# Patient Record
Sex: MALE | Race: WHITE | NOT HISPANIC OR LATINO
[De-identification: names, ages, dates, MRNs, and addresses within clinical notes are randomized per-mention and may not be internally consistent; named-entity substitution may affect disease eponyms.]

---

## 2020-08-13 ENCOUNTER — TRANSCRIPTION ENCOUNTER (OUTPATIENT)
Age: 20
End: 2020-08-13

## 2020-08-15 ENCOUNTER — EMERGENCY (EMERGENCY)
Facility: HOSPITAL | Age: 20
LOS: 1 days | Discharge: HOME | End: 2020-08-15
Attending: EMERGENCY MEDICINE | Admitting: EMERGENCY MEDICINE
Payer: COMMERCIAL

## 2020-08-15 VITALS
HEART RATE: 90 BPM | RESPIRATION RATE: 18 BRPM | DIASTOLIC BLOOD PRESSURE: 75 MMHG | SYSTOLIC BLOOD PRESSURE: 128 MMHG | OXYGEN SATURATION: 98 % | TEMPERATURE: 97 F

## 2020-08-15 VITALS
SYSTOLIC BLOOD PRESSURE: 125 MMHG | RESPIRATION RATE: 18 BRPM | TEMPERATURE: 98 F | DIASTOLIC BLOOD PRESSURE: 75 MMHG | OXYGEN SATURATION: 97 % | HEART RATE: 88 BPM

## 2020-08-15 PROCEDURE — 10060 I&D ABSCESS SIMPLE/SINGLE: CPT | Mod: GC

## 2020-08-15 PROCEDURE — 99283 EMERGENCY DEPT VISIT LOW MDM: CPT | Mod: 25,GC

## 2020-08-15 RX ORDER — TETANUS TOXOID, REDUCED DIPHTHERIA TOXOID AND ACELLULAR PERTUSSIS VACCINE, ADSORBED 5; 2.5; 8; 8; 2.5 [IU]/.5ML; [IU]/.5ML; UG/.5ML; UG/.5ML; UG/.5ML
0.5 SUSPENSION INTRAMUSCULAR ONCE
Refills: 0 | Status: COMPLETED | OUTPATIENT
Start: 2020-08-15 | End: 2020-08-15

## 2020-08-15 RX ADMIN — Medication 300 MILLIGRAM(S): at 20:47

## 2020-08-15 RX ADMIN — TETANUS TOXOID, REDUCED DIPHTHERIA TOXOID AND ACELLULAR PERTUSSIS VACCINE, ADSORBED 0.5 MILLILITER(S): 5; 2.5; 8; 8; 2.5 SUSPENSION INTRAMUSCULAR at 20:47

## 2020-08-15 NOTE — ED PROVIDER NOTE - NSFOLLOWUPINSTRUCTIONS_ED_ALL_ED_FT
Abscess    An abscess is an infected area that contains a collection of pus and debris. It can occur in almost any part of the body and occurs when the tissue gets infection. Symptoms include a painful mass that is red, warm, tender that might break open and HAVE drainage. If your health care provider gave you antibiotics make sure to take the full course and do not stop even if feeling better.     SEEK IMMEDIATE MEDICAL CARE IF YOU HAVE ANY OF THE FOLLOWING SYMPTOMS: chills, fever, muscle aches, or red streaking from the area.     - Please discontinue Keflex    - Please take clindamycin 300mg every 6 hours for 10 days for abscess    - Please follow up with your Primary Care Doctor within 24-48 hours and bring your paperwork Abscess    An abscess is an infected area that contains a collection of pus and debris. It can occur in almost any part of the body and occurs when the tissue gets infection. Symptoms include a painful mass that is red, warm, tender that might break open and HAVE drainage. If your health care provider gave you antibiotics make sure to take the full course and do not stop even if feeling better.     SEEK IMMEDIATE MEDICAL CARE IF YOU HAVE ANY OF THE FOLLOWING SYMPTOMS: chills, fever, muscle aches, or red streaking from the area.     - Please discontinue Keflex    - Please take clindamycin 300mg every 6 hours for 10 days for abscess    - Please apply clean gauze every 24 hours to wound    - Please follow up with your Primary Care Doctor within 24-48 hours and bring your paperwork

## 2020-08-15 NOTE — ED PROVIDER NOTE - OBJECTIVE STATEMENT
20M h/o intellectual disability and non-verbal at baseline brought in from group home for "cyst" to right chest wall.  Unclear duration of lesion, currently on Keflex to treat presumed infection but little improvement.  Per group home attendant, no fevers and acting at baseline. 21y/o M w/ h/o intellectual disability and non-verbal at baseline brought in from group home for "cyst" to right chest wall.  Unclear duration of lesion, currently on Keflex to treat presumed infection but little improvement.  Per group home attendant, no fevers and acting at baseline.

## 2020-08-15 NOTE — ED PROVIDER NOTE - SKIN, MLM
+lesion over right upper anterior chest just under clavicle; 3x3cm, erythematous, fluctuant in center

## 2020-08-15 NOTE — ED PROCEDURE NOTE - PROCEDURE ADDITIONAL DETAILS
A [id number] perimeter abscess was drained with pus. loculations broken up and squeezed out with sterile 4x4 gauze. Abscess was irrigated with normal saline 3 x and then cleaned with a sterile gauze and tegaderm.

## 2020-08-15 NOTE — ED ADULT TRIAGE NOTE - CHIEF COMPLAINT QUOTE
Pt accompanied with staff from [facility] group home sent to ED for drainage of R. chest wall cyst. Pt currently on cephalexin. Pt refusing to keep face mask on. PMHx MR, nonverbal

## 2020-08-15 NOTE — ED PROVIDER NOTE - PATIENT PORTAL LINK FT
You can access the FollowMyHealth Patient Portal offered by [facility] by registering at the following website: [email or web address]. By joining [employer]’s FollowMyHealth portal, you will also be able to view your health information using other applications (apps) compatible with our system.

## 2020-08-15 NOTE — ED ADULT NURSE REASSESSMENT NOTE - NS ED NURSE REASSESS COMMENT FT1
received report from day shift Rn Paulina, pt resting comfortably. Group Home caretaker at bedside, pt in NAD. Meds given as ordered.

## 2020-08-15 NOTE — ED PROCEDURE NOTE - ATTENDING CONTRIBUTION TO CARE
Dr. Quarles:  I have personally performed a face to face bedside history and physical examination of this patient. I have discussed the history, examination, review of systems, assessment and plan of management with the resident. I have reviewed the electronic medical record and amended it to reflect my history, review of systems, physical exam, assessment and plan.

## 2021-01-22 ENCOUNTER — INPATIENT (INPATIENT)
Facility: HOSPITAL | Age: 21
LOS: 13 days | Discharge: HOME | End: 2021-02-05
Attending: STUDENT IN AN ORGANIZED HEALTH CARE EDUCATION/TRAINING PROGRAM | Admitting: STUDENT IN AN ORGANIZED HEALTH CARE EDUCATION/TRAINING PROGRAM
Payer: MEDICARE

## 2021-01-22 ENCOUNTER — EMERGENCY (EMERGENCY)
Facility: HOSPITAL | Age: 21
LOS: 0 days | Discharge: ANOTHER HOSPITAL | End: 2021-01-22
Attending: EMERGENCY MEDICINE
Payer: MEDICARE

## 2021-01-22 VITALS
HEART RATE: 86 BPM | OXYGEN SATURATION: 98 % | SYSTOLIC BLOOD PRESSURE: 138 MMHG | RESPIRATION RATE: 19 BRPM | DIASTOLIC BLOOD PRESSURE: 75 MMHG | WEIGHT: 315 LBS | TEMPERATURE: 99 F | HEIGHT: 70 IN

## 2021-01-22 DIAGNOSIS — K62.5 HEMORRHAGE OF ANUS AND RECTUM: ICD-10-CM

## 2021-01-22 DIAGNOSIS — D69.6 THROMBOCYTOPENIA, UNSPECIFIED: ICD-10-CM

## 2021-01-22 DIAGNOSIS — U07.1 COVID-19: ICD-10-CM

## 2021-01-22 PROBLEM — F79 UNSPECIFIED INTELLECTUAL DISABILITIES: Chronic | Status: ACTIVE | Noted: 2020-08-15

## 2021-01-22 LAB
ALBUMIN SERPL ELPH-MCNC: 3.2 G/DL — LOW (ref 3.3–5)
ALP SERPL-CCNC: 62 U/L — SIGNIFICANT CHANGE UP (ref 40–120)
ALT FLD-CCNC: 27 U/L — SIGNIFICANT CHANGE UP (ref 12–78)
ANION GAP SERPL CALC-SCNC: 5 MMOL/L — SIGNIFICANT CHANGE UP (ref 5–17)
ANISOCYTOSIS BLD QL: SLIGHT — SIGNIFICANT CHANGE UP
APTT BLD: 32.1 SEC — SIGNIFICANT CHANGE UP (ref 27.5–35.5)
AST SERPL-CCNC: 17 U/L — SIGNIFICANT CHANGE UP (ref 15–37)
BASOPHILS # BLD AUTO: 0.02 K/UL — SIGNIFICANT CHANGE UP (ref 0–0.2)
BASOPHILS NFR BLD AUTO: 0.3 % — SIGNIFICANT CHANGE UP (ref 0–2)
BILIRUB SERPL-MCNC: 0.4 MG/DL — SIGNIFICANT CHANGE UP (ref 0.2–1.2)
BLD GP AB SCN SERPL QL: SIGNIFICANT CHANGE UP
BUN SERPL-MCNC: 14 MG/DL — SIGNIFICANT CHANGE UP (ref 7–23)
CALCIUM SERPL-MCNC: 8.5 MG/DL — SIGNIFICANT CHANGE UP (ref 8.5–10.1)
CHLORIDE SERPL-SCNC: 104 MMOL/L — SIGNIFICANT CHANGE UP (ref 96–108)
CO2 SERPL-SCNC: 30 MMOL/L — SIGNIFICANT CHANGE UP (ref 22–31)
CREAT SERPL-MCNC: 1.11 MG/DL — SIGNIFICANT CHANGE UP (ref 0.5–1.3)
EOSINOPHIL # BLD AUTO: 0.02 K/UL — SIGNIFICANT CHANGE UP (ref 0–0.5)
EOSINOPHIL NFR BLD AUTO: 0.3 % — SIGNIFICANT CHANGE UP (ref 0–6)
GLUCOSE SERPL-MCNC: 96 MG/DL — SIGNIFICANT CHANGE UP (ref 70–99)
HCT VFR BLD CALC: 44.2 % — SIGNIFICANT CHANGE UP (ref 39–50)
HGB BLD-MCNC: 13.7 G/DL — SIGNIFICANT CHANGE UP (ref 13–17)
IMM GRANULOCYTES NFR BLD AUTO: 0.2 % — SIGNIFICANT CHANGE UP (ref 0–1.5)
INR BLD: 1.14 RATIO — SIGNIFICANT CHANGE UP (ref 0.88–1.16)
LYMPHOCYTES # BLD AUTO: 1.91 K/UL — SIGNIFICANT CHANGE UP (ref 1–3.3)
LYMPHOCYTES # BLD AUTO: 30.4 % — SIGNIFICANT CHANGE UP (ref 13–44)
MANUAL SMEAR VERIFICATION: YES — SIGNIFICANT CHANGE UP
MCHC RBC-ENTMCNC: 25.4 PG — LOW (ref 27–34)
MCHC RBC-ENTMCNC: 31 GM/DL — LOW (ref 32–36)
MCV RBC AUTO: 82 FL — SIGNIFICANT CHANGE UP (ref 80–100)
MONOCYTES # BLD AUTO: 0.78 K/UL — SIGNIFICANT CHANGE UP (ref 0–0.9)
MONOCYTES NFR BLD AUTO: 12.4 % — SIGNIFICANT CHANGE UP (ref 2–14)
NEUTROPHILS # BLD AUTO: 3.55 K/UL — SIGNIFICANT CHANGE UP (ref 1.8–7.4)
NEUTROPHILS NFR BLD AUTO: 56.4 % — SIGNIFICANT CHANGE UP (ref 43–77)
NRBC # BLD: 0 /100 WBCS — SIGNIFICANT CHANGE UP (ref 0–0)
PLAT MORPH BLD: NORMAL — SIGNIFICANT CHANGE UP
PLATELET # BLD AUTO: 1 K/UL — CRITICAL LOW (ref 150–400)
POTASSIUM SERPL-MCNC: 3.7 MMOL/L — SIGNIFICANT CHANGE UP (ref 3.5–5.3)
POTASSIUM SERPL-SCNC: 3.7 MMOL/L — SIGNIFICANT CHANGE UP (ref 3.5–5.3)
PROT SERPL-MCNC: 6.8 GM/DL — SIGNIFICANT CHANGE UP (ref 6–8.3)
PROTHROM AB SERPL-ACNC: 13.1 SEC — SIGNIFICANT CHANGE UP (ref 10.6–13.6)
RBC # BLD: 5.39 M/UL — SIGNIFICANT CHANGE UP (ref 4.2–5.8)
RBC # FLD: 14.6 % — HIGH (ref 10.3–14.5)
RBC BLD AUTO: SIGNIFICANT CHANGE UP
SODIUM SERPL-SCNC: 139 MMOL/L — SIGNIFICANT CHANGE UP (ref 135–145)
WBC # BLD: 6.29 K/UL — SIGNIFICANT CHANGE UP (ref 3.8–10.5)
WBC # FLD AUTO: 6.29 K/UL — SIGNIFICANT CHANGE UP (ref 3.8–10.5)

## 2021-01-22 PROCEDURE — 99284 EMERGENCY DEPT VISIT MOD MDM: CPT

## 2021-01-22 PROCEDURE — 70450 CT HEAD/BRAIN W/O DYE: CPT | Mod: 26,MA

## 2021-01-22 NOTE — ED ADULT NURSE REASSESSMENT NOTE - NS ED NURSE REASSESS COMMENT FT1
patient in no acute distress no changes in patient status, no available beds at this time , mother little upset about it

## 2021-01-22 NOTE — ED PROVIDER NOTE - OBJECTIVE STATEMENT
19yo obese male from group home\ presents with some BRB noted when wiping at group home and noted some on diaper. Pt in USOH, eating drinking normally, no apparent pain. no prior h/o GI bleed. Family did note some bruising. no epistaxis. [facility]: [phone], dr dove ([place]). other numbers ([phone], [phone], [phone], [phone], [phone])    pmh autism, nonverbal and h/o ITP since 18months. Pt was treated with steroids and IVIG in past. No treatment for past 10 years, but pt normally runs low in [id number]. Pt does get routine lab work. Pt normally asymptomatic, but occasional bruising.     No fever/chills, no SOB/cough, No abdominal pain, No V/D,  no changes in neurological status/function.

## 2021-01-22 NOTE — ED ADULT NURSE REASSESSMENT NOTE - NS ED NURSE REASSESS COMMENT FT1
Claire the [facility] aware patient mother is upset because the patient still has no bed she will come to talk with mother

## 2021-01-22 NOTE — ED PROVIDER NOTE - CARE PLAN
Principal Discharge DX:	Rectal bleed   Principal Discharge DX:	Rectal bleed  Secondary Diagnosis:	Thrombocytopenia

## 2021-01-22 NOTE — ED PROVIDER NOTE - PHYSICAL EXAMINATION
Gen: Alert, Well appearing. NAD    Head: NC, AT, PERRL, normal lids/conjunctiva   Neck: supple, no tenderness/meningismus  Pulm: Bilateral clear BS, normal resp effort  CV: RRR, no M/R/G, +dist pulses   Abd: soft, NT/ND, +BS, no guarding/rebound tenderness  Rectal: scant BRB on diaper, no hemorrhoids, masses, + scant BRB on glove  Mskel:  no edema/erythema/cyanosis   Skin: no rash, no bruising  Neuro: AAOx3, no sensory/motor deficits, CN 2-12 intact

## 2021-01-22 NOTE — ED PROVIDER NOTE - PROGRESS NOTE DETAILS
Janeth; pt signed out to me at 7 pm. platelets 1k, CT-H ordered to r/o spontaneous bleed. discussed results with parents, transfer to [facility] for hematology c/s and IVIG (no hematologist on call at [facility])

## 2021-01-23 VITALS
HEIGHT: 70 IN | OXYGEN SATURATION: 95 % | DIASTOLIC BLOOD PRESSURE: 69 MMHG | RESPIRATION RATE: 16 BRPM | TEMPERATURE: 97 F | SYSTOLIC BLOOD PRESSURE: 111 MMHG | HEART RATE: 98 BPM

## 2021-01-23 DIAGNOSIS — D69.3 IMMUNE THROMBOCYTOPENIC PURPURA: ICD-10-CM

## 2021-01-23 DIAGNOSIS — K92.2 GASTROINTESTINAL HEMORRHAGE, UNSPECIFIED: ICD-10-CM

## 2021-01-23 DIAGNOSIS — Z29.9 ENCOUNTER FOR PROPHYLACTIC MEASURES, UNSPECIFIED: ICD-10-CM

## 2021-01-23 DIAGNOSIS — F79 UNSPECIFIED INTELLECTUAL DISABILITIES: ICD-10-CM

## 2021-01-23 LAB
ALBUMIN SERPL ELPH-MCNC: 3.6 G/DL — SIGNIFICANT CHANGE UP (ref 3.3–5)
ALP SERPL-CCNC: 64 U/L — SIGNIFICANT CHANGE UP (ref 40–120)
ALT FLD-CCNC: 18 U/L — SIGNIFICANT CHANGE UP (ref 4–41)
ANION GAP SERPL CALC-SCNC: 11 MMOL/L — SIGNIFICANT CHANGE UP (ref 7–14)
APTT BLD: 31.4 SEC — SIGNIFICANT CHANGE UP (ref 27–36.3)
AST SERPL-CCNC: 19 U/L — SIGNIFICANT CHANGE UP (ref 4–40)
BASOPHILS # BLD AUTO: 0 K/UL — SIGNIFICANT CHANGE UP (ref 0–0.2)
BASOPHILS NFR BLD AUTO: 0 % — SIGNIFICANT CHANGE UP (ref 0–2)
BILIRUB SERPL-MCNC: 0.4 MG/DL — SIGNIFICANT CHANGE UP (ref 0.2–1.2)
BLD GP AB SCN SERPL QL: NEGATIVE — SIGNIFICANT CHANGE UP
BUN SERPL-MCNC: 20 MG/DL — SIGNIFICANT CHANGE UP (ref 7–23)
CALCIUM SERPL-MCNC: 8.8 MG/DL — SIGNIFICANT CHANGE UP (ref 8.4–10.5)
CHLORIDE SERPL-SCNC: 103 MMOL/L — SIGNIFICANT CHANGE UP (ref 98–107)
CO2 SERPL-SCNC: 25 MMOL/L — SIGNIFICANT CHANGE UP (ref 22–31)
CREAT SERPL-MCNC: 0.95 MG/DL — SIGNIFICANT CHANGE UP (ref 0.5–1.3)
EOSINOPHIL # BLD AUTO: 0 K/UL — SIGNIFICANT CHANGE UP (ref 0–0.5)
EOSINOPHIL NFR BLD AUTO: 0 % — SIGNIFICANT CHANGE UP (ref 0–6)
FERRITIN SERPL-MCNC: 53 NG/ML — SIGNIFICANT CHANGE UP (ref 30–400)
FIBRINOGEN PPP-MCNC: 536 MG/DL — HIGH (ref 290–520)
GLUCOSE SERPL-MCNC: 96 MG/DL — SIGNIFICANT CHANGE UP (ref 70–99)
HCT VFR BLD CALC: 44.2 % — SIGNIFICANT CHANGE UP (ref 39–50)
HCT VFR BLD CALC: 48.4 % — SIGNIFICANT CHANGE UP (ref 39–50)
HGB BLD-MCNC: 13.4 G/DL — SIGNIFICANT CHANGE UP (ref 13–17)
HGB BLD-MCNC: 14.7 G/DL — SIGNIFICANT CHANGE UP (ref 13–17)
IANC: 3.83 K/UL — SIGNIFICANT CHANGE UP (ref 1.5–8.5)
INR BLD: 1.16 RATIO — SIGNIFICANT CHANGE UP (ref 0.88–1.16)
LDH SERPL L TO P-CCNC: 183 U/L — SIGNIFICANT CHANGE UP (ref 135–225)
LYMPHOCYTES # BLD AUTO: 0.99 K/UL — LOW (ref 1–3.3)
LYMPHOCYTES # BLD AUTO: 15.8 % — SIGNIFICANT CHANGE UP (ref 13–44)
MCHC RBC-ENTMCNC: 25.1 PG — LOW (ref 27–34)
MCHC RBC-ENTMCNC: 25.6 PG — LOW (ref 27–34)
MCHC RBC-ENTMCNC: 30.3 GM/DL — LOW (ref 32–36)
MCHC RBC-ENTMCNC: 30.4 GM/DL — LOW (ref 32–36)
MCV RBC AUTO: 82.6 FL — SIGNIFICANT CHANGE UP (ref 80–100)
MCV RBC AUTO: 84.4 FL — SIGNIFICANT CHANGE UP (ref 80–100)
MONOCYTES # BLD AUTO: 0.55 K/UL — SIGNIFICANT CHANGE UP (ref 0–0.9)
MONOCYTES NFR BLD AUTO: 8.8 % — SIGNIFICANT CHANGE UP (ref 2–14)
NEUTROPHILS # BLD AUTO: 4.3 K/UL — SIGNIFICANT CHANGE UP (ref 1.8–7.4)
NEUTROPHILS NFR BLD AUTO: 66.7 % — SIGNIFICANT CHANGE UP (ref 43–77)
NRBC # BLD: 0 /100 WBCS — SIGNIFICANT CHANGE UP
NRBC # FLD: 0 K/UL — SIGNIFICANT CHANGE UP
OB PNL STL: POSITIVE
PLATELET # BLD AUTO: 13 K/UL — CRITICAL LOW (ref 150–400)
PLATELET # BLD AUTO: 2 K/UL — CRITICAL LOW (ref 150–400)
POTASSIUM SERPL-MCNC: 4.2 MMOL/L — SIGNIFICANT CHANGE UP (ref 3.5–5.3)
POTASSIUM SERPL-SCNC: 4.2 MMOL/L — SIGNIFICANT CHANGE UP (ref 3.5–5.3)
PROT SERPL-MCNC: 6.5 G/DL — SIGNIFICANT CHANGE UP (ref 6–8.3)
PROTHROM AB SERPL-ACNC: 13.2 SEC — SIGNIFICANT CHANGE UP (ref 10.6–13.6)
RBC # BLD: 5.24 M/UL — SIGNIFICANT CHANGE UP (ref 4.2–5.8)
RBC # BLD: 5.86 M/UL — HIGH (ref 4.2–5.8)
RBC # FLD: 14.5 % — SIGNIFICANT CHANGE UP (ref 10.3–14.5)
RBC # FLD: 14.6 % — HIGH (ref 10.3–14.5)
RH IG SCN BLD-IMP: POSITIVE — SIGNIFICANT CHANGE UP
RH IG SCN BLD-IMP: POSITIVE — SIGNIFICANT CHANGE UP
SODIUM SERPL-SCNC: 139 MMOL/L — SIGNIFICANT CHANGE UP (ref 135–145)
WBC # BLD: 5.09 K/UL — SIGNIFICANT CHANGE UP (ref 3.8–10.5)
WBC # BLD: 6.29 K/UL — SIGNIFICANT CHANGE UP (ref 3.8–10.5)
WBC # FLD AUTO: 5.09 K/UL — SIGNIFICANT CHANGE UP (ref 3.8–10.5)
WBC # FLD AUTO: 6.29 K/UL — SIGNIFICANT CHANGE UP (ref 3.8–10.5)

## 2021-01-23 PROCEDURE — 99223 1ST HOSP IP/OBS HIGH 75: CPT | Mod: GC

## 2021-01-23 PROCEDURE — 99222 1ST HOSP IP/OBS MODERATE 55: CPT | Mod: GC

## 2021-01-23 PROCEDURE — 99223 1ST HOSP IP/OBS HIGH 75: CPT

## 2021-01-23 PROCEDURE — 99285 EMERGENCY DEPT VISIT HI MDM: CPT | Mod: CS

## 2021-01-23 RX ORDER — PANTOPRAZOLE SODIUM 20 MG/1
80 TABLET, DELAYED RELEASE ORAL ONCE
Refills: 0 | Status: DISCONTINUED | OUTPATIENT
Start: 2021-01-23 | End: 2021-01-23

## 2021-01-23 RX ORDER — DEXAMETHASONE 0.5 MG/5ML
40 ELIXIR ORAL ONCE
Refills: 0 | Status: COMPLETED | OUTPATIENT
Start: 2021-01-23 | End: 2021-01-23

## 2021-01-23 RX ORDER — IMMUNE GLOBULIN (HUMAN) 10 G/100ML
105 INJECTION INTRAVENOUS; SUBCUTANEOUS ONCE
Refills: 0 | Status: DISCONTINUED | OUTPATIENT
Start: 2021-01-23 | End: 2021-01-23

## 2021-01-23 RX ORDER — IMMUNE GLOBULIN (HUMAN) 10 G/100ML
105 INJECTION INTRAVENOUS; SUBCUTANEOUS DAILY
Refills: 0 | Status: DISCONTINUED | OUTPATIENT
Start: 2021-01-23 | End: 2021-01-24

## 2021-01-23 RX ORDER — TRAZODONE HCL 50 MG
300 TABLET ORAL AT BEDTIME
Refills: 0 | Status: DISCONTINUED | OUTPATIENT
Start: 2021-01-23 | End: 2021-02-05

## 2021-01-23 RX ORDER — INFLUENZA VIRUS VACCINE 15; 15; 15; 15 UG/.5ML; UG/.5ML; UG/.5ML; UG/.5ML
0.5 SUSPENSION INTRAMUSCULAR ONCE
Refills: 0 | Status: DISCONTINUED | OUTPATIENT
Start: 2021-01-23 | End: 2021-02-05

## 2021-01-23 RX ORDER — BREXPIPRAZOLE 0.25 MG/1
6 TABLET ORAL DAILY
Refills: 0 | Status: DISCONTINUED | OUTPATIENT
Start: 2021-01-23 | End: 2021-02-05

## 2021-01-23 RX ORDER — DEXAMETHASONE 0.5 MG/5ML
40 ELIXIR ORAL DAILY
Refills: 0 | Status: COMPLETED | OUTPATIENT
Start: 2021-01-24 | End: 2021-01-26

## 2021-01-23 RX ADMIN — Medication 40 MILLIGRAM(S): at 06:06

## 2021-01-23 RX ADMIN — Medication 120 MILLIGRAM(S): at 05:33

## 2021-01-23 RX ADMIN — Medication 1 MILLIGRAM(S): at 22:49

## 2021-01-23 RX ADMIN — IMMUNE GLOBULIN (HUMAN) 175 GRAM(S): 10 INJECTION INTRAVENOUS; SUBCUTANEOUS at 15:03

## 2021-01-23 RX ADMIN — BREXPIPRAZOLE 6 MILLIGRAM(S): 0.25 TABLET ORAL at 22:49

## 2021-01-23 NOTE — CONSULT NOTE ADULT - ASSESSMENT
20M w/ autism (non-verbal), chronic ITP, CARLITOS (not on CPAP) and ? acid reflux, transferred from [facility] for Plt 1 and BRBPR. MICU consulted for low platelets in the setting of possible bleeding event.     Assessment:  - Unlikely to have major GIB given reported hx and current presentation: Hgb stable, HD stable, no BM x 24 hours, no signs of discomfort  - Possible hemorrhoidal bleed  - Heme following: Will give IVIG and Decadron  - No concerning signs of infection; no change in medications    Recommendations:  - C/w IVIG and Decadron per heme rec  - No Plt transfusion unless absolutely indicated  - Monitor for further episode of bleeding, and monitor hemodynamics  - Not a MICU candidate at this time. Please call back if (+) worsening clinical conditions.

## 2021-01-23 NOTE — CHART NOTE - NSCHARTNOTEFT_GEN_A_CORE
Heme Note    21 yo M hx of chronic ITP (since 18 months per ED),  follows with hematology at [place], autism presented from [facility] VS with plt 1 and BRBPR.  Rest of CBC within normal limits, CMP normal and coags normal.  CT head negative for bleed.  ED reports BRBPR has stopped and no other bleeding at this time.    Recommend  - platelet transfusion for count less <30,000 and active bleeding (per ED, patient's mother has refused at this time)  - recheck CBC  - check retic count, LDH, haptoglobin, HIV, hepatitis panel  - start dexamethasone 40 mg x 4 days  - start IVIG 1g/kg daily x 2-3 days  - monitor for bleeding closely  - obtain records from hematologist  - full consult to follow in AM    Shavon Nicolas DO  Hematology/Oncology Fellow, PGY6  Pager: [phone]

## 2021-01-23 NOTE — ED PROVIDER NOTE - CARE PLAN
Principal Discharge DX:	Idiopathic thrombocytopenia purpura   Principal Discharge DX:	Idiopathic thrombocytopenia purpura  Secondary Diagnosis:	GI bleed

## 2021-01-23 NOTE — ED ADULT NURSE NOTE - CHIEF COMPLAINT QUOTE
coming from [place] c/o rectal bleeding. Pt from group home and when he went to the bathroom they saw bright red blood in the toilet and his pullup. As per EMS platelet count at [place] was found to be 1. Past medical history ITP. Pt autistic, non-verbal at baseline, mother at bedside.

## 2021-01-23 NOTE — H&P ADULT - PROBLEM SELECTOR PLAN 1
acute on chronic, unclear last platelet count, please call [phone] Dr. Blanco's office on Monday to obtain baseline platelet count and additional collateral  platelet ct 2000 on admission  heme recc: 1u platelet STAT, dexamethasone 40 mg daily x 4 days (1/23-1/26), IVIG 1g/kg daily x 3 days (1/23-1/25)  transfuse platelet < 30,000 although pt's mother refuses transfusion given his baseline is possibly much lower  obtain post transfusion CBC, retic count, LDH, haptoglobin, HIV, hepatitis panel with next lab draw   per mother pt had severe rxn to chronic suppressive therapy such as rituximab and WhinRho leading to MICU admission  pt not a MICU candidate given hemodynamic stability  CT head negative for ICH  hold cimetidine for ?GERD until platelets stabilize given case reports of causing thrombocytopenia acute on chronic, unclear last platelet count, please call [phone] Dr. Blanco's office on Monday to obtain baseline platelet count and additional collateral  platelet ct 2000 on admission, fibrinogen inconsistent with DIC  heme recc: 1u platelet STAT, dexamethasone 40 mg daily x 4 days (1/23-1/26), IVIG 1g/kg daily x 3 days (1/23-1/25)  transfuse platelet < 30,000 although pt's mother refuses transfusion given his baseline is possibly much lower  obtain post transfusion CBC, retic count, LDH, haptoglobin, HIV, hepatitis panel with next lab draw   per mother pt had severe rxn to chronic suppressive therapy such as rituximab and WhinRho leading to MICU admission  pt not a MICU candidate given hemodynamic stability  CT head negative for ICH  hold cimetidine for ?GERD until platelets stabilize given case reports of causing thrombocytopenia acute on chronic, unclear last platelet count, please call [phone] Dr. Blanco's office on Monday to obtain baseline platelet count and additional collateral  platelet ct 2000 on admission, fibrinogen inconsistent with DIC  heme recc: 1u platelet STAT, dexamethasone 40 mg daily x 4 days (1/23-1/26), IVIG 1g/kg daily x 3 days (1/23-1/25)  transfuse platelet < 30,000 although pt's mother refuses transfusion given his baseline is possibly much lower  obtain COVID 19 PCR and post transfusion CBC, retic count, LDH, haptoglobin, HIV, hepatitis panel with next lab draw   pt not a MICU candidate given hemodynamic stability  CT head negative for ICH  hold cimetidine for ?GERD until platelets stabilize given case reports of causing thrombocytopenia  per mother pt had severe rxn to chronic suppressive therapy such as rituximab and WhinRho leading to MICU admission

## 2021-01-23 NOTE — CONSULT NOTE ADULT - ASSESSMENT
21 yo M hx of chronic ITP (since 18 months per ED),  follows with hematology at [place], autism presented from [facility] VS with plt 1 and BRBPR.    # History of chronic ITP  - Recommend platelet transfusion for count less <30,000 or active bleeding (per ED, patient's mother has refused at this time)  - Check CBC daily  - Please check retic count, LDH, haptoglobin, HIV, hepatitis panel  - Continue dexamethasone 40 mg daily x 4 days (1/23-1/26)  - Continue IVIG 1g/kg daily x 3 days (1/23-1/25)  - Monitor closely for s/s of active bleeding  - Please obtain records from outside Hematologist for review including most recent lab work and progress notes  - Peripheral smear will be reviewed    Gabbi Phillips MD  Hematology/Oncology Fellow, PGY-4  Pager: [phone]  After 5pm and on weekends please page on-call fellow   21 yo M hx of chronic ITP (since 18 months per ED),  follows with hematology at [place], autism presented from [facility] VS with plt 1 and BRBPR.    # History of chronic ITP  - Recommend platelet transfusion for count less <30,000 or active bleeding (per ED, patient's mother has refused at this time)  - Check CBC daily  - Please check retic count, LDH, haptoglobin, HIV, hepatitis panel  - Continue dexamethasone 40 mg daily x 4 days (1/23-1/26)  - Continue IVIG 1g/kg daily x 2 days (1/23-1/24)  - Monitor closely for s/s of active bleeding  - Please obtain records from outside Hematologist for review including most recent lab work and progress notes  - Peripheral smear will be reviewed    Gabbi Phillips MD  Hematology/Oncology Fellow, PGY-4  Pager: [phone]  After 5pm and on weekends please page on-call fellow   21 yo M hx of chronic ITP (since 18 months per ED),  follows with hematology at [place], autism presented from [facility] VS with plt 1 and BRBPR.    # History of chronic ITP  - Recommend platelet transfusion for count less <30,000 or active bleeding - 1u Plt currently running and no active bleeding noted per patient's mother at bedside currently  - Please check retic count, LDH, haptoglobin, HIV, hepatitis panel  - Continue dexamethasone 40 mg daily x 4 days (1/23-1/26)  - Continue IVIG 1g/kg daily x 2 days (1/23-1/24)  - Monitor closely for s/s of active bleeding, stat CBC  - Please obtain records from outside Hematologist for review including most recent lab work and progress notes  - Peripheral smear will be reviewed    Gabbi Phillips MD  Hematology/Oncology Fellow, PGY-4  Pager: [phone]  After 5pm and on weekends please page on-call fellow

## 2021-01-23 NOTE — ED PROVIDER NOTE - ATTENDING CONTRIBUTION TO CARE
HPI: 19yo obese M from group home accompanied by mother, autism, nonverbal and h/o chronic ITP c/b chronic bruising since 18months presents as transfer from [facility] with BRBPR in his diaper and on wiping, +platelet of 1. CTH at [facility] neg for bleed. Pt was treated with steroids and IVIG in past. No treatments needed for past 10 years. Pt does not take any medications. Per mom pt has been acting normally and no complaints and has been tolerating PO without issue.   EXAM: Obese, NAD, watching ipad, abd soft nontender, rectal (see resident note).   MDM: Pt with multiple medical problems that is non verbal here with mom, transferred from [facility] for reports of GIB and found to have platelets of 1000. Known to have ITP, mom reports pt has had multiple transfusions and IVIG in past. Will obtain repeat labs, consult Hematology and admit.

## 2021-01-23 NOTE — H&P ADULT - NSHPSOCIALHISTORY_GEN_ALL_CORE
Lives at [facility]. Parents are undergoing divorce. Mother denies toxic habits. Ambulates independently.

## 2021-01-23 NOTE — CONSULT NOTE ADULT - SUBJECTIVE AND OBJECTIVE BOX
HPI:  20M w/ autism (non-verbal), chronic ITP, CARLITOS (not on CPAP) and ? acid reflux, transferred from [facility] for Plt 1 and BRBPR.   Mother Nicole is at bedside and providing history.   Patient lives in a group home, and mother was notified about "GI bleed" today after patient was sent to urgent care then ED. "GI bleed" was described as bright red blood while wiping, and bloody streak on the diaper. Mother saw blood in diaper while in [facility], and reported a thin streak of bright red smear of blood along the middle of the diaper. Otherwise no visualized gross bleeding, and no BM since yesterday. Patient is non-verbal, so no known complaints. Per mother, patient would usually moan and became restlessness with discomfort, which she has not observed today. No known sick contacts in group home, and no recent signs of illness. Denied fever, cough, difficulty breathing, change in appetite, abdominal discomfort, diarrhea, discomfort with urination or BLE pain.   Patient was diagnosed with ITP since 18 months. Plt usually runs in 10K range. Follows with hematology at [place]. Over the years, they have become more conservative in treatments given that patient would have very low level of platelets without major bleeding events. Patient was treated with IVIG and steroids in the remote past. Last treatment with steroids was about 10 years ago. Last episode of major flare was about 5 years ago when he had diffuse petechiae without bleeding, and plt was about 5K at that time.  No recent labs until this episode.       PAST MEDICAL & SURGICAL HISTORY:  Intellectual disability    No significant past surgical history        Allergies    Bactrim (Hives)  Rituxan (Hives)  WinRho SDF (Hives)    Intolerances        MEDICATIONS  (STANDING):  immune   globulin 10% (GAMMAGARD) IVPB 105 Gram(s) IV Intermittent Once  influenza   Vaccine 0.5 milliLiter(s) IntraMuscular once    MEDICATIONS  (PRN):      FAMILY HISTORY:      SOCIAL HISTORY: No EtOH, no tobacco    REVIEW OF SYSTEMS:  Unable to obtain, patient non-verbal    Height (cm): 180.3 (01-23 @ 07:47), 177.8 (01-22 @ 16:27)  Weight (kg): 151.6 (01-23 @ 07:47), 158.8 (01-22 @ 16:27)  BMI (kg/m2): 46.6 (01-23 @ 07:47), 50.2 (01-22 @ 16:27)  BSA (m2): 2.62 (01-23 @ 07:47), 2.65 (01-22 @ 16:27)    T(F): 98 (01-23-21 @ 07:47), Max: 99.3 (01-23-21 @ 02:40)  HR: 88 (01-23-21 @ 07:47)  BP: 117/66 (01-23-21 @ 07:47)  RR: 18 (01-23-21 @ 07:47)  SpO2: 95% (01-23-21 @ 07:47)  Wt(kg): --    GENERAL: NAD, Resting in bed, sleeping  Eyes: Not assessed due to sleeping  HENT:  Head atraumatic  NECK: Supple  CHEST/LUNG: Clear to auscultation bilaterally, though difficult exam due to obesity and patient position; No rales, rhonchi, wheezing, or rubs. Unlabored respirations on room air; (+) snoring  HEART: Regular rate and rhythm; No murmurs, rubs, or gallops appreciated  ABDOMEN: Bowel sounds present; Soft, Nontender, Nondistended  EXTREMITIES:  2+ Peripheral Pulses, brisk capillary refill. No clubbing, cyanosis, or edema  NERVOUS SYSTEM: Sleeping, non-verbal at baseline, not fully assessed 2/2 sleeping; spontaneously moving all extremities and turning in bed  SKIN: (+) scattered petechia and faint ecchymoses throughout the body, but no clusters; chronic appearing dark discoloration at b/l ankles with dry skin                          13.4   6.29  )-----------( 2        ( 23 Jan 2021 03:50 )             44.2       01-23    139  |  103  |  20  ----------------------------<  96  4.2   |  25  |  0.95    Ca    8.8      23 Jan 2021 03:50    TPro  6.5  /  Alb  3.6  /  TBili  0.4  /  DBili  x   /  AST  19  /  ALT  18  /  AlkPhos  64  01-23      Lactate Dehydrogenase, Serum: 183 U/L (01-23 @ 03:50)      PT/INR - ( 23 Jan 2021 03:50 )   PT: 13.2 sec;   INR: 1.16 ratio         PTT - ( 23 Jan 2021 03:50 )  PTT:31.4 sec     HPI:  20M w/ autism (non-verbal), chronic ITP, CARLITOS (not on CPAP) and ? acid reflux, transferred from [facility] for Plt 1 and BRBPR.   Mother Nicole is at bedside and providing history.   Patient lives in a group home, and mother was notified about "GI bleed" today after patient was sent to urgent care then ED. "GI bleed" was described as bright red blood while wiping, and bloody streak on the diaper. Mother saw blood in diaper while in [facility], and reported a thin streak of bright red smear of blood along the middle of the diaper. Otherwise no visualized gross bleeding, and no BM since yesterday. Patient is non-verbal, so no known complaints. Per mother, patient would usually moan and became restlessness with discomfort, which she has not observed today. No known sick contacts in group home, and no recent signs of illness. Denied fever, cough, difficulty breathing, change in appetite, abdominal discomfort, diarrhea, discomfort with urination or BLE pain.   Patient was diagnosed with ITP since 18 months. Plt usually runs in 10K range, but she notes he hasn't had regular CBCs in a long time, years. Follows with hematology at [place]. Over the years, they have become more conservative in treatments given that patient would have very low level of platelets without major bleeding events. Patient was treated with IVIG and steroids in the remote past. Last treatment with steroids was about 10 years ago. Last episode of major flare was about 5 years ago when he had diffuse petechiae without bleeding, and plt was about 5K at that time.  No recent labs until this episode.       PAST MEDICAL & SURGICAL HISTORY:  Intellectual disability    No significant past surgical history        Allergies    Bactrim (Hives)  Rituxan (Hives)  WinRho SDF (Hives)    Intolerances        MEDICATIONS  (STANDING):  immune   globulin 10% (GAMMAGARD) IVPB 105 Gram(s) IV Intermittent Once  influenza   Vaccine 0.5 milliLiter(s) IntraMuscular once    MEDICATIONS  (PRN):      FAMILY HISTORY:      SOCIAL HISTORY: No EtOH, no tobacco    REVIEW OF SYSTEMS:  Unable to obtain, patient non-verbal    Height (cm): 180.3 (01-23 @ 07:47), 177.8 (01-22 @ 16:27)  Weight (kg): 151.6 (01-23 @ 07:47), 158.8 (01-22 @ 16:27)  BMI (kg/m2): 46.6 (01-23 @ 07:47), 50.2 (01-22 @ 16:27)  BSA (m2): 2.62 (01-23 @ 07:47), 2.65 (01-22 @ 16:27)    T(F): 98 (01-23-21 @ 07:47), Max: 99.3 (01-23-21 @ 02:40)  HR: 88 (01-23-21 @ 07:47)  BP: 117/66 (01-23-21 @ 07:47)  RR: 18 (01-23-21 @ 07:47)  SpO2: 95% (01-23-21 @ 07:47)  Wt(kg): --    GENERAL: NAD, Resting in bed, sleeping  Eyes: Not assessed due to sleeping  HENT:  Head atraumatic  NECK: Supple  CHEST/LUNG: Clear to auscultation bilaterally, though difficult exam due to obesity and patient position; No rales, rhonchi, wheezing, or rubs. Unlabored respirations on room air; (+) snoring  HEART: Regular rate and rhythm; No murmurs, rubs, or gallops appreciated  ABDOMEN: Bowel sounds present; Soft, Nontender, Nondistended  EXTREMITIES:  2+ Peripheral Pulses, brisk capillary refill. No clubbing, cyanosis, or edema  NERVOUS SYSTEM: Sleeping, non-verbal at baseline, not fully assessed 2/2 sleeping; spontaneously moving all extremities and turning in bed  SKIN: (+) scattered petechia and faint ecchymoses throughout the body, but no clusters; chronic appearing dark discoloration at b/l ankles with dry skin                          13.4   6.29  )-----------( 2        ( 23 Jan 2021 03:50 )             44.2       01-23    139  |  103  |  20  ----------------------------<  96  4.2   |  25  |  0.95    Ca    8.8      23 Jan 2021 03:50    TPro  6.5  /  Alb  3.6  /  TBili  0.4  /  DBili  x   /  AST  19  /  ALT  18  /  AlkPhos  64  01-23      Lactate Dehydrogenase, Serum: 183 U/L (01-23 @ 03:50)      PT/INR - ( 23 Jan 2021 03:50 )   PT: 13.2 sec;   INR: 1.16 ratio         PTT - ( 23 Jan 2021 03:50 )  PTT:31.4 sec

## 2021-01-23 NOTE — H&P ADULT - ASSESSMENT
20M w/ autism (non-verbal), chronic ITP, CARLITOS (not on CPAP) and ? acid reflux admitted for acute lower GI bleed in setting of acute on chronic ITP, unclear inciting factor. Hgb stable, hemodynamically stable.

## 2021-01-23 NOTE — ED PROVIDER NOTE - OBJECTIVE STATEMENT
19yo obese male from group home, autism, nonverbal and h/o chronic ITP c/n chronic bruising since 18months presents as transfer from  with BRBPR and platelet of 1. Pt was treated with steroids and IVIG in past. No treatment for past 10 years. Pt does not take any medications. 21yo obese male from group home, autism, nonverbal and h/o chronic ITP c/b chronic bruising since 18months presents as transfer from  with BRBPR found in his diaper and platelet of 1. CTH at [facility] neg for bleed. Pt was treated with steroids and IVIG in past. No treatments needed for past 10 years. Pt does not take any medications. 19yo obese male from group home, autism, nonverbal and h/o chronic ITP c/b chronic bruising since 18months presents as transfer from  with BRBPR in his diaper and on wiping, +platelet of 1. CTH at [facility] neg for bleed. Pt was treated with steroids and IVIG in past. No treatments needed for past 10 years. Pt does not take any medications. ***HISTORY PER MOM***  21yo obese male from group home, autism, nonverbal and h/o chronic ITP c/b chronic bruising since 18months presents as transfer from  with BRBPR in his diaper and on wiping, +platelet of 1. CTH at [facility] neg for bleed. Pt was treated with steroids and IVIG in past. No treatments needed for past 10 years. Pt does not take any medications.

## 2021-01-23 NOTE — CONSULT NOTE ADULT - ASSESSMENT
IMPRESSION:   #BRBPR: pt w/ episode of small volume BRBPR when wiping + on diaper. Remains HDS w/ Hb stable in 13s. Most likely ddx 2/2 hemorrhoids v less likely rectal ulcers, malignancy, diverticulosis, angiectasias. Pt without clinically significant GI bleeding at this time, would not recommend colonoscopy considering severe thrombocytopenia.       RECOMMENDATION:   - Trend CBC, transfuse Hb < 1  - PLT transfusion goals per heme (PLT < 30 or active bleeding) -- mom refused in the ED   -  Aloe wipes  - High fiber diet   - Due to severe thrombocytopenia, the risks of endoscopic intervention would far outweigh benefits       Thank you for involving us in the care of this patient, please reach out if any further questions.     Chuck Mccarthy MD  Gastroenterology Fellow, PGY4    Available on Microsoft Teams  [phone] ([facility])  [phone] ([facility])  Please contact on call fellow weekdays after 5pm-7am and weekends: [phone]          IMPRESSION:   #BRBPR: pt w/ episode of small volume BRBPR when wiping + on diaper. Remains HDS w/ Hb stable in 13s. Most likely LGIB exacerbated in the setting of thrombocytopenia, ddx 2/2 very likely hemorrhoids v less likely rectal ulcers, malignancy, diverticulosis, angiectasias. Pt without clinically significant GI bleeding at this time, would not recommend colonoscopy considering severe thrombocytopenia.       RECOMMENDATION:   - Trend CBC, transfuse Hb < 7  - PLT transfusion goals per heme (PLT < 30 or active bleeding) -- mom refused in the ED   -  Aloe wipes  - High fiber diet  - Miralax daily, titrate to 1 soft BM daily   - Due to severe thrombocytopenia, the risks of endoscopic intervention would far outweigh benefits       Thank you for involving us in the care of this patient, please reach out if any further questions.     Chuck Mccarthy MD  Gastroenterology Fellow, PGY4    Available on Microsoft Teams  [phone] ([facility])  [phone] ([facility])  Please contact on call fellow weekdays after 5pm-7am and weekends: [phone]

## 2021-01-23 NOTE — H&P ADULT - NSHPPHYSICALEXAM_GEN_ALL_CORE
Vital Signs Last 24 Hrs  T(C): 37.1 (23 Jan 2021 10:39), Max: 37.4 (23 Jan 2021 02:40)  T(F): 98.7 (23 Jan 2021 10:39), Max: 99.3 (23 Jan 2021 02:40)  HR: 82 (23 Jan 2021 10:39) (82 - 98)  BP: 126/74 (23 Jan 2021 10:39) (111/69 - 138/75)  BP(mean): --  RR: 18 (23 Jan 2021 10:39) (16 - 19)  SpO2: 99% (23 Jan 2021 10:39) (95% - 99%)    General: agitated, asking for food via special needs ipad, sitting upright in chair  Neurology: Unable to assess due to mental status, unable to follow simple commands  Eyes: PERRL, anicteric  ENT/Neck: Neck supple, trachea midline, No JVD  Respiratory: CTA B/L, No wheezing, rales, rhonchi  CV: RRR, S1S2, no murmurs, rubs or gallops  Abdominal: Soft, NT, ND +BS,   Extremities: No edema, + peripheral pulses  Skin: faint abdominal ecchymosis, b/l LE xerosis with chronic hyperpigmentation  MSK: No joint effusion or joint tenderness noted

## 2021-01-23 NOTE — H&P ADULT - PROBLEM SELECTOR PLAN 2
Hgb stable 13.4, FOBT pos, per ED physician, no noticeable external hemorrhoids  last BRBPR yesterday PM  ED consulted GI via email, pending reccs Hgb stable 13.4, FOBT pos, Coags wnl, per ED physician, no noticeable external hemorrhoids  last BRBPR yesterday PM  ED consulted GI via email, pending reccs

## 2021-01-23 NOTE — ED PROVIDER NOTE - PHYSICAL EXAMINATION
Physical Examination: Gen: NAD, non-toxic, conversational  Eyes: PERRLA, EOMI   HENT: Normocephalic, atraumatic. External ears normal, no rhinorrhea, moist mucous membranes.   CV: RRR, no M/R/G  Resp: CTAB, non-labored, speaking without difficulty on room air  Abd: soft, non-tender, non rigid, no guarding or rebound tenderness  Back: No CVAT bilaterally, no midline ttp  Skin: purpura to abd wall  Neuro: awake and alert, non verbal, NICHOLS without laterality  Psych: Mood okay, affect euthymic Physical Examination: Gen: NAD, non-toxic, conversational  Eyes: PERRLA, EOMI   HENT: Normocephalic, atraumatic. External ears normal, no rhinorrhea, moist mucous membranes.   CV: RRR, no M/R/G  Resp: CTAB, non-labored, speaking without difficulty on room air  Abd: soft, non-tender, non rigid, no guarding or rebound tenderness, brown stool on rectal exam (chaperone RN Kale)  Back: No CVAT bilaterally, no midline ttp  Skin: purpura to abd wall  Neuro: awake and alert, non verbal, NICHOLS without laterality  Psych: Mood okay, affect euthymic

## 2021-01-23 NOTE — CONSULT NOTE ADULT - SUBJECTIVE AND OBJECTIVE BOX
Chief Complaint:  Patient is a 20y old  Male who presents with a chief complaint of BRBPR (23 Jan 2021 10:44)      HPI: 20M w/ autism (non-verbal), chronic ITP, CARLITOS (not on CPAP) and ? acid reflux, transferred from [facility] for BRBPR.     Pt non verbal, history provided by mother who reported there was bright red blood when wiping and then a thin smear of blood in diaper. Denies tram hematochezia, melena, abd pain, n/v/d/f/c.     In ED VSS  Hb 13.7 --> 13.4   PLT 1 --> 2    Allergies:  Bactrim (Hives)  Rituxan (Hives)  WinRho SDF (Hives)      Home Medications:    Hospital Medications:  brexpiprazole 6 milliGRAM(s) Oral daily  immune   globulin 10% (GAMMAGARD) IVPB 105 Gram(s) IV Intermittent daily  influenza   Vaccine 0.5 milliLiter(s) IntraMuscular once  LORazepam     Tablet 1 milliGRAM(s) Oral at bedtime  traZODone 300 milliGRAM(s) Oral at bedtime      PMHX/PSHX:  Chronic ITP (idiopathic thrombocytopenia)    Intellectual disability    No significant past surgical history        Family history:  FH: hypertension        Denies family history of colon cancer/polyps, stomach cancer/polyps, pancreatic cancer/masses, liver cancer/disease, ovarian cancer and endometrial cancer.    Social History:     Tob: Denies  EtOH: Denies  Illicit Drugs: Denies    ROS:     General:  No wt loss, fevers, chills, night sweats, fatigue  Eyes:  Good vision, no reported pain  ENT:  No sore throat, pain, runny nose, dysphagia  CV:  No pain, palpitations, hypo/hypertension  Pulm:  No dyspnea, cough, tachypnea, wheezing  GI:  No pain, No nausea, No vomiting, No diarrhea, No constipation, No weight loss, No fever, No pruritis, No rectal bleeding, No tarry stools, No dysphagia,  :  No pain, bleeding, incontinence, nocturia  Muscle:  No pain, weakness  Neuro:  No weakness, tingling, memory problems  Psych:  No fatigue, insomnia, mood problems, depression  Endocrine:  No polyuria, polydipsia, cold/heat intolerance  Heme:  No petechiae, ecchymosis, easy bruisability  Skin:  No rash, tattoos, scars, edema    PHYSICAL EXAM:     GENERAL:  No acute distress  HEENT:  Normocephalic/atraumatic, no scleral icterus  CHEST:  Clear to auscultation bilaterally, no wheezes/rales/ronchi, no accessory muscle use  HEART:  Regular rate and rhythm, no murmurs/rubs/gallops  ABDOMEN:  Soft, non-tender, non-distended, normoactive bowel sounds,  no masses, no hepato-splenomegaly, no signs of chronic liver disease  EXTREMITIES: No cyanosis, clubbing, or edema  SKIN:  No rash/erythema/ecchymoses/petechiae/wounds/abscess/warm/dry  NEURO:  Alert and oriented x 3, no asterixis    Vital Signs:  Vital Signs Last 24 Hrs  T(C): 37.1 (23 Jan 2021 10:45), Max: 37.4 (23 Jan 2021 02:40)  T(F): 98.7 (23 Jan 2021 10:45), Max: 99.3 (23 Jan 2021 02:40)  HR: 85 (23 Jan 2021 10:45) (82 - 98)  BP: 110/75 (23 Jan 2021 10:45) (110/75 - 138/75)  BP(mean): --  RR: 18 (23 Jan 2021 10:39) (16 - 19)  SpO2: 99% (23 Jan 2021 10:39) (95% - 99%)  Daily Height in cm: 180.34 (23 Jan 2021 07:47)    Daily     LABS:                        13.4   6.29  )-----------( 2        ( 23 Jan 2021 03:50 )             44.2     Mean Cell Volume: 84.4 fL (01-23-21 @ 03:50)    01-23    139  |  103  |  20  ----------------------------<  96  4.2   |  25  |  0.95    Ca    8.8      23 Jan 2021 03:50    TPro  6.5  /  Alb  3.6  /  TBili  0.4  /  DBili  x   /  AST  19  /  ALT  18  /  AlkPhos  64  01-23    LIVER FUNCTIONS - ( 23 Jan 2021 03:50 )  Alb: 3.6 g/dL / Pro: 6.5 g/dL / ALK PHOS: 64 U/L / ALT: 18 U/L / AST: 19 U/L / GGT: x           PT/INR - ( 23 Jan 2021 03:50 )   PT: 13.2 sec;   INR: 1.16 ratio         PTT - ( 23 Jan 2021 03:50 )  PTT:31.4 sec                            13.4   6.29  )-----------( 2        ( 23 Jan 2021 03:50 )             44.2                         13.7   6.29  )-----------( 1        ( 22 Jan 2021 18:46 )             44.2       Imaging:           Chief Complaint:  Patient is a 20y old  Male who presents with a chief complaint of BRBPR (23 Jan 2021 10:44)      HPI: 20M w/ autism (non-verbal), chronic ITP, CARLITOS (not on CPAP) and ? acid reflux, transferred from [facility] for BRBPR.     Pt non verbal, history provided by mother who reported there was bright red blood when wiping and then a thin smear of blood in diaper. Denies tram hematochezia, melena, abd pain, n/v/d/f/c. No prior Hx of GI bleeding. Has lost 45lbs in last year intentionally.    In ED VSS  Hb 13.7 --> 13.4   PLT 1 --> 2    Allergies:  Bactrim (Hives)  Rituxan (Hives)  WinRho SDF (Hives)      Home Medications:    Hospital Medications:  brexpiprazole 6 milliGRAM(s) Oral daily  immune   globulin 10% (GAMMAGARD) IVPB 105 Gram(s) IV Intermittent daily  influenza   Vaccine 0.5 milliLiter(s) IntraMuscular once  LORazepam     Tablet 1 milliGRAM(s) Oral at bedtime  traZODone 300 milliGRAM(s) Oral at bedtime      PMHX/PSHX:  Chronic ITP (idiopathic thrombocytopenia)    Intellectual disability    No significant past surgical history        Family history:  FH: hypertension        Denies family history of colon cancer/polyps, stomach cancer/polyps, pancreatic cancer/masses, liver cancer/disease, ovarian cancer and endometrial cancer.    Social History:     Tob: Denies  EtOH: Denies  Illicit Drugs: Denies    ROS:     General:  No wt loss, fevers, chills, night sweats, fatigue  Eyes:  Good vision, no reported pain  ENT:  No sore throat, pain, runny nose, dysphagia  CV:  No pain, palpitations, hypo/hypertension  Pulm:  No dyspnea, cough, tachypnea, wheezing  GI: see above  :  No pain, bleeding, incontinence, nocturia  Muscle:  No pain, weakness  Neuro:  No weakness, tingling, memory problems  Psych:  No fatigue, insomnia, mood problems, depression  Endocrine:  No polyuria, polydipsia, cold/heat intolerance  Heme:  No petechiae, ecchymosis, easy bruisability  Skin:  No rash, tattoos, scars, edema    PHYSICAL EXAM:     GENERAL:  No acute distress, WD,  WN, non verbal   HEENT:  Normocephalic/atraumatic, no scleral icterus  CHEST:  Clear to auscultation bilaterally, no wheezes/rales/ronchi, no accessory muscle use  HEART:  Regular rate and rhythm, no murmurs/rubs/gallops  ABDOMEN:  +obese, Soft, non-tender, non-distended, normoactive bowel sounds  RECTAL: +brown smear, no external hemorrhoids seen  EXTREMITIES: No cyanosis, clubbing, or edema  SKIN:  No rash/erythema/ecchymoses/petechiae/wounds/abscess/warm/dry  NEURO:  Alert and oriented x 3, no asterixis    Vital Signs:  Vital Signs Last 24 Hrs  T(C): 37.1 (23 Jan 2021 10:45), Max: 37.4 (23 Jan 2021 02:40)  T(F): 98.7 (23 Jan 2021 10:45), Max: 99.3 (23 Jan 2021 02:40)  HR: 85 (23 Jan 2021 10:45) (82 - 98)  BP: 110/75 (23 Jan 2021 10:45) (110/75 - 138/75)  BP(mean): --  RR: 18 (23 Jan 2021 10:39) (16 - 19)  SpO2: 99% (23 Jan 2021 10:39) (95% - 99%)  Daily Height in cm: 180.34 (23 Jan 2021 07:47)    Daily     LABS:                        13.4   6.29  )-----------( 2        ( 23 Jan 2021 03:50 )             44.2     Mean Cell Volume: 84.4 fL (01-23-21 @ 03:50)    01-23    139  |  103  |  20  ----------------------------<  96  4.2   |  25  |  0.95    Ca    8.8      23 Jan 2021 03:50    TPro  6.5  /  Alb  3.6  /  TBili  0.4  /  DBili  x   /  AST  19  /  ALT  18  /  AlkPhos  64  01-23    LIVER FUNCTIONS - ( 23 Jan 2021 03:50 )  Alb: 3.6 g/dL / Pro: 6.5 g/dL / ALK PHOS: 64 U/L / ALT: 18 U/L / AST: 19 U/L / GGT: x           PT/INR - ( 23 Jan 2021 03:50 )   PT: 13.2 sec;   INR: 1.16 ratio         PTT - ( 23 Jan 2021 03:50 )  PTT:31.4 sec                            13.4   6.29  )-----------( 2        ( 23 Jan 2021 03:50 )             44.2                         13.7   6.29  )-----------( 1        ( 22 Jan 2021 18:46 )             44.2       Imaging:

## 2021-01-23 NOTE — CONSULT NOTE ADULT - SUBJECTIVE AND OBJECTIVE BOX
HPI:  20M w/ autism (non-verbal), chronic ITP, CARLITOS (not on CPAP) and ? acid reflux, transferred from [facility] for Plt 1 and BRBPR.   Mother Nicole is at bedside and providing history.   Patient lives in a group home, and mother was notified about "GI bleed" today after patient was sent to urgent care then ED. "GI bleed" was described as bright red blood while wiping, and bloody streak on the diaper. Mother saw blood in diaper while in [facility], and reported a thin streak of bright red smear of blood along the middle of the diaper. Otherwise no visualized gross bleeding, and no BM since yesterday. Patient is non-verbal, so no known complaints. Per mother, patient would usually moan and became restlessness with discomfort, which she has not observed today. No known sick contacts in group home, and no recent signs of illness. Denied fever, cough, difficulty breathing, change in appetite, abdominal discomfort, diarrhea, discomfort with urination or BLE pain.   Patient was diagnosed with ITP since 18 months. Plt usually runs in 10K range. Follows with hematology at [place]. Over the years, they have become more conservative in treatments given that patient would have very low level of platelets without major bleeding events. Patient was treated with IVIG and steroids in the remote past. Last treatment with steroids was about 10 years ago. Last episode of major flare was about 5 years ago when he had diffuse petechiae without bleeding, and plt was about 5K at that time.  No recent labs until this episode.       PAST MEDICAL & SURGICAL HISTORY:  Intellectual disability  Chronic ITP  CARLITOS (not on CPAP  ? acid reflux  Undescended testes s/p repair at 13 months      FAMILY HISTORY:  Not contributory    SOCIAL HISTORY:  No toxic behaviors  Lives in group home, non-verbal    Allergies    Bactrim (Hives)  Rituxan & WinRho SDF (one is serum sickness and the other one anaphylaxis, but not sure which is which)    Intolerances        HOME MEDICATIONS:  Ativan 1 mg PO at bedtime  Trazadone 300 mg PO at bedtime  Cimetidine (dose in paperwork)  Rexulti 6 mg PO QD    REVIEW OF SYSTEMS:  [ ] All other systems negative  [X ] Unable to assess ROS because patient is non-verbal; See HPI for ROS reported by mother    OBJECTIVE:  ICU Vital Signs Last 24 Hrs  T(C): 37.4 (23 Jan 2021 02:40), Max: 37.4 (23 Jan 2021 02:40)  T(F): 99.3 (23 Jan 2021 02:40), Max: 99.3 (23 Jan 2021 02:40)  HR: 96 (23 Jan 2021 00:56) (86 - 98)  BP: 123/63 (23 Jan 2021 00:56) (111/69 - 138/75)  BP(mean): --  ABP: --  ABP(mean): --  RR: 16 (23 Jan 2021 00:56) (16 - 19)  SpO2: 98% (23 Jan 2021 00:56) (95% - 98%)      PHYSICAL EXAM:  GENERAL: NAD, Resting in bed, sleeping  Eyes: Not assessed due to sleeping  HENT:  Head atraumatic  NECK: Supple  CHEST/LUNG: Clear to auscultation bilaterally, though difficult exam due to obesity and patient position; No rales, rhonchi, wheezing, or rubs. Unlabored respirations on room air; (+) snoring  HEART: Regular rate and rhythm; No murmurs, rubs, or gallops appreciated  ABDOMEN: Bowel sounds present; Soft, Nontender, Nondistended  EXTREMITIES:  2+ Peripheral Pulses, brisk capillary refill. No clubbing, cyanosis, or edema  NERVOUS SYSTEM: Sleeping, non-verbal at baseline, not fully assessed 2/2 sleeping; spontaneously moving all extremities and turning in bed  SKIN: (+) scattered petechia and faint ecchymoses throughout the body, but no clusters; chronic appearing dark discoloration at b/l ankles with dry skin  Psych: Sleeping    HOSPITAL MEDICATIONS:  MEDICATIONS  (STANDING):  immune   globulin 10% (GAMMAGARD) IVPB 105 Gram(s) IV Intermittent Once    MEDICATIONS  (PRN):      LABS:                        13.4   6.29  )-----------( 2        ( 23 Jan 2021 03:50 )             44.2     01-23    139  |  103  |  20  ----------------------------<  96  4.2   |  25  |  0.95    Ca    8.8      23 Jan 2021 03:50    TPro  6.5  /  Alb  3.6  /  TBili  0.4  /  DBili  x   /  AST  19  /  ALT  18  /  AlkPhos  64  01-23    PT/INR - ( 23 Jan 2021 03:50 )   PT: 13.2 sec;   INR: 1.16 ratio         PTT - ( 23 Jan 2021 03:50 )  PTT:31.4 sec      MICROBIOLOGY:     RADIOLOGY:  ct< from: CT Head No Cont (01.22.21 @ 21:37) >  FINDINGS:  No acute intracranial hemorrhage, mass effect or midline shift.  No CT evidence of acute large vascular territory infarct.  The ventricles and cortical sulci are within normal limits.    The paranasal sinuses and mastoid air cells are well aerated.  No displaced calvarial fracture.    IMPRESSION:  No acute intracranial hemorrhage or mass effect.    < end of copied text >

## 2021-01-23 NOTE — CHART NOTE - NSCHARTNOTEFT_GEN_A_CORE
Pt's s/p 1 unit Plt, repeat Plt 13K, no active bleeding, no further BRBPR. No further Plt transfusion at this time. Pt  receiving IVIG. Will continue to monitor.

## 2021-01-23 NOTE — H&P ADULT - HISTORY OF PRESENT ILLNESS
20M w/ autism (non-verbal), chronic ITP, CARLITOS (not on CPAP) and ? acid reflux, transferred from [facility] for Plt 1 and BRBPR.   Mother Nicole is at bedside and providing history.   Patient lives in a group home, and mother was notified about "GI bleed" yesterday after patient was sent to urgent care then ED. "GI bleed" was described as bright red blood while wiping, and bloody streak on the diaper. Mother reports last blood streaked BM was yesterday at 7 PM at [facility], stated it was a thin streak of bright red smear of blood along the middle of the diaper. Otherwise no visualized gross bleeding, and no BM since yesterday. Patient is non-verbal, so no known complaints. Per mother, patient would usually moan and became restlessness with discomfort, which she has not observed today. No known sick contacts in group home, and no recent signs of illness. Denied fever, cough, difficulty breathing, change in appetite, abdominal discomfort, diarrhea, discomfort with urination or BLE pain.   Patient was diagnosed with ITP since 18 months. Plt usually runs in 10K range. Follows with hematology at [place]. Over the years, they have become more conservative in treatments given that patient would have very low level of platelets without major bleeding events. Patient was treated with IVIG and steroids in the remote past. Last episode of major flare was about 5 years ago when he had diffuse petechiae without bleeding, and plt was about 5K at that time. Mother reports last blood draw was 3-4 years ago, unsure of last platelet count. Denies prior colonoscopy, or known dx of internal/external hemorrhoids.

## 2021-01-23 NOTE — ED PROVIDER NOTE - CLINICAL SUMMARY MEDICAL DECISION MAKING FREE TEXT BOX
21yo obese male from group home, autism, nonverbal and h/o chronic ITP c/n chronic bruising since 18months presents as transfer from  with BRBPR and platelet of 1.  recheck labs, give platelets, ivig and +/- steroids. 19yo obese male from group home, autism, nonverbal and h/o chronic ITP c/n chronic bruising since 18months presents as transfer from  with BRBPR and platelet of 1.  recheck labs, give ivig and +/- steroids.

## 2021-01-23 NOTE — ED ADULT TRIAGE NOTE - CHIEF COMPLAINT QUOTE
coming from [place] c/o rectal bleeding. Pt from group home and when he went to the bathroom they saw bright red blood in the toilet and his pullup. As per EMS platelet count at [place] was found to be 1. Past medical history ITP. Pt autistic, non-verbal at baseline, mother at bedside.

## 2021-01-24 LAB
ANION GAP SERPL CALC-SCNC: 9 MMOL/L — SIGNIFICANT CHANGE UP (ref 7–14)
BASOPHILS # BLD AUTO: 0.01 K/UL — SIGNIFICANT CHANGE UP (ref 0–0.2)
BASOPHILS NFR BLD AUTO: 0.1 % — SIGNIFICANT CHANGE UP (ref 0–2)
BUN SERPL-MCNC: 19 MG/DL — SIGNIFICANT CHANGE UP (ref 7–23)
CALCIUM SERPL-MCNC: 8.8 MG/DL — SIGNIFICANT CHANGE UP (ref 8.4–10.5)
CHLORIDE SERPL-SCNC: 105 MMOL/L — SIGNIFICANT CHANGE UP (ref 98–107)
CO2 SERPL-SCNC: 22 MMOL/L — SIGNIFICANT CHANGE UP (ref 22–31)
CREAT SERPL-MCNC: 0.76 MG/DL — SIGNIFICANT CHANGE UP (ref 0.5–1.3)
EOSINOPHIL # BLD AUTO: 0 K/UL — SIGNIFICANT CHANGE UP (ref 0–0.5)
EOSINOPHIL NFR BLD AUTO: 0 % — SIGNIFICANT CHANGE UP (ref 0–6)
GLUCOSE SERPL-MCNC: 136 MG/DL — HIGH (ref 70–99)
HCT VFR BLD CALC: 46.1 % — SIGNIFICANT CHANGE UP (ref 39–50)
HGB BLD-MCNC: 14 G/DL — SIGNIFICANT CHANGE UP (ref 13–17)
IANC: 11.71 K/UL — HIGH (ref 1.5–8.5)
IMM GRANULOCYTES NFR BLD AUTO: 0.6 % — SIGNIFICANT CHANGE UP (ref 0–1.5)
LYMPHOCYTES # BLD AUTO: 0.89 K/UL — LOW (ref 1–3.3)
LYMPHOCYTES # BLD AUTO: 6.7 % — LOW (ref 13–44)
MAGNESIUM SERPL-MCNC: 1.8 MG/DL — SIGNIFICANT CHANGE UP (ref 1.6–2.6)
MCHC RBC-ENTMCNC: 25.5 PG — LOW (ref 27–34)
MCHC RBC-ENTMCNC: 30.4 GM/DL — LOW (ref 32–36)
MCV RBC AUTO: 83.8 FL — SIGNIFICANT CHANGE UP (ref 80–100)
MONOCYTES # BLD AUTO: 0.68 K/UL — SIGNIFICANT CHANGE UP (ref 0–0.9)
MONOCYTES NFR BLD AUTO: 5.1 % — SIGNIFICANT CHANGE UP (ref 2–14)
NEUTROPHILS # BLD AUTO: 11.71 K/UL — HIGH (ref 1.8–7.4)
NEUTROPHILS NFR BLD AUTO: 87.5 % — HIGH (ref 43–77)
NRBC # BLD: 0 /100 WBCS — SIGNIFICANT CHANGE UP
NRBC # FLD: 0 K/UL — SIGNIFICANT CHANGE UP
PHOSPHATE SERPL-MCNC: 3.6 MG/DL — SIGNIFICANT CHANGE UP (ref 2.5–4.5)
PLATELET # BLD AUTO: 64 K/UL — LOW (ref 150–400)
POTASSIUM SERPL-MCNC: 4.4 MMOL/L — SIGNIFICANT CHANGE UP (ref 3.5–5.3)
POTASSIUM SERPL-SCNC: 4.4 MMOL/L — SIGNIFICANT CHANGE UP (ref 3.5–5.3)
RBC # BLD: 5.5 M/UL — SIGNIFICANT CHANGE UP (ref 4.2–5.8)
RBC # FLD: 14.3 % — SIGNIFICANT CHANGE UP (ref 10.3–14.5)
SODIUM SERPL-SCNC: 136 MMOL/L — SIGNIFICANT CHANGE UP (ref 135–145)
WBC # BLD: 13.37 K/UL — HIGH (ref 3.8–10.5)
WBC # FLD AUTO: 13.37 K/UL — HIGH (ref 3.8–10.5)

## 2021-01-24 PROCEDURE — 99232 SBSQ HOSP IP/OBS MODERATE 35: CPT

## 2021-01-24 PROCEDURE — 99232 SBSQ HOSP IP/OBS MODERATE 35: CPT | Mod: GC

## 2021-01-24 RX ADMIN — IMMUNE GLOBULIN (HUMAN) 175 GRAM(S): 10 INJECTION INTRAVENOUS; SUBCUTANEOUS at 11:58

## 2021-01-24 RX ADMIN — BREXPIPRAZOLE 6 MILLIGRAM(S): 0.25 TABLET ORAL at 11:57

## 2021-01-24 RX ADMIN — Medication 1 MILLIGRAM(S): at 22:32

## 2021-01-24 RX ADMIN — Medication 120 MILLIGRAM(S): at 06:58

## 2021-01-24 RX ADMIN — Medication 300 MILLIGRAM(S): at 02:08

## 2021-01-24 RX ADMIN — Medication 300 MILLIGRAM(S): at 22:32

## 2021-01-24 NOTE — PROGRESS NOTE ADULT - ASSESSMENT
IMPRESSION:   #BRBPR: pt w/ episode of small volume BRBPR when wiping + on diaper. Remains HDS w/ Hb stable in 13-14s. Most likely LGIB exacerbated in the setting of thrombocytopenia, ddx 2/2 very likely hemorrhoids v less likely rectal ulcers, malignancy, diverticulosis, angiectasias. Pt without clinically significant GI bleeding at this time, would not recommend colonoscopy considering severe thrombocytopenia.       RECOMMENDATION:   - Trend CBC, transfuse Hb < 7  - PLT transfusion goals per heme (PLT < 30 or active bleeding)   -  Aloe wipes  - High fiber diet  - Miralax daily, titrate to 1 soft BM daily   - Due to severe thrombocytopenia, the risks of endoscopic intervention would far outweigh benefits   - Please call GI back if needed      Thank you for involving us in the care of this patient, please reach out if any further questions.     Chuck Mccarthy MD  Gastroenterology Fellow, PGY4    Available on [employer] Teams  [phone] ([facility])  [id number] ([facility])  Please contact on call fellow weekdays after 5pm-7am and weekends: [phone]

## 2021-01-24 NOTE — PROGRESS NOTE ADULT - ASSESSMENT
20M w/ autism (non-verbal), hx of ITP since childhood, CARLITOS (not on CPAP)  admitted for acute lower GI bleed in setting of acute on chronic ITP, unclear inciting factor. Likely internal hemorrhoids. H&H stable, no further episodes of bleeding

## 2021-01-25 DIAGNOSIS — Z02.9 ENCOUNTER FOR ADMINISTRATIVE EXAMINATIONS, UNSPECIFIED: ICD-10-CM

## 2021-01-25 LAB
ANION GAP SERPL CALC-SCNC: 11 MMOL/L — SIGNIFICANT CHANGE UP (ref 7–14)
BASOPHILS # BLD AUTO: 0.02 K/UL — SIGNIFICANT CHANGE UP (ref 0–0.2)
BASOPHILS NFR BLD AUTO: 0.1 % — SIGNIFICANT CHANGE UP (ref 0–2)
BUN SERPL-MCNC: 23 MG/DL — SIGNIFICANT CHANGE UP (ref 7–23)
CALCIUM SERPL-MCNC: 8.2 MG/DL — LOW (ref 8.4–10.5)
CHLORIDE SERPL-SCNC: 102 MMOL/L — SIGNIFICANT CHANGE UP (ref 98–107)
CO2 SERPL-SCNC: 23 MMOL/L — SIGNIFICANT CHANGE UP (ref 22–31)
CREAT SERPL-MCNC: 0.76 MG/DL — SIGNIFICANT CHANGE UP (ref 0.5–1.3)
EOSINOPHIL # BLD AUTO: 0 K/UL — SIGNIFICANT CHANGE UP (ref 0–0.5)
EOSINOPHIL NFR BLD AUTO: 0 % — SIGNIFICANT CHANGE UP (ref 0–6)
GLUCOSE SERPL-MCNC: 132 MG/DL — HIGH (ref 70–99)
HAV IGM SER-ACNC: SIGNIFICANT CHANGE UP
HBV CORE IGM SER-ACNC: SIGNIFICANT CHANGE UP
HBV SURFACE AG SER-ACNC: SIGNIFICANT CHANGE UP
HCT VFR BLD CALC: 44.3 % — SIGNIFICANT CHANGE UP (ref 39–50)
HCV AB S/CO SERPL IA: 0.18 S/CO — SIGNIFICANT CHANGE UP (ref 0–0.99)
HCV AB SERPL-IMP: SIGNIFICANT CHANGE UP
HGB BLD-MCNC: 13.2 G/DL — SIGNIFICANT CHANGE UP (ref 13–17)
HIV 1+2 AB+HIV1 P24 AG SERPL QL IA: SIGNIFICANT CHANGE UP
IANC: 15.52 K/UL — HIGH (ref 1.5–8.5)
IMM GRANULOCYTES NFR BLD AUTO: 0.8 % — SIGNIFICANT CHANGE UP (ref 0–1.5)
LYMPHOCYTES # BLD AUTO: 0.95 K/UL — LOW (ref 1–3.3)
LYMPHOCYTES # BLD AUTO: 5.5 % — LOW (ref 13–44)
MCHC RBC-ENTMCNC: 25.6 PG — LOW (ref 27–34)
MCHC RBC-ENTMCNC: 29.8 GM/DL — LOW (ref 32–36)
MCV RBC AUTO: 86 FL — SIGNIFICANT CHANGE UP (ref 80–100)
MONOCYTES # BLD AUTO: 0.62 K/UL — SIGNIFICANT CHANGE UP (ref 0–0.9)
MONOCYTES NFR BLD AUTO: 3.6 % — SIGNIFICANT CHANGE UP (ref 2–14)
NEUTROPHILS # BLD AUTO: 15.52 K/UL — HIGH (ref 1.8–7.4)
NEUTROPHILS NFR BLD AUTO: 90 % — HIGH (ref 43–77)
NRBC # BLD: 0 /100 WBCS — SIGNIFICANT CHANGE UP
NRBC # FLD: 0 K/UL — SIGNIFICANT CHANGE UP
PLATELET # BLD AUTO: 172 K/UL — SIGNIFICANT CHANGE UP (ref 150–400)
POTASSIUM SERPL-MCNC: 4.3 MMOL/L — SIGNIFICANT CHANGE UP (ref 3.5–5.3)
POTASSIUM SERPL-SCNC: 4.3 MMOL/L — SIGNIFICANT CHANGE UP (ref 3.5–5.3)
RBC # BLD: 5.15 M/UL — SIGNIFICANT CHANGE UP (ref 4.2–5.8)
RBC # FLD: 14.6 % — HIGH (ref 10.3–14.5)
RETICS #: 93 K/UL — SIGNIFICANT CHANGE UP (ref 25–125)
RETICS/RBC NFR: 1.8 % — SIGNIFICANT CHANGE UP (ref 0.5–2.5)
SARS-COV-2 IGG SERPL QL IA: NEGATIVE — SIGNIFICANT CHANGE UP
SARS-COV-2 IGM SERPL IA-ACNC: 0.3 INDEX — SIGNIFICANT CHANGE UP
SARS-COV-2 RNA SPEC QL NAA+PROBE: DETECTED
SODIUM SERPL-SCNC: 136 MMOL/L — SIGNIFICANT CHANGE UP (ref 135–145)
WBC # BLD: 17.24 K/UL — HIGH (ref 3.8–10.5)
WBC # FLD AUTO: 17.24 K/UL — HIGH (ref 3.8–10.5)

## 2021-01-25 PROCEDURE — 99233 SBSQ HOSP IP/OBS HIGH 50: CPT | Mod: GC

## 2021-01-25 PROCEDURE — 99232 SBSQ HOSP IP/OBS MODERATE 35: CPT

## 2021-01-25 RX ORDER — PANTOPRAZOLE SODIUM 20 MG/1
40 TABLET, DELAYED RELEASE ORAL ONCE
Refills: 0 | Status: COMPLETED | OUTPATIENT
Start: 2021-01-25 | End: 2021-01-25

## 2021-01-25 RX ORDER — PANTOPRAZOLE SODIUM 20 MG/1
40 TABLET, DELAYED RELEASE ORAL
Refills: 0 | Status: DISCONTINUED | OUTPATIENT
Start: 2021-01-26 | End: 2021-02-05

## 2021-01-25 RX ADMIN — Medication 300 MILLIGRAM(S): at 20:35

## 2021-01-25 RX ADMIN — Medication 120 MILLIGRAM(S): at 05:41

## 2021-01-25 RX ADMIN — PANTOPRAZOLE SODIUM 40 MILLIGRAM(S): 20 TABLET, DELAYED RELEASE ORAL at 18:36

## 2021-01-25 RX ADMIN — Medication 1 MILLIGRAM(S): at 20:34

## 2021-01-25 RX ADMIN — BREXPIPRAZOLE 6 MILLIGRAM(S): 0.25 TABLET ORAL at 11:52

## 2021-01-25 NOTE — PROGRESS NOTE ADULT - ASSESSMENT
20M w/ autism (non-verbal), hx of ITP since childhood, CARLITOS (not on CPAP)  admitted for acute lower GI bleed in setting of acute on chronic ITP

## 2021-01-25 NOTE — PROGRESS NOTE ADULT - ASSESSMENT
21 yo M hx of chronic ITP, previously under care of Dr. Rachana Blanco ([facility]), autism, presented from [facility] with plt 1 and BRBPR:    # History of chronic ITP  - discussed admission with Dr. Blanco. Last seen by Dr. Blanco in 2014, she previously treated with steroids, rituxan, without sustained response and plts have remained 4-10 without any issues.    - this admission, patient treated with IVIG x2, pulse dose dex, received 1 unit of plt and plt responded 1>>>172.   - hgb has been normal on admission. normal LDH, bilirubin, neg HIV.   - recommend finishing last dose of dexamethasone tomorrow. can finish as outpt if discharged   - patient no longer candidate to follow with Dr. Blanco (due to age) and will set up outpt follow up with [facility] at [facility]     d/w Dr. Santa Gleason Heme/onc PGY4    19 yo M hx of chronic ITP, previously under care of Dr. Rachana Blanco ([facility]), autism, presented from [facility] with plt 1 and BRBPR,:    # History of chronic ITP  - discussed admission with Dr. Blanco. Last seen by Dr. Blanco in 2014, she previously treated with steroids, rituxan, without sustained response and plts have remained 4-10 without any issues.    - this admission, patient treated with IVIG x2, pulse dose dex, received 1 unit of plt and plt responded 1>>>172.   - hgb has been normal on admission. normal LDH, bilirubin, neg HIV.   - recommend finishing last dose of dexamethasone tomorrow and d/c on prednisone 1 mg/kg. patient appointment with [facility] on Feb 9th at 2 pm.  can taper steroids as outpt     #BRBPR  -resoved, h/h stables, management per primary team     d/w Dr. Pratt and primary team     Seb Gleason Heme/onc PGY4

## 2021-01-26 DIAGNOSIS — U07.1 COVID-19: ICD-10-CM

## 2021-01-26 PROBLEM — D69.3 IMMUNE THROMBOCYTOPENIC PURPURA: Chronic | Status: ACTIVE | Noted: 2021-01-23

## 2021-01-26 PROBLEM — Z00.00 ENCOUNTER FOR PREVENTIVE HEALTH EXAMINATION: Status: ACTIVE | Noted: 2021-01-26

## 2021-01-26 LAB
ALBUMIN SERPL ELPH-MCNC: 3.5 G/DL — SIGNIFICANT CHANGE UP (ref 3.3–5)
ALP SERPL-CCNC: 49 U/L — SIGNIFICANT CHANGE UP (ref 40–120)
ALT FLD-CCNC: 121 U/L — HIGH (ref 4–41)
ANION GAP SERPL CALC-SCNC: 7 MMOL/L — SIGNIFICANT CHANGE UP (ref 7–14)
APTT BLD: 23.7 SEC — LOW (ref 27–36.3)
AST SERPL-CCNC: 58 U/L — HIGH (ref 4–40)
BASOPHILS # BLD AUTO: 0.01 K/UL — SIGNIFICANT CHANGE UP (ref 0–0.2)
BASOPHILS NFR BLD AUTO: 0.1 % — SIGNIFICANT CHANGE UP (ref 0–2)
BILIRUB SERPL-MCNC: 0.8 MG/DL — SIGNIFICANT CHANGE UP (ref 0.2–1.2)
BUN SERPL-MCNC: 21 MG/DL — SIGNIFICANT CHANGE UP (ref 7–23)
CALCIUM SERPL-MCNC: 8.1 MG/DL — LOW (ref 8.4–10.5)
CHLORIDE SERPL-SCNC: 103 MMOL/L — SIGNIFICANT CHANGE UP (ref 98–107)
CK SERPL-CCNC: 17 U/L — LOW (ref 30–200)
CO2 SERPL-SCNC: 24 MMOL/L — SIGNIFICANT CHANGE UP (ref 22–31)
CREAT SERPL-MCNC: 0.74 MG/DL — SIGNIFICANT CHANGE UP (ref 0.5–1.3)
CRP SERPL-MCNC: <4 MG/L — SIGNIFICANT CHANGE UP
D DIMER BLD IA.RAPID-MCNC: 240 NG/ML DDU — HIGH
EOSINOPHIL # BLD AUTO: 0 K/UL — SIGNIFICANT CHANGE UP (ref 0–0.5)
EOSINOPHIL NFR BLD AUTO: 0 % — SIGNIFICANT CHANGE UP (ref 0–6)
GLUCOSE SERPL-MCNC: 231 MG/DL — HIGH (ref 70–99)
HCT VFR BLD CALC: 42.8 % — SIGNIFICANT CHANGE UP (ref 39–50)
HGB BLD-MCNC: 13.2 G/DL — SIGNIFICANT CHANGE UP (ref 13–17)
IANC: 13.74 K/UL — HIGH (ref 1.5–8.5)
IMM GRANULOCYTES NFR BLD AUTO: 0.9 % — SIGNIFICANT CHANGE UP (ref 0–1.5)
INR BLD: 1.06 RATIO — SIGNIFICANT CHANGE UP (ref 0.88–1.16)
LDH SERPL L TO P-CCNC: 166 U/L — SIGNIFICANT CHANGE UP (ref 135–225)
LYMPHOCYTES # BLD AUTO: 0.57 K/UL — LOW (ref 1–3.3)
LYMPHOCYTES # BLD AUTO: 3.8 % — LOW (ref 13–44)
MCHC RBC-ENTMCNC: 25.2 PG — LOW (ref 27–34)
MCHC RBC-ENTMCNC: 30.8 GM/DL — LOW (ref 32–36)
MCV RBC AUTO: 81.7 FL — SIGNIFICANT CHANGE UP (ref 80–100)
MONOCYTES # BLD AUTO: 0.62 K/UL — SIGNIFICANT CHANGE UP (ref 0–0.9)
MONOCYTES NFR BLD AUTO: 4.1 % — SIGNIFICANT CHANGE UP (ref 2–14)
NEUTROPHILS # BLD AUTO: 13.74 K/UL — HIGH (ref 1.8–7.4)
NEUTROPHILS NFR BLD AUTO: 91.1 % — HIGH (ref 43–77)
NRBC # BLD: 0 /100 WBCS — SIGNIFICANT CHANGE UP
NRBC # FLD: 0 K/UL — SIGNIFICANT CHANGE UP
PLATELET # BLD AUTO: 327 K/UL — SIGNIFICANT CHANGE UP (ref 150–400)
POTASSIUM SERPL-MCNC: 4.2 MMOL/L — SIGNIFICANT CHANGE UP (ref 3.5–5.3)
POTASSIUM SERPL-SCNC: 4.2 MMOL/L — SIGNIFICANT CHANGE UP (ref 3.5–5.3)
PROCALCITONIN SERPL-MCNC: 0.04 NG/ML — SIGNIFICANT CHANGE UP (ref 0.02–0.1)
PROT SERPL-MCNC: 8.3 G/DL — SIGNIFICANT CHANGE UP (ref 6–8.3)
PROTHROM AB SERPL-ACNC: 12.2 SEC — SIGNIFICANT CHANGE UP (ref 10.6–13.6)
RBC # BLD: 5.24 M/UL — SIGNIFICANT CHANGE UP (ref 4.2–5.8)
RBC # FLD: 14.4 % — SIGNIFICANT CHANGE UP (ref 10.3–14.5)
SARS-COV-2 RNA SPEC QL NAA+PROBE: DETECTED
SODIUM SERPL-SCNC: 134 MMOL/L — LOW (ref 135–145)
TROPONIN T, HIGH SENSITIVITY RESULT: <6 NG/L — SIGNIFICANT CHANGE UP
WBC # BLD: 15.08 K/UL — HIGH (ref 3.8–10.5)
WBC # FLD AUTO: 15.08 K/UL — HIGH (ref 3.8–10.5)

## 2021-01-26 PROCEDURE — 71045 X-RAY EXAM CHEST 1 VIEW: CPT | Mod: 26

## 2021-01-26 PROCEDURE — 99232 SBSQ HOSP IP/OBS MODERATE 35: CPT | Mod: CS

## 2021-01-26 RX ORDER — ACETAMINOPHEN 500 MG
650 TABLET ORAL EVERY 6 HOURS
Refills: 0 | Status: DISCONTINUED | OUTPATIENT
Start: 2021-01-26 | End: 2021-02-05

## 2021-01-26 RX ADMIN — Medication 300 MILLIGRAM(S): at 23:34

## 2021-01-26 RX ADMIN — Medication 1 MILLIGRAM(S): at 23:34

## 2021-01-26 RX ADMIN — Medication 120 MILLIGRAM(S): at 07:48

## 2021-01-26 RX ADMIN — BREXPIPRAZOLE 6 MILLIGRAM(S): 0.25 TABLET ORAL at 18:03

## 2021-01-26 RX ADMIN — PANTOPRAZOLE SODIUM 40 MILLIGRAM(S): 20 TABLET, DELAYED RELEASE ORAL at 07:48

## 2021-01-26 RX ADMIN — Medication 650 MILLIGRAM(S): at 18:04

## 2021-01-26 NOTE — CHART NOTE - NSCHARTNOTEFT_GEN_A_CORE
COVID PCR from 1/22 done at [facility] and swab done on 1/25 at [facility] positive for COVID-19. Spoke with patients mother at bedside. Explained the swab that was done at [facility] on 1/22 prior to [facility] admission resulted as positive on 1/25 night. The repeat swab done on 1/25 resulted positive as well. Informed patients mother about transfer to COVID unit. Educated mother that she will have to wear PPE at all times and will have to remain in room if she is to remain at bedside during admission. D/w ANM and ADN. Mother requesting COVID swab, will find out more information from administration. Will continue to monitor patient.

## 2021-01-26 NOTE — CHART NOTE - NSCHARTNOTEFT_GEN_A_CORE
Discussed lab results and XRAY results with patients mother.  discussed continued need for steroids and Heme giving the OK for anticoagulation given platelet levels.  She wants to think about anticoagulation, will revisit in AM

## 2021-01-26 NOTE — PROGRESS NOTE ADULT - ASSESSMENT
20M w/ autism (non-verbal), hx of ITP since childhood, CARLITOS (not on CPAP)  admitted for acute lower GI bleed in setting of acute on chronic ITP, found to be COVID +

## 2021-01-27 ENCOUNTER — TRANSCRIPTION ENCOUNTER (OUTPATIENT)
Age: 21
End: 2021-01-27

## 2021-01-27 LAB
ALBUMIN SERPL ELPH-MCNC: 3 G/DL — LOW (ref 3.3–5)
ALBUMIN SERPL ELPH-MCNC: 3.2 G/DL — LOW (ref 3.3–5)
ALP SERPL-CCNC: 46 U/L — SIGNIFICANT CHANGE UP (ref 40–120)
ALP SERPL-CCNC: 52 U/L — SIGNIFICANT CHANGE UP (ref 40–120)
ALT FLD-CCNC: 170 U/L — HIGH (ref 4–41)
ALT FLD-CCNC: 204 U/L — HIGH (ref 4–41)
AMMONIA BLD-MCNC: 46 UMOL/L — SIGNIFICANT CHANGE UP (ref 11–55)
ANION GAP SERPL CALC-SCNC: 7 MMOL/L — SIGNIFICANT CHANGE UP (ref 7–14)
ANION GAP SERPL CALC-SCNC: 9 MMOL/L — SIGNIFICANT CHANGE UP (ref 7–14)
AST SERPL-CCNC: 78 U/L — HIGH (ref 4–40)
AST SERPL-CCNC: 82 U/L — HIGH (ref 4–40)
BASOPHILS # BLD AUTO: 0.02 K/UL — SIGNIFICANT CHANGE UP (ref 0–0.2)
BASOPHILS NFR BLD AUTO: 0.1 % — SIGNIFICANT CHANGE UP (ref 0–2)
BILIRUB SERPL-MCNC: 0.9 MG/DL — SIGNIFICANT CHANGE UP (ref 0.2–1.2)
BILIRUB SERPL-MCNC: 0.9 MG/DL — SIGNIFICANT CHANGE UP (ref 0.2–1.2)
BUN SERPL-MCNC: 21 MG/DL — SIGNIFICANT CHANGE UP (ref 7–23)
BUN SERPL-MCNC: 22 MG/DL — SIGNIFICANT CHANGE UP (ref 7–23)
CALCIUM SERPL-MCNC: 7.8 MG/DL — LOW (ref 8.4–10.5)
CALCIUM SERPL-MCNC: 8.6 MG/DL — SIGNIFICANT CHANGE UP (ref 8.4–10.5)
CHLORIDE SERPL-SCNC: 101 MMOL/L — SIGNIFICANT CHANGE UP (ref 98–107)
CHLORIDE SERPL-SCNC: 105 MMOL/L — SIGNIFICANT CHANGE UP (ref 98–107)
CO2 SERPL-SCNC: 23 MMOL/L — SIGNIFICANT CHANGE UP (ref 22–31)
CO2 SERPL-SCNC: 25 MMOL/L — SIGNIFICANT CHANGE UP (ref 22–31)
CREAT SERPL-MCNC: 0.72 MG/DL — SIGNIFICANT CHANGE UP (ref 0.5–1.3)
CREAT SERPL-MCNC: 0.83 MG/DL — SIGNIFICANT CHANGE UP (ref 0.5–1.3)
EOSINOPHIL # BLD AUTO: 0 K/UL — SIGNIFICANT CHANGE UP (ref 0–0.5)
EOSINOPHIL NFR BLD AUTO: 0 % — SIGNIFICANT CHANGE UP (ref 0–6)
FERRITIN SERPL-MCNC: 153 NG/ML — SIGNIFICANT CHANGE UP (ref 30–400)
GLUCOSE SERPL-MCNC: 121 MG/DL — HIGH (ref 70–99)
GLUCOSE SERPL-MCNC: 192 MG/DL — HIGH (ref 70–99)
HCT VFR BLD CALC: 41.6 % — SIGNIFICANT CHANGE UP (ref 39–50)
HCT VFR BLD CALC: 42.6 % — SIGNIFICANT CHANGE UP (ref 39–50)
HGB BLD-MCNC: 13.1 G/DL — SIGNIFICANT CHANGE UP (ref 13–17)
HGB BLD-MCNC: 13.4 G/DL — SIGNIFICANT CHANGE UP (ref 13–17)
IANC: 15.21 K/UL — HIGH (ref 1.5–8.5)
IMM GRANULOCYTES NFR BLD AUTO: 1.4 % — SIGNIFICANT CHANGE UP (ref 0–1.5)
LYMPHOCYTES # BLD AUTO: 0.87 K/UL — LOW (ref 1–3.3)
LYMPHOCYTES # BLD AUTO: 5 % — LOW (ref 13–44)
MAGNESIUM SERPL-MCNC: 1.8 MG/DL — SIGNIFICANT CHANGE UP (ref 1.6–2.6)
MAGNESIUM SERPL-MCNC: 2.1 MG/DL — SIGNIFICANT CHANGE UP (ref 1.6–2.6)
MCHC RBC-ENTMCNC: 25.6 PG — LOW (ref 27–34)
MCHC RBC-ENTMCNC: 25.7 PG — LOW (ref 27–34)
MCHC RBC-ENTMCNC: 31.5 GM/DL — LOW (ref 32–36)
MCHC RBC-ENTMCNC: 31.5 GM/DL — LOW (ref 32–36)
MCV RBC AUTO: 81.5 FL — SIGNIFICANT CHANGE UP (ref 80–100)
MCV RBC AUTO: 81.6 FL — SIGNIFICANT CHANGE UP (ref 80–100)
MONOCYTES # BLD AUTO: 1.16 K/UL — HIGH (ref 0–0.9)
MONOCYTES NFR BLD AUTO: 6.6 % — SIGNIFICANT CHANGE UP (ref 2–14)
NEUTROPHILS # BLD AUTO: 15.21 K/UL — HIGH (ref 1.8–7.4)
NEUTROPHILS NFR BLD AUTO: 86.9 % — HIGH (ref 43–77)
NRBC # BLD: 0 /100 WBCS — SIGNIFICANT CHANGE UP
NRBC # BLD: 0 /100 WBCS — SIGNIFICANT CHANGE UP
NRBC # FLD: 0 K/UL — SIGNIFICANT CHANGE UP
NRBC # FLD: 0 K/UL — SIGNIFICANT CHANGE UP
PHOSPHATE SERPL-MCNC: 2.4 MG/DL — LOW (ref 2.5–4.5)
PHOSPHATE SERPL-MCNC: 3.3 MG/DL — SIGNIFICANT CHANGE UP (ref 2.5–4.5)
PLATELET # BLD AUTO: 328 K/UL — SIGNIFICANT CHANGE UP (ref 150–400)
PLATELET # BLD AUTO: 392 K/UL — SIGNIFICANT CHANGE UP (ref 150–400)
POTASSIUM SERPL-MCNC: 4.1 MMOL/L — SIGNIFICANT CHANGE UP (ref 3.5–5.3)
POTASSIUM SERPL-MCNC: 4.5 MMOL/L — SIGNIFICANT CHANGE UP (ref 3.5–5.3)
POTASSIUM SERPL-SCNC: 4.1 MMOL/L — SIGNIFICANT CHANGE UP (ref 3.5–5.3)
POTASSIUM SERPL-SCNC: 4.5 MMOL/L — SIGNIFICANT CHANGE UP (ref 3.5–5.3)
PROT SERPL-MCNC: 7.8 G/DL — SIGNIFICANT CHANGE UP (ref 6–8.3)
PROT SERPL-MCNC: 8.3 G/DL — SIGNIFICANT CHANGE UP (ref 6–8.3)
RBC # BLD: 5.1 M/UL — SIGNIFICANT CHANGE UP (ref 4.2–5.8)
RBC # BLD: 5.23 M/UL — SIGNIFICANT CHANGE UP (ref 4.2–5.8)
RBC # FLD: 14.2 % — SIGNIFICANT CHANGE UP (ref 10.3–14.5)
RBC # FLD: 14.6 % — HIGH (ref 10.3–14.5)
SODIUM SERPL-SCNC: 133 MMOL/L — LOW (ref 135–145)
SODIUM SERPL-SCNC: 137 MMOL/L — SIGNIFICANT CHANGE UP (ref 135–145)
WBC # BLD: 15.17 K/UL — HIGH (ref 3.8–10.5)
WBC # BLD: 17.51 K/UL — HIGH (ref 3.8–10.5)
WBC # FLD AUTO: 15.17 K/UL — HIGH (ref 3.8–10.5)
WBC # FLD AUTO: 17.51 K/UL — HIGH (ref 3.8–10.5)

## 2021-01-27 PROCEDURE — 99233 SBSQ HOSP IP/OBS HIGH 50: CPT | Mod: CS

## 2021-01-27 RX ADMIN — BREXPIPRAZOLE 6 MILLIGRAM(S): 0.25 TABLET ORAL at 12:59

## 2021-01-27 RX ADMIN — PANTOPRAZOLE SODIUM 40 MILLIGRAM(S): 20 TABLET, DELAYED RELEASE ORAL at 06:51

## 2021-01-27 RX ADMIN — Medication 650 MILLIGRAM(S): at 17:00

## 2021-01-27 RX ADMIN — Medication 150 MILLIGRAM(S): at 12:57

## 2021-01-27 NOTE — DISCHARGE NOTE PROVIDER - NSFOLLOWUPCLINICS_GEN_ALL_ED_FT
[facility]  Hematology/Oncology  [address] [id number]  Phone: [phone]  Fax:   Follow Up Time: 1 week

## 2021-01-27 NOTE — PROGRESS NOTE ADULT - PROBLEM SELECTOR PLAN 3
Hgb stable, no further episodes of bleeding.  No need for colonoscopy at this time, GI input appreciates

## 2021-01-27 NOTE — DISCHARGE NOTE PROVIDER - NSDCMRMEDTOKEN_GEN_ALL_CORE_FT
Ativan 1 mg oral tablet: 1 tab(s) orally once a day (at bedtime)  cimetidine 400 mg oral tablet: 1 tab(s) orally 3 times a day  Rexulti 4 mg oral tablet: 6 tab(s) orally once a day in AM  traZODone 300 mg oral tablet: 1 tab(s) orally once a day (at bedtime)   acetaminophen 325 mg oral tablet: 2 tab(s) orally every 6 hours, As needed, Temp greater or equal to 38C (100.4F), Mild Pain (1 - 3)  brexpiprazole 3 mg oral tablet: 2 tab(s) orally once a day MDD:2  calcium carbonate 500 mg (200 mg elemental calcium) oral tablet, chewable: 1 tab(s) orally 4 times a day, As needed, Indigestion  cimetidine 400 mg oral tablet: 1 tab(s) orally 3 times a day  loratadine 10 mg oral tablet: 1 tab(s) orally once  Multiple Vitamins oral tablet: 1 tab(s) orally once a day  pantoprazole 40 mg oral delayed release tablet: 1 tab(s) orally once a day (before a meal)  predniSONE 20 mg oral tablet: 4 tab(s) orally once a day  traZODone 300 mg oral tablet: 1 tab(s) orally once a day (at bedtime)

## 2021-01-27 NOTE — CHART NOTE - NSCHARTNOTEFT_GEN_A_CORE
Called by RN to evaluate patient at bedside. Patient was seen by day provider for concerns of "eye tracking". CBC, CMP, NH3 sent. Discussed results with patients mom at bedside. - Hgb/platelets stable, Ammonia level normal. Patient appears a baseline, A+Ox0 nonverbal full ROM walking around the room. No gait abnormality. As per mom patient had Right gaze preference x 2 hours. Denies hx of stroke/seizure. Concern for neurological event due to COVID. Discussed with Hospitalist on call. Neuro c/s paged awaiting call back. Called by RN to evaluate patient at bedside. Patient was seen by day provider for concerns of "eye tracking". CBC, CMP, NH3 sent. Discussed results with patients mom at bedside. - Hgb/platelets stable, Ammonia level normal. Patient appears a baseline, A+Ox0 nonverbal full ROM walking around the room. No gait abnormality. As per mom patient had Left gaze preference x 2 hours. Denies hx of stroke/seizure. Concern for neurological event due to COVID. Discussed with Hospitalist on call. Neuro c/s paged awaiting call back. Called by RN to evaluate patient at bedside. Patient was seen by day provider for concerns of "eye tracking". CBC, CMP, NH3 sent. Discussed results with patients mom at bedside. - Hgb/platelets stable, Ammonia level normal. Patient appears a baseline, A+Ox0 nonverbal  PERRLA EOMI. full ROM walking around the room. No gait abnormality. No focal deficit. As per mom patient had Left gaze preference x 2 hours. Denies hx of stroke/seizure. Concern for neurological event due to COVID. Requesting Neuro eval. Discussed with Hospitalist on call. Neuro c/s paged awaiting call back. Called by RN to evaluate patient at bedside. Patient was seen by day provider for concerns of "eye tracking". CBC, CMP, NH3 sent. Discussed results with patients mom at bedside. - Hgb/platelets stable, Ammonia level normal. Patient appears a baseline, A+Ox0 nonverbal  PERRLA EOMI. full ROM walking around the room. No gait abnormality. No focal deficit. As per mom patient had Left gaze preference x 2 hours. Denies hx of stroke/seizure. Concern for neurological event due to COVID. Requesting Neuro eval. Discussed with Hospitalist on call. Neuro c/s paged awaiting call back.    Addendum 22:50 Discussed with Neuro. Patient with hx of Autism can be at risk for seizures. Will order 24 hour Video EEG r/o seizure. Seizure precautions ordered. CT head ordered. Will f/u official recs.

## 2021-01-27 NOTE — DISCHARGE NOTE PROVIDER - NSDCCPCAREPLAN_GEN_ALL_CORE_FT
PRINCIPAL DISCHARGE DIAGNOSIS  Diagnosis: Idiopathic thrombocytopenia purpura  Assessment and Plan of Treatment:       SECONDARY DISCHARGE DIAGNOSES  Diagnosis: COVID-19  Assessment and Plan of Treatment: You have been diagnosed with the COVID-19 virus during your hospital stay. You must self quarantine to complete a 14 day time period.  Monitor for fevers, shortness of breath and cough primarily.  Monitor your temperature daily to not any changes and increases.    It has been determined that you no longer need hospitalization and can recover while remaining in self-quarantine at home. You should follow the prevention steps below until a healthcare provider or local or state health department says you can return to your normal activities.  1. You should restrict activities outside your home, except for getting medical care.  2. Do not go to work, school, or public areas.  3. Avoid using public transportation, ride-sharing, or taxis.  4. Separate yourself from other people and animals in your home.  5. Call ahead before visiting your doctor.  6. Wear a facemask.  7. Cover your coughs and sneezes.  8. Clean your hands often.  9. Avoid sharing personal household items.  10. Clean all “high-touch” surfaces everyday.  11. Monitor your symptoms.  If you have a medical emergency and need to call 911, notify the dispatch personnel that you have COVID-19 If possible, put on a facemask before emergency medical services arrive.  12. Stopping home isolation.  Patients with confirmed COVID-19 should remain under home isolation precautions for 14 days since the positive COVID-19 test and until the risk of secondary transmission to others is thought to be low. The decision to discontinue home isolation precautions should be made on a case-by-case basis, in consultation with healthcare providers and state and local health departments. Your [facility] Department of Health can be reached at [phone] for further information about COVID-19.    Diagnosis: GI bleed  Assessment and Plan of Treatment:      PRINCIPAL DISCHARGE DIAGNOSIS  Diagnosis: Idiopathic thrombocytopenia purpura  Assessment and Plan of Treatment: You were seen and evaluated by hematology. You received 1 unit of platelets and Dexamethasone.  Take your steroid taper as prescribed, do not spot abrubtly.  Follow outpatient with at [facility].      SECONDARY DISCHARGE DIAGNOSES  Diagnosis: COVID-19  Assessment and Plan of Treatment: You have been diagnosed with the COVID-19 virus during your hospital stay. You must self quarantine to complete a 14 day time period.  Monitor for fevers, shortness of breath and cough primarily.  Monitor your temperature daily to not any changes and increases.    It has been determined that you no longer need hospitalization and can recover while remaining in self-quarantine at home. You should follow the prevention steps below until a healthcare provider or local or state health department says you can return to your normal activities.  1. You should restrict activities outside your home, except for getting medical care.  2. Do not go to work, school, or public areas.  3. Avoid using public transportation, ride-sharing, or taxis.  4. Separate yourself from other people and animals in your home.  5. Call ahead before visiting your doctor.  6. Wear a facemask.  7. Cover your coughs and sneezes.  8. Clean your hands often.  9. Avoid sharing personal household items.  10. Clean all “high-touch” surfaces everyday.  11. Monitor your symptoms.  If you have a medical emergency and need to call 911, notify the dispatch personnel that you have COVID-19 If possible, put on a facemask before emergency medical services arrive.  12. Stopping home isolation.  Patients with confirmed COVID-19 should remain under home isolation precautions for 14 days since the positive COVID-19 test and until the risk of secondary transmission to others is thought to be low. The decision to discontinue home isolation precautions should be made on a case-by-case basis, in consultation with healthcare providers and state and local health departments. Your [facility] Department of Health can be reached at [phone] for further information about COVID-19.

## 2021-01-27 NOTE — DISCHARGE NOTE PROVIDER - PROVIDER TOKENS
FREE:[LAST:[[place]],PHONE:[(   )    -],FAX:[(   )    -],SCHEDULEDAPPT:[02/09/2021],SCHEDULEDAPPTTIME:[02:00 PM],ESTABLISHEDPATIENT:[T]]

## 2021-01-27 NOTE — CHART NOTE - NSCHARTNOTEFT_GEN_A_CORE
PA medicine note    Informed by RN that pt's mother requested to see provider over concerns that her sons "eyes are tracking"   Pt. is A&O x 0, non verbal at baseline per mother. Mother states pt has been "tracking" his eyes upwards and to the left today and he has never done this before. Mother denies pt ever doing this before. Pt. noted to be yelling on exam, however per mother this is normal for pt.     Examined pt at bedside   General - Pt. in NAD. A&O x 0. No facial asymmetry noted. Was unable to assess true  strength however pt was observed eating and using both arms with full ROM. Asked mother to assist in standing pt up to walk, and pt was observed walking without any imbalance or gait abnormality.  Neuro - EOMI, PEERLA. On occasion would note that pt glances upwards briefly and then returns gaze to ipad in his hands or his mother.   Heart - RRR w/o MRG   Lungs - CTA w/o WRR    Vital Signs Last 24 Hrs  T(C): 36.1 (27 Jan 2021 16:27), Max: 36.4 (27 Jan 2021 09:52)  T(F): 96.9 (27 Jan 2021 16:27), Max: 97.5 (27 Jan 2021 09:52)  HR: 82 (27 Jan 2021 16:27) (63 - 89)  BP: 131/74 (27 Jan 2021 16:27) (122/85 - 150/75)  BP(mean): --  RR: 18 (27 Jan 2021 16:27) (18 - 20)  SpO2: 96% (27 Jan 2021 16:27) (94% - 99%)    A/P  Eye deviation  As per discussion with Dr. Steele, will order CBC and CMP. Also ordered ammonia level. Will order TSH, b12 and folate for AM. Pt. without focal neuro deficits and vital signs are stable. Will hold off CTH or EEG for now as per conversation and follow up lab work. Will continue to monitor pt closely     Luis Bertrand PA-C  Pager [phone]

## 2021-01-27 NOTE — DISCHARGE NOTE PROVIDER - CARE PROVIDER_API CALL
Adrienne,   Phone: (   )    -  Fax: (   )    -  Established Patient  Scheduled Appointment: 02/09/2021 02:00 PM

## 2021-01-28 DIAGNOSIS — Q15.9 CONGENITAL MALFORMATION OF EYE, UNSPECIFIED: ICD-10-CM

## 2021-01-28 LAB
ALBUMIN SERPL ELPH-MCNC: 3.1 G/DL — LOW (ref 3.3–5)
ALP SERPL-CCNC: 54 U/L — SIGNIFICANT CHANGE UP (ref 40–120)
ALT FLD-CCNC: 160 U/L — HIGH (ref 4–41)
ANION GAP SERPL CALC-SCNC: 12 MMOL/L — SIGNIFICANT CHANGE UP (ref 7–14)
AST SERPL-CCNC: 49 U/L — HIGH (ref 4–40)
BASOPHILS # BLD AUTO: 0.04 K/UL — SIGNIFICANT CHANGE UP (ref 0–0.2)
BASOPHILS NFR BLD AUTO: 0.2 % — SIGNIFICANT CHANGE UP (ref 0–2)
BILIRUB SERPL-MCNC: 0.7 MG/DL — SIGNIFICANT CHANGE UP (ref 0.2–1.2)
BUN SERPL-MCNC: 22 MG/DL — SIGNIFICANT CHANGE UP (ref 7–23)
CALCIUM SERPL-MCNC: 8.4 MG/DL — SIGNIFICANT CHANGE UP (ref 8.4–10.5)
CHLORIDE SERPL-SCNC: 100 MMOL/L — SIGNIFICANT CHANGE UP (ref 98–107)
CO2 SERPL-SCNC: 23 MMOL/L — SIGNIFICANT CHANGE UP (ref 22–31)
CREAT SERPL-MCNC: 0.67 MG/DL — SIGNIFICANT CHANGE UP (ref 0.5–1.3)
D DIMER BLD IA.RAPID-MCNC: 203 NG/ML DDU — HIGH
EOSINOPHIL # BLD AUTO: 0 K/UL — SIGNIFICANT CHANGE UP (ref 0–0.5)
EOSINOPHIL NFR BLD AUTO: 0 % — SIGNIFICANT CHANGE UP (ref 0–6)
FOLATE SERPL-MCNC: 5.5 NG/ML — SIGNIFICANT CHANGE UP (ref 3.1–17.5)
GLUCOSE SERPL-MCNC: 115 MG/DL — HIGH (ref 70–99)
HCT VFR BLD CALC: 43.8 % — SIGNIFICANT CHANGE UP (ref 39–50)
HGB BLD-MCNC: 13.4 G/DL — SIGNIFICANT CHANGE UP (ref 13–17)
IANC: 14.69 K/UL — HIGH (ref 1.5–8.5)
IMM GRANULOCYTES NFR BLD AUTO: 1.6 % — HIGH (ref 0–1.5)
LYMPHOCYTES # BLD AUTO: 1.21 K/UL — SIGNIFICANT CHANGE UP (ref 1–3.3)
LYMPHOCYTES # BLD AUTO: 7 % — LOW (ref 13–44)
MAGNESIUM SERPL-MCNC: 2 MG/DL — SIGNIFICANT CHANGE UP (ref 1.6–2.6)
MCHC RBC-ENTMCNC: 25.5 PG — LOW (ref 27–34)
MCHC RBC-ENTMCNC: 30.6 GM/DL — LOW (ref 32–36)
MCV RBC AUTO: 83.3 FL — SIGNIFICANT CHANGE UP (ref 80–100)
MONOCYTES # BLD AUTO: 1.16 K/UL — HIGH (ref 0–0.9)
MONOCYTES NFR BLD AUTO: 6.7 % — SIGNIFICANT CHANGE UP (ref 2–14)
NEUTROPHILS # BLD AUTO: 14.69 K/UL — HIGH (ref 1.8–7.4)
NEUTROPHILS NFR BLD AUTO: 84.5 % — HIGH (ref 43–77)
NRBC # BLD: 0 /100 WBCS — SIGNIFICANT CHANGE UP
NRBC # FLD: 0 K/UL — SIGNIFICANT CHANGE UP
PHOSPHATE SERPL-MCNC: 3.1 MG/DL — SIGNIFICANT CHANGE UP (ref 2.5–4.5)
PLATELET # BLD AUTO: 391 K/UL — SIGNIFICANT CHANGE UP (ref 150–400)
POTASSIUM SERPL-MCNC: 4.4 MMOL/L — SIGNIFICANT CHANGE UP (ref 3.5–5.3)
POTASSIUM SERPL-SCNC: 4.4 MMOL/L — SIGNIFICANT CHANGE UP (ref 3.5–5.3)
PROT SERPL-MCNC: 8.1 G/DL — SIGNIFICANT CHANGE UP (ref 6–8.3)
RBC # BLD: 5.26 M/UL — SIGNIFICANT CHANGE UP (ref 4.2–5.8)
RBC # FLD: 14.4 % — SIGNIFICANT CHANGE UP (ref 10.3–14.5)
SODIUM SERPL-SCNC: 135 MMOL/L — SIGNIFICANT CHANGE UP (ref 135–145)
TSH SERPL-MCNC: 1.28 UIU/ML — SIGNIFICANT CHANGE UP (ref 0.27–4.2)
VIT B12 SERPL-MCNC: 390 PG/ML — SIGNIFICANT CHANGE UP (ref 200–900)
WBC # BLD: 17.37 K/UL — HIGH (ref 3.8–10.5)
WBC # FLD AUTO: 17.37 K/UL — HIGH (ref 3.8–10.5)

## 2021-01-28 PROCEDURE — 99233 SBSQ HOSP IP/OBS HIGH 50: CPT | Mod: CS

## 2021-01-28 PROCEDURE — 70450 CT HEAD/BRAIN W/O DYE: CPT | Mod: 26

## 2021-01-28 PROCEDURE — 95816 EEG AWAKE AND DROWSY: CPT | Mod: 26

## 2021-01-28 RX ADMIN — BREXPIPRAZOLE 6 MILLIGRAM(S): 0.25 TABLET ORAL at 12:00

## 2021-01-28 RX ADMIN — Medication 1 MILLIGRAM(S): at 05:00

## 2021-01-28 RX ADMIN — Medication 1 MILLIGRAM(S): at 21:35

## 2021-01-28 RX ADMIN — Medication 300 MILLIGRAM(S): at 04:04

## 2021-01-28 RX ADMIN — Medication 300 MILLIGRAM(S): at 21:35

## 2021-01-28 RX ADMIN — Medication 1 MILLIGRAM(S): at 04:02

## 2021-01-28 NOTE — PROGRESS NOTE ADULT - PROBLEM SELECTOR PLAN 3
acute on chronic ITP  s/p 1 unit Plts, s/p IVIG, s/p Dexamethasone, was on prednisone 1mg/kg 150 mg.  Holding for ? steroid induced psychosis.  Platelets now normal.   Eventual plan for outpt f/u with Henry

## 2021-01-28 NOTE — CONSULT NOTE ADULT - ATTENDING COMMENTS
Agreed with above history and exam. Reviewed video, does not seem epileptic or vascular, appears behavioral, ? steroid induced Can get Routine EEG while here, does not need to delay discharge no further inpatient w/u
Agree with above. 19 yo with severe thrombocytopenia noted with bright red blood seen on diaper and upon wiping. Hemodynamically stable with stable Hb. Most likely hemorrhoidal. Bowel regimen to avoid constipation. Trend CBC.
The patient was seen and examined today with the fellow, Dr. Phillips, and I agree with the History, Physical Exam and Plan in the record. Labs and imaging reviewed by me.

## 2021-01-28 NOTE — CHART NOTE - NSCHARTNOTEFT_GEN_A_CORE
19 yo M hx of chronic ITP, previously under care of Dr. Rachana Blanco ([facility]), autism, presented from [facility] with plt 1 and BRBPR,:    # History of chronic ITP  - discussed admission with Dr. Blanco. Last seen by Dr. Blanco in 2014, she previously treated with steroids, rituxan, without sustained response and plts have remained 4-10 without any issues.    - hgb has been normal on admission. normal LDH, bilirubin, neg HIV.   - this admission, patient treated with IVIG x2, pulse dose dex x 4 days, received 1 unit of plt and plt responded 1>>>172>>300s  - plan was to continue with prednisone 1 mg/kg and taper steroids as outpt. patient has appointment with Dr. Deleon at [facility] on Feb 9th at 2 pm.    - however, no with eye deviation, MS change? possible steroid induced psychosis? neuro following and recs appreciated. EEG without seizure activity. okay to hold prednisone and continue to monitor counts     d/w Dr. Pratt and primary team     Seb Gleason Heme/onc PGY4

## 2021-01-28 NOTE — PROGRESS NOTE ADULT - ASSESSMENT
20M w/ autism (non-verbal), hx of ITP since childhood, CARLITOS (not on CPAP)  admitted for acute lower GI bleed in setting of acute on chronic ITP, found to be COVID +, now with eye deviation

## 2021-01-28 NOTE — CONSULT NOTE ADULT - ASSESSMENT
20M w/ autism (non-verbal), chronic ITP, CARLITOS (not on CPAP) transferred to [facility] from [facility] on 1/23 due to BRBPR. Admitted to the medical service for management of lower GI bleed in the setting of ITP flare. Hospital course notable for treatment with Prednisone and IVIG as well as COVID-19 positive on 1/26.   On 1/27, patient's mother noted patient to have eye deviation. Per mother and per video recording taken by mother, patient appeared to have intermittent upward and rightward gaze deviation while groaning, reaching for objects, and still interacting with mother. Per mother and primary team, there was no loss of consciousness however, patient had prolonged repetitive episodes of this. Neurology consulted for this reason. Per mother, patient has never exhibited this type of behavior before. Denies any prior history of seizure. Per mother, patient was back to his usual self after the episodes and interacting with her during the episodes. ROS limited.     Neurologic exam limited -- notable for non-verbal patient with impaired comprehension (baseline).     Impression: Intermittent eye deviation (resolved at time of interview/exam) 2/2 unknown etiology -- possibly seizure as there is a known increased prevalence of epilepsy in patient's with autism vs new behavioral manifestation; ischemic event is considered less likely.    Recommendations:   [] CTH non-contrast   [] vEEG for 24 hours -- It may be difficult to have a prolonged study but would ideally want to capture this eye deviation event  [] No AEDs for now  [] May possibly proceed with MRI Brain depending on results from aforementioned work-up

## 2021-01-28 NOTE — CONSULT NOTE ADULT - SUBJECTIVE AND OBJECTIVE BOX
Neurology Consult Note     History obtained from patient's mother, at bedside.   HPI: 20M w/ autism (non-verbal), chronic ITP, CARLITOS (not on CPAP) transferred to [facility] from [facility] on 1/23 due to BRBPR. Admitted to the medical service for management of lower GI bleed in the setting of ITP flare. Hospital course notable for treatment with Prednisone and IVIG as well as COVID-19 positive on 1/26.   On 1/27, patient's mother noted patient to have eye deviation. Per mother and per video recording taken by mother, patient appeared to have intermittent upward and rightward gaze deviation while groaning, reaching for objects, and still interacting with mother. Per mother and primary team, there was no loss of consciousness however, patient had prolonged repetitive episodes of this. Neurology consulted for this reason. Per mother, patient has never exhibited this type of behavior before. Denies any prior history of seizure. Per mother, patient was back to his usual self after the episodes and interacting with her during the episodes. ROS limited.       REVIEW OF SYSTEMS    A 10-system ROS was performed and is negative except for those items noted above and/or in the HPI.    PAST MEDICAL & SURGICAL HISTORY:  Chronic ITP (idiopathic thrombocytopenia)    Intellectual disability    No significant past surgical history      FAMILY HISTORY:  FH: hypertension      SOCIAL HISTORY:   T/E/D:   Occupation:   Lives with:     MEDICATIONS (HOME):  Home Medications:  Ativan 1 mg oral tablet: 1 tab(s) orally once a day (at bedtime) (23 Jan 2021 10:56)  cimetidine 400 mg oral tablet: 1 tab(s) orally 3 times a day (23 Jan 2021 10:56)  Rexulti 4 mg oral tablet: 6 tab(s) orally once a day in AM (23 Jan 2021 10:56)  traZODone 300 mg oral tablet: 1 tab(s) orally once a day (at bedtime) (23 Jan 2021 10:56)    MEDICATIONS  (STANDING):  brexpiprazole 6 milliGRAM(s) Oral daily  influenza   Vaccine 0.5 milliLiter(s) IntraMuscular once  LORazepam     Tablet 1 milliGRAM(s) Oral at bedtime  pantoprazole    Tablet 40 milliGRAM(s) Oral before breakfast  predniSONE   Tablet 150 milliGRAM(s) Oral daily  traZODone 300 milliGRAM(s) Oral at bedtime    MEDICATIONS  (PRN):  acetaminophen   Tablet .. 650 milliGRAM(s) Oral every 6 hours PRN Temp greater or equal to 38C (100.4F), Mild Pain (1 - 3)    ALLERGIES/INTOLERANCES:  Allergies  Bactrim (Hives)  Rituxan (Hives)  WinRho SDF (Hives)    Intolerances    VITALS & EXAMINATION:  Vital Signs Last 24 Hrs  T(C): 36.1 (27 Jan 2021 20:54), Max: 36.4 (27 Jan 2021 09:52)  T(F): 96.9 (27 Jan 2021 20:54), Max: 97.5 (27 Jan 2021 09:52)  HR: 75 (27 Jan 2021 20:54) (67 - 89)  BP: 124/69 (27 Jan 2021 20:54) (122/85 - 136/74)  BP(mean): --  RR: 16 (27 Jan 2021 20:54) (16 - 18)  SpO2: 97% (27 Jan 2021 20:54) (96% - 99%)    General:  Constitutional: Obese Male, appears stated age, in no apparent distress including pain  Head: Normocephalic & atraumatic.  Extremities: No cyanosis, clubbing, or edema.  Skin: No rashes, bruising, or discoloration.    Neurological (>12):  MS: Awake, alert. Unable to assess orientation as patient is non-verbal but attends to name. Intermittently follows simple commands.     Language: Non-verbal    CNs: VFF. EOMI no nystagmus, no diplopia. Well developed masseter muscles b/l. No facial asymmetry b/l.    Fundoscopic: Not visualized.     Motor:   R	At least antigravity; moves spontaneously	  L	At least antigravity; moves spontaneously  	  R	At least antigravity; moves spontaneously	   L	At least antigravity; moves spontaneously	     Sensation: Intact to LT/PP/Temp/Vibration/Position b/l throughout.     Cortical: Extinction on DSS (neglect): none    Reflexes:                                Plantar Resp  R			Down   L			Down     Coordination: Grossly intact (able to 'high five' mother's hand with both L/R hands)    Gait: Unable to assess due to lack of patient cooperation    LABORATORY:  CBC                       13.4   17.51 )-----------( 392      ( 27 Jan 2021 18:26 )             42.6     Chem 01-27    133<L>  |  101  |  22  ----------------------------<  192<H>  4.1   |  23  |  0.83    Ca    7.8<L>      27 Jan 2021 18:26  Phos  2.4     01-27  Mg     1.8     01-27    TPro  7.8  /  Alb  3.2<L>  /  TBili  0.9  /  DBili  x   /  AST  78<H>  /  ALT  204<H>  /  AlkPhos  52  01-27    LFTs LIVER FUNCTIONS - ( 27 Jan 2021 18:26 )  Alb: 3.2 g/dL / Pro: 7.8 g/dL / ALK PHOS: 52 U/L / ALT: 204 U/L / AST: 78 U/L / GGT: x           Coagulopathy PT/INR - ( 26 Jan 2021 15:52 )   PT: 12.2 sec;   INR: 1.06 ratio         PTT - ( 26 Jan 2021 15:52 )  PTT:23.7 sec  Lipid Panel   A1c   Cardiac enzymes CARDIAC MARKERS ( 26 Jan 2021 15:52 )  x     / x     / 17 U/L / x     / x          U/A   CSF  Immunological  Other    STUDIES & IMAGING:  Studies (EKG, EEG, EMG, etc):     Radiology (XR, CT, MR, U/S, TTE/MAKENZIE):

## 2021-01-28 NOTE — EEG REPORT - NS EEG TEXT BOX
[facility]   COMPREHENSIVE EPILEPSY CENTER   REPORT OF ROUTINE VIDEO EEG     [facility]: [address], [place] [address], Ph#: [phone]  [facility]: [address] [place] [address], Ph#: [phone]  H: [address] [place] [address], Ph#: [phone]    Patient Name: VINNY MOON  Age and : 20y ([birth year])  MRN #: [id number]  Location: [place] [id number]   Referring Physician: Devan Soto    Study Date: 21    _____________________________________________________________  TECHNICAL INFORMATION    Placement and Labeling of Electrodes:  The EEG was performed utilizing 20 channels referential EEG connections (coronal over temporal over parasagittal montage) using all standard 10-20 electrode placements with EKG.  Recording was at a sampling rate of 256 samples per second per channel.  Time synchronized digital video recording was done simultaneously with EEG recording.  A low light infrared camera was used for low light recording.  Ananda and seizure detection algorithms were utilized.    _____________________________________________________________  HISTORY    Patient is a 20y old  Male who presents with a chief complaint of BRBPR (2021 12:58)      PERTINENT MEDICATION:  LORazepam     Tablet 1 milliGRAM(s) Oral at bedtime    _____________________________________________________________  STUDY INTERPRETATION    Findings: The background was continuous, spontaneously variable and reactive. During wakefulness, fragmentary 8-9hz PDR was seen    Background Slowing:  Background predominantly consisted of theta, delta and faster activities.    Focal Slowing:   None were present.    Sleep Background:  Drowsiness and stage II sleep transients were not recorded.    Other Non-Epileptiform Findings:  F4 sharp transient noted  Accentuation of sharply contoured activity noted over F8 perhaps representing artifact    Interictal Epileptiform Activity:   None were present.    Events:  Clinical events: None recorded.  Seizures: None recorded.    Activation Procedures:   Hyperventilation was not performed.    Photic stimulation was not performed.     Artifacts:  Intermittent myogenic and movement artifacts were noted.    ECG:  The heart rate on single channel ECG was predominantly between 60-70 BPM.    _____________________________________________________________  EEG SUMMARY/CLASSIFICATION    Abnormal EEG in an altered Patient.  - Mild generalized slowing  - F4 sharp transient noted  - Accentuation of sharply contoured activity noted over F8 perhaps representing artifact  _____________________________________________________________  EEG IMPRESSION/CLINICAL CORRELATE    Abnormal EEG study.  1. Mild nonspecific diffuse or multifocal cerebral dysfunction.   2. No seizure seen.    Amilcar Garcia MD PGY-5  Epilepsy Fellow    This Preliminary report is based on fellow review. Final report pending attending review.    Reading Room: [phone]  On Call Service After Hours: [phone] [facility]   COMPREHENSIVE EPILEPSY CENTER   REPORT OF ROUTINE VIDEO EEG     [facility]: [address], [place] [address], Ph#: [phone]  [facility]: [address] [place] [address], Ph#: [phone]  [facility]: [address] [place] [address], Ph#: [phone]    Patient Name: VINNY MOON  Age and : 20y (99)  MRN #: [id number]  Location: [place] [id number]   Referring Physician: Devan Soto    Study Date: 21    _____________________________________________________________  TECHNICAL INFORMATION    Placement and Labeling of Electrodes:  The EEG was performed utilizing 20 channels referential EEG connections (coronal over temporal over parasagittal montage) using all standard 10-20 electrode placements with EKG.  Recording was at a sampling rate of 256 samples per second per channel.  Time synchronized digital video recording was done simultaneously with EEG recording.  A low light infrared camera was used for low light recording.  Ananda and seizure detection algorithms were utilized.    _____________________________________________________________  HISTORY    Patient is a 20y old  Male who presents with a chief complaint of BRBPR (2021 12:58)      PERTINENT MEDICATION:  LORazepam     Tablet 1 milliGRAM(s) Oral at bedtime    _____________________________________________________________  STUDY INTERPRETATION    Findings: The background was continuous, spontaneously variable and reactive. During wakefulness, fragmentary 8-9hz PDR was seen    Background Slowing:  Background predominantly consisted of theta, delta and faster activities.    Focal Slowing:   None were present.    Sleep Background:  Drowsiness and stage II sleep transients were not recorded.    Other Non-Epileptiform Findings:  F4 sharp transient noted  Accentuation of sharply contoured activity noted over F8 perhaps representing artifact    Interictal Epileptiform Activity:   None were present.    Events:  Clinical events: None recorded.  Seizures: None recorded.    Activation Procedures:   Hyperventilation was not performed.    Photic stimulation was not performed.     Artifacts:  Intermittent myogenic and movement artifacts were noted.    ECG:  The heart rate on single channel ECG was predominantly between 60-70 BPM.    _____________________________________________________________  EEG SUMMARY/CLASSIFICATION    Abnormal EEG in an altered Patient.  - Mild generalized slowing  - F4 sharp transient noted  - Accentuation of sharply contoured activity noted over F8 perhaps representing artifact  _____________________________________________________________  EEG IMPRESSION/CLINICAL CORRELATE    Abnormal EEG study.  1. Mild nonspecific diffuse or multifocal cerebral dysfunction.   2. No seizure seen.    Amilcar Garcia MD PGY-5  Epilepsy Fellow    Ramy Martino MD PhD  Director, Epilepsy Division, [facility] EEG Reading Room Ph#: [phone]  Epilepsy Answering Service after 5PM and before 8:30AM: Ph#: [phone]

## 2021-01-29 LAB
ALBUMIN SERPL ELPH-MCNC: 2.6 G/DL — LOW (ref 3.3–5)
ALP SERPL-CCNC: 49 U/L — SIGNIFICANT CHANGE UP (ref 40–120)
ALT FLD-CCNC: 92 U/L — HIGH (ref 4–41)
ANION GAP SERPL CALC-SCNC: 10 MMOL/L — SIGNIFICANT CHANGE UP (ref 7–14)
AST SERPL-CCNC: 20 U/L — SIGNIFICANT CHANGE UP (ref 4–40)
BILIRUB SERPL-MCNC: 0.7 MG/DL — SIGNIFICANT CHANGE UP (ref 0.2–1.2)
BUN SERPL-MCNC: 28 MG/DL — HIGH (ref 7–23)
CALCIUM SERPL-MCNC: 8.3 MG/DL — LOW (ref 8.4–10.5)
CHLORIDE SERPL-SCNC: 103 MMOL/L — SIGNIFICANT CHANGE UP (ref 98–107)
CO2 SERPL-SCNC: 22 MMOL/L — SIGNIFICANT CHANGE UP (ref 22–31)
CREAT SERPL-MCNC: 0.89 MG/DL — SIGNIFICANT CHANGE UP (ref 0.5–1.3)
GLUCOSE SERPL-MCNC: 59 MG/DL — LOW (ref 70–99)
HCT VFR BLD CALC: 41.7 % — SIGNIFICANT CHANGE UP (ref 39–50)
HGB BLD-MCNC: 12.9 G/DL — LOW (ref 13–17)
MAGNESIUM SERPL-MCNC: 1.9 MG/DL — SIGNIFICANT CHANGE UP (ref 1.6–2.6)
MCHC RBC-ENTMCNC: 25.9 PG — LOW (ref 27–34)
MCHC RBC-ENTMCNC: 30.9 GM/DL — LOW (ref 32–36)
MCV RBC AUTO: 83.6 FL — SIGNIFICANT CHANGE UP (ref 80–100)
NRBC # BLD: 0 /100 WBCS — SIGNIFICANT CHANGE UP
NRBC # FLD: 0.03 K/UL — HIGH
PHOSPHATE SERPL-MCNC: 3.1 MG/DL — SIGNIFICANT CHANGE UP (ref 2.5–4.5)
PLATELET # BLD AUTO: 256 K/UL — SIGNIFICANT CHANGE UP (ref 150–400)
POTASSIUM SERPL-MCNC: 4 MMOL/L — SIGNIFICANT CHANGE UP (ref 3.5–5.3)
POTASSIUM SERPL-SCNC: 4 MMOL/L — SIGNIFICANT CHANGE UP (ref 3.5–5.3)
PROT SERPL-MCNC: 7 G/DL — SIGNIFICANT CHANGE UP (ref 6–8.3)
RBC # BLD: 4.99 M/UL — SIGNIFICANT CHANGE UP (ref 4.2–5.8)
RBC # FLD: 14.6 % — HIGH (ref 10.3–14.5)
SODIUM SERPL-SCNC: 135 MMOL/L — SIGNIFICANT CHANGE UP (ref 135–145)
WBC # BLD: 15.06 K/UL — HIGH (ref 3.8–10.5)
WBC # FLD AUTO: 15.06 K/UL — HIGH (ref 3.8–10.5)

## 2021-01-29 PROCEDURE — 99233 SBSQ HOSP IP/OBS HIGH 50: CPT | Mod: CS

## 2021-01-29 RX ORDER — CALCIUM CARBONATE 500(1250)
1 TABLET ORAL
Refills: 0 | Status: DISCONTINUED | OUTPATIENT
Start: 2021-01-29 | End: 2021-02-05

## 2021-01-29 RX ORDER — ACETAMINOPHEN 500 MG
650 TABLET ORAL ONCE
Refills: 0 | Status: COMPLETED | OUTPATIENT
Start: 2021-01-29 | End: 2021-01-29

## 2021-01-29 RX ADMIN — Medication 650 MILLIGRAM(S): at 08:22

## 2021-01-29 RX ADMIN — PANTOPRAZOLE SODIUM 40 MILLIGRAM(S): 20 TABLET, DELAYED RELEASE ORAL at 05:55

## 2021-01-29 RX ADMIN — Medication 650 MILLIGRAM(S): at 21:54

## 2021-01-29 RX ADMIN — BREXPIPRAZOLE 6 MILLIGRAM(S): 0.25 TABLET ORAL at 15:39

## 2021-01-29 RX ADMIN — Medication 100 MILLIGRAM(S): at 08:22

## 2021-01-29 RX ADMIN — Medication 300 MILLIGRAM(S): at 21:36

## 2021-01-29 RX ADMIN — Medication 1 MILLIGRAM(S): at 21:36

## 2021-01-29 NOTE — PROGRESS NOTE ADULT - PROBLEM SELECTOR PLAN 3
Addended by: BERTHA SALDANA on: 11/18/2020 03:16 PM     Modules accepted: Orders     acute on chronic ITP  s/p 1 unit Plts, s/p IVIG, s/p Dexamethasone, was on prednisone 1mg/kg 150 mg.  Holding for ? steroid induced psychosis.  Platelets now normal. check daily off steroids  Eventual plan for outpt f/u with [facility] acute on chronic ITP  s/p 1 unit Plts, s/p IVIG, s/p Dexamethasone, was on prednisone 1mg/kg 150 mg.  Holding for ? steroid induced psychosis.  Platelets now normal. check daily off steroids  Eventual plan for outpt f/u with [facility]  d/w mother about her concerns about VTE prophylaxis after d/w heme fellow- ideally lovenox preferred but pt's mother would prefer to hold off d/t ITP.  pt is ambulating in the room.

## 2021-01-29 NOTE — PROGRESS NOTE ADULT - ASSESSMENT
20M w/ autism (non-verbal), hx of ITP since childhood, CARLITOS (not on CPAP)  admitted for acute lower GI bleed in setting of acute on chronic ITP, found to be COVID + stable for now  eye deviation episode resolved- not seizure per neuro ?steroid related psychosis

## 2021-01-29 NOTE — CHART NOTE - NSCHARTNOTEFT_GEN_A_CORE
EEG reviewed and is unremarkable. No further inpatient neurological work-up needed at this time. Neurology to sign-off.     Neuro [id number]

## 2021-01-30 LAB
ANION GAP SERPL CALC-SCNC: 10 MMOL/L — SIGNIFICANT CHANGE UP (ref 7–14)
BUN SERPL-MCNC: 22 MG/DL — SIGNIFICANT CHANGE UP (ref 7–23)
CALCIUM SERPL-MCNC: 8.2 MG/DL — LOW (ref 8.4–10.5)
CHLORIDE SERPL-SCNC: 101 MMOL/L — SIGNIFICANT CHANGE UP (ref 98–107)
CO2 SERPL-SCNC: 25 MMOL/L — SIGNIFICANT CHANGE UP (ref 22–31)
CREAT SERPL-MCNC: 0.83 MG/DL — SIGNIFICANT CHANGE UP (ref 0.5–1.3)
GIANT PLATELETS BLD QL SMEAR: PRESENT — SIGNIFICANT CHANGE UP
GLUCOSE SERPL-MCNC: 98 MG/DL — SIGNIFICANT CHANGE UP (ref 70–99)
HCT VFR BLD CALC: 44.1 % — SIGNIFICANT CHANGE UP (ref 39–50)
HGB BLD-MCNC: 13.6 G/DL — SIGNIFICANT CHANGE UP (ref 13–17)
MAGNESIUM SERPL-MCNC: 1.8 MG/DL — SIGNIFICANT CHANGE UP (ref 1.6–2.6)
MANUAL SMEAR VERIFICATION: SIGNIFICANT CHANGE UP
MCHC RBC-ENTMCNC: 25.4 PG — LOW (ref 27–34)
MCHC RBC-ENTMCNC: 30.8 GM/DL — LOW (ref 32–36)
MCV RBC AUTO: 82.4 FL — SIGNIFICANT CHANGE UP (ref 80–100)
NRBC # BLD: 0 /100 WBCS — SIGNIFICANT CHANGE UP
NRBC # FLD: 0 K/UL — SIGNIFICANT CHANGE UP
PHOSPHATE SERPL-MCNC: 3.7 MG/DL — SIGNIFICANT CHANGE UP (ref 2.5–4.5)
PLAT MORPH BLD: ABNORMAL
PLATELET # BLD AUTO: 122 K/UL — LOW (ref 150–400)
PLATELET COUNT - ESTIMATE: ABNORMAL
POTASSIUM SERPL-MCNC: 3.7 MMOL/L — SIGNIFICANT CHANGE UP (ref 3.5–5.3)
POTASSIUM SERPL-SCNC: 3.7 MMOL/L — SIGNIFICANT CHANGE UP (ref 3.5–5.3)
RBC # BLD: 5.35 M/UL — SIGNIFICANT CHANGE UP (ref 4.2–5.8)
RBC # FLD: 14.4 % — SIGNIFICANT CHANGE UP (ref 10.3–14.5)
RBC BLD AUTO: NORMAL — SIGNIFICANT CHANGE UP
SARS-COV-2 RNA SPEC QL NAA+PROBE: DETECTED
SODIUM SERPL-SCNC: 136 MMOL/L — SIGNIFICANT CHANGE UP (ref 135–145)
WBC # BLD: 12.95 K/UL — HIGH (ref 3.8–10.5)
WBC # FLD AUTO: 12.95 K/UL — HIGH (ref 3.8–10.5)

## 2021-01-30 PROCEDURE — 99233 SBSQ HOSP IP/OBS HIGH 50: CPT | Mod: CS

## 2021-01-30 RX ADMIN — BREXPIPRAZOLE 6 MILLIGRAM(S): 0.25 TABLET ORAL at 13:12

## 2021-01-30 RX ADMIN — PANTOPRAZOLE SODIUM 40 MILLIGRAM(S): 20 TABLET, DELAYED RELEASE ORAL at 06:04

## 2021-01-30 RX ADMIN — Medication 650 MILLIGRAM(S): at 21:29

## 2021-01-30 RX ADMIN — Medication 1 MILLIGRAM(S): at 21:29

## 2021-01-30 RX ADMIN — Medication 300 MILLIGRAM(S): at 21:29

## 2021-01-30 NOTE — PROGRESS NOTE ADULT - ASSESSMENT
20M w/ autism (non-verbal), hx of ITP since childhood, CARLITOS (not on CPAP)  admitted for acute lower GI bleed in setting of acute on chronic ITP, found to be COVID + stable for now  eye deviation episode resolved- not seizure per neuro   due to downtrending of platelets- prednisone restarted 1/30 based on ideal body weight

## 2021-01-30 NOTE — CHART NOTE - NSCHARTNOTEFT_GEN_A_CORE
21yo M hx of chronic ITP, previously under care of Dr. Rachana Blanco ([facility]), autism, presented from [facility] with plt 1 and BRBPR,:    # History of chronic ITP  - discussed admission with Dr. Blanco. Last seen by Dr. Blanco in 2014, she previously treated with steroids, rituxan, without sustained response and plts have remained 4-10 without any issues.    - hgb has been normal on admission. normal LDH, bilirubin, neg HIV.   - this admission, patient treated with IVIG x2, pulse dose dex x 4 days, received 1 unit of plt and plt responded 1>>>172>>300s  - plan was to continue with prednisone 1 mg/kg and taper steroids as outpt. patient has [place] with [place] [place] [facility] [place] [facility] at 2 pm.    - however, had eposide of eye deviation, ?MS change, possible steroid induced psychosis? Last prednisone 01/27. EEG without seizure activity and neuro recommending outpatient workup    - downtrending Plt since 01/27: 392 -> 391 -> 256 ->122  - recommend restarting prednisone at 1mg/kg using ideal body weight (recommend 80mg prednisone qd)    Discussed with family, primary team      Adriano Jones MD  Hematology/Oncology Fellow

## 2021-01-31 LAB
ALBUMIN SERPL ELPH-MCNC: 3.1 G/DL — LOW (ref 3.3–5)
ALP SERPL-CCNC: 55 U/L — SIGNIFICANT CHANGE UP (ref 40–120)
ALT FLD-CCNC: 88 U/L — HIGH (ref 4–41)
ANION GAP SERPL CALC-SCNC: 7 MMOL/L — SIGNIFICANT CHANGE UP (ref 7–14)
AST SERPL-CCNC: 42 U/L — HIGH (ref 4–40)
BILIRUB SERPL-MCNC: 0.5 MG/DL — SIGNIFICANT CHANGE UP (ref 0.2–1.2)
BUN SERPL-MCNC: 20 MG/DL — SIGNIFICANT CHANGE UP (ref 7–23)
CALCIUM SERPL-MCNC: 8.5 MG/DL — SIGNIFICANT CHANGE UP (ref 8.4–10.5)
CHLORIDE SERPL-SCNC: 103 MMOL/L — SIGNIFICANT CHANGE UP (ref 98–107)
CO2 SERPL-SCNC: 26 MMOL/L — SIGNIFICANT CHANGE UP (ref 22–31)
CREAT SERPL-MCNC: 0.97 MG/DL — SIGNIFICANT CHANGE UP (ref 0.5–1.3)
GLUCOSE SERPL-MCNC: 102 MG/DL — HIGH (ref 70–99)
HCT VFR BLD CALC: 41 % — SIGNIFICANT CHANGE UP (ref 39–50)
HGB BLD-MCNC: 12.7 G/DL — LOW (ref 13–17)
MAGNESIUM SERPL-MCNC: 1.9 MG/DL — SIGNIFICANT CHANGE UP (ref 1.6–2.6)
MCHC RBC-ENTMCNC: 25.7 PG — LOW (ref 27–34)
MCHC RBC-ENTMCNC: 31 GM/DL — LOW (ref 32–36)
MCV RBC AUTO: 83 FL — SIGNIFICANT CHANGE UP (ref 80–100)
NRBC # BLD: 0 /100 WBCS — SIGNIFICANT CHANGE UP
NRBC # FLD: 0 K/UL — SIGNIFICANT CHANGE UP
PLATELET # BLD AUTO: 43 K/UL — LOW (ref 150–400)
POTASSIUM SERPL-MCNC: 4.1 MMOL/L — SIGNIFICANT CHANGE UP (ref 3.5–5.3)
POTASSIUM SERPL-SCNC: 4.1 MMOL/L — SIGNIFICANT CHANGE UP (ref 3.5–5.3)
PROT SERPL-MCNC: 6.9 G/DL — SIGNIFICANT CHANGE UP (ref 6–8.3)
RBC # BLD: 4.94 M/UL — SIGNIFICANT CHANGE UP (ref 4.2–5.8)
RBC # FLD: 14.8 % — HIGH (ref 10.3–14.5)
SODIUM SERPL-SCNC: 136 MMOL/L — SIGNIFICANT CHANGE UP (ref 135–145)
WBC # BLD: 17.31 K/UL — HIGH (ref 3.8–10.5)
WBC # FLD AUTO: 17.31 K/UL — HIGH (ref 3.8–10.5)

## 2021-01-31 PROCEDURE — 99232 SBSQ HOSP IP/OBS MODERATE 35: CPT | Mod: CS

## 2021-01-31 RX ORDER — IMMUNE GLOBULIN (HUMAN) 10 G/100ML
55 INJECTION INTRAVENOUS; SUBCUTANEOUS DAILY
Refills: 0 | Status: COMPLETED | OUTPATIENT
Start: 2021-01-31 | End: 2021-02-03

## 2021-01-31 RX ADMIN — BREXPIPRAZOLE 6 MILLIGRAM(S): 0.25 TABLET ORAL at 14:13

## 2021-01-31 RX ADMIN — IMMUNE GLOBULIN (HUMAN) 137.5 GRAM(S): 10 INJECTION INTRAVENOUS; SUBCUTANEOUS at 21:52

## 2021-01-31 RX ADMIN — Medication 80 MILLIGRAM(S): at 12:32

## 2021-01-31 RX ADMIN — Medication 1 MILLIGRAM(S): at 22:05

## 2021-01-31 RX ADMIN — Medication 300 MILLIGRAM(S): at 22:05

## 2021-01-31 RX ADMIN — PANTOPRAZOLE SODIUM 40 MILLIGRAM(S): 20 TABLET, DELAYED RELEASE ORAL at 06:24

## 2021-01-31 NOTE — PROGRESS NOTE ADULT - ASSESSMENT
20M w/ autism (non-verbal), hx of ITP since childhood, CARLITOS (not on CPAP)  admitted for acute lower GI bleed in setting of acute on chronic ITP, found to be COVID + stable for now  eye deviation episode resolved- not seizure per neuro   due to downtrending of platelets- prednisone restarted 1/30 based on ideal body weight- not ordered as planned- started 1/31- heme wishes to give IVIG 1/31 as well.

## 2021-01-31 NOTE — PROGRESS NOTE ADULT - PROBLEM SELECTOR PLAN 3
acute on chronic ITP  s/p 1 unit Plts, s/p IVIG, s/p Dexamethasone, was on prednisone 1mg/kg 150 mg.  Held for ? steroid induced psychosis.  Platelets dropping- restarting Prednisone 1/31 and IVIG per heme for 1/31    d/w mother 1/29 about her concerns about VTE prophylaxis after d/w heme fellow- ideally lovenox preferred but pt's mother would prefer to hold off d/t ITP.  pt is ambulating in the room.

## 2021-01-31 NOTE — CHART NOTE - NSCHARTNOTEFT_GEN_A_CORE
20 year old man hx of chronic ITP, previously under care of Dr. Rachana Blanco ([facility]), autism, presented from [facility] with plt 1 and BRBPR,    # History of chronic ITP  - discussed admission with Dr. Blanco. Last seen by Dr. Blanco in 2014.  Patient chronically has platelet counts as low as 5,000 however does not typically experience bleeding.  At beginning of current hospital stay, patient treated with IVIG X 2 doses as well as pulse decadron 40mg X 4 days and then a prednisone taper starting at 1mg/kg (150mg).  Prednisone discontinued temporarily due to patient having mental status changes, possible steroid induced psychosis, but this could also have been from the COVID as well.  3 days following this platelets fell from 250's down to 122 then 43 today.  Patient prednisone restarted at an adjusted body weight dose of 80mg daily.  Recommend that he also be treated with IVIG 0.5mg/KG X 4 days to prevent severe drop in platelets counts.  It has been a week since the last dose of IVIG so this drop in platlets may be due to the previous IVIG dose leaving the system more than the discontinuation of the prednisone 150mg.    Discussed with family, primary team

## 2021-02-01 LAB
HCT VFR BLD CALC: 40.3 % — SIGNIFICANT CHANGE UP (ref 39–50)
HGB BLD-MCNC: 12.8 G/DL — LOW (ref 13–17)
MCHC RBC-ENTMCNC: 25.7 PG — LOW (ref 27–34)
MCHC RBC-ENTMCNC: 31.8 GM/DL — LOW (ref 32–36)
MCV RBC AUTO: 80.8 FL — SIGNIFICANT CHANGE UP (ref 80–100)
NRBC # BLD: 0 /100 WBCS — SIGNIFICANT CHANGE UP
NRBC # FLD: 0 K/UL — SIGNIFICANT CHANGE UP
PLATELET # BLD AUTO: 59 K/UL — LOW (ref 150–400)
RBC # BLD: 4.99 M/UL — SIGNIFICANT CHANGE UP (ref 4.2–5.8)
RBC # FLD: 15 % — HIGH (ref 10.3–14.5)
WBC # BLD: 17.93 K/UL — HIGH (ref 3.8–10.5)
WBC # FLD AUTO: 17.93 K/UL — HIGH (ref 3.8–10.5)

## 2021-02-01 PROCEDURE — 99232 SBSQ HOSP IP/OBS MODERATE 35: CPT | Mod: CS

## 2021-02-01 RX ORDER — DIPHENHYDRAMINE HCL 50 MG
25 CAPSULE ORAL ONCE
Refills: 0 | Status: COMPLETED | OUTPATIENT
Start: 2021-02-01 | End: 2021-02-01

## 2021-02-01 RX ADMIN — PANTOPRAZOLE SODIUM 40 MILLIGRAM(S): 20 TABLET, DELAYED RELEASE ORAL at 05:50

## 2021-02-01 RX ADMIN — Medication 80 MILLIGRAM(S): at 05:51

## 2021-02-01 RX ADMIN — BREXPIPRAZOLE 6 MILLIGRAM(S): 0.25 TABLET ORAL at 13:58

## 2021-02-01 RX ADMIN — Medication 300 MILLIGRAM(S): at 23:04

## 2021-02-01 RX ADMIN — IMMUNE GLOBULIN (HUMAN) 137.5 GRAM(S): 10 INJECTION INTRAVENOUS; SUBCUTANEOUS at 22:59

## 2021-02-01 RX ADMIN — Medication 1 MILLIGRAM(S): at 23:04

## 2021-02-01 RX ADMIN — Medication 25 MILLIGRAM(S): at 23:03

## 2021-02-01 NOTE — DIETITIAN INITIAL EVALUATION ADULT. - PHYSCIAL ASSESSMENT
obese Unable to conduct a face to face interview or nutrition-focused physical exam due to limited contact restrictions related to patient's medical condition and isolation precautions.

## 2021-02-01 NOTE — DIETITIAN INITIAL EVALUATION ADULT. - PROBLEM SELECTOR PLAN 2
Hgb stable 13.4, FOBT pos, Coags wnl, per ED physician, no noticeable external hemorrhoids  last BRBPR yesterday PM  ED consulted GI via email, pending reccs

## 2021-02-01 NOTE — DIETITIAN INITIAL EVALUATION ADULT. - REASON FOR ADMISSION
Patient is a 20M w/ autism (non-verbal), hx of ITP since childhood, CARLITOS (not on CPAP)  admitted for acute lower GI bleed in setting of acute on chronic ITP, found to be COVID + stable for now

## 2021-02-01 NOTE — DIETITIAN INITIAL EVALUATION ADULT. - PERTINENT LABORATORY DATA
01-31 Na136 mmol/L Glu 102 mg/dL<H> K+ 4.1 mmol/L Cr  0.97 mg/dL BUN 20 mg/dL 01-30 Phos 3.7 mg/dL 01-31 Alb 3.1 g/dL<L>

## 2021-02-01 NOTE — DIETITIAN INITIAL EVALUATION ADULT. - OTHER INFO
Unable to conduct a face to face interview or nutrition-focused physical exam due to limited contact restrictions related to patient's medical condition and isolation precautions. Obtained subjective information from extensive chart review. No GI distress (nausea/vomiting/diarrhea/constipation.) No reported difficulties chewing and swallowing, pt on a regular high fiber diet. Weight obtained on this admission 158.8kg vs 151.6kg 1/22.  Per chart, patient was 167.8kg 8/13/20. Please obtain current weight and document % PO intake as feasible to assess adequacy of PO.

## 2021-02-01 NOTE — DIETITIAN INITIAL EVALUATION ADULT. - PROBLEM SELECTOR PLAN 1
acute on chronic, unclear last platelet count, please call [phone] Dr. Blanco's office on Monday to obtain baseline platelet count and additional collateral  platelet ct 2000 on admission, fibrinogen inconsistent with DIC  heme recc: 1u platelet STAT, dexamethasone 40 mg daily x 4 days (1/23-1/26), IVIG 1g/kg daily x 3 days (1/23-1/25)  transfuse platelet < 30,000 although pt's mother refuses transfusion given his baseline is possibly much lower  obtain COVID 19 PCR and post transfusion CBC, retic count, LDH, haptoglobin, HIV, hepatitis panel with next lab draw   pt not a MICU candidate given hemodynamic stability  CT head negative for ICH  hold cimetidine for ?GERD until platelets stabilize given case reports of causing thrombocytopenia  per mother pt had severe rxn to chronic suppressive therapy such as rituximab and [place] leading to MICU admission

## 2021-02-01 NOTE — DIETITIAN INITIAL EVALUATION ADULT. - PERTINENT MEDS FT
MEDICATIONS  (STANDING):  brexpiprazole 6 milliGRAM(s) Oral daily  immune   globulin 10% (GAMMAGARD) IVPB 55 Gram(s) IV Intermittent daily  influenza   Vaccine 0.5 milliLiter(s) IntraMuscular once  LORazepam     Tablet 1 milliGRAM(s) Oral at bedtime  pantoprazole    Tablet 40 milliGRAM(s) Oral before breakfast  predniSONE   Tablet 80 milliGRAM(s) Oral daily  traZODone 300 milliGRAM(s) Oral at bedtime

## 2021-02-01 NOTE — CHART NOTE - NSCHARTNOTEFT_GEN_A_CORE
20 year old man hx of chronic ITP, previously under care of Dr. Rachana Blanco ([facility]), autism, presented from [facility] with plt 1 and BRBPR,    # History of chronic ITP  - discussed admission with Dr. Blanco. Last seen by Dr. Blanco in 2014.  Patient chronically has platelet counts as low as 5,000 however does not typically experience bleeding.  At beginning of current hospital stay, patient treated with IVIG X 2 doses as well as pulse decadron 40mg X 4 days and then a prednisone taper starting at 1mg/kg (150mg).  Prednisone discontinued temporarily due to patient having mental status changes, possible steroid induced psychosis, but this could also have been from the COVID as well.   -3 days following this platelets fell from 250's down to 122 then 43 today.  Rrednisone restarted at an adjusted body weight dose of 80mg daily and IVIG 0.5mg/KG X 4 days (to be completed on 2/3/21) to prevent severe drop in platelets counts.   -continue to monitor platelets. outpt f/u scheduled with Dr. Deleon on 2/9 and will likely need to be rescheduled     Seb Garcia/onc PGY4 20 year old man hx of chronic ITP, previously under care of Dr. Rachana Blanco ([facility]), autism, presented from [facility] with plt 1 and BRBPR,    # History of chronic ITP  - discussed admission with Dr. Blanco. Last seen by Dr. Blanco in 2014.  Patient chronically has platelet counts as low as 5,000 however does not typically experience bleeding.  At beginning of current hospital stay, patient treated with IVIG X 2 doses as well as pulse decadron 40mg X 4 days (with an adequate response, plt inc to 300s) and then a prednisone taper starting at 1mg/kg (150mg).  Prednisone discontinued temporarily due to patient having mental status changes, possible steroid induced psychosis, but this could also have been from the COVID as well.   -3 days following his platelets fell from 250's down to 122 then 43 today.  Prednisone restarted at an adjusted body weight dose of 80mg daily and IVIG 0.5mg/KG X 4 days (to be completed on 2/3/21) to prevent severe drop in platelets counts.   -continue to monitor platelets. outpt f/u scheduled with Dr. Deleon on 2/9 and will likely need to be rescheduled     Seb Garcia/onc PGY4 20 year old man hx of chronic ITP, previously under care of Dr. Rachana Blanco ([facility] sera), autism, presented from [facility] with plt 1 and BRBPR,    # History of chronic ITP  - discussed admission with Dr. Blanco. Last seen by Dr. Blanco in 2014.  Patient chronically has platelet counts as low as 5,000 however does not typically experience bleeding.  At beginning of current hospital stay, patient treated with IVIG X 2 doses as well as pulse decadron 40mg X 4 days (with an adequate response, plt inc to 300s) and then a prednisone taper starting at 1mg/kg (150mg).  Prednisone discontinued temporarily due to patient having mental status changes, possible steroid induced psychosis, but this could also have been from the COVID as well.   -3 days following his platelets fell from 250's down to 122 then 43 today.  Prednisone restarted at an adjusted body weight dose of 80mg daily and IVIG 0.5mg/KG X 4 days (to be completed on 2/3/21) to prevent severe drop in platelets counts.   -continue to monitor platelets. outpt f/u scheduled with Dr. Deleon on 2/9 and will likely need to be rescheduled     Seb Garcia/onc PGY4    Patient discussed during rounds.  Known chronic ITP, continue steroids and IVIG as tolerated. We will continue to monitor plts with you.

## 2021-02-01 NOTE — PROGRESS NOTE ADULT - ASSESSMENT
20M w/ autism (non-verbal), hx of ITP since childhood, CARLITOS (not on CPAP)  admitted for acute lower GI bleed in setting of acute on chronic ITP, found to be COVID +,

## 2021-02-02 LAB
ALBUMIN SERPL ELPH-MCNC: 2.8 G/DL — LOW (ref 3.3–5)
ALP SERPL-CCNC: 53 U/L — SIGNIFICANT CHANGE UP (ref 40–120)
ALT FLD-CCNC: 125 U/L — HIGH (ref 4–41)
ANION GAP SERPL CALC-SCNC: 9 MMOL/L — SIGNIFICANT CHANGE UP (ref 7–14)
AST SERPL-CCNC: 67 U/L — HIGH (ref 4–40)
BILIRUB SERPL-MCNC: 0.5 MG/DL — SIGNIFICANT CHANGE UP (ref 0.2–1.2)
BUN SERPL-MCNC: 20 MG/DL — SIGNIFICANT CHANGE UP (ref 7–23)
CALCIUM SERPL-MCNC: 8.7 MG/DL — SIGNIFICANT CHANGE UP (ref 8.4–10.5)
CHLORIDE SERPL-SCNC: 102 MMOL/L — SIGNIFICANT CHANGE UP (ref 98–107)
CO2 SERPL-SCNC: 20 MMOL/L — LOW (ref 22–31)
CREAT SERPL-MCNC: 0.8 MG/DL — SIGNIFICANT CHANGE UP (ref 0.5–1.3)
GLUCOSE SERPL-MCNC: 129 MG/DL — HIGH (ref 70–99)
HCT VFR BLD CALC: 41.5 % — SIGNIFICANT CHANGE UP (ref 39–50)
HGB BLD-MCNC: 12.9 G/DL — LOW (ref 13–17)
MCHC RBC-ENTMCNC: 25.9 PG — LOW (ref 27–34)
MCHC RBC-ENTMCNC: 31.1 GM/DL — LOW (ref 32–36)
MCV RBC AUTO: 83.2 FL — SIGNIFICANT CHANGE UP (ref 80–100)
NRBC # BLD: 0 /100 WBCS — SIGNIFICANT CHANGE UP
NRBC # FLD: 0 K/UL — SIGNIFICANT CHANGE UP
PLATELET # BLD AUTO: 78 K/UL — LOW (ref 150–400)
POTASSIUM SERPL-MCNC: 4.9 MMOL/L — SIGNIFICANT CHANGE UP (ref 3.5–5.3)
POTASSIUM SERPL-SCNC: 4.9 MMOL/L — SIGNIFICANT CHANGE UP (ref 3.5–5.3)
PROT SERPL-MCNC: 9.1 G/DL — HIGH (ref 6–8.3)
RBC # BLD: 4.99 M/UL — SIGNIFICANT CHANGE UP (ref 4.2–5.8)
RBC # FLD: 15.9 % — HIGH (ref 10.3–14.5)
SODIUM SERPL-SCNC: 131 MMOL/L — LOW (ref 135–145)
WBC # BLD: 16.89 K/UL — HIGH (ref 3.8–10.5)
WBC # FLD AUTO: 16.89 K/UL — HIGH (ref 3.8–10.5)

## 2021-02-02 PROCEDURE — 99232 SBSQ HOSP IP/OBS MODERATE 35: CPT | Mod: CS

## 2021-02-02 RX ORDER — DIPHENHYDRAMINE HCL 50 MG
25 CAPSULE ORAL ONCE
Refills: 0 | Status: COMPLETED | OUTPATIENT
Start: 2021-02-02 | End: 2021-02-02

## 2021-02-02 RX ADMIN — IMMUNE GLOBULIN (HUMAN) 137.5 GRAM(S): 10 INJECTION INTRAVENOUS; SUBCUTANEOUS at 23:35

## 2021-02-02 RX ADMIN — Medication 25 MILLIGRAM(S): at 23:03

## 2021-02-02 RX ADMIN — Medication 80 MILLIGRAM(S): at 05:20

## 2021-02-02 RX ADMIN — Medication 300 MILLIGRAM(S): at 21:55

## 2021-02-02 RX ADMIN — BREXPIPRAZOLE 6 MILLIGRAM(S): 0.25 TABLET ORAL at 12:59

## 2021-02-02 RX ADMIN — PANTOPRAZOLE SODIUM 40 MILLIGRAM(S): 20 TABLET, DELAYED RELEASE ORAL at 05:20

## 2021-02-02 RX ADMIN — Medication 1 MILLIGRAM(S): at 21:55

## 2021-02-03 ENCOUNTER — OUTPATIENT (OUTPATIENT)
Dept: OUTPATIENT SERVICES | Facility: HOSPITAL | Age: 21
LOS: 1 days | Discharge: HOME | End: 2021-02-03

## 2021-02-03 DIAGNOSIS — D69.3 IMMUNE THROMBOCYTOPENIC PURPURA: ICD-10-CM

## 2021-02-03 DIAGNOSIS — R60.9 EDEMA, UNSPECIFIED: ICD-10-CM

## 2021-02-03 LAB
HCT VFR BLD CALC: 37.5 % — LOW (ref 39–50)
HGB BLD-MCNC: 11.5 G/DL — LOW (ref 13–17)
MCHC RBC-ENTMCNC: 26 PG — LOW (ref 27–34)
MCHC RBC-ENTMCNC: 30.7 GM/DL — LOW (ref 32–36)
MCV RBC AUTO: 84.7 FL — SIGNIFICANT CHANGE UP (ref 80–100)
NRBC # BLD: 0 /100 WBCS — SIGNIFICANT CHANGE UP
NRBC # FLD: 0 K/UL — SIGNIFICANT CHANGE UP
PLATELET # BLD AUTO: 89 K/UL — LOW (ref 150–400)
RBC # BLD: 4.43 M/UL — SIGNIFICANT CHANGE UP (ref 4.2–5.8)
RBC # FLD: 15.9 % — HIGH (ref 10.3–14.5)
WBC # BLD: 20.04 K/UL — HIGH (ref 3.8–10.5)
WBC # FLD AUTO: 20.04 K/UL — HIGH (ref 3.8–10.5)

## 2021-02-03 PROCEDURE — 99232 SBSQ HOSP IP/OBS MODERATE 35: CPT | Mod: CS

## 2021-02-03 RX ADMIN — Medication 80 MILLIGRAM(S): at 05:01

## 2021-02-03 RX ADMIN — Medication 300 MILLIGRAM(S): at 21:53

## 2021-02-03 RX ADMIN — BREXPIPRAZOLE 6 MILLIGRAM(S): 0.25 TABLET ORAL at 13:26

## 2021-02-03 RX ADMIN — IMMUNE GLOBULIN (HUMAN) 137.5 GRAM(S): 10 INJECTION INTRAVENOUS; SUBCUTANEOUS at 21:54

## 2021-02-03 RX ADMIN — PANTOPRAZOLE SODIUM 40 MILLIGRAM(S): 20 TABLET, DELAYED RELEASE ORAL at 05:01

## 2021-02-03 RX ADMIN — Medication 1 MILLIGRAM(S): at 21:53

## 2021-02-03 NOTE — PROVIDER CONTACT NOTE (OTHER) - SITUATION
As per patients mom, patient legs are more swollen than yesterday.
Pt unable to fit largest size anti-embolism stockings
platels [id number]
Patients BP is 104/49mmHg prior to administration of immune globulin infusion.

## 2021-02-03 NOTE — PROVIDER CONTACT NOTE (OTHER) - RECOMMENDATIONS
Continue to monitor.
Ask provider if infusion can be initiated.
Continue to encourage ambulation in room

## 2021-02-03 NOTE — PROGRESS NOTE ADULT - PROBLEM SELECTOR PLAN 3
Mom reports LE edema yesterday evening, much improved this AM.  Seems less likely DVT (b/l, resolving with elevation/compression), more likely related to steroids.  Monitor

## 2021-02-03 NOTE — PROVIDER CONTACT NOTE (OTHER) - BACKGROUND
Pt has hx of covid-19 and at risk for blood clots
hx itp
20 year old male admitted for immune thrombocytopenia purpura. PMH of autism and CARLITOS.

## 2021-02-03 NOTE — PROVIDER CONTACT NOTE (OTHER) - ASSESSMENT
NAD noted
As per patients mom, patient legs are more swollen than yesterday. Patient A&Ox0, nonverbal. No signs of distress noted. +2 pitting edema noted to bilateral lower extremities.
Pt has no SOB and no calf pain; frequently ambulating around room
Patients BP is 104/49mmHg prior to administration of immune globulin infusion. Patient A&Ox0, nonverbal. No signs of distress noted.

## 2021-02-03 NOTE — PROVIDER CONTACT NOTE (OTHER) - ACTION/TREATMENT ORDERED:
patient now receiving gammaguard
No further orders; Continue to encourage ambulation in room
Ok to start immune globulin infusion.
Continue to monitor.

## 2021-02-03 NOTE — PROVIDER CONTACT NOTE (OTHER) - REASON
Patients BP is 104/49mmHg prior to administration of immune globulin infusion.
Platels [id number]
As per patients mom, patient legs are more swollen than yesterday.
Pt unable to fit largest size anti-embolism stockings

## 2021-02-04 LAB
HCT VFR BLD CALC: 35.7 % — LOW (ref 39–50)
HGB BLD-MCNC: 10.9 G/DL — LOW (ref 13–17)
MCHC RBC-ENTMCNC: 26.1 PG — LOW (ref 27–34)
MCHC RBC-ENTMCNC: 30.5 GM/DL — LOW (ref 32–36)
MCV RBC AUTO: 85.6 FL — SIGNIFICANT CHANGE UP (ref 80–100)
NRBC # BLD: 0 /100 WBCS — SIGNIFICANT CHANGE UP
NRBC # FLD: 0.03 K/UL — HIGH
PLATELET # BLD AUTO: 75 K/UL — LOW (ref 150–400)
RBC # BLD: 4.17 M/UL — LOW (ref 4.2–5.8)
RBC # FLD: 16.2 % — HIGH (ref 10.3–14.5)
SARS-COV-2 RNA SPEC QL NAA+PROBE: DETECTED
WBC # BLD: 19.22 K/UL — HIGH (ref 3.8–10.5)
WBC # FLD AUTO: 19.22 K/UL — HIGH (ref 3.8–10.5)

## 2021-02-04 PROCEDURE — 99232 SBSQ HOSP IP/OBS MODERATE 35: CPT | Mod: CS

## 2021-02-04 RX ORDER — LORATADINE 10 MG/1
10 TABLET ORAL ONCE
Refills: 0 | Status: DISCONTINUED | OUTPATIENT
Start: 2021-02-04 | End: 2021-02-05

## 2021-02-04 RX ADMIN — Medication 1 MILLIGRAM(S): at 21:08

## 2021-02-04 RX ADMIN — Medication 1 TABLET(S): at 17:18

## 2021-02-04 RX ADMIN — BREXPIPRAZOLE 6 MILLIGRAM(S): 0.25 TABLET ORAL at 12:19

## 2021-02-04 RX ADMIN — PANTOPRAZOLE SODIUM 40 MILLIGRAM(S): 20 TABLET, DELAYED RELEASE ORAL at 06:51

## 2021-02-04 RX ADMIN — Medication 300 MILLIGRAM(S): at 21:08

## 2021-02-04 RX ADMIN — Medication 80 MILLIGRAM(S): at 06:51

## 2021-02-05 ENCOUNTER — TRANSCRIPTION ENCOUNTER (OUTPATIENT)
Age: 21
End: 2021-02-05

## 2021-02-05 VITALS
SYSTOLIC BLOOD PRESSURE: 108 MMHG | DIASTOLIC BLOOD PRESSURE: 60 MMHG | HEART RATE: 91 BPM | RESPIRATION RATE: 17 BRPM | TEMPERATURE: 98 F | OXYGEN SATURATION: 99 %

## 2021-02-05 LAB
ANION GAP SERPL CALC-SCNC: 10 MMOL/L — SIGNIFICANT CHANGE UP (ref 7–14)
BUN SERPL-MCNC: 20 MG/DL — SIGNIFICANT CHANGE UP (ref 7–23)
CALCIUM SERPL-MCNC: 8.9 MG/DL — SIGNIFICANT CHANGE UP (ref 8.4–10.5)
CHLORIDE SERPL-SCNC: 102 MMOL/L — SIGNIFICANT CHANGE UP (ref 98–107)
CO2 SERPL-SCNC: 25 MMOL/L — SIGNIFICANT CHANGE UP (ref 22–31)
CREAT SERPL-MCNC: 0.89 MG/DL — SIGNIFICANT CHANGE UP (ref 0.5–1.3)
GLUCOSE SERPL-MCNC: 112 MG/DL — HIGH (ref 70–99)
HCT VFR BLD CALC: 38 % — LOW (ref 39–50)
HGB BLD-MCNC: 11.6 G/DL — LOW (ref 13–17)
MAGNESIUM SERPL-MCNC: 1.8 MG/DL — SIGNIFICANT CHANGE UP (ref 1.6–2.6)
MCHC RBC-ENTMCNC: 26.4 PG — LOW (ref 27–34)
MCHC RBC-ENTMCNC: 30.5 GM/DL — LOW (ref 32–36)
MCV RBC AUTO: 86.4 FL — SIGNIFICANT CHANGE UP (ref 80–100)
NRBC # BLD: 0 /100 WBCS — SIGNIFICANT CHANGE UP
NRBC # FLD: 0.04 K/UL — HIGH
PHOSPHATE SERPL-MCNC: 4.6 MG/DL — HIGH (ref 2.5–4.5)
PLATELET # BLD AUTO: 79 K/UL — LOW (ref 150–400)
POTASSIUM SERPL-MCNC: 3.8 MMOL/L — SIGNIFICANT CHANGE UP (ref 3.5–5.3)
POTASSIUM SERPL-SCNC: 3.8 MMOL/L — SIGNIFICANT CHANGE UP (ref 3.5–5.3)
RBC # BLD: 4.4 M/UL — SIGNIFICANT CHANGE UP (ref 4.2–5.8)
RBC # FLD: 17.1 % — HIGH (ref 10.3–14.5)
SODIUM SERPL-SCNC: 137 MMOL/L — SIGNIFICANT CHANGE UP (ref 135–145)
WBC # BLD: 22.06 K/UL — HIGH (ref 3.8–10.5)
WBC # FLD AUTO: 22.06 K/UL — HIGH (ref 3.8–10.5)

## 2021-02-05 PROCEDURE — 99239 HOSP IP/OBS DSCHRG MGMT >30: CPT | Mod: CS

## 2021-02-05 RX ORDER — CALCIUM CARBONATE 500(1250)
1 TABLET ORAL
Qty: 120 | Refills: 0
Start: 2021-02-05 | End: 2021-03-06

## 2021-02-05 RX ORDER — LORATADINE 10 MG/1
1 TABLET ORAL
Qty: 30 | Refills: 0
Start: 2021-02-05 | End: 2021-03-06

## 2021-02-05 RX ORDER — BREXPIPRAZOLE 0.25 MG/1
6 TABLET ORAL
Qty: 0 | Refills: 0 | DISCHARGE

## 2021-02-05 RX ORDER — TRAZODONE HCL 50 MG
1 TABLET ORAL
Qty: 0 | Refills: 0 | DISCHARGE

## 2021-02-05 RX ORDER — TRAZODONE HCL 50 MG
1 TABLET ORAL
Qty: 30 | Refills: 0
Start: 2021-02-05 | End: 2021-03-06

## 2021-02-05 RX ORDER — ACETAMINOPHEN 500 MG
2 TABLET ORAL
Qty: 0 | Refills: 0 | DISCHARGE
Start: 2021-02-05

## 2021-02-05 RX ORDER — BREXPIPRAZOLE 0.25 MG/1
2 TABLET ORAL
Qty: 20 | Refills: 0
Start: 2021-02-05 | End: 2021-02-14

## 2021-02-05 RX ORDER — PANTOPRAZOLE SODIUM 20 MG/1
1 TABLET, DELAYED RELEASE ORAL
Qty: 30 | Refills: 0
Start: 2021-02-05 | End: 2021-03-06

## 2021-02-05 RX ADMIN — BREXPIPRAZOLE 6 MILLIGRAM(S): 0.25 TABLET ORAL at 12:33

## 2021-02-05 RX ADMIN — PANTOPRAZOLE SODIUM 40 MILLIGRAM(S): 20 TABLET, DELAYED RELEASE ORAL at 05:08

## 2021-02-05 RX ADMIN — Medication 80 MILLIGRAM(S): at 05:08

## 2021-02-05 RX ADMIN — Medication 1 TABLET(S): at 12:33

## 2021-02-05 NOTE — PROGRESS NOTE ADULT - PROBLEM SELECTOR PLAN 6
DVT ppx: Hold pharmacologic VTE ppx,   Diet: Regular
DVT ppx: Hold pharmacologic VTE ppx, continued discussions with mother regarding AC.  Diet: Regular
DVT ppx: Hold pharmacologic VTE ppx, continued discussions with mother regarding AC.  Diet: Regular
DVT ppx: Hold pharmacologic VTE ppx,   Diet: Regular
eventual return to [facility].
Autism, stable, c/w ativan 1 mg PO qhs, trazodone 300 mg qhs, brexpiprazole (rixulti) 6 mg AM
DVT ppx: Hold pharmacologic VTE ppx, continued discussions with mother regarding AC.  Diet: Regular
DVT ppx: Hold pharmacologic VTE ppx, continued discussions with mother regarding AC.  Diet: Regular
Autism, stable, c/w ativan 1 mg PO qhs, trazodone 300 mg qhs, brexpiprazole (rixulti) 6 mg AM
eventual return to [facility].
Autism, stable, c/w ativan 1 mg PO qhs, trazodone 300 mg qhs, brexpiprazole (rixulti) 6 mg AM

## 2021-02-05 NOTE — PROGRESS NOTE ADULT - PROBLEM SELECTOR PROBLEM 5
Preventive measure
Lower GI bleed
Lower GI bleed
Intellectual disability
Lower GI bleed
Preventive measure
Intellectual disability
Intellectual disability
Discharge planning issues
Intellectual disability

## 2021-02-05 NOTE — PROGRESS NOTE ADULT - PROBLEM SELECTOR PROBLEM 7
Discharge planning issues
Discharge planning issues
Preventive measure
Discharge planning issues
Preventive measure
Discharge planning issues
Preventive measure

## 2021-02-05 NOTE — PROGRESS NOTE ADULT - PROBLEM SELECTOR PLAN 8
eventual return to Group Home when medically stable, likely 2/5
eventual return to Group Home when medically stable
Return to group home 2/5, 4pm

## 2021-02-05 NOTE — CHART NOTE - NSCHARTNOTEFT_GEN_A_CORE
Per heme-onc, patient can be discharged with prednisone 80mg po daily, and will be tapered outpatient.

## 2021-02-05 NOTE — PROGRESS NOTE ADULT - PROBLEM SELECTOR PLAN 1
VSS, no hypoxia.  resp status good,  No fevers.  CRP trending up; blood glucose on chemistry this am low- monitoring for now.    pt's mother not keen on monoclonal antibody therapy as pt has had rxns to rituxamab.  Moreover it's EUA reassured mother that pt will be monitored for now  - patient's mother is symptomatic now- she doesn't want to leave her son because he needs someone to watch him all the time- unless we provided 1:1- told her resources are scarce right now d/t COVID surge.  She understands but also said that if she feels worse she will go to ER to get evaluated
acute on chronic ITP  s/p IVIG and steroids, good response last week, steroids stopped for ? eye movements, Plts decreased, completed 2nd course of IVIG 2/3, remains on steroids. Plts increased and stable. check platelets in AM, likely DC if stable   O/p f/u at [facility], 2/9, 2 pm with Dr. Deleon
VSS, no hypoxia.  resp status good,  No fevers.  D dimer slightly elevated, CRP/Ferritin/Procal OK.   Discussed again DVT ppx with Pt's mother, she is still reluctant to give to her son given recent bleeding and thrombocytopenia.  Will continue discussions.  Discussed potential monoclonal AB treatment, mother not interested, concerned given prior reaction to rituxan
VSS, no hypoxia.  CXR read by me, no consolidation.  will check covid labs.  No AC given ITP/thrombocytopenia.
acute on chronic ITP  s/p 1 unit Plts  s/p IVIG x 2 (done with course)  plan is for dexamethasome x 4 days (last day on 1/26
VSS, no hypoxia.  resp status good,  No fevers.  CRP trending up; blood glucose on chemistry this am low- monitoring for now.    pt's mother not keen on monoclonal antibody therapy as pt has had rxns to rituxamab.  Moreover it's EUA reassured mother that pt will be monitored for now
acute on chronic ITP  s/p IVIG and steroids, good respons last week, steroids stopped for ? eye movements, Plts decreased, restarted IVIG/lower dose steroids.  Eventual plan for outpt f/u with [facility]
acute on chronic ITP  s/p IVIG and steroids, good response last week, steroids stopped for ? eye movements, Plts decreased, restarted IVIG/lower dose steroids. Plts increasing  Eventual plan for outpt f/u with [facility]
acute on chronic ITP  s/p IVIG and steroids, good response last week, steroids stopped for ? eye movements, Plts decreased, restarted IVIG/lower dose steroids. Plts increasing, 4th dose IVIG tonight.   Eventual plan for outpt f/u with [facility]
VSS, no hypoxia.  resp status good,  No fevers.  CRP trending up; blood glucose on chemistry this am low- monitoring for now.    pt's mother not keen on monoclonal antibody therapy as pt has had rxns to rituxamab.  Moreover it's EUA reassured mother that pt will be monitored for now
VSS, no hypoxia.  resp status good,  No fevers.  D dimer slightly elevated, repeat stable.  CRP/Ferritin/Procal OK.
acute on chronic ITP  s/p 1 unit Plts, s/p IVIG, Dexamethasone day 3/4.   Platelets now normal.  Plan for outpt f/u with [facility]
acute on chronic ITP  s/p IVIG and steroids, good response last week, steroids stopped for ? eye movements, Plts decreased, completed 2nd course of IVIG 2/3, remains on steroids. Plts increased and stable. check platelets in AM, likely DC if stable   O/p f/u at [facility], 2/9, 2 pm with Dr. Deleon

## 2021-02-05 NOTE — PROGRESS NOTE ADULT - PROBLEM SELECTOR PLAN 2
VSS, no hypoxia.  resp status good,  No fevers.  D dimer slightly elevated, repeat stable.  CRP/Ferritin/Procal OK.
eye deviation 1/27.  No further episodes.  Not seizure per neuro   CT head negative, EEG ordered, neuro input appreciated.  Less likely CVA.  Discussed with heme, will hold further steroids at this point
Hgb stable 13.4, FOBT pos, Coags wnl, per documentation no external hemorrhoids  last BRBPR on presentation  appreciate GI input, given improvement in bleeding and HD stability, deferring colonoscopy at this time
acute on chronic ITP  s/p 1 unit Plts, s/p IVIG, Dexamethasone day 4/4. Likely will need additional steroids per heme-(discussed with heme Dr. Gleason, will discuss dosing)  Platelets now normal.  F/u labs today  Plan for outpt f/u with [facility]
VSS, no hypoxia.  resp status good,  No fevers.  D dimer slightly elevated, repeat stable.  CRP/Ferritin/Procal OK.
VSS, no hypoxia.  resp status good,  No fevers.  D dimer slightly elevated, repeat stable.  CRP/Ferritin/Procal OK.
acute on chronic ITP  s/p 1 unit Plts, s/p IVIG, s/p Dexamethasone, now on prednisone 1mg/kg 150 mg  Platelets now normal.  F/u labs   Plan for outpt f/u with [facility]
eye deviation 1/27.  No further episodes.  CVA vs seizure vs steroid induced psychosis.  CT head negative, EEG ordered, neuro input appreciated.  Less likely CVA.  Discussed with heme, will hold further steroids at this point
eye deviation 1/27.  No further episodes.  Not seizure per neuro   CT head negative, EEG ordered, neuro input appreciated.  Less likely CVA.  Discussed with heme, will hold further steroids at this point
eye deviation 1/27.  No further episodes.  Not seizure per neuro   CT head negative, EEG ordered, neuro input appreciated.  not CVA  video seen by writer- doubt pt w/ neurological episode then ?behavioral
Hgb stable, no further episodes of bleeding.  No need for colonoscopy at this time, GI input appreciates
VSS, no hypoxia.  resp status good,  No fevers.  D dimer slightly elevated, repeat stable.  CRP/Ferritin/Procal OK.
VSS, no hypoxia.  resp status good,  No fevers.  D dimer slightly elevated, repeat stable.  CRP/Ferritin/Procal OK.

## 2021-02-05 NOTE — PROGRESS NOTE ADULT - SUBJECTIVE AND OBJECTIVE BOX
Patient is a 20y old  Male who presents with a chief complaint of BRBPR (01 Feb 2021 14:29)      SUBJECTIVE / OVERNIGHT EVENTS:  no events. no bleeding  ADDITIONAL REVIEW OF SYSTEMS:    MEDICATIONS  (STANDING):  brexpiprazole 6 milliGRAM(s) Oral daily  immune   globulin 10% (GAMMAGARD) IVPB 55 Gram(s) IV Intermittent daily  influenza   Vaccine 0.5 milliLiter(s) IntraMuscular once  LORazepam     Tablet 1 milliGRAM(s) Oral at bedtime  pantoprazole    Tablet 40 milliGRAM(s) Oral before breakfast  predniSONE   Tablet 80 milliGRAM(s) Oral daily  traZODone 300 milliGRAM(s) Oral at bedtime    MEDICATIONS  (PRN):  acetaminophen   Tablet .. 650 milliGRAM(s) Oral every 6 hours PRN Temp greater or equal to 38C (100.4F), Mild Pain (1 - 3)  calcium carbonate    500 mG (Tums) Chewable 1 Tablet(s) Chew four times a day PRN Indigestion      CAPILLARY BLOOD GLUCOSE        I&O's Summary      PHYSICAL EXAM:  Vital Signs Last 24 Hrs  T(C): 36.7 (02 Feb 2021 12:51), Max: 36.7 (02 Feb 2021 12:51)  T(F): 98.1 (02 Feb 2021 12:51), Max: 98.1 (02 Feb 2021 12:51)  HR: 80 (02 Feb 2021 12:51) (80 - 88)  BP: 123/70 (02 Feb 2021 12:51) (106/51 - 128/86)  BP(mean): --  RR: 17 (02 Feb 2021 12:51) (16 - 18)  SpO2: 99% (02 Feb 2021 12:51) (98% - 99%)  CONSTITUTIONAL: NAD, obese  RESPIRATORY: Normal respiratory effort; lungs are clear to auscultation bilaterally  CARDIOVASCULAR: Regular rate and rhythm, normal S1 and S2, No lower extremity edema  ABDOMEN: Nontender to palpation, normoactive bowel sounds  MUSCLOSKELETAL: no clubbing or cyanosis of digits  PSYCH: Awake, alert    LABS:                        12.8   17.93 )-----------( 59       ( 01 Feb 2021 08:03 )             40.3     01-31    136  |  103  |  20  ----------------------------<  102<H>  4.1   |  26  |  0.97    Ca    8.5      31 Jan 2021 13:25  Mg     1.9     01-31    TPro  6.9  /  Alb  3.1<L>  /  TBili  0.5  /  DBili  x   /  AST  42<H>  /  ALT  88<H>  /  AlkPhos  55  01-31      RADIOLOGY & ADDITIONAL TESTS:  Results Reviewed:   Imaging Personally Reviewed:  Electrocardiogram Personally Reviewed:    COORDINATION OF CARE:  Care Discussed with Consultants/Other Providers [Y/N]:  Prior or Outpatient Records Reviewed [Y/N]:  
  Chief Complaint:  Patient is a 20y old  Male who presents with a chief complaint of BRBPR (23 Jan 2021 12:55)      Interval Events:   - No further episodes of BRBPR  - Hb stable at 14s  - PLT 64 today        Allergies:  Bactrim (Hives)  Rituxan (Hives)  WinRho SDF (Hives)        [facility] Medications:  brexpiprazole 6 milliGRAM(s) Oral daily  dexAMETHasone  IVPB 40 milliGRAM(s) IV Intermittent daily  immune   globulin 10% (GAMMAGARD) IVPB 105 Gram(s) IV Intermittent daily  influenza   Vaccine 0.5 milliLiter(s) IntraMuscular once  LORazepam     Tablet 1 milliGRAM(s) Oral at bedtime  traZODone 300 milliGRAM(s) Oral at bedtime      PMHX/PSHX:  Chronic ITP (idiopathic thrombocytopenia)    Intellectual disability    No significant past surgical history        Family history:  FH: hypertension        ROS:     General:  No wt loss, fevers, chills, night sweats, fatigue,   Eyes:  Good vision, no reported pain  ENT:  No sore throat, pain, runny nose, dysphagia  CV:  No pain, palpitations, hypo/hypertension  Pulm:  No dyspnea, cough, tachypnea, wheezing  GI:  No pain, No nausea, No vomiting, No diarrhea, No constipation, No weight loss, No fever, No pruritis, No rectal bleeding, No tarry stools, No dysphagia  :  No pain, bleeding, incontinence, nocturia  Muscle:  No pain, weakness  Neuro:  No weakness, tingling, memory problems  Psych:  No fatigue, insomnia, mood problems, depression  Endocrine:  No polyuria, polydipsia, cold/heat intolerance  Heme:  No petechiae, ecchymosis, easy bruisability  Skin:  No rash, tattoos, scars, edema      PHYSICAL EXAM:   Vital Signs:  Vital Signs Last 24 Hrs  T(C): 37 (24 Jan 2021 06:58), Max: 37.1 (23 Jan 2021 10:39)  T(F): 98.6 (24 Jan 2021 06:58), Max: 98.7 (23 Jan 2021 10:39)  HR: 84 (24 Jan 2021 06:58) (70 - 88)  BP: 108/54 (24 Jan 2021 06:58) (108/54 - 148/80)  BP(mean): --  RR: 18 (24 Jan 2021 06:58) (17 - 19)  SpO2: 96% (24 Jan 2021 06:58) (95% - 100%)  Daily     Daily     GENERAL:  No acute distress  HEENT:  Normocephalic/atraumtic,  no scleral icterus  CHEST:  Clear to auscultation bilaterally, no wheezes/rales/ronchi, no accessory muscle use  HEART:  Regular rate and rhythm, no murmurs/rubs/gallops  ABDOMEN:  Soft, non-tender, non-distended, normoactive bowel sounds, no masses, no hepato-splenomegaly, no signs of chronic liver disease  EXTREMITIES:  No cyanosis, clubbing, or edema  SKIN:  No rash/erythema/ecchymoses/petechiae/wounds/abscess/warm/dry  NEURO:  Alert and oriented x 3, no asterixis, no tremor    LABS:                        14.0   13.37 )-----------( 64       ( 24 Jan 2021 08:01 )             46.1     Mean Cell Volume: 83.8 fL (01-24-21 @ 08:01)    01-24    136  |  105  |  19  ----------------------------<  136<H>  4.4   |  22  |  0.76    Ca    8.8      24 Jan 2021 08:01  Phos  3.6     01-24  Mg     1.8     01-24    TPro  6.5  /  Alb  3.6  /  TBili  0.4  /  DBili  x   /  AST  19  /  ALT  18  /  AlkPhos  64  01-23    LIVER FUNCTIONS - ( 23 Jan 2021 03:50 )  Alb: 3.6 g/dL / Pro: 6.5 g/dL / ALK PHOS: 64 U/L / ALT: 18 U/L / AST: 19 U/L / GGT: x           PT/INR - ( 23 Jan 2021 03:50 )   PT: 13.2 sec;   INR: 1.16 ratio         PTT - ( 23 Jan 2021 03:50 )  PTT:31.4 sec                            14.0   13.37 )-----------( 64       ( 24 Jan 2021 08:01 )             46.1                         14.7   5.09  )-----------( 13       ( 23 Jan 2021 16:13 )             48.4                         13.4   6.29  )-----------( 2        ( 23 Jan 2021 03:50 )             44.2                         13.7   6.29  )-----------( 1        ( 22 Jan 2021 18:46 )             44.2       Imaging:          
INTERVAL HPI/OVERNIGHT EVENTS:  Patient S&E at bedside. No complaints at this time. limited ROS 2/2 autism       VITAL SIGNS:  T(F): 97.6 (01-25-21 @ 05:49)  HR: 67 (01-25-21 @ 05:49)  BP: 129/56 (01-25-21 @ 05:49)  RR: 18 (01-25-21 @ 05:49)  SpO2: 96% (01-25-21 @ 05:49)  Wt(kg): --    PHYSICAL EXAM:  GENERAL: NAD  HEENT: EOMI, MMM,  Pulm: normal work of breathing  CV: RRR  ABDOMEN: soft, nt, nd  MSK: nl ROM  EXTREMITIES:  no appreciable edema in b/l LE  Neuro: no focal deficits  SKIN: warm and dry, no visible rash    MEDICATIONS  (STANDING):  brexpiprazole 6 milliGRAM(s) Oral daily  dexAMETHasone  IVPB 40 milliGRAM(s) IV Intermittent daily  influenza   Vaccine 0.5 milliLiter(s) IntraMuscular once  LORazepam     Tablet 1 milliGRAM(s) Oral at bedtime  traZODone 300 milliGRAM(s) Oral at bedtime    MEDICATIONS  (PRN):      LABS:                        13.2   17.24 )-----------( 172      ( 25 Jan 2021 07:52 )             44.3     01-25    136  |  102  |  23  ----------------------------<  132<H>  4.3   |  23  |  0.76    Ca    8.2<L>      25 Jan 2021 07:52  Phos  3.6     01-24  Mg     1.8     01-24            RADIOLOGY & ADDITIONAL TESTS:  
[facility] Division of Hospital Medicine  Ngozi Staples MD  Pager [phone]    Patient is a 20y old  Male who presents with a chief complaint of BRBPR (30 Jan 2021 17:22)      SUBJECTIVE / OVERNIGHT EVENTS: pt seen and examined at 215p- mother in patient's bed said she had a rough night w/ the n/v.  She is worried about getting swabbed for COVID because she wants to stay in the room w/ the pt- she is concerned that she will be sent home from the ED. Pt is doing better per mother.    ADDITIONAL REVIEW OF SYSTEMS:    MEDICATIONS  (STANDING):  brexpiprazole 6 milliGRAM(s) Oral daily  immune   globulin 10% (GAMMAGARD) IVPB 55 Gram(s) IV Intermittent daily  influenza   Vaccine 0.5 milliLiter(s) IntraMuscular once  LORazepam     Tablet 1 milliGRAM(s) Oral at bedtime  pantoprazole    Tablet 40 milliGRAM(s) Oral before breakfast  predniSONE   Tablet 80 milliGRAM(s) Oral daily  traZODone 300 milliGRAM(s) Oral at bedtime    MEDICATIONS  (PRN):  acetaminophen   Tablet .. 650 milliGRAM(s) Oral every 6 hours PRN Temp greater or equal to 38C (100.4F), Mild Pain (1 - 3)  calcium carbonate    500 mG (Tums) Chewable 1 Tablet(s) Chew four times a day PRN Indigestion      CAPILLARY BLOOD GLUCOSE        I&O's Summary      PHYSICAL EXAM:  Vital Signs Last 24 Hrs  T(C): 38 (30 Jan 2021 21:24), Max: 38 (30 Jan 2021 21:24)  T(F): 100.4 (30 Jan 2021 21:24), Max: 100.4 (30 Jan 2021 21:24)  HR: 96 (30 Jan 2021 21:24) (96 - 96)  BP: 113/50 (30 Jan 2021 21:24) (113/50 - 113/50)  BP(mean): --  RR: 18 (30 Jan 2021 21:24) (18 - 18)  SpO2: 97% (30 Jan 2021 21:24) (97% - 97%)  CONSTITUTIONAL: NAD,  non-verbal  RESPIRATORY: Normal respiratory effort; lungs are clear to auscultation bilaterally  CARDIOVASCULAR: Regular rate and rhythm, normal S1 and S2; No lower extremity edema;   ABDOMEN: Nontender to palpation, normoactive bowel sounds, not distended;   MUSCULOSKELETAL:  Normal gait; no clubbing or cyanosis of digits; no joint swelling or tenderness to palpation  PSYCH: A+O X0    LABS:                        12.7   17.31 )-----------( 43       ( 31 Jan 2021 13:25 )             41.0     01-31    136  |  103  |  20  ----------------------------<  102<H>  4.1   |  26  |  0.97    Ca    8.5      31 Jan 2021 13:25  Phos  3.7     01-30  Mg     1.9     01-31    TPro  6.9  /  Alb  3.1<L>  /  TBili  0.5  /  DBili  x   /  AST  42<H>  /  ALT  88<H>  /  AlkPhos  55  01-31                RADIOLOGY & ADDITIONAL TESTS:  Results Reviewed:   Imaging Personally Reviewed:  Electrocardiogram Personally Reviewed:    COORDINATION OF CARE:  Care Discussed with Consultants/Other Providers [Y/N]:  Prior or Outpatient Records Reviewed [Y/N]:  
Ciarra Zuniga MD   [facility] Medicine  Cell: [phone]    Patient is a 20y old  Male who presents with a chief complaint of BRBPR (24 Jan 2021 09:41)      INTERVAL HPI/OVERNIGHT EVENTS: No further episodes of bleeding overnight. H&H has remained stable. No other concerns per family, mom is at bedside    MEDICATIONS  (STANDING):  brexpiprazole 6 milliGRAM(s) Oral daily  dexAMETHasone  IVPB 40 milliGRAM(s) IV Intermittent daily  influenza   Vaccine 0.5 milliLiter(s) IntraMuscular once  LORazepam     Tablet 1 milliGRAM(s) Oral at bedtime  traZODone 300 milliGRAM(s) Oral at bedtime    MEDICATIONS  (PRN):      Allergies    Bactrim (Hives)  Rituxan (Hives)  WinRho SDF (Hives)    Intolerances        REVIEW OF SYSTEMS:  Please see interval HPI:    Vital Signs Last 24 Hrs  T(C): 36.4 (24 Jan 2021 13:05), Max: 37 (24 Jan 2021 06:58)  T(F): 97.6 (24 Jan 2021 13:05), Max: 98.6 (24 Jan 2021 06:58)  HR: 88 (24 Jan 2021 13:05) (70 - 96)  BP: 132/69 (24 Jan 2021 13:05) (108/54 - 138/83)  BP(mean): --  RR: 18 (24 Jan 2021 13:05) (18 - 19)  SpO2: 98% (24 Jan 2021 13:05) (95% - 98%)  I&O's Detail    23 Jan 2021 07:01  -  24 Jan 2021 07:00  --------------------------------------------------------  IN:    IV PiggyBack: 1050 mL    Platelets - Single Donor: 289 mL  Total IN: 1339 mL    OUT:  Total OUT: 0 mL    Total NET: 1339 mL            PHYSICAL EXAM:  Vital Signs Last 24 Hrs  T(C): 36.4 (24 Jan 2021 13:05), Max: 37 (24 Jan 2021 06:58)  T(F): 97.6 (24 Jan 2021 13:05), Max: 98.6 (24 Jan 2021 06:58)  HR: 88 (24 Jan 2021 13:05) (70 - 96)  BP: 132/69 (24 Jan 2021 13:05) (108/54 - 138/83)  BP(mean): --  RR: 18 (24 Jan 2021 13:05) (18 - 19)  SpO2: 98% (24 Jan 2021 13:05) (95% - 98%)  CONSTITUTIONAL: NAD, well-developed,   ENMT: Moist oral mucosa, normal dentition, no visible bleeding  RESPIRATORY: Normal respiratory effort; lungs are clear to auscultation bilaterally  CARDIOVASCULAR: Regular rate and rhythm, normal S1 and S2, no murmur/rub/gallop; No lower extremity edema;   ABDOMEN: Nontender to palpation, normoactive bowel sounds, no rebound/guarding; No hepatosplenomegaly  EXT: no edema b/l  NEUROLOGY: nonverbal, no focal deficits  SKIN: some healed bruising,       LABS:                        14.0   13.37 )-----------( 64       ( 24 Jan 2021 08:01 )             46.1     24 Jan 2021 08:01    136    |  105    |  19     ----------------------------<  136    4.4     |  22     |  0.76     Ca    8.8        24 Jan 2021 08:01  Phos  3.6       24 Jan 2021 08:01  Mg     1.8       24 Jan 2021 08:01      PT/INR - ( 23 Jan 2021 03:50 )   PT: 13.2 sec;   INR: 1.16 ratio         PTT - ( 23 Jan 2021 03:50 )  PTT:31.4 sec  CAPILLARY BLOOD GLUCOSE        BLOOD CULTURE    RADIOLOGY & ADDITIONAL TESTS:    Imaging Personally Reviewed:  [ ] YES     Consultant(s) Notes Reviewed:      Care Discussed with Consultants/Other Providers:
    Patient is a 20y old  Male who presents with a chief complaint of BRBPR (25 Jan 2021 13:41)      SUBJECTIVE / OVERNIGHT EVENTS: no bleeding  ADDITIONAL REVIEW OF SYSTEMS:    MEDICATIONS  (STANDING):  brexpiprazole 6 milliGRAM(s) Oral daily  dexAMETHasone  IVPB 40 milliGRAM(s) IV Intermittent daily  influenza   Vaccine 0.5 milliLiter(s) IntraMuscular once  LORazepam     Tablet 1 milliGRAM(s) Oral at bedtime  traZODone 300 milliGRAM(s) Oral at bedtime    MEDICATIONS  (PRN):      CAPILLARY BLOOD GLUCOSE        I&O's Summary      PHYSICAL EXAM:  Vital Signs Last 24 Hrs  T(C): 36.4 (25 Jan 2021 05:49), Max: 36.4 (24 Jan 2021 22:22)  T(F): 97.6 (25 Jan 2021 05:49), Max: 97.6 (24 Jan 2021 22:22)  HR: 67 (25 Jan 2021 05:49) (67 - 74)  BP: 129/56 (25 Jan 2021 05:49) (115/65 - 129/56)  BP(mean): --  RR: 18 (25 Jan 2021 05:49) (18 - 18)  SpO2: 96% (25 Jan 2021 05:49) (95% - 96%)  CONSTITUTIONAL: NAD, well-developed  RESPIRATORY: Normal respiratory effort; lungs are clear to auscultation bilaterally  CARDIOVASCULAR: Regular rate and rhythm, normal S1 and S2, No lower extremity edema  ABDOMEN: Nontender to palpation, normoactive bowel sounds  MUSCLOSKELETAL: no clubbing or cyanosis of digits  PSYCH: Awake and alert, nonverbal (baseline)    LABS:                        13.2   17.24 )-----------( 172      ( 25 Jan 2021 07:52 )             44.3     01-25    136  |  102  |  23  ----------------------------<  132<H>  4.3   |  23  |  0.76    Ca    8.2<L>      25 Jan 2021 07:52  Phos  3.6     01-24  Mg     1.8     01-24        RADIOLOGY & ADDITIONAL TESTS:  Results Reviewed:   Imaging Personally Reviewed:  Electrocardiogram Personally Reviewed:    COORDINATION OF CARE:  Care Discussed with Consultants/Other Providers [Y/N]: Y Dr. Seb Gleason ([facility]), will help arrange o/p follow-up.  Prior or Outpatient Records Reviewed [Y/N]:  
[facility] Division of Hospital Medicine  Ngozi Staples MD  Pager [phone]    Patient is a 20y old  Male who presents with a chief complaint of BRBPR (29 Jan 2021 16:18)      SUBJECTIVE / OVERNIGHT EVENTS: pt seen and examined at 130p w/ mother at bedside  she thinks he is looking better- had the glassy eyed look when he spiked a temp  last night.  Mother is starting to feel the symptoms of headache, fatigue, diarrhea- she is hoping that the patient's repeat swab is negative and he can go back to the group home tomorrow- reminded her of the platelet monitoring.  if pt remains positive, he can go to the group home under quarantine, which she is not keen on "being alone in a room".  We briefly discussed a sitter to watch the patient- she is thinking pt needs 1:1  ADDITIONAL REVIEW OF SYSTEMS:    MEDICATIONS  (STANDING):  brexpiprazole 6 milliGRAM(s) Oral daily  influenza   Vaccine 0.5 milliLiter(s) IntraMuscular once  LORazepam     Tablet 1 milliGRAM(s) Oral at bedtime  pantoprazole    Tablet 40 milliGRAM(s) Oral before breakfast  traZODone 300 milliGRAM(s) Oral at bedtime    MEDICATIONS  (PRN):  acetaminophen   Tablet .. 650 milliGRAM(s) Oral every 6 hours PRN Temp greater or equal to 38C (100.4F), Mild Pain (1 - 3)  calcium carbonate    500 mG (Tums) Chewable 1 Tablet(s) Chew four times a day PRN Indigestion      CAPILLARY BLOOD GLUCOSE        I&O's Summary      PHYSICAL EXAM:  Vital Signs Last 24 Hrs  T(C): 37.3 (30 Jan 2021 12:58), Max: 38 (29 Jan 2021 21:44)  T(F): 99.2 (30 Jan 2021 12:58), Max: 100.4 (29 Jan 2021 21:44)  HR: 100 (30 Jan 2021 12:58) (90 - 100)  BP: 122/61 (30 Jan 2021 12:58) (121/53 - 122/61)  BP(mean): --  RR: 17 (30 Jan 2021 12:58) (17 - 18)  SpO2: 97% (30 Jan 2021 12:58) (97% - 98%)  CONSTITUTIONAL: NAD  RESPIRATORY: Normal respiratory effort; lungs are clear to auscultation bilaterally  CARDIOVASCULAR: Regular rate and rhythm, normal S1 and S2, no murmur/rub/gallop; No lower extremity edema;   ABDOMEN: Nontender to palpation, normoactive bowel sounds, not distended;   MUSCULOSKELETAL:  no clubbing or cyanosis of digits; no joint swelling or tenderness to palpation  PSYCH: A+O X0      LABS:                        13.6   12.95 )-----------( 122      ( 30 Jan 2021 07:30 )             44.1     01-30    136  |  101  |  22  ----------------------------<  98  3.7   |  25  |  0.83    Ca    8.2<L>      30 Jan 2021 07:30  Phos  3.7     01-30  Mg     1.8     01-30    TPro  7.0  /  Alb  2.6<L>  /  TBili  0.7  /  DBili  x   /  AST  20  /  ALT  92<H>  /  AlkPhos  49  01-29                RADIOLOGY & ADDITIONAL TESTS:  Results Reviewed:   Imaging Personally Reviewed:  Electrocardiogram Personally Reviewed:    COORDINATION OF CARE:  Care Discussed with Consultants/Other Providers [Y/N]:  Prior or Outpatient Records Reviewed [Y/N]:  
    Patient is a 20y old  Male who presents with a chief complaint of BRBPR (02 Feb 2021 13:16)      SUBJECTIVE / OVERNIGHT EVENTS:  pt with LE edema b/l overnight, much improved per mom with leg elevation and stocking  ADDITIONAL REVIEW OF SYSTEMS:    MEDICATIONS  (STANDING):  brexpiprazole 6 milliGRAM(s) Oral daily  immune   globulin 10% (GAMMAGARD) IVPB 55 Gram(s) IV Intermittent daily  influenza   Vaccine 0.5 milliLiter(s) IntraMuscular once  LORazepam     Tablet 1 milliGRAM(s) Oral at bedtime  pantoprazole    Tablet 40 milliGRAM(s) Oral before breakfast  predniSONE   Tablet 80 milliGRAM(s) Oral daily  traZODone 300 milliGRAM(s) Oral at bedtime    MEDICATIONS  (PRN):  acetaminophen   Tablet .. 650 milliGRAM(s) Oral every 6 hours PRN Temp greater or equal to 38C (100.4F), Mild Pain (1 - 3)  calcium carbonate    500 mG (Tums) Chewable 1 Tablet(s) Chew four times a day PRN Indigestion      CAPILLARY BLOOD GLUCOSE        I&O's Summary      PHYSICAL EXAM:  Vital Signs Last 24 Hrs  T(C): 36.6 (03 Feb 2021 13:24), Max: 36.6 (02 Feb 2021 23:29)  T(F): 97.8 (03 Feb 2021 13:24), Max: 97.9 (02 Feb 2021 23:29)  HR: 99 (03 Feb 2021 13:24) (75 - 99)  BP: 100/55 (03 Feb 2021 03:35) (100/55 - 116/49)  BP(mean): --  RR: 19 (03 Feb 2021 13:24) (16 - 19)  SpO2: 95% (03 Feb 2021 13:24) (95% - 98%)  CONSTITUTIONAL: NAD, well-developed, obese.   RESPIRATORY: Normal respiratory effort; lungs are clear to auscultation bilaterally  CARDIOVASCULAR: Regular rate and rhythm, normal S1 and S2, trace b/l LE edema  ABDOMEN: Nontender to palpation, normoactive bowel sounds  Arms: trace b/l edema  PSYCH: Awake, alert.     LABS:                        11.5   20.04 )-----------( 89       ( 03 Feb 2021 13:02 )             37.5     02-02    131<L>  |  102  |  20  ----------------------------<  129<H>  4.9   |  20<L>  |  0.80    Ca    8.7      02 Feb 2021 13:39    TPro  9.1<H>  /  Alb  2.8<L>  /  TBili  0.5  /  DBili  x   /  AST  67<H>  /  ALT  125<H>  /  AlkPhos  53  02-02                RADIOLOGY & ADDITIONAL TESTS:  Results Reviewed:   Imaging Personally Reviewed:  Electrocardiogram Personally Reviewed:    COORDINATION OF CARE:  Care Discussed with Consultants/Other Providers [Y/N]: Dr. Gleason ([facility]), plan to complete IVIG tonight, c/w steroids, check Plts in am  Prior or Outpatient Records Reviewed [Y/N]:  
    Patient is a 20y old  Male who presents with a chief complaint of BRBPR (27 Jan 2021 07:58)      SUBJECTIVE / OVERNIGHT EVENTS: some mild agitation overnight and this AM.  Improved with tylenol  ADDITIONAL REVIEW OF SYSTEMS:    MEDICATIONS  (STANDING):  brexpiprazole 6 milliGRAM(s) Oral daily  influenza   Vaccine 0.5 milliLiter(s) IntraMuscular once  LORazepam     Tablet 1 milliGRAM(s) Oral at bedtime  pantoprazole    Tablet 40 milliGRAM(s) Oral before breakfast  predniSONE   Tablet 150 milliGRAM(s) Oral daily  traZODone 300 milliGRAM(s) Oral at bedtime    MEDICATIONS  (PRN):  acetaminophen   Tablet .. 650 milliGRAM(s) Oral every 6 hours PRN Temp greater or equal to 38C (100.4F), Mild Pain (1 - 3)      CAPILLARY BLOOD GLUCOSE        I&O's Summary      PHYSICAL EXAM:  Vital Signs Last 24 Hrs  T(C): 36.4 (27 Jan 2021 09:52), Max: 36.4 (27 Jan 2021 09:52)  T(F): 97.5 (27 Jan 2021 09:52), Max: 97.5 (27 Jan 2021 09:52)  HR: 89 (27 Jan 2021 09:52) (63 - 89)  BP: 136/74 (27 Jan 2021 09:52) (122/85 - 150/75)  BP(mean): --  RR: 18 (27 Jan 2021 09:52) (18 - 20)  SpO2: 97% (27 Jan 2021 09:52) (94% - 99%)  CONSTITUTIONAL: NAD, well-developed, obese.  walking around the room  RESPIRATORY: Normal respiratory effort; lungs are clear to auscultation bilaterally, no resp distress  CARDIOVASCULAR: Regular rate and rhythm, normal S1 and S2  ABDOMEN: Nontender to palpation, normoactive bowel sounds  MUSCLOSKELETAL: no clubbing or cyanosis of digits  PSYCH: Awake and alert    LABS:                        13.1   15.17 )-----------( 328      ( 27 Jan 2021 06:36 )             41.6     01-27    137  |  105  |  21  ----------------------------<  121<H>  4.5   |  25  |  0.72    Ca    8.6      27 Jan 2021 06:36  Phos  3.3     01-27  Mg     2.1     01-27    TPro  8.3  /  Alb  3.0<L>  /  TBili  0.9  /  DBili  x   /  AST  82<H>  /  ALT  170<H>  /  AlkPhos  46  01-27    PT/INR - ( 26 Jan 2021 15:52 )   PT: 12.2 sec;   INR: 1.06 ratio         PTT - ( 26 Jan 2021 15:52 )  PTT:23.7 sec  CARDIAC MARKERS ( 26 Jan 2021 15:52 )  x     / x     / 17 U/L / x     / x          Ddimer 240  procal .4  CRP <4  Ferritin 153      RADIOLOGY & ADDITIONAL TESTS:  Results Reviewed:   Imaging Personally Reviewed:  Electrocardiogram Personally Reviewed:    COORDINATION OF CARE:  Care Discussed with Consultants/Other Providers [Y/N]: Dr. Gleason ([facility]) no objections to AC  Prior or Outpatient Records Reviewed [Y/N]:  
    Patient is a 20y old  Male who presents with a chief complaint of Patient is a 20M w/ autism (non-verbal), hx of ITP since childhood, CARLITOS (not on CPAP)  admitted for acute lower GI bleed in setting of acute on chronic ITP, found to be COVID + stable for now (01 Feb 2021 11:55)      SUBJECTIVE / OVERNIGHT EVENTS:  ADDITIONAL REVIEW OF SYSTEMS:    MEDICATIONS  (STANDING):  brexpiprazole 6 milliGRAM(s) Oral daily  immune   globulin 10% (GAMMAGARD) IVPB 55 Gram(s) IV Intermittent daily  influenza   Vaccine 0.5 milliLiter(s) IntraMuscular once  LORazepam     Tablet 1 milliGRAM(s) Oral at bedtime  pantoprazole    Tablet 40 milliGRAM(s) Oral before breakfast  predniSONE   Tablet 80 milliGRAM(s) Oral daily  traZODone 300 milliGRAM(s) Oral at bedtime    MEDICATIONS  (PRN):  acetaminophen   Tablet .. 650 milliGRAM(s) Oral every 6 hours PRN Temp greater or equal to 38C (100.4F), Mild Pain (1 - 3)  calcium carbonate    500 mG (Tums) Chewable 1 Tablet(s) Chew four times a day PRN Indigestion      CAPILLARY BLOOD GLUCOSE        I&O's Summary      PHYSICAL EXAM:  Vital Signs Last 24 Hrs  T(C): 36.7 (01 Feb 2021 12:49), Max: 36.9 (31 Jan 2021 21:49)  T(F): 98 (01 Feb 2021 12:49), Max: 98.4 (31 Jan 2021 21:49)  HR: 78 (01 Feb 2021 12:49) (65 - 88)  BP: 118/73 (01 Feb 2021 12:49) (113/78 - 139/63)  BP(mean): --  RR: 18 (01 Feb 2021 12:49) (17 - 19)  SpO2: 97% (01 Feb 2021 12:49) (93% - 97%)  CONSTITUTIONAL: NAD, well-developed, obese  RESPIRATORY: Normal respiratory effort; lungs are clear to auscultation bilaterally  CARDIOVASCULAR: Regular rate and rhythm, normal S1 and S2, No lower extremity edema  ABDOMEN: Nontender to palpation, normoactive bowel sounds  MUSCLOSKELETAL: no clubbing or cyanosis of digits  PSYCH: Awake, alert    LABS:                        12.8   17.93 )-----------( 59       ( 01 Feb 2021 08:03 )             40.3     01-31    136  |  103  |  20  ----------------------------<  102<H>  4.1   |  26  |  0.97    Ca    8.5      31 Jan 2021 13:25  Mg     1.9     01-31    TPro  6.9  /  Alb  3.1<L>  /  TBili  0.5  /  DBili  x   /  AST  42<H>  /  ALT  88<H>  /  AlkPhos  55  01-31      RADIOLOGY & ADDITIONAL TESTS:  Results Reviewed:   Imaging Personally Reviewed:  Electrocardiogram Personally Reviewed:    COORDINATION OF CARE:  Care Discussed with Consultants/Other Providers [Y/N]: Dr. Gleason ([facility]), c/w IVIG and steroids  Prior or Outpatient Records Reviewed [Y/N]:  
  Patient is a 20y old  Male who presents with a chief complaint of BRBPR (03 Feb 2021 15:14)    SUBJECTIVE / OVERNIGHT EVENTS: no bleeding.   ADDITIONAL REVIEW OF SYSTEMS:    MEDICATIONS  (STANDING):  brexpiprazole 6 milliGRAM(s) Oral daily  influenza   Vaccine 0.5 milliLiter(s) IntraMuscular once  LORazepam     Tablet 1 milliGRAM(s) Oral at bedtime  multivitamin 1 Tablet(s) Oral daily  pantoprazole    Tablet 40 milliGRAM(s) Oral before breakfast  predniSONE   Tablet 80 milliGRAM(s) Oral daily  traZODone 300 milliGRAM(s) Oral at bedtime    MEDICATIONS  (PRN):  acetaminophen   Tablet .. 650 milliGRAM(s) Oral every 6 hours PRN Temp greater or equal to 38C (100.4F), Mild Pain (1 - 3)  calcium carbonate    500 mG (Tums) Chewable 1 Tablet(s) Chew four times a day PRN Indigestion      CAPILLARY BLOOD GLUCOSE        I&O's Summary      PHYSICAL EXAM:  Vital Signs Last 24 Hrs  T(C): 37.1 (04 Feb 2021 10:02), Max: 37.1 (04 Feb 2021 10:02)  T(F): 98.7 (04 Feb 2021 10:02), Max: 98.7 (04 Feb 2021 10:02)  HR: 98 (04 Feb 2021 10:02) (69 - 98)  BP: 134/63 (04 Feb 2021 10:02) (105/66 - 134/63)  BP(mean): --  RR: 18 (04 Feb 2021 10:02) (16 - 18)  SpO2: 97% (04 Feb 2021 10:02) (95% - 99%)  CONSTITUTIONAL: NAD, obese  RESPIRATORY: Normal respiratory effort; lungs are clear to auscultation bilaterally  CARDIOVASCULAR: Regular rate and rhythm, normal S1 and S2, b/l LE edema, improved from yesterday  ABDOMEN: Nontender to palpation, normoactive bowel sounds  MUSCLOSKELETAL: no clubbing or cyanosis of digits  PSYCH: Awake, alert    LABS:                        10.9   19.22 )-----------( 75       ( 04 Feb 2021 07:29 )             35.7       COORDINATION OF CARE:  Care Discussed with Consultants/Other Providers [Y/N]: Discussed with Dr. Gleason (heme), ok for DC with o/p follow-up with heme next week, c/w steroids until then  
    Patient is a 20y old  Male who presents with a chief complaint of ITP (25 Jan 2021 15:26)      SUBJECTIVE / OVERNIGHT EVENTS:  COVID + resulted.  No SOB  ADDITIONAL REVIEW OF SYSTEMS:    MEDICATIONS  (STANDING):  brexpiprazole 6 milliGRAM(s) Oral daily  influenza   Vaccine 0.5 milliLiter(s) IntraMuscular once  LORazepam     Tablet 1 milliGRAM(s) Oral at bedtime  pantoprazole    Tablet 40 milliGRAM(s) Oral before breakfast  traZODone 300 milliGRAM(s) Oral at bedtime    MEDICATIONS  (PRN):      CAPILLARY BLOOD GLUCOSE        I&O's Summary      PHYSICAL EXAM:  Vital Signs Last 24 Hrs  T(C): 36.3 (26 Jan 2021 13:29), Max: 36.6 (25 Jan 2021 20:50)  T(F): 97.4 (26 Jan 2021 13:29), Max: 97.8 (25 Jan 2021 20:50)  HR: 82 (26 Jan 2021 13:29) (76 - 87)  BP: 134/82 (26 Jan 2021 13:29) (134/82 - 142/82)  BP(mean): --  RR: 19 (26 Jan 2021 13:29) (18 - 19)  SpO2: 97% (26 Jan 2021 13:29) (95% - 97%)  CONSTITUTIONAL: NAD, well-developed, obese  RESPIRATORY: Normal respiratory effort; lungs are clear to auscultation bilaterally  CARDIOVASCULAR: Regular rate and rhythm, normal S1 and S2, No lower extremity edema  ABDOMEN: Nontender to palpation, normoactive bowel sounds  MUSCLOSKELETAL: no clubbing or cyanosis of digits  PSYCH: Awake and alert, nonverbal    LABS:                        13.2   17.24 )-----------( 172      ( 25 Jan 2021 07:52 )             44.3     01-25    136  |  102  |  23  ----------------------------<  132<H>  4.3   |  23  |  0.76    Ca    8.2<L>      25 Jan 2021 07:52                  RADIOLOGY & ADDITIONAL TESTS:  Results Reviewed:   Imaging Personally Reviewed:  Electrocardiogram Personally Reviewed:    COORDINATION OF CARE:  Care Discussed with Consultants/Other Providers [Y/N]:  Prior or Outpatient Records Reviewed [Y/N]:  
Dr. Staples pager [phone]    Patient is a 20y old  Male who presents with a chief complaint of BRBPR (28 Jan 2021 12:58)      SUBJECTIVE / OVERNIGHT EVENTS: pt seen and examined at 1215p w/ mother at bedside.  Patient ambulating in room- from bathroom- breathing heavier.    pulse ox 99%RA.  eating lunch.  occasional cough.  mother worried about temp of 99.5      MEDICATIONS  (STANDING):  brexpiprazole 6 milliGRAM(s) Oral daily  influenza   Vaccine 0.5 milliLiter(s) IntraMuscular once  LORazepam     Tablet 1 milliGRAM(s) Oral at bedtime  pantoprazole    Tablet 40 milliGRAM(s) Oral before breakfast  traZODone 300 milliGRAM(s) Oral at bedtime    MEDICATIONS  (PRN):  acetaminophen   Tablet .. 650 milliGRAM(s) Oral every 6 hours PRN Temp greater or equal to 38C (100.4F), Mild Pain (1 - 3)  calcium carbonate    500 mG (Tums) Chewable 1 Tablet(s) Chew four times a day PRN Indigestion      Meds ordered within last 24hours  calcium carbonate    500 mG (Tums) Chewable: [Ordered as TUMS]  1 Tablet(s), Chew, four times a day, PRN for Indigestion  Administration Instructions: Calcium Carbonate 500 mG = elemental calcium 200 mG  Provider's Contact #: [phone] (01-29 @ 12:23)  benzonatate: [Ordered as TESSALON PERLE]  100 milliGRAM(s), Oral, once, Stop After 1 Doses  Administration Instructions: Swallow whole * don't crush/chew (01-29 @ 08:08)  acetaminophen   Tablet ..: [Ordered as TYLENOL..]  650 milliGRAM(s), Oral, once, Stop After 1 Doses  Administration Instructions: MAX DAILY DOSE:  ADULT = 4,000 mG/Day  This is a Look-alike/Sound-alike Medication (01-29 @ 08:08)      T(C): 36.8 (01-29-21 @ 10:00), Max: 37.5 (01-29-21 @ 08:22)  HR: 92 (01-29-21 @ 08:22) (73 - 92)  BP: 112/60 (01-29-21 @ 08:22) (110/62 - 112/60)  RR: 19 (01-29-21 @ 08:22) (18 - 19)  SpO2: 98% (01-29-21 @ 08:22) (98% - 99%)    CAPILLARY BLOOD GLUCOSE        I&O's Summary      PHYSICAL EXAM:  GENERAL: NAD obese  CHEST/LUNG: Clear to auscultation bilaterally; No wheeze  HEART: Regular rate and rhythm; No murmurs, rubs, or gallops  ABDOMEN: Soft, Nontender, Nondistended; Bowel sounds present  EXTREMITIES:  No clubbing, cyanosis, or edema venous stasis changed        LABS:                        12.9   15.06 )-----------( 256      ( 29 Jan 2021 07:21 )             41.7     01-29    135  |  103  |  28<H>  ----------------------------<  59<L>  4.0   |  22  |  0.89    Ca    8.3<L>      29 Jan 2021 07:21  Phos  3.1     01-29  Mg     1.9     01-29    TPro  7.0  /  Alb  2.6<L>  /  TBili  0.7  /  DBili  x   /  AST  20  /  ALT  92<H>  /  AlkPhos  49  01-29              RADIOLOGY & ADDITIONAL TESTS:    Imaging Personally Reviewed:    Consultant(s) Notes Reviewed:      Care Discussed with Consultants/Other Providers:      
    Patient is a 20y old  Male who presents with a chief complaint of BRBPR (28 Jan 2021 01:33)      SUBJECTIVE / OVERNIGHT EVENTS: episodes of staring, eye deviation yesterday afternoon.  ADDITIONAL REVIEW OF SYSTEMS:    MEDICATIONS  (STANDING):  brexpiprazole 6 milliGRAM(s) Oral daily  influenza   Vaccine 0.5 milliLiter(s) IntraMuscular once  LORazepam     Tablet 1 milliGRAM(s) Oral at bedtime  pantoprazole    Tablet 40 milliGRAM(s) Oral before breakfast  traZODone 300 milliGRAM(s) Oral at bedtime    MEDICATIONS  (PRN):  acetaminophen   Tablet .. 650 milliGRAM(s) Oral every 6 hours PRN Temp greater or equal to 38C (100.4F), Mild Pain (1 - 3)      CAPILLARY BLOOD GLUCOSE        I&O's Summary      PHYSICAL EXAM:  Vital Signs Last 24 Hrs  T(C): 36.8 (28 Jan 2021 12:11), Max: 36.8 (28 Jan 2021 12:11)  T(F): 98.2 (28 Jan 2021 12:11), Max: 98.2 (28 Jan 2021 12:11)  HR: 92 (28 Jan 2021 12:11) (75 - 92)  BP: 118/60 (28 Jan 2021 12:11) (118/60 - 131/74)  BP(mean): --  RR: 18 (28 Jan 2021 12:11) (16 - 18)  SpO2: 98% (28 Jan 2021 12:11) (96% - 98%)  CONSTITUTIONAL: NAD, well-developed, obese.  RESPIRATORY: Normal respiratory effort; lungs are clear to auscultation bilaterally  CARDIOVASCULAR: Regular rate and rhythm, normal S1 and S2, No lower extremity edema  ABDOMEN: Nontender to palpation, normoactive bowel sounds  MUSCLOSKELETAL: no clubbing or cyanosis of digits  PSYCH: Awake and alert  Neuro: non focal exam.  Eyes moving appropriately.  LABS:                        13.4   17.37 )-----------( 391      ( 28 Jan 2021 06:33 )             43.8     01-28    135  |  100  |  22  ----------------------------<  115<H>  4.4   |  23  |  0.67    Ca    8.4      28 Jan 2021 06:33  Phos  3.1     01-28  Mg     2.0     01-28    TPro  8.1  /  Alb  3.1<L>  /  TBili  0.7  /  DBili  x   /  AST  49<H>  /  ALT  160<H>  /  AlkPhos  54  01-28    PT/INR - ( 26 Jan 2021 15:52 )   PT: 12.2 sec;   INR: 1.06 ratio         PTT - ( 26 Jan 2021 15:52 )  PTT:23.7 sec  CARDIAC MARKERS ( 26 Jan 2021 15:52 )  x     / x     / 17 U/L / x     / x              COORDINATION OF CARE:  Care Discussed with Consultants/Other Providers [Y/N]: Y Dr. Gleason (heme), ok to hold steroids for now  Prior or Outpatient Records Reviewed [Y/N]:  
Maggy Shannon MD   Pager [phone]    Patient is a 20y old  Male who presents with a chief complaint of BRBPR (03 Feb 2021 15:14)    SUBJECTIVE / OVERNIGHT EVENTS: no bleeding. For d'c home today.     MEDICATIONS  (STANDING):  brexpiprazole 6 milliGRAM(s) Oral daily  influenza   Vaccine 0.5 milliLiter(s) IntraMuscular once  loratadine 10 milliGRAM(s) Oral once  LORazepam     Tablet 1 milliGRAM(s) Oral at bedtime  multivitamin 1 Tablet(s) Oral daily  pantoprazole    Tablet 40 milliGRAM(s) Oral before breakfast  predniSONE   Tablet 80 milliGRAM(s) Oral daily  traZODone 300 milliGRAM(s) Oral at bedtime    MEDICATIONS  (PRN):  acetaminophen   Tablet .. 650 milliGRAM(s) Oral every 6 hours PRN Temp greater or equal to 38C (100.4F), Mild Pain (1 - 3)  calcium carbonate    500 mG (Tums) Chewable 1 Tablet(s) Chew four times a day PRN Indigestion    PHYSICAL EXAM:  Vital Signs Last 24 Hrs  T(C): 36.6 (05 Feb 2021 05:05), Max: 36.7 (04 Feb 2021 21:00)  T(F): 97.8 (05 Feb 2021 05:05), Max: 98.1 (04 Feb 2021 21:00)  HR: 91 (05 Feb 2021 05:05) (91 - 98)  BP: 108/60 (05 Feb 2021 05:05) (108/60 - 114/60)  BP(mean): --  RR: 17 (05 Feb 2021 05:05) (17 - 17)  SpO2: 99% (05 Feb 2021 05:05) (99% - 99%)    CONSTITUTIONAL: NAD, obese  RESPIRATORY: Normal respiratory effort; lungs are clear to auscultation bilaterally  CARDIOVASCULAR: Regular rate and rhythm, normal S1 and S2, b/l LE edema, improved from yesterday  ABDOMEN: Nontender to palpation, normoactive bowel sounds  MUSCLOSKELETAL: no clubbing or cyanosis of digits  PSYCH: Awake, alert    LABS:                          11.6   22.06 )-----------( 79       ( 05 Feb 2021 06:51 )             38.0     02-05    137  |  102  |  20  ----------------------------<  112<H>  3.8   |  25  |  0.89    Ca    8.9      05 Feb 2021 06:51  Phos  4.6     02-05  Mg     1.8     02-05                    RADIOLOGY, EKG & ADDITIONAL TESTS: Reviewed.

## 2021-02-05 NOTE — PROGRESS NOTE ADULT - PROBLEM SELECTOR PROBLEM 4
Lower GI bleed
Eye abnormalities
Preventive measure
Intellectual disability
Lower GI bleed
Intellectual disability
Lower GI bleed
Eye abnormalities
Preventive measure
Lower GI bleed
Eye abnormalities

## 2021-02-05 NOTE — PROGRESS NOTE ADULT - PROBLEM SELECTOR PROBLEM 2
Lower GI bleed
COVID-19
COVID-19
Idiopathic thrombocytopenia purpura
Eye abnormalities
Lower GI bleed
Eye abnormalities
COVID-19
Eye abnormalities
COVID-19
Idiopathic thrombocytopenia purpura
COVID-19
Eye abnormalities

## 2021-02-05 NOTE — PROGRESS NOTE ADULT - PROBLEM SELECTOR PROBLEM 1
COVID-19
COVID-19
Idiopathic thrombocytopenia purpura
COVID-19
Idiopathic thrombocytopenia purpura
COVID-19
Idiopathic thrombocytopenia purpura
COVID-19
Idiopathic thrombocytopenia purpura
Idiopathic thrombocytopenia purpura
COVID-19
Idiopathic thrombocytopenia purpura
Idiopathic thrombocytopenia purpura

## 2021-02-05 NOTE — DISCHARGE NOTE NURSING/CASE MANAGEMENT/SOCIAL WORK - NSDCPNINST_GEN_ALL_CORE
Patient is alert and awake, mother at bedside, no s/s of distress noted, pt ready for safe discharge.

## 2021-02-05 NOTE — PROGRESS NOTE ADULT - PROVIDER SPECIALTY LIST ADULT
Gastroenterology
Heme/Onc
Hospitalist

## 2021-02-05 NOTE — PROGRESS NOTE ADULT - PROBLEM SELECTOR PROBLEM 3
Eye abnormalities
Edema
Lower GI bleed
Edema
Edema
Eye abnormalities
Idiopathic thrombocytopenia purpura
Lower GI bleed
Intellectual disability
Idiopathic thrombocytopenia purpura
Intellectual disability
Idiopathic thrombocytopenia purpura
Idiopathic thrombocytopenia purpura

## 2021-02-05 NOTE — PROGRESS NOTE ADULT - PROBLEM SELECTOR PROBLEM 6
Intellectual disability
Preventive measure
Discharge planning issues
Preventive measure
Preventive measure
Discharge planning issues
Intellectual disability
Preventive measure
Intellectual disability

## 2021-02-05 NOTE — PROGRESS NOTE ADULT - PROBLEM SELECTOR PLAN 5
COVID swab for return to .  F/u plan at [facility].  stable for DC home.  DC time 40 minutes
Autism, stable, c/w ativan 1 mg PO qhs, trazodone 300 mg qhs, brexpiprazole (rixulti) 6 mg AM
DVT ppx: Hold all pharmacologic VTE ppx given thrombocytopenia and LGI bleed  Diet: Regular
Autism, stable, c/w ativan 1 mg PO qhs, trazodone 300 mg qhs, brexpiprazole (rixulti) 6 mg AM
Hgb stable, no further episodes of bleeding.  No need for colonoscopy at this time, GI input appreciated
DVT ppx: Hold all pharmacologic VTE ppx given thrombocytopenia and LGI bleed  Diet: Regular
Autism, stable, c/w ativan 1 mg PO qhs, trazodone 300 mg qhs, brexpiprazole (rixulti) 6 mg AM
Hgb stable, no further episodes of bleeding.  No need for colonoscopy at this time, GI input appreciated
Autism, stable, c/w ativan 1 mg PO qhs, trazodone 300 mg qhs, brexpiprazole (rixulti) 6 mg AM
Hgb stable, no further episodes of bleeding.  No need for colonoscopy at this time, GI input appreciated

## 2021-02-05 NOTE — PROGRESS NOTE ADULT - PROBLEM SELECTOR PLAN 4
Hgb stable, no further episodes of bleeding.  No need for colonoscopy at this time, GI input appreciated
eye deviation 1/27.  No further episodes.  ? Steroids vs COVID. CT neg, not likely seizure
DVT ppx: Hold all pharmacologic VTE ppx given thrombocytopenia and LGI bleed  Diet: Regular
Autism, stable, c/w ativan 1 mg PO qhs, trazodone 300 mg qhs, brexpiprazole (rixulti) 6 mg AM
DVT ppx: Hold all pharmacologic VTE ppx given thrombocytopenia and LGI bleed  Diet: Regular
eye deviation 1/27.  No further episodes.  ? Steroids vs COVID. CT neg, not likely seizure
Hgb stable, no further episodes of bleeding.  No need for colonoscopy at this time, GI input appreciated
Hgb stable, no further episodes of bleeding.  No need for colonoscopy at this time, GI input appreciated
Autism, stable, c/w ativan 1 mg PO qhs, trazodone 300 mg qhs, brexpiprazole (rixulti) 6 mg AM
eye deviation 1/27.  No further episodes.  ? Steroids vs COVID. CT neg, not likely seizure
Hgb stable, no further episodes of bleeding.  No need for colonoscopy at this time, GI input appreciated

## 2021-02-05 NOTE — PROGRESS NOTE ADULT - PROBLEM SELECTOR PLAN 7
DVT ppx: Hold pharmacologic VTE ppx,   Diet: Regular
DVT ppx: Hold pharmacologic VTE ppx,   Diet: Regular
eventual return to Group Home when medically stable
lives at a group home- mother is  nervous about pt being in quarantine at the group home if the COVID PCR stays positive on d/c.  told her that if pt is ready for discharge and remains PCR positive he would have to be discharged to the group home and they can follow their quarantine guidelines
DVT ppx: Hold pharmacologic VTE ppx,   Diet: Regular
eventual return to Group Home when medically stable

## 2021-02-05 NOTE — CHART NOTE - NSCHARTNOTEFT_GEN_A_CORE
20 year old man hx of chronic ITP, previously under care of Dr. Rachana Blanco ([facility]), autism, presented from [facility] with plt 1 and BRBPR,    # History of chronic ITP  - discussed admission with Dr. Blanco. Last seen by Dr. Blanco in 2014.  Patient chronically has platelet counts as low as 5,000 however does not typically experience bleeding.  At beginning of current hospital stay, patient treated with IVIG X 2 doses as well as pulse decadron 40mg X 4 days (with an adequate response, plt inc to 300s) and then a prednisone taper starting at 1mg/kg (150mg).  Prednisone discontinued temporarily due to patient having mental status changes, possible steroid induced psychosis, but this could also have been from the COVID as well.   -3 days following his platelets fell from 250's down to 122 then 43 today.  Prednisone restarted at an adjusted body weight dose of 80mg daily and IVIG 0.5mg/KG X 4 days (to be completed on 2/3/21) to prevent severe drop in platelets counts.   -platelets stable. okay for d/c from heme stand point. patient has outpt f/u scheduled with Dr. Deleon on 2/9     Seb Gleason Heme/onc PGY4

## 2021-02-05 NOTE — CHART NOTE - NSCHARTNOTESELECT_GEN_ALL_CORE
ACP NP/Event Note
Event Note
Hematology/Event Note
Heme/Event Note
Neurology
Event Note
Hematology Chart Note
Heme/onc/Event Note

## 2021-02-09 ENCOUNTER — RESULT REVIEW (OUTPATIENT)
Age: 21
End: 2021-02-09

## 2021-02-09 ENCOUNTER — APPOINTMENT (OUTPATIENT)
Dept: HEMATOLOGY ONCOLOGY | Facility: CLINIC | Age: 21
End: 2021-02-09
Payer: MEDICARE

## 2021-02-09 VITALS
TEMPERATURE: 97.1 F | DIASTOLIC BLOOD PRESSURE: 79 MMHG | WEIGHT: 315 LBS | HEART RATE: 84 BPM | RESPIRATION RATE: 17 BRPM | HEIGHT: 69.17 IN | BODY MASS INDEX: 46.13 KG/M2 | OXYGEN SATURATION: 95 % | SYSTOLIC BLOOD PRESSURE: 112 MMHG

## 2021-02-09 LAB
BASOPHILS # BLD AUTO: 0.02 K/UL — SIGNIFICANT CHANGE UP (ref 0–0.2)
BASOPHILS NFR BLD AUTO: 0.1 % — SIGNIFICANT CHANGE UP (ref 0–2)
EOSINOPHIL # BLD AUTO: 0 K/UL — SIGNIFICANT CHANGE UP (ref 0–0.5)
EOSINOPHIL NFR BLD AUTO: 0 % — SIGNIFICANT CHANGE UP (ref 0–6)
HCT VFR BLD CALC: 38.3 % — LOW (ref 39–50)
HGB BLD-MCNC: 12.5 G/DL — LOW (ref 13–17)
IMM GRANULOCYTES NFR BLD AUTO: 0.5 % — SIGNIFICANT CHANGE UP (ref 0–1.5)
LYMPHOCYTES # BLD AUTO: 0.64 K/UL — LOW (ref 1–3.3)
LYMPHOCYTES # BLD AUTO: 4.3 % — LOW (ref 13–44)
MCHC RBC-ENTMCNC: 27.4 PG — SIGNIFICANT CHANGE UP (ref 27–34)
MCHC RBC-ENTMCNC: 32.6 G/DL — SIGNIFICANT CHANGE UP (ref 32–36)
MCV RBC AUTO: 83.8 FL — SIGNIFICANT CHANGE UP (ref 80–100)
MONOCYTES # BLD AUTO: 0.11 K/UL — SIGNIFICANT CHANGE UP (ref 0–0.9)
MONOCYTES NFR BLD AUTO: 0.7 % — LOW (ref 2–14)
NEUTROPHILS # BLD AUTO: 14.18 K/UL — HIGH (ref 1.8–7.4)
NEUTROPHILS NFR BLD AUTO: 94.4 % — HIGH (ref 43–77)
NRBC # BLD: 0 /100 WBCS — SIGNIFICANT CHANGE UP (ref 0–0)
PLATELET # BLD AUTO: 25 K/UL — LOW (ref 150–400)
RBC # BLD: 4.57 M/UL — SIGNIFICANT CHANGE UP (ref 4.2–5.8)
RBC # FLD: 18.6 % — HIGH (ref 10.3–14.5)
WBC # BLD: 15.03 K/UL — HIGH (ref 3.8–10.5)
WBC # FLD AUTO: 15.03 K/UL — HIGH (ref 3.8–10.5)

## 2021-02-09 PROCEDURE — 99214 OFFICE O/P EST MOD 30 MIN: CPT

## 2021-02-09 RX ORDER — TRAZODONE HYDROCHLORIDE 100 MG/1
100 TABLET ORAL DAILY
Refills: 0 | Status: ACTIVE | COMMUNITY
Start: 2021-02-09

## 2021-02-09 RX ORDER — LORAZEPAM 1 MG/1
1 TABLET ORAL AT BEDTIME
Refills: 0 | Status: ACTIVE | COMMUNITY
Start: 2021-02-09

## 2021-02-09 RX ORDER — BREXPIPRAZOLE 4 MG/1
4 TABLET ORAL DAILY
Refills: 0 | Status: ACTIVE | COMMUNITY
Start: 2021-02-09

## 2021-02-09 NOTE — REASON FOR VISIT
[Initial Consultation] : an initial consultation for [Blood Count Assessment] : blood count assessment [Formal Caregiver] : formal caregiver [Other: _____] : [unfilled] [FreeTextEntry2] : immune thrombocytopenia purpura

## 2021-02-09 NOTE — PHYSICAL EXAM
[Completely disabled. Cannot carry on any self care. Totally confined to bed or chair] : Status 4- Completely disabled. Cannot carry on any self care. Totally confined to bed or chair [Obese] : obese [Normal] : affect appropriate [de-identified] : no rales and no rhonchi; increased AP diameter [de-identified] : no bleeding no ecchymosis and no petechae [de-identified] : withdraws to stimulation. guarder and difficulty in following commmands. cooperates with blood testing; non verbal

## 2021-02-09 NOTE — RESULTS/DATA
[FreeTextEntry1] : CBC WBC 15.3  HGB 12.5 g/dL  PL 25 000 review of peripheral smear performed by me shows normal white cell and red cell morphology; No atypical lymphocytes. platelets are large in size. Actual count may range form 2500 to 500 in some fields.

## 2021-02-09 NOTE — ASSESSMENT
[Supportive] : Goals of care discussed with patient: Supportive [Palliative Care Plan] : not applicable at this time [FreeTextEntry1] : This is my first visit with the patient and history was obtained by review of the [facility] medical record; his admission to [facility] for the treatment of rectal bleeding, acute COVID 19 infection and a greater than 19 year history of immune thrombocytopenia. Significant  history was obtained in three telephone calls with his mother and also discussion with the health aide attendant Chely.The patient has a diagnosis of autism , developmental disability diagnosed within the first year and half of life.\par I called the numbers provided by me by his mother to a prior hematologist at [facility]: Dr Rachana Blanco  and ; answering machine and I left a message for a call back.\par The patient has been refractory to steroids and he has not been able to tolerate WinRho or tolerate rituximab in the past. He had a transient rise in platelet count to IV IgG during his hospitalization. The discharge platelet count was 79 000; today's platelet count is 25 000. Peripheral blood smear confirms findings with large platelet forms. normal white cell morphology. He is not bleeding.\par I have advised hospitalization at [facility] and discussed this recommendation with is mother by telephone. \par The mother tells me that she prefers that he be sent back to the group home on his oral medications and that he not be admitted. She recognizes the risk of bleeding and disability which may be possible with him having a low platelet count as an outpatient.\par His mother has told me that platelet counts between 25 000 and 70308 are his usual counts for years prior to him being seen at [facility] (today is his first visit); and she does not wish that referral to emergency room because he does not bleed.  occur.The patient 's mother is his medical care decision maker as discussed with her and with home health care aide. \par The patient has chronic immune thrombocytopenia and patient s may have low platelet counts in the absence of bleeding. They may be refractory to treatment.\par As to why his admission count at [facility] was 1 000; it is possible that the decrease in counts was related to acute consumption during his virus illness COVID 19. Patietn with normal platelet counts of 150 000 have typically reduced counts to 130 000 in active COVID 19 infection. He may well be at his base line but observation over time may confirm this finding. \par No petechiae , ecchymosis or mucosal bleeding is noted on examination today.\par  I discussed my plan of referral to ER due to declining platelet count with our practice administrator ARMIDA Durán and verified the observance of the health care decision maker (patient's mother) as the plan to be followed. Patient seen with ARMIDA TOUSSAINT who wrote the history\par RTC in 2 weeks with CBC; he will remain on steroid treatment\par

## 2021-02-09 NOTE — REVIEW OF SYSTEMS
[Negative] : Allergic/Immunologic [Vomiting] : no vomiting [Abdominal Pain] : no abdominal pain [Constipation] : no constipation [Diarrhea] : no diarrhea [Skin Rash] : no skin rash [Skin Wound] : no skin wound [de-identified] : no ecchymosis

## 2021-02-09 NOTE — HISTORY OF PRESENT ILLNESS
[Date: ____________] : Patient's last distress assessment performed on [unfilled]. [0 - No Distress] : Distress Level: 0 [Cardiovascular] : Cardiovascular [Constitutional] : Constitutional [ENT] : ENT [Endocrine] : Endocrine [Dermatologic] : Dermatologic [Gastrointestinal] : Gastrointestinal [Genitourinary] : Genitourinary [Gynecologic] : Gynecologic [Infectious] : Infectious [Musculoskeletal] : Musculoskeletal [Neurologic] : Neurologic [Pain] : Pain [Pulmonary] : Pulmonary [Hematologic] : Hematologic [Disease:__________________________] : Disease: [unfilled] [de-identified] : 20 year old male presenting to [facility] for hematologic care. He is referred here from [facility]. Patient's past medical history is significant for intellectual disability, autism (non-verbal), ITP (diagnosed since he was 18 months of age), CARLITOS (not on CPAP). He was admitted on 1/23/2021 due to acute lower GI bleed in setting of thrombocytopenia; his platelet count was 1,000 and he was also found to be COVID +. During the course of his hospital stay, he received 1 unit platelet transfusion, 2 days of IVIG, 2 days of IV steroids, then started on oral steroid therapy. Patient was discharged on 2/5/2021, on prednisone 80 mg daily and advised to follow up at [facility] to taper his steroid dosage down in an outpatient setting. \par \par Patient is accompanied by a formal caregiver, presented to [facility] for blood count assessment and management of his steroid therapy. Patient's mother believed his ITP began after he received MMR vaccination around 18 months of age. His ITP was managed by Dr. Rachana Blanco (hematology) at [place], intermittently receiving IVIG treatments. Attempts were made with various treatments but he was believed to have experienced reactions (Rituxan, WinRho, etc). Prior to his January 2021 [facility] hospitalization, he did not receive any treatment for at least 10 years for ITP. Patient's mother also reported that he appeared withdrawn since birth. [FreeTextEntry1] : oral prednisone therapy [de-identified] : Patient is accompanied by a formal caregiver, presented to [facility] for blood count assessment and management of his steroid therapy. Patient's mother believed his ITP began after he received MMR vaccination around 18 months of age. His ITP was managed by Dr. Rachana Blanco (hematology) at [place], intermittently receiving IVIG treatments. Attempts were made with various treatments but he was believed to have experienced reactions (Rituxan, WinRho, etc). Prior to his January 2021 [facility] hospitalization, he did not receive any treatment for at least 10 years for ITP. Patient's mother also reported that he appeared withdrawn since birth. [FreeTextEntry7] : unable to assess; the patient is non verbal

## 2021-02-13 ENCOUNTER — EMERGENCY (EMERGENCY)
Facility: HOSPITAL | Age: 21
LOS: 1 days | Discharge: HOME | End: 2021-02-13
Attending: EMERGENCY MEDICINE | Admitting: EMERGENCY MEDICINE
Payer: MEDICARE

## 2021-02-13 VITALS
RESPIRATION RATE: 16 BRPM | HEIGHT: 71 IN | TEMPERATURE: 98 F | DIASTOLIC BLOOD PRESSURE: 39 MMHG | HEART RATE: 93 BPM | OXYGEN SATURATION: 100 % | SYSTOLIC BLOOD PRESSURE: 116 MMHG

## 2021-02-13 VITALS
RESPIRATION RATE: 16 BRPM | TEMPERATURE: 98 F | SYSTOLIC BLOOD PRESSURE: 135 MMHG | OXYGEN SATURATION: 100 % | HEART RATE: 95 BPM | DIASTOLIC BLOOD PRESSURE: 68 MMHG

## 2021-02-13 LAB
ALBUMIN SERPL ELPH-MCNC: 3.4 G/DL — SIGNIFICANT CHANGE UP (ref 3.3–5)
ALP SERPL-CCNC: 45 U/L — SIGNIFICANT CHANGE UP (ref 40–120)
ALT FLD-CCNC: 67 U/L — HIGH (ref 4–41)
ANION GAP SERPL CALC-SCNC: 14 MMOL/L — SIGNIFICANT CHANGE UP (ref 7–14)
APTT BLD: 18.8 SEC — LOW (ref 27–36.3)
AST SERPL-CCNC: 82 U/L — HIGH (ref 4–40)
BASOPHILS # BLD AUTO: 0.01 K/UL — SIGNIFICANT CHANGE UP (ref 0–0.2)
BASOPHILS NFR BLD AUTO: 0.1 % — SIGNIFICANT CHANGE UP (ref 0–2)
BILIRUB SERPL-MCNC: 0.8 MG/DL — SIGNIFICANT CHANGE UP (ref 0.2–1.2)
BLD GP AB SCN SERPL QL: NEGATIVE — SIGNIFICANT CHANGE UP
BUN SERPL-MCNC: 19 MG/DL — SIGNIFICANT CHANGE UP (ref 7–23)
CALCIUM SERPL-MCNC: 9 MG/DL — SIGNIFICANT CHANGE UP (ref 8.4–10.5)
CHLORIDE SERPL-SCNC: 99 MMOL/L — SIGNIFICANT CHANGE UP (ref 98–107)
CO2 SERPL-SCNC: 22 MMOL/L — SIGNIFICANT CHANGE UP (ref 22–31)
CREAT SERPL-MCNC: 1.02 MG/DL — SIGNIFICANT CHANGE UP (ref 0.5–1.3)
EOSINOPHIL # BLD AUTO: 0 K/UL — SIGNIFICANT CHANGE UP (ref 0–0.5)
EOSINOPHIL NFR BLD AUTO: 0 % — SIGNIFICANT CHANGE UP (ref 0–6)
GLUCOSE SERPL-MCNC: 105 MG/DL — HIGH (ref 70–99)
HCT VFR BLD CALC: 42.5 % — SIGNIFICANT CHANGE UP (ref 39–50)
HGB BLD-MCNC: 13.4 G/DL — SIGNIFICANT CHANGE UP (ref 13–17)
IANC: 12.34 K/UL — HIGH (ref 1.5–8.5)
IMM GRANULOCYTES NFR BLD AUTO: 0.6 % — SIGNIFICANT CHANGE UP (ref 0–1.5)
INR BLD: 1.04 RATIO — SIGNIFICANT CHANGE UP (ref 0.88–1.16)
LYMPHOCYTES # BLD AUTO: 0.84 K/UL — LOW (ref 1–3.3)
LYMPHOCYTES # BLD AUTO: 6.1 % — LOW (ref 13–44)
MCHC RBC-ENTMCNC: 26.9 PG — LOW (ref 27–34)
MCHC RBC-ENTMCNC: 31.5 GM/DL — LOW (ref 32–36)
MCV RBC AUTO: 85.2 FL — SIGNIFICANT CHANGE UP (ref 80–100)
MONOCYTES # BLD AUTO: 0.48 K/UL — SIGNIFICANT CHANGE UP (ref 0–0.9)
MONOCYTES NFR BLD AUTO: 3.5 % — SIGNIFICANT CHANGE UP (ref 2–14)
NEUTROPHILS # BLD AUTO: 12.34 K/UL — HIGH (ref 1.8–7.4)
NEUTROPHILS NFR BLD AUTO: 89.7 % — HIGH (ref 43–77)
NRBC # BLD: 0 /100 WBCS — SIGNIFICANT CHANGE UP
NRBC # FLD: 0 K/UL — SIGNIFICANT CHANGE UP
PLATELET # BLD AUTO: 69 K/UL — LOW (ref 150–400)
POTASSIUM SERPL-MCNC: 5 MMOL/L — SIGNIFICANT CHANGE UP (ref 3.5–5.3)
POTASSIUM SERPL-SCNC: 5 MMOL/L — SIGNIFICANT CHANGE UP (ref 3.5–5.3)
PROT SERPL-MCNC: 8.6 G/DL — HIGH (ref 6–8.3)
PROTHROM AB SERPL-ACNC: 11.9 SEC — SIGNIFICANT CHANGE UP (ref 10.6–13.6)
RBC # BLD: 4.99 M/UL — SIGNIFICANT CHANGE UP (ref 4.2–5.8)
RBC # FLD: 19.6 % — HIGH (ref 10.3–14.5)
RH IG SCN BLD-IMP: POSITIVE — SIGNIFICANT CHANGE UP
SODIUM SERPL-SCNC: 135 MMOL/L — SIGNIFICANT CHANGE UP (ref 135–145)
WBC # BLD: 13.75 K/UL — HIGH (ref 3.8–10.5)
WBC # FLD AUTO: 13.75 K/UL — HIGH (ref 3.8–10.5)

## 2021-02-13 PROCEDURE — 99283 EMERGENCY DEPT VISIT LOW MDM: CPT

## 2021-02-13 NOTE — ED PROVIDER NOTE - PHYSICAL EXAMINATION
Gen: AAOx3, non-toxic  Head: NCAT  HEENT: EOMI, PERRLA, oral mucosa moist, normal conjunctiva, dried blood in nare b/l no active bleeding  Lung: CTAB, no respiratory distress, no wheezes/rhonchi/rales B/L, speaking in full sentences  CV: RRR, no murmurs, rubs or gallops  Abd: soft, NTND, no guarding, no CVA tenderness, no rebound tenderness  MSK: no visible deformities, full range of motion of all 4 exts  Neuro: No focal sensory or motor deficits  Skin: Warm, well perfused, no rash  Psych: normal affect.   ~Nathen Crespo MD

## 2021-02-13 NOTE — ED ADULT NURSE NOTE - OBJECTIVE STATEMENT
Break covering RN: 19 y/o M received to room 11 c/o nose bleed. pt non-verbal and ambulatory. Pt has PMHx of ITP and autism. Pt presents to ed today with nosebleed that has resolved. As per pt mother, pt had covid  last week in January. Pt respirations even and unlabored. pt abdomen soft nontender nondistended. pt skin intact. no petechia noted on skin. Vital signs as noted, call bell in reach comfort measures provided, will give report to primary RN.

## 2021-02-13 NOTE — ED PROVIDER NOTE - NSFOLLOWUPINSTRUCTIONS_ED_ALL_ED_FT
1) Please follow-up with your primary care doctor in the next 2-3 days.  Please call tomorrow for an appointment.  If you cannot follow-up with your primary care doctor please return to the ED for any urgent issues.  2) You were given a copy of the tests performed today.  Please bring the results with you and review them with your primary care doctor.  3) If you have any worsening of symptoms or any other concerns please return to the ED immediately. Such as but not limited to increased bleeding, lightheadedness, shortness of breath, weakness  4) Please continue taking your home medications as directed. Apply pressure on nose if bleeding begins

## 2021-02-13 NOTE — ED ADULT NURSE NOTE - NSIMPLEMENTINTERV_GEN_ALL_ED
Implemented All Fall with Harm Risk Interventions:  [place] to call system. Call bell, personal items and telephone within reach. Instruct patient to call for assistance. Room bathroom lighting operational. Non-slip footwear when patient is off stretcher. Physically safe environment: no spills, clutter or unnecessary equipment. Stretcher in lowest position, wheels locked, appropriate side rails in place. Provide visual cue, wrist band, yellow gown, etc. Monitor gait and stability. Monitor for mental status changes and reorient to person, place, and time. Review medications for side effects contributing to fall risk. Reinforce activity limits and safety measures with patient and family. Provide visual clues: red socks.

## 2021-02-13 NOTE — ED ADULT NURSE NOTE - PRO INTERPRETER NEED 2
-Other

## 2021-02-13 NOTE — ED PROVIDER NOTE - CLINICAL SUMMARY MEDICAL DECISION MAKING FREE TEXT BOX
Nathen Crespo MD: 20M PMH w/ autism (non-verbal), chronic ITP on oral prednisone, CARLITOS (not on CPAP) COVID19+ 01/25 p/w nose bleed today. Will get cbc to check platelets and touch basis with hematology

## 2021-02-13 NOTE — ED PROVIDER NOTE - PROGRESS NOTE DETAILS
Nathen Crespo MD: Platelet 69,000. pt now has blood on tissue when wiping, applied  pressure on the nose bridge. Bleeding stopped. Spoke to hematology who rec thinks pt platelet if going in good trend and thinks the high dose steroid are working. Heme fellow rec that if pt stops bleeding pt can f/u out pt and be d/c Nathen Crespo MD: Pt well appearing and asymptomatic. Pt is ambulatory and tolerating PO. Spoke with pt's mom about return precautions. Pt's mom agrees to follow up with their PCP. Pt ready for discharge

## 2021-02-13 NOTE — ED PROVIDER NOTE - OBJECTIVE STATEMENT
Nathen Crespo MD: 20M PMH w/ autism (non-verbal), chronic ITP, CARLITOS (not on CPAP p/w nose bleed today. Nathen Crespo MD: 20M PMH w/ autism (non-verbal), chronic ITP on oral prednisone, CARLITOS (not on CPAP) COVID19+ 01/25 p/w nose bleed today. As per mom lives in a grp home and called around noon that his nose was bleeding. Pt's mom states that it was not profusely bleeding just blood on  the tissues when he wiped his nose. Pt's  mom states that she was worried that his platelet count was below 10,000 b/c of the bleeding. Pt last platelet count as per mom  was 25,000 5 days ago. Pt mom states that she is trying to organized IVIG treatment outpt bc of the strain of hospitalization on the family. Pt last IVIG treatment was inpatient 2 weeks ago and platelet was 79,000 post treatment. Nathen Crespo MD: 20M PMH w/ autism (non-verbal), chronic ITP on oral prednisone, CARLITOS (not on CPAP) COVID19+  p/w nose bleed today. As per mom lives in a grp home and called around noon that his nose was bleeding. Pt's mom states that it was not profusely bleeding just blood on  the tissues when he wiped his nose. Pt's  mom states that she was worried that his platelet count was below 10,000 b/c of the bleeding. Pt last platelet count as per mom  was 25,000 5 days ago. Pt mom states that she is trying to organized IVIG treatment outpt bc of the strain of hospitalization on the family. Pt last IVIG treatment was inpatient 2 weeks ago and platelet was 79,000 post treatment.    Attendinyo male with ITP presents with nosebleed earlier.  currently no bleeding from the nose.

## 2021-02-13 NOTE — ED PROVIDER NOTE - PATIENT PORTAL LINK FT
You can access the FollowMyHealth Patient Portal offered by [facility] by registering at the following website: [email or web address]. By joining [facility]’s FollowMyHealth portal, you will also be able to view your health information using other applications (apps) compatible with our system.

## 2021-02-13 NOTE — ED ADULT TRIAGE NOTE - CHIEF COMPLAINT QUOTE
Pt is autistic , covid positive c/o nose bleed.  Pt with hx of Idiopathic Thrombocytopenia .  Pt tested positive last week and in Feb

## 2021-02-26 ENCOUNTER — RESULT REVIEW (OUTPATIENT)
Age: 21
End: 2021-02-26

## 2021-02-26 ENCOUNTER — APPOINTMENT (OUTPATIENT)
Dept: HEMATOLOGY ONCOLOGY | Facility: CLINIC | Age: 21
End: 2021-02-26
Payer: MEDICARE

## 2021-02-26 ENCOUNTER — INPATIENT (INPATIENT)
Facility: HOSPITAL | Age: 21
LOS: 44 days | Discharge: NURSING, REHAB OR LONG-TERM CARE | End: 2021-04-12
Attending: HOSPITALIST | Admitting: HOSPITALIST
Payer: MEDICARE

## 2021-02-26 VITALS
DIASTOLIC BLOOD PRESSURE: 78 MMHG | TEMPERATURE: 96.8 F | SYSTOLIC BLOOD PRESSURE: 132 MMHG | WEIGHT: 315 LBS | BODY MASS INDEX: 46.13 KG/M2 | HEART RATE: 97 BPM | OXYGEN SATURATION: 97 % | HEIGHT: 69.13 IN | RESPIRATION RATE: 17 BRPM

## 2021-02-26 VITALS
SYSTOLIC BLOOD PRESSURE: 129 MMHG | TEMPERATURE: 98 F | HEIGHT: 71 IN | OXYGEN SATURATION: 99 % | HEART RATE: 96 BPM | DIASTOLIC BLOOD PRESSURE: 73 MMHG | RESPIRATION RATE: 18 BRPM

## 2021-02-26 DIAGNOSIS — F84.0 AUTISTIC DISORDER: ICD-10-CM

## 2021-02-26 DIAGNOSIS — G47.33 OBSTRUCTIVE SLEEP APNEA (ADULT) (PEDIATRIC): ICD-10-CM

## 2021-02-26 DIAGNOSIS — F79 UNSPECIFIED INTELLECTUAL DISABILITIES: ICD-10-CM

## 2021-02-26 LAB
BASOPHILS # BLD AUTO: 0.05 K/UL — SIGNIFICANT CHANGE UP (ref 0–0.2)
BASOPHILS NFR BLD AUTO: 0.3 % — SIGNIFICANT CHANGE UP (ref 0–2)
EOSINOPHIL # BLD AUTO: 0.01 K/UL — SIGNIFICANT CHANGE UP (ref 0–0.5)
EOSINOPHIL NFR BLD AUTO: 0.1 % — SIGNIFICANT CHANGE UP (ref 0–6)
HCT VFR BLD CALC: 43.5 % — SIGNIFICANT CHANGE UP (ref 39–50)
HGB BLD-MCNC: 13.9 G/DL — SIGNIFICANT CHANGE UP (ref 13–17)
IMM GRANULOCYTES NFR BLD AUTO: 1.8 % — HIGH (ref 0–1.5)
LYMPHOCYTES # BLD AUTO: 1.25 K/UL — SIGNIFICANT CHANGE UP (ref 1–3.3)
LYMPHOCYTES # BLD AUTO: 8.7 % — LOW (ref 13–44)
MCHC RBC-ENTMCNC: 27.4 PG — SIGNIFICANT CHANGE UP (ref 27–34)
MCHC RBC-ENTMCNC: 32 G/DL — SIGNIFICANT CHANGE UP (ref 32–36)
MCV RBC AUTO: 85.6 FL — SIGNIFICANT CHANGE UP (ref 80–100)
MONOCYTES # BLD AUTO: 0.59 K/UL — SIGNIFICANT CHANGE UP (ref 0–0.9)
MONOCYTES NFR BLD AUTO: 4.1 % — SIGNIFICANT CHANGE UP (ref 2–14)
NEUTROPHILS # BLD AUTO: 12.23 K/UL — HIGH (ref 1.8–7.4)
NEUTROPHILS NFR BLD AUTO: 85 % — HIGH (ref 43–77)
NRBC # BLD: 0 /100 WBCS — SIGNIFICANT CHANGE UP (ref 0–0)
PLATELET # BLD AUTO: 7 K/UL — CRITICAL LOW (ref 150–400)
RBC # BLD: 5.08 M/UL — SIGNIFICANT CHANGE UP (ref 4.2–5.8)
RBC # FLD: 17.8 % — HIGH (ref 10.3–14.5)
WBC # BLD: 14.39 K/UL — HIGH (ref 3.8–10.5)
WBC # FLD AUTO: 14.39 K/UL — HIGH (ref 3.8–10.5)

## 2021-02-26 PROCEDURE — 99215 OFFICE O/P EST HI 40 MIN: CPT

## 2021-02-26 PROCEDURE — 99291 CRITICAL CARE FIRST HOUR: CPT | Mod: CS

## 2021-02-26 NOTE — ED ADULT TRIAGE NOTE - CHIEF COMPLAINT QUOTE
Patient is non-verbal with intellectual disability, sent to ED by hematologist for low platelet count.  History of Chronic ITP.  Per mother, patient is at baseline.  Appears comfortable and in no apparent distress.

## 2021-02-26 NOTE — CHART NOTE - NSCHARTNOTEFT_GEN_A_CORE
HEMATOLOGY FELLOW NOTE    19 yo M with a history of chronic ITP and severe autism (non-verbal at baseline) who presents after being referred from [facility] with thrombocytopenia (Plt 7). Patient had previously followed with Pb Garcia at [place] several years ago and had most recently established care with Dr. Deleon at [facility]. He has now transitioned care to Dr. Rosaura Bhagat. The patient is on Prednisone outpatient, but remains thrombocytopenic. Platelets have fallen to as low as 1K in the, past, though patient responds very well to IVIG and typically does not experience any active bleeding.     Recommendations:  -Per ED, no s/s of active bleeding at this time  -Check stat CBC  -Start IVIG 1gm/kg daily x2 days (premedicate with Tylenol and Benadryl)  -Monitor closely for s/s of active bleeding    Patient will be seen by the Hematology team in the AM with formal recommendations to follow.    I was notified of patient en route to the ER. The above plan was previously agreed upon by Dr. Bhagat.     Gabbi Phillips MD  Hematology/Oncology Fellow, PGY-4  Pager: [phone]  After 5pm and on weekends please page on-call fellow

## 2021-02-27 DIAGNOSIS — F79 UNSPECIFIED INTELLECTUAL DISABILITIES: ICD-10-CM

## 2021-02-27 DIAGNOSIS — D69.3 IMMUNE THROMBOCYTOPENIC PURPURA: ICD-10-CM

## 2021-02-27 DIAGNOSIS — U07.1 COVID-19: ICD-10-CM

## 2021-02-27 LAB
ALBUMIN SERPL ELPH-MCNC: 3.4 G/DL — SIGNIFICANT CHANGE UP (ref 3.3–5)
ALP SERPL-CCNC: 44 U/L — SIGNIFICANT CHANGE UP (ref 40–120)
ALT FLD-CCNC: 33 U/L — SIGNIFICANT CHANGE UP (ref 4–41)
ANION GAP SERPL CALC-SCNC: 8 MMOL/L — SIGNIFICANT CHANGE UP (ref 7–14)
ANION GAP SERPL CALC-SCNC: 9 MMOL/L — SIGNIFICANT CHANGE UP (ref 7–14)
APTT BLD: 28.9 SEC — SIGNIFICANT CHANGE UP (ref 27–36.3)
AST SERPL-CCNC: 13 U/L — SIGNIFICANT CHANGE UP (ref 4–40)
BASOPHILS # BLD AUTO: 0 K/UL — SIGNIFICANT CHANGE UP (ref 0–0.2)
BASOPHILS NFR BLD AUTO: 0 % — SIGNIFICANT CHANGE UP (ref 0–2)
BILIRUB SERPL-MCNC: 0.5 MG/DL — SIGNIFICANT CHANGE UP (ref 0.2–1.2)
BLD GP AB SCN SERPL QL: NEGATIVE — SIGNIFICANT CHANGE UP
BUN SERPL-MCNC: 17 MG/DL — SIGNIFICANT CHANGE UP (ref 7–23)
BUN SERPL-MCNC: 17 MG/DL — SIGNIFICANT CHANGE UP (ref 7–23)
CALCIUM SERPL-MCNC: 8.9 MG/DL — SIGNIFICANT CHANGE UP (ref 8.4–10.5)
CALCIUM SERPL-MCNC: 9 MG/DL — SIGNIFICANT CHANGE UP (ref 8.4–10.5)
CHLORIDE SERPL-SCNC: 103 MMOL/L — SIGNIFICANT CHANGE UP (ref 98–107)
CHLORIDE SERPL-SCNC: 106 MMOL/L — SIGNIFICANT CHANGE UP (ref 98–107)
CO2 SERPL-SCNC: 27 MMOL/L — SIGNIFICANT CHANGE UP (ref 22–31)
CO2 SERPL-SCNC: 27 MMOL/L — SIGNIFICANT CHANGE UP (ref 22–31)
CREAT SERPL-MCNC: 1.04 MG/DL — SIGNIFICANT CHANGE UP (ref 0.5–1.3)
CREAT SERPL-MCNC: 1.14 MG/DL — SIGNIFICANT CHANGE UP (ref 0.5–1.3)
EOSINOPHIL # BLD AUTO: 0.08 K/UL — SIGNIFICANT CHANGE UP (ref 0–0.5)
EOSINOPHIL NFR BLD AUTO: 0.9 % — SIGNIFICANT CHANGE UP (ref 0–6)
GLUCOSE SERPL-MCNC: 108 MG/DL — HIGH (ref 70–99)
GLUCOSE SERPL-MCNC: 114 MG/DL — HIGH (ref 70–99)
HCT VFR BLD CALC: 40.3 % — SIGNIFICANT CHANGE UP (ref 39–50)
HCT VFR BLD CALC: 40.4 % — SIGNIFICANT CHANGE UP (ref 39–50)
HCT VFR BLD CALC: 40.9 % — SIGNIFICANT CHANGE UP (ref 39–50)
HGB BLD-MCNC: 11.8 G/DL — LOW (ref 13–17)
HGB BLD-MCNC: 12 G/DL — LOW (ref 13–17)
HGB BLD-MCNC: 12.5 G/DL — LOW (ref 13–17)
IANC: 6.23 K/UL — SIGNIFICANT CHANGE UP (ref 1.5–8.5)
INR BLD: 1.04 RATIO — SIGNIFICANT CHANGE UP (ref 0.88–1.16)
LYMPHOCYTES # BLD AUTO: 1.38 K/UL — SIGNIFICANT CHANGE UP (ref 1–3.3)
LYMPHOCYTES # BLD AUTO: 15.2 % — SIGNIFICANT CHANGE UP (ref 13–44)
MAGNESIUM SERPL-MCNC: 2.1 MG/DL — SIGNIFICANT CHANGE UP (ref 1.6–2.6)
MCHC RBC-ENTMCNC: 26.3 PG — LOW (ref 27–34)
MCHC RBC-ENTMCNC: 26.4 PG — LOW (ref 27–34)
MCHC RBC-ENTMCNC: 26.8 PG — LOW (ref 27–34)
MCHC RBC-ENTMCNC: 29.3 GM/DL — LOW (ref 32–36)
MCHC RBC-ENTMCNC: 29.7 GM/DL — LOW (ref 32–36)
MCHC RBC-ENTMCNC: 30.6 GM/DL — LOW (ref 32–36)
MCV RBC AUTO: 87.6 FL — SIGNIFICANT CHANGE UP (ref 80–100)
MCV RBC AUTO: 88.8 FL — SIGNIFICANT CHANGE UP (ref 80–100)
MCV RBC AUTO: 90 FL — SIGNIFICANT CHANGE UP (ref 80–100)
MONOCYTES # BLD AUTO: 0.89 K/UL — SIGNIFICANT CHANGE UP (ref 0–0.9)
MONOCYTES NFR BLD AUTO: 9.8 % — SIGNIFICANT CHANGE UP (ref 2–14)
NEUTROPHILS # BLD AUTO: 6.33 K/UL — SIGNIFICANT CHANGE UP (ref 1.8–7.4)
NEUTROPHILS NFR BLD AUTO: 69.6 % — SIGNIFICANT CHANGE UP (ref 43–77)
NRBC # BLD: 0 /100 WBCS — SIGNIFICANT CHANGE UP
NRBC # BLD: 0 /100 WBCS — SIGNIFICANT CHANGE UP
NRBC # FLD: 0 K/UL — SIGNIFICANT CHANGE UP
NRBC # FLD: 0 K/UL — SIGNIFICANT CHANGE UP
PLATELET # BLD AUTO: 1 K/UL — CRITICAL LOW (ref 150–400)
PLATELET # BLD AUTO: 3 K/UL — CRITICAL LOW (ref 150–400)
PLATELET # BLD AUTO: 3 K/UL — CRITICAL LOW (ref 150–400)
POTASSIUM SERPL-MCNC: 3.7 MMOL/L — SIGNIFICANT CHANGE UP (ref 3.5–5.3)
POTASSIUM SERPL-MCNC: 3.7 MMOL/L — SIGNIFICANT CHANGE UP (ref 3.5–5.3)
POTASSIUM SERPL-SCNC: 3.7 MMOL/L — SIGNIFICANT CHANGE UP (ref 3.5–5.3)
POTASSIUM SERPL-SCNC: 3.7 MMOL/L — SIGNIFICANT CHANGE UP (ref 3.5–5.3)
PROT SERPL-MCNC: 6.7 G/DL — SIGNIFICANT CHANGE UP (ref 6–8.3)
PROTHROM AB SERPL-ACNC: 11.9 SEC — SIGNIFICANT CHANGE UP (ref 10.6–13.6)
RBC # BLD: 4.48 M/UL — SIGNIFICANT CHANGE UP (ref 4.2–5.8)
RBC # BLD: 4.55 M/UL — SIGNIFICANT CHANGE UP (ref 4.2–5.8)
RBC # BLD: 4.67 M/UL — SIGNIFICANT CHANGE UP (ref 4.2–5.8)
RBC # FLD: 17.6 % — HIGH (ref 10.3–14.5)
RBC # FLD: 17.8 % — HIGH (ref 10.3–14.5)
RBC # FLD: 18.1 % — HIGH (ref 10.3–14.5)
RH IG SCN BLD-IMP: POSITIVE — SIGNIFICANT CHANGE UP
SARS-COV-2 RNA SPEC QL NAA+PROBE: DETECTED
SODIUM SERPL-SCNC: 139 MMOL/L — SIGNIFICANT CHANGE UP (ref 135–145)
SODIUM SERPL-SCNC: 141 MMOL/L — SIGNIFICANT CHANGE UP (ref 135–145)
WBC # BLD: 8.27 K/UL — SIGNIFICANT CHANGE UP (ref 3.8–10.5)
WBC # BLD: 9.1 K/UL — SIGNIFICANT CHANGE UP (ref 3.8–10.5)
WBC # BLD: 9.61 K/UL — SIGNIFICANT CHANGE UP (ref 3.8–10.5)
WBC # FLD AUTO: 8.27 K/UL — SIGNIFICANT CHANGE UP (ref 3.8–10.5)
WBC # FLD AUTO: 9.1 K/UL — SIGNIFICANT CHANGE UP (ref 3.8–10.5)
WBC # FLD AUTO: 9.61 K/UL — SIGNIFICANT CHANGE UP (ref 3.8–10.5)

## 2021-02-27 PROCEDURE — 12345: CPT | Mod: NC

## 2021-02-27 PROCEDURE — 99223 1ST HOSP IP/OBS HIGH 75: CPT

## 2021-02-27 PROCEDURE — 71045 X-RAY EXAM CHEST 1 VIEW: CPT | Mod: 26

## 2021-02-27 RX ORDER — BREXPIPRAZOLE 0.25 MG/1
6 TABLET ORAL DAILY
Refills: 0 | Status: DISCONTINUED | OUTPATIENT
Start: 2021-02-27 | End: 2021-04-12

## 2021-02-27 RX ORDER — TRAZODONE HCL 50 MG
300 TABLET ORAL AT BEDTIME
Refills: 0 | Status: DISCONTINUED | OUTPATIENT
Start: 2021-02-27 | End: 2021-04-12

## 2021-02-27 RX ORDER — ACETAMINOPHEN 500 MG
650 TABLET ORAL ONCE
Refills: 0 | Status: COMPLETED | OUTPATIENT
Start: 2021-02-27 | End: 2021-02-27

## 2021-02-27 RX ORDER — ACETAMINOPHEN 500 MG
650 TABLET ORAL ONCE
Refills: 0 | Status: COMPLETED | OUTPATIENT
Start: 2021-02-27 | End: 2021-02-28

## 2021-02-27 RX ORDER — DEXAMETHASONE 0.5 MG/5ML
40 ELIXIR ORAL ONCE
Refills: 0 | Status: DISCONTINUED | OUTPATIENT
Start: 2021-02-27 | End: 2021-02-27

## 2021-02-27 RX ORDER — IMMUNE GLOBULIN (HUMAN) 10 G/100ML
105 INJECTION INTRAVENOUS; SUBCUTANEOUS ONCE
Refills: 0 | Status: COMPLETED | OUTPATIENT
Start: 2021-02-28 | End: 2021-02-28

## 2021-02-27 RX ORDER — DEXAMETHASONE 0.5 MG/5ML
40 ELIXIR ORAL DAILY
Refills: 0 | Status: COMPLETED | OUTPATIENT
Start: 2021-02-28 | End: 2021-03-03

## 2021-02-27 RX ORDER — DIPHENHYDRAMINE HCL 50 MG
25 CAPSULE ORAL ONCE
Refills: 0 | Status: COMPLETED | OUTPATIENT
Start: 2021-02-27 | End: 2021-02-28

## 2021-02-27 RX ORDER — CIMETIDINE 200 MG
1 TABLET ORAL
Qty: 0 | Refills: 0 | DISCHARGE

## 2021-02-27 RX ORDER — IMMUNE GLOBULIN (HUMAN) 10 G/100ML
55 INJECTION INTRAVENOUS; SUBCUTANEOUS DAILY
Refills: 0 | Status: DISCONTINUED | OUTPATIENT
Start: 2021-02-27 | End: 2021-02-27

## 2021-02-27 RX ORDER — IMMUNE GLOBULIN (HUMAN) 10 G/100ML
105 INJECTION INTRAVENOUS; SUBCUTANEOUS ONCE
Refills: 0 | Status: COMPLETED | OUTPATIENT
Start: 2021-02-27 | End: 2021-02-27

## 2021-02-27 RX ORDER — INFLUENZA VIRUS VACCINE 15; 15; 15; 15 UG/.5ML; UG/.5ML; UG/.5ML; UG/.5ML
0.5 SUSPENSION INTRAMUSCULAR ONCE
Refills: 0 | Status: DISCONTINUED | OUTPATIENT
Start: 2021-02-27 | End: 2021-04-12

## 2021-02-27 RX ORDER — DIPHENHYDRAMINE HCL 50 MG
25 CAPSULE ORAL ONCE
Refills: 0 | Status: COMPLETED | OUTPATIENT
Start: 2021-02-27 | End: 2021-02-27

## 2021-02-27 RX ORDER — SODIUM CHLORIDE 9 MG/ML
1000 INJECTION INTRAMUSCULAR; INTRAVENOUS; SUBCUTANEOUS ONCE
Refills: 0 | Status: COMPLETED | OUTPATIENT
Start: 2021-02-27 | End: 2021-02-27

## 2021-02-27 RX ORDER — DIPHENHYDRAMINE HCL 50 MG
50 CAPSULE ORAL ONCE
Refills: 0 | Status: DISCONTINUED | OUTPATIENT
Start: 2021-02-27 | End: 2021-02-27

## 2021-02-27 RX ORDER — PANTOPRAZOLE SODIUM 20 MG/1
40 TABLET, DELAYED RELEASE ORAL
Refills: 0 | Status: DISCONTINUED | OUTPATIENT
Start: 2021-02-27 | End: 2021-03-07

## 2021-02-27 RX ADMIN — SODIUM CHLORIDE 1000 MILLILITER(S): 9 INJECTION INTRAMUSCULAR; INTRAVENOUS; SUBCUTANEOUS at 01:07

## 2021-02-27 RX ADMIN — Medication 1 MILLIGRAM(S): at 23:03

## 2021-02-27 RX ADMIN — Medication 650 MILLIGRAM(S): at 05:20

## 2021-02-27 RX ADMIN — IMMUNE GLOBULIN (HUMAN) 175 GRAM(S): 10 INJECTION INTRAVENOUS; SUBCUTANEOUS at 05:57

## 2021-02-27 RX ADMIN — Medication 300 MILLIGRAM(S): at 23:03

## 2021-02-27 RX ADMIN — BREXPIPRAZOLE 6 MILLIGRAM(S): 0.25 TABLET ORAL at 13:50

## 2021-02-27 RX ADMIN — Medication 25 MILLIGRAM(S): at 05:20

## 2021-02-27 RX ADMIN — PANTOPRAZOLE SODIUM 40 MILLIGRAM(S): 20 TABLET, DELAYED RELEASE ORAL at 07:09

## 2021-02-27 RX ADMIN — Medication 1 TABLET(S): at 13:50

## 2021-02-27 RX ADMIN — Medication 80 MILLIGRAM(S): at 07:09

## 2021-02-27 NOTE — H&P ADULT - ASSESSMENT
19yo M h/o intellectual disability, autism, nonverbal at baseline, chronic ITP on 80mg of daily prednisone, presents w/ outpatient labs showing low plt.

## 2021-02-27 NOTE — H&P ADULT - NSHPPHYSICALEXAM_GEN_ALL_CORE
Vital Signs Last 24 Hrs  T(C): 36.3 (27 Feb 2021 00:19), Max: 36.6 (26 Feb 2021 21:48)  T(F): 97.4 (27 Feb 2021 00:19), Max: 97.8 (26 Feb 2021 21:48)  HR: 81 (27 Feb 2021 00:19) (81 - 96)  BP: 102/55 (27 Feb 2021 00:19) (102/55 - 129/73)  BP(mean): --  RR: 16 (27 Feb 2021 00:19) (16 - 18)  SpO2: 95% (27 Feb 2021 00:19) (95% - 99%)    General: sleepy, no acute distress  Neurology: Unable to assess due to mental status, unable to follow simple commands  Eyes: PERRL, anicteric  ENT/Neck: Neck supple, trachea midline, No JVD  Respiratory: CTA B/L, No wheezing, rales, rhonchi  CV: RRR, S1S2, no murmurs, rubs or gallops  Abdominal: Soft, NT, ND +BS,   Extremities: No edema, + peripheral pulses  Skin: faint abdominal ecchymosis, b/l LE xerosis with chronic hyperpigmentation  MSK: No joint effusion or joint tenderness noted

## 2021-02-27 NOTE — CONSULT NOTE ADULT - SUBJECTIVE AND OBJECTIVE BOX
** RECOMMENDATIONS BASED ON CHART REVIEW GIVEN PATIENT COVID +)    HPI:  19yo M h/o intellectual disability, autism, nonverbal at baseline, chronic ITP on 80mg of daily prednisone, presents w/ outpatient labs showing low plt. Patient was sent in from [facility] with platelet of 7 and heme referral for admission w/ IVIG. Patient is at baseline mental status and has had no bleeding recently. Patient had recent admission for plt transfusion and ivig for a rectal bleed.    Of note, patient was diagnosed with ITP since 18 months. Plt usually runs in 10K range. Follows with hematology at [place]. Over the years, they have become more conservative in treatments given that patient would have very low level of platelets without major bleeding events. Patient was treated with IVIG and steroids end of Jan. 2021. Prior to that, last episode of major flare was about 5 years ago when he had diffuse petechiae without bleeding, and plt was about 5K at that time. (27 Feb 2021 01:52)      14 point ROS otherwise negative    PAST MEDICAL & SURGICAL HISTORY:  Chronic ITP (idiopathic thrombocytopenia)    Intellectual disability    No significant past surgical history        Allergies    Bactrim (Hives)  Rituxan (Hives)  WinRho SDF (Hives)    Intolerances        MEDICATIONS  (STANDING):  brexpiprazole 6 milliGRAM(s) Oral daily  multivitamin 1 Tablet(s) Oral daily  pantoprazole    Tablet 40 milliGRAM(s) Oral before breakfast  predniSONE   Tablet 80 milliGRAM(s) Oral daily  traZODone 300 milliGRAM(s) Oral at bedtime    MEDICATIONS  (PRN):      FAMILY HISTORY:  FH: hypertension        SOCIAL HISTORY: No EtOH, no tobacco    Height (cm): 180.3 (02-26 @ 21:48)    VITALS:   T(F): 98.2 (02-27-21 @ 09:45), Max: 98.4 (02-27-21 @ 07:18)  HR: 97 (02-27-21 @ 09:45)  BP: 126/81 (02-27-21 @ 09:45)  RR: 18 (02-27-21 @ 09:45)  SpO2: 98% (02-27-21 @ 09:45)  Wt(kg): --    PHYSICAL EXAM  Per primary team    SKIN: No rashes or lesions    LABS:                         11.8   8.27  )-----------( 1        ( 27 Feb 2021 05:59 )             40.3     02-27    141  |  106  |  17  ----------------------------<  114<H>  3.7   |  27  |  1.04    Ca    8.9      27 Feb 2021 05:59  Mg     2.1     02-27    TPro  6.7  /  Alb  3.4  /  TBili  0.5  /  DBili  x   /  AST  13  /  ALT  33  /  AlkPhos  44  02-26    Magnesium, Serum: 2.1 mg/dL (02-27 @ 05:59)    PT/INR - ( 26 Feb 2021 23:47 )   PT: 11.9 sec;   INR: 1.04 ratio         PTT - ( 26 Feb 2021 23:47 )  PTT:28.9 sec      IMAGING:

## 2021-02-27 NOTE — H&P ADULT - NSHPLABSRESULTS_GEN_ALL_CORE
(02-26 @ 23:47)                      12.5  9.10 )-----------( 3                 40.9    Neutrophils = 6.33 (69.6%)  Lymphocytes = 1.38 (15.2%)  Eosinophils = 0.08 (0.9%)  Basophils = 0.00 (0.0%)  Monocytes = 0.89 (9.8%)  Bands = --%    02-26    139  |  103  |  17  ----------------------------<  108<H>  3.7   |  27  |  1.14    Ca    9.0      26 Feb 2021 23:47    TPro  6.7  /  Alb  3.4  /  TBili  0.5  /  DBili  x   /  AST  13  /  ALT  33  /  AlkPhos  44  02-26    ( 26 Feb 2021 23:47 )   PT: 11.9 sec;   INR: 1.04 ratio;       PTT:28.9 sec

## 2021-02-27 NOTE — ED PROVIDER NOTE - PHYSICAL EXAMINATION
CONSTITUTIONAL: NAD, awake, alert  HEAD: Normocephalic; atraumatic  ENMT: External appears normal, MMM, no mucosal bleeding noted   CARD: Normal Sl, S2; no audible murmurs  RESP: normal wob, lungs ctab  ABD: soft, non-distended; non-tender  MSK: no edema, normal ROM in all four extremities  SKIN: Warm, dry, no rashes  NEURO: awake, alert, moving all extremities spontaneously

## 2021-02-27 NOTE — H&P ADULT - PROBLEM SELECTOR PLAN 1
Monitor platelets.  Transfuse 1u.  Appreciate hematology recs, will start IVIG.  C/w home dose steroids.  Hold all chemo DVT ppx for now. Monitor platelets. Per discussion with ED resident who communicated with hematology, no platelet transfusion.  Appreciate hematology recs, will start IVIG.  C/w home dose steroids.  Hold all chemo DVT ppx for now.

## 2021-02-27 NOTE — PROGRESS NOTE ADULT - PROBLEM SELECTOR PLAN 3
hx of COVID +1/22, still positive  -Not on supplemental O2  -Routine CXR ordered to r/o infiltrates per mother's request

## 2021-02-27 NOTE — ED PROVIDER NOTE - OBJECTIVE STATEMENT
20M w/ h/o intellectual disability, autism, nonverbal at baseline, chronic ITP on 80mg of daily prednisone, presents w/ outpatient labs showing a plt of 7 and heme referral for admission w/ IVIG. Mother states patient is at baseline mental status and has had no bleeding recently. Patient had recent admission for plt transfusion and ivig for a rectal bleed.

## 2021-02-27 NOTE — ED PROVIDER NOTE - NS ED ROS FT
ros as per mother    General: denies fever, chills  HENT: denies nasal congestion, rhinorrhea  CV: denies chest pain, palpitations  Resp: denies difficulty breathing, cough  Abdominal: denies nausea, vomiting, diarrhea, abdominal pain  MSK: denies muscle aches, leg swelling  Neuro: denies headaches, numbness, tingling  Skin: denies rashes, bruises  Heme: denies petechia, abnormal bleeding

## 2021-02-27 NOTE — ED ADULT NURSE REASSESSMENT NOTE - NS ED NURSE REASSESS COMMENT FT1
Pt received from [facility]. Report was given to floor RN, pt currently in for transport. Mother at bedside, approved by management. Tolerating infusion well. Pt transport to inpatient unit.

## 2021-02-27 NOTE — H&P ADULT - HISTORY OF PRESENT ILLNESS
19yo M h/o intellectual disability, autism, nonverbal at baseline, chronic ITP on 80mg of daily prednisone, presents w/ outpatient labs showing low plt. Patient was sent in from [facility] with platelet of 7 and heme referral for admission w/ IVIG. Patient is at baseline mental status and has had no bleeding recently. Patient had recent admission for plt transfusion and ivig for a rectal bleed.    Of note, patient was diagnosed with ITP since 18 months. Plt usually runs in 10K range. Follows with hematology at [place]. Over the years, they have become more conservative in treatments given that patient would have very low level of platelets without major bleeding events. Patient was treated with IVIG and steroids end of Jan. 2021. Prior to that, last episode of major flare was about 5 years ago when he had diffuse petechiae without bleeding, and plt was about 5K at that time.

## 2021-02-27 NOTE — ED ADULT NURSE NOTE - OBJECTIVE STATEMENT
Patient is a 20y male, history of intellectual disability, nonverbal at baseline, Chronic ITP, has gotten IVIG infusions in the past, Patients mother at bedside providing history. Patient lives at a group home. Had blood work taken at his hematologist showing low platelet count and was told to come in for IVIG. IV initiated 20 gauge right forearm. labs drawn and sent. Awaiting laboratory results. Nonverbal indicators of pain are absent. Patient appears comfortable, in no acute respiratory distress. Stretcher in lowest position, call bell in reach. Awaiting bed assignment. Patient is a 20y male, history of intellectual disability, nonverbal at baseline, Chronic ITP, has gotten IVIG infusions in the past, Patients mother at bedside providing history. Patient lives at a group home. Had blood work taken at his hematologist showing low platelet count and was told to come in for IVIG. Tested positive for COVID19 1 month ago, with runny nose as symptoms, patient is currently asymptomatic. IV initiated 20 gauge right forearm. labs drawn and sent. Awaiting laboratory results. Nonverbal indicators of pain are absent. Patient appears comfortable, in no acute respiratory distress. Stretcher in lowest position, call bell in reach. Awaiting bed assignment.

## 2021-02-27 NOTE — PROGRESS NOTE ADULT - PROBLEM SELECTOR PLAN 1
Hx of chronic ITP since 18 months, recently on home Pred 80mg qD   -Plts 7 --> 1 today  -No s/s of active bleeding, CTH neg for bleed  -Currently getting IVIG, IV Decadron and ordered for 1U plt transfusion  -Mother updated at bedside

## 2021-02-27 NOTE — PROVIDER CONTACT NOTE (OTHER) - ACTION/TREATMENT ORDERED:
Evans Thomas Amanda notified and made aware. Rhoda Rothman stated it was okay to continue with transfusion. Will continue to monitor.

## 2021-02-27 NOTE — ED PROVIDER NOTE - CLINICAL SUMMARY MEDICAL DECISION MAKING FREE TEXT BOX
20M w/ autism, nonverbal, chronic ITP, thrombocytopenia to 7, no signs of bleeding, neuro intact, no signs of intracranial bleeding, will check labs and admit for ivig

## 2021-02-27 NOTE — ED PROVIDER NOTE - ATTENDING CONTRIBUTION TO CARE
I have personally performed a face to face bedside history and physical examination of this patient. I have discussed the history, examination, review of systems, assessment and plan of management with the resident. I have reviewed the electronic medical record and amended it to reflect my history, review of systems, physical exam, assessment and plan.    Brief HPI:  20M w/ h/o intellectual disability, autism, nonverbal at baseline, chronic ITP on 80mg of daily prednisone, presents w/ outpatient labs showing a plt of 7 and heme referral for admission w/ IVIG    Vitals:   Reviewed    Exam:    GEN:  Non-toxic appearing, non-distressed, speaking full sentences, non-diaphoretic, AAOx3  HEENT:  NCAT, neck supple, EOMI, PERRLA, sclera anicteric, no conjunctival pallor or injection, no stridor, normal voice, no tonsillar exudate, uvula midline, tympanic membranes and external auditory canals normal appearing bilaterally   CV:  regular rhythm and rate, s1/s2 audible, no murmurs, rubs or gallops, peripheral pulses 2+ and symmetric  PULM:  non-labored respirations, lungs clear to auscultation bilaterally, no wheezes, crackles or rales  ABD:  non distended, non-tender, no rebound, no guarding, negative Gleason's sign, bowel sounds normal, no cvat  MSK:  no gross deformity, non-tender extremities and joints, range of motion grossly normal appearing, no extremity edema, extremities warm and well perfused   NEURO:  AAOx3, CN II-XII intact, motor 5/5 in upper and lower extremities bilaterally, sensation grossly intact in extremities and trunk, finger to nose testing wnl, no nystagmus, negative Romberg, no pronator drift, no gait deficit  SKIN:  warm, dry, no rash or vesicles     A/P: I have personally performed a face to face bedside history and physical examination of this patient. I have discussed the history, examination, review of systems, assessment and plan of management with the resident. I have reviewed the electronic medical record and amended it to reflect my history, review of systems, physical exam, assessment and plan.    Brief HPI:  20M w/ h/o intellectual disability, autism, nonverbal at baseline, chronic ITP on 80mg of daily prednisone, presents w/ outpatient labs showing a plt of 7 and heme referral for admission for treatment.  Patient non-verbal, history obtained from mother at bedside.  Denies bleeding, nausea, vomiting, bloody or dark stool.     Vitals:   Reviewed    Exam:    GEN:  Non-toxic appearing, non-distressed, non-diaphoretic, alert   HEENT:  NCAT, neck supple, EOMI, PERRLA, sclera anicteric, no conjunctival pallor or injection, no stridor, normal voice, no tonsillar exudate, uvula midline, tympanic membranes and external auditory canals normal appearing bilaterally   CV:  regular rhythm and rate, s1/s2 audible, no murmurs, rubs or gallops, peripheral pulses 2+ and symmetric  PULM:  non-labored respirations, lungs clear to auscultation bilaterally, no wheezes, crackles or rales  ABD:  non distended, non-tender, no rebound, no guarding, negative Gleason's sign, bowel sounds normal, no cvat  MSK:  no gross deformity, non-tender extremities and joints, range of motion grossly normal appearing, no extremity edema, extremities warm and well perfused   NEURO:  alert, CN II-XII intact, motor 5/5 in upper and lower extremities bilaterally, sensation grossly intact in extremities and trunk, finger to nose testing wnl, no nystagmus, negative Romberg, no pronator drift, no gait deficit  SKIN:  warm, dry, no rash or vesicles     A/P: 20M w/ h/o intellectual disability, autism, nonverbal at baseline, chronic ITP on 80mg of daily prednisone, presents w/ outpatient labs showing a plt of 7 and heme referral for admission for treatment. VSS.  No e/o bleeding on exam.  Low c/f ICH at this time.  Plan for labs, admission.

## 2021-02-27 NOTE — CONSULT NOTE ADULT - ASSESSMENT
21 yo M with a history of chronic ITP and severe autism (non-verbal at baseline) who presents after being referred from [facility] with thrombocytopenia (Plt 7). Patient had previously followed with Pb Garcia at [place] several years ago and had most recently established care with Dr. Deleon at [facility] [place]. He has now transitioned care to Dr. Rosaura Bhagat. The patient is on Prednisone outpatient, but remains thrombocytopenic. Platelets have fallen to as low as 1K in the, past, though patient responds very well to IVIG and typically does not experience any active bleeding.     # ITP  -No s/s of active bleeding at this time  -Start IVIG 1gm/kg daily x2 days (premedicate with Tylenol and Benadryl)  -Monitor closely for s/s of active bleeding  -Daily CBC  -     * Incomplete *  19 yo M with a history of chronic ITP and severe autism (non-verbal at baseline) who presents after being referred from [facility] with thrombocytopenia (Plt 7). Patient had previously followed with Pb Garcia at [place] several years ago and had most recently established care with Dr. Deleon at [facility] [place] [place]. He has now transitioned care to Dr. Rosaura Bhagat. The patient is on Prednisone outpatient, but remains thrombocytopenic. Platelets have fallen to as low as 1K in the, past, though patient responds very well to IVIG and typically does not experience any active bleeding.     # ITP  -No s/s of active bleeding at this time but platelet count critically low at 1k  -CT head neg for bleed  -Start IVIG 1gm/kg daily x2 days (premedicate with Tylenol and Benadryl)  -Monitor closely for s/s of active bleeding  -Daily CBC    # COVID +  - Patient has been persistely COVID positive  - Last CXR questionnable for pneumonia in Jan; can consider repeating CXR      Francisca Lutz, PGY-4  Hematology-Oncology Fellow  [phone] ([place]) [phone] ([facility])  I can also be reached on [employer] Teams  Please page fellow on call after 5pm and on weekends 19 yo M with a history of chronic ITP and severe autism (non-verbal at baseline) who presents after being referred from [facility] with thrombocytopenia (Plt 7). Patient had previously followed with Pb Garcia at [place] several years ago and had most recently established care with Dr. Deleon at [facility] [place]. He has now transitioned care to Dr. Rosaura Bhagat. The patient is on Prednisone outpatient, but remains thrombocytopenic. Platelets have fallen to as low as 1K in the, past, though patient responds very well to IVIG and typically does not experience any active bleeding.     # ITP  -No s/s of active bleeding at this time but platelet count critically low at 1k  -CT head neg for bleed  -Start IVIG 1gm/kg daily x2 days (premedicate with Tylenol and Benadryl)  -Cont. prednisone 80mg daily  -Monitor closely for s/s of active bleeding  -Daily CBC    # COVID +  - Patient has been persistently COVID positive  - Last CXR questionable for pneumonia in Jan; can consider repeating CXR      Francisca Lutz, PGY-4  Hematology-Oncology Fellow  [phone] ([place]) [phone] ([facility])  I can also be reached on Microsoft Teams  Please page fellow on call after 5pm and on weekends 21 yo M with a history of chronic ITP and severe autism (non-verbal at baseline) who presents after being referred from [facility] with thrombocytopenia (Plt 7). Patient had previously followed with Pb Garcia at [place] several years ago and had most recently established care with Dr. Deleon at [facility] [place]. He has now transitioned care to Dr. Rosaura Bhagat. The patient is on Prednisone outpatient, but remains thrombocytopenic. Platelets have fallen to as low as 1K in the, past, though patient responds very well to IVIG and typically does not experience any active bleeding.     # ITP  -No s/s of active bleeding at this time but platelet count critically low at 1k  -CT head neg for bleed  -Start IVIG 1gm/kg daily x2 days (premedicate with Tylenol and Benadryl)  -Pulse dose dexamethasone 40mg IV x4 days  -Transfuse to platelet goal of 5k   -Monitor closely for s/s of active bleeding  -Daily CBC    # COVID +  - Patient has been persistently COVID positive  - Last CXR questionable for pneumonia in Jan; can consider repeating CXR      Francisca Lutz, PGY-4  Hematology-Oncology Fellow  [phone] ([place]) [phone] ([facility])  I can also be reached on [employer] Teams  Please page fellow on call after 5pm and on weekends

## 2021-02-27 NOTE — PROGRESS NOTE ADULT - SUBJECTIVE AND OBJECTIVE BOX
[facility] Division of Hospital Medicine  Alisha Farris DO  Pager (M-F, 8A-5P): [phone]      Patient is a 20y old  Male who presents with a chief complaint of low platelets (27 Feb 2021 09:59)      SUBJECTIVE / OVERNIGHT EVENTS: Pt seen at bedside w/mother present.   Pt has no complaints, comfortable in bed. Mother denies any issues overnight.   ADDITIONAL REVIEW OF SYSTEMS: unable to assess     MEDICATIONS  (STANDING):  brexpiprazole 6 milliGRAM(s) Oral daily  LORazepam     Tablet 1 milliGRAM(s) Oral at bedtime  multivitamin 1 Tablet(s) Oral daily  pantoprazole    Tablet 40 milliGRAM(s) Oral before breakfast  traZODone 300 milliGRAM(s) Oral at bedtime    MEDICATIONS  (PRN):      CAPILLARY BLOOD GLUCOSE        I&O's Summary      PHYSICAL EXAM:  Vital Signs Last 24 Hrs  T(C): 36.8 (27 Feb 2021 10:35), Max: 36.9 (27 Feb 2021 07:18)  T(F): 98.3 (27 Feb 2021 10:35), Max: 98.4 (27 Feb 2021 07:18)  HR: 84 (27 Feb 2021 10:35) (81 - 98)  BP: 115/78 (27 Feb 2021 10:35) (102/55 - 129/73)  BP(mean): --  RR: 18 (27 Feb 2021 10:35) (16 - 19)  SpO2: 98% (27 Feb 2021 10:35) (95% - 99%)  CONSTITUTIONAL: NAD, well-appearing   EYES: EOMI; conjunctiva and sclera clear  ENMT: Moist oral mucosa, no pharyngeal injection or exudates; normal dentition  NECK: Supple  RESPIRATORY: overall clear, poor effort  CARDIOVASCULAR: Regular rate and rhythm, normal S1 and S2, no murmur/rub/gallop; Peripheral pulses are 2+ bilaterally  ABDOMEN: Nontender to palpation, normoactive bowel sounds  MUSKULOSKELETAL:  no clubbing or cyanosis of digits; no joint swelling or tenderness to palpation  PSYCH: calm, affect appropriate  NEUROLOGY: CN 2-12 are intact and symmetric; nonfocal, does not follow simple commands.   SKIN: scattered UE ecchymosis    LABS:                        11.8   8.27  )-----------( 1        ( 27 Feb 2021 05:59 )             40.3     02-27    141  |  106  |  17  ----------------------------<  114<H>  3.7   |  27  |  1.04    Ca    8.9      27 Feb 2021 05:59  Mg     2.1     02-27    TPro  6.7  /  Alb  3.4  /  TBili  0.5  /  DBili  x   /  AST  13  /  ALT  33  /  AlkPhos  44  02-26    PT/INR - ( 26 Feb 2021 23:47 )   PT: 11.9 sec;   INR: 1.04 ratio         PTT - ( 26 Feb 2021 23:47 )  PTT:28.9 sec            RADIOLOGY & ADDITIONAL TESTS:  Results Reviewed:   Imaging Personally Reviewed:  Electrocardiogram Personally Reviewed:    COORDINATION OF CARE:  Care Discussed with Consultants/Other Providers [Y/N]: Y  Prior or Outpatient Records Reviewed [Y/N]: Y

## 2021-02-27 NOTE — PROVIDER CONTACT NOTE (OTHER) - ASSESSMENT
Patient is a 20 year old male, non-verbal speaking, Patient Heart rate pre- transfusion 1 Unit of platelet's is 115. Patient afebrile, all other vital signs stable.

## 2021-02-27 NOTE — ED ADULT NURSE REASSESSMENT NOTE - NS ED NURSE REASSESS COMMENT FT1
Break RN: Pt is sleeping at this time, mother at bedside. MD Moore at bedside for evaluation, as per MD hold off on platelet transfusion at this time. Will continue to monitor

## 2021-02-28 LAB
ALBUMIN SERPL ELPH-MCNC: 2.9 G/DL — LOW (ref 3.3–5)
ALP SERPL-CCNC: 44 U/L — SIGNIFICANT CHANGE UP (ref 40–120)
ALT FLD-CCNC: 25 U/L — SIGNIFICANT CHANGE UP (ref 4–41)
ANION GAP SERPL CALC-SCNC: 6 MMOL/L — LOW (ref 7–14)
AST SERPL-CCNC: 12 U/L — SIGNIFICANT CHANGE UP (ref 4–40)
BILIRUB SERPL-MCNC: 0.3 MG/DL — SIGNIFICANT CHANGE UP (ref 0.2–1.2)
BUN SERPL-MCNC: 23 MG/DL — SIGNIFICANT CHANGE UP (ref 7–23)
CALCIUM SERPL-MCNC: 8.7 MG/DL — SIGNIFICANT CHANGE UP (ref 8.4–10.5)
CHLORIDE SERPL-SCNC: 105 MMOL/L — SIGNIFICANT CHANGE UP (ref 98–107)
CO2 SERPL-SCNC: 27 MMOL/L — SIGNIFICANT CHANGE UP (ref 22–31)
CREAT SERPL-MCNC: 0.98 MG/DL — SIGNIFICANT CHANGE UP (ref 0.5–1.3)
GLUCOSE SERPL-MCNC: 127 MG/DL — HIGH (ref 70–99)
HCT VFR BLD CALC: 38.2 % — LOW (ref 39–50)
HCT VFR BLD CALC: 38.5 % — LOW (ref 39–50)
HGB BLD-MCNC: 11.3 G/DL — LOW (ref 13–17)
HGB BLD-MCNC: 11.8 G/DL — LOW (ref 13–17)
MAGNESIUM SERPL-MCNC: 2 MG/DL — SIGNIFICANT CHANGE UP (ref 1.6–2.6)
MCHC RBC-ENTMCNC: 26.5 PG — LOW (ref 27–34)
MCHC RBC-ENTMCNC: 26.9 PG — LOW (ref 27–34)
MCHC RBC-ENTMCNC: 29.6 GM/DL — LOW (ref 32–36)
MCHC RBC-ENTMCNC: 30.6 GM/DL — LOW (ref 32–36)
MCV RBC AUTO: 87.9 FL — SIGNIFICANT CHANGE UP (ref 80–100)
MCV RBC AUTO: 89.7 FL — SIGNIFICANT CHANGE UP (ref 80–100)
NRBC # BLD: 0 /100 WBCS — SIGNIFICANT CHANGE UP
NRBC # BLD: 0 /100 WBCS — SIGNIFICANT CHANGE UP
NRBC # FLD: 0 K/UL — SIGNIFICANT CHANGE UP
NRBC # FLD: 0 K/UL — SIGNIFICANT CHANGE UP
PHOSPHATE SERPL-MCNC: 3.9 MG/DL — SIGNIFICANT CHANGE UP (ref 2.5–4.5)
PLATELET # BLD AUTO: 2 K/UL — CRITICAL LOW (ref 150–400)
PLATELET # BLD AUTO: 9 K/UL — CRITICAL LOW (ref 150–400)
POTASSIUM SERPL-MCNC: 3.7 MMOL/L — SIGNIFICANT CHANGE UP (ref 3.5–5.3)
POTASSIUM SERPL-SCNC: 3.7 MMOL/L — SIGNIFICANT CHANGE UP (ref 3.5–5.3)
PROT SERPL-MCNC: 7.4 G/DL — SIGNIFICANT CHANGE UP (ref 6–8.3)
RBC # BLD: 4.26 M/UL — SIGNIFICANT CHANGE UP (ref 4.2–5.8)
RBC # BLD: 4.38 M/UL — SIGNIFICANT CHANGE UP (ref 4.2–5.8)
RBC # FLD: 17.7 % — HIGH (ref 10.3–14.5)
RBC # FLD: 17.8 % — HIGH (ref 10.3–14.5)
SODIUM SERPL-SCNC: 138 MMOL/L — SIGNIFICANT CHANGE UP (ref 135–145)
WBC # BLD: 9.16 K/UL — SIGNIFICANT CHANGE UP (ref 3.8–10.5)
WBC # BLD: 9.69 K/UL — SIGNIFICANT CHANGE UP (ref 3.8–10.5)
WBC # FLD AUTO: 9.16 K/UL — SIGNIFICANT CHANGE UP (ref 3.8–10.5)
WBC # FLD AUTO: 9.69 K/UL — SIGNIFICANT CHANGE UP (ref 3.8–10.5)

## 2021-02-28 PROCEDURE — 99233 SBSQ HOSP IP/OBS HIGH 50: CPT

## 2021-02-28 RX ORDER — ROMIPLOSTIM 250 UG/.5ML
150 INJECTION, POWDER, LYOPHILIZED, FOR SOLUTION SUBCUTANEOUS ONCE
Refills: 0 | Status: COMPLETED | OUTPATIENT
Start: 2021-02-28 | End: 2021-02-28

## 2021-02-28 RX ADMIN — ROMIPLOSTIM 150 MICROGRAM(S): 250 INJECTION, POWDER, LYOPHILIZED, FOR SOLUTION SUBCUTANEOUS at 17:29

## 2021-02-28 RX ADMIN — Medication 650 MILLIGRAM(S): at 06:24

## 2021-02-28 RX ADMIN — Medication 120 MILLIGRAM(S): at 12:31

## 2021-02-28 RX ADMIN — Medication 300 MILLIGRAM(S): at 21:13

## 2021-02-28 RX ADMIN — Medication 1 TABLET(S): at 12:31

## 2021-02-28 RX ADMIN — Medication 1 MILLIGRAM(S): at 21:12

## 2021-02-28 RX ADMIN — PANTOPRAZOLE SODIUM 40 MILLIGRAM(S): 20 TABLET, DELAYED RELEASE ORAL at 06:16

## 2021-02-28 RX ADMIN — Medication 25 MILLIGRAM(S): at 06:24

## 2021-02-28 RX ADMIN — BREXPIPRAZOLE 6 MILLIGRAM(S): 0.25 TABLET ORAL at 12:31

## 2021-02-28 RX ADMIN — IMMUNE GLOBULIN (HUMAN) 105 GRAM(S): 10 INJECTION INTRAVENOUS; SUBCUTANEOUS at 06:53

## 2021-02-28 NOTE — PROGRESS NOTE ADULT - ASSESSMENT
19 yo M with a history of chronic ITP and severe autism (non-verbal at baseline) who presents with severe thrombocytopenia (Plt 7) on PO prednisone.   Platelets have fallen to as low as 1K in the, past, though patient responds very well to IVIG and typically does not experience any active bleeding.     # ITP  -No s/s of active bleeding at this time but platelet count critically low at 1k  -CT head neg for bleed  -Start IVIG 1gm/kg daily x2 days (premedicate with Tylenol and Benadryl)  -Pulse dose dexamethasone 40mg IV x4 days  -Transfuse to platelet goal of 5k   -Monitor closely for s/s of active bleeding  -Daily CBC    # COVID +  - Patient has been persistently COVID positive  - Last CXR questionable for pneumonia in Jan; can consider repeating CXR      Francisca Lutz, PGY-4  Hematology-Oncology Fellow  [phone] ([place]) [phone] ([facility])  I can also be reached on [employer] Teams  Please page fellow on call after 5pm and on weekends 21 yo M with a history of chronic ITP and severe autism (non-verbal at baseline) who presents with severe thrombocytopenia (Plt 7) on PO prednisone (80mg daily).     # Chronic ITP  -No s/s of active bleeding at this time but platelet count critically low at 1k  -CT head neg for bleed  -s/p IVIG 1gm/kg daily x 2 (completed 2/28) without much improvement in platelet count 7k --> 1 --> 3 --> 2  -please transfuse platelets to keep >5K   -start romiplastin/Nplate today, 1mcg/kg, ordered and discussed with pharmacy  -continue pulse dose dexamethasone 40mg IV x4 days   -monitor closely for any signs or symptoms of bleeding   -BID CBC   -discussed plans with patient's mother     # COVID +  - Patient has been persistently COVID positive  - CXR reviewed, no infiltrates     Please call with questions.     Alexei Rose MD  Heme/Onc attending

## 2021-02-28 NOTE — PROGRESS NOTE ADULT - PROBLEM SELECTOR PLAN 1
Hx of chronic ITP since 18 months, recently on home Pred 80mg qD   CTH neg, no e/o bleeding on exam   -Plts 7 on admission --> 1 --> 3 --> 2   -s/p 2U plts on 2/27, ordered for 1U today   -s/p IVIG x2, completed 2/28   -c/w IV decadron x4 days  -Romiplastin/Nplate ordered by heme for today  -Mother updated at bedside.

## 2021-02-28 NOTE — CHART NOTE - NSCHARTNOTEFT_GEN_A_CORE
Spoke to heme/onc regarding lack of response to PLT post transfusion of 2 units plts. Recommends to continue morning dose of IVIG and IV dexamethasone. Will consider additional transfusion after today's doses of IVIG.     BREANA Robles  Department of Medicine   In House # [phone]

## 2021-02-28 NOTE — PROGRESS NOTE ADULT - SUBJECTIVE AND OBJECTIVE BOX
INTERVAL HPI/OVERNIGHT EVENTS:  No overnight events. No signs or symptoms of bleeding.   Patient not examined at bedside due to COVID infection.     VITAL SIGNS:  T(F): 97.8 (02-28-21 @ 07:45)  HR: 80 (02-28-21 @ 07:45)  BP: 130/78 (02-28-21 @ 07:45)  RR: 16 (02-28-21 @ 07:45)  SpO2: 100% (02-28-21 @ 07:45)    MEDICATIONS  (STANDING):  brexpiprazole 6 milliGRAM(s) Oral daily  dexAMETHasone  IVPB 40 milliGRAM(s) IV Intermittent daily  influenza   Vaccine 0.5 milliLiter(s) IntraMuscular once  LORazepam     Tablet 1 milliGRAM(s) Oral at bedtime  multivitamin 1 Tablet(s) Oral daily  pantoprazole    Tablet 40 milliGRAM(s) Oral before breakfast  romiPLOStim Injectable 150 MICROGram(s) SubCutaneous once  traZODone 300 milliGRAM(s) Oral at bedtime      Allergies    Bactrim (Hives)  Rituxan (Hives)  WinRho SDF (Hives)    Intolerances    LABS:                        11.3   9.69  )-----------( 2        ( 28 Feb 2021 02:41 )             38.2     02-28    138  |  105  |  23  ----------------------------<  127<H>  3.7   |  27  |  0.98    Ca    8.7      28 Feb 2021 02:41  Phos  3.9     02-28  Mg     2.0     02-28    TPro  7.4  /  Alb  2.9<L>  /  TBili  0.3  /  DBili  x   /  AST  12  /  ALT  25  /  AlkPhos  44  02-28    PT/INR - ( 26 Feb 2021 23:47 )   PT: 11.9 sec;   INR: 1.04 ratio         PTT - ( 26 Feb 2021 23:47 )  PTT:28.9 sec      RADIOLOGY & ADDITIONAL TESTS:  Studies reviewed.

## 2021-02-28 NOTE — PROGRESS NOTE ADULT - SUBJECTIVE AND OBJECTIVE BOX
[facility] Division of Hospital Medicine  Alisha Farris DO  Pager (M-F, 8A-5P): [phone]      Patient is a 20y old  Male who presents with a chief complaint of low platelets (27 Feb 2021 14:42)      SUBJECTIVE / OVERNIGHT EVENTS: received 1U plt transfusion yesterday evening   ADDITIONAL REVIEW OF SYSTEMS:    MEDICATIONS  (STANDING):  brexpiprazole 6 milliGRAM(s) Oral daily  dexAMETHasone  IVPB 40 milliGRAM(s) IV Intermittent daily  influenza   Vaccine 0.5 milliLiter(s) IntraMuscular once  LORazepam     Tablet 1 milliGRAM(s) Oral at bedtime  multivitamin 1 Tablet(s) Oral daily  pantoprazole    Tablet 40 milliGRAM(s) Oral before breakfast  traZODone 300 milliGRAM(s) Oral at bedtime    MEDICATIONS  (PRN):      CAPILLARY BLOOD GLUCOSE        I&O's Summary    27 Feb 2021 07:01  -  28 Feb 2021 07:00  --------------------------------------------------------  IN: 318 mL / OUT: 0 mL / NET: 318 mL        PHYSICAL EXAM:  Vital Signs Last 24 Hrs  T(C): 36.6 (28 Feb 2021 07:45), Max: 37.1 (27 Feb 2021 16:20)  T(F): 97.8 (28 Feb 2021 07:45), Max: 98.7 (27 Feb 2021 16:20)  HR: 80 (28 Feb 2021 07:45) (80 - 115)  BP: 130/78 (28 Feb 2021 07:45) (114/67 - 148/82)  BP(mean): --  RR: 16 (28 Feb 2021 07:45) (16 - 18)  SpO2: 100% (28 Feb 2021 07:45) (96% - 100%)  CONSTITUTIONAL: NAD, well-developed, well-groomed  EYES: EOMI; conjunctiva and sclera clear  ENMT: Moist oral mucosa, no pharyngeal injection or exudates; normal dentition  NECK: Supple, no palpable masses; no thyromegaly  RESPIRATORY: Normal respiratory effort; lungs are clear to auscultation bilaterally  CARDIOVASCULAR: Regular rate and rhythm, normal S1 and S2, no murmur/rub/gallop; No lower extremity edema; Peripheral pulses are 2+ bilaterally  ABDOMEN: Nontender to palpation, normoactive bowel sounds, no rebound/guarding; No hepatosplenomegaly  MUSKULOSKELETAL:  no clubbing or cyanosis of digits; no joint swelling or tenderness to palpation  PSYCH: A+O to person, place, and time; affect appropriate  NEUROLOGY: CN 2-12 are intact and symmetric; no gross sensory deficits;   SKIN: No rashes; no palpable lesions    LABS:                        11.3   9.69  )-----------( 2        ( 28 Feb 2021 02:41 )             38.2     02-28    138  |  105  |  23  ----------------------------<  127<H>  3.7   |  27  |  0.98    Ca    8.7      28 Feb 2021 02:41  Phos  3.9     02-28  Mg     2.0     02-28    TPro  7.4  /  Alb  2.9<L>  /  TBili  0.3  /  DBili  x   /  AST  12  /  ALT  25  /  AlkPhos  44  02-28    PT/INR - ( 26 Feb 2021 23:47 )   PT: 11.9 sec;   INR: 1.04 ratio         PTT - ( 26 Feb 2021 23:47 )  PTT:28.9 sec            RADIOLOGY & ADDITIONAL TESTS:  Results Reviewed:   Imaging Personally Reviewed:  Electrocardiogram Personally Reviewed:    COORDINATION OF CARE:  Care Discussed with Consultants/Other Providers [Y/N]:  Prior or Outpatient Records Reviewed [Y/N]:   [facility] Division of Hospital Medicine  Alisha Farris DO  Pager (M-F, 8A-5P): [phone]      Patient is a 20y old  Male who presents with a chief complaint of low platelets (27 Feb 2021 14:42)      SUBJECTIVE / OVERNIGHT EVENTS: received 2U plt transfusion yesterday evening.  Today, sitting in chair, comfortably watching cartoons on ipad. Mother at bedside, denies any e/o bleeding or any notable bruising.   ADDITIONAL REVIEW OF SYSTEMS: unable to fully assess     MEDICATIONS  (STANDING):  brexpiprazole 6 milliGRAM(s) Oral daily  dexAMETHasone  IVPB 40 milliGRAM(s) IV Intermittent daily  influenza   Vaccine 0.5 milliLiter(s) IntraMuscular once  LORazepam     Tablet 1 milliGRAM(s) Oral at bedtime  multivitamin 1 Tablet(s) Oral daily  pantoprazole    Tablet 40 milliGRAM(s) Oral before breakfast  traZODone 300 milliGRAM(s) Oral at bedtime    MEDICATIONS  (PRN):      CAPILLARY BLOOD GLUCOSE        I&O's Summary    27 Feb 2021 07:01  -  28 Feb 2021 07:00  --------------------------------------------------------  IN: 318 mL / OUT: 0 mL / NET: 318 mL        PHYSICAL EXAM:  Vital Signs Last 24 Hrs  T(C): 36.6 (28 Feb 2021 07:45), Max: 37.1 (27 Feb 2021 16:20)  T(F): 97.8 (28 Feb 2021 07:45), Max: 98.7 (27 Feb 2021 16:20)  HR: 80 (28 Feb 2021 07:45) (80 - 115)  BP: 130/78 (28 Feb 2021 07:45) (114/67 - 148/82)  BP(mean): --  RR: 16 (28 Feb 2021 07:45) (16 - 18)  SpO2: 100% (28 Feb 2021 07:45) (96% - 100%)  CONSTITUTIONAL: NAD, well-appearing, sitting in chair   EYES: EOMI; conjunctiva and sclera clear  ENMT: Moist oral mucosa,  normal dentition  NECK: Supple  RESPIRATORY: overall clear, does not participate in exam  CARDIOVASCULAR: Regular rate and rhythm, normal S1 and S2, no murmur/rub/gallops   ABDOMEN: Nontender to palpation, normoactive bowel sounds  MUSKULOSKELETAL:  no clubbing or cyanosis of digits; no joint swelling or tenderness to palpation  PSYCH: calm, affect appropriate  NEUROLOGY: CN 2-12 are intact and symmetric; nonfocal, nonverbal, does not follow simple commands.   SKIN: scattered UE ecchymosis    LABS:                        11.3   9.69  )-----------( 2        ( 28 Feb 2021 02:41 )             38.2     02-28    138  |  105  |  23  ----------------------------<  127<H>  3.7   |  27  |  0.98    Ca    8.7      28 Feb 2021 02:41  Phos  3.9     02-28  Mg     2.0     02-28    TPro  7.4  /  Alb  2.9<L>  /  TBili  0.3  /  DBili  x   /  AST  12  /  ALT  25  /  AlkPhos  44  02-28    PT/INR - ( 26 Feb 2021 23:47 )   PT: 11.9 sec;   INR: 1.04 ratio         PTT - ( 26 Feb 2021 23:47 )  PTT:28.9 sec            RADIOLOGY & ADDITIONAL TESTS:  Results Reviewed:   Imaging Personally Reviewed:  Electrocardiogram Personally Reviewed:    COORDINATION OF CARE:  Care Discussed with Consultants/Other Providers [Y/N]: Y  Prior or Outpatient Records Reviewed [Y/N]: Y

## 2021-03-01 PROBLEM — F84.0 AUTISM: Status: ACTIVE | Noted: 2021-02-09

## 2021-03-01 PROBLEM — F79 INTELLECTUAL DISABILITY: Status: ACTIVE | Noted: 2021-02-09

## 2021-03-01 PROBLEM — G47.33 OSA (OBSTRUCTIVE SLEEP APNEA): Status: ACTIVE | Noted: 2021-02-09

## 2021-03-01 LAB
ANION GAP SERPL CALC-SCNC: 9 MMOL/L — SIGNIFICANT CHANGE UP (ref 7–14)
BASOPHILS # BLD AUTO: 0.01 K/UL — SIGNIFICANT CHANGE UP (ref 0–0.2)
BASOPHILS NFR BLD AUTO: 0.1 % — SIGNIFICANT CHANGE UP (ref 0–2)
BUN SERPL-MCNC: 22 MG/DL — SIGNIFICANT CHANGE UP (ref 7–23)
CALCIUM SERPL-MCNC: 8.8 MG/DL — SIGNIFICANT CHANGE UP (ref 8.4–10.5)
CHLORIDE SERPL-SCNC: 104 MMOL/L — SIGNIFICANT CHANGE UP (ref 98–107)
CO2 SERPL-SCNC: 22 MMOL/L — SIGNIFICANT CHANGE UP (ref 22–31)
CREAT SERPL-MCNC: 0.73 MG/DL — SIGNIFICANT CHANGE UP (ref 0.5–1.3)
EOSINOPHIL # BLD AUTO: 0 K/UL — SIGNIFICANT CHANGE UP (ref 0–0.5)
EOSINOPHIL NFR BLD AUTO: 0 % — SIGNIFICANT CHANGE UP (ref 0–6)
GLUCOSE SERPL-MCNC: 155 MG/DL — HIGH (ref 70–99)
HCT VFR BLD CALC: 40.7 % — SIGNIFICANT CHANGE UP (ref 39–50)
HGB BLD-MCNC: 12.6 G/DL — LOW (ref 13–17)
IANC: 13.47 K/UL — HIGH (ref 1.5–8.5)
IMM GRANULOCYTES NFR BLD AUTO: 1 % — SIGNIFICANT CHANGE UP (ref 0–1.5)
LYMPHOCYTES # BLD AUTO: 0.67 K/UL — LOW (ref 1–3.3)
LYMPHOCYTES # BLD AUTO: 4.6 % — LOW (ref 13–44)
MAGNESIUM SERPL-MCNC: 1.9 MG/DL — SIGNIFICANT CHANGE UP (ref 1.6–2.6)
MCHC RBC-ENTMCNC: 27 PG — SIGNIFICANT CHANGE UP (ref 27–34)
MCHC RBC-ENTMCNC: 31 GM/DL — LOW (ref 32–36)
MCV RBC AUTO: 87.3 FL — SIGNIFICANT CHANGE UP (ref 80–100)
MONOCYTES # BLD AUTO: 0.3 K/UL — SIGNIFICANT CHANGE UP (ref 0–0.9)
MONOCYTES NFR BLD AUTO: 2.1 % — SIGNIFICANT CHANGE UP (ref 2–14)
NEUTROPHILS # BLD AUTO: 13.47 K/UL — HIGH (ref 1.8–7.4)
NEUTROPHILS NFR BLD AUTO: 92.2 % — HIGH (ref 43–77)
NRBC # BLD: 0 /100 WBCS — SIGNIFICANT CHANGE UP
NRBC # FLD: 0 K/UL — SIGNIFICANT CHANGE UP
PHOSPHATE SERPL-MCNC: 2.5 MG/DL — SIGNIFICANT CHANGE UP (ref 2.5–4.5)
PLATELET # BLD AUTO: 8 K/UL — CRITICAL LOW (ref 150–400)
POTASSIUM SERPL-MCNC: 4 MMOL/L — SIGNIFICANT CHANGE UP (ref 3.5–5.3)
POTASSIUM SERPL-SCNC: 4 MMOL/L — SIGNIFICANT CHANGE UP (ref 3.5–5.3)
RBC # BLD: 4.66 M/UL — SIGNIFICANT CHANGE UP (ref 4.2–5.8)
RBC # FLD: 17.2 % — HIGH (ref 10.3–14.5)
SODIUM SERPL-SCNC: 135 MMOL/L — SIGNIFICANT CHANGE UP (ref 135–145)
WBC # BLD: 14.59 K/UL — HIGH (ref 3.8–10.5)
WBC # FLD AUTO: 14.59 K/UL — HIGH (ref 3.8–10.5)

## 2021-03-01 PROCEDURE — 99223 1ST HOSP IP/OBS HIGH 75: CPT | Mod: CS,GC

## 2021-03-01 PROCEDURE — 99233 SBSQ HOSP IP/OBS HIGH 50: CPT

## 2021-03-01 RX ADMIN — BREXPIPRAZOLE 6 MILLIGRAM(S): 0.25 TABLET ORAL at 12:47

## 2021-03-01 RX ADMIN — PANTOPRAZOLE SODIUM 40 MILLIGRAM(S): 20 TABLET, DELAYED RELEASE ORAL at 05:57

## 2021-03-01 RX ADMIN — Medication 300 MILLIGRAM(S): at 21:18

## 2021-03-01 RX ADMIN — Medication 1 TABLET(S): at 12:24

## 2021-03-01 RX ADMIN — Medication 1 MILLIGRAM(S): at 21:18

## 2021-03-01 RX ADMIN — Medication 120 MILLIGRAM(S): at 05:57

## 2021-03-01 NOTE — PROGRESS NOTE ADULT - PROBLEM SELECTOR PLAN 1
Hx of chronic ITP since 18 months, recently on home Pred 80mg qD   CTH neg, no e/o bleeding on exam   -s/p 2U plts on 2/27, 1 u plts 2/28  -s/p IVIG x2, completed 2/28   -c/w IV decadron x3 days  -Romiplastin/Nplate ordered by Heme on 2/28, next dose 3/7   -Mother updated at bedside

## 2021-03-01 NOTE — ASSESSMENT
[Supportive] : Goals of care discussed with patient: Supportive [Palliative Care Plan] : not applicable at this time [FreeTextEntry1] : Denilson Hernandes is a 20 year old male presenting to [facility] for hematologic care of chronic ITP. Patient and history was obtained by review of the [facility] medical record and his mother Nicole Hernandes. His January 2021 admission to [facility] was due to rectal bleeding, acute COVID 19 infection and history of immune thrombocytopenia. The patient has a diagnosis of autism , developmental disability diagnosed within the first year and half of life.\par \par The patient has been refractory to steroids and he has not been able to tolerate WinRho nor rituximab in the past. He had a transient rise in platelet count to IV IgG during his January 2021 hospitalization. The platelet count was 79 000 at the time of discharge; His platelet count during the initial visit at [facility] on 2/9/2021 was 25,000. Peripheral blood smear confirmed findings with large platelet forms. normal white cell morphology. He is not bleeding.\par \par I have advised hospitalization at [facility] and discussed this recommendation with his mother by telephone. The mother preferred that he be sent back to the group home on his oral medications and that he not be admitted. She recognizes the risk of bleeding and disability which may be possible with him having a low platelet count as an outpatient. She also stated that the patient's platelet counts remain stable for years between 25,000 - 35,000 prior to him being seen at [facility]. She does not wish referral to emergency room because he does not bleed. The patient 's mother is his medical care decision maker as discussed with her and with home health care aide. \par \par The patient has chronic immune thrombocytopenia and patient may have low platelet counts in the absence of bleeding. They may be refractory to treatment. As to why his January 2021 admission count at [facility] was 1 000; it is possible that the decrease in counts was related to acute consumption during his virus illness COVID 19. Patient with normal platelet counts of 150 000 have typically reduced counts to 130 000 in active COVID 19 infection. He may well be at his base line but observation over time may confirm this finding.\par  \par Patient returned for a follow-up visit on 2/27/2021 and his platelet count was 7,000 despite taking prednisone 80 mg daily. Initial review of the note from [facility] indicated use of "prednisone 20 mg PO Q day", but when discussed with nurse at home she told me that he was taking prednisone 20 mg PO  four times a day  (80 mg)  No acute petechiae, ecchymosis nor mucosal bleeding is noted on physical examination today. His mother was advised to re-admit Denilson at [facility] for further management with IVIG x 4-5 days but she deferred due to her experience last month and his recent COVID positive diagnosis. I have told his mother that bleeding is possible if he remains an outpatient although he is currently not bleeding in his physical examination. I am not able to offer IV IgG as an outpatient today (medication needs pre approval and there is limited treatment spots on emergent basis) and I advised inpatient treatment. Nonetheless despite this advise she declines permission to refer him to the hospital this afternoon.\par \par Patient's mother initially requested if IVIG could be administered today at [facility] or if patient could begin treatment at the hospital for 1-2 days, then be transitioned to [facility] for continuation of treatment but this was not recommended at this time. The following factors were explained to her: patient's current platelet count at 7,000, the request for insurance approval in outpatient setting, contact scheduling to check for last minute availability and a recent negative COVID test result.\par \par Patient was recommended to proceed with the following alternative outpatient regimen: Promacta. Plan to begin 50 mg once daily, to be taken in conjunction with oral steroid therapy. Mother agreed to this plan and thought that she might have patietn brought to hospital this evening. \teresa Gooden seen with A Denilson TOUSSAINT

## 2021-03-01 NOTE — CHART NOTE - NSCHARTNOTEFT_GEN_A_CORE
21 yo M with a history of chronic ITP and severe autism (non-verbal at baseline) who presents with severe thrombocytopenia (Plt 7) on PO prednisone (80mg daily).     # Chronic ITP  -No s/s of active bleeding at this time but platelet count critically low at 1k  -CT head neg for bleed  -s/p IVIG 1gm/kg daily x 2 (completed 2/28)   -please transfuse platelets to keep >5K   -S/p romiplastin/Nplate today, 1mcg/kg; given 2/28; next dose due 3/7   -continue pulse dose dexamethasone 40mg IV x4 days   -monitor closely for any signs or symptoms of bleeding     # COVID +  - Patient has been persistently COVID positive  - CXR reviewed, no infiltrates         Francisca Lutz, PGY-4  Hematology-Oncology Fellow  [phone] ([place]) [phone] ([facility])  I can also be reached on [employer] Teams  Please page fellow on call after 5pm and on weekends 21 yo M with a history of chronic ITP and severe autism (non-verbal at baseline) who presents with severe thrombocytopenia (Plt 7) on PO prednisone (80mg daily).     # Chronic ITP  -No s/s of active bleeding at this time but platelet count critically low at 1k  -CT head neg for bleed  -s/p IVIG 1gm/kg daily x 2 (completed 2/28)   -please transfuse platelets to keep >5K   -S/p romiplastin/Nplate today, 1mcg/kg; given 2/28; next dose due 3/7   -continue pulse dose dexamethasone 40mg IV x4 days   -monitor closely for any signs or symptoms of bleeding     # COVID +  - Patient has been persistently COVID positive  - CXR reviewed, no infiltrates         Francisca Lutz, PGY-4  Hematology-Oncology Fellow  [phone] ([place]) [phone] ([facility])  I can also be reached on [employer] Teams  Please page fellow on call after 5pm and on weekends      Chart reviewed with fellows. PS examined. Labs noted. Agree with above assessment and recs.    Partha Bishop MD  Hematology/Oncology Attending

## 2021-03-01 NOTE — REVIEW OF SYSTEMS
[Negative] : Allergic/Immunologic [Confused] : confusion [Anxiety] : anxiety [Abdominal Pain] : no abdominal pain [Vomiting] : no vomiting [Constipation] : no constipation [Diarrhea] : no diarrhea [Skin Rash] : no skin rash [Skin Wound] : no skin wound [Dizziness] : no dizziness [Fainting] : no fainting [Difficulty Walking] : no difficulty walking [Suicidal] : not suicidal [Insomnia] : no insomnia [Depression] : no depression [de-identified] : no ecchymosis; seborrhea  over eye brows [de-identified] : non verbal [de-identified] : patient is non verbal; he becomes agitated with attempts to place needles in finer for blood testing and has to be restrained gently

## 2021-03-01 NOTE — PROGRESS NOTE ADULT - PROBLEM SELECTOR PLAN 3
hx of COVID +1/22, still positive  -not requiring isolation  -Not on supplemental O2  -CXR w/out infiltrates

## 2021-03-01 NOTE — REASON FOR VISIT
[Follow-Up Visit] : a follow-up visit for [Blood Count Assessment] : blood count assessment [Formal Caregiver] : formal caregiver [Other: _____] : [unfilled] [FreeTextEntry2] : immune thrombocytopenia purpura

## 2021-03-01 NOTE — RESULTS/DATA
[FreeTextEntry1] : review of data in the [facility]; platelet count today was 7000; review of peripheral blood smear shows decreased platelets. No platelet clumping seen

## 2021-03-01 NOTE — PHYSICAL EXAM
[Completely disabled. Cannot carry on any self care. Totally confined to bed or chair] : Status 4- Completely disabled. Cannot carry on any self care. Totally confined to bed or chair [Obese] : obese [Normal] : affect appropriate [de-identified] : no rales and no rhonchi; increased AP diameter [de-identified] : no active bleeding/ecchymosis/petechiae noted; well-healed right neck scratch [de-identified] : withdraws to stimulation. guarder and difficulty in following command. cooperates with blood testing; non verbal

## 2021-03-01 NOTE — HISTORY OF PRESENT ILLNESS
[Disease:__________________________] : Disease: [unfilled] [Cardiovascular] : Cardiovascular [Constitutional] : Constitutional [ENT] : ENT [Dermatologic] : Dermatologic [Endocrine] : Endocrine [Gastrointestinal] : Gastrointestinal [Genitourinary] : Genitourinary [Gynecologic] : Gynecologic [Infectious] : Infectious [Musculoskeletal] : Musculoskeletal [Neurologic] : Neurologic [Pain] : Pain [Pulmonary] : Pulmonary [Hematologic] : Hematologic [Date: ____________] : Patient's last distress assessment performed on [unfilled]. [de-identified] : 20 year old male presenting to [facility] for hematologic care. He is referred here from [facility]. Patient's past medical history is significant for intellectual disability, autism (non-verbal), ITP (diagnosed since he was 18 months of age), CARLITOS (not on CPAP). He was admitted on 1/23/2021 due to acute lower GI bleed in setting of thrombocytopenia; his platelet count was 1,000 and he was also found to be COVID +. During the course of his hospital stay, he received 1 unit platelet transfusion, 2 days of IVIG, 2 days of IV steroids, then started on oral steroid therapy. Patient was discharged on 2/5/2021, on prednisone 80 mg daily and advised to follow up at [facility] to taper his steroid dosage down in an outpatient setting. \par \par Patient is accompanied by a formal caregiver, presented to [facility] for blood count assessment and management of his steroid therapy. Patient's mother believed his ITP began after he received MMR vaccination around 18 months of age. His ITP was managed by Dr. Rachana Blanco (hematology) at [place], intermittently receiving IVIG treatments. Attempts were made with various treatments but he was believed to have experienced reactions (Rituxan, WinRho, etc). Prior to his January 2021 [facility] hospitalization, he did not receive any treatment for at least 10 years for ITP. Patient's mother also reported that he appeared withdrawn since birth. [FreeTextEntry1] : oral prednisone 80 mg daily [de-identified] : Patient is accompanied by a formal caregiver, presented to [facility] for blood count assessment and management of his steroid therapy. He was evaluated at [facility] on 2/13/2021 due to nosebleed episode; platelet count at that time was 69,000 prior to discharged. No acute signs of bruising nor bleeding were noted today.  [FreeTextEntry7] : patietn is non verbal and distress tool cannot be used. he is agitated when held for blood testing

## 2021-03-02 LAB
ANION GAP SERPL CALC-SCNC: 7 MMOL/L — SIGNIFICANT CHANGE UP (ref 7–14)
BASOPHILS # BLD AUTO: 0.02 K/UL — SIGNIFICANT CHANGE UP (ref 0–0.2)
BASOPHILS NFR BLD AUTO: 0.1 % — SIGNIFICANT CHANGE UP (ref 0–2)
BUN SERPL-MCNC: 19 MG/DL — SIGNIFICANT CHANGE UP (ref 7–23)
CALCIUM SERPL-MCNC: 8.9 MG/DL — SIGNIFICANT CHANGE UP (ref 8.4–10.5)
CHLORIDE SERPL-SCNC: 103 MMOL/L — SIGNIFICANT CHANGE UP (ref 98–107)
CO2 SERPL-SCNC: 25 MMOL/L — SIGNIFICANT CHANGE UP (ref 22–31)
CREAT SERPL-MCNC: 0.72 MG/DL — SIGNIFICANT CHANGE UP (ref 0.5–1.3)
EOSINOPHIL # BLD AUTO: 0 K/UL — SIGNIFICANT CHANGE UP (ref 0–0.5)
EOSINOPHIL NFR BLD AUTO: 0 % — SIGNIFICANT CHANGE UP (ref 0–6)
GLUCOSE SERPL-MCNC: 114 MG/DL — HIGH (ref 70–99)
HCT VFR BLD CALC: 42.6 % — SIGNIFICANT CHANGE UP (ref 39–50)
HGB BLD-MCNC: 12.9 G/DL — LOW (ref 13–17)
IANC: 18.03 K/UL — HIGH (ref 1.5–8.5)
IMM GRANULOCYTES NFR BLD AUTO: 0.7 % — SIGNIFICANT CHANGE UP (ref 0–1.5)
LYMPHOCYTES # BLD AUTO: 1.29 K/UL — SIGNIFICANT CHANGE UP (ref 1–3.3)
LYMPHOCYTES # BLD AUTO: 6.3 % — LOW (ref 13–44)
MCHC RBC-ENTMCNC: 26.8 PG — LOW (ref 27–34)
MCHC RBC-ENTMCNC: 30.3 GM/DL — LOW (ref 32–36)
MCV RBC AUTO: 88.4 FL — SIGNIFICANT CHANGE UP (ref 80–100)
MONOCYTES # BLD AUTO: 1.13 K/UL — HIGH (ref 0–0.9)
MONOCYTES NFR BLD AUTO: 5.5 % — SIGNIFICANT CHANGE UP (ref 2–14)
NEUTROPHILS # BLD AUTO: 18.03 K/UL — HIGH (ref 1.8–7.4)
NEUTROPHILS NFR BLD AUTO: 87.4 % — HIGH (ref 43–77)
NRBC # BLD: 0 /100 WBCS — SIGNIFICANT CHANGE UP
NRBC # FLD: 0 K/UL — SIGNIFICANT CHANGE UP
PLATELET # BLD AUTO: 12 K/UL — CRITICAL LOW (ref 150–400)
POTASSIUM SERPL-MCNC: 4.3 MMOL/L — SIGNIFICANT CHANGE UP (ref 3.5–5.3)
POTASSIUM SERPL-SCNC: 4.3 MMOL/L — SIGNIFICANT CHANGE UP (ref 3.5–5.3)
RBC # BLD: 4.82 M/UL — SIGNIFICANT CHANGE UP (ref 4.2–5.8)
RBC # FLD: 17.1 % — HIGH (ref 10.3–14.5)
SODIUM SERPL-SCNC: 135 MMOL/L — SIGNIFICANT CHANGE UP (ref 135–145)
WBC # BLD: 20.62 K/UL — HIGH (ref 3.8–10.5)
WBC # FLD AUTO: 20.62 K/UL — HIGH (ref 3.8–10.5)

## 2021-03-02 PROCEDURE — 99233 SBSQ HOSP IP/OBS HIGH 50: CPT

## 2021-03-02 PROCEDURE — 99232 SBSQ HOSP IP/OBS MODERATE 35: CPT | Mod: CS,GC

## 2021-03-02 RX ADMIN — Medication 1 MILLIGRAM(S): at 21:34

## 2021-03-02 RX ADMIN — Medication 300 MILLIGRAM(S): at 21:35

## 2021-03-02 RX ADMIN — PANTOPRAZOLE SODIUM 40 MILLIGRAM(S): 20 TABLET, DELAYED RELEASE ORAL at 06:28

## 2021-03-02 RX ADMIN — Medication 1 TABLET(S): at 12:16

## 2021-03-02 RX ADMIN — Medication 120 MILLIGRAM(S): at 06:28

## 2021-03-02 RX ADMIN — BREXPIPRAZOLE 6 MILLIGRAM(S): 0.25 TABLET ORAL at 12:16

## 2021-03-02 NOTE — CHART NOTE - NSCHARTNOTEFT_GEN_A_CORE
19 yo M with a history of chronic ITP and severe autism (non-verbal at baseline) who presents with severe thrombocytopenia (Plt 7) on PO prednisone (80mg daily).     # Chronic ITP exacerbated in the setting of recent COVID infxn  -No s/s of active bleeding at this time but platelet count critically low at 1k  -CT head neg for bleed  -s/p IVIG 1gm/kg daily x 2 (completed 2/28)   -please transfuse platelets to keep >5K   -S/p romiplastin/Nplate today, 1mcg/kg; given 2/28; next dose due 3/7   -continue pulse dose dexamethasone 40mg IV x4 days   -monitor closely for any signs or symptoms of bleeding     # COVID +  - Patient has been persistently COVID positive  - CXR reviewed, no infiltrates 19 yo M with a history of chronic ITP and severe autism (non-verbal at baseline) who presents with severe thrombocytopenia (Plt 7) on PO prednisone (80mg daily).     # Chronic ITP exacerbated in the setting of recent COVID infxn  -CT head neg for bleed  -s/p IVIG 1gm/kg daily x 2 (completed 2/28)   -S/p romiplastin/Nplate today, 1mcg/kg; given 2/28; next dose due 3/7   -contin with dexamethasone 40mg IV x4 days   -plts count improved to 12 on 3/2, it should continue to go up  - transfuse platelets if <10 k  -monitor closely for any signs or symptoms of bleeding     # COVID +  - Patient has been persistently COVID positive  - CXR reviewed, no infiltrates        Robert Waite MD. PGY 6  Heme-Onc fellow  [phone]; [phone]

## 2021-03-02 NOTE — PROGRESS NOTE ADULT - PROBLEM SELECTOR PLAN 1
Plts up to 12 today   Hx of chronic ITP since 18 months, recently on home Pred 80mg qD   CTH neg, no e/o bleeding on exam   -s/p 2U plts on 2/27, 1 u plts 2/28  -s/p IVIG x2, completed 2/28   -c/w Decadron 40 mg IV daily - Day #3/4 today   -Romiplastin/Nplate ordered by Heme on 2/28, next dose 3/7   -Mother updated at bedside

## 2021-03-03 LAB
ANION GAP SERPL CALC-SCNC: 9 MMOL/L — SIGNIFICANT CHANGE UP (ref 7–14)
BASOPHILS # BLD AUTO: 0 K/UL — SIGNIFICANT CHANGE UP (ref 0–0.2)
BASOPHILS NFR BLD AUTO: 0 % — SIGNIFICANT CHANGE UP (ref 0–2)
BLD GP AB SCN SERPL QL: NEGATIVE — SIGNIFICANT CHANGE UP
BUN SERPL-MCNC: 28 MG/DL — HIGH (ref 7–23)
CALCIUM SERPL-MCNC: 8.8 MG/DL — SIGNIFICANT CHANGE UP (ref 8.4–10.5)
CHLORIDE SERPL-SCNC: 104 MMOL/L — SIGNIFICANT CHANGE UP (ref 98–107)
CO2 SERPL-SCNC: 24 MMOL/L — SIGNIFICANT CHANGE UP (ref 22–31)
CREAT SERPL-MCNC: 0.77 MG/DL — SIGNIFICANT CHANGE UP (ref 0.5–1.3)
EOSINOPHIL # BLD AUTO: 0 K/UL — SIGNIFICANT CHANGE UP (ref 0–0.5)
EOSINOPHIL NFR BLD AUTO: 0 % — SIGNIFICANT CHANGE UP (ref 0–6)
GLUCOSE SERPL-MCNC: 126 MG/DL — HIGH (ref 70–99)
HCT VFR BLD CALC: 38.6 % — LOW (ref 39–50)
HGB BLD-MCNC: 12 G/DL — LOW (ref 13–17)
IANC: 15.82 K/UL — HIGH (ref 1.5–8.5)
LYMPHOCYTES # BLD AUTO: 0.32 K/UL — LOW (ref 1–3.3)
LYMPHOCYTES # BLD AUTO: 1.8 % — LOW (ref 13–44)
MAGNESIUM SERPL-MCNC: 2.1 MG/DL — SIGNIFICANT CHANGE UP (ref 1.6–2.6)
MCHC RBC-ENTMCNC: 26.8 PG — LOW (ref 27–34)
MCHC RBC-ENTMCNC: 31.1 GM/DL — LOW (ref 32–36)
MCV RBC AUTO: 86.2 FL — SIGNIFICANT CHANGE UP (ref 80–100)
MONOCYTES # BLD AUTO: 1.46 K/UL — HIGH (ref 0–0.9)
MONOCYTES NFR BLD AUTO: 8.2 % — SIGNIFICANT CHANGE UP (ref 2–14)
NEUTROPHILS # BLD AUTO: 15.53 K/UL — HIGH (ref 1.8–7.4)
NEUTROPHILS NFR BLD AUTO: 87.3 % — HIGH (ref 43–77)
PHOSPHATE SERPL-MCNC: 4.2 MG/DL — SIGNIFICANT CHANGE UP (ref 2.5–4.5)
PLATELET # BLD AUTO: 13 K/UL — CRITICAL LOW (ref 150–400)
POTASSIUM SERPL-MCNC: 4.6 MMOL/L — SIGNIFICANT CHANGE UP (ref 3.5–5.3)
POTASSIUM SERPL-SCNC: 4.6 MMOL/L — SIGNIFICANT CHANGE UP (ref 3.5–5.3)
RBC # BLD: 4.48 M/UL — SIGNIFICANT CHANGE UP (ref 4.2–5.8)
RBC # FLD: 17 % — HIGH (ref 10.3–14.5)
RH IG SCN BLD-IMP: POSITIVE — SIGNIFICANT CHANGE UP
SODIUM SERPL-SCNC: 137 MMOL/L — SIGNIFICANT CHANGE UP (ref 135–145)
WBC # BLD: 17.79 K/UL — HIGH (ref 3.8–10.5)
WBC # FLD AUTO: 17.79 K/UL — HIGH (ref 3.8–10.5)

## 2021-03-03 PROCEDURE — 99233 SBSQ HOSP IP/OBS HIGH 50: CPT

## 2021-03-03 PROCEDURE — 99232 SBSQ HOSP IP/OBS MODERATE 35: CPT | Mod: CS,GC

## 2021-03-03 RX ADMIN — Medication 300 MILLIGRAM(S): at 22:23

## 2021-03-03 RX ADMIN — PANTOPRAZOLE SODIUM 40 MILLIGRAM(S): 20 TABLET, DELAYED RELEASE ORAL at 05:44

## 2021-03-03 RX ADMIN — Medication 1 MILLIGRAM(S): at 22:23

## 2021-03-03 RX ADMIN — Medication 120 MILLIGRAM(S): at 12:59

## 2021-03-03 RX ADMIN — Medication 1 TABLET(S): at 13:00

## 2021-03-03 RX ADMIN — BREXPIPRAZOLE 6 MILLIGRAM(S): 0.25 TABLET ORAL at 13:00

## 2021-03-03 NOTE — PROGRESS NOTE ADULT - SUBJECTIVE AND OBJECTIVE BOX
INTERVAL EVENTS:  Pt upward gaze for a few hours and steroid was held. Pt had no other complaint. Denies any fever, chills, night sweat, CP, SOB, abd pain, constipation, diarrhea, dysuria      Vital Signs Last 24 Hrs  T(C): 36.8 (03 Mar 2021 05:43), Max: 36.9 (02 Mar 2021 20:22)  T(F): 98.3 (03 Mar 2021 05:43), Max: 98.5 (02 Mar 2021 20:22)  HR: 68 (03 Mar 2021 05:43) (68 - 93)  BP: 127/66 (03 Mar 2021 05:43) (120/77 - 130/60)  BP(mean): --  RR: 18 (03 Mar 2021 05:43) (17 - 18)  SpO2: 96% (03 Mar 2021 05:43) (96% - 98%)      PHYSICAL EXAM:   GENERAL: no acute distress  HEENT: EOMI, neck supple  RESPIRATORY: LCTAB/L, no rhonchi, rales, or wheezing  CARDIOVASCULAR: RRR, no murmurs, gallops, rubs  ABDOMINAL: soft, non-tender, non-distended, positive bowel sounds   EXTREMITIES: no clubbing, cyanosis, or edema  NEUROLOGICAL: alert and oriented x 3, non-focal  SKIN: no rashes or lesions   MUSCULOSKELETAL: no gross joint deformity                          12.0   17.79 )-----------( 13       ( 03 Mar 2021 04:55 )             38.6     03-03    137  |  104  |  28<H>  ----------------------------<  126<H>  4.6   |  24  |  0.77    Ca    8.8      03 Mar 2021 04:55  Phos  4.2     03-03  Mg     2.1     03-03          MEDICATIONS  (STANDING):  brexpiprazole 6 milliGRAM(s) Oral daily  dexAMETHasone  IVPB 40 milliGRAM(s) IV Intermittent daily  influenza   Vaccine 0.5 milliLiter(s) IntraMuscular once  LORazepam     Tablet 1 milliGRAM(s) Oral at bedtime  multivitamin 1 Tablet(s) Oral daily  pantoprazole    Tablet 40 milliGRAM(s) Oral before breakfast  traZODone 300 milliGRAM(s) Oral at bedtime

## 2021-03-03 NOTE — PROGRESS NOTE ADULT - PROBLEM SELECTOR PLAN 1
Plts up to 13 today   Hx of chronic ITP since 18 months, recently on home Pred 80mg qD   CTH neg, no e/o bleeding on exam   -s/p 2U plts on 2/27, 1 u plts 2/28  -s/p IVIG x2, completed 2/28   -s/p Decadron 40 mg IV (2/28-3/2), f/u further Heme recs   -Romiplastin/Nplate ordered by Jose on 2/28, next dose 3/7

## 2021-03-03 NOTE — PROGRESS NOTE ADULT - SUBJECTIVE AND OBJECTIVE BOX
Patient is a 20y old  Male who presents with a chief complaint of low platelets (03 Mar 2021 11:50)        SUBJECTIVE / OVERNIGHT EVENTS:    Overnight, pt had an episode of persistent upward gaze, seen by night PA  Per mom, this was similar to episode pt had last hospitalization, which he underwent a neuro w/u (negative) and was determined 2/2 steroids  now, mental status back at baseline, watching TV on tablet      MEDICATIONS  (STANDING):  brexpiprazole 6 milliGRAM(s) Oral daily  influenza   Vaccine 0.5 milliLiter(s) IntraMuscular once  LORazepam     Tablet 1 milliGRAM(s) Oral at bedtime  multivitamin 1 Tablet(s) Oral daily  pantoprazole    Tablet 40 milliGRAM(s) Oral before breakfast  traZODone 300 milliGRAM(s) Oral at bedtime    MEDICATIONS  (PRN):      Vital Signs Last 24 Hrs  T(C): 36.6 (03 Mar 2021 12:58), Max: 36.9 (02 Mar 2021 20:22)  T(F): 97.9 (03 Mar 2021 12:58), Max: 98.5 (02 Mar 2021 20:22)  HR: 94 (03 Mar 2021 12:58) (68 - 94)  BP: 111/73 (03 Mar 2021 12:58) (111/73 - 130/60)  BP(mean): --  RR: 18 (03 Mar 2021 12:58) (17 - 18)  SpO2: 96% (03 Mar 2021 12:58) (96% - 98%)  CAPILLARY BLOOD GLUCOSE        I&O's Summary    02 Mar 2021 07:01  -  03 Mar 2021 07:00  --------------------------------------------------------  IN: 890 mL / OUT: 0 mL / NET: 890 mL        PHYSICAL EXAM  CONSTITUTIONAL: NAD, well-appearing, sitting in chair, obese   RESPIRATORY: overall clear, does not participate in exam  CARDIOVASCULAR: Regular rate and rhythm, normal S1 and S2, no murmur/rub/gallops   ABDOMEN: Nontender to palpation, normoactive bowel sounds  MUSKULOSKELETAL:  no clubbing or cyanosis of digits; no joint swelling or tenderness to palpation  PSYCH: calm, affect appropriate  NEUROLOGY: CN 2-12 are intact and symmetric; nonfocal, nonverbal, does not follow simple commands.   SKIN: scattered UE ecchymosis          LABS:                        12.0   17.79 )-----------( 13       ( 03 Mar 2021 04:55 )             38.6     03-03    137  |  104  |  28<H>  ----------------------------<  126<H>  4.6   |  24  |  0.77    Ca    8.8      03 Mar 2021 04:55  Phos  4.2     03-03  Mg     2.1     03-03                RADIOLOGY & ADDITIONAL TESTS:    Imaging Personally Reviewed:  Consultant(s) Notes Reviewed:    Care Discussed with Consultants/Other Providers:

## 2021-03-03 NOTE — CHART NOTE - NSCHARTNOTEFT_GEN_A_CORE
SUBJECTIVE:    CC: Notified by RN patient having fixed gaze   HPI: 20y M h/o intellectual disability, autism, nonverbal at baseline, chronic ITP on 80mg of daily prednisone, presents w/ outpatient labs showing low platelets now presenting with a fixed upward gaze. Mother at bedside, states that this upward gaze has lasted a couple of hours this evening but denies noticing other focal deficits. Patient currently receiving Dexamethasone, IVIG and s/p plt transfusions. Mom at bedside states this occurance has happened    that this eye gaze has happened in the past   Recommendations:   [] CTH non-contrast   [] vEEG for 24 hours -- It may be difficult to have a prolonged study but would ideally want to capture this eye deviation event  [] No AEDs for now  [] May possibly proceed with MRI Brain depending on results from aforementioned work-up          Attending Attestation:   Agreed with above history and exam. Reviewed video, does not seem epileptic or vascular, appears behavioral, ? steroid induced Can get Routine EEG while here, does not need to delay discharge no further inpatient w/u .     I have personally seen, examined, and participated in the care of this patient.  I have reviewed all pertinent clinical information, including history, physical exam, plan and the Medical/PA/NP Student’s note and agree except as noted..     I was physically present for the key portions of the evaluation and management (E/M) service provided.  I agree with the above history, physical, and plan which I have reviewed and edited where appropriate.             Denies F/C, N/V, HA, CP, SOB, palpitations, cough, diaphoresis, vision changes, sore throat, abd pain, diarrhea, numbness, weakness, rashes, pain.     ROS:    Constitutional: Denies F/C, malaise, fatigue, diaphoresis, weakness, weight loss  Eyes: Denies visual changes, eye pain, double vision  ENT: Denies rhinorrhea, congestion, sinus pain/pressure, ear pain, tinnitus, sore throat, odynophagia  Cardio: Denies CP, palpitations, edema, paroxysmal nocturnal dyspnea, orthopnea   Pulm: Denies cough, wheezing, hemoptysis, SOB  GI: Denies N/V, Diarrhea, constipation, Abd pain, dysphagia, anorexia, hematemesis, BRBPR, melena  : Denies, dysuria, frequency, incontinence, change in urine color, difficulty urinating  MSK: Denies arthralgia, joint swelling, decreased ROM  Skin: Denies pruritus, rashes, lesions, wounds  Neuro: Denies seizures, HA, paresthesia, numbness, weakness  Psych: Denies Anxiety, depression, difficulty concentrating, labile mood, lack of energy, trouble sleeping  Endocrine: Denies heat/cold intolerance, polydipsia, polyuria  Hematologic: Denies bleeding, easy bruising, painful/swollen lymph nodes  Positives and pertinent negatives noted, all other systems negative.     PAST MEDICAL & SURGICAL HISTORY:  Chronic ITP (idiopathic thrombocytopenia)    Intellectual disability    No significant past surgical history        OBJECTIVE:  Vital Signs Last 24 Hrs  T(C): 36.9 (03-02-21 @ 20:22), Max: 36.9 (03-02-21 @ 20:22)  T(F): 98.5 (03-02-21 @ 20:22), Max: 98.5 (03-02-21 @ 20:22)  HR: 93 (03-02-21 @ 20:22) (80 - 93)  BP: 130/60 (03-02-21 @ 20:22) (118/67 - 130/60)  RR: 17 (03-02-21 @ 20:22) (15 - 17)  SpO2: 98% (03-02-21 @ 20:22) (97% - 98%) on (O2)    Physical Exam:   General: NAD, A&Ox3, WN/WD  Eyes: PERRLA, EOMI, Vision grossly intact b/l  ENT: MMM, no stridor, no pharyngeal/tonsilar erythema/edema, hearing grossly intact  Neck: Supple, trachea midline, no JVD, no thyromegaly/nodules, no lymphademopathy, no ttp  Resp: CTA b/l, no wheezing, rales, rhonchi  Cardio: RRR, nl S1/2, no murmurs, rubs, or gallops  Abd: Soft, NT/ND, +BS  Extremities: no edema, radial/DP pulses 2+ b/l, no acrocyanosis, Cap refill<3 sec  Neuro: Strength 5/5 in UE/LE b/l, sensation equal/intact b/l, CN 2-12 intact  Skin: no rashes, ecchymosis, normal temp, turgur  Lymph: no neck, axilla, groin LAD  Psych: appropriate mood/affect                          12.9   20.62 )-----------( 12       ( 02 Mar 2021 07:20 )             42.6     03-02    135  |  103  |  19  ----------------------------<  114<H>  4.3   |  25  |  0.72    Ca    8.9      02 Mar 2021 07:20  Phos  2.5     03-01  Mg     1.9     03-01        MEDICATIONS  (STANDING):  brexpiprazole 6 milliGRAM(s) Oral daily  dexAMETHasone  IVPB 40 milliGRAM(s) IV Intermittent daily  influenza   Vaccine 0.5 milliLiter(s) IntraMuscular once  LORazepam     Tablet 1 milliGRAM(s) Oral at bedtime  multivitamin 1 Tablet(s) Oral daily  pantoprazole    Tablet 40 milliGRAM(s) Oral before breakfast  traZODone 300 milliGRAM(s) Oral at bedtime    MEDICATIONS  (PRN):    Reviewed all relevant lab results, tests, radiology studies, medications, telemetry, nursing assessments, and EKG.     ASSESSMENT:    20y Male admitted with complaints of Patient is a 20y old  Male who presents with a chief complaint of low platelets (02 Mar 2021 12:56)  Now c/o ________________    Problem:   1)    2)    PLAN:  1)    2)    Case d/w Dr_______________ , in agreement w/ assessment and plan.     Will continue to monitor   [] Low complexity/risk (Time> 15min)  [] Medium complexity/risk (Time> 25 min)  [] High complexity/risk (Time>35 min) SUBJECTIVE:    CC: Notified by RN patient having fixed gaze   HPI: 20y M h/o intellectual disability, autism, nonverbal at baseline, chronic ITP on 80mg of daily prednisone, presents w/ outpatient labs showing low platelets now presenting with a fixed upward gaze. Mother at bedside, states that this upward gaze has lasted a couple of hours this evening but denies noticing other focal deficits. Patient currently receiving Dexamethasone, IVIG and s/p plt transfusions. Mom at bedside states this occurrence has happened; has been worked up by neurology in the past by EEG and CTH non contrast - both tests negative and recommended a outpatient workup per neurology note on 2/28. Mom states that neurology believes it is due to high dose steroids; and she is concerned the dose needs to be reduced.   Patient currently has PLT of 12.     OBJECTIVE:  Vital Signs Last 24 Hrs  T(C): 36.9 (03-02-21 @ 20:22), Max: 36.9 (03-02-21 @ 20:22)  T(F): 98.5 (03-02-21 @ 20:22), Max: 98.5 (03-02-21 @ 20:22)  HR: 93 (03-02-21 @ 20:22) (80 - 93)  BP: 130/60 (03-02-21 @ 20:22) (118/67 - 130/60)  RR: 17 (03-02-21 @ 20:22) (15 - 17)  SpO2: 98% (03-02-21 @ 20:22) (97% - 98%) on (O2)                          12.9   20.62 )-----------( 12       ( 02 Mar 2021 07:20 )             42.6     03-02    135  |  103  |  19  ----------------------------<  114<H>  4.3   |  25  |  0.72    Ca    8.9      02 Mar 2021 07:20  Phos  2.5     03-01  Mg     1.9     03-01    MEDICATIONS  (STANDING):  brexpiprazole 6 milliGRAM(s) Oral daily  dexAMETHasone  IVPB 40 milliGRAM(s) IV Intermittent daily  influenza   Vaccine 0.5 milliLiter(s) IntraMuscular once  LORazepam     Tablet 1 milliGRAM(s) Oral at bedtime  multivitamin 1 Tablet(s) Oral daily  pantoprazole    Tablet 40 milliGRAM(s) Oral before breakfast  traZODone 300 milliGRAM(s) Oral at bedtime    ASSESSMENT:    20y Male admitted with complaints of thrombocytopenia and bleeding now presents with fixed upward gaze. (02 Mar 2021 12:56)    Problem/Plan:  Fixed Upward Gaze:   -Patient currently on Dexamethasone and IVIG  -CTH ordered urgently to r/o hemorrhage in the presence of PLt of 12   -CBC and CMP in am   -Will continue with bedrest in the setting of severe thrombocytopenia to avoid bleeding.   -Will defer am dose of dexamethasone. Will defer dosing to Heme. R/o steroid induced psychosis.   -Will consult Heme in am.   -Will continue to monitor     Will continue to monitor   Medium complexity/risk (Time> 25 min)    BREANA Robles  Department of Medicine   In House # [id number]

## 2021-03-03 NOTE — PROGRESS NOTE ADULT - ATTENDING COMMENTS
Case discussed with team. Pt with recent COVID infection, and on tx for ITP. S/p initiation of Nplate. Plts stable/slowly improving. Would like to reiterate that Dr. Bhagat is patient's current hematologist and all heme-related decisions have been discussed with her. A/w above assessment and recs.

## 2021-03-03 NOTE — PROGRESS NOTE ADULT - PROBLEM SELECTOR PLAN 3
Hospitalist Progress Note  Fredo Borjas MD  Answering service: [phone] OR [phone] from in house phone      Date of Service:  2020  NAME:  Seun York  :  1962  MRN:  [id number]      Admission Summary:   Seun York is a 60 y.o. female with past medical history significant for diabetes mellitus type 2, obesity, CKD, history of right hydronephrosis and ureter multiple stents placement presented to the emergency room with right flank and hematuria. Patient has been experiencing urinary symptoms and flank pain for several days now. She has been in the emergency multiple times but discharged home with antibiotic therapy. She has tried amoxicillin, Keflex and Cipro without improvement of his symptoms. It was not until yesterday that she started noticing blood in the urine for which she came to the emergency room. In the ED it was noted that her ureter stent was coming out through her urethra. She had a CT scan of the abdomen that showed ureteral stent misplacement as well as enlargement of the right kidney. She was found to have a leukocytosis, be febrile and hypotensive. Received Ceftriaxone in the ED. Urology notified by ED of admission. Admission requested for UTI and hematuria. Interval history / Subjective: Follow up sepsis, hematuria and DAPHNE on CKD3. Patient seen and examined at the bedside. Labs, images and notes reviewed  Discussed with nursing staff, orders reviewed. Plan discussed with patient/Family  Feeling better. Discomfort improving. Iron deficiency noted her panel. Also Hb 6.2, patient is Restorationism. Agreeable to IV iron infusion and Epogen. D/W with nephrology. Renal US noted with bilateral hydronephrosis, right more than left. No other concerns or questions.     Assessment & Plan:     # Sepsis (POA) secondary to UTI:  - Continue with Rocephin 1 g every 24 hours  - ct with IV fluid NC  - stent replaced   - Urology on board  - Follow cultures.  Urine culture mixed urogenital wendy– insignificant    # Hematuria: likely secondary to UTI and trauma for stent migration   - removal of old stent and replacement of right stent   - Off CBI  - Urology following   - Morphine for pain change to fentanyl barry, Tylenol ng with opium/belladonna suppository/tramadol as needed and scheduled oxybutynin for bladder spasm.  Better controlled  - PRN catheter - christensen/ straight     # DAPHNE on CKD likely obstructive uropathy-bilateral hydronephrosis, R> L  - Creatinine further up trended at 4.7.  Baseline between 2 and 3.    - treat obstruction, UTI  - ct with IVF   - Nephrology on board.  Recommendations noted.  Renal ultrasound noted.    # Anemia, chronic disease/ iron deficiency as well  - Defer IV iron infusion with EPO given patient is Oriental orthodox to nephrology  - Iron studies suggestive of iron deficiency     # Metabolic acidosis:   - Likely secondary to renal failure.   - Bicarb GTT. Further per nephrology     # Pressure Wound (POA): buttock bilaterally  - Does not appear to be infected.  - Wound care.     # DM type 2  - Accu-Chek and sliding scale insulin every 6 hours while n.p.o.     # Obesity: Patient will benefit of weight loss management as an outpatient.     DVT prophy: scd  Code statuts: DNR/DNI     FUNCTIONAL STATUS PRIOR TO HOSPITALIZATION Bedbound      Hospital Problems  Date Reviewed: 7/18/2020          Codes Class Noted POA    Hematuria ICD-10-CM: R31.9  ICD-9-CM: 599.70  7/17/2020 Unknown                Review of Systems:   Pertinent positive mentioned in interval hx/HPI . Other systems reviewed and negative     Vital Signs:    Last 24hrs VS reviewed since prior progress note. Most recent are:  Visit Vitals  /49 (BP 1 Location: Left arm, BP Patient Position: At rest)   Pulse 94   Temp 98.6 °F (37 °C)   Resp 20   Ht 5' 4\" (1.626 m)   Wt 149.1 kg (328 lb 10.9 oz)   SpO2 94%   Breastfeeding No    BMI 56.42 kg/m²         Intake/Output Summary (Last 24 hours) at 7/21/2020 1958  Last data filed at 7/21/2020 1838  Gross per 24 hour   Intake 1010 ml   Output —   Net 1010 ml        Physical Examination:             Constitutional:  No acute distress, in pain, obese    ENT:  Oral mucous moist, oropharynx benign. Neck supple,    Resp:  CTA bilaterally. No wheezing/rhonchi/rales. No accessory muscle use   CV:  Regular rhythm, normal rate, no murmurs, gallops, rubs    GI:  hernia, Soft, non distended, mild tenderness. normoactive bowel sounds, no hepatosplenomegaly     Musculoskeletal:  No edema, warm, 2+ pulses throughout    Neurologic:  Moves all extremities.  AAOx3, CN II-XII reviewed           Data Review:    I personally reviewed labs and imaging     Ct Abd Pelv Wo Cont    Result Date: 7/17/2020  IMPRESSION: 1. Very large left paramedian abdominal wall hernia containing fat and majority of bowel. 2. Right internal ureteral stent with distal pigtail in the urethra. The right kidney is enlarged, lobular in contour, with poor delineation of cortical medullary and renal collecting system anatomy. 3. Small left kidney.    Us Retroperitoneum Comp    Result Date: 7/20/2020  IMPRESSION: Bilateral hydronephrosis right much greater than left. Right ureteral stent in position.     Xr Chest Port    Result Date: 7/17/2020  IMPRESSION: No acute pulmonary findings. Superior right paratracheal soft tissue enlargement with leftward deviation of trachea.    Xr Cystogram    Result Date: 7/18/2020  IMPRESSION: A single image/s was/were obtained intraoperatively . A pigtail catheter in the abdomen may be a ureteral stent. No radiologist was present during image acquisition. Please see operative note for further details. FLUOROSCOPY TIME PROVIDED: 19 seconds.        Labs:     Recent Labs     07/21/20 0319 07/20/20  0620   WBC 12.0* 11.1*   HGB 6.2* 6.3*   HCT 20.6* 22.0*    267     Recent Labs     07/21/20 0319  07/20/20  0620 07/19/20  1559    142 144   K 3.9 4.2 4.4    115* 118*   CO2 24 18* 18*   BUN 79* 83* 83*   CREA 4.70* 4.55* 4.15*   * 130* 188*   CA 8.0* 8.6 8.8   MG 2.0 2.3  --    PHOS 2.5* 3.8  --      Recent Labs     07/21/20  0319 07/20/20  0620   ALT 7* 9*   AP 48 49   TBILI 0.1* 0.1*   TP 6.6 6.6   ALB 2.2* 2.3*   GLOB 4.4* 4.3*     No results for input(s): INR, PTP, APTT, INREXT, INREXT in the last 72 hours. Recent Labs     07/20/20 0620   TIBC 219*   PSAT 11*      No results found for: FOL, RBCF   No results for input(s): PH, PCO2, PO2 in the last 72 hours. No results for input(s): CPK, CKNDX, TROIQ in the last 72 hours.     No lab exists for component: CPKMB  No results found for: CHOL, CHOLX, CHLST, CHOLV, HDL, HDLP, LDL, LDLC, DLDLP, TGLX, TRIGL, TRIGP, CHHD, CHHDX  Lab Results   Component Value Date/Time    Glucose (POC) 138 (H) 07/21/2020 04:26 PM    Glucose (POC) 177 (H) 07/21/2020 11:02 AM    Glucose (POC) 185 (H) 07/21/2020 06:30 AM    Glucose (POC) 191 (H) 07/20/2020 11:51 PM    Glucose (POC) 150 (H) 07/20/2020 04:19 PM     Lab Results   Component Value Date/Time    Color RED 07/17/2020 08:08 PM    Appearance TURBID (A) 07/17/2020 08:08 PM    Specific gravity 1.020 07/17/2020 08:08 PM    pH (UA) 6.5 07/17/2020 08:08 PM    Protein >300 (A) 07/17/2020 08:08 PM    Glucose Negative 07/17/2020 08:08 PM    Ketone Negative 07/17/2020 08:08 PM    Bilirubin Negative 07/17/2020 08:08 PM    Urobilinogen 0.2 07/17/2020 08:08 PM    Nitrites Negative 07/17/2020 08:08 PM    Leukocyte Esterase MODERATE (A) 07/17/2020 08:08 PM    Epithelial cells FEW 07/17/2020 08:08 PM    Bacteria Negative 07/17/2020 08:08 PM    WBC >100 (H) 07/17/2020 08:08 PM    RBC >100 (H) 07/17/2020 08:08 PM         Medications Reviewed:     Current Facility-Administered Medications   Medication Dose Route Frequency    0.9% sodium chloride infusion  75 mL/hr IntraVENous CONTINUOUS    iron sucrose (VENOFER) 200 mg in 0.9% sodium chloride 100 mL IVPB  200 mg IntraVENous Q24H    traMADoL (ULTRAM) tablet 50 mg  50 mg Oral Q8H PRN    gabapentin (NEURONTIN) capsule 100 mg  100 mg Oral BID    fentaNYL citrate (PF) injection 25 mcg  25 mcg IntraVENous Q4H PRN    lidocaine (URO-JET/ GLYDO) 2 % jelly   Urethral DAILY PRN    glucose chewable tablet 16 g  4 Tab Oral PRN    glucagon (GLUCAGEN) injection 1 mg  1 mg IntraMUSCular PRN    dextrose 10% infusion 0-250 mL  0-250 mL IntraVENous PRN    pantoprazole (PROTONIX) tablet 40 mg  40 mg Oral DAILY    acetaminophen (TYLENOL) tablet 500 mg  500 mg Oral Q6H PRN    cefTRIAXone (ROCEPHIN) 1 g in 0.9% sodium chloride (MBP/ADV) 50 mL  1 g IntraVENous Q24H    opium-belladonna (B&O 15-A) 16.2-30 mg suppository 1 Suppository  1 Suppository Rectal Q8H PRN    opium-belladonna (B&O 15-A) 16.2-30 mg suppository 1 Suppository  1 Suppository Rectal Q6H PRN    oxybutynin chloride XL (DITROPAN XL) tablet 10 mg  10 mg Oral DAILY    morphine injection 2 mg  2 mg IntraVENous Q4H PRN    insulin lispro (HUMALOG) injection   SubCUTAneous AC&HS    sodium chloride (NS) flush 5-10 mL  5-10 mL IntraVENous PRN     ______________________________________________________________________  EXPECTED LENGTH OF STAY: 5d 12h  ACTUAL LENGTH OF STAY:          2                 Luis Alcantara MD   Patient has given Verbal permission to discuss medical care with   persons present in the room and and also with contact as listed on face sheet. hx of COVID +1/22, still positive  -not requiring isolation  -Not on supplemental O2  -CXR w/out infiltrates

## 2021-03-03 NOTE — PROGRESS NOTE ADULT - ASSESSMENT
21 yo M with a history of chronic ITP and severe autism (non-verbal at baseline) who presents with severe thrombocytopenia (Plt 7) on PO prednisone (80mg daily).     # Chronic ITP exacerbated in the setting of recent COVID infxn  -CT head neg for bleed  -s/p IVIG 1gm/kg daily x 2 (completed 2/28)   -S/p romiplastin/Nplate today, 1mcg/kg; given 2/28; next dose due 3/7   -contin with dexamethasone 40mg IV x4 days   -plts count improved to 12 on 3/2, it should continue to go up  - transfuse platelets if <10 k  -monitor closely for any signs or symptoms of bleeding      Robert Waite MD. PGY 6  Heme-Onc fellow  [phone]; [phone]

## 2021-03-03 NOTE — PROGRESS NOTE ADULT - ASSESSMENT
Impression: Exudative age-related macular degeneration, right eye, with active choroidal neovascularization: H35.3211. OD.
s/p Lucentis x 29, last OD 1/20/21
s/p Avastin OD, last 11/2/17
last DFE OU 1/20/21 Plan: Exam and OCT reveal moderate SRF and stable PED s/p Lucentis 6 weeks ago. History of worsening at 7 weeks. I have recommended continuing Lucentis q6 weeks to maintain vision in her  better eye. The diagnosis, natural history, and prognosis of exudative AMD, as well as the R/B/A of laser, PDT, and anti-VEGF were discussed. The patient understands that treatment may not improve vision but should reduce the risk of further visual loss. The patient elects to proceed with intravitreal Lucentis OD, which was performed in the office per protocol without complication.  

RTC 6 weeks, OCT OU re-eval, Lucentis 0.5 20M w/ autism (non-verbal), hx of ITP since childhood, CARLITOS (not on CPAP)  admitted for acute lower GI bleed in setting of acute on chronic ITP, found to be COVID +,

## 2021-03-04 LAB
ANION GAP SERPL CALC-SCNC: 12 MMOL/L — SIGNIFICANT CHANGE UP (ref 7–14)
BASOPHILS # BLD AUTO: 0.02 K/UL — SIGNIFICANT CHANGE UP (ref 0–0.2)
BASOPHILS NFR BLD AUTO: 0.1 % — SIGNIFICANT CHANGE UP (ref 0–2)
BUN SERPL-MCNC: 28 MG/DL — HIGH (ref 7–23)
CALCIUM SERPL-MCNC: 8.9 MG/DL — SIGNIFICANT CHANGE UP (ref 8.4–10.5)
CHLORIDE SERPL-SCNC: 98 MMOL/L — SIGNIFICANT CHANGE UP (ref 98–107)
CO2 SERPL-SCNC: 22 MMOL/L — SIGNIFICANT CHANGE UP (ref 22–31)
CREAT SERPL-MCNC: 0.72 MG/DL — SIGNIFICANT CHANGE UP (ref 0.5–1.3)
EOSINOPHIL # BLD AUTO: 0 K/UL — SIGNIFICANT CHANGE UP (ref 0–0.5)
EOSINOPHIL NFR BLD AUTO: 0 % — SIGNIFICANT CHANGE UP (ref 0–6)
GLUCOSE SERPL-MCNC: 142 MG/DL — HIGH (ref 70–99)
HCT VFR BLD CALC: 45 % — SIGNIFICANT CHANGE UP (ref 39–50)
HGB BLD-MCNC: 14.2 G/DL — SIGNIFICANT CHANGE UP (ref 13–17)
IANC: 14.55 K/UL — HIGH (ref 1.5–8.5)
IMM GRANULOCYTES NFR BLD AUTO: 1.2 % — SIGNIFICANT CHANGE UP (ref 0–1.5)
LYMPHOCYTES # BLD AUTO: 0.94 K/UL — LOW (ref 1–3.3)
LYMPHOCYTES # BLD AUTO: 5.8 % — LOW (ref 13–44)
MAGNESIUM SERPL-MCNC: 2 MG/DL — SIGNIFICANT CHANGE UP (ref 1.6–2.6)
MCHC RBC-ENTMCNC: 26.8 PG — LOW (ref 27–34)
MCHC RBC-ENTMCNC: 31.6 GM/DL — LOW (ref 32–36)
MCV RBC AUTO: 85.1 FL — SIGNIFICANT CHANGE UP (ref 80–100)
MONOCYTES # BLD AUTO: 0.53 K/UL — SIGNIFICANT CHANGE UP (ref 0–0.9)
MONOCYTES NFR BLD AUTO: 3.3 % — SIGNIFICANT CHANGE UP (ref 2–14)
NEUTROPHILS # BLD AUTO: 14.55 K/UL — HIGH (ref 1.8–7.4)
NEUTROPHILS NFR BLD AUTO: 89.6 % — HIGH (ref 43–77)
NRBC # BLD: 0 /100 WBCS — SIGNIFICANT CHANGE UP
NRBC # FLD: 0 K/UL — SIGNIFICANT CHANGE UP
PHOSPHATE SERPL-MCNC: 3.8 MG/DL — SIGNIFICANT CHANGE UP (ref 2.5–4.5)
PLATELET # BLD AUTO: 11 K/UL — CRITICAL LOW (ref 150–400)
POTASSIUM SERPL-MCNC: 4.3 MMOL/L — SIGNIFICANT CHANGE UP (ref 3.5–5.3)
POTASSIUM SERPL-SCNC: 4.3 MMOL/L — SIGNIFICANT CHANGE UP (ref 3.5–5.3)
RBC # BLD: 5.29 M/UL — SIGNIFICANT CHANGE UP (ref 4.2–5.8)
RBC # FLD: 16.9 % — HIGH (ref 10.3–14.5)
SODIUM SERPL-SCNC: 132 MMOL/L — LOW (ref 135–145)
WBC # BLD: 16.24 K/UL — HIGH (ref 3.8–10.5)
WBC # FLD AUTO: 16.24 K/UL — HIGH (ref 3.8–10.5)

## 2021-03-04 PROCEDURE — 76705 ECHO EXAM OF ABDOMEN: CPT | Mod: 26

## 2021-03-04 PROCEDURE — 99232 SBSQ HOSP IP/OBS MODERATE 35: CPT | Mod: CS,GC

## 2021-03-04 PROCEDURE — 99233 SBSQ HOSP IP/OBS HIGH 50: CPT

## 2021-03-04 RX ADMIN — BREXPIPRAZOLE 6 MILLIGRAM(S): 0.25 TABLET ORAL at 16:07

## 2021-03-04 RX ADMIN — Medication 1 MILLIGRAM(S): at 22:13

## 2021-03-04 RX ADMIN — Medication 80 MILLIGRAM(S): at 18:00

## 2021-03-04 RX ADMIN — Medication 1 TABLET(S): at 16:07

## 2021-03-04 RX ADMIN — Medication 300 MILLIGRAM(S): at 22:14

## 2021-03-04 RX ADMIN — PANTOPRAZOLE SODIUM 40 MILLIGRAM(S): 20 TABLET, DELAYED RELEASE ORAL at 07:57

## 2021-03-04 NOTE — PROGRESS NOTE ADULT - SUBJECTIVE AND OBJECTIVE BOX
Patient is a 20y old  Male who presents with a chief complaint of low platelets (03 Mar 2021 14:00)        SUBJECTIVE / OVERNIGHT EVENTS:  per mother at bedside, pt again had episode of fixed upward gaze last night, resolved spontaneously  now sitting in chair, watching tablet, NAD        MEDICATIONS  (STANDING):  brexpiprazole 6 milliGRAM(s) Oral daily  influenza   Vaccine 0.5 milliLiter(s) IntraMuscular once  LORazepam     Tablet 1 milliGRAM(s) Oral at bedtime  multivitamin 1 Tablet(s) Oral daily  pantoprazole    Tablet 40 milliGRAM(s) Oral before breakfast  traZODone 300 milliGRAM(s) Oral at bedtime    MEDICATIONS  (PRN):      Vital Signs Last 24 Hrs  T(C): 36.6 (04 Mar 2021 08:06), Max: 36.9 (03 Mar 2021 22:23)  T(F): 97.8 (04 Mar 2021 08:06), Max: 98.5 (03 Mar 2021 22:23)  HR: 80 (04 Mar 2021 08:06) (71 - 94)  BP: 132/80 (04 Mar 2021 08:06) (111/61 - 132/80)  BP(mean): --  RR: 17 (04 Mar 2021 08:06) (17 - 18)  SpO2: 96% (04 Mar 2021 08:06) (95% - 96%)  CAPILLARY BLOOD GLUCOSE        I&O's Summary        PHYSICAL EXAM  CONSTITUTIONAL: NAD, well-appearing, sitting in chair, obese   RESPIRATORY: overall clear, does not participate in exam  CARDIOVASCULAR: Regular rate and rhythm, normal S1 and S2, no murmur/rub/gallops   ABDOMEN: Nontender to palpation, normoactive bowel sounds  MUSKULOSKELETAL:  no clubbing or cyanosis of digits; no joint swelling or tenderness to palpation  PSYCH: calm, affect appropriate  NEUROLOGY: CN 2-12 are intact and symmetric; nonfocal, nonverbal, does not follow simple commands.   SKIN: scattered UE ecchymosis    LABS:                        14.2   16.24 )-----------( 11       ( 04 Mar 2021 07:48 )             45.0     03-04    132<L>  |  98  |  28<H>  ----------------------------<  142<H>  4.3   |  22  |  0.72    Ca    8.9      04 Mar 2021 07:48  Phos  3.8     03-04  Mg     2.0     03-04                RADIOLOGY & ADDITIONAL TESTS:    Imaging Personally Reviewed:  Consultant(s) Notes Reviewed:    Care Discussed with Consultants/Other Providers:

## 2021-03-04 NOTE — PROGRESS NOTE ADULT - PROBLEM SELECTOR PLAN 1
Plts down to 11 today   Hx of chronic ITP since 18 months, recently on home Pred 80mg qD   CTH neg, no e/o bleeding on exam   -s/p 2U plts on 2/27, 1 u plts 2/28  -s/p IVIG x2, completed 2/28   -s/p Decadron 40 mg IV (2/28-3/3), f/u further Heme recs   -Romiplastin/Nplate ordered by Jose on 2/28, next dose 3/7

## 2021-03-04 NOTE — CHART NOTE - NSCHARTNOTEFT_GEN_A_CORE
19 yo M with a history of chronic ITP and severe autism (non-verbal at baseline) who presents with severe thrombocytopenia (Plt 7) while on home PO prednisone (80mg daily).     # Chronic ITP exacerbated in the setting of recent COVID infxn  -CT head neg for bleed  -s/p IVIG 1gm/kg daily x 2 (completed 2/28)   -S/p romiplastin/Nplate, 1mcg/kg; given 2/28; next dose due 3/7   -S/p dexamethasone 40mg IV x4 days (completed 3/3)  -plts count still low; will d/w Dr. Bhagat (pt's hematologist) today  -transfuse platelets if bleeding (consider giving 1/2 unit platelets slowly infused over 3 hours)  -monitor closely for any signs or symptoms of bleeding      Francisca Lutz, PGY-4  Hematology-Oncology Fellow  [phone] ([place]) [phone] ([facility])  I can also be reached on [employer] Teams  Please page fellow on call after 5pm and on weekends 21 yo M with a history of chronic ITP and severe autism (non-verbal at baseline) who presents with severe thrombocytopenia (Plt 7) while on home PO prednisone (80mg daily).     # Chronic ITP exacerbated in the setting of recent COVID infxn  -CT head neg for bleed  -s/p IVIG 1gm/kg daily x 2 (completed 2/28)   -S/p romiplastin/Nplate, 1mcg/kg; given 2/28; next dose due 3/7   -S/p dexamethasone 40mg IV x4 days (completed 3/3)  -plts count still low; will d/w Dr. Bhagat (pt's hematologist) today  -transfuse platelets if bleeding (consider giving 1/2 unit platelets slowly infused over 3 hours)  -PLEASE START BACTRIM FOR PCP PPX  -monitor closely for any signs or symptoms of bleeding      Francisca Lutz, PGY-4  Hematology-Oncology Fellow  [phone] ([place]) [phone] ([facility])  I can also be reached on Microsoft Teams  Please page fellow on call after 5pm and on weekends 19 yo M with a history of chronic ITP and severe autism (non-verbal at baseline) who presents with severe thrombocytopenia (Plt 7) while on home PO prednisone (80mg daily).     # Chronic ITP exacerbated in the setting of recent COVID infxn  -CT head neg for bleed  -s/p IVIG 1gm/kg daily x 2 (completed 2/28)   -S/p romiplastin/Nplate, 1mcg/kg; given 2/28; next dose due 3/7   -S/p dexamethasone 40mg IV x4 days (completed 3/3)  -as pt was on steroid for prolonged period will restart prednisone 80 mg then taper  -plts count still low; will d/w Dr. Bhagat (pt's hematologist) today - too soon to give further IVIG. If plts remain refractory, Rituxan and splenectomy are other options. Please consult allergy and immunology to assess pt's rituxan allergy. He may be eligible for desensitization protocol.  -transfuse platelets if bleeding (consider giving 1/2 unit platelets slowly infused over 3 hours)  -PLEASE START BACTRIM FOR PCP PPX  -monitor closely for any signs or symptoms of bleeding      Francisca Lutz, PGY-4  Hematology-Oncology Fellow  [phone] ([place]) [phone] ([facility])  I can also be reached on Microsoft Teams  Please page fellow on call after 5pm and on weekends 21 yo M with a history of chronic ITP and severe autism (non-verbal at baseline) who presents with severe thrombocytopenia (Plt 7) while on home PO prednisone (80mg daily).     # Chronic ITP exacerbated in the setting of recent COVID infxn  -CT head neg for bleed  -s/p IVIG 1gm/kg daily x 2 (completed 2/28)   -S/p romiplastin/Nplate, 1mcg/kg; given 2/28; next dose due 3/7   -S/p dexamethasone 40mg IV x4 days (completed 3/3)  -as pt was on steroid for prolonged period will restart prednisone 80 mg then taper  -plts count still low; will d/w Dr. Bhagat (pt's hematologist) today - too soon to give further IVIG. If plts remain refractory, Rituxan and splenectomy are other options. Please consult allergy and immunology to assess pt's rituxan allergy. He may be eligible for desensitization protocol.  -transfuse platelets if bleeding (consider giving 1/2 unit platelets slowly infused over 3 hours)  -PLEASE START BACTRIM FOR PCP PPX  -monitor closely for any signs or symptoms of bleeding      Francisca Lutz, PGY-4  Hematology-Oncology Fellow  [phone] ([place]) [phone] ([facility])  I can also be reached on [employer] Teams  Please page fellow on call after 5pm and on weekends    Plt count stable but not improving. Will watch for another day or 2, if not improving- Rituximab (will need desensitization) as per Dr. Bhagat  A/w above assessment and recs

## 2021-03-04 NOTE — PROGRESS NOTE ADULT - ATTENDING COMMENTS
Case d/w Dr. Waite, heme fellow  Catarino win ordered to assess spleen size as per Heme recs   F/u final Heme recs

## 2021-03-05 DIAGNOSIS — T50.905A ADVERSE EFFECT OF UNSPECIFIED DRUGS, MEDICAMENTS AND BIOLOGICAL SUBSTANCES, INITIAL ENCOUNTER: ICD-10-CM

## 2021-03-05 LAB
ALBUMIN SERPL ELPH-MCNC: 3 G/DL — LOW (ref 3.3–5)
ALP SERPL-CCNC: 39 U/L — LOW (ref 40–120)
ALT FLD-CCNC: 78 U/L — HIGH (ref 4–41)
ANION GAP SERPL CALC-SCNC: 7 MMOL/L — SIGNIFICANT CHANGE UP (ref 7–14)
AST SERPL-CCNC: 22 U/L — SIGNIFICANT CHANGE UP (ref 4–40)
BASOPHILS # BLD AUTO: 0.03 K/UL — SIGNIFICANT CHANGE UP (ref 0–0.2)
BASOPHILS NFR BLD AUTO: 0.2 % — SIGNIFICANT CHANGE UP (ref 0–2)
BILIRUB SERPL-MCNC: 0.6 MG/DL — SIGNIFICANT CHANGE UP (ref 0.2–1.2)
BUN SERPL-MCNC: 28 MG/DL — HIGH (ref 7–23)
CALCIUM SERPL-MCNC: 8.5 MG/DL — SIGNIFICANT CHANGE UP (ref 8.4–10.5)
CHLORIDE SERPL-SCNC: 99 MMOL/L — SIGNIFICANT CHANGE UP (ref 98–107)
CO2 SERPL-SCNC: 25 MMOL/L — SIGNIFICANT CHANGE UP (ref 22–31)
CREAT SERPL-MCNC: 0.71 MG/DL — SIGNIFICANT CHANGE UP (ref 0.5–1.3)
EOSINOPHIL # BLD AUTO: 0 K/UL — SIGNIFICANT CHANGE UP (ref 0–0.5)
EOSINOPHIL NFR BLD AUTO: 0 % — SIGNIFICANT CHANGE UP (ref 0–6)
GLUCOSE SERPL-MCNC: 114 MG/DL — HIGH (ref 70–99)
HCT VFR BLD CALC: 42 % — SIGNIFICANT CHANGE UP (ref 39–50)
HGB BLD-MCNC: 13 G/DL — SIGNIFICANT CHANGE UP (ref 13–17)
IANC: 11.34 K/UL — HIGH (ref 1.5–8.5)
IMM GRANULOCYTES NFR BLD AUTO: 1.2 % — SIGNIFICANT CHANGE UP (ref 0–1.5)
LYMPHOCYTES # BLD AUTO: 0.89 K/UL — LOW (ref 1–3.3)
LYMPHOCYTES # BLD AUTO: 6.9 % — LOW (ref 13–44)
MAGNESIUM SERPL-MCNC: 1.9 MG/DL — SIGNIFICANT CHANGE UP (ref 1.6–2.6)
MCHC RBC-ENTMCNC: 26.9 PG — LOW (ref 27–34)
MCHC RBC-ENTMCNC: 31 GM/DL — LOW (ref 32–36)
MCV RBC AUTO: 87 FL — SIGNIFICANT CHANGE UP (ref 80–100)
MONOCYTES # BLD AUTO: 0.57 K/UL — SIGNIFICANT CHANGE UP (ref 0–0.9)
MONOCYTES NFR BLD AUTO: 4.4 % — SIGNIFICANT CHANGE UP (ref 2–14)
NEUTROPHILS # BLD AUTO: 11.34 K/UL — HIGH (ref 1.8–7.4)
NEUTROPHILS NFR BLD AUTO: 87.3 % — HIGH (ref 43–77)
NRBC # BLD: 0 /100 WBCS — SIGNIFICANT CHANGE UP
NRBC # FLD: 0 K/UL — SIGNIFICANT CHANGE UP
PHOSPHATE SERPL-MCNC: 3.8 MG/DL — SIGNIFICANT CHANGE UP (ref 2.5–4.5)
PLATELET # BLD AUTO: 5 K/UL — CRITICAL LOW (ref 150–400)
POTASSIUM SERPL-MCNC: 4.5 MMOL/L — SIGNIFICANT CHANGE UP (ref 3.5–5.3)
POTASSIUM SERPL-SCNC: 4.5 MMOL/L — SIGNIFICANT CHANGE UP (ref 3.5–5.3)
PROT SERPL-MCNC: 7.5 G/DL — SIGNIFICANT CHANGE UP (ref 6–8.3)
RBC # BLD: 4.83 M/UL — SIGNIFICANT CHANGE UP (ref 4.2–5.8)
RBC # FLD: 16.9 % — HIGH (ref 10.3–14.5)
SODIUM SERPL-SCNC: 131 MMOL/L — LOW (ref 135–145)
WBC # BLD: 12.98 K/UL — HIGH (ref 3.8–10.5)
WBC # FLD AUTO: 12.98 K/UL — HIGH (ref 3.8–10.5)

## 2021-03-05 PROCEDURE — 99232 SBSQ HOSP IP/OBS MODERATE 35: CPT | Mod: CS,GC

## 2021-03-05 PROCEDURE — 99223 1ST HOSP IP/OBS HIGH 75: CPT | Mod: GC

## 2021-03-05 PROCEDURE — 99223 1ST HOSP IP/OBS HIGH 75: CPT

## 2021-03-05 PROCEDURE — 99233 SBSQ HOSP IP/OBS HIGH 50: CPT

## 2021-03-05 RX ORDER — ATOVAQUONE 750 MG/5ML
1500 SUSPENSION ORAL ONCE
Refills: 0 | Status: COMPLETED | OUTPATIENT
Start: 2021-03-05 | End: 2021-03-05

## 2021-03-05 RX ORDER — SODIUM CHLORIDE 9 MG/ML
50 INJECTION INTRAMUSCULAR; INTRAVENOUS; SUBCUTANEOUS ONCE
Refills: 0 | Status: DISCONTINUED | OUTPATIENT
Start: 2021-03-05 | End: 2021-03-05

## 2021-03-05 RX ORDER — ATOVAQUONE 750 MG/5ML
1500 SUSPENSION ORAL DAILY
Refills: 0 | Status: DISCONTINUED | OUTPATIENT
Start: 2021-03-05 | End: 2021-04-02

## 2021-03-05 RX ORDER — SODIUM CHLORIDE 9 MG/ML
1000 INJECTION INTRAMUSCULAR; INTRAVENOUS; SUBCUTANEOUS
Refills: 0 | Status: DISCONTINUED | OUTPATIENT
Start: 2021-03-05 | End: 2021-03-08

## 2021-03-05 RX ADMIN — Medication 1 MILLIGRAM(S): at 21:25

## 2021-03-05 RX ADMIN — Medication 80 MILLIGRAM(S): at 05:04

## 2021-03-05 RX ADMIN — SODIUM CHLORIDE 50 MILLILITER(S): 9 INJECTION INTRAMUSCULAR; INTRAVENOUS; SUBCUTANEOUS at 21:30

## 2021-03-05 RX ADMIN — ATOVAQUONE 1500 MILLIGRAM(S): 750 SUSPENSION ORAL at 21:25

## 2021-03-05 RX ADMIN — Medication 1 TABLET(S): at 13:57

## 2021-03-05 RX ADMIN — PANTOPRAZOLE SODIUM 40 MILLIGRAM(S): 20 TABLET, DELAYED RELEASE ORAL at 05:04

## 2021-03-05 RX ADMIN — Medication 300 MILLIGRAM(S): at 21:26

## 2021-03-05 RX ADMIN — BREXPIPRAZOLE 6 MILLIGRAM(S): 0.25 TABLET ORAL at 13:56

## 2021-03-05 NOTE — PROGRESS NOTE ADULT - ATTENDING COMMENTS
CAse discussed with team and patient's hematologist, Dr. Bhagat- Plan is for Rituximab tomorrow after desensitization in the ICU. Counts being closely monitored. Reviewed smear- low plts, some large plts, no schistos. We suspect that there is marrow suppression from COVID in addition to known ITP.

## 2021-03-05 NOTE — CONSULT NOTE ADULT - ASSESSMENT
1.) Severe thrombocytopenia  2.) Refractory ITP  3.) Hyponatremia  4.) Adverse reaction to Rituxan, Winrho, Bactrim    -Reported history of reaction (acute hypertension/tachycardia) with Rituxan is not entirely consistent with an IgE mediated allergic reaction. The patient did develop a rash, which is consistent with an IgE mediated reaction, however it is unclear whether this pt's rash was consistent with urticaria vs a morbiliform drug rash. However, all of the patient's reactions are noted to be common adverse reactions with Rituxan, per UTD (Cardiac disorder (5% to 29%), hypertension (6% to 12%), skin rash (=17%)  -Recent steroid use precludes skin testing, and skin testing is sensitive only for an IgE mediated reaction. Given limited therapeutic alternatives, we will provide a Rituxan desensitization protocol to be used at the discretion of the primary team and the heme/onc team. Given the unclear nature of the patients reaction (IgE mediated vs non-IgE mediated), we cannot perfectly predict the efficacy. Pt will need to be transferred to the ICU for a desensitization. As with all desensitizations, if Rituxan therapy is discontinued for >48hr, he will need to be desensitized again.   -Rituxan desensitization protocol was discussed with Heme/onc team, and they recommended a target dose of 990mg. A copy of the protocol will be emailed to the heme/onc team, and is also documented below.  -Recommend use of standard Rituxan premedication regimen, in addition to premedications below  -Additional management as per primary team  -Patient should follow up after discharge for further testing to evaluate his drug allergies. Call [phone] to schedule a follow up appointment with Dr Yin in the Drug allergy center  -Thank you for the consult, please contact with any additional questions or concerns    [facility] - Division of Allergy & Immunology						Date: 3/5/21	  Rituximab Rapid Desensitization Protocol		Patient name: SEBAS Castle [birth year]				  												  Total Dose (mg):	990											  												  	Concentration (mg/ml)										   Bag A:	0.34											  Bag B:	3.34											  												  												  Step #	Infusion Duration (min)	Bag	Rate (mL/hr)	Volume to Infuse (mL)	Dose (mg)	Cum dose (mg)		    Exam			  							BP	HR	O2 sats	RR	Skin	Lungs  												  1	15	A	5	1.25	0.425	0.425						  2	15	A	10	2.5	0.85	1.275						  3	15	A	20	5	1.7	2.975						  4	15	A	40	10	3.4	6.375						  												  5	15	B	10	2.5	8.35	14.725						  6	15	B	20	5	16.7	31.425						  7	15	B	40	10	33.4	64.825						  8	066.3887566	B	75	276.9985	925.175	990						  												  Total minutes:	531.7047067											  Total Hours:	5.729474691											  *Premedication:  Start cetirizine 10mg and continue nightly until desensitized					  *Give cetirizine 10mg , famotidine 20mg  and lorazepam (0.5-1 mg  PO)  20 minutes before initiation desensitization, aside of  Decadron and Zofran (as per oncology team). Can repeat lorazepam (0.5-1 mg PO or IV as needed for anxiety)  *Beta-blockers (if applicable) must be held 24 hours before desensitization	  *If reactions occur:										  1.     temporarily halt the infusion and treat as appropriate--please obtain a serum tryptase level 1 hour after any symptoms of allergic reaction					  2.     In case of mild allergic reaction (itching ,flushing,  hives, mild swelling, mild nausea/ discomfort), and back pain- immediately give 50 mg Benadryl IM/IV				  3.     For wheezing- use 1 unit of  Albuterol  0.083% 3ml via nebulizer				  4.     For severe systemic reaction and anaphylaxis, including laryngeal edema (voice changes, throat tightness, stridor, difficulty swallowing), respiratory (shortness of breath, wheezing, repetitive cough), GI (repetitive vomiting, severe diarrhea), or cardiovascular symptoms (hypotension, hypotonia/ collapse)-give 0.3 ml 1:1000 Epinephrine IM immediately.				  5.      In case of  fevers, rigors/chills, nausea, pain, rigors- increase IVF to 500 cc/h until symptoms resolve.	  6.     The protocol should be aborted if the patient develops hypotension and/or laryngeal edema that are not immediately responsive to IM epinephrine					  7.     Refractory cases and/or those on beta blockers may require glucagon (1-2mg IV over 5 minutes); followed by 5-15mcg/min infusion if needed.					  8.     Resume the protocol by restarting at the same step the protocol had been paused.					  Please contact us with any questions: [phone]    Quang Pérez MD  ___________________________________  Fellow, Division of Allergy and Immunology  [employer] at [facility] 1.) Severe thrombocytopenia  2.) Refractory ITP  3.) Hyponatremia  4.) Adverse reaction to Rituxan, Winrho, Bactrim    -Reported history of reaction (acute hypertension/tachycardia) with Rituxan is not entirely consistent with an IgE mediated allergic reaction. The patient did develop a rash, which is consistent with an IgE mediated reaction, however it is unclear whether this pt's rash was consistent with urticaria vs a morbiliform drug rash. However, all of the patient's reactions are noted to be common adverse reactions with Rituxan, per UTD (Cardiac disorder (5% to 29%), hypertension (6% to 12%), skin rash (=17%)  -Recent steroid use precludes skin testing, and skin testing is sensitive only for an IgE mediated reaction. Given limited therapeutic alternatives, we will provide a Rituxan desensitization protocol to be used at the discretion of the primary team and the heme/onc team. Given the unclear nature of the patients reaction (IgE mediated vs non-IgE mediated), we cannot predict the efficacy of preventing a non-IgE mediated reaction. Pt will need to be transferred to the ICU for a desensitization. As with all desensitizations, if Rituxan therapy is discontinued for >48hr, he will need to be desensitized again. He also cannot exceed the target dose that he is desensitized with.  -Rituxan desensitization protocol was discussed with Heme/onc team, and they recommended a target dose of 990mg. A copy of the protocol will be emailed to the heme/onc team, and is also documented below.  -Recommend use of standard Rituxan premedication regimen, in addition to premedications below  -Additional management as per primary team  -Patient should follow up after discharge for further testing to evaluate his drug allergies. Call [phone] to schedule a follow up appointment with Dr Yin in the Drug allergy center  -Thank you for the consult, please contact with any additional questions or concerns    [facility] - Division of Allergy & Immunology						Date: 3/5/21	  Rituximab Rapid Desensitization Protocol		Patient name: SEBAS Castle [birth year]				  												  Total Dose (mg):	990											  												  	Concentration (mg/ml)										   Bag A:	0.34											  Bag B:	3.34											  												  												  Step #	Infusion Duration (min)	Bag	Rate (mL/hr)	Volume to Infuse (mL)	Dose (mg)	Cum dose (mg)		    Exam			  							BP	HR	O2 sats	RR	Skin	Lungs  												  1	15	A	5	1.25	0.425	0.425						  2	15	A	10	2.5	0.85	1.275						  3	15	A	20	5	1.7	2.975						  4	15	A	40	10	3.4	6.375						  												  5	15	B	10	2.5	8.35	14.725						  6	15	B	20	5	16.7	31.425						  7	15	B	40	10	33.4	64.825						  8	030.6084739	B	75	276.9985	925.175	990						  												  Total minutes:	042.1705721											  Total Hours:	5.650893206											  *Premedication:  Start cetirizine 10mg and continue nightly until desensitized					  *Give cetirizine 10mg , famotidine 20mg  and lorazepam (0.5-1 mg  PO)  20 minutes before initiation desensitization, aside of  Decadron and Zofran (as per oncology team). Can repeat lorazepam (0.5-1 mg PO or IV as needed for anxiety)  *Beta-blockers (if applicable) must be held 24 hours before desensitization	  *If reactions occur:										  1.     temporarily halt the infusion and treat as appropriate--please obtain a serum tryptase level 1 hour after any symptoms of allergic reaction					  2.     In case of mild allergic reaction (itching ,flushing,  hives, mild swelling, mild nausea/ discomfort), and back pain- immediately give 50 mg Benadryl IM/IV				  3.     For wheezing- use 1 unit of  Albuterol  0.083% 3ml via nebulizer				  4.     For severe systemic reaction and anaphylaxis, including laryngeal edema (voice changes, throat tightness, stridor, difficulty swallowing), respiratory (shortness of breath, wheezing, repetitive cough), GI (repetitive vomiting, severe diarrhea), or cardiovascular symptoms (hypotension, hypotonia/ collapse)-give 0.3 ml 1:1000 Epinephrine IM immediately.				  5.      In case of  fevers, rigors/chills, nausea, pain, rigors- increase IVF to 500 cc/h until symptoms resolve.	  6.     The protocol should be aborted if the patient develops hypotension and/or laryngeal edema that are not immediately responsive to IM epinephrine					  7.     Refractory cases and/or those on beta blockers may require glucagon (1-2mg IV over 5 minutes); followed by 5-15mcg/min infusion if needed.					  8.     Resume the protocol by restarting at the same step the protocol had been paused.					  Please contact us with any questions: [phone]    Quang Pérez MD  ___________________________________  Fellow, Division of Allergy and Immunology  [facility] [employer] at [facility] 1.) Severe thrombocytopenia  2.) Refractory ITP  3.) Hyponatremia  4.) Adverse reaction to Rituxan, Winrho, Bactrim    -Reported history of reaction (acute hypertension/tachycardia) with Rituxan is not entirely consistent with an IgE mediated allergic reaction. The patient did develop a rash, which is consistent with an IgE mediated reaction, however it is unclear whether this pt's rash was consistent with urticaria vs a morbiliform drug rash. However, all of the patient's reactions are noted to be common adverse reactions with Rituxan, per UTD (Cardiac disorder (5% to 29%), hypertension (6% to 12%), skin rash (=17%)  -Recent steroid use precludes skin testing, and skin testing is sensitive only for an IgE mediated reaction. Given limited therapeutic alternatives, we will provide a Rituxan desensitization protocol to be used at the discretion of the primary team and the heme/onc team. Given the unclear nature of the patients reaction (IgE mediated vs non-IgE mediated), we cannot predict the efficacy of preventing a non-IgE mediated reaction. Pt will need to be transferred to the ICU for a desensitization. As with all desensitizations, if Rituxan therapy is discontinued for >48hr, he will need to be desensitized again. He also cannot exceed the target dose that he is desensitized with.  -Rituxan desensitization protocol was discussed with Heme/onc team (Dr Lutz), and they recommended a target dose of 990mg. A copy of the protocol will be emailed to the heme/onc team, and is also documented below.  -Recommend use of standard Rituxan premedication regimen, in addition to premedications below  -Additional management as per primary team  -Patient should follow up after discharge for further testing to evaluate his drug allergies. Call [phone] to schedule a follow up appointment with Dr Yin in the Drug allergy center  -Thank you for the consult, please contact with any additional questions or concerns    [facility] - Division of Allergy & Immunology						Date: 3/5/21	  Rituximab Rapid Desensitization Protocol		Patient name: SEBAS Castle [birth year]				  												  Total Dose (mg):	990											  												  	Concentration (mg/ml)										   Bag A:	0.34											  Bag B:	3.34											  												  												  Step #	Infusion Duration (min)	Bag	Rate (mL/hr)	Volume to Infuse (mL)	Dose (mg)	Cum dose (mg)		    Exam			  							BP	HR	O2 sats	RR	Skin	Lungs  												  1	15	A	5	1.25	0.425	0.425						  2	15	A	10	2.5	0.85	1.275						  3	15	A	20	5	1.7	2.975						  4	15	A	40	10	3.4	6.375						  												  5	15	B	10	2.5	8.35	14.725						  6	15	B	20	5	16.7	31.425						  7	15	B	40	10	33.4	64.825						  8	931.7590690	B	75	276.9985	925.175	990						  												  Total minutes:	294.8690154											  Total Hours:	5.378676762											  *Premedication:  Start cetirizine 10mg and continue nightly until desensitized					  *Give cetirizine 10mg , famotidine 20mg  and lorazepam (0.5-1 mg  PO)  20 minutes before initiation desensitization, aside of  Decadron and Zofran (as per oncology team). Can repeat lorazepam (0.5-1 mg PO or IV as needed for anxiety)  *Beta-blockers (if applicable) must be held 24 hours before desensitization	  *If reactions occur:										  1.     temporarily halt the infusion and treat as appropriate--please obtain a serum tryptase level 1 hour after any symptoms of allergic reaction					  2.     In case of mild allergic reaction (itching ,flushing,  hives, mild swelling, mild nausea/ discomfort), and back pain- immediately give 50 mg Benadryl IM/IV				  3.     For wheezing- use 1 unit of  Albuterol  0.083% 3ml via nebulizer				  4.     For severe systemic reaction and anaphylaxis, including laryngeal edema (voice changes, throat tightness, stridor, difficulty swallowing), respiratory (shortness of breath, wheezing, repetitive cough), GI (repetitive vomiting, severe diarrhea), or cardiovascular symptoms (hypotension, hypotonia/ collapse)-give 0.3 ml 1:1000 Epinephrine IM immediately.				  5.      In case of  fevers, rigors/chills, nausea, pain, rigors- increase IVF to 500 cc/h until symptoms resolve.	  6.     The protocol should be aborted if the patient develops hypotension and/or laryngeal edema that are not immediately responsive to IM epinephrine					  7.     Refractory cases and/or those on beta blockers may require glucagon (1-2mg IV over 5 minutes); followed by 5-15mcg/min infusion if needed.					  8.     Resume the protocol by restarting at the same step the protocol had been paused.					  Please contact us with any questions: [phone]    Quang Pérez MD  ___________________________________  Fellow, Division of Allergy and Immunology  [facility] [employer] at [facility] 1.) Severe thrombocytopenia  2.) Refractory ITP  3.) Hyponatremia  4.) Adverse reaction to Rituxan, Winrho, Bactrim    -Reported history of reaction (acute hypertension/tachycardia) with Rituxan is not entirely consistent with an IgE mediated allergic reaction. The patient did develop a rash, however it is unclear whether this pt's rash was consistent with urticaria vs a morbiliform drug rash. Other  patient's symptoms are known and not uncommon adverse reactions with Rituxan, per UTD (Cardiac disorder (5% to 29%), hypertension (6% to 12%), skin rash (=17%)  -Recent steroid use precludes skin testing, and skin testing is sensitive only for an IgE mediated reaction. Given limited therapeutic alternatives, we will provide a Rituxan desensitization protocol to be used at the discretion of the primary team and the heme/onc team.   Skin testing may be helpful to confirm IgE- mediated sensitivity but may be false- negative, especially in the setting of current high-dose steroids. Therefore, as rituximab is the best available treatment at this time, recommend desensitization (shorter 2-bag protocol).  Pt will need to be transferred to the ICU for a desensitization. As with all desensitizations, if Rituxan therapy is discontinued for >48hr, he will need to be desensitized again for every dose.  Will consider skin testing to rituximab when off high-dose steroids and antihistamines.   -Rituxan desensitization protocol was discussed with Heme/onc team (Dr Lutz), and they recommended a target dose of 990mg. A copy of the protocol will be emailed to the heme/onc team, and is also documented below.  -Recommend use of standard Rituxan premedication regimen, in addition to premedications below  -Additional management as per primary team  -Patient should follow up after discharge for further testing to evaluate his drug allergies. Call [phone] to schedule a follow up appointment with Dr Yin in the Drug allergy center  -Thank you for the consult, please contact with any additional questions or concerns    [facility] - Division of Allergy & Immunology						Date: 3/5/21	  Rituximab Rapid Desensitization Protocol		Patient name: SEBAS Castle [birth year]				  												  Total Dose (mg):	990											  												  	Concentration (mg/ml)										   Bag A:	0.34											  Bag B:	3.34											  												  												  Step #	Infusion Duration (min)	Bag	Rate (mL/hr)	Volume to Infuse (mL)	Dose (mg)	Cum dose (mg)		    Exam			  							BP	HR	O2 sats	RR	Skin	Lungs  												  1	15	A	5	1.25	0.425	0.425						  2	15	A	10	2.5	0.85	1.275						  3	15	A	20	5	1.7	2.975						  4	15	A	40	10	3.4	6.375						  												  5	15	B	10	2.5	8.35	14.725						  6	15	B	20	5	16.7	31.425						  7	15	B	40	10	33.4	64.825						  8	693.6695432	B	75	276.9985	925.175	990						  												  Total minutes:	003.6850993											  Total Hours:	5.020644287											  *Premedication:  Start cetirizine 10mg and continue nightly until desensitized					  *Give cetirizine 10mg , famotidine 20mg  and lorazepam (0.5-1 mg  PO)  20 minutes before initiation desensitization, aside of  Decadron and Zofran (as per oncology team). Can repeat lorazepam (0.5-1 mg PO or IV as needed for anxiety)  *Beta-blockers (if applicable) must be held 24 hours before desensitization	  *If reactions occur:										  1.     temporarily halt the infusion and treat as appropriate--please obtain a serum tryptase level 1 hour after any symptoms of allergic reaction					  2.     In case of mild allergic reaction (itching ,flushing,  hives, mild swelling, mild nausea/ discomfort), and back pain- immediately give 50 mg Benadryl IM/IV				  3.     For wheezing- use 1 unit of  Albuterol  0.083% 3ml via nebulizer				  4.     For severe systemic reaction and anaphylaxis, including laryngeal edema (voice changes, throat tightness, stridor, difficulty swallowing), respiratory (shortness of breath, wheezing, repetitive cough), GI (repetitive vomiting, severe diarrhea), or cardiovascular symptoms (hypotension, hypotonia/ collapse)-give 0.3 ml 1:1000 Epinephrine IM immediately.				  5.      In case of  fevers, rigors/chills, nausea, pain, rigors- increase IVF to 500 cc/h until symptoms resolve.	  6.     The protocol should be aborted if the patient develops hypotension and/or laryngeal edema that are not immediately responsive to IM epinephrine					  7.     Refractory cases and/or those on beta blockers may require glucagon (1-2mg IV over 5 minutes); followed by 5-15mcg/min infusion if needed.					  8.     Resume the protocol by restarting at the same step the protocol had been paused.					  Please contact us with any questions: [phone]    Quang Pérez MD  ___________________________________  Fellow, Division of Allergy and Immunology  [facility] [employer] at [facility]

## 2021-03-05 NOTE — CONSULT NOTE ADULT - SUBJECTIVE AND OBJECTIVE BOX
Patient is a 20y old  Male who presents with a chief complaint of low platelets (05 Mar 2021 11:10)    Interval HPI:  Pt is a 19yo male with refractory ITP, intellectual disability (nonverbal at baseline) who presented on 2/27/21 for severe thrombocytopenia. Of note, patient reportedly had a recent admission for thrombocytopenia, complicated with a rectal bleed, for which he was treated with IVIG and steroids. Patient has a remote history of an adverse reaction to Rituxan. However, in light of his refractory thrombocytopenia and limited therapeutic options, A/I was consulted for recommendations on Rixutan therapy.    Pt's mom reports that he had a reaction with Rituxan about 15 years prior, which she describes as onset of hypertension and tachycardia, and an associated erythematous, macular rash. She denies any associated dyspnea, angioedema, GI symptoms or hypotension. She cannot recall further details, due to the remote nature of the reaction. She reports that after that reaction, he was transitioned to other treatments, and his symptoms improved with IVIG. She reports that he was in remission for many years and has required no specific treatment. However, recently, he had a COVID infection, which has triggered a relapse of thrombocytopenic episodes. Pt's mom reports other reactions with treatments as below.    Winrho- about 15 years prior; developed acute dyspnea and was possibly given epinephrine injection for suspected anaphylaxis    Bactrim- about 15 years prio; developed diffuse rash, but denies any associated dyspnea, angioedema, GI symptoms or hypotension      Initial HPI:  19yo M h/o intellectual disability, autism, nonverbal at baseline, chronic ITP on 80mg of daily prednisone, presents w/ outpatient labs showing low plt. Patient was sent in from [facility] with platelet of 7 and heme referral for admission w/ IVIG. Patient is at baseline mental status and has had no bleeding recently. Patient had recent admission for plt transfusion and ivig for a rectal bleed.    Of note, patient was diagnosed with ITP since 18 months. Plt usually runs in 10K range. Follows with hematology at [facility]. Over the years, they have become more conservative in treatments given that patient would have very low level of platelets without major bleeding events. Patient was treated with IVIG and steroids end of Jan. 2021. Prior to that, last episode of major flare was about 5 years ago when he had diffuse petechiae without bleeding, and plt was about 5K at that time. (27 Feb 2021 01:52)      Allergies    Bactrim (Hives)  Rituxan (Hives)  WinRho SDF (Hives)    Intolerances      MEDICATIONS  (STANDING):  atovaquone  Suspension 1500 milliGRAM(s) Oral daily  brexpiprazole 6 milliGRAM(s) Oral daily  influenza   Vaccine 0.5 milliLiter(s) IntraMuscular once  LORazepam     Tablet 1 milliGRAM(s) Oral at bedtime  multivitamin 1 Tablet(s) Oral daily  pantoprazole    Tablet 40 milliGRAM(s) Oral before breakfast  predniSONE   Tablet 80 milliGRAM(s) Oral daily  traZODone 300 milliGRAM(s) Oral at bedtime    MEDICATIONS  (PRN):      PAST MEDICAL & SURGICAL HISTORY:  Chronic ITP (idiopathic thrombocytopenia)    Intellectual disability    No significant past surgical history        REVIEW OF SYSTEMS  Unable to obtain 2/2 basesline mentation    FAMILY HISTORY:  FH: hypertension    Vital Signs Last 24 Hrs  T(C): 36.8 (05 Mar 2021 05:01), Max: 36.8 (05 Mar 2021 05:01)  T(F): 98.2 (05 Mar 2021 05:01), Max: 98.2 (05 Mar 2021 05:01)  HR: 74 (05 Mar 2021 05:01) (64 - 81)  BP: 108/64 (05 Mar 2021 05:01) (108/64 - 123/78)  BP(mean): --  RR: 17 (05 Mar 2021 05:01) (17 - 17)  SpO2: 95% (05 Mar 2021 05:01) (95% - 98%)    PHYSICAL EXAM  Refer to inpatient physical exam    Lab Results:                        13.0   12.98 )-----------( 5        ( 05 Mar 2021 06:47 )             42.0     03-05    131<L>  |  99  |  28<H>  ----------------------------<  114<H>  4.5   |  25  |  0.71    Ca    8.5      05 Mar 2021 06:47  Phos  3.8     03-05  Mg     1.9     03-05    TPro  7.5  /  Alb  3.0<L>  /  TBili  0.6  /  DBili  x   /  AST  22  /  ALT  78<H>  /  AlkPhos  39<L>  03-05    LIVER FUNCTIONS - ( 05 Mar 2021 06:47 )  Alb: 3.0 g/dL / Pro: 7.5 g/dL / ALK PHOS: 39 U/L / ALT: 78 U/L / AST: 22 U/L / GGT: x

## 2021-03-05 NOTE — CONSULT NOTE ADULT - SUBJECTIVE AND OBJECTIVE BOX
CHIEF COMPLAINT:     HPI:  19yo M h/o intellectual disability, autism, nonverbal at baseline, chronic ITP on 80mg of daily prednisone, presents w/ outpatient labs showing low plt. Patient was sent in from [facility] with platelet of 7 and heme referral for admission w/ IVIG. Patient is at baseline mental status and has had no bleeding recently. Patient had recent admission for plt transfusion and ivig for a rectal bleed.    Of note, patient was diagnosed with ITP since 18 months. Plt usually runs in 10K range. Follows with hematology at [place]. Over the years, they have become more conservative in treatments given that patient would have very low level of platelets without major bleeding events. Patient was treated with IVIG and steroids end of Jan. 2021. Prior to that, last episode of major flare was about 5 years ago when he had diffuse petechiae without bleeding, and plt was about 5K at that time. (27 Feb 2021 01:52)    Pt has been unresponsive to IVIG during this admission; Heme/Onc is requesting a trial of rituximab for treatment which would need to occur in the ICU given the patient's previous reactions.    FAMILY HISTORY:  FH: hypertension    Allergies    Bactrim (Hives)  Rituxan (Hives)  WinRho SDF (Hives)    Intolerances        HOME MEDICATIONS:    OBJECTIVE:  ICU Vital Signs Last 24 Hrs  T(C): 36.8 (05 Mar 2021 05:01), Max: 36.8 (05 Mar 2021 05:01)  T(F): 98.2 (05 Mar 2021 05:01), Max: 98.2 (05 Mar 2021 05:01)  HR: 74 (05 Mar 2021 05:01) (64 - 81)  BP: 108/64 (05 Mar 2021 05:01) (108/64 - 123/78)  BP(mean): --  ABP: --  ABP(mean): --  RR: 17 (05 Mar 2021 05:01) (17 - 17)  SpO2: 95% (05 Mar 2021 05:01) (95% - 98%)        CAPILLARY BLOOD GLUCOSE          PHYSICAL EXAM:  GENERAL: NAD, Obese  HEAD:  Atraumatic, Normocephalic  EYES: EOMI, PERRLA, conjunctiva and sclera clear  NECK: Supple, No JVD  CHEST/LUNG: Clear to auscultation bilaterally; No wheeze  HEART: Regular rate and rhythm; No murmurs, rubs, or gallops  ABDOMEN: Soft, Nontender, Nondistended; Bowel sounds present  EXTREMITIES:  2+ Peripheral Pulses, No clubbing, cyanosis, or edema  PSYCH: AAOx0  NEUROLOGY: non-focal      HOSPITAL MEDICATIONS:  MEDICATIONS  (STANDING):  atovaquone  Suspension 1500 milliGRAM(s) Oral daily  brexpiprazole 6 milliGRAM(s) Oral daily  influenza   Vaccine 0.5 milliLiter(s) IntraMuscular once  LORazepam     Tablet 1 milliGRAM(s) Oral at bedtime  multivitamin 1 Tablet(s) Oral daily  pantoprazole    Tablet 40 milliGRAM(s) Oral before breakfast  predniSONE   Tablet 80 milliGRAM(s) Oral daily  traZODone 300 milliGRAM(s) Oral at bedtime    MEDICATIONS  (PRN):      LABS:                        13.0   12.98 )-----------( 5        ( 05 Mar 2021 06:47 )             42.0     03-05    131<L>  |  99  |  28<H>  ----------------------------<  114<H>  4.5   |  25  |  0.71    Ca    8.5      05 Mar 2021 06:47  Phos  3.8     03-05  Mg     1.9     03-05    TPro  7.5  /  Alb  3.0<L>  /  TBili  0.6  /  DBili  x   /  AST  22  /  ALT  78<H>  /  AlkPhos  39<L>  03-05              MICROBIOLOGY:     RADIOLOGY:  [ ] Reviewed and interpreted by me    EKG:

## 2021-03-05 NOTE — PROGRESS NOTE ADULT - ATTENDING COMMENTS
Plan for transfer to MICU for Rituxan desensitization  D/w MICU fellow, who will consult  Transfer to MICU when bed available Plan for transfer to MICU for Rituxan desensitization  D/w MICU fellow  Transfer to MICU when bed available

## 2021-03-05 NOTE — PROGRESS NOTE ADULT - PROBLEM SELECTOR PLAN 1
Hx of chronic ITP since 18 months, recently on home Pred 80mg qD   CTH neg, no e/o bleeding on exam   -s/p 2U plts on 2/27, 1 u plts 2/28  -s/p IVIG x2, completed 2/28   -s/p Decadron 40 mg IV (2/28-3/3), f/u further Heme recs   -Romiplastin/Nplate ordered by Jose on 2/28, next dose 3/7    platelets down to 5K today - d/w Heme and A/I - plan for transfter to MICU for Rituxan desensitization

## 2021-03-05 NOTE — PROGRESS NOTE ADULT - ASSESSMENT
19 yo M with a history of chronic ITP and severe autism (non-verbal at baseline) who presents with severe thrombocytopenia (Plt 7) while on home PO prednisone (80mg daily).     # Chronic ITP exacerbated in the setting of recent COVID infection   -CT head neg for bleed  -s/p IVIG 1gm/kg daily x 2 (completed 2/28)   -S/p romiplastin/Nplate, 1mcg/kg; given 2/28; next dose due 3/7   -S/p dexamethasone 40mg IV x4 days (completed 3/3)  -as pt was on steroid for prolonged period will restart prednisone 80 mg then taper  -plts count still low; will d/w Dr. Bhagat (pt's hematologist) today - too soon to give further IVIG. If plts remain refractory, Rituxan and splenectomy are other options. Please consult allergy and immunology to assess pt's rituxan allergy. He may be eligible for desensitization protocol.  -D/w allergy/immunology     -- PLAN TO densensitize for Rituxan and administer this tomorrow (3/6)     -- Orders will be given to Chemo nurse and pharmacy      -- Patient needs to move to ICU for this; plan communicated to Dr. Donato who will start the process    -transfuse platelets if bleeding (consider giving 1/2 unit platelets slowly infused over 3 hours)  -PLEASE START MEPRON for PCP ppx (have asked pharmacy to enter this)  -monitor closely for any signs or symptoms of bleeding      Francisca Lutz, PGY-4  Hematology-Oncology Fellow  [phone] ([place]) [phone] ([facility])  I can also be reached on [employer] Teams  Please page fellow on call after 5pm and on weekends.

## 2021-03-05 NOTE — PROGRESS NOTE ADULT - ASSESSMENT
21yo M h/o intellectual disability, autism, nonverbal at baseline, chronic ITP on 80mg of daily prednisone, presents w/ outpatient labs showing low plt.

## 2021-03-05 NOTE — PROGRESS NOTE ADULT - SUBJECTIVE AND OBJECTIVE BOX
INTERVAL HPI/OVERNIGHT EVENTS:  No overnight events.     MEDICATIONS  (STANDING):  brexpiprazole 6 milliGRAM(s) Oral daily  influenza   Vaccine 0.5 milliLiter(s) IntraMuscular once  LORazepam     Tablet 1 milliGRAM(s) Oral at bedtime  multivitamin 1 Tablet(s) Oral daily  pantoprazole    Tablet 40 milliGRAM(s) Oral before breakfast  predniSONE   Tablet 80 milliGRAM(s) Oral daily  traZODone 300 milliGRAM(s) Oral at bedtime    MEDICATIONS  (PRN):    Allergies    Bactrim (Hives)  Rituxan (Hives)  WinRho SDF (Hives)    Intolerances          VITAL SIGNS:  T(F): 98.2 (03-05-21 @ 05:01)  HR: 74 (03-05-21 @ 05:01)  BP: 108/64 (03-05-21 @ 05:01)  RR: 17 (03-05-21 @ 05:01)  SpO2: 95% (03-05-21 @ 05:01)  Wt(kg): --    PHYSICAL EXAM:  Exam per primary team    LABS:                        13.0   12.98 )-----------( 5        ( 05 Mar 2021 06:47 )             42.0     03-05    131<L>  |  99  |  28<H>  ----------------------------<  114<H>  4.5   |  25  |  0.71    Ca    8.5      05 Mar 2021 06:47  Phos  3.8     03-05  Mg     1.9     03-05    TPro  7.5  /  Alb  3.0<L>  /  TBili  0.6  /  DBili  x   /  AST  22  /  ALT  78<H>  /  AlkPhos  39<L>  03-05          RADIOLOGY & ADDITIONAL TESTS:  Studies reviewed.

## 2021-03-05 NOTE — CONSULT NOTE ADULT - ASSESSMENT
19yo M h/o intellectual disability, autism, nonverbal at baseline, chronic ITP on 80mg of daily prednisone with very low platelets now requiring rituximab for treatment.    - We will follow the protocol as set forth by A/I  - Currently we do not have beds available for this patient; once we do we will bring the patient over.  - While the patient has had previous anaphylactic reactions; as per the mom it seems the reaction to rituximab was not anaphylactic. Unclear if patient received epinephrine at that time; otherwise developed a rash that improved with benadryl use.      Faraz Zavala  EM/IM  PGY-3

## 2021-03-05 NOTE — CONSULT NOTE ADULT - PROBLEM SELECTOR RECOMMENDATION 9
-Proceed with Rituxan desensitization protocol, at the discretion of primary team and heme/onc team  -Follow up with [facility] after discharge for further evaluation of drug allergies

## 2021-03-05 NOTE — PROGRESS NOTE ADULT - SUBJECTIVE AND OBJECTIVE BOX
Patient is a 20y old  Male who presents with a chief complaint of low platelets (05 Mar 2021 11:40)        SUBJECTIVE / OVERNIGHT EVENTS:  no acute events o/n  plts down to 5 today  pt no new complaints, ambulating around room, watching tablet mostly  d/w mother at bedside - she is trying to get records from [employer] from when he had reaction to Rituxan ~ 15 years ago      MEDICATIONS  (STANDING):  atovaquone  Suspension 1500 milliGRAM(s) Oral daily  brexpiprazole 6 milliGRAM(s) Oral daily  influenza   Vaccine 0.5 milliLiter(s) IntraMuscular once  LORazepam     Tablet 1 milliGRAM(s) Oral at bedtime  multivitamin 1 Tablet(s) Oral daily  pantoprazole    Tablet 40 milliGRAM(s) Oral before breakfast  predniSONE   Tablet 80 milliGRAM(s) Oral daily  traZODone 300 milliGRAM(s) Oral at bedtime    MEDICATIONS  (PRN):      Vital Signs Last 24 Hrs  T(C): 36.8 (05 Mar 2021 05:01), Max: 36.8 (05 Mar 2021 05:01)  T(F): 98.2 (05 Mar 2021 05:01), Max: 98.2 (05 Mar 2021 05:01)  HR: 74 (05 Mar 2021 05:01) (64 - 81)  BP: 108/64 (05 Mar 2021 05:01) (108/64 - 123/78)  BP(mean): --  RR: 17 (05 Mar 2021 05:01) (17 - 17)  SpO2: 95% (05 Mar 2021 05:01) (95% - 98%)  CAPILLARY BLOOD GLUCOSE        I&O's Summary        PHYSICAL EXAM  CONSTITUTIONAL: NAD, well-appearing, sitting in chair, obese   RESPIRATORY: overall clear, does not participate in exam  CARDIOVASCULAR: Regular rate and rhythm, normal S1 and S2, no murmur/rub/gallops   ABDOMEN: Nontender to palpation, normoactive bowel sounds  MUSKULOSKELETAL:  no clubbing or cyanosis of digits; no joint swelling or tenderness to palpation  PSYCH: calm, affect appropriate  NEUROLOGY: CN 2-12 are intact and symmetric; nonfocal, nonverbal, does not follow simple commands.   SKIN: scattered UE ecchymosis        LABS:                        13.0   12.98 )-----------( 5        ( 05 Mar 2021 06:47 )             42.0     03-05    131<L>  |  99  |  28<H>  ----------------------------<  114<H>  4.5   |  25  |  0.71    Ca    8.5      05 Mar 2021 06:47  Phos  3.8     03-05  Mg     1.9     03-05    TPro  7.5  /  Alb  3.0<L>  /  TBili  0.6  /  DBili  x   /  AST  22  /  ALT  78<H>  /  AlkPhos  39<L>  03-05              RADIOLOGY & ADDITIONAL TESTS:    Imaging Personally Reviewed:  Consultant(s) Notes Reviewed:    Care Discussed with Consultants/Other Providers:

## 2021-03-06 LAB
ALBUMIN SERPL ELPH-MCNC: 3.1 G/DL — LOW (ref 3.3–5)
ALP SERPL-CCNC: 45 U/L — SIGNIFICANT CHANGE UP (ref 40–120)
ALT FLD-CCNC: 73 U/L — HIGH (ref 4–41)
ANION GAP SERPL CALC-SCNC: 14 MMOL/L — SIGNIFICANT CHANGE UP (ref 7–14)
AST SERPL-CCNC: 33 U/L — SIGNIFICANT CHANGE UP (ref 4–40)
BASOPHILS # BLD AUTO: 0.03 K/UL — SIGNIFICANT CHANGE UP (ref 0–0.2)
BASOPHILS NFR BLD AUTO: 0.2 % — SIGNIFICANT CHANGE UP (ref 0–2)
BILIRUB SERPL-MCNC: 0.6 MG/DL — SIGNIFICANT CHANGE UP (ref 0.2–1.2)
BLD GP AB SCN SERPL QL: NEGATIVE — SIGNIFICANT CHANGE UP
BUN SERPL-MCNC: 30 MG/DL — HIGH (ref 7–23)
CALCIUM SERPL-MCNC: 8.5 MG/DL — SIGNIFICANT CHANGE UP (ref 8.4–10.5)
CHLORIDE SERPL-SCNC: 102 MMOL/L — SIGNIFICANT CHANGE UP (ref 98–107)
CO2 SERPL-SCNC: 20 MMOL/L — LOW (ref 22–31)
CREAT SERPL-MCNC: 0.77 MG/DL — SIGNIFICANT CHANGE UP (ref 0.5–1.3)
EOSINOPHIL # BLD AUTO: 0 K/UL — SIGNIFICANT CHANGE UP (ref 0–0.5)
EOSINOPHIL NFR BLD AUTO: 0 % — SIGNIFICANT CHANGE UP (ref 0–6)
GLUCOSE SERPL-MCNC: 151 MG/DL — HIGH (ref 70–99)
HCT VFR BLD CALC: 44.8 % — SIGNIFICANT CHANGE UP (ref 39–50)
HGB BLD-MCNC: 14 G/DL — SIGNIFICANT CHANGE UP (ref 13–17)
IANC: 13.91 K/UL — HIGH (ref 1.5–8.5)
IMM GRANULOCYTES NFR BLD AUTO: 1.8 % — HIGH (ref 0–1.5)
LYMPHOCYTES # BLD AUTO: 0.97 K/UL — LOW (ref 1–3.3)
LYMPHOCYTES # BLD AUTO: 6.2 % — LOW (ref 13–44)
MCHC RBC-ENTMCNC: 26.9 PG — LOW (ref 27–34)
MCHC RBC-ENTMCNC: 31.3 GM/DL — LOW (ref 32–36)
MCV RBC AUTO: 86 FL — SIGNIFICANT CHANGE UP (ref 80–100)
MONOCYTES # BLD AUTO: 0.53 K/UL — SIGNIFICANT CHANGE UP (ref 0–0.9)
MONOCYTES NFR BLD AUTO: 3.4 % — SIGNIFICANT CHANGE UP (ref 2–14)
NEUTROPHILS # BLD AUTO: 13.91 K/UL — HIGH (ref 1.8–7.4)
NEUTROPHILS NFR BLD AUTO: 88.4 % — HIGH (ref 43–77)
NRBC # BLD: 0 /100 WBCS — SIGNIFICANT CHANGE UP
NRBC # FLD: 0 K/UL — SIGNIFICANT CHANGE UP
PLATELET # BLD AUTO: 5 K/UL — CRITICAL LOW (ref 150–400)
POTASSIUM SERPL-MCNC: 4.3 MMOL/L — SIGNIFICANT CHANGE UP (ref 3.5–5.3)
POTASSIUM SERPL-SCNC: 4.3 MMOL/L — SIGNIFICANT CHANGE UP (ref 3.5–5.3)
PROT SERPL-MCNC: 7.6 G/DL — SIGNIFICANT CHANGE UP (ref 6–8.3)
RBC # BLD: 5.21 M/UL — SIGNIFICANT CHANGE UP (ref 4.2–5.8)
RBC # FLD: 17.6 % — HIGH (ref 10.3–14.5)
RH IG SCN BLD-IMP: POSITIVE — SIGNIFICANT CHANGE UP
SODIUM SERPL-SCNC: 136 MMOL/L — SIGNIFICANT CHANGE UP (ref 135–145)
WBC # BLD: 15.72 K/UL — HIGH (ref 3.8–10.5)
WBC # FLD AUTO: 15.72 K/UL — HIGH (ref 3.8–10.5)

## 2021-03-06 PROCEDURE — 99291 CRITICAL CARE FIRST HOUR: CPT | Mod: 25

## 2021-03-06 RX ORDER — ALBUTEROL 90 UG/1
2.5 AEROSOL, METERED ORAL ONCE
Refills: 0 | Status: DISCONTINUED | OUTPATIENT
Start: 2021-03-06 | End: 2021-03-08

## 2021-03-06 RX ORDER — ONDANSETRON 8 MG/1
8 TABLET, FILM COATED ORAL ONCE
Refills: 0 | Status: COMPLETED | OUTPATIENT
Start: 2021-03-06 | End: 2021-03-06

## 2021-03-06 RX ORDER — CETIRIZINE HYDROCHLORIDE 10 MG/1
10 TABLET ORAL ONCE
Refills: 0 | Status: DISCONTINUED | OUTPATIENT
Start: 2021-03-06 | End: 2021-03-06

## 2021-03-06 RX ORDER — EPINEPHRINE 0.3 MG/.3ML
0.3 INJECTION INTRAMUSCULAR; SUBCUTANEOUS ONCE
Refills: 0 | Status: DISCONTINUED | OUTPATIENT
Start: 2021-03-06 | End: 2021-03-08

## 2021-03-06 RX ORDER — CETIRIZINE HYDROCHLORIDE 10 MG/1
10 TABLET ORAL AT BEDTIME
Refills: 0 | Status: DISCONTINUED | OUTPATIENT
Start: 2021-03-06 | End: 2021-04-12

## 2021-03-06 RX ORDER — RITUXIMAB 10 MG/ML
17 INJECTION, SOLUTION INTRAVENOUS ONCE
Refills: 0 | Status: COMPLETED | OUTPATIENT
Start: 2021-03-06 | End: 2021-03-06

## 2021-03-06 RX ORDER — ACETAMINOPHEN 500 MG
650 TABLET ORAL ONCE
Refills: 0 | Status: COMPLETED | OUTPATIENT
Start: 2021-03-06 | End: 2021-03-06

## 2021-03-06 RX ORDER — DIPHENHYDRAMINE HCL 50 MG
50 CAPSULE ORAL DAILY
Refills: 0 | Status: DISCONTINUED | OUTPATIENT
Start: 2021-03-06 | End: 2021-03-06

## 2021-03-06 RX ORDER — FAMOTIDINE 10 MG/ML
20 INJECTION INTRAVENOUS ONCE
Refills: 0 | Status: DISCONTINUED | OUTPATIENT
Start: 2021-03-06 | End: 2021-03-06

## 2021-03-06 RX ORDER — RITUXIMAB 10 MG/ML
983.62 INJECTION, SOLUTION INTRAVENOUS ONCE
Refills: 0 | Status: COMPLETED | OUTPATIENT
Start: 2021-03-06 | End: 2021-03-06

## 2021-03-06 RX ORDER — HYDROCORTISONE 20 MG
100 TABLET ORAL ONCE
Refills: 0 | Status: DISCONTINUED | OUTPATIENT
Start: 2021-03-06 | End: 2021-03-08

## 2021-03-06 RX ORDER — CETIRIZINE HYDROCHLORIDE 10 MG/1
10 TABLET ORAL ONCE
Refills: 0 | Status: COMPLETED | OUTPATIENT
Start: 2021-03-06 | End: 2021-03-06

## 2021-03-06 RX ORDER — MEPERIDINE HYDROCHLORIDE 50 MG/ML
25 INJECTION INTRAMUSCULAR; INTRAVENOUS; SUBCUTANEOUS ONCE
Refills: 0 | Status: DISCONTINUED | OUTPATIENT
Start: 2021-03-06 | End: 2021-03-08

## 2021-03-06 RX ORDER — DIPHENHYDRAMINE HCL 50 MG
50 CAPSULE ORAL ONCE
Refills: 0 | Status: COMPLETED | OUTPATIENT
Start: 2021-03-06 | End: 2021-03-07

## 2021-03-06 RX ORDER — CHLORHEXIDINE GLUCONATE 213 G/1000ML
1 SOLUTION TOPICAL
Refills: 0 | Status: DISCONTINUED | OUTPATIENT
Start: 2021-03-06 | End: 2021-04-12

## 2021-03-06 RX ORDER — DIPHENHYDRAMINE HCL 50 MG
50 CAPSULE ORAL ONCE
Refills: 0 | Status: COMPLETED | OUTPATIENT
Start: 2021-03-06 | End: 2021-03-06

## 2021-03-06 RX ORDER — FAMOTIDINE 10 MG/ML
20 INJECTION INTRAVENOUS ONCE
Refills: 0 | Status: COMPLETED | OUTPATIENT
Start: 2021-03-06 | End: 2021-03-06

## 2021-03-06 RX ADMIN — CETIRIZINE HYDROCHLORIDE 10 MILLIGRAM(S): 10 TABLET ORAL at 17:41

## 2021-03-06 RX ADMIN — ATOVAQUONE 1500 MILLIGRAM(S): 750 SUSPENSION ORAL at 14:52

## 2021-03-06 RX ADMIN — CETIRIZINE HYDROCHLORIDE 10 MILLIGRAM(S): 10 TABLET ORAL at 21:37

## 2021-03-06 RX ADMIN — ONDANSETRON 8 MILLIGRAM(S): 8 TABLET, FILM COATED ORAL at 17:09

## 2021-03-06 RX ADMIN — Medication 80 MILLIGRAM(S): at 05:08

## 2021-03-06 RX ADMIN — Medication 1 MILLIGRAM(S): at 17:41

## 2021-03-06 RX ADMIN — RITUXIMAB 17 MILLIGRAM(S): 10 INJECTION, SOLUTION INTRAVENOUS at 18:04

## 2021-03-06 RX ADMIN — RITUXIMAB 983.62 MILLIGRAM(S): 10 INJECTION, SOLUTION INTRAVENOUS at 19:14

## 2021-03-06 RX ADMIN — Medication 650 MILLIGRAM(S): at 17:42

## 2021-03-06 RX ADMIN — Medication 1 TABLET(S): at 14:52

## 2021-03-06 RX ADMIN — Medication 1 MILLIGRAM(S): at 17:44

## 2021-03-06 RX ADMIN — PANTOPRAZOLE SODIUM 40 MILLIGRAM(S): 20 TABLET, DELAYED RELEASE ORAL at 05:08

## 2021-03-06 RX ADMIN — Medication 1 MILLIGRAM(S): at 21:37

## 2021-03-06 RX ADMIN — Medication 50 MILLIGRAM(S): at 17:20

## 2021-03-06 RX ADMIN — BREXPIPRAZOLE 6 MILLIGRAM(S): 0.25 TABLET ORAL at 14:52

## 2021-03-06 RX ADMIN — Medication 650 MILLIGRAM(S): at 17:05

## 2021-03-06 RX ADMIN — Medication 300 MILLIGRAM(S): at 21:37

## 2021-03-06 RX ADMIN — FAMOTIDINE 20 MILLIGRAM(S): 10 INJECTION INTRAVENOUS at 17:42

## 2021-03-06 NOTE — CHART NOTE - NSCHARTNOTEFT_GEN_A_CORE
MICU Accept Note  ------------------------    CHIEF COMPLAINT:     HPI / INTERVAL HISTORY:  HPI:  21yo M h/o intellectual disability, autism, nonverbal at baseline, chronic ITP on 80mg of daily prednisone, presents w/ outpatient labs showing low plt. Patient was sent in from [facility] with platelet of 7 and heme referral for admission w/ IVIG. Patient is at baseline mental status and has had no bleeding recently. Patient had recent admission for plt transfusion and ivig for a rectal bleed.    Of note, patient was diagnosed with ITP since 18 months. Plt usually runs in 10K range. Follows with hematology at [place]. Over the years, they have become more conservative in treatments given that patient would have very low level of platelets without major bleeding events. Patient was treated with IVIG and steroids end of Jan. 2021. Prior to that, last episode of major flare was about 5 years ago when he had diffuse petechiae without bleeding, and plt was about 5K at that time. (27 Feb 2021 01:52)    MICU called for rituximab desensitization.        REVIEW OF SYSTEMS:  Constitutional: No fevers, chills, weight loss  Neurological: No headache, dizziness, weakness, numbness  HEENT: No vision problems, eye pain, nasal congestion, rhinorrhea, sore throat, dysphagia  Resp: No cough, dyspnea, wheezing, hemoptysis  CV: No chest pain, orthopnea, palpitations  GI: No nausea, vomiting, diarrhea, constipation, abdominal pain  : No dysuria, hematuria, urinary frequency or urgency  Musculoskeletal: No back pain, myalgias, arthralgias, or BLE edema  Skin: No new rashes, itching  [ ] Unable to assess ROS because ________      PMH/PSH:  PAST MEDICAL & SURGICAL HISTORY:  Chronic ITP (idiopathic thrombocytopenia)    Intellectual disability    No significant past surgical history        FAMILY HISTORY:  FAMILY HISTORY:  FH: hypertension        SOCIAL HISTORY:  Smoking: [  ] Never Smoked  [  ] Former Smoker (# packs x # years), quit   [  ] Current Smoker (# packs x # years)  Substance Use: [  ] None; [   ] Yes:  EtOH Use: [   ] None; [   ] Rare; [   ] Social; [   ] Frequent:   Marital Status: [  ] Single  [  ]   [  ]   [  ]   Dependents:  Occupation:  Barriers to treatment:   Advance Directives:     HOME MEDICATIONS:  Home Medications:      ALLERGIES:  Allergies    Bactrim (Hives)  Rituxan (Hives)  WinRho SDF (Hives)    Intolerances            OBJECTIVE:  ICU Vital Signs Last 24 Hrs  T(C): 36.7 (06 Mar 2021 05:07), Max: 36.7 (05 Mar 2021 13:50)  T(F): 98 (06 Mar 2021 05:07), Max: 98 (05 Mar 2021 13:50)  HR: 109 (06 Mar 2021 05:07) (70 - 109)  BP: 118/60 (06 Mar 2021 05:07) (110/85 - 132/58)  BP(mean): --  ABP: --  ABP(mean): --  RR: 18 (06 Mar 2021 05:07) (18 - 20)  SpO2: 95% (06 Mar 2021 05:07) (95% - 98%)        CAPILLARY BLOOD GLUCOSE          PHYSICAL EXAM  GENERAL: No acute distress  NEURO: A+O x 3, follows commands; spontaneously moving all 4 extremities; strength and sensation grossly intact  HEENT: Pupil equal and reactive to light; extra-ocular movements intact; clear conjunctiva; normal mucus membrane and oropharynx; neck supple  RESP: Normal respiratory effort on room air; clear to auscultation bilateral lung fields  CVS: S1/S2 present, normal rate and rhythm; no murmurs, gallops or rubs appreciated  ABD: Soft, non-tender, non-distended, no masses appreciated; bowel sound normal at all 4 quadrants  EXT: Grossly normal ROM; 2+ pedal pulses bilaterally, no BLE edema  SKIN: Warm, dry, and appropirate color for skin tone; No new rashes    LINES:       HOSPITAL MEDICATIONS:  MEDICATIONS  (STANDING):  atovaquone  Suspension 1500 milliGRAM(s) Oral daily  brexpiprazole 6 milliGRAM(s) Oral daily  influenza   Vaccine 0.5 milliLiter(s) IntraMuscular once  LORazepam     Tablet 1 milliGRAM(s) Oral at bedtime  multivitamin 1 Tablet(s) Oral daily  pantoprazole    Tablet 40 milliGRAM(s) Oral before breakfast  predniSONE   Tablet 80 milliGRAM(s) Oral daily  sodium chloride 0.9%. 1000 milliLiter(s) (50 mL/Hr) IV Continuous <Continuous>  traZODone 300 milliGRAM(s) Oral at bedtime    MEDICATIONS  (PRN):        LABS:                        14.0   15.72 )-----------( 5        ( 06 Mar 2021 09:50 )             44.8     Hgb Trend: 14.0<--, 13.0<--, 14.2<--, 12.0<--, 12.9<--  03-05    131<L>  |  99  |  28<H>  ----------------------------<  114<H>  4.5   |  25  |  0.71    Ca    8.5      05 Mar 2021 06:47  Phos  3.8     03-05  Mg     1.9     03-05    TPro  7.5  /  Alb  3.0<L>  /  TBili  0.6  /  DBili  x   /  AST  22  /  ALT  78<H>  /  AlkPhos  39<L>  03-05    Creatinine Trend: 0.71<--, 0.72<--, 0.77<--, 0.72<--, 0.73<--, 0.98<--            MICROBIOLOGY:       RADIOLOGY & ADDITIONAL TESTS: Reviewed. MICU Accept Note  ------------------------    CHIEF COMPLAINT:     HPI / INTERVAL HISTORY:  HPI:  19yo M h/o intellectual disability, autism, nonverbal at baseline, chronic ITP on 80mg of daily prednisone, presents w/ outpatient labs showing low plt. Patient was sent in from [facility] with platelet of 7 and heme referral for admission w/ IVIG. Patient is at baseline mental status and has had no bleeding recently. Patient had recent admission for plt transfusion and ivig for a rectal bleed.    Of note, patient was diagnosed with ITP since 18 months. Plt usually runs in 10K range. Follows with hematology at [place]. Over the years, they have become more conservative in treatments given that patient would have very low level of platelets without major bleeding events. Patient was treated with IVIG and steroids end of Jan. 2021. Prior to that, last episode of major flare was about 5 years ago when he had diffuse petechiae without bleeding, and plt was about 5K at that time. (27 Feb 2021 01:52)    MICU called for rituximab desensitization.        REVIEW OF SYSTEMS:  Constitutional: No fevers, chills, weight loss  Neurological: No headache, dizziness, weakness, numbness  HEENT: No vision problems, eye pain, nasal congestion, rhinorrhea, sore throat, dysphagia  Resp: No cough, dyspnea, wheezing, hemoptysis  CV: No chest pain, orthopnea, palpitations  GI: No nausea, vomiting, diarrhea, constipation, abdominal pain  : No dysuria, hematuria, urinary frequency or urgency  Musculoskeletal: No back pain, myalgias, arthralgias, or BLE edema  Skin: No new rashes, itching  [ ] Unable to assess ROS because ________      PMH/PSH:  PAST MEDICAL & SURGICAL HISTORY:  Chronic ITP (idiopathic thrombocytopenia)    Intellectual disability    No significant past surgical history        FAMILY HISTORY:  FAMILY HISTORY:  FH: hypertension        SOCIAL HISTORY:  Smoking: [  ] Never Smoked  [  ] Former Smoker (# packs x # years), quit   [  ] Current Smoker (# packs x # years)  Substance Use: [  ] None; [   ] Yes:  EtOH Use: [   ] None; [   ] Rare; [   ] Social; [   ] Frequent:   Marital Status: [  ] Single  [  ]   [  ]   [  ]   Dependents:  Occupation:  Barriers to treatment:   Advance Directives:     HOME MEDICATIONS:  Home Medications:      ALLERGIES:  Allergies    Bactrim (Hives)  Rituxan (Hives)  WinRho SDF (Hives)    Intolerances            OBJECTIVE:  ICU Vital Signs Last 24 Hrs  T(C): 36.7 (06 Mar 2021 05:07), Max: 36.7 (05 Mar 2021 13:50)  T(F): 98 (06 Mar 2021 05:07), Max: 98 (05 Mar 2021 13:50)  HR: 109 (06 Mar 2021 05:07) (70 - 109)  BP: 118/60 (06 Mar 2021 05:07) (110/85 - 132/58)  BP(mean): --  ABP: --  ABP(mean): --  RR: 18 (06 Mar 2021 05:07) (18 - 20)  SpO2: 95% (06 Mar 2021 05:07) (95% - 98%)        CAPILLARY BLOOD GLUCOSE          PHYSICAL EXAM  GENERAL: No acute distress  NEURO: A+O x 3, follows commands; spontaneously moving all 4 extremities; strength and sensation grossly intact  HEENT: Pupil equal and reactive to light; extra-ocular movements intact; clear conjunctiva; normal mucus membrane and oropharynx; neck supple  RESP: Normal respiratory effort on room air; clear to auscultation bilateral lung fields  CVS: S1/S2 present, normal rate and rhythm; no murmurs, gallops or rubs appreciated  ABD: Soft, non-tender, non-distended, no masses appreciated; bowel sound normal at all 4 quadrants  EXT: Grossly normal ROM; 2+ pedal pulses bilaterally, no BLE edema  SKIN: Warm, dry, and appropirate color for skin tone; No new rashes    LINES:       HOSPITAL MEDICATIONS:  MEDICATIONS  (STANDING):  atovaquone  Suspension 1500 milliGRAM(s) Oral daily  brexpiprazole 6 milliGRAM(s) Oral daily  influenza   Vaccine 0.5 milliLiter(s) IntraMuscular once  LORazepam     Tablet 1 milliGRAM(s) Oral at bedtime  multivitamin 1 Tablet(s) Oral daily  pantoprazole    Tablet 40 milliGRAM(s) Oral before breakfast  predniSONE   Tablet 80 milliGRAM(s) Oral daily  sodium chloride 0.9%. 1000 milliLiter(s) (50 mL/Hr) IV Continuous <Continuous>  traZODone 300 milliGRAM(s) Oral at bedtime    MEDICATIONS  (PRN):        LABS:                        14.0   15.72 )-----------( 5        ( 06 Mar 2021 09:50 )             44.8     Hgb Trend: 14.0<--, 13.0<--, 14.2<--, 12.0<--, 12.9<--  03-05    131<L>  |  99  |  28<H>  ----------------------------<  114<H>  4.5   |  25  |  0.71    Ca    8.5      05 Mar 2021 06:47  Phos  3.8     03-05  Mg     1.9     03-05    TPro  7.5  /  Alb  3.0<L>  /  TBili  0.6  /  DBili  x   /  AST  22  /  ALT  78<H>  /  AlkPhos  39<L>  03-05    Creatinine Trend: 0.71<--, 0.72<--, 0.77<--, 0.72<--, 0.73<--, 0.98<--            MICROBIOLOGY:       RADIOLOGY & ADDITIONAL TESTS: Reviewed.    A/P:  19yo M h/o intellectual disability, autism, nonverbal at baseline, chronic ITP on 80mg of daily prednisone with very low platelets now requiring rituximab for treatment.        #NEURO    #CVS    #RESP    #GI    #RENAL    #HEME    #ENDO    #ID    #GOC MICU Accept Note  ------------------------    CHIEF COMPLAINT:     HPI / INTERVAL HISTORY:  HPI:  21yo M h/o intellectual disability, autism, nonverbal at baseline, chronic ITP on 80mg of daily prednisone, presents w/ outpatient labs showing low plt. Patient was sent in from [facility] with platelet of 7 and heme referral for admission w/ IVIG. Patient is at baseline mental status and has had no bleeding recently. Patient had recent admission for plt transfusion and ivig for a rectal bleed.    Of note, patient was diagnosed with ITP since 18 months. Plt usually runs in 10K range. Follows with hematology at [place]. Over the years, they have become more conservative in treatments given that patient would have very low level of platelets without major bleeding events. Patient was treated with IVIG and steroids end of Jan. 2021. Prior to that, last episode of major flare was about 5 years ago when he had diffuse petechiae without bleeding, and plt was about 5K at that time. (27 Feb 2021 01:52)    MICU called for rituximab desensitization.        REVIEW OF SYSTEMS:  Unable to obtain given nonverbal at baseline      PMH/PSH:  PAST MEDICAL & SURGICAL HISTORY:  Chronic ITP (idiopathic thrombocytopenia)  Intellectual disability    No significant past surgical history      FAMILY HISTORY:  FH: hypertension      SOCIAL HISTORY:  Smoking: none  Substance Use: none  EtOH Use: none      HOME MEDICATIONS:  Home Medications:      ALLERGIES:  Allergies    Bactrim (Hives)  Rituxan (Hives)  WinRho SDF (Hives)    Intolerances            OBJECTIVE:  ICU Vital Signs Last 24 Hrs  T(C): 36.7 (06 Mar 2021 05:07), Max: 36.7 (05 Mar 2021 13:50)  T(F): 98 (06 Mar 2021 05:07), Max: 98 (05 Mar 2021 13:50)  HR: 109 (06 Mar 2021 05:07) (70 - 109)  BP: 118/60 (06 Mar 2021 05:07) (110/85 - 132/58)  BP(mean): --  ABP: --  ABP(mean): --  RR: 18 (06 Mar 2021 05:07) (18 - 20)  SpO2: 95% (06 Mar 2021 05:07) (95% - 98%)        CAPILLARY BLOOD GLUCOSE          PHYSICAL EXAM  GENERAL: No acute distress  NEURO: A+O x 3, follows commands; spontaneously moving all 4 extremities; strength and sensation grossly intact  HEENT: Pupil equal and reactive to light; extra-ocular movements intact; clear conjunctiva; normal mucus membrane and oropharynx; neck supple  RESP: Normal respiratory effort on room air; clear to auscultation bilateral lung fields  CVS: S1/S2 present, normal rate and rhythm; no murmurs, gallops or rubs appreciated  ABD: Soft, non-tender, non-distended, no masses appreciated; bowel sound normal at all 4 quadrants  EXT: Grossly normal ROM; 2+ pedal pulses bilaterally, no BLE edema  SKIN: Warm, dry, and appropirate color for skin tone; No new rashes    LINES:       HOSPITAL MEDICATIONS:  MEDICATIONS  (STANDING):  atovaquone  Suspension 1500 milliGRAM(s) Oral daily  brexpiprazole 6 milliGRAM(s) Oral daily  influenza   Vaccine 0.5 milliLiter(s) IntraMuscular once  LORazepam     Tablet 1 milliGRAM(s) Oral at bedtime  multivitamin 1 Tablet(s) Oral daily  pantoprazole    Tablet 40 milliGRAM(s) Oral before breakfast  predniSONE   Tablet 80 milliGRAM(s) Oral daily  sodium chloride 0.9%. 1000 milliLiter(s) (50 mL/Hr) IV Continuous <Continuous>  traZODone 300 milliGRAM(s) Oral at bedtime    MEDICATIONS  (PRN):        LABS:                        14.0   15.72 )-----------( 5        ( 06 Mar 2021 09:50 )             44.8     Hgb Trend: 14.0<--, 13.0<--, 14.2<--, 12.0<--, 12.9<--  03-05    131<L>  |  99  |  28<H>  ----------------------------<  114<H>  4.5   |  25  |  0.71    Ca    8.5      05 Mar 2021 06:47  Phos  3.8     03-05  Mg     1.9     03-05    TPro  7.5  /  Alb  3.0<L>  /  TBili  0.6  /  DBili  x   /  AST  22  /  ALT  78<H>  /  AlkPhos  39<L>  03-05    Creatinine Trend: 0.71<--, 0.72<--, 0.77<--, 0.72<--, 0.73<--, 0.98<--            MICROBIOLOGY:       RADIOLOGY & ADDITIONAL TESTS: Reviewed.    A/P:  21yo M h/o intellectual disability, autism, nonverbal at baseline, chronic ITP on 80mg of daily prednisone with very low platelets now requiring rituximab for treatment.        #NEURO    #CVS    #RESP    #GI    #RENAL    #HEME    #ENDO    #ID    #GOC MICU Accept Note  ------------------------    CHIEF COMPLAINT:     HPI / INTERVAL HISTORY:  21yo M h/o intellectual disability, autism, nonverbal at baseline, chronic ITP on 80mg of daily prednisone, presents w/ outpatient labs showing low plt. Patient was sent in from [facility] with platelet of 7 and heme referral for admission w/ IVIG. Patient is at baseline mental status and has had no bleeding recently. Patient had recent admission for plt transfusion and ivig for a rectal bleed.    Of note, patient was diagnosed with ITP since 18 months. Plt usually runs in 10K range. Follows with hematology at [place]. Over the years, they have become more conservative in treatments given that patient would have very low level of platelets without major bleeding events. Patient was treated with IVIG and steroids end of Jan. 2021. Prior to that, last episode of major flare was about 5 years ago when he had diffuse petechiae without bleeding, and plt was about 5K at that time. (27 Feb 2021 01:52)    MICU called for rituximab desensitization.        REVIEW OF SYSTEMS:  Unable to obtain given nonverbal at baseline      PMH/PSH:  PAST MEDICAL & SURGICAL HISTORY:  Chronic ITP (idiopathic thrombocytopenia)  Intellectual disability    No significant past surgical history      FAMILY HISTORY:  FH: hypertension      SOCIAL HISTORY:  Smoking: none  Substance Use: none  EtOH Use: none      HOME MEDICATIONS:  Home Medications:      ALLERGIES:  Allergies    Bactrim (Hives)  Rituxan (Hives)  WinRho SDF (Hives)    Intolerances            OBJECTIVE:  ICU Vital Signs Last 24 Hrs  T(C): 36.7 (06 Mar 2021 05:07), Max: 36.7 (05 Mar 2021 13:50)  T(F): 98 (06 Mar 2021 05:07), Max: 98 (05 Mar 2021 13:50)  HR: 109 (06 Mar 2021 05:07) (70 - 109)  BP: 118/60 (06 Mar 2021 05:07) (110/85 - 132/58)  BP(mean): --  ABP: --  ABP(mean): --  RR: 18 (06 Mar 2021 05:07) (18 - 20)  SpO2: 95% (06 Mar 2021 05:07) (95% - 98%)        CAPILLARY BLOOD GLUCOSE          PHYSICAL EXAM  GENERAL: NAD, Obese  HEAD:  Atraumatic, Normocephalic  EYES: EOMI, PERRLA, conjunctiva and sclera clear  NECK: Supple, No JVD  CHEST/LUNG: Clear to auscultation bilaterally; No wheeze  HEART: Regular rate and rhythm; No murmurs, rubs, or gallops  ABDOMEN: Soft, Nontender, Nondistended; Bowel sounds present  EXTREMITIES:  2+ Peripheral Pulses, No clubbing, cyanosis, or edema  PSYCH: AAOx0  NEUROLOGY: non-focal    LINES:       HOSPITAL MEDICATIONS:  MEDICATIONS  (STANDING):  atovaquone  Suspension 1500 milliGRAM(s) Oral daily  brexpiprazole 6 milliGRAM(s) Oral daily  influenza   Vaccine 0.5 milliLiter(s) IntraMuscular once  LORazepam     Tablet 1 milliGRAM(s) Oral at bedtime  multivitamin 1 Tablet(s) Oral daily  pantoprazole    Tablet 40 milliGRAM(s) Oral before breakfast  predniSONE   Tablet 80 milliGRAM(s) Oral daily  sodium chloride 0.9%. 1000 milliLiter(s) (50 mL/Hr) IV Continuous <Continuous>  traZODone 300 milliGRAM(s) Oral at bedtime    MEDICATIONS  (PRN):        LABS:                        14.0   15.72 )-----------( 5        ( 06 Mar 2021 09:50 )             44.8     Hgb Trend: 14.0<--, 13.0<--, 14.2<--, 12.0<--, 12.9<--  03-05    131<L>  |  99  |  28<H>  ----------------------------<  114<H>  4.5   |  25  |  0.71    Ca    8.5      05 Mar 2021 06:47  Phos  3.8     03-05  Mg     1.9     03-05    TPro  7.5  /  Alb  3.0<L>  /  TBili  0.6  /  DBili  x   /  AST  22  /  ALT  78<H>  /  AlkPhos  39<L>  03-05    Creatinine Trend: 0.71<--, 0.72<--, 0.77<--, 0.72<--, 0.73<--, 0.98<--            MICROBIOLOGY:       RADIOLOGY & ADDITIONAL TESTS: Reviewed.    A/P:  21yo M h/o intellectual disability, autism, nonverbal at baseline, chronic ITP on 80mg of daily prednisone with very low platelets now requiring rituximab for treatment.    #NEURO  -At neuro baseline  -Continue home brexpiprazole, trazadone and ativan    #CVS  -No active concerns  -Close HD monitoring in ICU    #RESP  -No active concerns  -Monitor off supplemental O2    #GI    #RENAL    #HEME    #ENDO    #ID    #GOC MICU Accept Note  ------------------------    CHIEF COMPLAINT:     HPI / INTERVAL HISTORY:  21yo M h/o intellectual disability, autism, nonverbal at baseline, chronic ITP on 80mg of daily prednisone, presents w/ outpatient labs showing low plt. Patient was sent in from [facility] with platelet of 7 and heme referral for admission w/ IVIG. Patient is at baseline mental status and has had no bleeding recently. Patient had recent admission for plt transfusion and ivig for a rectal bleed.    Of note, patient was diagnosed with ITP since 18 months. Plt usually runs in 10K range. Follows with hematology at [place]. Over the years, they have become more conservative in treatments given that patient would have very low level of platelets without major bleeding events. Patient was treated with IVIG and steroids end of Jan. 2021. Prior to that, last episode of major flare was about 5 years ago when he had diffuse petechiae without bleeding, and plt was about 5K at that time. (27 Feb 2021 01:52)    MICU called for rituximab desensitization.        REVIEW OF SYSTEMS:  Unable to obtain given nonverbal at baseline      PMH/PSH:  PAST MEDICAL & SURGICAL HISTORY:  Chronic ITP (idiopathic thrombocytopenia)  Intellectual disability    No significant past surgical history      FAMILY HISTORY:  FH: hypertension      SOCIAL HISTORY:  Smoking: none  Substance Use: none  EtOH Use: none      HOME MEDICATIONS:  Home Medications:      ALLERGIES:  Allergies    Bactrim (Hives)  Rituxan (Hives)  WinRho SDF (Hives)    Intolerances            OBJECTIVE:  ICU Vital Signs Last 24 Hrs  T(C): 36.7 (06 Mar 2021 05:07), Max: 36.7 (05 Mar 2021 13:50)  T(F): 98 (06 Mar 2021 05:07), Max: 98 (05 Mar 2021 13:50)  HR: 109 (06 Mar 2021 05:07) (70 - 109)  BP: 118/60 (06 Mar 2021 05:07) (110/85 - 132/58)  BP(mean): --  ABP: --  ABP(mean): --  RR: 18 (06 Mar 2021 05:07) (18 - 20)  SpO2: 95% (06 Mar 2021 05:07) (95% - 98%)        CAPILLARY BLOOD GLUCOSE          PHYSICAL EXAM  GENERAL: NAD, Obese  HEAD:  Atraumatic, Normocephalic  EYES: EOMI, PERRLA, conjunctiva and sclera clear  NECK: Supple, No JVD  CHEST/LUNG: Clear to auscultation bilaterally; No wheeze  HEART: Regular rate and rhythm; No murmurs, rubs, or gallops  ABDOMEN: Soft, Nontender, Nondistended; Bowel sounds present  EXTREMITIES:  2+ Peripheral Pulses, No clubbing, cyanosis, or edema  PSYCH: AAOx0  NEUROLOGY: non-focal    LINES:       HOSPITAL MEDICATIONS:  MEDICATIONS  (STANDING):  atovaquone  Suspension 1500 milliGRAM(s) Oral daily  brexpiprazole 6 milliGRAM(s) Oral daily  influenza   Vaccine 0.5 milliLiter(s) IntraMuscular once  LORazepam     Tablet 1 milliGRAM(s) Oral at bedtime  multivitamin 1 Tablet(s) Oral daily  pantoprazole    Tablet 40 milliGRAM(s) Oral before breakfast  predniSONE   Tablet 80 milliGRAM(s) Oral daily  sodium chloride 0.9%. 1000 milliLiter(s) (50 mL/Hr) IV Continuous <Continuous>  traZODone 300 milliGRAM(s) Oral at bedtime    MEDICATIONS  (PRN):        LABS:                        14.0   15.72 )-----------( 5        ( 06 Mar 2021 09:50 )             44.8     Hgb Trend: 14.0<--, 13.0<--, 14.2<--, 12.0<--, 12.9<--  03-05    131<L>  |  99  |  28<H>  ----------------------------<  114<H>  4.5   |  25  |  0.71    Ca    8.5      05 Mar 2021 06:47  Phos  3.8     03-05  Mg     1.9     03-05    TPro  7.5  /  Alb  3.0<L>  /  TBili  0.6  /  DBili  x   /  AST  22  /  ALT  78<H>  /  AlkPhos  39<L>  03-05    Creatinine Trend: 0.71<--, 0.72<--, 0.77<--, 0.72<--, 0.73<--, 0.98<--            MICROBIOLOGY:       RADIOLOGY & ADDITIONAL TESTS: Reviewed.    A/P:  21yo M h/o intellectual disability, autism, nonverbal at baseline, chronic ITP on 80mg of daily prednisone with very low platelets now requiring rituximab for treatment.    #NEURO  -At neuro baseline, hx of intellectual disability and autism  -Continue home brexpiprazole, Trazadone and ativan    #CVS  -No active concerns  -Close HD monitoring in ICU    #RESP  -No active concerns  -Monitor off supplemental O2    #GI  -Regular diet  -Continue home PPI    #RENAL  -No active concerns  -Trend I/Os    #HEME  -Hx of chronic ITP since 1.4yo, platelets <10  -Transfuse if bleeding  -Admit to MICU for rituximab desensitization  -AI/Oncology following  -    #ENDO  -No active concerns    #ID  -Atovaquone for PCP prophylaxis    #GOC  -Full MICU Accept Note  ------------------------    CHIEF COMPLAINT:     HPI / INTERVAL HISTORY:  21yo M h/o intellectual disability, autism, nonverbal at baseline, chronic ITP on 80mg of daily prednisone, presents w/ outpatient labs showing low plt. Patient was sent in from [facility] with platelet of 7 and heme referral for admission w/ IVIG. Patient is at baseline mental status and has had no bleeding recently. Patient had recent admission for plt transfusion and ivig for a rectal bleed.    Of note, patient was diagnosed with ITP since 18 months. Plt usually runs in 10K range. Follows with hematology at [place]. Over the years, they have become more conservative in treatments given that patient would have very low level of platelets without major bleeding events. Patient was treated with IVIG and steroids end of Jan. 2021. Prior to that, last episode of major flare was about 5 years ago when he had diffuse petechiae without bleeding, and plt was about 5K at that time. (27 Feb 2021 01:52)    MICU called for rituximab desensitization.        REVIEW OF SYSTEMS:  Unable to obtain given nonverbal at baseline      PMH/PSH:  PAST MEDICAL & SURGICAL HISTORY:  Chronic ITP (idiopathic thrombocytopenia)  Intellectual disability    No significant past surgical history      FAMILY HISTORY:  FH: hypertension      SOCIAL HISTORY:  Smoking: none  Substance Use: none  EtOH Use: none      HOME MEDICATIONS:  Home Medications:      ALLERGIES:  Allergies    Bactrim (Hives)  Rituxan (Hives)  WinRho SDF (Hives)    Intolerances            OBJECTIVE:  ICU Vital Signs Last 24 Hrs  T(C): 36.7 (06 Mar 2021 05:07), Max: 36.7 (05 Mar 2021 13:50)  T(F): 98 (06 Mar 2021 05:07), Max: 98 (05 Mar 2021 13:50)  HR: 109 (06 Mar 2021 05:07) (70 - 109)  BP: 118/60 (06 Mar 2021 05:07) (110/85 - 132/58)  BP(mean): --  ABP: --  ABP(mean): --  RR: 18 (06 Mar 2021 05:07) (18 - 20)  SpO2: 95% (06 Mar 2021 05:07) (95% - 98%)        CAPILLARY BLOOD GLUCOSE          PHYSICAL EXAM  GENERAL: NAD, Obese  HEAD:  Atraumatic, Normocephalic  EYES: EOMI, PERRLA, conjunctiva and sclera clear  NECK: Supple, No JVD  CHEST/LUNG: Clear to auscultation bilaterally; No wheeze  HEART: Regular rate and rhythm; No murmurs, rubs, or gallops  ABDOMEN: Soft, Nontender, Nondistended; Bowel sounds present  EXTREMITIES:  2+ Peripheral Pulses, No clubbing, cyanosis, or edema  PSYCH: AAOx0  NEUROLOGY: non-focal    LINES:       HOSPITAL MEDICATIONS:  MEDICATIONS  (STANDING):  atovaquone  Suspension 1500 milliGRAM(s) Oral daily  brexpiprazole 6 milliGRAM(s) Oral daily  influenza   Vaccine 0.5 milliLiter(s) IntraMuscular once  LORazepam     Tablet 1 milliGRAM(s) Oral at bedtime  multivitamin 1 Tablet(s) Oral daily  pantoprazole    Tablet 40 milliGRAM(s) Oral before breakfast  predniSONE   Tablet 80 milliGRAM(s) Oral daily  sodium chloride 0.9%. 1000 milliLiter(s) (50 mL/Hr) IV Continuous <Continuous>  traZODone 300 milliGRAM(s) Oral at bedtime    MEDICATIONS  (PRN):        LABS:                        14.0   15.72 )-----------( 5        ( 06 Mar 2021 09:50 )             44.8     Hgb Trend: 14.0<--, 13.0<--, 14.2<--, 12.0<--, 12.9<--  03-05    131<L>  |  99  |  28<H>  ----------------------------<  114<H>  4.5   |  25  |  0.71    Ca    8.5      05 Mar 2021 06:47  Phos  3.8     03-05  Mg     1.9     03-05    TPro  7.5  /  Alb  3.0<L>  /  TBili  0.6  /  DBili  x   /  AST  22  /  ALT  78<H>  /  AlkPhos  39<L>  03-05    Creatinine Trend: 0.71<--, 0.72<--, 0.77<--, 0.72<--, 0.73<--, 0.98<--            MICROBIOLOGY:       RADIOLOGY & ADDITIONAL TESTS: Reviewed.    A/P:  21yo M h/o intellectual disability, autism, nonverbal at baseline, chronic ITP on 80mg of daily prednisone with very low platelets now requiring rituximab for treatment.    #NEURO  -At neuro baseline, hx of intellectual disability and autism  -Continue home brexpiprazole, Trazadone and ativan    #CVS  -No active concerns  -Close HD monitoring in ICU    #RESP  -No active concerns  -Monitor off supplemental O2    #GI  -Regular diet  -Continue home PPI    #RENAL  -No active concerns  -Trend I/Os    #HEME  -Hx of chronic ITP since 1.6yo, platelets <10  -Transfuse plt if bleeding  -Patient admitted to MICU for rituximab desensitization  -Unable to get rituximab today  -Plan to transfer out to floors until Monday 3/8  -AI/Oncology following      #ENDO  -No active concerns    #ID  -Atovaquone for PCP prophylaxis    #GOC  -Full MICU Accept Note  ------------------------    CHIEF COMPLAINT:     HPI / INTERVAL HISTORY:  19yo M h/o intellectual disability, autism, nonverbal at baseline, chronic ITP on 80mg of daily prednisone, presents w/ outpatient labs showing low plt. Patient was sent in from [facility] with platelet of 7 and heme referral for admission w/ IVIG. Patient is at baseline mental status and has had no bleeding recently. Patient had recent admission for plt transfusion and ivig for a rectal bleed.    Of note, patient was diagnosed with ITP since 18 months. Plt usually runs in 10K range. Follows with hematology at [facility]. Over the years, they have become more conservative in treatments given that patient would have very low level of platelets without major bleeding events. Patient was treated with IVIG and steroids end of Jan. 2021. Prior to that, last episode of major flare was about 5 years ago when he had diffuse petechiae without bleeding, and plt was about 5K at that time. (27 Feb 2021 01:52)    MICU called for rituximab desensitization.        REVIEW OF SYSTEMS:  Unable to obtain given nonverbal at baseline      PMH/PSH:  PAST MEDICAL & SURGICAL HISTORY:  Chronic ITP (idiopathic thrombocytopenia)  Intellectual disability    No significant past surgical history      FAMILY HISTORY:  FH: hypertension      SOCIAL HISTORY:  Smoking: none  Substance Use: none  EtOH Use: none      HOME MEDICATIONS:  Home Medications:      ALLERGIES:  Allergies    Bactrim (Hives)  Rituxan (Hives)  WinRho SDF (Hives)    Intolerances            OBJECTIVE:  ICU Vital Signs Last 24 Hrs  T(C): 36.7 (06 Mar 2021 05:07), Max: 36.7 (05 Mar 2021 13:50)  T(F): 98 (06 Mar 2021 05:07), Max: 98 (05 Mar 2021 13:50)  HR: 109 (06 Mar 2021 05:07) (70 - 109)  BP: 118/60 (06 Mar 2021 05:07) (110/85 - 132/58)  BP(mean): --  ABP: --  ABP(mean): --  RR: 18 (06 Mar 2021 05:07) (18 - 20)  SpO2: 95% (06 Mar 2021 05:07) (95% - 98%)        CAPILLARY BLOOD GLUCOSE          PHYSICAL EXAM  GENERAL: NAD, Obese  HEAD:  Atraumatic, Normocephalic  EYES: EOMI, PERRLA, conjunctiva and sclera clear  NECK: Supple, No JVD  CHEST/LUNG: Clear to auscultation bilaterally; No wheeze  HEART: Regular rate and rhythm; No murmurs, rubs, or gallops  ABDOMEN: Soft, Nontender, Nondistended; Bowel sounds present  EXTREMITIES:  2+ Peripheral Pulses, No clubbing, cyanosis, or edema  PSYCH: AAOx0  NEUROLOGY: non-focal    LINES:       HOSPITAL MEDICATIONS:  MEDICATIONS  (STANDING):  atovaquone  Suspension 1500 milliGRAM(s) Oral daily  brexpiprazole 6 milliGRAM(s) Oral daily  influenza   Vaccine 0.5 milliLiter(s) IntraMuscular once  LORazepam     Tablet 1 milliGRAM(s) Oral at bedtime  multivitamin 1 Tablet(s) Oral daily  pantoprazole    Tablet 40 milliGRAM(s) Oral before breakfast  predniSONE   Tablet 80 milliGRAM(s) Oral daily  sodium chloride 0.9%. 1000 milliLiter(s) (50 mL/Hr) IV Continuous <Continuous>  traZODone 300 milliGRAM(s) Oral at bedtime    MEDICATIONS  (PRN):        LABS:                        14.0   15.72 )-----------( 5        ( 06 Mar 2021 09:50 )             44.8     Hgb Trend: 14.0<--, 13.0<--, 14.2<--, 12.0<--, 12.9<--  03-05    131<L>  |  99  |  28<H>  ----------------------------<  114<H>  4.5   |  25  |  0.71    Ca    8.5      05 Mar 2021 06:47  Phos  3.8     03-05  Mg     1.9     03-05    TPro  7.5  /  Alb  3.0<L>  /  TBili  0.6  /  DBili  x   /  AST  22  /  ALT  78<H>  /  AlkPhos  39<L>  03-05    Creatinine Trend: 0.71<--, 0.72<--, 0.77<--, 0.72<--, 0.73<--, 0.98<--            MICROBIOLOGY:       RADIOLOGY & ADDITIONAL TESTS: Reviewed.    A/P:  19yo M h/o intellectual disability, autism, nonverbal at baseline, chronic ITP on 80mg of daily prednisone with very low platelets now requiring rituximab for treatment.    #NEURO  -At neuro baseline, hx of intellectual disability and autism  -Continue home brexpiprazole, Trazadone and ativan    #CVS  -No active concerns  -Close HD monitoring in ICU    #RESP  -No active concerns  -Monitor off supplemental O2    #GI  -Regular diet  -Continue home PPI    #RENAL  -No active concerns  -Trend I/Os    #HEME  -Hx of chronic ITP since 1.6yo, platelets <10  -Transfuse plt if bleeding  -Patient admitted to MICU for rituximab desensitization  -Unable to get rituximab today  -Plan to transfer out to floors until Monday 3/8  -AI/Oncology following      #ENDO  -No active concerns    #ID  -Atovaquone for PCP prophylaxis    #GOC  -Full      Critical Care Attestation:    I have personally provided 35 minutes of critical care time excluding time spent on separate procedures.    As above. Transferred to MICU for Rituximab desensitization protocol. Plan as above.

## 2021-03-06 NOTE — CHART NOTE - NSCHARTNOTEFT_GEN_A_CORE
MICU Transfer Note    Transfer from: MICU  Transfer to:  ( X ) Medicine    (  ) Telemetry    (  ) RCU    (  ) Palliative    (  ) Stroke Unit    (  ) _______________    Accepting physician: Dr. Roberson    HPI:  HPI:  19yo M h/o intellectual disability, autism, nonverbal at baseline, chronic ITP on 80mg of daily prednisone, presents w/ outpatient labs showing low plt. Patient was sent in from [facility] with platelet of 7 and heme referral for admission w/ IVIG. Patient is at baseline mental status and has had no bleeding recently. Patient had recent admission for plt transfusion and ivig for a rectal bleed.    Of note, patient was diagnosed with ITP since 18 months. Plt usually runs in 10K range. Follows with hematology at [facility]. Over the years, they have become more conservative in treatments given that patient would have very low level of platelets without major bleeding events. Patient was treated with IVIG and steroids end of Jan. 2021. Prior to that, last episode of major flare was about 5 years ago when he had diffuse petechiae without bleeding, and plt was about 5K at that time. (27 Feb 2021 01:52)            MICU COURSE:  Transferred to MICU for desensitization. Per discussion with Oncology, unable to receive today. In need of beds, will transfer back to floors.        For Follow-Up:  [] Continue supportive care on floor  [] Contact ICU for plans for desensitization          Jouse Soriano MD  PGY-1 MICU Green Team

## 2021-03-06 NOTE — CHART NOTE - NSCHARTNOTEFT_GEN_A_CORE
HEMATOLOGY FELLOW NOTE    Confirmed desensitization protocol with Pharmacy and AI. I have coordinated with the chemotherapy nurse and with Pharmacy and we will plan to start Rituxan today, 3/6.     Plan was communicated to the MICU resident and Dr. Belkis Asif.    Gabbi Phillips MD  Hematology/Oncology Fellow, PGY-4  Pager: [phone]  After 5pm and on weekends please page on-call fellow

## 2021-03-06 NOTE — CHART NOTE - NSCHARTNOTEFT_GEN_A_CORE
HEMATOLOGY FELLOW NOTE    Patient with chronic ITP who will need to be admitted to MICU for desensitization protocol for Rituxan.    Orders for Rituxan were written, signed and reviewed by pharmacy on 3/5.     I was notified this morning by Pharmacy that the desensitization protocol will need to be revised. The minimum concentration per bag needed is 1mg/cc. Per the current protocol, the first bag is only at 0.34mg/cc, thus pharmacy cannot fill this order.     AI has been paged to amend the orders. Call back is pending. However, it is unlikely that patient can safely get Rituxan today. The desensitization protocol will need to run over 8 hours and chemo nurses are only available until 10pm. Therefore, Rituxan would need to be started by 2pm at the latest which is unlikely to happen today.    We recommend keeping patient in the MICU so that Rituxan can be safely started on 3/7.    Case was discussed with MICU team and Dr. Belkis Asif.    Gabbi Phillips MD  Hematology/Oncology Fellow, PGY-4  Pager: [phone]  After 5pm and on weekends please page on-call fellow HEMATOLOGY FELLOW NOTE    Patient with chronic ITP who will need to be admitted to MICU for desensitization protocol for Rituxan.    Orders for Rituxan were written and signed on 3/5.     I was notified this morning by Pharmacy that the desensitization protocol will need to be revised. The minimum concentration per bag needed is 1mg/cc. Per the current protocol, the first bag is only at 0.34mg/cc, thus pharmacy cannot fill this order.     AI has been paged to amend the orders. Call back is pending. However, it is unlikely that patient can safely get Rituxan today. The desensitization protocol will need to run over 8 hours and chemo nurses are only available until 10pm. Therefore, Rituxan would need to be started by 2pm at the latest which is unlikely to happen today.    We recommend keeping patient in the MICU so that Rituxan can be safely started on 3/7.    Case was discussed with MICU team and Dr. Belkis Asif.    Gabbi Phillips MD  Hematology/Oncology Fellow, PGY-4  Pager: [phone]  After 5pm and on weekends please page on-call fellow HEMATOLOGY FELLOW NOTE    Patient with chronic ITP who will need to be admitted to MICU for desensitization protocol for Rituxan.    Orders for Rituxan were written and signed on 3/5.     I was notified this morning by Pharmacy that the desensitization protocol will need to be revised. The minimum concentration per bag needed is 1mg/cc. Per the current protocol, the first bag is only at 0.34mg/cc, thus pharmacy cannot fill this order.     I have spoken with AI regarding this issue and we are in the process of reviewing and confirming the orders with Pharmacy. We recommend keeping the patient in the MICU so that Rituxan can be started on 3/6 or 3/7 safely.     Case was discussed with MICU team and Dr. Belkis Asif.    Gabbi Phillips MD  Hematology/Oncology Fellow, PGY-4  Pager: [phone]  After 5pm and on weekends please page on-call fellow

## 2021-03-06 NOTE — CHART NOTE - NSCHARTNOTEFT_GEN_A_CORE
Spoke with LALITO that patient's mother forgot to inform team that previously he was diagnosed with possible serum sickness after Ritux when he had >10 years ago. Dr. Yin spoke with mother over the phone and she reported that the patient had a rash during his last Ritux infusion along with shivering, both are known side effects. Denied any joint pains, swelling, fevers. The doctor told mother that he likely had serum sickness. Discussed risks and benefits with mother and patient and it makes sense to proceed because serum sickness was less likely based on description of symptoms. However, it was truly serum sickness, this cannot be desensitized and could potentially reoccur. He has not had any recent infections.    If possible, please draw immunoglobulin panel and Full T cell subsets prior to Ritux administration.

## 2021-03-07 LAB
ALBUMIN SERPL ELPH-MCNC: 2.7 G/DL — LOW (ref 3.3–5)
ALP SERPL-CCNC: 37 U/L — LOW (ref 40–120)
ALT FLD-CCNC: 63 U/L — HIGH (ref 4–41)
ANION GAP SERPL CALC-SCNC: 8 MMOL/L — SIGNIFICANT CHANGE UP (ref 7–14)
AST SERPL-CCNC: 27 U/L — SIGNIFICANT CHANGE UP (ref 4–40)
BILIRUB SERPL-MCNC: 0.6 MG/DL — SIGNIFICANT CHANGE UP (ref 0.2–1.2)
BUN SERPL-MCNC: 25 MG/DL — HIGH (ref 7–23)
CALCIUM SERPL-MCNC: 8.3 MG/DL — LOW (ref 8.4–10.5)
CHLORIDE SERPL-SCNC: 101 MMOL/L — SIGNIFICANT CHANGE UP (ref 98–107)
CO2 SERPL-SCNC: 26 MMOL/L — SIGNIFICANT CHANGE UP (ref 22–31)
CREAT SERPL-MCNC: 0.8 MG/DL — SIGNIFICANT CHANGE UP (ref 0.5–1.3)
GLUCOSE SERPL-MCNC: 85 MG/DL — SIGNIFICANT CHANGE UP (ref 70–99)
HCT VFR BLD CALC: 40.2 % — SIGNIFICANT CHANGE UP (ref 39–50)
HGB BLD-MCNC: 12.4 G/DL — LOW (ref 13–17)
MAGNESIUM SERPL-MCNC: 1.8 MG/DL — SIGNIFICANT CHANGE UP (ref 1.6–2.6)
MCHC RBC-ENTMCNC: 26.8 PG — LOW (ref 27–34)
MCHC RBC-ENTMCNC: 30.8 GM/DL — LOW (ref 32–36)
MCV RBC AUTO: 86.8 FL — SIGNIFICANT CHANGE UP (ref 80–100)
NRBC # BLD: 0 /100 WBCS — SIGNIFICANT CHANGE UP
NRBC # FLD: 0 K/UL — SIGNIFICANT CHANGE UP
PHOSPHATE SERPL-MCNC: 3.2 MG/DL — SIGNIFICANT CHANGE UP (ref 2.5–4.5)
PLATELET # BLD AUTO: 2 K/UL — CRITICAL LOW (ref 150–400)
POTASSIUM SERPL-MCNC: 3.9 MMOL/L — SIGNIFICANT CHANGE UP (ref 3.5–5.3)
POTASSIUM SERPL-SCNC: 3.9 MMOL/L — SIGNIFICANT CHANGE UP (ref 3.5–5.3)
PROT SERPL-MCNC: 6.5 G/DL — SIGNIFICANT CHANGE UP (ref 6–8.3)
RBC # BLD: 4.63 M/UL — SIGNIFICANT CHANGE UP (ref 4.2–5.8)
RBC # FLD: 17.6 % — HIGH (ref 10.3–14.5)
SODIUM SERPL-SCNC: 135 MMOL/L — SIGNIFICANT CHANGE UP (ref 135–145)
WBC # BLD: 13.97 K/UL — HIGH (ref 3.8–10.5)
WBC # FLD AUTO: 13.97 K/UL — HIGH (ref 3.8–10.5)

## 2021-03-07 PROCEDURE — 99233 SBSQ HOSP IP/OBS HIGH 50: CPT | Mod: GC

## 2021-03-07 RX ORDER — ROMIPLOSTIM 250 UG/.5ML
150 INJECTION, POWDER, LYOPHILIZED, FOR SOLUTION SUBCUTANEOUS ONCE
Refills: 0 | Status: DISCONTINUED | OUTPATIENT
Start: 2021-03-07 | End: 2021-03-07

## 2021-03-07 RX ORDER — ROMIPLOSTIM 250 UG/.5ML
300 INJECTION, POWDER, LYOPHILIZED, FOR SOLUTION SUBCUTANEOUS ONCE
Refills: 0 | Status: DISCONTINUED | OUTPATIENT
Start: 2021-03-07 | End: 2021-03-07

## 2021-03-07 RX ORDER — ROMIPLOSTIM 250 UG/.5ML
250 INJECTION, POWDER, LYOPHILIZED, FOR SOLUTION SUBCUTANEOUS ONCE
Refills: 0 | Status: COMPLETED | OUTPATIENT
Start: 2021-03-07 | End: 2021-03-07

## 2021-03-07 RX ORDER — FAMOTIDINE 10 MG/ML
20 INJECTION INTRAVENOUS DAILY
Refills: 0 | Status: DISCONTINUED | OUTPATIENT
Start: 2021-03-07 | End: 2021-03-07

## 2021-03-07 RX ORDER — FLUTICASONE PROPIONATE 50 MCG
1 SPRAY, SUSPENSION NASAL
Refills: 0 | Status: DISCONTINUED | OUTPATIENT
Start: 2021-03-07 | End: 2021-04-12

## 2021-03-07 RX ADMIN — Medication 300 MILLIGRAM(S): at 21:59

## 2021-03-07 RX ADMIN — Medication 1 MILLIGRAM(S): at 06:52

## 2021-03-07 RX ADMIN — BREXPIPRAZOLE 6 MILLIGRAM(S): 0.25 TABLET ORAL at 11:21

## 2021-03-07 RX ADMIN — Medication 1 TABLET(S): at 11:03

## 2021-03-07 RX ADMIN — Medication 50 MILLIGRAM(S): at 05:23

## 2021-03-07 RX ADMIN — Medication 80 MILLIGRAM(S): at 05:23

## 2021-03-07 RX ADMIN — ROMIPLOSTIM 250 MICROGRAM(S): 250 INJECTION, POWDER, LYOPHILIZED, FOR SOLUTION SUBCUTANEOUS at 12:00

## 2021-03-07 RX ADMIN — CHLORHEXIDINE GLUCONATE 1 APPLICATION(S): 213 SOLUTION TOPICAL at 07:33

## 2021-03-07 RX ADMIN — CETIRIZINE HYDROCHLORIDE 10 MILLIGRAM(S): 10 TABLET ORAL at 21:59

## 2021-03-07 RX ADMIN — ATOVAQUONE 1500 MILLIGRAM(S): 750 SUSPENSION ORAL at 11:03

## 2021-03-07 RX ADMIN — Medication 1 MILLIGRAM(S): at 21:59

## 2021-03-07 NOTE — CHART NOTE - NSCHARTNOTEFT_GEN_A_CORE
MAR Accept Note  Transfer to: Medicine  Accepting Attending Physician:  Karol  Assigned Room:  4S 44    Patient seen and examined.   Labs and data reviewed.   No findings precluding transfer of service.       HPI/MICU COURSE:   Please refer to MICU transfer note for full details. Briefly, this is a 19yo M h/o intellectual disability, autism, nonverbal at baseline, chronic ITP on 80mg of daily prednisone, presents w/ outpatient labs showing low plt. Patient was sent in from [facility] with platelet of 7 and heme referral for admission w/ IVIG. Patient is at baseline mental status and has had no bleeding recently. Patient had recent admission for plt transfusion and ivig for a rectal bleed.    Of note, patient was diagnosed with ITP since 18 months. Plt usually runs in 10K range. Follows with hematology at [facility]. Over the years, they have become more conservative in treatments given that patient would have very low level of platelets without major bleeding events. Patient was treated with IVIG and steroids end of Jan. 2021. Prior to that, last episode of major flare was about 5 years ago when he had diffuse petechiae without bleeding, and plt was about 5K at that time. Transferred to MICU for desensitization. Pt received desensitization overnight without any issues. Pt did not develop any rash, shortness of breath or other symptoms. Platelet counts are still low and patient is continuing with steroids at this time.    FOR FOLLOW-UP:  [] Continue supportive care on floor  [] F/U Heme recs regarding further care.    Evens Lizarraga PGY3

## 2021-03-07 NOTE — PROGRESS NOTE ADULT - ASSESSMENT
21 yo M with a history of chronic ITP and severe autism (non-verbal at baseline) who presents with severe thrombocytopenia (Plt 7) while on home PO prednisone (80mg daily).     # Chronic ITP exacerbated in the setting of recent COVID infection   -CT head neg for bleed  -s/p IVIG 1gm/kg daily x 2 (completed 2/28)   -S/p romiplastin/Nplate, 1mcg/kg; given 2/28; next dose due 3/7   -S/p dexamethasone 40mg IV x4 days (completed 3/3)  -as pt was on steroid for prolonged period will restart prednisone 80 mg then taper  -s/p RITUXAN with desensitization protocol yesterday 3/6. Appreciate ICU care.   -transfuse platelets (consider giving 1/2 unit platelets slowly infused over 3 hours)  - Continue MEPRON for PCP ppx   -monitor closely for any signs or symptoms of bleeding       19 yo M with a history of chronic ITP and severe autism (non-verbal at baseline) who presents with severe thrombocytopenia (Plt 7) while on home PO prednisone (80mg daily).     # Chronic ITP exacerbated in the setting of recent COVID infection   -CT head neg for bleed  -s/p IVIG 1gm/kg daily x 2 (completed 2/28)   -S/p romiplastin/Nplate, 1mcg/kg; given 2/28; next dose due 3/7 (ORDERED FOR TODAY)  -S/p dexamethasone 40mg IV x4 days (completed 3/3)  -as pt was on steroid for prolonged period will restart prednisone 80 mg then taper  -s/p RITUXAN with desensitization protocol yesterday 3/6. Appreciate ICU care.   -transfuse platelets (consider giving 1/2 unit platelets slowly infused over 3 hours)  - Continue MEPRON for PCP ppx   -monitor closely for any signs or symptoms of bleeding  - Spoke to parents at length. They are overwhelmed. Social work support apprecaited  - Spoke to ICU team. Ok for tx to floor

## 2021-03-07 NOTE — PROGRESS NOTE ADULT - SUBJECTIVE AND OBJECTIVE BOX
INTERVAL HPI/OVERNIGHT EVENTS:  Patient S&E at bedside. No o/n events, patient resting comfortably. s/p rituxan yesterday. No reaction. Immunology was on board for desensitization protocol,      VITAL SIGNS:  T(F): 98 (03-07-21 @ 08:00)  HR: 95 (03-07-21 @ 08:00)  BP: 125/87 (03-07-21 @ 08:00)  RR: 20 (03-07-21 @ 08:00)  SpO2: 99% (03-07-21 @ 08:00)  Wt(kg): --    PHYSICAL EXAM:    Constitutional: NAD  Eyes: EOMI, sclera non-icteric  Neck: supple, no masses, no JVD  Respiratory: CTA b/l, good air entry b/l  Cardiovascular: RRR, no M/R/G  Gastrointestinal: soft, NTND, no masses palpable, + BS, no hepatosplenomegaly  Extremities: no c/c/e  Neurological: Awake      MEDICATIONS  (STANDING):  atovaquone  Suspension 1500 milliGRAM(s) Oral daily  brexpiprazole 6 milliGRAM(s) Oral daily  cetirizine 10 milliGRAM(s) Oral at bedtime  chlorhexidine 4% Liquid 1 Application(s) Topical <User Schedule>  fluticasone propionate 50 MICROgram(s)/spray Nasal Spray 1 Spray(s) Both Nostrils two times a day  influenza   Vaccine 0.5 milliLiter(s) IntraMuscular once  LORazepam     Tablet 1 milliGRAM(s) Oral at bedtime  multivitamin 1 Tablet(s) Oral daily  predniSONE   Tablet 80 milliGRAM(s) Oral daily  sodium chloride 0.9%. 1000 milliLiter(s) (50 mL/Hr) IV Continuous <Continuous>  traZODone 300 milliGRAM(s) Oral at bedtime    MEDICATIONS  (PRN):  ALBUTerol    0.083%. 2.5 milliGRAM(s) Nebulizer once PRN reaction  ALBUTerol    0.083%. 2.5 milliGRAM(s) Nebulizer once PRN Wheezing  EPINEPHrine     1 mG/mL Injectable 0.3 milliGRAM(s) IntraMuscular once PRN reaction  hydrocortisone sodium succinate Injectable 100 milliGRAM(s) IV Push once PRN reaction  meperidine     Injectable 25 milliGRAM(s) IV Push once PRN reaction  meperidine     Injectable 25 milliGRAM(s) IV Push once PRN reaction      Allergies    Bactrim (Hives)  Rituxan (Hives)  WinRho SDF (Hives)    Intolerances        LABS:                        12.4   13.97 )-----------( 2        ( 07 Mar 2021 04:25 )             40.2     03-07    135  |  101  |  25<H>  ----------------------------<  85  3.9   |  26  |  0.80    Ca    8.3<L>      07 Mar 2021 04:25  Phos  3.2     03-07  Mg     1.8     03-07    TPro  6.5  /  Alb  2.7<L>  /  TBili  0.6  /  DBili  x   /  AST  27  /  ALT  63<H>  /  AlkPhos  37<L>  03-07          RADIOLOGY & ADDITIONAL TESTS:  Studies reviewed.    ASSESSMENT & PLAN:

## 2021-03-07 NOTE — PROGRESS NOTE ADULT - ASSESSMENT
21yo M h/o intellectual disability, autism, nonverbal at baseline, chronic ITP on 80mg of daily prednisone with very low platelets now requiring rituximab for treatment.    #NEURO  -At neuro baseline, hx of intellectual disability and autism  -Continue home brexpiprazole, Trazadone and ativan    #CVS  -No active concerns  -Close HD monitoring in ICU    #RESP  -No active concerns  -Monitor off supplemental O2    #GI  -Regular diet  -Continue home PPI    #RENAL  -No active concerns  -Trend I/Os    #HEME  -Hx of chronic ITP since 1.6yo, platelets of 2 currently  -Transfuse plt if bleeding. Will hold at this time.  -Patient admitted to MICU for rituximab desensitization  -Obtained rituximab overnight; no side effects noted. Pt completed protocol  -Will f/u with heme/onc regarding need for ICU  -AI/Oncology following      #ENDO  -No active concerns    #ID  -Atovaquone for PCP prophylaxis    #GOC  -Full

## 2021-03-07 NOTE — PROGRESS NOTE ADULT - SUBJECTIVE AND OBJECTIVE BOX
INTERVAL HPI/OVERNIGHT EVENTS:    O/N: No acute overnight events. Pt received rituximab overnight without any issues.    SUBJECTIVE: Patient seen and examined at bedside.     OBJECTIVE:    VITAL SIGNS:  ICU Vital Signs Last 24 Hrs  T(C): 36.8 (07 Mar 2021 04:00), Max: 37.3 (06 Mar 2021 19:41)  T(F): 98.2 (07 Mar 2021 04:00), Max: 99.2 (06 Mar 2021 19:41)  HR: 98 (07 Mar 2021 07:00) (93 - 136)  BP: 131/88 (07 Mar 2021 07:00) (96/71 - 144/66)  BP(mean): 101 (07 Mar 2021 07:00) (74 - 106)  ABP: --  ABP(mean): --  RR: 17 (07 Mar 2021 07:00) (17 - 36)  SpO2: 100% (07 Mar 2021 07:00) (92% - 100%)        03-06 @ 07:01  -  03-07 @ 07:00  --------------------------------------------------------  IN: 250 mL / OUT: 0 mL / NET: 250 mL      CAPILLARY BLOOD GLUCOSE          PHYSICAL EXAM:    General: NAD  HEENT: NC/AT; PERRL, clear conjunctiva  Neck: supple  Respiratory: CTA b/l  Cardiovascular: +S1/S2; RRR  Abdomen: soft, NT/ND; +BS x4  Extremities: WWP, 2+ peripheral pulses b/l; no LE edema  Skin: normal color and turgor; no rash  Neurological:    MEDICATIONS:  MEDICATIONS  (STANDING):  atovaquone  Suspension 1500 milliGRAM(s) Oral daily  brexpiprazole 6 milliGRAM(s) Oral daily  cetirizine 10 milliGRAM(s) Oral at bedtime  chlorhexidine 4% Liquid 1 Application(s) Topical <User Schedule>  fluticasone propionate 50 MICROgram(s)/spray Nasal Spray 1 Spray(s) Both Nostrils two times a day  influenza   Vaccine 0.5 milliLiter(s) IntraMuscular once  LORazepam     Tablet 1 milliGRAM(s) Oral at bedtime  multivitamin 1 Tablet(s) Oral daily  pantoprazole    Tablet 40 milliGRAM(s) Oral before breakfast  predniSONE   Tablet 80 milliGRAM(s) Oral daily  sodium chloride 0.9%. 1000 milliLiter(s) (50 mL/Hr) IV Continuous <Continuous>  traZODone 300 milliGRAM(s) Oral at bedtime    MEDICATIONS  (PRN):  ALBUTerol    0.083%. 2.5 milliGRAM(s) Nebulizer once PRN reaction  ALBUTerol    0.083%. 2.5 milliGRAM(s) Nebulizer once PRN Wheezing  EPINEPHrine     1 mG/mL Injectable 0.3 milliGRAM(s) IntraMuscular once PRN reaction  hydrocortisone sodium succinate Injectable 100 milliGRAM(s) IV Push once PRN reaction  meperidine     Injectable 25 milliGRAM(s) IV Push once PRN reaction  meperidine     Injectable 25 milliGRAM(s) IV Push once PRN reaction      ALLERGIES:  Allergies    Bactrim (Hives)  Rituxan (Hives)  WinRho SDF (Hives)    Intolerances        LABS:                        12.4   13.97 )-----------( 2        ( 07 Mar 2021 04:25 )             40.2     03-07    135  |  101  |  25<H>  ----------------------------<  85  3.9   |  26  |  0.80    Ca    8.3<L>      07 Mar 2021 04:25  Phos  3.2     03-07  Mg     1.8     03-07    TPro  6.5  /  Alb  2.7<L>  /  TBili  0.6  /  DBili  x   /  AST  27  /  ALT  63<H>  /  AlkPhos  37<L>  03-07          RADIOLOGY & ADDITIONAL TESTS: Reviewed.

## 2021-03-08 LAB
ALBUMIN SERPL ELPH-MCNC: 3.3 G/DL — SIGNIFICANT CHANGE UP (ref 3.3–5)
ALP SERPL-CCNC: 46 U/L — SIGNIFICANT CHANGE UP (ref 40–120)
ALT FLD-CCNC: 79 U/L — HIGH (ref 4–41)
ANION GAP SERPL CALC-SCNC: 12 MMOL/L — SIGNIFICANT CHANGE UP (ref 7–14)
AST SERPL-CCNC: 37 U/L — SIGNIFICANT CHANGE UP (ref 4–40)
BASOPHILS # BLD AUTO: 0 K/UL — SIGNIFICANT CHANGE UP (ref 0–0.2)
BASOPHILS NFR BLD AUTO: 0 % — SIGNIFICANT CHANGE UP (ref 0–2)
BILIRUB SERPL-MCNC: 0.6 MG/DL — SIGNIFICANT CHANGE UP (ref 0.2–1.2)
BUN SERPL-MCNC: 26 MG/DL — HIGH (ref 7–23)
CALCIUM SERPL-MCNC: 8.5 MG/DL — SIGNIFICANT CHANGE UP (ref 8.4–10.5)
CHLORIDE SERPL-SCNC: 100 MMOL/L — SIGNIFICANT CHANGE UP (ref 98–107)
CO2 SERPL-SCNC: 25 MMOL/L — SIGNIFICANT CHANGE UP (ref 22–31)
CREAT SERPL-MCNC: 0.89 MG/DL — SIGNIFICANT CHANGE UP (ref 0.5–1.3)
EOSINOPHIL # BLD AUTO: 0 K/UL — SIGNIFICANT CHANGE UP (ref 0–0.5)
EOSINOPHIL NFR BLD AUTO: 0 % — SIGNIFICANT CHANGE UP (ref 0–6)
GLUCOSE SERPL-MCNC: 126 MG/DL — HIGH (ref 70–99)
HCT VFR BLD CALC: 43.8 % — SIGNIFICANT CHANGE UP (ref 39–50)
HGB BLD-MCNC: 12.9 G/DL — LOW (ref 13–17)
IANC: 15.04 K/UL — HIGH (ref 1.5–8.5)
LYMPHOCYTES # BLD AUTO: 0.33 K/UL — LOW (ref 1–3.3)
LYMPHOCYTES # BLD AUTO: 1.9 % — LOW (ref 13–44)
MAGNESIUM SERPL-MCNC: 2 MG/DL — SIGNIFICANT CHANGE UP (ref 1.6–2.6)
MCHC RBC-ENTMCNC: 26.3 PG — LOW (ref 27–34)
MCHC RBC-ENTMCNC: 29.5 GM/DL — LOW (ref 32–36)
MCV RBC AUTO: 89.4 FL — SIGNIFICANT CHANGE UP (ref 80–100)
MONOCYTES # BLD AUTO: 1.1 K/UL — HIGH (ref 0–0.9)
MONOCYTES NFR BLD AUTO: 6.4 % — SIGNIFICANT CHANGE UP (ref 2–14)
NEUTROPHILS # BLD AUTO: 15.54 K/UL — HIGH (ref 1.8–7.4)
NEUTROPHILS NFR BLD AUTO: 90.8 % — HIGH (ref 43–77)
PHOSPHATE SERPL-MCNC: 2.3 MG/DL — LOW (ref 2.5–4.5)
PLATELET # BLD AUTO: 1 K/UL — CRITICAL LOW (ref 150–400)
POTASSIUM SERPL-MCNC: 3.8 MMOL/L — SIGNIFICANT CHANGE UP (ref 3.5–5.3)
POTASSIUM SERPL-SCNC: 3.8 MMOL/L — SIGNIFICANT CHANGE UP (ref 3.5–5.3)
PROT SERPL-MCNC: 7.3 G/DL — SIGNIFICANT CHANGE UP (ref 6–8.3)
RBC # BLD: 4.9 M/UL — SIGNIFICANT CHANGE UP (ref 4.2–5.8)
RBC # FLD: 17.9 % — HIGH (ref 10.3–14.5)
SODIUM SERPL-SCNC: 137 MMOL/L — SIGNIFICANT CHANGE UP (ref 135–145)
WBC # BLD: 17.11 K/UL — HIGH (ref 3.8–10.5)
WBC # FLD AUTO: 17.11 K/UL — HIGH (ref 3.8–10.5)

## 2021-03-08 PROCEDURE — 99233 SBSQ HOSP IP/OBS HIGH 50: CPT | Mod: CS

## 2021-03-08 RX ADMIN — BREXPIPRAZOLE 6 MILLIGRAM(S): 0.25 TABLET ORAL at 12:05

## 2021-03-08 RX ADMIN — Medication 1 MILLIGRAM(S): at 17:38

## 2021-03-08 RX ADMIN — ATOVAQUONE 1500 MILLIGRAM(S): 750 SUSPENSION ORAL at 12:04

## 2021-03-08 RX ADMIN — CETIRIZINE HYDROCHLORIDE 10 MILLIGRAM(S): 10 TABLET ORAL at 20:27

## 2021-03-08 RX ADMIN — Medication 1 TABLET(S): at 12:05

## 2021-03-08 RX ADMIN — Medication 300 MILLIGRAM(S): at 20:27

## 2021-03-08 RX ADMIN — Medication 80 MILLIGRAM(S): at 06:57

## 2021-03-08 NOTE — PROGRESS NOTE ADULT - ASSESSMENT
21 yo M with a history of chronic ITP and severe autism (non-verbal at baseline) who presents with severe thrombocytopenia (Plt 7) while on home PO prednisone (80mg daily).     # Chronic ITP exacerbated in the setting of recent COVID infection   -CT head neg for bleed  -s/p IVIG 1gm/kg daily x 2 (completed 2/28)   -S/p romiplastin/Nplate, 1mcg/kg; given 2/28; next dose 3/7 was 2mcg/kg (pt only got 250mcg instead of 300mcg as pharmacy only had 250)  -S/p dexamethasone 40mg IV x4 days (completed 3/3)  -as pt was on steroid for prolonged period continue prednisone 80 mg then taper  -s/p RITUXAN with desensitization protocol 3/6. Appreciate ICU care.   -transfuse platelets if bleeding (consider giving 1/2 unit platelets slowly infused over 3 hours)  -Continue MEPRON for PCP ppx   -monitor closely for any signs or symptoms of bleeding      Francisca Lutz, PGY-4  Hematology-Oncology Fellow  [phone] ([place]) [phone] ([facility])  I can also be reached on [employer] Teams  Please page fellow on call after 5pm and on weekends

## 2021-03-08 NOTE — CHART NOTE - NSCHARTNOTEFT_GEN_A_CORE
Pt with low plt 1. Pt seen and examined at bedside. Pt had BM in diaper and had mild blood streaks. Per mom at bedside, pt has been having on and off mild nose bleed. Discussed this with sera who recommended transfusing 1/2 unit platelet today. Pt has active T&S. Mother at bedside in agreement. Per sera, no need to repeat CBC after transfusion and will f/u repeat platelet in AM. Will keep active T&S. Will continue to monitor.

## 2021-03-08 NOTE — PROGRESS NOTE ADULT - SUBJECTIVE AND OBJECTIVE BOX
INTERVAL HPI/OVERNIGHT EVENTS:  No overnight events. Per hospitalist notes, patient with some bleeding on tissue when he wipes his nose. Awaiting labs from this AM.     MEDICATIONS  (STANDING):  atovaquone  Suspension 1500 milliGRAM(s) Oral daily  brexpiprazole 6 milliGRAM(s) Oral daily  cetirizine 10 milliGRAM(s) Oral at bedtime  chlorhexidine 4% Liquid 1 Application(s) Topical <User Schedule>  fluticasone propionate 50 MICROgram(s)/spray Nasal Spray 1 Spray(s) Both Nostrils two times a day  influenza   Vaccine 0.5 milliLiter(s) IntraMuscular once  LORazepam     Tablet 1 milliGRAM(s) Oral at bedtime  multivitamin 1 Tablet(s) Oral daily  predniSONE   Tablet 80 milliGRAM(s) Oral daily  traZODone 300 milliGRAM(s) Oral at bedtime    MEDICATIONS  (PRN):    Allergies    Bactrim (Hives)  Rituxan (Hives)  WinRho SDF (Hives)    Intolerances          VITAL SIGNS:  T(F): 98.2 (03-08-21 @ 06:54)  HR: 107 (03-08-21 @ 06:54)  BP: 131/71 (03-08-21 @ 06:54)  RR: 18 (03-08-21 @ 06:54)  SpO2: 99% (03-08-21 @ 06:54)  Wt(kg): --    PHYSICAL EXAM:  Exam per primary team    LABS:                        12.4   13.97 )-----------( 2        ( 07 Mar 2021 04:25 )             40.2     03-07    135  |  101  |  25<H>  ----------------------------<  85  3.9   |  26  |  0.80    Ca    8.3<L>      07 Mar 2021 04:25  Phos  3.2     03-07  Mg     1.8     03-07    TPro  6.5  /  Alb  2.7<L>  /  TBili  0.6  /  DBili  x   /  AST  27  /  ALT  63<H>  /  AlkPhos  37<L>  03-07          RADIOLOGY & ADDITIONAL TESTS:  Studies reviewed.

## 2021-03-08 NOTE — PROGRESS NOTE ADULT - ASSESSMENT
21yo M h/o intellectual disability, autism, nonverbal at baseline, chronic ITP on 80mg of daily prednisone, presents w/ outpatient labs showing thrombocytoepnia admitted for management of refractory ITP

## 2021-03-08 NOTE — PROGRESS NOTE ADULT - PROBLEM SELECTOR PLAN 3
hx of COVID +1/22, still positive on 2/27  -not requiring isolation  -Not on supplemental O2  -CXR w/out infiltrates

## 2021-03-08 NOTE — PROGRESS NOTE ADULT - SUBJECTIVE AND OBJECTIVE BOX
Olinda Asif, [facility] Medicine   Pager: [phone]    Patient is a 20y old  Male who presents with a chief complaint of low platelets (05 Mar 2021 11:40)  SUBJECTIVE / OVERNIGHT EVENTS: Mother at bedside. Mother states that patient tolerated Rituxan treatment well in ICU yesterday, Denies any reaction. Patient with blood on tissue when he wipes his nose. Mom reports that patient's appetite is very high given prednisone. Mom is trying to control snacking as much as possible. No other concerns. Awaiting labs this morning to assess platelets levels today.       MEDICATIONS  (STANDING):  atovaquone  Suspension 1500 milliGRAM(s) Oral daily  brexpiprazole 6 milliGRAM(s) Oral daily  influenza   Vaccine 0.5 milliLiter(s) IntraMuscular once  LORazepam     Tablet 1 milliGRAM(s) Oral at bedtime  multivitamin 1 Tablet(s) Oral daily  pantoprazole    Tablet 40 milliGRAM(s) Oral before breakfast  predniSONE   Tablet 80 milliGRAM(s) Oral daily  traZODone 300 milliGRAM(s) Oral at bedtime    MEDICATIONS  (PRN):    Vital Signs Last 24 Hrs  T(C): 36.8 (08 Mar 2021 06:54), Max: 37 (07 Mar 2021 21:53)  T(F): 98.2 (08 Mar 2021 06:54), Max: 98.6 (07 Mar 2021 21:53)  HR: 107 (08 Mar 2021 06:54) (91 - 110)  BP: 131/71 (08 Mar 2021 06:54) (125/82 - 151/69)  BP(mean): --  RR: 18 (08 Mar 2021 06:54) (18 - 24)  SpO2: 99% (08 Mar 2021 06:54) (97% - 100%)      PHYSICAL EXAM  CONSTITUTIONAL: NAD, well-appearing, sitting in chair, obese   RESPIRATORY: overall clear, does not participate in exam  CARDIOVASCULAR: Regular rate and rhythm, normal S1 and S2, no murmur/rub/gallops   ABDOMEN: Nontender to palpation, normoactive bowel sounds  MUSKULOSKELETAL:  no clubbing or cyanosis of digits; no joint swelling or tenderness to palpation  PSYCH: calm, affect appropriate  NEUROLOGY: CN 2-12 are intact and symmetric; nonfocal, nonverbal, does not follow simple commands.   SKIN: scattered UE ecchymosis        LABS:                                   12.4   13.97 )-----------( 2        ( 07 Mar 2021 04:25 )             40.2   03-07    135  |  101  |  25<H>  ----------------------------<  85  3.9   |  26  |  0.80    Ca    8.3<L>      07 Mar 2021 04:25  Phos  3.2     03-07  Mg     1.8     03-07    TPro  6.5  /  Alb  2.7<L>  /  TBili  0.6  /  DBili  x   /  AST  27  /  ALT  63<H>  /  AlkPhos  37<L>  03-07          RADIOLOGY & ADDITIONAL TESTS:    Imaging Personally Reviewed:  Consultant(s) Notes Reviewed:    Care Discussed with Consultants/Other Providers: Heme/onc

## 2021-03-08 NOTE — PROGRESS NOTE ADULT - PROBLEM SELECTOR PLAN 1
Hx of chronic ITP since 18 months, recently on home Pred 80mg qD   -CTH neg, no e/o bleeding on exam   -s/p 2U plts on 2/27, 1 u plts 2/28, 1 units on 3/7  -s/p IVIG x2, completed 2/28   -s/p Decadron 40 mg IV (2/28-3/3), now on prednisone 80mg  -Romiplastin/Nplate ordered by Heme on 2/28 and 3/7  -s/p Ritruxan infusion on 3/7; tolerated infusion well  -C/w mepron for PCP ppx

## 2021-03-09 LAB
ALBUMIN SERPL ELPH-MCNC: 3.1 G/DL — LOW (ref 3.3–5)
ALP SERPL-CCNC: 42 U/L — SIGNIFICANT CHANGE UP (ref 40–120)
ALT FLD-CCNC: 73 U/L — HIGH (ref 4–41)
ANION GAP SERPL CALC-SCNC: 10 MMOL/L — SIGNIFICANT CHANGE UP (ref 7–14)
AST SERPL-CCNC: 35 U/L — SIGNIFICANT CHANGE UP (ref 4–40)
BASOPHILS # BLD AUTO: 0.03 K/UL — SIGNIFICANT CHANGE UP (ref 0–0.2)
BASOPHILS NFR BLD AUTO: 0.2 % — SIGNIFICANT CHANGE UP (ref 0–2)
BILIRUB SERPL-MCNC: 0.5 MG/DL — SIGNIFICANT CHANGE UP (ref 0.2–1.2)
BLD GP AB SCN SERPL QL: NEGATIVE — SIGNIFICANT CHANGE UP
BUN SERPL-MCNC: 25 MG/DL — HIGH (ref 7–23)
CALCIUM SERPL-MCNC: 8.8 MG/DL — SIGNIFICANT CHANGE UP (ref 8.4–10.5)
CHLORIDE SERPL-SCNC: 103 MMOL/L — SIGNIFICANT CHANGE UP (ref 98–107)
CO2 SERPL-SCNC: 26 MMOL/L — SIGNIFICANT CHANGE UP (ref 22–31)
CREAT SERPL-MCNC: 0.88 MG/DL — SIGNIFICANT CHANGE UP (ref 0.5–1.3)
EOSINOPHIL # BLD AUTO: 0.04 K/UL — SIGNIFICANT CHANGE UP (ref 0–0.5)
EOSINOPHIL NFR BLD AUTO: 0.2 % — SIGNIFICANT CHANGE UP (ref 0–6)
GLUCOSE SERPL-MCNC: 110 MG/DL — HIGH (ref 70–99)
HCT VFR BLD CALC: 41.5 % — SIGNIFICANT CHANGE UP (ref 39–50)
HGB BLD-MCNC: 12.7 G/DL — LOW (ref 13–17)
IANC: 12.69 K/UL — HIGH (ref 1.5–8.5)
IMM GRANULOCYTES NFR BLD AUTO: 3.6 % — HIGH (ref 0–1.5)
LYMPHOCYTES # BLD AUTO: 16.2 % — SIGNIFICANT CHANGE UP (ref 13–44)
LYMPHOCYTES # BLD AUTO: 2.85 K/UL — SIGNIFICANT CHANGE UP (ref 1–3.3)
MAGNESIUM SERPL-MCNC: 1.9 MG/DL — SIGNIFICANT CHANGE UP (ref 1.6–2.6)
MCHC RBC-ENTMCNC: 27.2 PG — SIGNIFICANT CHANGE UP (ref 27–34)
MCHC RBC-ENTMCNC: 30.6 GM/DL — LOW (ref 32–36)
MCV RBC AUTO: 88.9 FL — SIGNIFICANT CHANGE UP (ref 80–100)
MONOCYTES # BLD AUTO: 1.35 K/UL — HIGH (ref 0–0.9)
MONOCYTES NFR BLD AUTO: 7.7 % — SIGNIFICANT CHANGE UP (ref 2–14)
NEUTROPHILS # BLD AUTO: 12.69 K/UL — HIGH (ref 1.8–7.4)
NEUTROPHILS NFR BLD AUTO: 72.1 % — SIGNIFICANT CHANGE UP (ref 43–77)
NRBC # BLD: 0 /100 WBCS — SIGNIFICANT CHANGE UP
NRBC # FLD: 0.02 K/UL — HIGH
PHOSPHATE SERPL-MCNC: 2.9 MG/DL — SIGNIFICANT CHANGE UP (ref 2.5–4.5)
PLATELET # BLD AUTO: 3 K/UL — CRITICAL LOW (ref 150–400)
POTASSIUM SERPL-MCNC: 3.3 MMOL/L — LOW (ref 3.5–5.3)
POTASSIUM SERPL-SCNC: 3.3 MMOL/L — LOW (ref 3.5–5.3)
PROT SERPL-MCNC: 7 G/DL — SIGNIFICANT CHANGE UP (ref 6–8.3)
RBC # BLD: 4.67 M/UL — SIGNIFICANT CHANGE UP (ref 4.2–5.8)
RBC # FLD: 17.9 % — HIGH (ref 10.3–14.5)
RH IG SCN BLD-IMP: POSITIVE — SIGNIFICANT CHANGE UP
SODIUM SERPL-SCNC: 139 MMOL/L — SIGNIFICANT CHANGE UP (ref 135–145)
WBC # BLD: 17.6 K/UL — HIGH (ref 3.8–10.5)
WBC # FLD AUTO: 17.6 K/UL — HIGH (ref 3.8–10.5)

## 2021-03-09 PROCEDURE — 99233 SBSQ HOSP IP/OBS HIGH 50: CPT | Mod: CS

## 2021-03-09 PROCEDURE — 99222 1ST HOSP IP/OBS MODERATE 55: CPT | Mod: CS,GC

## 2021-03-09 RX ORDER — POTASSIUM CHLORIDE 20 MEQ
40 PACKET (EA) ORAL ONCE
Refills: 0 | Status: COMPLETED | OUTPATIENT
Start: 2021-03-09 | End: 2021-03-09

## 2021-03-09 RX ORDER — POTASSIUM CHLORIDE 20 MEQ
40 PACKET (EA) ORAL ONCE
Refills: 0 | Status: DISCONTINUED | OUTPATIENT
Start: 2021-03-09 | End: 2021-03-09

## 2021-03-09 RX ADMIN — Medication 80 MILLIGRAM(S): at 11:46

## 2021-03-09 RX ADMIN — BREXPIPRAZOLE 6 MILLIGRAM(S): 0.25 TABLET ORAL at 11:46

## 2021-03-09 RX ADMIN — CETIRIZINE HYDROCHLORIDE 10 MILLIGRAM(S): 10 TABLET ORAL at 21:37

## 2021-03-09 RX ADMIN — Medication 40 MILLIEQUIVALENT(S): at 12:49

## 2021-03-09 RX ADMIN — CHLORHEXIDINE GLUCONATE 1 APPLICATION(S): 213 SOLUTION TOPICAL at 06:49

## 2021-03-09 RX ADMIN — Medication 1 MILLIGRAM(S): at 13:59

## 2021-03-09 RX ADMIN — Medication 1 TABLET(S): at 11:46

## 2021-03-09 RX ADMIN — ATOVAQUONE 1500 MILLIGRAM(S): 750 SUSPENSION ORAL at 11:46

## 2021-03-09 RX ADMIN — Medication 1 MILLIGRAM(S): at 21:37

## 2021-03-09 RX ADMIN — Medication 300 MILLIGRAM(S): at 21:37

## 2021-03-09 NOTE — DIETITIAN INITIAL EVALUATION ADULT. - ADD RECOMMEND
1) Monitor weights, PO intake/diet tolerance, skin integrity, pertinent labs. 1) Monitor weights, PO intake/diet tolerance, skin integrity, pertinent labs. 2) Continue MVI 1x daily; may consider Ascorbic acid supplementation if medically appropriate.

## 2021-03-09 NOTE — DIETITIAN INITIAL EVALUATION ADULT. - PERTINENT MEDS FT
MEDICATIONS  (STANDING):  atovaquone  Suspension 1500 milliGRAM(s) Oral daily  brexpiprazole 6 milliGRAM(s) Oral daily  cetirizine 10 milliGRAM(s) Oral at bedtime  chlorhexidine 4% Liquid 1 Application(s) Topical <User Schedule>  fluticasone propionate 50 MICROgram(s)/spray Nasal Spray 1 Spray(s) Both Nostrils two times a day  influenza   Vaccine 0.5 milliLiter(s) IntraMuscular once  LORazepam     Tablet 1 milliGRAM(s) Oral at bedtime  multivitamin 1 Tablet(s) Oral daily  predniSONE   Tablet 80 milliGRAM(s) Oral daily  traZODone 300 milliGRAM(s) Oral at bedtime    MEDICATIONS  (PRN):

## 2021-03-09 NOTE — DIETITIAN INITIAL EVALUATION ADULT. - ETIOLOGY
Likely related to lifestyle factors, overconsumption of calorically dense foods, psychosocial factors

## 2021-03-09 NOTE — PROGRESS NOTE ADULT - PROBLEM SELECTOR PLAN 3
hx of COVID +1/22, still positive on 2/27  -not requiring isolation  -Not on supplemental O2  -CXR w/out infiltrates  -ID consult called for evaluation for MAB infusion for treatment of covid infection. -Unable to repeat covid PCR at this time due to severe thrombocytopenia and epistaxis.

## 2021-03-09 NOTE — CONSULT NOTE ADULT - ASSESSMENT
19yo M h/o intellectual disability, autism, nonverbal at baseline, chronic ITP on 80mg of daily prednisone, presents 2/27 w/ outpatient labs showing thrombocytopenia of 7k.   Recent admit for thrombocytopenia and asymptomatic covid-19 infection on 1/22.     Now s/p IVIG, steroids, romiplastin and Rituxan desensitization in the MICU on 3/5, and multiple plt transfusions.      #Thrombocytopenia 2/2 Asymptomatic COVID-19 - PCR remains positive as of 2/27. Likely shedding old virus vs re-infection, and repeat covid-ab have been checked.   He remains asymptomatic and platelets were downtrending at the end of his prior admission on 1/22-2/13. Mom reports he is at baseline without fever, chills or hypoxia.   Appears thrombocytopenia is likely sequela from initial infection detected on 1/22.  There is unclear benefit from any COVID-19 experimental therapy as he is so far out from initial infection, specifically to monoclonal therapy, he does meet hospital covid guidelines due to length of infection.    Rec:  - check repeat covid-ab  - consider repeat blood clx to eval for bacteremia  - agree with mepron for pcp ppx     Emerson Loo MD  Fellow, Infectious Diseases, PGY-4  Pager: [phone]  Before 9am or after 5pm/Weekends: Call [phone]   21yo M h/o intellectual disability, autism, nonverbal at baseline, chronic ITP on 80mg of daily prednisone, presents 2/27 w/ outpatient labs showing thrombocytopenia of 7k.   Recent admit for thrombocytopenia and asymptomatic covid-19 infection on 1/22.     Now s/p IVIG, steroids, romiplastin and Rituxan desensitization in the MICU on 3/5, and multiple plt transfusions.      #Thrombocytopenia,  Asymptomatic COVID-19 - PCR remains positive as of 2/27. Likely shedding old virus vs re-infection.   He remains asymptomatic and platelets were downtrending at the end of his prior admission on 1/22-2/13. Mom reports he is at baseline without fever, chills or hypoxia.   Appears thrombocytopenia may have been exacerbated by initial infection detected on 1/22.  There is unclear benefit from monoclonal antibody therapy as he is so far out from initial infection.  He does not meet hospital covid guidelines due to length of infection.    Rec:  - check repeat covid-ab  - consider repeat blood clx to eval for bacteremia  - agree with mepron for pcp ppx   -maintain airborne/ contact precautions     Emerson Loo MD  Fellow, Infectious Diseases, PGY-4  Pager: [phone]  Before 9am or after 5pm/Weekends: Call [phone]

## 2021-03-09 NOTE — PROGRESS NOTE ADULT - PROBLEM SELECTOR PLAN 1
Hx of chronic ITP since 18 months, recently on home Pred 80mg qD   -CTH neg, no e/o bleeding on exam   -s/p 2U plts on 2/27, 1 u plts 2/28, 1 units on 3/7;  -per heme recommendations will transfuse 1/2 unit platelets every 12 hrs until count>5  -s/p IVIG x2, completed 2/28   -s/p Decadron 40 mg IV (2/28-3/3), now on prednisone 80mg  -Romiplastin/Nplate ordered by Heme on 2/28 and 3/7  -s/p Ritruxan infusion on 3/7; tolerated infusion well  -C/w mepron for PCP ppx  -management per heme given severe refractory case   -ID consult called for evaluation for MAB infusion for treatment of covid infection. Unable to repeat covid PCR at this time due to severe thrombocytopenia and epistaxis.

## 2021-03-09 NOTE — CONSULT NOTE ADULT - SUBJECTIVE AND OBJECTIVE BOX
Patient is a 20y old  Male who presents with a chief complaint of low platelets (09 Mar 2021 13:22)    HPI:  19yo M h/o intellectual disability, autism, nonverbal at baseline, chronic ITP on 80mg of daily prednisone, presents 2/27 w/ outpatient labs showing low plt. Patient was sent in from [facility] with platelet of 7 and heme referral for admission w/ IVIG. Patient is at baseline mental status and has had no bleeding recently. Patient had recent admission for plt transfusion and ivig for a rectal bleed.    Of note, patient was diagnosed with ITP since 18 months. Plt usually runs in 10K range. Follows with hematology at [facility]. Over the years, they have become more conservative in treatments given that patient would have very low level of platelets without major bleeding events. Patient was treated with IVIG and steroids end of Jan. 2021. Prior to that, last episode of major flare was about 5 years ago when he had diffuse petechiae without bleeding, and plt was about 5K at that time.     Last admission had 1/22-2/5 for thrombocytopenia. He recieved steroids, IVIG, and platelet transfusion. Plts improved from 1k to 300k, then pt developed steroids induced psychosis, and 2nd round of IVIG was given although platelets began to downtrend. He was discharged with plt near 80k.      Now this admission, He has received IVIG, steroids, romiplastin and Rituxan desensitization in the MICU on 3/5.   HE was also started on mepron for pcp ppx.   More recently he had a bloody BM and received 1/2 U plt x 2 today.     Mom reports he is at baseline, although becoming more agitated due to hospitalization.  No fevers or chills. No notable signs of pain or discomfort as he is nonverbal.        prior hospital charts reviewed [x  ]  primary team notes reviewed [ x ]  other consultant notes reviewed [ x ]    PAST MEDICAL & SURGICAL HISTORY:  Chronic ITP (idiopathic thrombocytopenia)    Intellectual disability    No significant past surgical history      Allergies  Bactrim (Hives)  Rituxan (Hives)  WinRho SDF (Hives)    ANTIMICROBIALS (past 90 days)  MEDICATIONS  (STANDING):  atovaquone  Suspension   1500 milliGRAM(s) Oral (03-09-21 @ 11:46)   1500 milliGRAM(s) Oral (03-08-21 @ 12:04)   1500 milliGRAM(s) Oral (03-07-21 @ 11:03)   1500 milliGRAM(s) Oral (03-06-21 @ 14:52)    atovaquone  Suspension   1500 milliGRAM(s) Oral (03-05-21 @ 21:25)      ANTIMICROBIALS:    atovaquone  Suspension 1500 daily    OTHER MEDS: MEDICATIONS  (STANDING):  brexpiprazole 6 daily  cetirizine 10 at bedtime  influenza   Vaccine 0.5 once  LORazepam     Tablet 1 at bedtime  predniSONE   Tablet 80 daily  traZODone 300 at bedtime    SOCIAL HISTORY:   No active toxic habits. (27 Feb 2021 01:52)      FAMILY HISTORY:  FH: hypertension      REVIEW OF SYSTEMS  [  ] ROS unobtainable because:    [  x] All other systems negative except as noted below:	    Constitutional:  [ ] fever [ ] chills  [ ] weight loss  [ ] weakness  Skin:  [ ] rash [ ] phlebitis	  Eyes: [ ] icterus [ ] pain  [ ] discharge	  ENMT: [ ] sore throat  [ ] thrush [ ] ulcers [ ] exudates  Respiratory: [ ] dyspnea [ ] hemoptysis [ ] cough [ ] sputum	  Cardiovascular:  [ ] chest pain [ ] palpitations [ ] edema	  Gastrointestinal:  [ ] nausea [ ] vomiting [ ] diarrhea [ ] constipation [ ] pain	  Genitourinary:  [ ] dysuria [ ] frequency [ ] hematuria [ ] discharge [ ] flank pain  [ ] incontinence  Musculoskeletal:  [ ] myalgias [ ] arthralgias [ ] arthritis  [ ] back pain  Neurological:  [ ] headache [ ] seizures  [ ] confusion/altered mental status  Psychiatric:  [ ] anxiety [ ] depression	  Hematology/Lymphatics:  [ ] lymphadenopathy  Endocrine:  [ ] adrenal [ ] thyroid  Allergic/Immunologic:	 [ ] transplant [ ] seasonal    Vital Signs Last 24 Hrs  T(F): 98 (03-09-21 @ 16:05), Max: 99.2 (03-06-21 @ 19:41)  Vital Signs Last 24 Hrs  HR: 107 (03-09-21 @ 16:05) (88 - 121)  BP: 121/81 (03-09-21 @ 16:05) (109/62 - 133/81)  RR: 16 (03-09-21 @ 16:05)  SpO2: 100% (03-09-21 @ 16:05) (99% - 100%)  Wt(kg): --    EXAM:  Constitutional: Not in acute distress  Eyes: pupils bilaterally reactive to light. No icterus.  Oral cavity: Clear, no lesions  Neck: No neck vein distension noted  RS: Chest clear to auscultation bilaterally. No wheeze/rhonchi/crepitations.  CVS: S1, S2 heard. Regular rate and rhythm. No murmurs/rubs/gallops.  Abdomen: Soft. No guarding/rigidity/tenderness.  : No acute abnormalities  Extremities: Warm. Non pitting edema  Skin: No lesions noted  Vascular: No evidence of phlebitis  Neuro: Alert, nonverbal                          12.7   17.60 )-----------( 3        ( 09 Mar 2021 07:19 )             41.5     03-09    139  |  103  |  25<H>  ----------------------------<  110<H>  3.3<L>   |  26  |  0.88    Ca    8.8      09 Mar 2021 07:19  Phos  2.9     03-09  Mg     1.9     03-09    TPro  7.0  /  Alb  3.1<L>  /  TBili  0.5  /  DBili  x   /  AST  35  /  ALT  73<H>  /  AlkPhos  42  03-09    MICROBIOLOGY:  COVID-19 PCR (01.22.21 @ 22:01)    COVID-19 PCR: Detected:     COVID-19 PCR . (01.25.21 @ 19:56)    COVID-19 PCR: Detected:     COVID-19 PCR . (01.30.21 @ 16:14)    COVID-19 PCR: Detected:    COVID-19 PCR . (02.04.21 @ 14:33)    COVID-19 PCR: Detected    COVID-19 PCR . (02.27.21 @ 00:59)    COVID-19 PCR: Detected    COVID-19  Antibody - for prior infection screening (01.25.21 @ 10:50)    COVID-19 IgG Antibody Index: 0.30: Roche ECLIA Total AB (ERINN)  NOTE: This result index represents a total antibody measurement,  which  includes IgG, IgA, and IgM  Measures Nucleocapsid  Negative <= 0.99 Index  Positive >= 1.00 Index Index    COVID-19 IgG Antibody Interpretation: Negative      RADIOLOGY:  imaging below personally reviewed    < from: Xray Chest 1 View- PORTABLE-Routine (Xray Chest 1 View- PORTABLE-Routine .) (02.27.21 @ 18:52) >  IMPRESSION: Low lung volumes with clear lungs.    < from: US Spleen (03.04.21 @ 12:50) >  IMPRESSION:  Spleen: Normal appearance and morphology. Upper limits of normal in size measuring 14 cm in length.      OTHER TESTS:   Patient is a 20y old  Male who presents with a chief complaint of low platelets (09 Mar 2021 13:22)    HPI:  21yo M h/o intellectual disability, autism, nonverbal at baseline, chronic ITP on 80mg of daily prednisone, presents 2/27 w/ outpatient labs showing low plt. Patient was sent in from [facility] with platelet of 7 and heme referral for admission w/ IVIG. Patient is at baseline mental status and has had no bleeding recently. Patient had recent admission for plt transfusion and ivig for a rectal bleed.    Of note, patient was diagnosed with ITP since 18 months. Plt usually runs in 10K range. Follows with hematology at [facility]. Over the years, they have become more conservative in treatments given that patient would have very low level of platelets without major bleeding events. Patient was treated with IVIG and steroids end of Jan. 2021. Prior to that, last episode of major flare was about 5 years ago when he had diffuse petechiae without bleeding, and plt was about 5K at that time.     Last admission had 1/22-2/5 for thrombocytopenia. He recieved steroids, IVIG, and platelet transfusion. Plts improved from 1k to 300k, then pt developed steroids induced psychosis, and 2nd round of IVIG was given although platelets began to downtrend. He was discharged with plt near 80k.      Now this admission, He has received IVIG, steroids, romiplastin and Rituxan desensitization in the MICU on 3/5.   HE was also started on mepron for pcp ppx.   More recently he had a bloody BM and received 1/2 U plt x 2 today.     Mom reports he is at baseline, although becoming more agitated due to hospitalization.  No fevers or chills. No notable signs of pain or discomfort as he is nonverbal.        prior hospital charts reviewed [x  ]  primary team notes reviewed [ x ]  other consultant notes reviewed [ x ]    PAST MEDICAL & SURGICAL HISTORY:  Chronic ITP (idiopathic thrombocytopenia)    Intellectual disability    No significant past surgical history      Allergies  Bactrim (Hives)  Rituxan (Hives)  WinRho SDF (Hives)    ANTIMICROBIALS (past 90 days)  MEDICATIONS  (STANDING):  atovaquone  Suspension   1500 milliGRAM(s) Oral (03-09-21 @ 11:46)   1500 milliGRAM(s) Oral (03-08-21 @ 12:04)   1500 milliGRAM(s) Oral (03-07-21 @ 11:03)   1500 milliGRAM(s) Oral (03-06-21 @ 14:52)    atovaquone  Suspension   1500 milliGRAM(s) Oral (03-05-21 @ 21:25)      ANTIMICROBIALS:    atovaquone  Suspension 1500 daily    OTHER MEDS: MEDICATIONS  (STANDING):  brexpiprazole 6 daily  cetirizine 10 at bedtime  influenza   Vaccine 0.5 once  LORazepam     Tablet 1 at bedtime  predniSONE   Tablet 80 daily  traZODone 300 at bedtime    SOCIAL HISTORY:   No active toxic habits. (27 Feb 2021 01:52)      FAMILY HISTORY:  FH: hypertension      REVIEW OF SYSTEMS  [  ] ROS unobtainable because:    [  x] All other systems negative except as noted below:	    Constitutional:  [ ] fever [ ] chills  [ ] weight loss  [ ] weakness  Skin:  [ ] rash [ ] phlebitis	  Eyes: [ ] icterus [ ] pain  [ ] discharge	  ENMT: [ ] sore throat  [ ] thrush [ ] ulcers [ ] exudates  Respiratory: [ ] dyspnea [ ] hemoptysis [ ] cough [ ] sputum	  Cardiovascular:  [ ] chest pain [ ] palpitations [ ] edema	  Gastrointestinal:  [ ] nausea [ ] vomiting [ ] diarrhea [ ] constipation [ ] pain	  Genitourinary:  [ ] dysuria [ ] frequency [ ] hematuria [ ] discharge [ ] flank pain  [ ] incontinence  Musculoskeletal:  [ ] myalgias [ ] arthralgias [ ] arthritis  [ ] back pain  Neurological:  [ ] headache [ ] seizures  [ ] confusion/altered mental status  Psychiatric:  [ ] anxiety [ ] depression	  Hematology/Lymphatics:  [ ] lymphadenopathy  Endocrine:  [ ] adrenal [ ] thyroid  Allergic/Immunologic:	 [ ] transplant [ ] seasonal    Vital Signs Last 24 Hrs  T(F): 98 (03-09-21 @ 16:05), Max: 99.2 (03-06-21 @ 19:41)  Vital Signs Last 24 Hrs  HR: 107 (03-09-21 @ 16:05) (88 - 121)  BP: 121/81 (03-09-21 @ 16:05) (109/62 - 133/81)  RR: 16 (03-09-21 @ 16:05)  SpO2: 100% (03-09-21 @ 16:05) (99% - 100%)  Wt(kg): --    EXAM:  Constitutional: Not in acute distress  Eyes: pupils bilaterally reactive to light. No icterus.  Oral cavity: Clear, no lesions  Neck: No neck vein distension noted  RS: Chest clear to auscultation bilaterally. No wheeze/rhonchi/crepitations.  CVS: S1, S2 heard. Regular rate and rhythm. No murmurs/rubs/gallops.  Abdomen: Soft. No guarding/rigidity/tenderness.  : No acute abnormalities  Extremities: Warm. Non pitting edema  Skin: few eschar fingers  Vascular: No evidence of phlebitis  Neuro: Alert, nonverbal                          12.7   17.60 )-----------( 3        ( 09 Mar 2021 07:19 )             41.5     03-09    139  |  103  |  25<H>  ----------------------------<  110<H>  3.3<L>   |  26  |  0.88    Ca    8.8      09 Mar 2021 07:19  Phos  2.9     03-09  Mg     1.9     03-09    TPro  7.0  /  Alb  3.1<L>  /  TBili  0.5  /  DBili  x   /  AST  35  /  ALT  73<H>  /  AlkPhos  42  03-09    MICROBIOLOGY:  COVID-19 PCR (01.22.21 @ 22:01)    COVID-19 PCR: Detected:     COVID-19 PCR . (01.25.21 @ 19:56)    COVID-19 PCR: Detected:     COVID-19 PCR . (01.30.21 @ 16:14)    COVID-19 PCR: Detected:    COVID-19 PCR . (02.04.21 @ 14:33)    COVID-19 PCR: Detected    COVID-19 PCR . (02.27.21 @ 00:59)    COVID-19 PCR: Detected    COVID-19  Antibody - for prior infection screening (01.25.21 @ 10:50)    COVID-19 IgG Antibody Index: 0.30: Roche ECLIA Total AB (ERINN)  NOTE: This result index represents a total antibody measurement,  which  includes IgG, IgA, and IgM  Measures Nucleocapsid  Negative <= 0.99 Index  Positive >= 1.00 Index Index    COVID-19 IgG Antibody Interpretation: Negative      RADIOLOGY:  imaging below personally reviewed    < from: Xray Chest 1 View- PORTABLE-Routine (Xray Chest 1 View- PORTABLE-Routine .) (02.27.21 @ 18:52) >  IMPRESSION: Low lung volumes with clear lungs.    < from: US Spleen (03.04.21 @ 12:50) >  IMPRESSION:  Spleen: Normal appearance and morphology. Upper limits of normal in size measuring 14 cm in length.      OTHER TESTS:

## 2021-03-09 NOTE — DIETITIAN INITIAL EVALUATION ADULT. - PROBLEM SELECTOR PLAN 1
Monitor platelets. Per discussion with ED resident who communicated with hematology, no platelet transfusion.  Appreciate hematology recs, will start IVIG.  C/w home dose steroids.  Hold all chemo DVT ppx for now.

## 2021-03-09 NOTE — DIETITIAN INITIAL EVALUATION ADULT. - OTHER INFO
RD unable to have face to face encounter with patient to perform nutrition interview and/or nutrition-focused physical exam due to COVID-19.  Last swab 2/27 indicated that patient remains covid positive.  Per chart,  Unable to repeat covid PCR at this time due to severe thrombocytopenia and epistaxis.      RD spoke to patient's mom, Nicole, via telephone for collateral, as patient is nonverbal due to autism.  Patient was residing in a group home PTA.  Mom said that Denilson was losing weight over the past couple of months (patient with morbid obesity) through diet modification.  Mom said that she was reducing intake of concentrated sweets and wants to continue with same, as patient has been restarted on steroid tx. RD assessment from Feb 2021 reviewed; wt 151.6kgs (vs noted 158.8kgs) on 1/22/21.      No chewing or swallowing difficulties reported.  No food allergies reported.  Mom indicated that patient not experiencing nausea or vomiting, but does have intermittent diarrhea, which she attributes to some of his medications and treatments vs. diet.    RD reviewed diet modifications as discussed with mom - salads and fresh fruits with meals, reducing concentrated sweets (juices, fruit cups); mom making menu selections, but stated some challenges as steroid tx increases Denilson's appetite. RD unable to have face to face encounter with patient to perform nutrition interview and/or nutrition-focused physical exam due to COVID-19.  Last swab 2/27 indicated that patient remains covid positive.  Per chart,  Unable to repeat covid PCR at this time due to severe thrombocytopenia and epistaxis.      RD spoke to patient's mom, Nicole, via telephone for collateral, as patient is nonverbal due to autism.  Patient was residing in a group home PTA.  Mom said that Denilson was losing weight over the past couple of months (patient with morbid obesity) through diet modification.  Mom said that she was reducing intake of concentrated sweets and wants to continue with same, as patient has been restarted on steroid tx. RD assessment from Feb 2021 reviewed; wt 151.6kgs (vs noted 158.8kgs) on 1/22/21.      No chewing or swallowing difficulties reported.  No food allergies reported.  Mom indicated that patient not experiencing nausea or vomiting, but does have intermittent diarrhea, which she attributes to some of his medications and treatments vs. diet.    RD reviewed diet modifications as discussed with mom - salads and fresh fruits with meals, reducing concentrated sweets (juices, fruit cups, desserts); mom making menu selections, but stated some challenges as steroid tx increases Denilson's appetite.

## 2021-03-09 NOTE — PROGRESS NOTE ADULT - SUBJECTIVE AND OBJECTIVE BOX
INTERVAL HPI/OVERNIGHT EVENTS:  x1 episode of blood streaked stool in diaper and some blood in tissue when nose was wiped.     MEDICATIONS  (STANDING):  atovaquone  Suspension 1500 milliGRAM(s) Oral daily  brexpiprazole 6 milliGRAM(s) Oral daily  cetirizine 10 milliGRAM(s) Oral at bedtime  chlorhexidine 4% Liquid 1 Application(s) Topical <User Schedule>  fluticasone propionate 50 MICROgram(s)/spray Nasal Spray 1 Spray(s) Both Nostrils two times a day  influenza   Vaccine 0.5 milliLiter(s) IntraMuscular once  LORazepam     Tablet 1 milliGRAM(s) Oral at bedtime  multivitamin 1 Tablet(s) Oral daily  predniSONE   Tablet 80 milliGRAM(s) Oral daily  traZODone 300 milliGRAM(s) Oral at bedtime    MEDICATIONS  (PRN):    Allergies    Bactrim (Hives)  Rituxan (Hives)  WinRho SDF (Hives)    Intolerances          VITAL SIGNS:  T(F): 98.5 (03-09-21 @ 06:55)  HR: 88 (03-09-21 @ 06:55)  BP: 127/76 (03-09-21 @ 06:55)  RR: 16 (03-09-21 @ 06:55)  SpO2: 99% (03-09-21 @ 06:55)  Wt(kg): --    PHYSICAL EXAM:  Exam per primary team    LABS:                        12.7   17.60 )-----------( 3        ( 09 Mar 2021 07:19 )             41.5     03-09    139  |  103  |  25<H>  ----------------------------<  110<H>  3.3<L>   |  26  |  0.88    Ca    8.8      09 Mar 2021 07:19  Phos  2.9     03-09  Mg     1.9     03-09    TPro  7.0  /  Alb  3.1<L>  /  TBili  0.5  /  DBili  x   /  AST  35  /  ALT  73<H>  /  AlkPhos  42  03-09          RADIOLOGY & ADDITIONAL TESTS:  Studies reviewed.

## 2021-03-09 NOTE — DIETITIAN INITIAL EVALUATION ADULT. - PERTINENT LABORATORY DATA
03-09 Na139 mmol/L Glu 110 mg/dL<H> K+ 3.3 mmol/L<L> Cr  0.88 mg/dL BUN 25 mg/dL<H> 03-09 Phos 2.9 mg/dL 03-09 Alb 3.1 g/dL<L>

## 2021-03-09 NOTE — DIETITIAN INITIAL EVALUATION ADULT. - CHIEF COMPLAINT
21yo M h/o intellectual disability, autism, nonverbal at baseline, chronic ITP on 80mg of daily prednisone, presents w/ outpatient labs showing thrombocytopenia admitted for management of refractory ITP.

## 2021-03-09 NOTE — PROGRESS NOTE ADULT - ASSESSMENT
19 yo M with a history of chronic ITP and severe autism (non-verbal at baseline) who presents with severe thrombocytopenia (Plt 7) while on home PO prednisone (80mg daily).     # Chronic ITP exacerbated in the setting of recent COVID infection   -CT head neg for bleed  -s/p IVIG 1gm/kg daily x 2 (completed 2/28)   -S/p romiplastin/Nplate, 1mcg/kg; given 2/28; next dose 3/7 was 2mcg/kg (pt only got 250mcg instead of 300mcg as pharmacy only had 250)  -S/p dexamethasone 40mg IV x4 days (completed 3/3)  -as pt was on steroid for prolonged period continue prednisone 80 mg then taper  -s/p RITUXAN with desensitization protocol 3/6. Appreciate ICU care.   -transfuse platelets if bleeding (consider giving 1/2 unit platelets slowly infused over 3 hours)  -Continue MEPRON for PCP ppx   -Consult ID for treatment of COVID with MAB (consider under compassionate use); there have been cases of ITP and prolonged COVID who don't get better until COVID is cleared   -monitor closely for any signs or symptoms of bleeding      Francisca Lutz, PGY-4  Hematology-Oncology Fellow  [phone] ([place]) [phone] ([facility])  I can also be reached on [employer] Teams  Please page fellow on call after 5pm and on weekends

## 2021-03-09 NOTE — PROGRESS NOTE ADULT - SUBJECTIVE AND OBJECTIVE BOX
Olinda Asif, [facility] Medicine   Pager: [phone]    Patient is a 20y old  Male who presents with a chief complaint of low platelets (05 Mar 2021 11:40)  SUBJECTIVE / OVERNIGHT EVENTS: Mother at bedside. History provided by mom. Mom states that patient is still having nosebleeds today. She is also concerned about patient's increase appetite due to prednisone. I explained to mother that for now risk of low platelets are higher and hence we should continue prednisone until we see improvement in platelets. Mom agreeable. Heme spoke to mother as well today and reinforced the plan.     MEDICATIONS  (STANDING):  atovaquone  Suspension 1500 milliGRAM(s) Oral daily  brexpiprazole 6 milliGRAM(s) Oral daily  influenza   Vaccine 0.5 milliLiter(s) IntraMuscular once  LORazepam     Tablet 1 milliGRAM(s) Oral at bedtime  multivitamin 1 Tablet(s) Oral daily  pantoprazole    Tablet 40 milliGRAM(s) Oral before breakfast  predniSONE   Tablet 80 milliGRAM(s) Oral daily  traZODone 300 milliGRAM(s) Oral at bedtime    MEDICATIONS  (PRN):    Vital Signs Last 24 Hrs  T(C): 36.9 (09 Mar 2021 06:55), Max: 37.1 (08 Mar 2021 20:25)  T(F): 98.5 (09 Mar 2021 06:55), Max: 98.7 (08 Mar 2021 20:25)  HR: 88 (09 Mar 2021 06:55) (88 - 110)  BP: 127/76 (09 Mar 2021 06:55) (126/86 - 147/72)  BP(mean): --  RR: 16 (09 Mar 2021 06:55) (16 - 16)  SpO2: 99% (09 Mar 2021 06:55) (99% - 100%)    PHYSICAL EXAM  CONSTITUTIONAL: NAD, well-appearing, sitting in chair, obese   RESPIRATORY: overall clear, does not participate in exam  CARDIOVASCULAR: Regular rate and rhythm, normal S1 and S2, no murmur/rub/gallops   ABDOMEN: Nontender to palpation, normoactive bowel sounds  MUSKULOSKELETAL:  no clubbing or cyanosis of digits; no joint swelling or tenderness to palpation  PSYCH: calm, affect appropriate  NEUROLOGY: CN 2-12 are intact and symmetric; nonfocal, nonverbal, does not follow simple commands.   SKIN: scattered UE ecchymosis        LABS:                                   12.4   13.97 )-----------( 2        ( 07 Mar 2021 04:25 )             40.2   03-07    135  |  101  |  25<H>  ----------------------------<  85  3.9   |  26  |  0.80    Ca    8.3<L>      07 Mar 2021 04:25  Phos  3.2     03-07  Mg     1.8     03-07    TPro  6.5  /  Alb  2.7<L>  /  TBili  0.6  /  DBili  x   /  AST  27  /  ALT  63<H>  /  AlkPhos  37<L>  03-07          RADIOLOGY & ADDITIONAL TESTS:    Imaging Personally Reviewed:  Consultant(s) Notes Reviewed:    Care Discussed with Consultants/Other Providers: Heme/onc

## 2021-03-10 LAB
ALBUMIN SERPL ELPH-MCNC: 3.3 G/DL — SIGNIFICANT CHANGE UP (ref 3.3–5)
ALP SERPL-CCNC: 44 U/L — SIGNIFICANT CHANGE UP (ref 40–120)
ALT FLD-CCNC: 55 U/L — HIGH (ref 4–41)
ANION GAP SERPL CALC-SCNC: 11 MMOL/L — SIGNIFICANT CHANGE UP (ref 7–14)
AST SERPL-CCNC: 26 U/L — SIGNIFICANT CHANGE UP (ref 4–40)
BASOPHILS # BLD AUTO: 0.03 K/UL — SIGNIFICANT CHANGE UP (ref 0–0.2)
BASOPHILS NFR BLD AUTO: 0.2 % — SIGNIFICANT CHANGE UP (ref 0–2)
BILIRUB SERPL-MCNC: 0.5 MG/DL — SIGNIFICANT CHANGE UP (ref 0.2–1.2)
BUN SERPL-MCNC: 24 MG/DL — HIGH (ref 7–23)
CALCIUM SERPL-MCNC: 8.6 MG/DL — SIGNIFICANT CHANGE UP (ref 8.4–10.5)
CHLORIDE SERPL-SCNC: 102 MMOL/L — SIGNIFICANT CHANGE UP (ref 98–107)
CO2 SERPL-SCNC: 26 MMOL/L — SIGNIFICANT CHANGE UP (ref 22–31)
CREAT SERPL-MCNC: 0.72 MG/DL — SIGNIFICANT CHANGE UP (ref 0.5–1.3)
EOSINOPHIL # BLD AUTO: 0.06 K/UL — SIGNIFICANT CHANGE UP (ref 0–0.5)
EOSINOPHIL NFR BLD AUTO: 0.3 % — SIGNIFICANT CHANGE UP (ref 0–6)
GLUCOSE SERPL-MCNC: 99 MG/DL — SIGNIFICANT CHANGE UP (ref 70–99)
HCT VFR BLD CALC: 39.4 % — SIGNIFICANT CHANGE UP (ref 39–50)
HGB BLD-MCNC: 12 G/DL — LOW (ref 13–17)
IANC: 15.32 K/UL — HIGH (ref 1.5–8.5)
IMM GRANULOCYTES NFR BLD AUTO: 3.6 % — HIGH (ref 0–1.5)
LYMPHOCYTES # BLD AUTO: 1.94 K/UL — SIGNIFICANT CHANGE UP (ref 1–3.3)
LYMPHOCYTES # BLD AUTO: 9.7 % — LOW (ref 13–44)
MAGNESIUM SERPL-MCNC: 2 MG/DL — SIGNIFICANT CHANGE UP (ref 1.6–2.6)
MCHC RBC-ENTMCNC: 27.2 PG — SIGNIFICANT CHANGE UP (ref 27–34)
MCHC RBC-ENTMCNC: 30.5 GM/DL — LOW (ref 32–36)
MCV RBC AUTO: 89.3 FL — SIGNIFICANT CHANGE UP (ref 80–100)
MONOCYTES # BLD AUTO: 1.85 K/UL — HIGH (ref 0–0.9)
MONOCYTES NFR BLD AUTO: 9.3 % — SIGNIFICANT CHANGE UP (ref 2–14)
NEUTROPHILS # BLD AUTO: 15.32 K/UL — HIGH (ref 1.8–7.4)
NEUTROPHILS NFR BLD AUTO: 76.9 % — SIGNIFICANT CHANGE UP (ref 43–77)
NRBC # BLD: 0 /100 WBCS — SIGNIFICANT CHANGE UP
NRBC # FLD: 0.03 K/UL — HIGH
PHOSPHATE SERPL-MCNC: 3.2 MG/DL — SIGNIFICANT CHANGE UP (ref 2.5–4.5)
PLATELET # BLD AUTO: 4 K/UL — CRITICAL LOW (ref 150–400)
POTASSIUM SERPL-MCNC: 4.2 MMOL/L — SIGNIFICANT CHANGE UP (ref 3.5–5.3)
POTASSIUM SERPL-SCNC: 4.2 MMOL/L — SIGNIFICANT CHANGE UP (ref 3.5–5.3)
PROT SERPL-MCNC: 6.6 G/DL — SIGNIFICANT CHANGE UP (ref 6–8.3)
RBC # BLD: 4.41 M/UL — SIGNIFICANT CHANGE UP (ref 4.2–5.8)
RBC # FLD: 18 % — HIGH (ref 10.3–14.5)
SARS-COV-2 IGG SERPL QL IA: POSITIVE
SARS-COV-2 IGM SERPL IA-ACNC: 14.4 INDEX — HIGH
SODIUM SERPL-SCNC: 139 MMOL/L — SIGNIFICANT CHANGE UP (ref 135–145)
WBC # BLD: 19.92 K/UL — HIGH (ref 3.8–10.5)
WBC # FLD AUTO: 19.92 K/UL — HIGH (ref 3.8–10.5)

## 2021-03-10 PROCEDURE — 99233 SBSQ HOSP IP/OBS HIGH 50: CPT | Mod: CS

## 2021-03-10 RX ADMIN — Medication 1 MILLIGRAM(S): at 23:10

## 2021-03-10 RX ADMIN — Medication 1 TABLET(S): at 11:51

## 2021-03-10 RX ADMIN — Medication 80 MILLIGRAM(S): at 06:15

## 2021-03-10 RX ADMIN — Medication 300 MILLIGRAM(S): at 23:10

## 2021-03-10 RX ADMIN — BREXPIPRAZOLE 6 MILLIGRAM(S): 0.25 TABLET ORAL at 11:51

## 2021-03-10 RX ADMIN — CETIRIZINE HYDROCHLORIDE 10 MILLIGRAM(S): 10 TABLET ORAL at 23:10

## 2021-03-10 RX ADMIN — CHLORHEXIDINE GLUCONATE 1 APPLICATION(S): 213 SOLUTION TOPICAL at 08:30

## 2021-03-10 RX ADMIN — ATOVAQUONE 1500 MILLIGRAM(S): 750 SUSPENSION ORAL at 11:51

## 2021-03-10 NOTE — PROGRESS NOTE ADULT - ASSESSMENT
19yo M h/o intellectual disability, autism, nonverbal at baseline, chronic ITP on 80mg of daily prednisone, presents w/ outpatient labs showing thrombocytoepnia admitted for management of refractory ITP

## 2021-03-10 NOTE — PROGRESS NOTE ADULT - PROBLEM SELECTOR PLAN 1
Hx of chronic ITP since 18 months, recently on home Pred 80mg qD   -CTH neg, no e/o bleeding on exam   -s/p 2U plts on 2/27, 1 u plts 2/28, 1 units on 3/7;  -per heme recommendations will transfuse 1/2 unit platelets every 12 hrs until count>5  -s/p IVIG x2, completed 2/28   -s/p Decadron 40 mg IV (2/28-3/3), now on prednisone 80mg  -Romiplastin/Nplate ordered by Heme on 2/28 and 3/7  -s/p Ritruxan infusion on 3/7; tolerated infusion well  -C/w mepron for PCP ppx  -management per heme given severe refractory case   -ID deferring to heme on MAB infusion for covid treatment. Case discussed with heme who would like MAB infusion given. Will obtain approval from medical director.   -Unable to repeat covid PCR at this time due to severe thrombocytopenia and epistaxis. Covid ab pending in lab

## 2021-03-10 NOTE — PROGRESS NOTE ADULT - ASSESSMENT
19 yo M with a history of chronic ITP and severe autism (non-verbal at baseline) who presents with severe thrombocytopenia (Plt 7) while on home PO prednisone (80mg daily).     # Chronic ITP exacerbated in the setting of recent COVID infection   -CT head neg for bleed  -s/p IVIG 1gm/kg daily x 2 (completed 2/28) and dexamethasone 40mg IV x4 days (completed 3/3)  -S/p romiplastin/Nplate, 1mcg/kg; given 2/28; next dose 3/7 was 2mcg/kg (pt only got 250mcg instead of 300mcg as pharmacy only had 250)  -next dose Nplate due 3/14  -continue prednisone 80 mg daily  -s/p rituxan with desensitization protocol 3/6. Will hold further doses as this may be contributing to lack of immune response to covid infection   -transfuse platelets if bleeding (consider giving 1/2 unit platelets slowly infused over 3 hours)  -Continue mepron for PCP ppx   -Continue to recommend monoclonal antibody as treatment for COVID; although pt is outside the window, pt has disease limiting effect of severe thrombocytopenia from infection; there have been cases of ITP and prolonged COVID who don't get better until COVID is cleared   - will await repeat covid antibody testing (did not mount a response previously)   -monitor closely for any signs or symptoms of bleeding      Liliana Ryder, PGY-6  Hematology-Oncology Fellow  [phone] ([place]) [phone] ([facility])

## 2021-03-10 NOTE — PROGRESS NOTE ADULT - PROBLEM SELECTOR PLAN 3
hx of COVID +1/22, still positive on 2/27  -isolation per ID  -Not on supplemental O2  -CXR w/out infiltrates  -ID consult called for evaluation for MAB infusion for treatment of covid infection. -Unable to repeat covid PCR at this time due to severe thrombocytopenia and epistaxis.

## 2021-03-10 NOTE — PROGRESS NOTE ADULT - SUBJECTIVE AND OBJECTIVE BOX
INTERVAL HPI/OVERNIGHT EVENTS:  No overnight events. CBC pending this AM.     MEDICATIONS  (STANDING):  atovaquone  Suspension 1500 milliGRAM(s) Oral daily  brexpiprazole 6 milliGRAM(s) Oral daily  cetirizine 10 milliGRAM(s) Oral at bedtime  chlorhexidine 4% Liquid 1 Application(s) Topical <User Schedule>  fluticasone propionate 50 MICROgram(s)/spray Nasal Spray 1 Spray(s) Both Nostrils two times a day  influenza   Vaccine 0.5 milliLiter(s) IntraMuscular once  LORazepam     Tablet 1 milliGRAM(s) Oral at bedtime  multivitamin 1 Tablet(s) Oral daily  predniSONE   Tablet 80 milliGRAM(s) Oral daily  traZODone 300 milliGRAM(s) Oral at bedtime    MEDICATIONS  (PRN):    Allergies    Bactrim (Hives)  Rituxan (Hives)  WinRho SDF (Hives)    Intolerances          VITAL SIGNS:  T(F): 97.3 (03-10-21 @ 04:40)  HR: 99 (03-10-21 @ 04:40)  BP: 153/66 (03-10-21 @ 04:40)  RR: 16 (03-10-21 @ 04:40)  SpO2: 100% (03-10-21 @ 04:40)  Wt(kg): --    PHYSICAL EXAM:    Deferred    LABS:                        12.7   17.60 )-----------( 3        ( 09 Mar 2021 07:19 )             41.5     03-09    139  |  103  |  25<H>  ----------------------------<  110<H>  3.3<L>   |  26  |  0.88    Ca    8.8      09 Mar 2021 07:19  Phos  2.9     03-09  Mg     1.9     03-09    TPro  7.0  /  Alb  3.1<L>  /  TBili  0.5  /  DBili  x   /  AST  35  /  ALT  73<H>  /  AlkPhos  42  03-09          RADIOLOGY & ADDITIONAL TESTS:  Studies reviewed.

## 2021-03-10 NOTE — PROGRESS NOTE ADULT - SUBJECTIVE AND OBJECTIVE BOX
Olinda Asif, [facility] Medicine   Pager: [phone]    Patient is a 20y old  Male who presents with a chief complaint of low platelets (05 Mar 2021 11:40)  SUBJECTIVE / OVERNIGHT EVENTS: Mother at bedside. History provided by mom. Mom states that patient is still having nosebleeds today. NO rectal bleed noted so far today. Mom is interested in getting monoclonal antibody if offered to patient. No other concerns today.     MEDICATIONS  (STANDING):  atovaquone  Suspension 1500 milliGRAM(s) Oral daily  brexpiprazole 6 milliGRAM(s) Oral daily  influenza   Vaccine 0.5 milliLiter(s) IntraMuscular once  LORazepam     Tablet 1 milliGRAM(s) Oral at bedtime  multivitamin 1 Tablet(s) Oral daily  pantoprazole    Tablet 40 milliGRAM(s) Oral before breakfast  predniSONE   Tablet 80 milliGRAM(s) Oral daily  traZODone 300 milliGRAM(s) Oral at bedtime    MEDICATIONS  (PRN):    Vital Signs Last 24 Hrs  T(C): 37 (10 Mar 2021 11:41), Max: 37 (10 Mar 2021 01:38)  T(F): 98.6 (10 Mar 2021 11:41), Max: 98.6 (10 Mar 2021 01:38)  HR: 104 (10 Mar 2021 11:41) (89 - 121)  BP: 143/78 (10 Mar 2021 11:41) (109/62 - 156/71)  BP(mean): --  RR: 18 (10 Mar 2021 11:41) (16 - 18)  SpO2: 98% (10 Mar 2021 11:41) (98% - 100%)    PHYSICAL EXAM  CONSTITUTIONAL: NAD, well-appearing, sitting in chair, obese   RESPIRATORY: overall clear, does not participate in exam  CARDIOVASCULAR: Regular rate and rhythm, normal S1 and S2, no murmur/rub/gallops   ABDOMEN: Nontender to palpation, normoactive bowel sounds  MUSKULOSKELETAL:  no clubbing or cyanosis of digits; no joint swelling or tenderness to palpation  PSYCH: calm, affect appropriate  NEUROLOGY: CN 2-12 are intact and symmetric; nonfocal, nonverbal, does not follow simple commands.   SKIN: scattered UE ecchymosis        LABS:                                   12.0   19.92 )-----------( 4        ( 10 Mar 2021 08:41 )             39.4   03-10    139  |  102  |  24<H>  ----------------------------<  99  4.2   |  26  |  0.72    Ca    8.6      10 Mar 2021 08:41  Phos  3.2     03-10  Mg     2.0     03-10    TPro  6.6  /  Alb  3.3  /  TBili  0.5  /  DBili  x   /  AST  26  /  ALT  55<H>  /  AlkPhos  44  03-10            RADIOLOGY & ADDITIONAL TESTS:    Imaging Personally Reviewed:  Consultant(s) Notes Reviewed:    Care Discussed with Consultants/Other Providers: Heme/onc

## 2021-03-11 LAB
BASOPHILS # BLD AUTO: 0.05 K/UL — SIGNIFICANT CHANGE UP (ref 0–0.2)
BASOPHILS NFR BLD AUTO: 0.3 % — SIGNIFICANT CHANGE UP (ref 0–2)
EOSINOPHIL # BLD AUTO: 0.04 K/UL — SIGNIFICANT CHANGE UP (ref 0–0.5)
EOSINOPHIL NFR BLD AUTO: 0.3 % — SIGNIFICANT CHANGE UP (ref 0–6)
HCT VFR BLD CALC: 38.6 % — LOW (ref 39–50)
HGB BLD-MCNC: 11.5 G/DL — LOW (ref 13–17)
IANC: 10.05 K/UL — HIGH (ref 1.5–8.5)
IMM GRANULOCYTES NFR BLD AUTO: 3.8 % — HIGH (ref 0–1.5)
LYMPHOCYTES # BLD AUTO: 18.2 % — SIGNIFICANT CHANGE UP (ref 13–44)
LYMPHOCYTES # BLD AUTO: 2.64 K/UL — SIGNIFICANT CHANGE UP (ref 1–3.3)
MCHC RBC-ENTMCNC: 27.1 PG — SIGNIFICANT CHANGE UP (ref 27–34)
MCHC RBC-ENTMCNC: 29.8 GM/DL — LOW (ref 32–36)
MCV RBC AUTO: 90.8 FL — SIGNIFICANT CHANGE UP (ref 80–100)
MONOCYTES # BLD AUTO: 1.2 K/UL — HIGH (ref 0–0.9)
MONOCYTES NFR BLD AUTO: 8.3 % — SIGNIFICANT CHANGE UP (ref 2–14)
NEUTROPHILS # BLD AUTO: 10.05 K/UL — HIGH (ref 1.8–7.4)
NEUTROPHILS NFR BLD AUTO: 69.1 % — SIGNIFICANT CHANGE UP (ref 43–77)
NRBC # BLD: 0 /100 WBCS — SIGNIFICANT CHANGE UP
NRBC # FLD: 0.03 K/UL — HIGH
PLATELET # BLD AUTO: 1 K/UL — CRITICAL LOW (ref 150–400)
RBC # BLD: 4.25 M/UL — SIGNIFICANT CHANGE UP (ref 4.2–5.8)
RBC # FLD: 18 % — HIGH (ref 10.3–14.5)
WBC # BLD: 14.53 K/UL — HIGH (ref 3.8–10.5)
WBC # FLD AUTO: 14.53 K/UL — HIGH (ref 3.8–10.5)

## 2021-03-11 PROCEDURE — 99233 SBSQ HOSP IP/OBS HIGH 50: CPT | Mod: CS

## 2021-03-11 RX ADMIN — Medication 1 TABLET(S): at 09:08

## 2021-03-11 RX ADMIN — Medication 80 MILLIGRAM(S): at 06:51

## 2021-03-11 RX ADMIN — ATOVAQUONE 1500 MILLIGRAM(S): 750 SUSPENSION ORAL at 09:08

## 2021-03-11 RX ADMIN — CETIRIZINE HYDROCHLORIDE 10 MILLIGRAM(S): 10 TABLET ORAL at 22:43

## 2021-03-11 RX ADMIN — CHLORHEXIDINE GLUCONATE 1 APPLICATION(S): 213 SOLUTION TOPICAL at 06:51

## 2021-03-11 RX ADMIN — Medication 1 SPRAY(S): at 06:51

## 2021-03-11 RX ADMIN — Medication 1 MILLIGRAM(S): at 22:43

## 2021-03-11 RX ADMIN — BREXPIPRAZOLE 6 MILLIGRAM(S): 0.25 TABLET ORAL at 12:33

## 2021-03-11 RX ADMIN — Medication 300 MILLIGRAM(S): at 22:43

## 2021-03-11 NOTE — PROGRESS NOTE ADULT - SUBJECTIVE AND OBJECTIVE BOX
INTERVAL HPI/OVERNIGHT EVENTS:   Mom states that patient is still having nosebleeds today. NO rectal bleed noted so far today    MEDICATIONS  (STANDING):  atovaquone  Suspension 1500 milliGRAM(s) Oral daily  brexpiprazole 6 milliGRAM(s) Oral daily  cetirizine 10 milliGRAM(s) Oral at bedtime  chlorhexidine 4% Liquid 1 Application(s) Topical <User Schedule>  fluticasone propionate 50 MICROgram(s)/spray Nasal Spray 1 Spray(s) Both Nostrils two times a day  influenza   Vaccine 0.5 milliLiter(s) IntraMuscular once  LORazepam     Tablet 1 milliGRAM(s) Oral at bedtime  multivitamin 1 Tablet(s) Oral daily  predniSONE   Tablet 80 milliGRAM(s) Oral daily  traZODone 300 milliGRAM(s) Oral at bedtime    MEDICATIONS  (PRN):    Allergies    Bactrim (Hives)  Rituxan (Hives)  WinRho SDF (Hives)    Intolerances          VITAL SIGNS:  T(F): 97.4 (03-11-21 @ 06:50)  HR: 91 (03-11-21 @ 06:50)  BP: 105/61 (03-11-21 @ 06:50)  RR: 17 (03-11-21 @ 06:50)  SpO2: 99% (03-11-21 @ 06:50)  Wt(kg): --    PHYSICAL EXAM:  Exam per primary team    LABS:                        11.5   14.53 )-----------( 1        ( 11 Mar 2021 07:25 )             38.6     03-10    139  |  102  |  24<H>  ----------------------------<  99  4.2   |  26  |  0.72    Ca    8.6      10 Mar 2021 08:41  Phos  3.2     03-10  Mg     2.0     03-10    TPro  6.6  /  Alb  3.3  /  TBili  0.5  /  DBili  x   /  AST  26  /  ALT  55<H>  /  AlkPhos  44  03-10          RADIOLOGY & ADDITIONAL TESTS:  Studies reviewed.

## 2021-03-11 NOTE — PROGRESS NOTE ADULT - ASSESSMENT
19 yo M with a history of chronic ITP and severe autism (non-verbal at baseline) who presents with severe thrombocytopenia (Plt 7) while on home PO prednisone (80mg daily).     # Chronic ITP exacerbated in the setting of recent COVID infection   -CT head neg for bleed  -s/p IVIG 1gm/kg daily x 2 (completed 2/28) and dexamethasone 40mg IV x4 days (completed 3/3)  -S/p romiplastin/Nplate, 1mcg/kg; given 2/28; next dose 3/7 was 2mcg/kg (pt only got 250mcg instead of 300mcg as pharmacy only had 250); next dose due 3/14- plan to give 10mcg/kg  -continue prednisone 80 mg daily  -s/p rituxan with desensitization protocol 3/6. Will hold further doses as this may be contributing to lack of immune response to covid infection   -transfuse platelets if bleeding (consider giving 1/2 unit platelets slowly infused over 3 hours q12h)  -Continue mepron for PCP ppx   -Currently discussing some form of treatment of COVID with ID; there have been cases of ITP and prolonged COVID who don't get better until COVID is cleared   -COVID Ab +ve (? why patient still having trouble clearing); discuss with ID  -monitor closely for any signs or symptoms of bleeding      Francisca Lutz, PGY-4  Hematology-Oncology Fellow  [phone] ([place]) [phone] ([facility])  I can also be reached on [employer] Teams  Please page fellow on call after 5pm and on weekends

## 2021-03-11 NOTE — PROGRESS NOTE ADULT - SUBJECTIVE AND OBJECTIVE BOX
Olinda Asif, [facility] Medicine   Pager: [phone]    Patient is a 20y old  Male who presents with a chief complaint of low platelets (05 Mar 2021 11:40)  SUBJECTIVE / OVERNIGHT EVENTS: Mother at bedside. History provided by mom. Mom states that patient is still having rectal bleed today. Mom also concerned about fluid retention from steroid use. Mom wants to pursue convalescent plasma treatment for covid. Fact sheet reviewed with her and she understands potential risks and wants to pursue with therapy understanding those risks.     MEDICATIONS  (STANDING):  atovaquone  Suspension 1500 milliGRAM(s) Oral daily  brexpiprazole 6 milliGRAM(s) Oral daily  influenza   Vaccine 0.5 milliLiter(s) IntraMuscular once  LORazepam     Tablet 1 milliGRAM(s) Oral at bedtime  multivitamin 1 Tablet(s) Oral daily  pantoprazole    Tablet 40 milliGRAM(s) Oral before breakfast  predniSONE   Tablet 80 milliGRAM(s) Oral daily  traZODone 300 milliGRAM(s) Oral at bedtime    MEDICATIONS  (PRN):    Vital Signs Last 24 Hrs  T(C): 36.7 (11 Mar 2021 13:00), Max: 36.7 (10 Mar 2021 14:10)  T(F): 98.1 (11 Mar 2021 13:00), Max: 98.1 (10 Mar 2021 14:10)  HR: 93 (11 Mar 2021 13:00) (90 - 112)  BP: 118/66 (11 Mar 2021 13:00) (104/59 - 130/70)  BP(mean): --  RR: 18 (11 Mar 2021 13:00) (16 - 18)  SpO2: 99% (11 Mar 2021 13:00) (98% - 100%)    PHYSICAL EXAM  CONSTITUTIONAL: NAD, well-appearing, sitting in chair, obese   RESPIRATORY: overall clear, does not participate in exam  CARDIOVASCULAR: Regular rate and rhythm, normal S1 and S2, no murmur/rub/gallops   ABDOMEN: Nontender to palpation, normoactive bowel sounds  MUSKULOSKELETAL:  no clubbing or cyanosis of digits; no joint swelling or tenderness to palpation  PSYCH: calm, affect appropriate  NEUROLOGY: CN 2-12 are intact and symmetric; nonfocal, nonverbal, does not follow simple commands.   SKIN: scattered UE ecchymosis        LABS:                                              11.5   14.53 )-----------( 1        ( 11 Mar 2021 07:25 )             38.6   03-10    139  |  102  |  24<H>  ----------------------------<  99  4.2   |  26  |  0.72    Ca    8.6      10 Mar 2021 08:41  Phos  3.2     03-10  Mg     2.0     03-10    TPro  6.6  /  Alb  3.3  /  TBili  0.5  /  DBili  x   /  AST  26  /  ALT  55<H>  /  AlkPhos  44  03-10          RADIOLOGY & ADDITIONAL TESTS:    Imaging Personally Reviewed:  Consultant(s) Notes Reviewed:    Care Discussed with Consultants/Other Providers: Heme/onc, ID

## 2021-03-11 NOTE — PROGRESS NOTE ADULT - ATTENDING COMMENTS
Plan discussed with mother at bedside and with heme/onc team. All in agreement with convalescent plasma infusion today

## 2021-03-11 NOTE — PROGRESS NOTE ADULT - PROBLEM SELECTOR PLAN 1
Hx of chronic ITP since 18 months, recently on home Pred 80mg qD   -CTH neg, rectal bleeding noted today  -s/p 2U plts on 2/27, 1 u plts 2/28, 1 units on 3/7;  -per [facility] recommendations will transfuse 1/2 unit platelets every 12 hrs until count>5  -s/p IVIG x2, completed 2/28   -s/p Decadron 40 mg IV (2/28-3/3), now on prednisone 80mg  -Romiplastin/Nplate ordered by Heme on 2/28 and 3/7. Next dose on 3/14  -s/p Ritruxan infusion on 3/7; tolerated infusion well  -C/w mepron for PCP ppx  -management per heme given severe refractory case   -Unable to repeat covid PCR at this time due to severe thrombocytopenia and epistaxis. Covid ab positive  -Per discussion with heme - they would like to treat covid at this time which they believe is exacerbating patient's thrombocytopenia.   -Heme is recommend convalescent plasma therapy. Mom agreeable to convalescent plasma as well. Mother understands that this is a investigational therapy. Fact sheet reviewed with her and she understands all risks and still wishes to pursue with plasma transfusion.   -[facility] team to obtain MAB therapy as part of expanded access in case plasma is ineffective.

## 2021-03-11 NOTE — PROGRESS NOTE ADULT - PROBLEM SELECTOR PLAN 3
hx of COVID +1/22, still positive on 2/27  -isolation per ID  -Not on supplemental O2  -CXR w/out infiltrates  -Unable to repeat covid PCR at this time due to severe thrombocytopenia and epistaxis.  -Per discussion with heme - they would like to treat covid at this time which they believe is exacerbating patient's thrombocytopenia.   -Heme is recommend convalescent plasma therapy. Mom agreeable to convalescent plasma as well. Mother understands that this is a investigational therapy. Fact sheet reviewed with her and she understands all risks and still wishes to pursue with plasma transfusion.   -Heme team to obtain MAB therapy as part of expanded access in case plasma is ineffective.

## 2021-03-12 LAB
BASOPHILS # BLD AUTO: 0.05 K/UL — SIGNIFICANT CHANGE UP (ref 0–0.2)
BASOPHILS NFR BLD AUTO: 0.3 % — SIGNIFICANT CHANGE UP (ref 0–2)
EOSINOPHIL # BLD AUTO: 0.04 K/UL — SIGNIFICANT CHANGE UP (ref 0–0.5)
EOSINOPHIL NFR BLD AUTO: 0.2 % — SIGNIFICANT CHANGE UP (ref 0–6)
HCT VFR BLD CALC: 38.4 % — LOW (ref 39–50)
HGB BLD-MCNC: 12 G/DL — LOW (ref 13–17)
IANC: 14.75 K/UL — HIGH (ref 1.5–8.5)
IMM GRANULOCYTES NFR BLD AUTO: 2.3 % — HIGH (ref 0–1.5)
LYMPHOCYTES # BLD AUTO: 12.8 % — LOW (ref 13–44)
LYMPHOCYTES # BLD AUTO: 2.37 K/UL — SIGNIFICANT CHANGE UP (ref 1–3.3)
MCHC RBC-ENTMCNC: 28 PG — SIGNIFICANT CHANGE UP (ref 27–34)
MCHC RBC-ENTMCNC: 31.3 GM/DL — LOW (ref 32–36)
MCV RBC AUTO: 89.7 FL — SIGNIFICANT CHANGE UP (ref 80–100)
MONOCYTES # BLD AUTO: 0.9 K/UL — SIGNIFICANT CHANGE UP (ref 0–0.9)
MONOCYTES NFR BLD AUTO: 4.9 % — SIGNIFICANT CHANGE UP (ref 2–14)
NEUTROPHILS # BLD AUTO: 14.75 K/UL — HIGH (ref 1.8–7.4)
NEUTROPHILS NFR BLD AUTO: 79.5 % — HIGH (ref 43–77)
NRBC # BLD: 0 /100 WBCS — SIGNIFICANT CHANGE UP
NRBC # FLD: 0.05 K/UL — HIGH
PLATELET # BLD AUTO: 5 K/UL — CRITICAL LOW (ref 150–400)
RBC # BLD: 4.28 M/UL — SIGNIFICANT CHANGE UP (ref 4.2–5.8)
RBC # FLD: 17.9 % — HIGH (ref 10.3–14.5)
SARS-COV-2 RNA SPEC QL NAA+PROBE: SIGNIFICANT CHANGE UP
WBC # BLD: 18.53 K/UL — HIGH (ref 3.8–10.5)
WBC # FLD AUTO: 18.53 K/UL — HIGH (ref 3.8–10.5)

## 2021-03-12 PROCEDURE — 99233 SBSQ HOSP IP/OBS HIGH 50: CPT | Mod: CS

## 2021-03-12 RX ADMIN — BREXPIPRAZOLE 6 MILLIGRAM(S): 0.25 TABLET ORAL at 12:19

## 2021-03-12 RX ADMIN — Medication 80 MILLIGRAM(S): at 06:19

## 2021-03-12 RX ADMIN — CHLORHEXIDINE GLUCONATE 1 APPLICATION(S): 213 SOLUTION TOPICAL at 06:19

## 2021-03-12 RX ADMIN — Medication 1 TABLET(S): at 12:18

## 2021-03-12 RX ADMIN — ATOVAQUONE 1500 MILLIGRAM(S): 750 SUSPENSION ORAL at 12:19

## 2021-03-12 NOTE — PROGRESS NOTE ADULT - PROBLEM SELECTOR PLAN 1
Hx of chronic ITP since 18 months, recently on home Pred 80mg qD   -CTH neg, rectal bleeding noted today  -s/p 2U plts on 2/27, 1 u plts 2/28, 1 units on 3/7;  -per [facility] recommendations will transfuse 1/2 unit platelets every 12 hrs until count>5 or if bleeding  -s/p IVIG x2, completed 2/28   -s/p Decadron 40 mg IV (2/28-3/3), now on prednisone 80mg  -Romiplastin/Nplate ordered by Heme on 2/28 and 3/7. Next dose on 3/14  -s/p Ritruxan infusion on 3/7; tolerated infusion well. Next infusion planned for 3/13  -C/w mepron for PCP ppx  -management per heme given severe refractory case   -Unable to repeat covid PCR at this time due to severe thrombocytopenia and epistaxis. Covid ab positive  -Per discussion with heme - they would like to treat covid at this time which they believe is exacerbating patient's thrombocytopenia.   -s/p convalescent plasma on 3/11   -[facility] team to obtain MAB therapy as part of expanded access in case plasma is ineffective.

## 2021-03-12 NOTE — PROGRESS NOTE ADULT - PROBLEM SELECTOR PLAN 3
hx of COVID +1/22, still positive on 2/27  -isolation per ID  -Not on supplemental O2  -CXR w/out infiltrates  -Unable to repeat covid PCR at this time due to severe thrombocytopenia and epistaxis.  -Per discussion with heme - they would like to treat covid at this time which they believe is exacerbating patient's thrombocytopenia.   -s/p onvalescent plasma therapy on 3/11   -Heme team to obtain MAB therapy as part of expanded access in case plasma is ineffective.

## 2021-03-12 NOTE — PROGRESS NOTE ADULT - SUBJECTIVE AND OBJECTIVE BOX
Olinda Asif, [facility] Medicine   Pager: [phone]    Patient is a 20y old  Male who presents with a chief complaint of low platelets (05 Mar 2021 11:40)  SUBJECTIVE / OVERNIGHT EVENTS: Mother at bedside. History provided by mom. Mom states that patient tolerated convalescent infusion well.  No new rectal or nose bleed this morning.     MEDICATIONS  (STANDING):  atovaquone  Suspension 1500 milliGRAM(s) Oral daily  brexpiprazole 6 milliGRAM(s) Oral daily  influenza   Vaccine 0.5 milliLiter(s) IntraMuscular once  LORazepam     Tablet 1 milliGRAM(s) Oral at bedtime  multivitamin 1 Tablet(s) Oral daily  pantoprazole    Tablet 40 milliGRAM(s) Oral before breakfast  predniSONE   Tablet 80 milliGRAM(s) Oral daily  traZODone 300 milliGRAM(s) Oral at bedtime    MEDICATIONS  (PRN):    Vital Signs Last 24 Hrs  T(C): 36.7 (12 Mar 2021 06:17), Max: 36.8 (12 Mar 2021 03:14)  T(F): 98 (12 Mar 2021 06:17), Max: 98.2 (12 Mar 2021 03:14)  HR: 90 (12 Mar 2021 06:17) (78 - 100)  BP: 138/88 (12 Mar 2021 06:17) (118/72 - 140/78)  BP(mean): --  RR: 18 (12 Mar 2021 06:17) (17 - 18)  SpO2: 100% (12 Mar 2021 06:17) (100% - 100%)    PHYSICAL EXAM  CONSTITUTIONAL: NAD, well-appearing, sitting in chair, obese   RESPIRATORY: overall clear, does not participate in exam  CARDIOVASCULAR: Regular rate and rhythm, normal S1 and S2, no murmur/rub/gallops   ABDOMEN: Nontender to palpation, normoactive bowel sounds  MUSKULOSKELETAL:  no clubbing or cyanosis of digits; no joint swelling or tenderness to palpation  PSYCH: calm, affect appropriate  NEUROLOGY: CN 2-12 are intact and symmetric; nonfocal, nonverbal, does not follow simple commands.   SKIN: scattered UE ecchymosis        LABS:                                              12.0   18.53 )-----------( 5        ( 12 Mar 2021 09:07 )             38.4           RADIOLOGY & ADDITIONAL TESTS:    Imaging Personally Reviewed:  Consultant(s) Notes Reviewed:    Care Discussed with Consultants/Other Providers: Heme/onc, ID

## 2021-03-12 NOTE — PROGRESS NOTE ADULT - ASSESSMENT
21 yo M with a history of chronic ITP and severe autism (non-verbal at baseline) who presents with severe thrombocytopenia (Plt 7) while on home PO prednisone (80mg daily).     # Chronic ITP exacerbated in the setting of recent COVID infection   -CT head neg for bleed  -s/p IVIG 1gm/kg daily x 2 (completed 2/28) and dexamethasone 40mg IV x4 days (completed 3/3)  -S/p romiplastin/Nplate, 1mcg/kg; given 2/28; next dose 3/7 was 2mcg/kg (pt only got 250mcg instead of 300mcg as pharmacy only had 250); next dose due 3/14- plan to give 10mcg/kg (d/w pharmacy who will make sure 1500mcg is available)  -continue prednisone 80 mg daily  -s/p rituxan with desensitization protocol 3/6. Will hold further doses as this may be contributing to lack of immune response to covid infection; will also confirm with Dr. Bhagat  -transfuse platelets if bleeding (consider giving 1/2 unit platelets slowly infused over 3 hours q12h) OR if platelets <5k  -Continue mepron for PCP ppx   -Currently discussing some form of treatment of COVID with ID; there have been cases of ITP and prolonged COVID who don't get better until COVID is cleared   -Trying to get approval to MAB; s/p convalescent plasma 3/11; Mother signed HIPAA release form for [employer]   -monitor closely for any signs or symptoms of bleeding      Francisca Lutz, PGY-4  Hematology-Oncology Fellow  [phone] ([place]) [phone] ([facility])  I can also be reached on [employer] Teams  Please page fellow on call after 5pm and on weekends   19 yo M with a history of chronic ITP and severe autism (non-verbal at baseline) who presents with severe thrombocytopenia (Plt 7) while on home PO prednisone (80mg daily).     # Chronic ITP exacerbated in the setting of recent COVID infection   -CT head neg for bleed  -s/p IVIG 1gm/kg daily x 2 (completed 2/28) and dexamethasone 40mg IV x4 days (completed 3/3)  -S/p romiplastin/Nplate, 1mcg/kg; given 2/28; next dose 3/7 was 2mcg/kg (pt only got 250mcg instead of 300mcg as pharmacy only had 250); next dose due 3/14- plan to give 5mcg/kg  -continue prednisone 80 mg daily  -Plan to give Rituxan C2 on 3/13 (will d/w A/I if desensitization protocol needed again)  -transfuse platelets if bleeding (consider giving 1/2 unit platelets slowly infused over 3 hours q12h) OR if platelets <5k  -Continue mepron for PCP ppx   -monitor closely for any signs or symptoms of bleeding      Francisca Lutz, PGY-4  Hematology-Oncology Fellow  [phone] ([place]) [phone] ([facility])  I can also be reached on [employer] Teams  Please page fellow on call after 5pm and on weekends   21 yo M with a history of chronic ITP and severe autism (non-verbal at baseline) who presents with severe thrombocytopenia (Plt 7) while on home PO prednisone (80mg daily).     # Chronic ITP exacerbated in the setting of recent COVID infection   -CT head neg for bleed  -s/p IVIG 1gm/kg daily x 2 (completed 2/28) and dexamethasone 40mg IV x4 days (completed 3/3)  -S/p C1 Rituxan with desensitization protocol on 3/6  -S/p romiplastin/Nplate, 1mcg/kg; given 2/28; next dose 3/7 was 2mcg/kg (pt only got 250mcg instead of 300mcg as pharmacy only had 250); next dose due 3/14- plan to give 5mcg/kg  -continue prednisone 80 mg daily  -Plan to give Rituxan C2 on 3/13 (will d/w A/I if desensitization protocol needed again)  -transfuse platelets if bleeding (consider giving 1/2 unit platelets slowly infused over 3 hours q12h) OR if platelets <5k  -Continue mepron for PCP ppx   -monitor closely for any signs or symptoms of bleeding      Francisca Lutz, PGY-4  Hematology-Oncology Fellow  [phone] ([place]) [phone] ([facility])  I can also be reached on [employer] Teams  Please page fellow on call after 5pm and on weekends

## 2021-03-12 NOTE — PROGRESS NOTE ADULT - SUBJECTIVE AND OBJECTIVE BOX
INTERVAL HPI/OVERNIGHT EVENTS:  No overnight events.     MEDICATIONS  (STANDING):  atovaquone  Suspension 1500 milliGRAM(s) Oral daily  brexpiprazole 6 milliGRAM(s) Oral daily  cetirizine 10 milliGRAM(s) Oral at bedtime  chlorhexidine 4% Liquid 1 Application(s) Topical <User Schedule>  fluticasone propionate 50 MICROgram(s)/spray Nasal Spray 1 Spray(s) Both Nostrils two times a day  influenza   Vaccine 0.5 milliLiter(s) IntraMuscular once  LORazepam     Tablet 1 milliGRAM(s) Oral at bedtime  multivitamin 1 Tablet(s) Oral daily  predniSONE   Tablet 80 milliGRAM(s) Oral daily  traZODone 300 milliGRAM(s) Oral at bedtime    MEDICATIONS  (PRN):    Allergies    Bactrim (Hives)  Rituxan (Hives)  WinRho SDF (Hives)    Intolerances      VITAL SIGNS:  T(F): 98 (03-12-21 @ 06:17)  HR: 90 (03-12-21 @ 06:17)  BP: 138/88 (03-12-21 @ 06:17)  RR: 18 (03-12-21 @ 06:17)  SpO2: 100% (03-12-21 @ 06:17)  Wt(kg): --    PHYSICAL EXAM:  Exam per primary team    LABS:                        12.0   18.53 )-----------( 5        ( 12 Mar 2021 09:07 )             38.4                 RADIOLOGY & ADDITIONAL TESTS:  Studies reviewed.

## 2021-03-13 LAB
BASOPHILS # BLD AUTO: 0.04 K/UL — SIGNIFICANT CHANGE UP (ref 0–0.2)
BASOPHILS NFR BLD AUTO: 0.2 % — SIGNIFICANT CHANGE UP (ref 0–2)
EOSINOPHIL # BLD AUTO: 0.04 K/UL — SIGNIFICANT CHANGE UP (ref 0–0.5)
EOSINOPHIL NFR BLD AUTO: 0.2 % — SIGNIFICANT CHANGE UP (ref 0–6)
HCT VFR BLD CALC: 37.5 % — LOW (ref 39–50)
HGB BLD-MCNC: 11.6 G/DL — LOW (ref 13–17)
IANC: 12.67 K/UL — HIGH (ref 1.5–8.5)
IMM GRANULOCYTES NFR BLD AUTO: 2.3 % — HIGH (ref 0–1.5)
LYMPHOCYTES # BLD AUTO: 16.2 % — SIGNIFICANT CHANGE UP (ref 13–44)
LYMPHOCYTES # BLD AUTO: 2.74 K/UL — SIGNIFICANT CHANGE UP (ref 1–3.3)
MCHC RBC-ENTMCNC: 27.2 PG — SIGNIFICANT CHANGE UP (ref 27–34)
MCHC RBC-ENTMCNC: 30.9 GM/DL — LOW (ref 32–36)
MCV RBC AUTO: 88 FL — SIGNIFICANT CHANGE UP (ref 80–100)
MONOCYTES # BLD AUTO: 0.99 K/UL — HIGH (ref 0–0.9)
MONOCYTES NFR BLD AUTO: 5.9 % — SIGNIFICANT CHANGE UP (ref 2–14)
NEUTROPHILS # BLD AUTO: 12.67 K/UL — HIGH (ref 1.8–7.4)
NEUTROPHILS NFR BLD AUTO: 75.2 % — SIGNIFICANT CHANGE UP (ref 43–77)
NRBC # BLD: 0 /100 WBCS — SIGNIFICANT CHANGE UP
NRBC # FLD: 0 K/UL — SIGNIFICANT CHANGE UP
PLATELET # BLD AUTO: 1 K/UL — CRITICAL LOW (ref 150–400)
RBC # BLD: 4.26 M/UL — SIGNIFICANT CHANGE UP (ref 4.2–5.8)
RBC # FLD: 17.9 % — HIGH (ref 10.3–14.5)
WBC # BLD: 16.87 K/UL — HIGH (ref 3.8–10.5)
WBC # FLD AUTO: 16.87 K/UL — HIGH (ref 3.8–10.5)

## 2021-03-13 PROCEDURE — 99233 SBSQ HOSP IP/OBS HIGH 50: CPT | Mod: CS

## 2021-03-13 RX ORDER — DIPHENHYDRAMINE HCL 50 MG
50 CAPSULE ORAL ONCE
Refills: 0 | Status: DISCONTINUED | OUTPATIENT
Start: 2021-03-13 | End: 2021-03-14

## 2021-03-13 RX ORDER — ALBUTEROL 90 UG/1
2.5 AEROSOL, METERED ORAL ONCE
Refills: 0 | Status: DISCONTINUED | OUTPATIENT
Start: 2021-03-13 | End: 2021-03-14

## 2021-03-13 RX ORDER — EPINEPHRINE 0.3 MG/.3ML
0.3 INJECTION INTRAMUSCULAR; SUBCUTANEOUS ONCE
Refills: 0 | Status: DISCONTINUED | OUTPATIENT
Start: 2021-03-13 | End: 2021-03-14

## 2021-03-13 RX ORDER — DIPHENHYDRAMINE HCL 50 MG
50 CAPSULE ORAL ONCE
Refills: 0 | Status: DISCONTINUED | OUTPATIENT
Start: 2021-03-13 | End: 2021-03-13

## 2021-03-13 RX ORDER — FAMOTIDINE 10 MG/ML
20 INJECTION INTRAVENOUS DAILY
Refills: 0 | Status: DISCONTINUED | OUTPATIENT
Start: 2021-03-13 | End: 2021-03-13

## 2021-03-13 RX ORDER — LORATADINE 10 MG/1
10 TABLET ORAL ONCE
Refills: 0 | Status: DISCONTINUED | OUTPATIENT
Start: 2021-03-13 | End: 2021-03-13

## 2021-03-13 RX ORDER — ALBUTEROL 90 UG/1
3 AEROSOL, METERED ORAL ONCE
Refills: 0 | Status: DISCONTINUED | OUTPATIENT
Start: 2021-03-13 | End: 2021-03-13

## 2021-03-13 RX ORDER — DIPHENHYDRAMINE HCL 50 MG
50 CAPSULE ORAL ONCE
Refills: 0 | Status: COMPLETED | OUTPATIENT
Start: 2021-03-13 | End: 2021-03-13

## 2021-03-13 RX ORDER — ACETAMINOPHEN 500 MG
650 TABLET ORAL ONCE
Refills: 0 | Status: COMPLETED | OUTPATIENT
Start: 2021-03-13 | End: 2021-03-13

## 2021-03-13 RX ORDER — MEPERIDINE HYDROCHLORIDE 50 MG/ML
25 INJECTION INTRAMUSCULAR; INTRAVENOUS; SUBCUTANEOUS ONCE
Refills: 0 | Status: DISCONTINUED | OUTPATIENT
Start: 2021-03-13 | End: 2021-03-14

## 2021-03-13 RX ORDER — CETIRIZINE HYDROCHLORIDE 10 MG/1
10 TABLET ORAL ONCE
Refills: 0 | Status: COMPLETED | OUTPATIENT
Start: 2021-03-13 | End: 2021-03-13

## 2021-03-13 RX ORDER — FAMOTIDINE 10 MG/ML
20 INJECTION INTRAVENOUS ONCE
Refills: 0 | Status: COMPLETED | OUTPATIENT
Start: 2021-03-13 | End: 2021-03-13

## 2021-03-13 RX ORDER — BACITRACIN ZINC 500 UNIT/G
1 OINTMENT IN PACKET (EA) TOPICAL
Refills: 0 | Status: DISCONTINUED | OUTPATIENT
Start: 2021-03-13 | End: 2021-04-12

## 2021-03-13 RX ORDER — RITUXIMAB 10 MG/ML
17 INJECTION, SOLUTION INTRAVENOUS ONCE
Refills: 0 | Status: COMPLETED | OUTPATIENT
Start: 2021-03-13 | End: 2021-03-13

## 2021-03-13 RX ORDER — RITUXIMAB 10 MG/ML
983.62 INJECTION, SOLUTION INTRAVENOUS ONCE
Refills: 0 | Status: COMPLETED | OUTPATIENT
Start: 2021-03-13 | End: 2021-03-13

## 2021-03-13 RX ORDER — HYDROCORTISONE 20 MG
100 TABLET ORAL ONCE
Refills: 0 | Status: DISCONTINUED | OUTPATIENT
Start: 2021-03-13 | End: 2021-03-14

## 2021-03-13 RX ADMIN — CETIRIZINE HYDROCHLORIDE 10 MILLIGRAM(S): 10 TABLET ORAL at 00:10

## 2021-03-13 RX ADMIN — Medication 1 MILLIGRAM(S): at 13:52

## 2021-03-13 RX ADMIN — RITUXIMAB 66.33 MILLIGRAM(S): 10 INJECTION, SOLUTION INTRAVENOUS at 16:45

## 2021-03-13 RX ADMIN — Medication 80 MILLIGRAM(S): at 09:11

## 2021-03-13 RX ADMIN — Medication 100 MILLIGRAM(S): at 14:06

## 2021-03-13 RX ADMIN — Medication 50 MILLIGRAM(S): at 14:04

## 2021-03-13 RX ADMIN — Medication 1 MILLIGRAM(S): at 22:36

## 2021-03-13 RX ADMIN — Medication 1 APPLICATION(S): at 18:49

## 2021-03-13 RX ADMIN — CETIRIZINE HYDROCHLORIDE 10 MILLIGRAM(S): 10 TABLET ORAL at 13:53

## 2021-03-13 RX ADMIN — Medication 1 MILLIGRAM(S): at 00:10

## 2021-03-13 RX ADMIN — Medication 650 MILLIGRAM(S): at 13:53

## 2021-03-13 RX ADMIN — RITUXIMAB 50 MILLIGRAM(S): 10 INJECTION, SOLUTION INTRAVENOUS at 15:38

## 2021-03-13 RX ADMIN — Medication 300 MILLIGRAM(S): at 00:10

## 2021-03-13 RX ADMIN — FAMOTIDINE 20 MILLIGRAM(S): 10 INJECTION INTRAVENOUS at 13:53

## 2021-03-13 RX ADMIN — CHLORHEXIDINE GLUCONATE 1 APPLICATION(S): 213 SOLUTION TOPICAL at 07:08

## 2021-03-13 RX ADMIN — CETIRIZINE HYDROCHLORIDE 10 MILLIGRAM(S): 10 TABLET ORAL at 22:36

## 2021-03-13 RX ADMIN — Medication 1 TABLET(S): at 09:11

## 2021-03-13 RX ADMIN — ATOVAQUONE 1500 MILLIGRAM(S): 750 SUSPENSION ORAL at 09:12

## 2021-03-13 RX ADMIN — Medication 300 MILLIGRAM(S): at 23:18

## 2021-03-13 RX ADMIN — BREXPIPRAZOLE 6 MILLIGRAM(S): 0.25 TABLET ORAL at 09:12

## 2021-03-13 NOTE — PROGRESS NOTE ADULT - SUBJECTIVE AND OBJECTIVE BOX
Olinda Asif, [facility] Medicine   Pager: [phone]    Patient is a 20y old  Male who presents with a chief complaint of low platelets (05 Mar 2021 11:40)  SUBJECTIVE / OVERNIGHT EVENTS: Mother at bedside. History provided by mom. Mom states no further rectal or nose bleed today. No new concerns. She is amenable to planned rituxan treatment today    MEDICATIONS  (STANDING):  atovaquone  Suspension 1500 milliGRAM(s) Oral daily  brexpiprazole 6 milliGRAM(s) Oral daily  influenza   Vaccine 0.5 milliLiter(s) IntraMuscular once  LORazepam     Tablet 1 milliGRAM(s) Oral at bedtime  multivitamin 1 Tablet(s) Oral daily  pantoprazole    Tablet 40 milliGRAM(s) Oral before breakfast  predniSONE   Tablet 80 milliGRAM(s) Oral daily  traZODone 300 milliGRAM(s) Oral at bedtime    MEDICATIONS  (PRN):    Vital Signs Last 24 Hrs  T(C): 36.2 (13 Mar 2021 07:00), Max: 36.8 (12 Mar 2021 21:00)  T(F): 97.2 (13 Mar 2021 07:00), Max: 98.2 (12 Mar 2021 21:00)  HR: 93 (13 Mar 2021 07:00) (69 - 93)  BP: 122/78 (13 Mar 2021 07:00) (114/62 - 153/81)  BP(mean): --  RR: 18 (13 Mar 2021 07:00) (17 - 18)  SpO2: 99% (13 Mar 2021 07:00) (98% - 100%)  PHYSICAL EXAM  CONSTITUTIONAL: NAD, well-appearing, sitting in chair, obese   RESPIRATORY: overall clear, does not participate in exam  CARDIOVASCULAR: Regular rate and rhythm, normal S1 and S2, no murmur/rub/gallops   ABDOMEN: Nontender to palpation, normoactive bowel sounds  MUSKULOSKELETAL:  no clubbing or cyanosis of digits; no joint swelling or tenderness to palpation  PSYCH: calm, affect appropriate  NEUROLOGY: CN 2-12 are intact and symmetric; nonfocal, nonverbal, does not follow simple commands.   SKIN: scattered UE ecchymosis        LABS:                                              11.6   16.87 )-----------( 1        ( 13 Mar 2021 08:00 )             37.5       RADIOLOGY & ADDITIONAL TESTS:    Imaging Personally Reviewed:  Consultant(s) Notes Reviewed:    Care Discussed with Consultants/Other Providers: Heme/onc, ID, AI

## 2021-03-13 NOTE — PROGRESS NOTE ADULT - PROBLEM SELECTOR PLAN 1
Hx of chronic ITP since 18 months, recently on home Pred 80mg qD   -CTH neg, No nose or rectal bleed today so far  -s/p multiple platelets infusions   -per [facility] recommendations will transfuse 1/2 unit platelets every 12 hrs until count>5 or if bleeding. Transfuse 1/2 unit today as plt are 1 today  -s/p IVIG x2, completed 2/28   -s/p Decadron 40 mg IV (2/28-3/3), now on prednisone 80mg  -Romiplastin/Nplate ordered by [facility] on 2/28 and 3/7. Next dose on 3/14  -s/p Ritruxan infusion on 3/7; tolerated infusion well. Next infusion planned for 3/13. I spoke to AI team. Rituxan desensitization protocal can happen on the floors. They advised us to follow protocol as outlined on their note from 3/5   -C/w mepron for PCP ppx  -management per [facility] given severe refractory case   -Unable to repeat covid PCR at this time due to severe thrombocytopenia and epistaxis. Covid ab positive  -Per discussion with heme - they would like to treat covid at this time which they believe is exacerbating patient's thrombocytopenia.   -s/p convalescent plasma on 3/11   -[facility] team to obtain MAB therapy as part of expanded access in case plasma is ineffective.

## 2021-03-13 NOTE — PROGRESS NOTE ADULT - SUBJECTIVE AND OBJECTIVE BOX
INTERVAL History: No acute events overnight. COVID PCR from 3/12 negative. Pt given 1/2 unit plt yesterday.     Allergies    Bactrim (Hives)  Rituxan (Hives)  WinRho SDF (Hives)    Intolerances        MEDICATIONS  (STANDING):  atovaquone  Suspension 1500 milliGRAM(s) Oral daily  brexpiprazole 6 milliGRAM(s) Oral daily  cetirizine 10 milliGRAM(s) Oral at bedtime  chlorhexidine 4% Liquid 1 Application(s) Topical <User Schedule>  famotidine    Tablet 20 milliGRAM(s) Oral daily  fluticasone propionate 50 MICROgram(s)/spray Nasal Spray 1 Spray(s) Both Nostrils two times a day  influenza   Vaccine 0.5 milliLiter(s) IntraMuscular once  loratadine 10 milliGRAM(s) Oral once  LORazepam     Tablet 1 milliGRAM(s) Oral once  LORazepam     Tablet 1 milliGRAM(s) Oral at bedtime  multivitamin 1 Tablet(s) Oral daily  predniSONE   Tablet 80 milliGRAM(s) Oral daily  traZODone 300 milliGRAM(s) Oral at bedtime    MEDICATIONS  (PRN):  ALBUTerol    0.083%. 3 milliGRAM(s) Nebulizer once PRN Wheezing  diphenhydrAMINE   Injectable 50 milliGRAM(s) IntraMuscular once PRN Allergy symptoms      Vital Signs Last 24 Hrs  T(C): 36.2 (13 Mar 2021 07:00), Max: 36.8 (12 Mar 2021 21:00)  T(F): 97.2 (13 Mar 2021 07:00), Max: 98.2 (12 Mar 2021 21:00)  HR: 93 (13 Mar 2021 07:00) (69 - 93)  BP: 122/78 (13 Mar 2021 07:00) (114/62 - 153/81)  BP(mean): --  RR: 18 (13 Mar 2021 07:00) (17 - 18)  SpO2: 99% (13 Mar 2021 07:00) (98% - 100%)    PHYSICAL EXAM:  Exam per primary team      LABS:                        12.0   18.53 )-----------( 5        ( 12 Mar 2021 09:07 )             38.4         RADIOLOGY & ADDITIONAL STUDIES:    PATHOLOGY:       INTERVAL History: No acute events overnight. COVID PCR from 3/12 negative. Pt given 1/2 unit plt yesterday.     Allergies  Bactrim (Hives)  Rituxan (Hives)  WinRho SDF (Hives)    MEDICATIONS  (STANDING):  atovaquone  Suspension 1500 milliGRAM(s) Oral daily  brexpiprazole 6 milliGRAM(s) Oral daily  cetirizine 10 milliGRAM(s) Oral at bedtime  chlorhexidine 4% Liquid 1 Application(s) Topical <User Schedule>  famotidine    Tablet 20 milliGRAM(s) Oral daily  fluticasone propionate 50 MICROgram(s)/spray Nasal Spray 1 Spray(s) Both Nostrils two times a day  influenza   Vaccine 0.5 milliLiter(s) IntraMuscular once  loratadine 10 milliGRAM(s) Oral once  LORazepam     Tablet 1 milliGRAM(s) Oral once  LORazepam     Tablet 1 milliGRAM(s) Oral at bedtime  multivitamin 1 Tablet(s) Oral daily  predniSONE   Tablet 80 milliGRAM(s) Oral daily  traZODone 300 milliGRAM(s) Oral at bedtime    MEDICATIONS  (PRN):  ALBUTerol    0.083%. 3 milliGRAM(s) Nebulizer once PRN Wheezing  diphenhydrAMINE   Injectable 50 milliGRAM(s) IntraMuscular once PRN Allergy symptoms      Vital Signs Last 24 Hrs  T(C): 36.2 (13 Mar 2021 07:00), Max: 36.8 (12 Mar 2021 21:00)  T(F): 97.2 (13 Mar 2021 07:00), Max: 98.2 (12 Mar 2021 21:00)  HR: 93 (13 Mar 2021 07:00) (69 - 93)  BP: 122/78 (13 Mar 2021 07:00) (114/62 - 153/81)  BP(mean): --  RR: 18 (13 Mar 2021 07:00) (17 - 18)  SpO2: 99% (13 Mar 2021 07:00) (98% - 100%)    PHYSICAL EXAM:  Exam per primary team      LABS:                        12.0   18.53 )-----------( 5        ( 12 Mar 2021 09:07 )             38.4         RADIOLOGY & ADDITIONAL STUDIES:    PATHOLOGY:

## 2021-03-13 NOTE — PROVIDER CONTACT NOTE (OTHER) - ACTION/TREATMENT ORDERED:
tele pa and blood bank notified   as per protocol platelets not hung and returned to blood bank.   as per tele pa and order hold platelets tele pa and blood bank notified   as per protocol platelets not hung and returned to blood bank.   as per tele pa and order hold platelets ; pt to receive chemo first

## 2021-03-13 NOTE — PROVIDER CONTACT NOTE (OTHER) - ASSESSMENT
IV platelet ordered and on unit   pt IV noted to be newly kinked and not working properly   IV removed and new IV placed  new IV took over 30 mins to insert as per pt hard stick

## 2021-03-13 NOTE — PROGRESS NOTE ADULT - ATTENDING COMMENTS
The patient was discussed with the fellow, Dr. Chávez, and I agree with the History, Physical Exam and Plan in the record. Labs and imaging reviewed by me.

## 2021-03-13 NOTE — PROGRESS NOTE ADULT - ASSESSMENT
21 yo M with a history of chronic ITP and severe autism (non-verbal at baseline) who presents with severe thrombocytopenia (Plt 7) while on home PO prednisone (80mg daily).     # Chronic ITP exacerbated in the setting of recent COVID infection   -CT head neg for bleed  -s/p IVIG 1gm/kg daily x 2 (completed 2/28) and dexamethasone 40mg IV x4 days (completed 3/3)  -S/p C1 Rituxan with desensitization protocol on 3/6  -S/p romiplastin/Nplate, 1mcg/kg; given 2/28; next dose 3/7 was 2mcg/kg (pt only got 250mcg instead of 300mcg as pharmacy only had 250); next dose due 3/14- plan to give 5mcg/kg  -continue prednisone 80 mg daily  -Plan to give Rituxan C2 today 3/13  (d/w A/I and will give using the desensitization protocol, scanned and emailed orders and protocol to pharmacy and chemo nurse. This is pt's 2nd desensitization, defer to A/I team if pt can be treated on floors or needs ICU monitoring)  -transfuse platelets if bleeding (consider giving 1/2 unit platelets slowly infused over 3 hours q12h) OR if platelets <5k  - COVID PCR returned negative on 3/12.   -Continue mepron for PCP ppx   -monitor closely for any signs or symptoms of bleeding    Kenya Chávez MD  Hematology Oncology Fellow, PGY-5  [facility] Pager: [phone]/ [facility] Pager: [phone]

## 2021-03-13 NOTE — PROGRESS NOTE ADULT - PROBLEM SELECTOR PLAN 3
hx of COVID +1/22, covid negative on 3/12  -isolation per ID  -Not on supplemental O2  -CXR w/out infiltrates  -Unable to repeat covid PCR at this time due to severe thrombocytopenia and   -Per discussion with heme - they would like to treat covid at this time which they believe is exacerbating patient's thrombocytopenia.   -s/p onvalescent plasma therapy on 3/11   -Heme team to obtain MAB therapy as part of expanded access in case plasma is ineffective.

## 2021-03-14 LAB
ANION GAP SERPL CALC-SCNC: 12 MMOL/L — SIGNIFICANT CHANGE UP (ref 7–14)
ANISOCYTOSIS BLD QL: SLIGHT — SIGNIFICANT CHANGE UP
BASOPHILS # BLD AUTO: 0 K/UL — SIGNIFICANT CHANGE UP (ref 0–0.2)
BASOPHILS NFR BLD AUTO: 0 % — SIGNIFICANT CHANGE UP (ref 0–2)
BUN SERPL-MCNC: 28 MG/DL — HIGH (ref 7–23)
CALCIUM SERPL-MCNC: 9.2 MG/DL — SIGNIFICANT CHANGE UP (ref 8.4–10.5)
CHLORIDE SERPL-SCNC: 101 MMOL/L — SIGNIFICANT CHANGE UP (ref 98–107)
CO2 SERPL-SCNC: 24 MMOL/L — SIGNIFICANT CHANGE UP (ref 22–31)
CREAT SERPL-MCNC: 0.69 MG/DL — SIGNIFICANT CHANGE UP (ref 0.5–1.3)
D DIMER BLD IA.RAPID-MCNC: 197 NG/ML DDU — SIGNIFICANT CHANGE UP
DACRYOCYTES BLD QL SMEAR: SLIGHT — SIGNIFICANT CHANGE UP
EOSINOPHIL # BLD AUTO: 0 K/UL — SIGNIFICANT CHANGE UP (ref 0–0.5)
EOSINOPHIL NFR BLD AUTO: 0 % — SIGNIFICANT CHANGE UP (ref 0–6)
GLUCOSE SERPL-MCNC: 123 MG/DL — HIGH (ref 70–99)
HCT VFR BLD CALC: 38.5 % — LOW (ref 39–50)
HGB BLD-MCNC: 12.2 G/DL — LOW (ref 13–17)
HYPOCHROMIA BLD QL: SLIGHT — SIGNIFICANT CHANGE UP
IANC: 24.92 K/UL — HIGH (ref 1.5–8.5)
LYMPHOCYTES # BLD AUTO: 0.25 K/UL — LOW (ref 1–3.3)
LYMPHOCYTES # BLD AUTO: 0.9 % — LOW (ref 13–44)
MACROCYTES BLD QL: SLIGHT — SIGNIFICANT CHANGE UP
MANUAL SMEAR VERIFICATION: SIGNIFICANT CHANGE UP
MCHC RBC-ENTMCNC: 27.5 PG — SIGNIFICANT CHANGE UP (ref 27–34)
MCHC RBC-ENTMCNC: 31.7 GM/DL — LOW (ref 32–36)
MCV RBC AUTO: 86.7 FL — SIGNIFICANT CHANGE UP (ref 80–100)
MICROCYTES BLD QL: SLIGHT — SIGNIFICANT CHANGE UP
MONOCYTES # BLD AUTO: 0.97 K/UL — HIGH (ref 0–0.9)
MONOCYTES NFR BLD AUTO: 3.5 % — SIGNIFICANT CHANGE UP (ref 2–14)
NEUTROPHILS # BLD AUTO: 25.85 K/UL — HIGH (ref 1.8–7.4)
NEUTROPHILS NFR BLD AUTO: 93 % — HIGH (ref 43–77)
NRBC # BLD: 1 /100 — HIGH (ref 0–0)
PLAT MORPH BLD: NORMAL — SIGNIFICANT CHANGE UP
PLATELET # BLD AUTO: 4 K/UL — CRITICAL LOW (ref 150–400)
PLATELET COUNT - ESTIMATE: ABNORMAL
POIKILOCYTOSIS BLD QL AUTO: SLIGHT — SIGNIFICANT CHANGE UP
POLYCHROMASIA BLD QL SMEAR: SLIGHT — SIGNIFICANT CHANGE UP
POTASSIUM SERPL-MCNC: 4 MMOL/L — SIGNIFICANT CHANGE UP (ref 3.5–5.3)
POTASSIUM SERPL-SCNC: 4 MMOL/L — SIGNIFICANT CHANGE UP (ref 3.5–5.3)
RBC # BLD: 4.44 M/UL — SIGNIFICANT CHANGE UP (ref 4.2–5.8)
RBC # FLD: 17.4 % — HIGH (ref 10.3–14.5)
RBC BLD AUTO: ABNORMAL
SODIUM SERPL-SCNC: 137 MMOL/L — SIGNIFICANT CHANGE UP (ref 135–145)
SPHEROCYTES BLD QL SMEAR: SLIGHT — SIGNIFICANT CHANGE UP
TARGETS BLD QL SMEAR: SLIGHT — SIGNIFICANT CHANGE UP
VARIANT LYMPHS # BLD: 2.6 % — SIGNIFICANT CHANGE UP (ref 0–6)
WBC # BLD: 27.8 K/UL — HIGH (ref 3.8–10.5)
WBC # FLD AUTO: 27.8 K/UL — HIGH (ref 3.8–10.5)

## 2021-03-14 PROCEDURE — 99233 SBSQ HOSP IP/OBS HIGH 50: CPT | Mod: CS

## 2021-03-14 PROCEDURE — 99232 SBSQ HOSP IP/OBS MODERATE 35: CPT | Mod: GC

## 2021-03-14 RX ORDER — ROMIPLOSTIM 250 UG/.5ML
750 INJECTION, POWDER, LYOPHILIZED, FOR SOLUTION SUBCUTANEOUS ONCE
Refills: 0 | Status: COMPLETED | OUTPATIENT
Start: 2021-03-14 | End: 2021-03-14

## 2021-03-14 RX ADMIN — Medication 1 MILLIGRAM(S): at 21:32

## 2021-03-14 RX ADMIN — Medication 1 TABLET(S): at 17:26

## 2021-03-14 RX ADMIN — CETIRIZINE HYDROCHLORIDE 10 MILLIGRAM(S): 10 TABLET ORAL at 21:33

## 2021-03-14 RX ADMIN — Medication 80 MILLIGRAM(S): at 07:42

## 2021-03-14 RX ADMIN — Medication 1 APPLICATION(S): at 07:42

## 2021-03-14 RX ADMIN — ROMIPLOSTIM 750 MICROGRAM(S): 250 INJECTION, POWDER, LYOPHILIZED, FOR SOLUTION SUBCUTANEOUS at 17:26

## 2021-03-14 RX ADMIN — ATOVAQUONE 1500 MILLIGRAM(S): 750 SUSPENSION ORAL at 15:34

## 2021-03-14 RX ADMIN — BREXPIPRAZOLE 6 MILLIGRAM(S): 0.25 TABLET ORAL at 15:35

## 2021-03-14 RX ADMIN — Medication 300 MILLIGRAM(S): at 21:33

## 2021-03-14 RX ADMIN — Medication 1 APPLICATION(S): at 17:26

## 2021-03-14 RX ADMIN — CHLORHEXIDINE GLUCONATE 1 APPLICATION(S): 213 SOLUTION TOPICAL at 07:43

## 2021-03-14 NOTE — PROGRESS NOTE ADULT - PROBLEM SELECTOR PLAN 3
hx of COVID +1/22, covid negative on 3/12  -isolation per ID  -Not on supplemental O2  -CXR w/out infiltrates  -Per discussion with heme - they would like to treat covid at this time which they believe is exacerbating patient's thrombocytopenia.   -s/p convalescent plasma therapy on 3/11   -Heme team to obtain MAB therapy as part of expanded access in case plasma is ineffective.

## 2021-03-14 NOTE — PROGRESS NOTE ADULT - SUBJECTIVE AND OBJECTIVE BOX
Olinda Asif, [facility] Medicine   Pager: [phone]    Patient is a 20y old  Male who presents with a chief complaint of low platelets (05 Mar 2021 11:40)  SUBJECTIVE / OVERNIGHT EVENTS: Mother at bedside. History provided by mom. Mom states no further rectal or nose bleed today. Tolerated rituxan infusion well yesterday. Patient more agitated now given length of stay and chronic steroid use.     MEDICATIONS  (STANDING):  atovaquone  Suspension 1500 milliGRAM(s) Oral daily  brexpiprazole 6 milliGRAM(s) Oral daily  influenza   Vaccine 0.5 milliLiter(s) IntraMuscular once  LORazepam     Tablet 1 milliGRAM(s) Oral at bedtime  multivitamin 1 Tablet(s) Oral daily  pantoprazole    Tablet 40 milliGRAM(s) Oral before breakfast  predniSONE   Tablet 80 milliGRAM(s) Oral daily  traZODone 300 milliGRAM(s) Oral at bedtime    MEDICATIONS  (PRN):    Vital Signs Last 24 Hrs  T(C): 36.3 (14 Mar 2021 04:25), Max: 37.1 (13 Mar 2021 18:01)  T(F): 97.4 (14 Mar 2021 04:25), Max: 98.8 (13 Mar 2021 18:01)  HR: 80 (14 Mar 2021 04:25) (80 - 124)  BP: 100/59 (14 Mar 2021 04:25) (100/59 - 165/98)  BP(mean): --  RR: 18 (14 Mar 2021 04:25) (18 - 18)  SpO2: 97% (14 Mar 2021 04:25) (97% - 100%)  PHYSICAL EXAM  CONSTITUTIONAL: NAD, well-appearing, sitting in chair, obese   RESPIRATORY: overall clear, does not participate in exam  CARDIOVASCULAR: Regular rate and rhythm, normal S1 and S2, no murmur/rub/gallops, b/l LE edema 2+ with blistering and ecchymosis    ABDOMEN: Nontender to palpation, normoactive bowel sounds  MUSKULOSKELETAL:  no clubbing or cyanosis of digits; no joint swelling or tenderness to palpation  PSYCH: calm, affect appropriate  NEUROLOGY: CN 2-12 are intact and symmetric; nonfocal, nonverbal, does not follow simple commands.   SKIN: scattered UE ecchymosis        LABS:                                              11.6   16.87 )-----------( 1        ( 13 Mar 2021 08:00 )             37.5       RADIOLOGY & ADDITIONAL TESTS:    Imaging Personally Reviewed:  Consultant(s) Notes Reviewed:    Care Discussed with Consultants/Other Providers: Heme/onc, ID, AI

## 2021-03-14 NOTE — PROGRESS NOTE ADULT - ASSESSMENT
21 yo M with a history of chronic ITP and severe autism (non-verbal at baseline) who presents with severe thrombocytopenia (Plt 7) while on home PO prednisone (80mg daily).     # Chronic ITP exacerbated in the setting of recent COVID infection   -CT head neg for bleed  -s/p IVIG 1gm/kg daily x 2 (completed 2/28) and dexamethasone 40mg IV x4 days (completed 3/3)  -S/p C1 Rituxan with desensitization protocol on 3/6, C2 3/13 completed (A/I recommended desensitization protocol given prior reaction)  -S/p romiplastin/Nplate, 1mcg/kg; given 2/28; next dose 3/7 was 2mcg/kg (pt only got 250mcg instead of 300mcg as pharmacy only had 250); next dose due today 3/14- plan to give 5mcg/kg (750mcg) - order placed today  -continue prednisone 80 mg daily  -transfuse platelets if bleeding (consider giving 1/2 unit platelets slowly infused over 3 hours q12h) OR if platelets <5k - give 1/2-1 unit today and reassess labs thereafter  -COVID PCR returned negative on 3/12  -Continue mepron for PCP ppx   -monitor closely for any signs or symptoms of bleeding

## 2021-03-14 NOTE — PROGRESS NOTE ADULT - SUBJECTIVE AND OBJECTIVE BOX
INTERVAL HPI/OVERNIGHT EVENTS:  Patient S&E at bedside. Yesterday had IV placement issues, received Ritxuan later without issues. No o/n events, patient's mother notes some slight blood when wiping stool lately. No other issues.    VITAL SIGNS:  T(F): 97.4 (03-14-21 @ 04:25)  HR: 80 (03-14-21 @ 04:25)  BP: 100/59 (03-14-21 @ 04:25)  RR: 18 (03-14-21 @ 04:25)  SpO2: 97% (03-14-21 @ 04:25)  Wt(kg): --    PHYSICAL EXAM:  Constitutional: NAD, nonverbal, in chair  Eyes: sclera non-icteric  Neck: supple  Extremities: no c/c/e  Neurological: unable to assess    MEDICATIONS  (STANDING):  atovaquone  Suspension 1500 milliGRAM(s) Oral daily  BACItracin   Ointment 1 Application(s) Topical two times a day  brexpiprazole 6 milliGRAM(s) Oral daily  cetirizine 10 milliGRAM(s) Oral at bedtime  chlorhexidine 4% Liquid 1 Application(s) Topical <User Schedule>  fluticasone propionate 50 MICROgram(s)/spray Nasal Spray 1 Spray(s) Both Nostrils two times a day  influenza   Vaccine 0.5 milliLiter(s) IntraMuscular once  LORazepam     Tablet 1 milliGRAM(s) Oral at bedtime  multivitamin 1 Tablet(s) Oral daily  predniSONE   Tablet 80 milliGRAM(s) Oral daily  romiPLOStim Injectable 750 MICROGram(s) SubCutaneous once  traZODone 300 milliGRAM(s) Oral at bedtime    MEDICATIONS  (PRN):      Allergies    Bactrim (Hives)  Rituxan (Hives)  WinRho SDF (Hives)    Intolerances        LABS:                        12.2   27.80 )-----------( 4        ( 14 Mar 2021 08:54 )             38.5     03-14    137  |  101  |  28<H>  ----------------------------<  123<H>  4.0   |  24  |  0.69    Ca    9.2      14 Mar 2021 08:54            RADIOLOGY & ADDITIONAL TESTS:  Studies reviewed.

## 2021-03-14 NOTE — PROGRESS NOTE ADULT - PROBLEM SELECTOR PLAN 1
Hx of chronic ITP since 18 months  -CTH neg, No nose or rectal bleed today so far  -s/p multiple platelets infusions during this admission  -per [facility] recommendations will transfuse 1/2 unit platelets every 12 hrs until count>5 or if bleeding.   -s/p IVIG x2, completed 2/28   -s/p Decadron 40 mg IV (2/28-3/3), now on prednisone 80mg  -Romiplastin/Nplate ordered by Heme on 2/28 and 3/7. Next dose on 3/14  -s/p Ritruxan infusion on 3/7 and 3/13; tolerated infusion well.    -C/w mepron for PCP ppx  -management per heme given severe refractory case   -Per discussion with heme - they would like to treat covid at this time which they believe is exacerbating patient's thrombocytopenia.   -s/p convalescent plasma on 3/11   -[facility] team to obtain MAB therapy as part of expanded access in case plasma is ineffective.

## 2021-03-15 LAB
BASOPHILS # BLD AUTO: 0.02 K/UL — SIGNIFICANT CHANGE UP (ref 0–0.2)
BASOPHILS NFR BLD AUTO: 0.1 % — SIGNIFICANT CHANGE UP (ref 0–2)
CULTURE RESULTS: SIGNIFICANT CHANGE UP
CULTURE RESULTS: SIGNIFICANT CHANGE UP
EOSINOPHIL # BLD AUTO: 0.04 K/UL — SIGNIFICANT CHANGE UP (ref 0–0.5)
EOSINOPHIL NFR BLD AUTO: 0.3 % — SIGNIFICANT CHANGE UP (ref 0–6)
HCT VFR BLD CALC: 36.9 % — LOW (ref 39–50)
HGB BLD-MCNC: 11.2 G/DL — LOW (ref 13–17)
IANC: 12.16 K/UL — HIGH (ref 1.5–8.5)
IMM GRANULOCYTES NFR BLD AUTO: 1.3 % — SIGNIFICANT CHANGE UP (ref 0–1.5)
LYMPHOCYTES # BLD AUTO: 14.6 % — SIGNIFICANT CHANGE UP (ref 13–44)
LYMPHOCYTES # BLD AUTO: 2.27 K/UL — SIGNIFICANT CHANGE UP (ref 1–3.3)
MCHC RBC-ENTMCNC: 27.5 PG — SIGNIFICANT CHANGE UP (ref 27–34)
MCHC RBC-ENTMCNC: 30.4 GM/DL — LOW (ref 32–36)
MCV RBC AUTO: 90.7 FL — SIGNIFICANT CHANGE UP (ref 80–100)
MONOCYTES # BLD AUTO: 0.89 K/UL — SIGNIFICANT CHANGE UP (ref 0–0.9)
MONOCYTES NFR BLD AUTO: 5.7 % — SIGNIFICANT CHANGE UP (ref 2–14)
NEUTROPHILS # BLD AUTO: 12.16 K/UL — HIGH (ref 1.8–7.4)
NEUTROPHILS NFR BLD AUTO: 78 % — HIGH (ref 43–77)
NRBC # BLD: 0 /100 WBCS — SIGNIFICANT CHANGE UP
NRBC # FLD: 0 K/UL — SIGNIFICANT CHANGE UP
PLATELET # BLD AUTO: 1 K/UL — CRITICAL LOW (ref 150–400)
RBC # BLD: 4.07 M/UL — LOW (ref 4.2–5.8)
RBC # FLD: 17.5 % — HIGH (ref 10.3–14.5)
SPECIMEN SOURCE: SIGNIFICANT CHANGE UP
SPECIMEN SOURCE: SIGNIFICANT CHANGE UP
WBC # BLD: 15.58 K/UL — HIGH (ref 3.8–10.5)
WBC # FLD AUTO: 15.58 K/UL — HIGH (ref 3.8–10.5)

## 2021-03-15 PROCEDURE — 93970 EXTREMITY STUDY: CPT | Mod: 26

## 2021-03-15 PROCEDURE — 99233 SBSQ HOSP IP/OBS HIGH 50: CPT | Mod: CS

## 2021-03-15 PROCEDURE — 99232 SBSQ HOSP IP/OBS MODERATE 35: CPT | Mod: GC

## 2021-03-15 RX ADMIN — Medication 80 MILLIGRAM(S): at 05:22

## 2021-03-15 RX ADMIN — BREXPIPRAZOLE 6 MILLIGRAM(S): 0.25 TABLET ORAL at 16:40

## 2021-03-15 RX ADMIN — Medication 1 MILLIGRAM(S): at 12:12

## 2021-03-15 RX ADMIN — CETIRIZINE HYDROCHLORIDE 10 MILLIGRAM(S): 10 TABLET ORAL at 22:09

## 2021-03-15 RX ADMIN — Medication 1 TABLET(S): at 12:22

## 2021-03-15 RX ADMIN — Medication 1 APPLICATION(S): at 16:00

## 2021-03-15 RX ADMIN — Medication 300 MILLIGRAM(S): at 22:09

## 2021-03-15 RX ADMIN — CHLORHEXIDINE GLUCONATE 1 APPLICATION(S): 213 SOLUTION TOPICAL at 05:22

## 2021-03-15 RX ADMIN — Medication 1 APPLICATION(S): at 05:22

## 2021-03-15 RX ADMIN — ATOVAQUONE 1500 MILLIGRAM(S): 750 SUSPENSION ORAL at 11:58

## 2021-03-15 RX ADMIN — Medication 1 MILLIGRAM(S): at 22:09

## 2021-03-15 NOTE — PROGRESS NOTE ADULT - ATTENDING COMMENTS
21 yo M with a history of chronic ITP and severe autism (non-verbal at baseline) who presents with severe thrombocytopenia (Plt 7) while on home PO prednisone (80mg daily). ITP has worsened in the setting of recent COVID infection which has taken weeks to clear. He received IVIG 1gm/kg daily x 2 (completed 2/28) and dexamethasone 40mg IV x 4 days (completed 3/3), C1 Rituxan with desensitization protocol on 3/6; C2 Rituximab on 3/13, and started Romiplastin (Nplate), 1mcg/kg on 2/28; next dose 3/7 was 2mcg/kg (pt only got 250mcg instead of 300mcg as pharmacy only had 250); next dose on 3/14- 5mcg/kg (750mcg). So far he has had no response, so plan is to complete 4 doses of weekly Rituxan and continue to escalate Nplate dose up to 10 mcg/kg weekly and then reevaluate for further treatment if needed. As he is not responsive to steroids will start to taper prednisone and will decrease by 10mg every 3 days. Should start 70mg prednisone 3/16. Continue to support with platelet transfusions giving 1/2 unit platelets slowly infused over 3 hours q12h for platelets <5k or if bleeding. Continue mepron for PCP ppx. Hematology will continue to follow.

## 2021-03-15 NOTE — PROGRESS NOTE ADULT - ASSESSMENT
19 yo M with a history of chronic ITP and severe autism (non-verbal at baseline) who presents with severe thrombocytopenia (Plt 7) while on home PO prednisone (80mg daily).     # Chronic ITP exacerbated in the setting of recent COVID infection   -CT head neg for bleed  -s/p IVIG 1gm/kg daily x 2 (completed 2/28) and dexamethasone 40mg IV x4 days (completed 3/3)  -S/p C1 Rituxan with desensitization protocol on 3/6; C2 Rituximab on 3/13  -S/p romiplastin/Nplate, 1mcg/kg; given 2/28; next dose 3/7 was 2mcg/kg (pt only got 250mcg instead of 300mcg as pharmacy only had 250); next dose on 3/14- 5mcg/kg (750mcg)  -continue prednisone 80 mg daily  -transfuse platelets if bleeding (consider giving 1/2 unit platelets slowly infused over 3 hours q12h) OR if platelets <5k  -Continue mepron for PCP ppx   -monitor closely for any signs or symptoms of bleeding      Quiroz-in MD Lalo, PGY-4  Translational Medical Oncology Fellow  Pager: [phone]   19 yo M with a history of chronic ITP and severe autism (non-verbal at baseline) who presents with severe thrombocytopenia (Plt 7) while on home PO prednisone (80mg daily).     # Chronic ITP exacerbated in the setting of recent COVID infection   -CT head neg for bleed  -s/p IVIG 1gm/kg daily x 2 (completed 2/28) and dexamethasone 40mg IV x4 days (completed 3/3)  -S/p C1 Rituxan with desensitization protocol on 3/6; C2 Rituximab on 3/13  -S/p romiplastin/Nplate, 1mcg/kg; given 2/28; next dose 3/7 was 2mcg/kg (pt only got 250mcg instead of 300mcg as pharmacy only had 250); next dose on 3/14- 5mcg/kg (750mcg)  -would taper his prednisone now. would decrease by 10mg every 3 days. Should start 70mg prednisone tomorrow  -transfuse platelets if bleeding (consider giving 1/2 unit platelets slowly infused over 3 hours q12h) OR if platelets <5k  -Continue mepron for PCP ppx   -monitor closely for any signs or symptoms of bleeding      Quirzo-in MD Lalo, PGY-4  Translational Medical Oncology Fellow  Pager: [phone]

## 2021-03-15 NOTE — PROGRESS NOTE ADULT - PROBLEM SELECTOR PLAN 1
Hx of chronic ITP since 18 months  -CTH neg, mild epistaxis today, platelet 1K, transfuse 1/2 unit plts today  -s/p multiple platelets infusions during this admission  -per [facility] recommendations will transfuse 1/2 unit platelets every 12 hrs until count>5 or if bleeding.   -s/p IVIG x2, completed 2/28   -s/p Decadron 40 mg IV (2/28-3/3), now on prednisone 80mg  -Romiplastin/Nplate ordered by Heme on 2/28 and 3/7. Next dose on 3/14  -s/p Ritruxan infusion on 3/7 and 3/13; tolerated infusion well.    -C/w mepron for PCP ppx  -management per heme given severe refractory case   -Per discussion with heme - they would like to treat covid at this time which they believe is exacerbating patient's thrombocytopenia.   -s/p convalescent plasma on 3/11   -Heme team to obtain MAB therapy as part of expanded access in case plasma is ineffective.

## 2021-03-15 NOTE — PROGRESS NOTE ADULT - SUBJECTIVE AND OBJECTIVE BOX
Patient is a 20y old  Male who presents with a chief complaint of low platelets (15 Mar 2021 09:50)      SUBJECTIVE / OVERNIGHT EVENTS: Today AM pt had multiple episodes of small, self resolved epistaxis. He appears in no distress, eating his breakfast comfortably. Mother at bedside and RN denies BRBPR/hematuria/hematemesis.     MEDICATIONS  (STANDING):  atovaquone  Suspension 1500 milliGRAM(s) Oral daily  BACItracin   Ointment 1 Application(s) Topical two times a day  brexpiprazole 6 milliGRAM(s) Oral daily  cetirizine 10 milliGRAM(s) Oral at bedtime  chlorhexidine 4% Liquid 1 Application(s) Topical <User Schedule>  fluticasone propionate 50 MICROgram(s)/spray Nasal Spray 1 Spray(s) Both Nostrils two times a day  influenza   Vaccine 0.5 milliLiter(s) IntraMuscular once  LORazepam     Tablet 1 milliGRAM(s) Oral at bedtime  multivitamin 1 Tablet(s) Oral daily  predniSONE   Tablet 80 milliGRAM(s) Oral daily  traZODone 300 milliGRAM(s) Oral at bedtime    MEDICATIONS  (PRN):        CAPILLARY BLOOD GLUCOSE        I&O's Summary    14 Mar 2021 07:01  -  15 Mar 2021 07:00  --------------------------------------------------------  IN: 200 mL / OUT: 0 mL / NET: 200 mL    15 Mar 2021 07:01  -  15 Mar 2021 15:46  --------------------------------------------------------  IN: 585 mL / OUT: 0 mL / NET: 585 mL    Vital Signs Last 24 Hrs  T(C): 36.8 (15 Mar 2021 15:06), Max: 37.1 (15 Mar 2021 12:15)  T(F): 98.2 (15 Mar 2021 15:06), Max: 98.7 (15 Mar 2021 12:15)  HR: 96 (15 Mar 2021 15:06) (80 - 112)  BP: 122/64 (15 Mar 2021 15:06) (117/82 - 150/87)  BP(mean): --  RR: 16 (15 Mar 2021 15:06) (16 - 18)  SpO2: 99% (15 Mar 2021 15:06) (95% - 99%)    PHYSICAL EXAM:  CONSTITUTIONAL: NAD, well-appearing, sitting in chair, obese   RESPIRATORY: overall clear, does not participate in exam  CARDIOVASCULAR: Regular rate and rhythm, normal S1 and S2, no murmur/rub/gallops, b/l LE edema 2+ with blistering and ecchymosis    ABDOMEN: Nontender to palpation, normoactive bowel sounds  MUSKULOSKELETAL:  no clubbing or cyanosis of digits; no joint swelling or tenderness to palpation  PSYCH: calm, affect appropriate  NEUROLOGY: CN 2-12 are intact and symmetric; nonfocal, nonverbal, does not follow simple commands.   SKIN: scattered UE ecchymosis      LABS:                        11.2   15.58 )-----------( 1        ( 15 Mar 2021 08:10 )             36.9     03-14    137  |  101  |  28<H>  ----------------------------<  123<H>  4.0   |  24  |  0.69    Ca    9.2      14 Mar 2021 08:54

## 2021-03-15 NOTE — PROGRESS NOTE ADULT - SUBJECTIVE AND OBJECTIVE BOX
VINNY MOON  MRN-[id number]    Interval hx:  Patient was seen and examined at the bedside      MEDICATIONS  (STANDING):  atovaquone  Suspension 1500 milliGRAM(s) Oral daily  BACItracin   Ointment 1 Application(s) Topical two times a day  brexpiprazole 6 milliGRAM(s) Oral daily  cetirizine 10 milliGRAM(s) Oral at bedtime  chlorhexidine 4% Liquid 1 Application(s) Topical <User Schedule>  fluticasone propionate 50 MICROgram(s)/spray Nasal Spray 1 Spray(s) Both Nostrils two times a day  influenza   Vaccine 0.5 milliLiter(s) IntraMuscular once  LORazepam     Tablet 1 milliGRAM(s) Oral at bedtime  multivitamin 1 Tablet(s) Oral daily  predniSONE   Tablet 80 milliGRAM(s) Oral daily  traZODone 300 milliGRAM(s) Oral at bedtime    MEDICATIONS  (PRN):      Allergies    Bactrim (Hives)  Rituxan (Hives)  WinRho SDF (Hives)    Intolerances        Objectives:  Vital Signs Last 24 Hrs  T(C): 36.6 (15 Mar 2021 05:17), Max: 37 (14 Mar 2021 13:05)  T(F): 97.8 (15 Mar 2021 05:17), Max: 98.6 (14 Mar 2021 13:05)  HR: 95 (15 Mar 2021 05:17) (80 - 124)  BP: 126/75 (15 Mar 2021 05:17) (118/72 - 153/78)  BP(mean): --  RR: 18 (15 Mar 2021 05:17) (17 - 18)  SpO2: 99% (15 Mar 2021 05:17) (95% - 99%)    Physical exam  General - NAD, alert and oriented  HEENT - PERRLA, EOM intact, sclera and conjunctiva clear, oropharynx, nares clear  Neck - Supple, no thyromegaly or thyroid nodules, no bruits  Cardiovascular - RRR no m/r/g, no JVD, no carotid bruits  Lungs - CTAB, no use of acessory muscles, no w/c/r  Abdomen - Normal bowel sounds, NT/ND  Genito Urinary –   Lymph Nodes - No LAD  Extremeties - No e/c/c  Skin - No rashes, skin warm and dry, no erythematous areas  Musculo Skeletal - 5/5 strength, normal range of motion, no swollen or erythematous joints.  Neurological – Alert and oriented x 3, CN 2-12 grossly intact.        03-14-21 @ 07:01  -  03-15-21 @ 07:00  --------------------------------------------------------  IN: 200 mL / OUT: 0 mL / NET: 200 mL        Labs:    CBC Full  -  ( 15 Mar 2021 08:10 )  WBC Count : 15.58 K/uL  RBC Count : 4.07 M/uL  Hemoglobin : 11.2 g/dL  Hematocrit : 36.9 %  Platelet Count - Automated : 1 K/uL  Mean Cell Volume : 90.7 fL  Mean Cell Hemoglobin : 27.5 pg  Mean Cell Hemoglobin Concentration : 30.4 gm/dL  Auto Neutrophil # : 12.16 K/uL  Auto Lymphocyte # : 2.27 K/uL  Auto Monocyte # : 0.89 K/uL  Auto Eosinophil # : 0.04 K/uL  Auto Basophil # : 0.02 K/uL  Auto Neutrophil % : 78.0 %  Auto Lymphocyte % : 14.6 %  Auto Monocyte % : 5.7 %  Auto Eosinophil % : 0.3 %  Auto Basophil % : 0.1 %        03-14    137  |  101  |  28<H>  ----------------------------<  123<H>  4.0   |  24  |  0.69    Ca    9.2      14 Mar 2021 08:54                  Imagings:

## 2021-03-16 LAB
ANION GAP SERPL CALC-SCNC: 9 MMOL/L — SIGNIFICANT CHANGE UP (ref 7–14)
BASOPHILS # BLD AUTO: 0.01 K/UL — SIGNIFICANT CHANGE UP (ref 0–0.2)
BASOPHILS NFR BLD AUTO: 0.1 % — SIGNIFICANT CHANGE UP (ref 0–2)
BUN SERPL-MCNC: 31 MG/DL — HIGH (ref 7–23)
CALCIUM SERPL-MCNC: 8.8 MG/DL — SIGNIFICANT CHANGE UP (ref 8.4–10.5)
CHLORIDE SERPL-SCNC: 103 MMOL/L — SIGNIFICANT CHANGE UP (ref 98–107)
CO2 SERPL-SCNC: 26 MMOL/L — SIGNIFICANT CHANGE UP (ref 22–31)
CREAT SERPL-MCNC: 0.92 MG/DL — SIGNIFICANT CHANGE UP (ref 0.5–1.3)
EOSINOPHIL # BLD AUTO: 0.04 K/UL — SIGNIFICANT CHANGE UP (ref 0–0.5)
EOSINOPHIL NFR BLD AUTO: 0.3 % — SIGNIFICANT CHANGE UP (ref 0–6)
GLUCOSE SERPL-MCNC: 82 MG/DL — SIGNIFICANT CHANGE UP (ref 70–99)
HCT VFR BLD CALC: 34.4 % — LOW (ref 39–50)
HGB BLD-MCNC: 10.6 G/DL — LOW (ref 13–17)
IANC: 9.77 K/UL — HIGH (ref 1.5–8.5)
IMM GRANULOCYTES NFR BLD AUTO: 0.8 % — SIGNIFICANT CHANGE UP (ref 0–1.5)
LYMPHOCYTES # BLD AUTO: 18 % — SIGNIFICANT CHANGE UP (ref 13–44)
LYMPHOCYTES # BLD AUTO: 2.34 K/UL — SIGNIFICANT CHANGE UP (ref 1–3.3)
MAGNESIUM SERPL-MCNC: 1.9 MG/DL — SIGNIFICANT CHANGE UP (ref 1.6–2.6)
MCHC RBC-ENTMCNC: 27 PG — SIGNIFICANT CHANGE UP (ref 27–34)
MCHC RBC-ENTMCNC: 30.8 GM/DL — LOW (ref 32–36)
MCV RBC AUTO: 87.8 FL — SIGNIFICANT CHANGE UP (ref 80–100)
MONOCYTES # BLD AUTO: 0.73 K/UL — SIGNIFICANT CHANGE UP (ref 0–0.9)
MONOCYTES NFR BLD AUTO: 5.6 % — SIGNIFICANT CHANGE UP (ref 2–14)
NEUTROPHILS # BLD AUTO: 9.77 K/UL — HIGH (ref 1.8–7.4)
NEUTROPHILS NFR BLD AUTO: 75.2 % — SIGNIFICANT CHANGE UP (ref 43–77)
NRBC # BLD: 0 /100 WBCS — SIGNIFICANT CHANGE UP
NRBC # FLD: 0 K/UL — SIGNIFICANT CHANGE UP
PLATELET # BLD AUTO: 1 K/UL — CRITICAL LOW (ref 150–400)
POTASSIUM SERPL-MCNC: 3.6 MMOL/L — SIGNIFICANT CHANGE UP (ref 3.5–5.3)
POTASSIUM SERPL-SCNC: 3.6 MMOL/L — SIGNIFICANT CHANGE UP (ref 3.5–5.3)
RBC # BLD: 3.92 M/UL — LOW (ref 4.2–5.8)
RBC # FLD: 17.3 % — HIGH (ref 10.3–14.5)
SODIUM SERPL-SCNC: 138 MMOL/L — SIGNIFICANT CHANGE UP (ref 135–145)
WBC # BLD: 13 K/UL — HIGH (ref 3.8–10.5)
WBC # FLD AUTO: 13 K/UL — HIGH (ref 3.8–10.5)

## 2021-03-16 PROCEDURE — 99232 SBSQ HOSP IP/OBS MODERATE 35: CPT | Mod: GC

## 2021-03-16 PROCEDURE — 99233 SBSQ HOSP IP/OBS HIGH 50: CPT | Mod: CS

## 2021-03-16 RX ADMIN — Medication 1 APPLICATION(S): at 05:38

## 2021-03-16 RX ADMIN — CETIRIZINE HYDROCHLORIDE 10 MILLIGRAM(S): 10 TABLET ORAL at 22:44

## 2021-03-16 RX ADMIN — Medication 80 MILLIGRAM(S): at 05:38

## 2021-03-16 RX ADMIN — ATOVAQUONE 1500 MILLIGRAM(S): 750 SUSPENSION ORAL at 11:43

## 2021-03-16 RX ADMIN — Medication 1 TABLET(S): at 22:43

## 2021-03-16 RX ADMIN — BREXPIPRAZOLE 6 MILLIGRAM(S): 0.25 TABLET ORAL at 11:42

## 2021-03-16 RX ADMIN — Medication 300 MILLIGRAM(S): at 22:43

## 2021-03-16 RX ADMIN — Medication 1 APPLICATION(S): at 18:28

## 2021-03-16 RX ADMIN — Medication 1 MILLIGRAM(S): at 11:41

## 2021-03-16 RX ADMIN — CHLORHEXIDINE GLUCONATE 1 APPLICATION(S): 213 SOLUTION TOPICAL at 05:38

## 2021-03-16 RX ADMIN — Medication 1 MILLIGRAM(S): at 22:43

## 2021-03-16 NOTE — PROGRESS NOTE ADULT - SUBJECTIVE AND OBJECTIVE BOX
Patient is a 20y old  Male who presents with a chief complaint of low platelets (15 Mar 2021 15:44)      SUBJECTIVE / OVERNIGHT EVENTS: No events overnight. No episodes of epistaxis today AM. remains calm and comfortable in bed.    MEDICATIONS  (STANDING):  atovaquone  Suspension 1500 milliGRAM(s) Oral daily  BACItracin   Ointment 1 Application(s) Topical two times a day  brexpiprazole 6 milliGRAM(s) Oral daily  cetirizine 10 milliGRAM(s) Oral at bedtime  chlorhexidine 4% Liquid 1 Application(s) Topical <User Schedule>  fluticasone propionate 50 MICROgram(s)/spray Nasal Spray 1 Spray(s) Both Nostrils two times a day  influenza   Vaccine 0.5 milliLiter(s) IntraMuscular once  LORazepam     Tablet 1 milliGRAM(s) Oral at bedtime  multivitamin 1 Tablet(s) Oral daily  traZODone 300 milliGRAM(s) Oral at bedtime    MEDICATIONS  (PRN):        CAPILLARY BLOOD GLUCOSE        I&O's Summary    15 Mar 2021 07:01  -  16 Mar 2021 07:00  --------------------------------------------------------  IN: 825 mL / OUT: 0 mL / NET: 825 mL      Vital Signs Last 24 Hrs  T(C): 36.4 (16 Mar 2021 11:05), Max: 37 (16 Mar 2021 05:38)  T(F): 97.5 (16 Mar 2021 11:05), Max: 98.6 (16 Mar 2021 05:38)  HR: 115 (16 Mar 2021 11:05) (90 - 115)  BP: 137/65 (16 Mar 2021 11:05) (113/60 - 137/65)  BP(mean): --  RR: 18 (16 Mar 2021 11:05) (16 - 18)  SpO2: 97% (16 Mar 2021 11:05) (97% - 100%)      PHYSICAL EXAM:  CONSTITUTIONAL: NAD, well-appearing, obese  RESPIRATORY: overall clear, does not participate in exam  CARDIOVASCULAR: Regular rate and rhythm, normal S1 and S2, no murmur/rub/gallops, b/l LE edema 2+ with blistering and ecchymosis    ABDOMEN: Nontender to palpation, normoactive bowel sounds  MUSKULOSKELETAL:  no clubbing or cyanosis of digits; no joint swelling or tenderness to palpation  PSYCH: calm, affect appropriate  NEUROLOGY: CN 2-12 are intact and symmetric; nonfocal, nonverbal, does not follow simple commands.   SKIN: scattered UE ecchymosis  LABS:                        10.6   13.00 )-----------( 1        ( 16 Mar 2021 07:36 )             34.4     03-16    138  |  103  |  31<H>  ----------------------------<  82  3.6   |  26  |  0.92    Ca    8.8      16 Mar 2021 07:36  Mg     1.9     03-16

## 2021-03-16 NOTE — PROGRESS NOTE ADULT - PROBLEM SELECTOR PLAN 1
Hx of chronic ITP since 18 months  -CTH neg  - platelet 1K, transfuse 1/2 unit plts today  -s/p multiple platelets infusions during this admission  -per [facility] recommendations will transfuse 1/2 unit platelets every 12 hrs until count>5 or if bleeding.   -s/p IVIG x2, completed 2/28   -s/p Decadron 40 mg IV (2/28-3/3), now on prednisone taper per heme  -Romiplastin/Nplate ordered by [facility] on 2/28 and 3/7. Next dose on 3/14  -s/p Ritruxan infusion on 3/7 and 3/13; tolerated infusion well.    -C/w mepron for PCP ppx  -management per heme given severe refractory case   -Per discussion with heme - they would like to treat covid at this time which they believe is exacerbating patient's thrombocytopenia.   -s/p convalescent plasma on 3/11

## 2021-03-16 NOTE — PROGRESS NOTE ADULT - ASSESSMENT
19 yo M with a history of chronic ITP and severe autism (non-verbal at baseline) who presents with severe thrombocytopenia (Plt 7) while on home PO prednisone (80mg daily).     # Chronic ITP exacerbated in the setting of recent COVID infection   -CT head neg for bleed  -s/p IVIG 1gm/kg daily x 2 (completed 2/28) and dexamethasone 40mg IV x4 days (completed 3/3)  -S/p C1 Rituxan with desensitization protocol on 3/6; C2 Rituximab on 3/13  -S/p romiplastin/Nplate, 1mcg/kg; given 2/28; next dose 3/7 was 2mcg/kg (pt only got 250mcg instead of 300mcg as pharmacy only had 250); next dose on 3/14- 5mcg/kg (750mcg). next dose 10mcg/kg on 3/21  continue taper his prednisone. would decrease by 10mg every 3 days. Started 70mg prednisone today  -transfuse platelets if bleeding (consider giving 1/2 unit platelets slowly infused over 3 hours q12h) OR if platelets <5k  -Continue mepron for PCP ppx   -monitor closely for any signs or symptoms of bleeding      Quiroz-in MD Lalo, PGY-4  Translational Medical Oncology Fellow  Pager: [phone]

## 2021-03-16 NOTE — PROGRESS NOTE ADULT - ATTENDING COMMENTS
19 yo M with a history of chronic ITP and severe autism (non-verbal at baseline) who presents with severe thrombocytopenia (Plt 7) while on home PO prednisone (80mg daily). ITP has worsened in the setting of recent COVID infection which has taken weeks to clear. He received IVIG 1gm/kg daily x 2 (completed 2/28) and dexamethasone 40mg IV x 4 days (completed 3/3), C1 Rituxan with desensitization protocol on 3/6; C2 Rituximab on 3/13, and started Romiplastin (Nplate), 1mcg/kg on 2/28; next dose 3/7 was 2mcg/kg (pt only got 250mcg instead of 300mcg as pharmacy only had 250); next dose on 3/14- 5mcg/kg (750mcg). So far he has had no response, so plan is to complete 4 doses of weekly Rituxan and continue to escalate Nplate dose up to 10 mcg/kg weekly and then reevaluate for further treatment if needed. As he is not responsive to steroids will start to taper prednisone and will decrease by 10mg every 3 days. Prednisione lowered to 70mg on 3/16. Continue to support with platelet transfusions giving 1/2 unit platelets slowly infused over 3 hours q12h for platelets <5k or if bleeding. Continue mepron for PCP ppx. Hematology will continue to follow.

## 2021-03-16 NOTE — PROGRESS NOTE ADULT - SUBJECTIVE AND OBJECTIVE BOX
VINNY MOON  MRN-[id number]    Interval hx:    Patient was seen and examined      MEDICATIONS  (STANDING):  atovaquone  Suspension 1500 milliGRAM(s) Oral daily  BACItracin   Ointment 1 Application(s) Topical two times a day  brexpiprazole 6 milliGRAM(s) Oral daily  cetirizine 10 milliGRAM(s) Oral at bedtime  chlorhexidine 4% Liquid 1 Application(s) Topical <User Schedule>  fluticasone propionate 50 MICROgram(s)/spray Nasal Spray 1 Spray(s) Both Nostrils two times a day  influenza   Vaccine 0.5 milliLiter(s) IntraMuscular once  LORazepam     Tablet 1 milliGRAM(s) Oral at bedtime  multivitamin 1 Tablet(s) Oral daily  traZODone 300 milliGRAM(s) Oral at bedtime    MEDICATIONS  (PRN):      Allergies    Bactrim (Hives)  Rituxan (Hives)  WinRho SDF (Hives)    Intolerances        Objectives:  Vital Signs Last 24 Hrs  T(C): 36.8 (16 Mar 2021 15:10), Max: 37 (16 Mar 2021 05:38)  T(F): 98.2 (16 Mar 2021 15:10), Max: 98.6 (16 Mar 2021 05:38)  HR: 102 (16 Mar 2021 15:10) (90 - 115)  BP: 135/78 (16 Mar 2021 15:10) (113/60 - 137/65)  BP(mean): --  RR: 16 (16 Mar 2021 15:10) (16 - 18)  SpO2: 98% (16 Mar 2021 15:10) (97% - 100%)    Physical exam  General - NAD  HEENT - PERRLA, EOM intact, sclera and conjunctiva clear, oropharynx, nares clear  Neck - Supple, no thyromegaly or thyroid nodules, no bruits  Cardiovascular - RRR no m/r/g, no JVD, no carotid bruits  Lungs - CTAB, no use of acessory muscles, no w/c/r  Abdomen - Normal bowel sounds, NT/ND  Extremeties - b/l LE wrapped, with bruises          03-15-21 @ 07:01  -  03-16-21 @ 07:00  --------------------------------------------------------  IN: 825 mL / OUT: 0 mL / NET: 825 mL    03-16-21 @ 07:01  -  03-16-21 @ 16:29  --------------------------------------------------------  IN: 585 mL / OUT: 0 mL / NET: 585 mL        Labs:    CBC Full  -  ( 16 Mar 2021 07:36 )  WBC Count : 13.00 K/uL  RBC Count : 3.92 M/uL  Hemoglobin : 10.6 g/dL  Hematocrit : 34.4 %  Platelet Count - Automated : 1 K/uL  Mean Cell Volume : 87.8 fL  Mean Cell Hemoglobin : 27.0 pg  Mean Cell Hemoglobin Concentration : 30.8 gm/dL  Auto Neutrophil # : 9.77 K/uL  Auto Lymphocyte # : 2.34 K/uL  Auto Monocyte # : 0.73 K/uL  Auto Eosinophil # : 0.04 K/uL  Auto Basophil # : 0.01 K/uL  Auto Neutrophil % : 75.2 %  Auto Lymphocyte % : 18.0 %  Auto Monocyte % : 5.6 %  Auto Eosinophil % : 0.3 %  Auto Basophil % : 0.1 %        03-16    138  |  103  |  31<H>  ----------------------------<  82  3.6   |  26  |  0.92    Ca    8.8      16 Mar 2021 07:36  Mg     1.9     03-16                  Imagings:

## 2021-03-17 LAB
BLD GP AB SCN SERPL QL: NEGATIVE — SIGNIFICANT CHANGE UP
HCT VFR BLD CALC: 35.7 % — LOW (ref 39–50)
HGB BLD-MCNC: 10.8 G/DL — LOW (ref 13–17)
MCHC RBC-ENTMCNC: 26.6 PG — LOW (ref 27–34)
MCHC RBC-ENTMCNC: 30.3 GM/DL — LOW (ref 32–36)
MCV RBC AUTO: 87.9 FL — SIGNIFICANT CHANGE UP (ref 80–100)
NRBC # BLD: 0 /100 WBCS — SIGNIFICANT CHANGE UP
NRBC # FLD: 0 K/UL — SIGNIFICANT CHANGE UP
PLATELET # BLD AUTO: 3 K/UL — CRITICAL LOW (ref 150–400)
RBC # BLD: 4.06 M/UL — LOW (ref 4.2–5.8)
RBC # FLD: 17 % — HIGH (ref 10.3–14.5)
RH IG SCN BLD-IMP: POSITIVE — SIGNIFICANT CHANGE UP
WBC # BLD: 13.69 K/UL — HIGH (ref 3.8–10.5)
WBC # FLD AUTO: 13.69 K/UL — HIGH (ref 3.8–10.5)

## 2021-03-17 PROCEDURE — 99232 SBSQ HOSP IP/OBS MODERATE 35: CPT | Mod: GC

## 2021-03-17 PROCEDURE — 99233 SBSQ HOSP IP/OBS HIGH 50: CPT | Mod: CS

## 2021-03-17 RX ADMIN — Medication 1 APPLICATION(S): at 05:47

## 2021-03-17 RX ADMIN — Medication 1 TABLET(S): at 13:45

## 2021-03-17 RX ADMIN — Medication 1 MILLIGRAM(S): at 20:16

## 2021-03-17 RX ADMIN — CHLORHEXIDINE GLUCONATE 1 APPLICATION(S): 213 SOLUTION TOPICAL at 07:30

## 2021-03-17 RX ADMIN — Medication 1 SPRAY(S): at 05:47

## 2021-03-17 RX ADMIN — CETIRIZINE HYDROCHLORIDE 10 MILLIGRAM(S): 10 TABLET ORAL at 20:16

## 2021-03-17 RX ADMIN — ATOVAQUONE 1500 MILLIGRAM(S): 750 SUSPENSION ORAL at 13:46

## 2021-03-17 RX ADMIN — Medication 1 APPLICATION(S): at 18:50

## 2021-03-17 RX ADMIN — BREXPIPRAZOLE 6 MILLIGRAM(S): 0.25 TABLET ORAL at 13:46

## 2021-03-17 RX ADMIN — Medication 300 MILLIGRAM(S): at 20:16

## 2021-03-17 RX ADMIN — Medication 70 MILLIGRAM(S): at 13:45

## 2021-03-17 NOTE — PROGRESS NOTE ADULT - SUBJECTIVE AND OBJECTIVE BOX
Patient is a 20y old  Male who presents with a chief complaint of low platelets (15 Mar 2021 15:44)      SUBJECTIVE / OVERNIGHT EVENTS: No events overnight.  Patient is calm, watching a video on his ipad, does not appear in distress.     MEDICATIONS  (STANDING):  atovaquone  Suspension 1500 milliGRAM(s) Oral daily  BACItracin   Ointment 1 Application(s) Topical two times a day  brexpiprazole 6 milliGRAM(s) Oral daily  cetirizine 10 milliGRAM(s) Oral at bedtime  chlorhexidine 4% Liquid 1 Application(s) Topical <User Schedule>  fluticasone propionate 50 MICROgram(s)/spray Nasal Spray 1 Spray(s) Both Nostrils two times a day  influenza   Vaccine 0.5 milliLiter(s) IntraMuscular once  LORazepam     Tablet 1 milliGRAM(s) Oral at bedtime  multivitamin 1 Tablet(s) Oral daily  traZODone 300 milliGRAM(s) Oral at bedtime    MEDICATIONS  (PRN):        CAPILLARY BLOOD GLUCOSE        I&O's Summary    15 Mar 2021 07:01  -  16 Mar 2021 07:00  --------------------------------------------------------  IN: 825 mL / OUT: 0 mL / NET: 825 mL      Vital Signs Last 24 Hrs  T(C): 36.4 (16 Mar 2021 11:05), Max: 37 (16 Mar 2021 05:38)  T(F): 97.5 (16 Mar 2021 11:05), Max: 98.6 (16 Mar 2021 05:38)  HR: 115 (16 Mar 2021 11:05) (90 - 115)  BP: 137/65 (16 Mar 2021 11:05) (113/60 - 137/65)  BP(mean): --  RR: 18 (16 Mar 2021 11:05) (16 - 18)  SpO2: 97% (16 Mar 2021 11:05) (97% - 100%)      PHYSICAL EXAM:  CONSTITUTIONAL: NAD, well-appearing, obese male in NAD  RESPIRATORY: overall clear, does not participate in exam  CARDIOVASCULAR: Regular rate and rhythm, normal S1 and S2, no murmur/rub/gallops, b/l LE edema 2+ with blistering and ecchymosis    ABDOMEN: Nontender to palpation, normoactive bowel sounds  MUSCULOSKELETAL  no clubbing or cyanosis of digits; no joint swelling or tenderness to palpation  PSYCH: calm, affect appropriate  NEUROLOGY: CN 2-12 are intact and symmetric; nonfocal, nonverbal, does not follow simple commands.   SKIN: scattered UE ecchymosis  LABS:                        10.6   13.00 )-----------( 1        ( 16 Mar 2021 07:36 )             34.4     03-16    138  |  103  |  31<H>  ----------------------------<  82  3.6   |  26  |  0.92    Ca    8.8      16 Mar 2021 07:36  Mg     1.9     03-16

## 2021-03-17 NOTE — PROGRESS NOTE ADULT - SUBJECTIVE AND OBJECTIVE BOX
INTERVAL History:    Allergies    Bactrim (Hives)  Rituxan (Hives)  WinRho SDF (Hives)    Intolerances        MEDICATIONS  (STANDING):  atovaquone  Suspension 1500 milliGRAM(s) Oral daily  BACItracin   Ointment 1 Application(s) Topical two times a day  brexpiprazole 6 milliGRAM(s) Oral daily  cetirizine 10 milliGRAM(s) Oral at bedtime  chlorhexidine 4% Liquid 1 Application(s) Topical <User Schedule>  fluticasone propionate 50 MICROgram(s)/spray Nasal Spray 1 Spray(s) Both Nostrils two times a day  influenza   Vaccine 0.5 milliLiter(s) IntraMuscular once  LORazepam     Tablet 1 milliGRAM(s) Oral at bedtime  multivitamin 1 Tablet(s) Oral daily  predniSONE   Tablet 70 milliGRAM(s) Oral daily  traZODone 300 milliGRAM(s) Oral at bedtime    MEDICATIONS  (PRN):      Vital Signs Last 24 Hrs  T(C): 36.7 (17 Mar 2021 05:27), Max: 36.8 (16 Mar 2021 15:10)  T(F): 98 (17 Mar 2021 05:27), Max: 98.2 (16 Mar 2021 15:10)  HR: 89 (17 Mar 2021 05:27) (89 - 115)  BP: 118/67 (17 Mar 2021 05:27) (100/68 - 137/65)  BP(mean): --  RR: 18 (17 Mar 2021 05:27) (16 - 18)  SpO2: 98% (17 Mar 2021 05:27) (97% - 98%)    PHYSICAL EXAM:    General - NAD  HEENT - PERRLA, EOM intact, sclera and conjunctiva clear, oropharynx, nares clear  Neck - Supple, no thyromegaly or thyroid nodules, no bruits  Cardiovascular - RRR no m/r/g, no JVD, no carotid bruits  Lungs - CTAB, no use of acessory muscles, no w/c/r  Abdomen - Normal bowel sounds, NT/ND  Extremeties - b/l LE wrapped, with bruises        LABS:                        10.8   13.69 )-----------( 3        ( 17 Mar 2021 05:41 )             35.7     03-16    138  |  103  |  31<H>  ----------------------------<  82  3.6   |  26  |  0.92    Ca    8.8      16 Mar 2021 07:36  Mg     1.9     03-16              RADIOLOGY & ADDITIONAL STUDIES:    PATHOLOGY:         INTERVAL History: No acute events overnight. Plt count this morning is 3.     Allergies    Bactrim (Hives)  Rituxan (Hives)  WinRho SDF (Hives)    Intolerances        MEDICATIONS  (STANDING):  atovaquone  Suspension 1500 milliGRAM(s) Oral daily  BACItracin   Ointment 1 Application(s) Topical two times a day  brexpiprazole 6 milliGRAM(s) Oral daily  cetirizine 10 milliGRAM(s) Oral at bedtime  chlorhexidine 4% Liquid 1 Application(s) Topical <User Schedule>  fluticasone propionate 50 MICROgram(s)/spray Nasal Spray 1 Spray(s) Both Nostrils two times a day  influenza   Vaccine 0.5 milliLiter(s) IntraMuscular once  LORazepam     Tablet 1 milliGRAM(s) Oral at bedtime  multivitamin 1 Tablet(s) Oral daily  predniSONE   Tablet 70 milliGRAM(s) Oral daily  traZODone 300 milliGRAM(s) Oral at bedtime    MEDICATIONS  (PRN):      Vital Signs Last 24 Hrs  T(C): 36.7 (17 Mar 2021 05:27), Max: 36.8 (16 Mar 2021 15:10)  T(F): 98 (17 Mar 2021 05:27), Max: 98.2 (16 Mar 2021 15:10)  HR: 89 (17 Mar 2021 05:27) (89 - 115)  BP: 118/67 (17 Mar 2021 05:27) (100/68 - 137/65)  BP(mean): --  RR: 18 (17 Mar 2021 05:27) (16 - 18)  SpO2: 98% (17 Mar 2021 05:27) (97% - 98%)    PHYSICAL EXAM:    General - NAD  HEENT - PERRLA, EOM intact, sclera and conjunctiva clear, oropharynx, nares clear  Neck - Supple, no thyromegaly or thyroid nodules, no bruits  Cardiovascular - RRR no m/r/g, no JVD, no carotid bruits  Lungs - CTAB, no use of acessory muscles, no w/c/r  Abdomen - Normal bowel sounds, NT/ND  Extremeties - b/l LE wrapped, with bruises        LABS:                        10.8   13.69 )-----------( 3        ( 17 Mar 2021 05:41 )             35.7     03-16    138  |  103  |  31<H>  ----------------------------<  82  3.6   |  26  |  0.92    Ca    8.8      16 Mar 2021 07:36  Mg     1.9     03-16              RADIOLOGY & ADDITIONAL STUDIES:    PATHOLOGY:

## 2021-03-17 NOTE — PROGRESS NOTE ADULT - ATTENDING COMMENTS
21 yo M with a history of chronic ITP and severe autism (non-verbal at baseline) who presents with severe thrombocytopenia (Plt 7) while on home PO prednisone (80mg daily). ITP has worsened in the setting of recent COVID infection which has taken weeks to clear. He received IVIG 1gm/kg daily x 2 (completed 2/28) and dexamethasone 40mg IV x 4 days (completed 3/3), C1 Rituxan with desensitization protocol on 3/6; C2 Rituximab on 3/13, and started Romiplastin (Nplate), 1mcg/kg on 2/28; next dose 3/7 was 2mcg/kg (pt only got 250mcg instead of 300mcg as pharmacy only had 250); next dose on 3/14- 5mcg/kg (750mcg). So far he has had no response, so plan is to complete 4 doses of weekly Rituxan and continue to escalate Nplate dose up to 10 mcg/kg weekly and then reevaluate for further treatment if needed. As he is not responsive to steroids will start to taper prednisone and will decrease by 10mg every 3 days. Prednisone lowered to 70mg on 3/16. Continue to support with platelet transfusions giving 1/2 unit platelets slowly infused over 3 hours q12h for platelets <5k or if bleeding. Continue mepron for PCP ppx. Hematology will continue to follow.

## 2021-03-17 NOTE — PROGRESS NOTE ADULT - ASSESSMENT
21 yo M with a history of chronic ITP and severe autism (non-verbal at baseline) who presents with severe thrombocytopenia (Plt 7) while on home PO prednisone (80mg daily).     # Chronic ITP exacerbated in the setting of recent COVID infection   -CT head neg for bleed  -s/p IVIG 1gm/kg daily x 2 (completed 2/28) and dexamethasone 40mg IV x4 days (completed 3/3)  -S/p C1 Rituxan with desensitization protocol on 3/6; C2 Rituximab on 3/13; C3 Rituximab due on 3/20 with desensitization protocol. Plan for 4 total doses  -S/p romiplastin/Nplate, 1mcg/kg; given 2/28; next dose 3/7 was 2mcg/kg (pt only got 250mcg instead of 300mcg as pharmacy only had 250); next dose on 3/14- 5mcg/kg (750mcg). next dose 10mcg/kg on 3/21  continue taper his prednisone. would decrease by 10mg every 3 days. Currently 70mg, please start 60mg on 3/19.   -transfuse platelets if bleeding (consider giving 1/2 unit platelets slowly infused over 3 hours q12h) OR if platelets <5k  -Continue mepron for PCP ppx   -monitor closely for any signs or symptoms of bleeding      Kenya Chávez MD  Hematology Oncology Fellow, PGY-5  [facility] Pager: [phone]/ [facility] Pager: [phone]

## 2021-03-17 NOTE — PROGRESS NOTE ADULT - ASSESSMENT
21yo M h/o intellectual disability, autism, nonverbal at baseline, chronic ITP on 80mg of daily prednisone, hospitalized for thrombocytopenia noted on OP labs, admitted for management of refractory ITP

## 2021-03-17 NOTE — PROGRESS NOTE ADULT - PROBLEM SELECTOR PLAN 3
hx of COVID +1/22, covid negative on 3/12, not requiring supplemental O2  -CXR w/out infiltrates  -Per discussion with heme - they would like to treat covid at this time which they believe is exacerbating patient's thrombocytopenia.   -s/p convalescent plasma therapy on 3/11   -Heme team to obtain MAB therapy as part of expanded access in case plasma is ineffective.

## 2021-03-17 NOTE — PROGRESS NOTE ADULT - PROBLEM SELECTOR PLAN 1
Hx of chronic ITP since age 18 months. CTH negative, requiring multiple plt transfusions this admission. May be exacerbated by COVID, now s/p convalescent plasma (3/11)  -per heme recommendations will transfuse 1/2 unit platelets every 12 hrs for plt <5  or if bleeding.   -s/p IVIG x2, completed 2/28   -s/p Decadron 40 mg IV (2/28-3/3), cont Prednisone taper   -Romiplastin/Nplate ordered by [facility] on 2/28 and 3/7.   -s/p Ritruxan infusion on 3/7 and 3/13  -C/w mepron for PCP ppx  - Hematology following  - Prednisone 70mg, decrease to 60mg 3/19   [ ] Romiplastin/Nplate dose 3/21, Rituximab 3/20

## 2021-03-18 LAB
ANION GAP SERPL CALC-SCNC: 10 MMOL/L — SIGNIFICANT CHANGE UP (ref 7–14)
BUN SERPL-MCNC: 26 MG/DL — HIGH (ref 7–23)
CALCIUM SERPL-MCNC: 8.7 MG/DL — SIGNIFICANT CHANGE UP (ref 8.4–10.5)
CHLORIDE SERPL-SCNC: 105 MMOL/L — SIGNIFICANT CHANGE UP (ref 98–107)
CO2 SERPL-SCNC: 24 MMOL/L — SIGNIFICANT CHANGE UP (ref 22–31)
CREAT SERPL-MCNC: 0.71 MG/DL — SIGNIFICANT CHANGE UP (ref 0.5–1.3)
GLUCOSE SERPL-MCNC: 124 MG/DL — HIGH (ref 70–99)
HCT VFR BLD CALC: 39 % — SIGNIFICANT CHANGE UP (ref 39–50)
HGB BLD-MCNC: 12 G/DL — LOW (ref 13–17)
MCHC RBC-ENTMCNC: 27.3 PG — SIGNIFICANT CHANGE UP (ref 27–34)
MCHC RBC-ENTMCNC: 30.8 GM/DL — LOW (ref 32–36)
MCV RBC AUTO: 88.8 FL — SIGNIFICANT CHANGE UP (ref 80–100)
NRBC # BLD: 0 /100 WBCS — SIGNIFICANT CHANGE UP
NRBC # FLD: 0 K/UL — SIGNIFICANT CHANGE UP
PLATELET # BLD AUTO: 4 K/UL — CRITICAL LOW (ref 150–400)
POTASSIUM SERPL-MCNC: 3.7 MMOL/L — SIGNIFICANT CHANGE UP (ref 3.5–5.3)
POTASSIUM SERPL-SCNC: 3.7 MMOL/L — SIGNIFICANT CHANGE UP (ref 3.5–5.3)
RBC # BLD: 4.39 M/UL — SIGNIFICANT CHANGE UP (ref 4.2–5.8)
RBC # FLD: 17.2 % — HIGH (ref 10.3–14.5)
SODIUM SERPL-SCNC: 139 MMOL/L — SIGNIFICANT CHANGE UP (ref 135–145)
WBC # BLD: 18.63 K/UL — HIGH (ref 3.8–10.5)
WBC # FLD AUTO: 18.63 K/UL — HIGH (ref 3.8–10.5)

## 2021-03-18 PROCEDURE — 99233 SBSQ HOSP IP/OBS HIGH 50: CPT | Mod: GC

## 2021-03-18 PROCEDURE — 99233 SBSQ HOSP IP/OBS HIGH 50: CPT | Mod: CS

## 2021-03-18 RX ORDER — CIMETIDINE 200 MG
400 TABLET ORAL THREE TIMES A DAY
Refills: 0 | Status: DISCONTINUED | OUTPATIENT
Start: 2021-03-18 | End: 2021-04-12

## 2021-03-18 RX ADMIN — Medication 1 APPLICATION(S): at 17:42

## 2021-03-18 RX ADMIN — Medication 1 MILLIGRAM(S): at 21:25

## 2021-03-18 RX ADMIN — Medication 1 TABLET(S): at 15:06

## 2021-03-18 RX ADMIN — CETIRIZINE HYDROCHLORIDE 10 MILLIGRAM(S): 10 TABLET ORAL at 21:25

## 2021-03-18 RX ADMIN — Medication 70 MILLIGRAM(S): at 06:35

## 2021-03-18 RX ADMIN — ATOVAQUONE 1500 MILLIGRAM(S): 750 SUSPENSION ORAL at 15:06

## 2021-03-18 RX ADMIN — Medication 300 MILLIGRAM(S): at 21:25

## 2021-03-18 RX ADMIN — BREXPIPRAZOLE 6 MILLIGRAM(S): 0.25 TABLET ORAL at 15:05

## 2021-03-18 RX ADMIN — Medication 1 APPLICATION(S): at 06:35

## 2021-03-18 RX ADMIN — Medication 400 MILLIGRAM(S): at 21:25

## 2021-03-18 RX ADMIN — CHLORHEXIDINE GLUCONATE 1 APPLICATION(S): 213 SOLUTION TOPICAL at 06:35

## 2021-03-18 NOTE — PROGRESS NOTE ADULT - SUBJECTIVE AND OBJECTIVE BOX
Patient is a 20y old  Male who presents with a chief complaint of low platelets (15 Mar 2021 15:44)      SUBJECTIVE / OVERNIGHT EVENTS: No events overnight.  Patient is calm, watching a movie on his ipad. Last BM was yesterday, having a mild nosebleed, some mild bleeding from LE.     MEDICATIONS  (STANDING):  atovaquone  Suspension 1500 milliGRAM(s) Oral daily  BACItracin   Ointment 1 Application(s) Topical two times a day  brexpiprazole 6 milliGRAM(s) Oral daily  cetirizine 10 milliGRAM(s) Oral at bedtime  chlorhexidine 4% Liquid 1 Application(s) Topical <User Schedule>  fluticasone propionate 50 MICROgram(s)/spray Nasal Spray 1 Spray(s) Both Nostrils two times a day  influenza   Vaccine 0.5 milliLiter(s) IntraMuscular once  LORazepam     Tablet 1 milliGRAM(s) Oral at bedtime  multivitamin 1 Tablet(s) Oral daily  traZODone 300 milliGRAM(s) Oral at bedtime    MEDICATIONS  (PRN):        CAPILLARY BLOOD GLUCOSE        I&O's Summary    15 Mar 2021 07:01  -  16 Mar 2021 07:00  --------------------------------------------------------  IN: 825 mL / OUT: 0 mL / NET: 825 mL      Vital Signs Last 24 Hrs  T(C): 36.4 (16 Mar 2021 11:05), Max: 37 (16 Mar 2021 05:38)  T(F): 97.5 (16 Mar 2021 11:05), Max: 98.6 (16 Mar 2021 05:38)  HR: 115 (16 Mar 2021 11:05) (90 - 115)  BP: 137/65 (16 Mar 2021 11:05) (113/60 - 137/65)  BP(mean): --  RR: 18 (16 Mar 2021 11:05) (16 - 18)  SpO2: 97% (16 Mar 2021 11:05) (97% - 100%)      PHYSICAL EXAM:  CONSTITUTIONAL: NAD, well-appearing, obese male in NAD  RESPIRATORY: overall clear, does not participate in exam  CARDIOVASCULAR: Regular rate and rhythm, normal S1 and S2, no murmur/rub/gallops, b/l LE edema 2+ with blistering and ecchymosis    ABDOMEN: Nontender to palpation, normoactive bowel sounds  MUSCULOSKELETAL  no clubbing or cyanosis of digits; no joint swelling or tenderness to palpation  PSYCH: calm, affect appropriate  NEUROLOGY: CN 2-12 are intact and symmetric; nonfocal, nonverbal, does not follow simple commands.   SKIN: scattered UE/LE ecchymosis  LABS:                        10.6   13.00 )-----------( 1        ( 16 Mar 2021 07:36 )             34.4     03-16    138  |  103  |  31<H>  ----------------------------<  82  3.6   |  26  |  0.92    Ca    8.8      16 Mar 2021 07:36  Mg     1.9     03-16        Discussed with mother and ACP

## 2021-03-18 NOTE — PROGRESS NOTE ADULT - ASSESSMENT
19yo M h/o intellectual disability, autism, nonverbal at baseline, chronic ITP on 80mg of daily prednisone, hospitalized for thrombocytopenia noted on OP labs, admitted for management of refractory ITP

## 2021-03-18 NOTE — PROGRESS NOTE ADULT - PROBLEM SELECTOR PLAN 1
Hx of chronic ITP since age 18 months. CTH negative, requiring multiple plt transfusions this admission. May be exacerbated by COVID, now s/p convalescent plasma (3/11)  -per heme recommendations will transfuse 1/2 unit platelets every 12 hrs for plt <5  or if bleeding.   -s/p IVIG x2, completed 2/28   -s/p Decadron 40 mg IV (2/28-3/3), cont Prednisone taper   -Romiplastin/Nplate ordered by [facility] [place] on 2/28 and 3/7.   -s/p Ritruxan infusion on 3/7 and 3/13  -C/w mepron for PCP ppx  - Hematology following  - Prednisone 70mg, decrease to 60mg 3/19   [ ] Romiplastin/Nplate dose 3/21, Rituximab 3/20

## 2021-03-19 LAB
ANION GAP SERPL CALC-SCNC: 11 MMOL/L — SIGNIFICANT CHANGE UP (ref 7–14)
BASOPHILS # BLD AUTO: 0.04 K/UL — SIGNIFICANT CHANGE UP (ref 0–0.2)
BASOPHILS NFR BLD AUTO: 0.3 % — SIGNIFICANT CHANGE UP (ref 0–2)
BUN SERPL-MCNC: 29 MG/DL — HIGH (ref 7–23)
CALCIUM SERPL-MCNC: 9.1 MG/DL — SIGNIFICANT CHANGE UP (ref 8.4–10.5)
CHLORIDE SERPL-SCNC: 105 MMOL/L — SIGNIFICANT CHANGE UP (ref 98–107)
CO2 SERPL-SCNC: 25 MMOL/L — SIGNIFICANT CHANGE UP (ref 22–31)
CREAT SERPL-MCNC: 0.76 MG/DL — SIGNIFICANT CHANGE UP (ref 0.5–1.3)
EOSINOPHIL # BLD AUTO: 0.06 K/UL — SIGNIFICANT CHANGE UP (ref 0–0.5)
EOSINOPHIL NFR BLD AUTO: 0.4 % — SIGNIFICANT CHANGE UP (ref 0–6)
GLUCOSE SERPL-MCNC: 86 MG/DL — SIGNIFICANT CHANGE UP (ref 70–99)
HCT VFR BLD CALC: 40.7 % — SIGNIFICANT CHANGE UP (ref 39–50)
HGB BLD-MCNC: 12.3 G/DL — LOW (ref 13–17)
IANC: 10.99 K/UL — HIGH (ref 1.5–8.5)
IMM GRANULOCYTES NFR BLD AUTO: 1.6 % — HIGH (ref 0–1.5)
LYMPHOCYTES # BLD AUTO: 15.7 % — SIGNIFICANT CHANGE UP (ref 13–44)
LYMPHOCYTES # BLD AUTO: 2.29 K/UL — SIGNIFICANT CHANGE UP (ref 1–3.3)
MAGNESIUM SERPL-MCNC: 2 MG/DL — SIGNIFICANT CHANGE UP (ref 1.6–2.6)
MCHC RBC-ENTMCNC: 26.9 PG — LOW (ref 27–34)
MCHC RBC-ENTMCNC: 30.2 GM/DL — LOW (ref 32–36)
MCV RBC AUTO: 88.9 FL — SIGNIFICANT CHANGE UP (ref 80–100)
MONOCYTES # BLD AUTO: 0.96 K/UL — HIGH (ref 0–0.9)
MONOCYTES NFR BLD AUTO: 6.6 % — SIGNIFICANT CHANGE UP (ref 2–14)
NEUTROPHILS # BLD AUTO: 10.99 K/UL — HIGH (ref 1.8–7.4)
NEUTROPHILS NFR BLD AUTO: 75.4 % — SIGNIFICANT CHANGE UP (ref 43–77)
NRBC # BLD: 0 /100 WBCS — SIGNIFICANT CHANGE UP
NRBC # FLD: 0 K/UL — SIGNIFICANT CHANGE UP
PLATELET # BLD AUTO: 10 K/UL — CRITICAL LOW (ref 150–400)
POTASSIUM SERPL-MCNC: 3.5 MMOL/L — SIGNIFICANT CHANGE UP (ref 3.5–5.3)
POTASSIUM SERPL-SCNC: 3.5 MMOL/L — SIGNIFICANT CHANGE UP (ref 3.5–5.3)
RBC # BLD: 4.58 M/UL — SIGNIFICANT CHANGE UP (ref 4.2–5.8)
RBC # FLD: 17.3 % — HIGH (ref 10.3–14.5)
SODIUM SERPL-SCNC: 141 MMOL/L — SIGNIFICANT CHANGE UP (ref 135–145)
WBC # BLD: 14.57 K/UL — HIGH (ref 3.8–10.5)
WBC # FLD AUTO: 14.57 K/UL — HIGH (ref 3.8–10.5)

## 2021-03-19 PROCEDURE — 99232 SBSQ HOSP IP/OBS MODERATE 35: CPT | Mod: GC

## 2021-03-19 PROCEDURE — 99233 SBSQ HOSP IP/OBS HIGH 50: CPT | Mod: CS

## 2021-03-19 RX ORDER — HYDROCORTISONE 20 MG
100 TABLET ORAL ONCE
Refills: 0 | Status: DISCONTINUED | OUTPATIENT
Start: 2021-03-20 | End: 2021-04-07

## 2021-03-19 RX ORDER — DIPHENHYDRAMINE HCL 50 MG
50 CAPSULE ORAL ONCE
Refills: 0 | Status: COMPLETED | OUTPATIENT
Start: 2021-03-20 | End: 2021-03-20

## 2021-03-19 RX ORDER — DIPHENHYDRAMINE HCL 50 MG
50 CAPSULE ORAL ONCE
Refills: 0 | Status: COMPLETED | OUTPATIENT
Start: 2021-03-20 | End: 2021-03-27

## 2021-03-19 RX ORDER — ALBUTEROL 90 UG/1
2.5 AEROSOL, METERED ORAL ONCE
Refills: 0 | Status: DISCONTINUED | OUTPATIENT
Start: 2021-03-20 | End: 2021-04-12

## 2021-03-19 RX ORDER — ROMIPLOSTIM 250 UG/.5ML
1500 INJECTION, POWDER, LYOPHILIZED, FOR SOLUTION SUBCUTANEOUS ONCE
Refills: 0 | Status: DISCONTINUED | OUTPATIENT
Start: 2021-03-21 | End: 2021-03-21

## 2021-03-19 RX ORDER — CETIRIZINE HYDROCHLORIDE 10 MG/1
10 TABLET ORAL ONCE
Refills: 0 | Status: COMPLETED | OUTPATIENT
Start: 2021-03-20 | End: 2021-03-20

## 2021-03-19 RX ORDER — ACETAMINOPHEN 500 MG
650 TABLET ORAL ONCE
Refills: 0 | Status: COMPLETED | OUTPATIENT
Start: 2021-03-20 | End: 2021-03-20

## 2021-03-19 RX ORDER — EPINEPHRINE 0.3 MG/.3ML
0.3 INJECTION INTRAMUSCULAR; SUBCUTANEOUS ONCE
Refills: 0 | Status: DISCONTINUED | OUTPATIENT
Start: 2021-03-20 | End: 2021-04-07

## 2021-03-19 RX ORDER — RITUXIMAB 10 MG/ML
993.62 INJECTION, SOLUTION INTRAVENOUS ONCE
Refills: 0 | Status: COMPLETED | OUTPATIENT
Start: 2021-03-20 | End: 2021-03-20

## 2021-03-19 RX ORDER — RITUXIMAB 10 MG/ML
17 INJECTION, SOLUTION INTRAVENOUS ONCE
Refills: 0 | Status: COMPLETED | OUTPATIENT
Start: 2021-03-20 | End: 2021-03-20

## 2021-03-19 RX ORDER — FAMOTIDINE 10 MG/ML
20 INJECTION INTRAVENOUS ONCE
Refills: 0 | Status: COMPLETED | OUTPATIENT
Start: 2021-03-20 | End: 2021-03-20

## 2021-03-19 RX ORDER — MEPERIDINE HYDROCHLORIDE 50 MG/ML
25 INJECTION INTRAMUSCULAR; INTRAVENOUS; SUBCUTANEOUS ONCE
Refills: 0 | Status: DISCONTINUED | OUTPATIENT
Start: 2021-03-20 | End: 2021-03-20

## 2021-03-19 RX ADMIN — CETIRIZINE HYDROCHLORIDE 10 MILLIGRAM(S): 10 TABLET ORAL at 21:40

## 2021-03-19 RX ADMIN — Medication 1 APPLICATION(S): at 17:31

## 2021-03-19 RX ADMIN — CHLORHEXIDINE GLUCONATE 1 APPLICATION(S): 213 SOLUTION TOPICAL at 06:10

## 2021-03-19 RX ADMIN — Medication 70 MILLIGRAM(S): at 06:11

## 2021-03-19 RX ADMIN — Medication 400 MILLIGRAM(S): at 14:16

## 2021-03-19 RX ADMIN — Medication 1 APPLICATION(S): at 06:10

## 2021-03-19 RX ADMIN — Medication 1 MILLIGRAM(S): at 21:40

## 2021-03-19 RX ADMIN — Medication 400 MILLIGRAM(S): at 21:40

## 2021-03-19 RX ADMIN — BREXPIPRAZOLE 6 MILLIGRAM(S): 0.25 TABLET ORAL at 11:20

## 2021-03-19 RX ADMIN — Medication 400 MILLIGRAM(S): at 06:11

## 2021-03-19 RX ADMIN — Medication 1 TABLET(S): at 11:20

## 2021-03-19 RX ADMIN — ATOVAQUONE 1500 MILLIGRAM(S): 750 SUSPENSION ORAL at 11:20

## 2021-03-19 RX ADMIN — Medication 300 MILLIGRAM(S): at 21:40

## 2021-03-19 NOTE — CHART NOTE - NSCHARTNOTEFT_GEN_A_CORE
Source: Patient [ ]    Family [ ]     other [ x] pt's mother at bedside, chart review     Nutrition f/u for prolonged Length Of Stay. 19 y/o M with chronic idiopathic thrombocytopenia pupura, autism, s/p COVID-19 infection. Per mother, pt with an appetite above baseline due to prednisone increasing his appetite. She is requesting sugar-free items on his menu and double portions of protein. +loose stools. Denies any GI issues (nausea/vomiting/diarrhea/constipation.) Denies any chewing or swallowing difficulties at this time.  Pt with 23 pound weight gain this admission. 3+ LE edema noted; wt gain may be  combination of fluid retention and true wt gain.    Diet, Regular (02-27-21 @ 01:31)    PO intake:  < 50% [ ] 50-75% [ ]   % [x ]  other :  Current Weight: Weight (kg): 153.8 (02-27)  3/8: 339 pounds   3/19: 362 pounds     Edema: 1+ gen, 3+ LE   Pressure Injuries: none noted.     __________________ Pertinent Medications__________________   MEDICATIONS  (STANDING):  atovaquone  Suspension 1500 milliGRAM(s) Oral daily  BACItracin   Ointment 1 Application(s) Topical two times a day  brexpiprazole 6 milliGRAM(s) Oral daily  cetirizine 10 milliGRAM(s) Oral at bedtime  chlorhexidine 4% Liquid 1 Application(s) Topical <User Schedule>  cimetidine 400 milliGRAM(s) Oral three times a day  fluticasone propionate 50 MICROgram(s)/spray Nasal Spray 1 Spray(s) Both Nostrils two times a day  influenza   Vaccine 0.5 milliLiter(s) IntraMuscular once  LORazepam     Tablet 1 milliGRAM(s) Oral at bedtime  multivitamin 1 Tablet(s) Oral daily  traZODone 300 milliGRAM(s) Oral at bedtime          __________________ Pertinent Labs__________________   03-19 Na141 mmol/L Glu 86 mg/dL K+ 3.5 mmol/L Cr  0.76 mg/dL BUN 29 mg/dL<H>      Estimated Needs:   [x ] no change since previous assessment  [ ] recalculated:       Previous Nutrition Diagnosis: Obesity    Nutrition Diagnosis is [ x] ongoing  [ ] resolved [ ] not applicable       Recommendations:  1. Continue regular diet. Offered consistent carbohydrate diet to pt's mother, but declined.   2. Continue multivitamin.       Monitoring and Evaluation:     [x ] PO intake [ x] Tolerance to diet prescription [x ] weights [x ] follow up per protocol  [ ] other:

## 2021-03-19 NOTE — PROGRESS NOTE ADULT - ATTENDING COMMENTS
21 yo M with a history of chronic ITP and severe autism (non-verbal at baseline) who presents with severe thrombocytopenia (Plt 7) while on home PO prednisone (80mg daily). ITP has worsened in the setting of recent COVID infection which has taken weeks to clear. He received IVIG 1gm/kg daily x 2 (completed 2/28) and dexamethasone 40mg IV x 4 days (completed 3/3), C1 Rituxan with desensitization protocol on 3/6; C2 Rituximab on 3/13, and started Romiplastin (Nplate), 1mcg/kg on 2/28; next dose 3/7 was 2mcg/kg (pt only got 250mcg instead of 300mcg as pharmacy only had 250); next dose on 3/14- 5mcg/kg (750mcg). So far he has had no response, so plan is to complete 4 doses of weekly Rituxan and continue to escalate Nplate dose up to 10 mcg/kg weekly and then reevaluate for further treatment if needed. As he is not responsive to steroids will start to taper prednisone and will decrease by 10mg every 3 days. Prednisone lowered to 70mg on 3/16 and should go to 60 mg on 3/19. Continue to support with platelet transfusions giving 1/2 unit platelets slowly infused over 3 hours q12h for platelets <5k or if bleeding.  Today he has a plt count of 10 so seems to be trending up. Rituxan C3 on 3/20 and NPlate on 3/21. Continue mepron for PCP ppx. Hematology will continue to follow. Discussed d/c planning with patient's mother and when plts > 30 for at least 2 days and trending up will discuss plan in detail.

## 2021-03-19 NOTE — PROGRESS NOTE ADULT - SUBJECTIVE AND OBJECTIVE BOX
Patient is a 20y old  Male who presents with a chief complaint of low platelets (15 Mar 2021 15:44)      SUBJECTIVE / OVERNIGHT EVENTS: No events overnight.  Patient is calm, watching a movie on his ipad. Last BM yesterday   No complaints, eating and drinking well     MEDICATIONS  (STANDING):  atovaquone  Suspension 1500 milliGRAM(s) Oral daily  BACItracin   Ointment 1 Application(s) Topical two times a day  brexpiprazole 6 milliGRAM(s) Oral daily  cetirizine 10 milliGRAM(s) Oral at bedtime  chlorhexidine 4% Liquid 1 Application(s) Topical <User Schedule>  fluticasone propionate 50 MICROgram(s)/spray Nasal Spray 1 Spray(s) Both Nostrils two times a day  influenza   Vaccine 0.5 milliLiter(s) IntraMuscular once  LORazepam     Tablet 1 milliGRAM(s) Oral at bedtime  multivitamin 1 Tablet(s) Oral daily  traZODone 300 milliGRAM(s) Oral at bedtime    MEDICATIONS  (PRN):        CAPILLARY BLOOD GLUCOSE        I&O's Summary    15 Mar 2021 07:01  -  16 Mar 2021 07:00  --------------------------------------------------------  IN: 825 mL / OUT: 0 mL / NET: 825 mL      Vital Signs Last 24 Hrs  T(C): 36.4 (16 Mar 2021 11:05), Max: 37 (16 Mar 2021 05:38)  T(F): 97.5 (16 Mar 2021 11:05), Max: 98.6 (16 Mar 2021 05:38)  HR: 115 (16 Mar 2021 11:05) (90 - 115)  BP: 137/65 (16 Mar 2021 11:05) (113/60 - 137/65)  BP(mean): --  RR: 18 (16 Mar 2021 11:05) (16 - 18)  SpO2: 97% (16 Mar 2021 11:05) (97% - 100%)      PHYSICAL EXAM:  CONSTITUTIONAL: NAD, well-appearing, obese male in NAD  RESPIRATORY: overall clear, does not participate in exam  CARDIOVASCULAR: Regular rate and rhythm, normal S1 and S2, no murmur/rub/gallops, b/l LE edema 2+ with blistering and ecchymosis    ABDOMEN: Nontender to palpation, normoactive bowel sounds  MUSCULOSKELETAL  no clubbing or cyanosis of digits; no joint swelling or tenderness to palpation  PSYCH: calm, affect appropriate  NEUROLOGY: CN 2-12 are intact and symmetric; nonfocal, nonverbal, does not follow simple commands.   SKIN: scattered UE/LE ecchymosis  LABS:                        10.6   13.00 )-----------( 1        ( 16 Mar 2021 07:36 )             34.4     03-16    138  |  103  |  31<H>  ----------------------------<  82  3.6   |  26  |  0.92    Ca    8.8      16 Mar 2021 07:36  Mg     1.9     03-16        Discussed with mother and ACP

## 2021-03-19 NOTE — PROGRESS NOTE ADULT - PROBLEM SELECTOR PLAN 1
Hx of chronic ITP since age 18 months. CTH negative, requiring multiple plt transfusions this admission. May be exacerbated by COVID, now s/p convalescent plasma (3/11)  -per heme recommendations will transfuse 1/2 unit platelets every 12 hrs for plt <5  or if bleeding.   -s/p IVIG x2, completed 2/28   -s/p Decadron 40 mg IV (2/28-3/3), cont Prednisone taper   -Romiplastin/Nplate ordered by [facility] on 2/28 and 3/7.   -s/p Ritruxan infusion on 3/7 and 3/13  -C/w mepron for PCP ppx  - Hematology following  - Prednisone 70mg, decrease to 60mg 3/19   [ ] Rituximab 3/20, Romiplastin/Nplate dose 3/21

## 2021-03-19 NOTE — PROGRESS NOTE ADULT - ASSESSMENT
22 yo M h/o intellectual disability, autism, nonverbal at baseline, chronic ITP on 80mg of daily prednisone, hospitalized for thrombocytopenia noted on OP labs, admitted for management of refractory ITP

## 2021-03-19 NOTE — PROGRESS NOTE ADULT - ASSESSMENT
21 yo M with a history of chronic ITP and severe autism (non-verbal at baseline) who presents with severe thrombocytopenia (Plt 7) while on home PO prednisone (80mg daily).     # Chronic ITP exacerbated in the setting of recent COVID infection   -CT head neg for bleed  -s/p IVIG 1gm/kg daily x 2 (completed 2/28) and dexamethasone 40mg IV x4 days (completed 3/3)  -S/p C1 Rituxan with desensitization protocol on 3/6; C2 Rituximab on 3/13; C3 Rituximab due on 3/20 with desensitization protocol, pending finalization of protocol by Allergy team today, will give orders to chemo RN and chemo pharmacist. Plan for 4 total doses  -S/p romiplastin/Nplate, 1mcg/kg; given 2/28; next dose 3/7 was 2mcg/kg (pt only got 250mcg instead of 300mcg as pharmacy only had 250); next dose on 3/14- 5mcg/kg (750mcg). next dose 10mcg/kg on 3/21= ordered for 1500mcg to be given Sunday   - Continue prednisone taper as ordered (Decrease 10mg every 3 days)  -transfuse platelets if bleeding (consider giving 1/2 unit platelets slowly infused over 3 hours q12h) OR if platelets <5k  -Continue mepron for PCP ppx   -monitor closely for any signs or symptoms of bleeding      Kenya Chávez MD  Hematology Oncology Fellow, PGY-5  [facility] Pager: [phone]/ [facility] Pager: [phone]

## 2021-03-19 NOTE — PROGRESS NOTE ADULT - SUBJECTIVE AND OBJECTIVE BOX
INTERVAL History: No acute events overnight. Plt count this morning is 10.     Allergies    Bactrim (Hives)  Rituxan (Hives)  WinRho SDF (Hives)    Intolerances        MEDICATIONS  (STANDING):  atovaquone  Suspension 1500 milliGRAM(s) Oral daily  BACItracin   Ointment 1 Application(s) Topical two times a day  brexpiprazole 6 milliGRAM(s) Oral daily  cetirizine 10 milliGRAM(s) Oral at bedtime  chlorhexidine 4% Liquid 1 Application(s) Topical <User Schedule>  cimetidine 400 milliGRAM(s) Oral three times a day  fluticasone propionate 50 MICROgram(s)/spray Nasal Spray 1 Spray(s) Both Nostrils two times a day  influenza   Vaccine 0.5 milliLiter(s) IntraMuscular once  LORazepam     Tablet 1 milliGRAM(s) Oral at bedtime  multivitamin 1 Tablet(s) Oral daily  traZODone 300 milliGRAM(s) Oral at bedtime    MEDICATIONS  (PRN):      Vital Signs Last 24 Hrs  T(C): 36.7 (19 Mar 2021 06:05), Max: 37.1 (18 Mar 2021 14:14)  T(F): 98.1 (19 Mar 2021 06:05), Max: 98.7 (18 Mar 2021 14:14)  HR: 97 (19 Mar 2021 06:05) (94 - 107)  BP: 120/68 (19 Mar 2021 06:05) (105/64 - 121/71)  BP(mean): --  RR: 17 (19 Mar 2021 06:05) (16 - 18)  SpO2: 98% (19 Mar 2021 06:05) (97% - 98%)    PHYSICAL EXAM:  General - obese, NAD  HEENT - PERRLA, EOM intact, sclera and conjunctiva clear, oropharynx, nares clear  Neck - Supple, no thyromegaly or thyroid nodules, no bruits  Cardiovascular - RRR no m/r/g, no JVD, no carotid bruits  Lungs - CTAB, no use of acessory muscles, no w/c/r  Abdomen - Normal bowel sounds, NT/ND  Extremeties - b/l LE wrapped, with bruises  Skin- easy bruising     LABS:                        12.3   14.57 )-----------( 10       ( 19 Mar 2021 07:47 )             40.7     03-19    141  |  105  |  29<H>  ----------------------------<  86  3.5   |  25  |  0.76    Ca    9.1      19 Mar 2021 07:47  Mg     2.0     03-19              RADIOLOGY & ADDITIONAL STUDIES:    PATHOLOGY:

## 2021-03-20 LAB
ALBUMIN SERPL ELPH-MCNC: 3.3 G/DL — SIGNIFICANT CHANGE UP (ref 3.3–5)
ALP SERPL-CCNC: 44 U/L — SIGNIFICANT CHANGE UP (ref 40–120)
ALT FLD-CCNC: 27 U/L — SIGNIFICANT CHANGE UP (ref 4–41)
ANION GAP SERPL CALC-SCNC: 10 MMOL/L — SIGNIFICANT CHANGE UP (ref 7–14)
AST SERPL-CCNC: 11 U/L — SIGNIFICANT CHANGE UP (ref 4–40)
BASOPHILS # BLD AUTO: 0.04 K/UL — SIGNIFICANT CHANGE UP (ref 0–0.2)
BASOPHILS NFR BLD AUTO: 0.3 % — SIGNIFICANT CHANGE UP (ref 0–2)
BILIRUB SERPL-MCNC: 0.4 MG/DL — SIGNIFICANT CHANGE UP (ref 0.2–1.2)
BLD GP AB SCN SERPL QL: NEGATIVE — SIGNIFICANT CHANGE UP
BUN SERPL-MCNC: 27 MG/DL — HIGH (ref 7–23)
CALCIUM SERPL-MCNC: 8.8 MG/DL — SIGNIFICANT CHANGE UP (ref 8.4–10.5)
CHLORIDE SERPL-SCNC: 108 MMOL/L — HIGH (ref 98–107)
CO2 SERPL-SCNC: 25 MMOL/L — SIGNIFICANT CHANGE UP (ref 22–31)
CREAT SERPL-MCNC: 0.74 MG/DL — SIGNIFICANT CHANGE UP (ref 0.5–1.3)
EOSINOPHIL # BLD AUTO: 0.04 K/UL — SIGNIFICANT CHANGE UP (ref 0–0.5)
EOSINOPHIL NFR BLD AUTO: 0.3 % — SIGNIFICANT CHANGE UP (ref 0–6)
GLUCOSE SERPL-MCNC: 80 MG/DL — SIGNIFICANT CHANGE UP (ref 70–99)
HCT VFR BLD CALC: 36.1 % — LOW (ref 39–50)
HGB BLD-MCNC: 11.2 G/DL — LOW (ref 13–17)
IANC: 9.17 K/UL — HIGH (ref 1.5–8.5)
IMM GRANULOCYTES NFR BLD AUTO: 1.9 % — HIGH (ref 0–1.5)
LYMPHOCYTES # BLD AUTO: 16.5 % — SIGNIFICANT CHANGE UP (ref 13–44)
LYMPHOCYTES # BLD AUTO: 2.04 K/UL — SIGNIFICANT CHANGE UP (ref 1–3.3)
MAGNESIUM SERPL-MCNC: 2.1 MG/DL — SIGNIFICANT CHANGE UP (ref 1.6–2.6)
MCHC RBC-ENTMCNC: 27.7 PG — SIGNIFICANT CHANGE UP (ref 27–34)
MCHC RBC-ENTMCNC: 31 GM/DL — LOW (ref 32–36)
MCV RBC AUTO: 89.1 FL — SIGNIFICANT CHANGE UP (ref 80–100)
MONOCYTES # BLD AUTO: 0.86 K/UL — SIGNIFICANT CHANGE UP (ref 0–0.9)
MONOCYTES NFR BLD AUTO: 6.9 % — SIGNIFICANT CHANGE UP (ref 2–14)
NEUTROPHILS # BLD AUTO: 9.17 K/UL — HIGH (ref 1.8–7.4)
NEUTROPHILS NFR BLD AUTO: 74.1 % — SIGNIFICANT CHANGE UP (ref 43–77)
NRBC # BLD: 0 /100 WBCS — SIGNIFICANT CHANGE UP
NRBC # FLD: 0 K/UL — SIGNIFICANT CHANGE UP
PHOSPHATE SERPL-MCNC: 4.2 MG/DL — SIGNIFICANT CHANGE UP (ref 2.5–4.5)
PLATELET # BLD AUTO: 17 K/UL — CRITICAL LOW (ref 150–400)
POTASSIUM SERPL-MCNC: 3.9 MMOL/L — SIGNIFICANT CHANGE UP (ref 3.5–5.3)
POTASSIUM SERPL-SCNC: 3.9 MMOL/L — SIGNIFICANT CHANGE UP (ref 3.5–5.3)
PROT SERPL-MCNC: 5.9 G/DL — LOW (ref 6–8.3)
RBC # BLD: 4.05 M/UL — LOW (ref 4.2–5.8)
RBC # FLD: 17.2 % — HIGH (ref 10.3–14.5)
RH IG SCN BLD-IMP: POSITIVE — SIGNIFICANT CHANGE UP
SODIUM SERPL-SCNC: 143 MMOL/L — SIGNIFICANT CHANGE UP (ref 135–145)
WBC # BLD: 12.38 K/UL — HIGH (ref 3.8–10.5)
WBC # FLD AUTO: 12.38 K/UL — HIGH (ref 3.8–10.5)

## 2021-03-20 PROCEDURE — 99233 SBSQ HOSP IP/OBS HIGH 50: CPT

## 2021-03-20 PROCEDURE — 99233 SBSQ HOSP IP/OBS HIGH 50: CPT | Mod: CS

## 2021-03-20 RX ADMIN — BREXPIPRAZOLE 6 MILLIGRAM(S): 0.25 TABLET ORAL at 13:39

## 2021-03-20 RX ADMIN — RITUXIMAB 993.62 MILLIGRAM(S): 10 INJECTION, SOLUTION INTRAVENOUS at 15:15

## 2021-03-20 RX ADMIN — Medication 400 MILLIGRAM(S): at 21:34

## 2021-03-20 RX ADMIN — CETIRIZINE HYDROCHLORIDE 10 MILLIGRAM(S): 10 TABLET ORAL at 21:34

## 2021-03-20 RX ADMIN — CHLORHEXIDINE GLUCONATE 1 APPLICATION(S): 213 SOLUTION TOPICAL at 06:00

## 2021-03-20 RX ADMIN — RITUXIMAB 17 MILLIGRAM(S): 10 INJECTION, SOLUTION INTRAVENOUS at 13:45

## 2021-03-20 RX ADMIN — Medication 1 TABLET(S): at 13:39

## 2021-03-20 RX ADMIN — FAMOTIDINE 20 MILLIGRAM(S): 10 INJECTION INTRAVENOUS at 12:42

## 2021-03-20 RX ADMIN — Medication 1 MILLIGRAM(S): at 21:34

## 2021-03-20 RX ADMIN — Medication 1 APPLICATION(S): at 06:00

## 2021-03-20 RX ADMIN — Medication 50 MILLIGRAM(S): at 12:42

## 2021-03-20 RX ADMIN — CETIRIZINE HYDROCHLORIDE 10 MILLIGRAM(S): 10 TABLET ORAL at 12:41

## 2021-03-20 RX ADMIN — Medication 0.5 MILLIGRAM(S): at 19:53

## 2021-03-20 RX ADMIN — Medication 300 MILLIGRAM(S): at 21:34

## 2021-03-20 RX ADMIN — Medication 1 APPLICATION(S): at 17:45

## 2021-03-20 RX ADMIN — Medication 400 MILLIGRAM(S): at 06:00

## 2021-03-20 RX ADMIN — Medication 100 MILLIGRAM(S): at 12:42

## 2021-03-20 RX ADMIN — ATOVAQUONE 1500 MILLIGRAM(S): 750 SUSPENSION ORAL at 13:39

## 2021-03-20 RX ADMIN — Medication 650 MILLIGRAM(S): at 12:41

## 2021-03-20 RX ADMIN — Medication 60 MILLIGRAM(S): at 06:00

## 2021-03-20 RX ADMIN — Medication 400 MILLIGRAM(S): at 13:49

## 2021-03-20 RX ADMIN — Medication 1 APPLICATION(S): at 05:59

## 2021-03-20 RX ADMIN — Medication 1 MILLIGRAM(S): at 12:41

## 2021-03-20 NOTE — PROGRESS NOTE ADULT - ASSESSMENT
21 yo M with a history of chronic ITP and severe autism (non-verbal at baseline) who presents with severe thrombocytopenia (Plt 7) while on home PO prednisone (80mg daily). ITP has worsened in the setting of recent COVID infection which has taken weeks to clear. He received IVIG 1gm/kg daily x 2 (completed 2/28) and dexamethasone 40mg IV x 4 days (completed 3/3), C1 Weekly rituxan with desensitization protocol on 3/6; C2 Rituximab on 3/13, and started Romiplastin (Nplate), 1mcg/kg on 2/28; next dose 3/7 was 2mcg/kg (pt only got 250mcg instead of 300mcg as pharmacy only had 250); next dose on 3/14- 5mcg/kg (750mcg). Now on prednisone 60mg since 3/19. Platlets now starting to respond after the 3 doses of rituxan so far.  Yesterday paltlets 10,000, today 17,000.  .  Patient has so far been requring large doses of Nplate, up to 750mcg on 3/24.      Nplate 1000mcg ((7.5mcg/kg) tomorrow 3/21 unless platelets rapidly rise to > 50, which I expect this not to be the case.      Patient would likely continue current treatment throughout this next week, once platelets stable can consider eventual discharge to group home with Promacta 75mg rather than Nplate.  Given high doses Nplate, the fixed maximum dose of Promacta may not be sufficient in the long run.  Discussed possibility of eventual conversion to Doptelet in case the promacta is insufficient.  19 yo M with a history of chronic ITP and severe autism (non-verbal at baseline) who presents with severe thrombocytopenia (Plt 7) while on home PO prednisone (80mg daily). ITP has worsened in the setting of recent COVID infection which has taken weeks to clear. He received IVIG 1gm/kg daily x 2 (completed 2/28) and dexamethasone 40mg IV x 4 days (completed 3/3), C1 Weekly rituxan with desensitization protocol on 3/6; C2 Rituximab on 3/13, and started Romiplastin (Nplate), 1mcg/kg on 2/28; next dose 3/7 was 2mcg/kg (pt only got 250mcg instead of 300mcg as pharmacy only had 250); next dose on 3/14- 5mcg/kg (750mcg). Now on prednisone 60mg since 3/19. Platlets now starting to respond after the 3 doses of rituxan so far.  Yesterday paltlets 10,000, today 17,000.  Patient has so far been requring large doses of Nplate, up to 750mcg on 3/24.      Nplate 1000mcg (7.5mcg/kg) tomorrow 3/21 unless platelets rapidly rise to > 50, which I expect this not to be the case.      Patient would likely continue current treatment throughout this next week, once platelets stable can consider eventual discharge to group home with Promacta 75mg rather than Nplate.  Given high doses Nplate, the fixed maximum dose of Promacta may not be sufficient in the long run.  Discussed possibility of eventual conversion to Doptelet in case the promacta is insufficient.     LE skin ulcerations from venous stasis.  This is not a rash specific to the rituxan.  LE edema from prolonged corticosteroid use. When platelets rise appreciably over 20,000 continue steroid taper. Consider lasix diuresis.  Wound care follow up. Continue Silvadene ointment.

## 2021-03-20 NOTE — PROGRESS NOTE ADULT - SUBJECTIVE AND OBJECTIVE BOX
Patient is a 21y old  Male who presents with a chief complaint of low platelets (19 Mar 2021 14:38)      SUBJECTIVE / OVERNIGHT EVENTS:    MEDICATIONS  (STANDING):  atovaquone  Suspension 1500 milliGRAM(s) Oral daily  BACItracin   Ointment 1 Application(s) Topical two times a day  brexpiprazole 6 milliGRAM(s) Oral daily  cetirizine 10 milliGRAM(s) Oral at bedtime  chlorhexidine 4% Liquid 1 Application(s) Topical <User Schedule>  cimetidine 400 milliGRAM(s) Oral three times a day  fluticasone propionate 50 MICROgram(s)/spray Nasal Spray 1 Spray(s) Both Nostrils two times a day  influenza   Vaccine 0.5 milliLiter(s) IntraMuscular once  LORazepam     Tablet 1 milliGRAM(s) Oral at bedtime  multivitamin 1 Tablet(s) Oral daily  predniSONE   Tablet 60 milliGRAM(s) Oral daily  silver sulfADIAZINE 1% Cream 1 Application(s) Topical two times a day  traZODone 300 milliGRAM(s) Oral at bedtime    MEDICATIONS  (PRN):  ALBUTerol    0.083%. 2.5 milliGRAM(s) Nebulizer once PRN PRN Chemotherapy Reaction  diphenhydrAMINE   Injectable 50 milliGRAM(s) IV Push once PRN PRN Chemotherapy Reaction  EPINEPHrine     1 mG/mL Injectable 0.3 milliGRAM(s) IntraMuscular once PRN PRN Chemotherapy Reaction  hydrocortisone sodium succinate Injectable 100 milliGRAM(s) IV Push once PRN PRN Chemotherapy Reaction  meperidine     Injectable 25 milliGRAM(s) IV Push once PRN PRN Chemotherapy Reaction (Rigors)      Vital Signs Last 24 Hrs  T(C): 36.3 (20 Mar 2021 13:53), Max: 37.1 (20 Mar 2021 12:32)  T(F): 97.4 (20 Mar 2021 13:53), Max: 98.8 (20 Mar 2021 12:32)  HR: 110 (20 Mar 2021 13:53) (94 - 110)  BP: 121/92 (20 Mar 2021 13:53) (121/92 - 150/85)  BP(mean): --  RR: 18 (20 Mar 2021 13:53) (17 - 18)  SpO2: 99% (20 Mar 2021 13:53) (97% - 100%)  CAPILLARY BLOOD GLUCOSE        I&O's Summary      PHYSICAL EXAM:  CONSTITUTIONAL: NAD, well-appearing, obese male in NAD  RESPIRATORY: overall clear, does not participate in exam  CARDIOVASCULAR: Regular rate and rhythm, normal S1 and S2, no murmur/rub/gallops, b/l LE edema 2+ with blistering and ecchymosis    ABDOMEN: Nontender to palpation, normoactive bowel sounds  MUSCULOSKELETAL  no clubbing or cyanosis of digits; no joint swelling or tenderness to palpation  PSYCH: calm, affect appropriate  NEUROLOGY: CN 2-12 are intact and symmetric; nonfocal, nonverbal, does not follow simple commands.   SKIN: scattered UE/LE ecchymosis    LABS:                        11.2   12.38 )-----------( 17       ( 20 Mar 2021 07:26 )             36.1     03-20    143  |  108<H>  |  27<H>  ----------------------------<  80  3.9   |  25  |  0.74    Ca    8.8      20 Mar 2021 07:32  Phos  4.2     03-20  Mg     2.1     03-20    TPro  5.9<L>  /  Alb  3.3  /  TBili  0.4  /  DBili  x   /  AST  11  /  ALT  27  /  AlkPhos  44  03-20              RADIOLOGY & ADDITIONAL TESTS:    Imaging Personally Reviewed:    Consultant(s) Notes Reviewed:      Care Discussed with Consultants/Other Providers:   Patient is a 21y old  Male who presents with a chief complaint of low platelets (19 Mar 2021 14:38)      SUBJECTIVE / OVERNIGHT EVENTS:  Patient is calm and nonverbal. Unable to obtain ROS due to mental disability.     MEDICATIONS  (STANDING):  atovaquone  Suspension 1500 milliGRAM(s) Oral daily  BACItracin   Ointment 1 Application(s) Topical two times a day  brexpiprazole 6 milliGRAM(s) Oral daily  cetirizine 10 milliGRAM(s) Oral at bedtime  chlorhexidine 4% Liquid 1 Application(s) Topical <User Schedule>  cimetidine 400 milliGRAM(s) Oral three times a day  fluticasone propionate 50 MICROgram(s)/spray Nasal Spray 1 Spray(s) Both Nostrils two times a day  influenza   Vaccine 0.5 milliLiter(s) IntraMuscular once  LORazepam     Tablet 1 milliGRAM(s) Oral at bedtime  multivitamin 1 Tablet(s) Oral daily  predniSONE   Tablet 60 milliGRAM(s) Oral daily  silver sulfADIAZINE 1% Cream 1 Application(s) Topical two times a day  traZODone 300 milliGRAM(s) Oral at bedtime    MEDICATIONS  (PRN):  ALBUTerol    0.083%. 2.5 milliGRAM(s) Nebulizer once PRN PRN Chemotherapy Reaction  diphenhydrAMINE   Injectable 50 milliGRAM(s) IV Push once PRN PRN Chemotherapy Reaction  EPINEPHrine     1 mG/mL Injectable 0.3 milliGRAM(s) IntraMuscular once PRN PRN Chemotherapy Reaction  hydrocortisone sodium succinate Injectable 100 milliGRAM(s) IV Push once PRN PRN Chemotherapy Reaction  meperidine     Injectable 25 milliGRAM(s) IV Push once PRN PRN Chemotherapy Reaction (Rigors)      Vital Signs Last 24 Hrs  T(C): 36.3 (20 Mar 2021 13:53), Max: 37.1 (20 Mar 2021 12:32)  T(F): 97.4 (20 Mar 2021 13:53), Max: 98.8 (20 Mar 2021 12:32)  HR: 110 (20 Mar 2021 13:53) (94 - 110)  BP: 121/92 (20 Mar 2021 13:53) (121/92 - 150/85)  BP(mean): --  RR: 18 (20 Mar 2021 13:53) (17 - 18)  SpO2: 99% (20 Mar 2021 13:53) (97% - 100%)  CAPILLARY BLOOD GLUCOSE        I&O's Summary      PHYSICAL EXAM:  CONSTITUTIONAL: NAD, well-appearing, obese male in NAD  RESPIRATORY: overall clear, does not participate in exam  CARDIOVASCULAR: Regular rate and rhythm, normal S1 and S2, no murmur/rub/gallops, b/l LE edema 2+ with blistering and ecchymosis    ABDOMEN: Nontender to palpation, normoactive bowel sounds  MUSCULOSKELETAL  no clubbing or cyanosis of digits; no joint swelling or tenderness to palpation; + large local are of erythema and skin flaking on shins, L > R.   PSYCH: calm, affect appropriate  NEUROLOGY: CN 2-12 are intact and symmetric; nonfocal, nonverbal, does not follow simple commands.   SKIN: scattered UE/LE ecchymosis    LABS:                        11.2   12.38 )-----------( 17       ( 20 Mar 2021 07:26 )             36.1     03-20    143  |  108<H>  |  27<H>  ----------------------------<  80  3.9   |  25  |  0.74    Ca    8.8      20 Mar 2021 07:32  Phos  4.2     03-20  Mg     2.1     03-20    TPro  5.9<L>  /  Alb  3.3  /  TBili  0.4  /  DBili  x   /  AST  11  /  ALT  27  /  AlkPhos  44  03-20              RADIOLOGY & ADDITIONAL TESTS:    Imaging Personally Reviewed:    Consultant(s) Notes Reviewed:      Care Discussed with Consultants/Other Providers:

## 2021-03-20 NOTE — PROGRESS NOTE ADULT - PROBLEM SELECTOR PLAN 1
Hx of chronic ITP since age 18 months. CTH negative, requiring multiple plt transfusions this admission. May be exacerbated by COVID, now s/p convalescent plasma (3/11)  -per heme recommendations will transfuse 1/2 unit platelets every 12 hrs for plt <5  or if bleeding.   -s/p IVIG x2, completed 2/28   -s/p Decadron 40 mg IV (2/28-3/3), cont Prednisone taper   -Romiplastin/Nplate ordered by [facility] on 2/28 and 3/7.   -s/p Ritruxan infusion on 3/7 and 3/13  -C/w mepron for PCP ppx  - Hematology following  - Prednisone 70mg, decrease to 60mg 3/19   - Rituximab 3/20, Romiplastin/Nplate dose 3/21 Hx of chronic ITP since age 18 months. CTH negative, requiring multiple plt transfusions this admission. May be exacerbated by COVID, now s/p convalescent plasma (3/11)  -per [facility] recommendations will transfuse 1/2 unit platelets every 12 hrs for plt <5  or if bleeding.   -s/p IVIG x2, completed 2/28   -s/p Decadron 40 mg IV (2/28-3/3), cont Prednisone taper   -Romiplastin/Nplate ordered by [facility] on 2/28 and 3/7.   -s/p Ritruxan infusion on 3/7 and 3/13  -C/w mepron for PCP ppx  - Hematology following  - Prednisone 70mg, decrease to 60mg 3/19   - Rituximab 3/20, Romiplastin/Nplate dose 3/21  - hematology notified and they  thinks its stasis dermatitis on shins and not related to rituxan. Will consult wound care.

## 2021-03-20 NOTE — PROGRESS NOTE ADULT - ASSESSMENT
21 y.o. Male h/o intellectual disability, autism, nonverbal at baseline, chronic ITP on 80mg of daily prednisone, hospitalized for thrombocytopenia noted on OP labs, admitted for management of refractory ITP

## 2021-03-20 NOTE — PROGRESS NOTE ADULT - SUBJECTIVE AND OBJECTIVE BOX
Patient appears comfortable, non verbal. Mother at bedside.  She reports patient has had LE edema with the shin ulcerations which previously started with edema.  Has since erupted into a gelatinous welt and is starting to open now. Being seen by wound care, Silvadene being applied.  Patient has a number of venepuncture sites that mother had noticed but she reports no overt bleeding episodes or blood in stools.      MEDICATIONS  (STANDING):  atovaquone  Suspension 1500 milliGRAM(s) Oral daily  BACItracin   Ointment 1 Application(s) Topical two times a day  brexpiprazole 6 milliGRAM(s) Oral daily  cetirizine 10 milliGRAM(s) Oral at bedtime  chlorhexidine 4% Liquid 1 Application(s) Topical <User Schedule>  cimetidine 400 milliGRAM(s) Oral three times a day  fluticasone propionate 50 MICROgram(s)/spray Nasal Spray 1 Spray(s) Both Nostrils two times a day  influenza   Vaccine 0.5 milliLiter(s) IntraMuscular once  LORazepam     Tablet 1 milliGRAM(s) Oral at bedtime  multivitamin 1 Tablet(s) Oral daily  predniSONE   Tablet 60 milliGRAM(s) Oral daily  silver sulfADIAZINE 1% Cream 1 Application(s) Topical two times a day  traZODone 300 milliGRAM(s) Oral at bedtime    MEDICATIONS  (PRN):  ALBUTerol    0.083%. 2.5 milliGRAM(s) Nebulizer once PRN PRN Chemotherapy Reaction  diphenhydrAMINE   Injectable 50 milliGRAM(s) IV Push once PRN PRN Chemotherapy Reaction  EPINEPHrine     1 mG/mL Injectable 0.3 milliGRAM(s) IntraMuscular once PRN PRN Chemotherapy Reaction  hydrocortisone sodium succinate Injectable 100 milliGRAM(s) IV Push once PRN PRN Chemotherapy Reaction  meperidine     Injectable 25 milliGRAM(s) IV Push once PRN PRN Chemotherapy Reaction (Rigors)  Vital Signs Last 24 Hrs  T(C): 36.3 (20 Mar 2021 13:53), Max: 37.1 (20 Mar 2021 12:32)  T(F): 97.4 (20 Mar 2021 13:53), Max: 98.8 (20 Mar 2021 12:32)  HR: 109 (20 Mar 2021 14:47) (94 - 115)  BP: 135/66 (20 Mar 2021 14:47) (121/92 - 150/98)  BP(mean): --  RR: 17 (20 Mar 2021 14:47) (17 - 18)  SpO2: 98% (20 Mar 2021 14:47) (97% - 100%)                        11.2   12.38 )-----------( 17       ( 20 Mar 2021 07:26 )             36.1

## 2021-03-21 LAB
ALBUMIN SERPL ELPH-MCNC: 3.4 G/DL — SIGNIFICANT CHANGE UP (ref 3.3–5)
ALP SERPL-CCNC: 49 U/L — SIGNIFICANT CHANGE UP (ref 40–120)
ALT FLD-CCNC: 34 U/L — SIGNIFICANT CHANGE UP (ref 4–41)
ANION GAP SERPL CALC-SCNC: 13 MMOL/L — SIGNIFICANT CHANGE UP (ref 7–14)
AST SERPL-CCNC: 18 U/L — SIGNIFICANT CHANGE UP (ref 4–40)
BASOPHILS # BLD AUTO: 0.03 K/UL — SIGNIFICANT CHANGE UP (ref 0–0.2)
BASOPHILS NFR BLD AUTO: 0.1 % — SIGNIFICANT CHANGE UP (ref 0–2)
BILIRUB SERPL-MCNC: 0.6 MG/DL — SIGNIFICANT CHANGE UP (ref 0.2–1.2)
BUN SERPL-MCNC: 23 MG/DL — SIGNIFICANT CHANGE UP (ref 7–23)
CALCIUM SERPL-MCNC: 9.3 MG/DL — SIGNIFICANT CHANGE UP (ref 8.4–10.5)
CHLORIDE SERPL-SCNC: 104 MMOL/L — SIGNIFICANT CHANGE UP (ref 98–107)
CO2 SERPL-SCNC: 22 MMOL/L — SIGNIFICANT CHANGE UP (ref 22–31)
CREAT SERPL-MCNC: 0.66 MG/DL — SIGNIFICANT CHANGE UP (ref 0.5–1.3)
EOSINOPHIL # BLD AUTO: 0 K/UL — SIGNIFICANT CHANGE UP (ref 0–0.5)
EOSINOPHIL NFR BLD AUTO: 0 % — SIGNIFICANT CHANGE UP (ref 0–6)
GLUCOSE SERPL-MCNC: 127 MG/DL — HIGH (ref 70–99)
HCT VFR BLD CALC: 39.6 % — SIGNIFICANT CHANGE UP (ref 39–50)
HGB BLD-MCNC: 12 G/DL — LOW (ref 13–17)
IANC: 18.91 K/UL — HIGH (ref 1.5–8.5)
IMM GRANULOCYTES NFR BLD AUTO: 1.5 % — SIGNIFICANT CHANGE UP (ref 0–1.5)
LYMPHOCYTES # BLD AUTO: 1.01 K/UL — SIGNIFICANT CHANGE UP (ref 1–3.3)
LYMPHOCYTES # BLD AUTO: 4.7 % — LOW (ref 13–44)
MAGNESIUM SERPL-MCNC: 1.9 MG/DL — SIGNIFICANT CHANGE UP (ref 1.6–2.6)
MCHC RBC-ENTMCNC: 27 PG — SIGNIFICANT CHANGE UP (ref 27–34)
MCHC RBC-ENTMCNC: 30.3 GM/DL — LOW (ref 32–36)
MCV RBC AUTO: 89 FL — SIGNIFICANT CHANGE UP (ref 80–100)
MONOCYTES # BLD AUTO: 1.38 K/UL — HIGH (ref 0–0.9)
MONOCYTES NFR BLD AUTO: 6.4 % — SIGNIFICANT CHANGE UP (ref 2–14)
NEUTROPHILS # BLD AUTO: 18.91 K/UL — HIGH (ref 1.8–7.4)
NEUTROPHILS NFR BLD AUTO: 87.3 % — HIGH (ref 43–77)
NRBC # BLD: 0 /100 WBCS — SIGNIFICANT CHANGE UP
NRBC # FLD: 0 K/UL — SIGNIFICANT CHANGE UP
PHOSPHATE SERPL-MCNC: 3.3 MG/DL — SIGNIFICANT CHANGE UP (ref 2.5–4.5)
PLATELET # BLD AUTO: 52 K/UL — LOW (ref 150–400)
POTASSIUM SERPL-MCNC: 3.8 MMOL/L — SIGNIFICANT CHANGE UP (ref 3.5–5.3)
POTASSIUM SERPL-SCNC: 3.8 MMOL/L — SIGNIFICANT CHANGE UP (ref 3.5–5.3)
PROT SERPL-MCNC: 6.5 G/DL — SIGNIFICANT CHANGE UP (ref 6–8.3)
RBC # BLD: 4.45 M/UL — SIGNIFICANT CHANGE UP (ref 4.2–5.8)
RBC # FLD: 16.9 % — HIGH (ref 10.3–14.5)
SODIUM SERPL-SCNC: 139 MMOL/L — SIGNIFICANT CHANGE UP (ref 135–145)
WBC # BLD: 21.65 K/UL — HIGH (ref 3.8–10.5)
WBC # FLD AUTO: 21.65 K/UL — HIGH (ref 3.8–10.5)

## 2021-03-21 PROCEDURE — 99233 SBSQ HOSP IP/OBS HIGH 50: CPT | Mod: CS

## 2021-03-21 PROCEDURE — 99233 SBSQ HOSP IP/OBS HIGH 50: CPT

## 2021-03-21 RX ORDER — ROMIPLOSTIM 250 UG/.5ML
750 INJECTION, POWDER, LYOPHILIZED, FOR SOLUTION SUBCUTANEOUS ONCE
Refills: 0 | Status: COMPLETED | OUTPATIENT
Start: 2021-03-21 | End: 2021-03-21

## 2021-03-21 RX ADMIN — Medication 1 TABLET(S): at 12:04

## 2021-03-21 RX ADMIN — Medication 60 MILLIGRAM(S): at 05:10

## 2021-03-21 RX ADMIN — Medication 300 MILLIGRAM(S): at 22:45

## 2021-03-21 RX ADMIN — CHLORHEXIDINE GLUCONATE 1 APPLICATION(S): 213 SOLUTION TOPICAL at 05:09

## 2021-03-21 RX ADMIN — BREXPIPRAZOLE 6 MILLIGRAM(S): 0.25 TABLET ORAL at 12:04

## 2021-03-21 RX ADMIN — Medication 400 MILLIGRAM(S): at 22:45

## 2021-03-21 RX ADMIN — CETIRIZINE HYDROCHLORIDE 10 MILLIGRAM(S): 10 TABLET ORAL at 22:45

## 2021-03-21 RX ADMIN — Medication 1 APPLICATION(S): at 18:01

## 2021-03-21 RX ADMIN — ROMIPLOSTIM 750 MICROGRAM(S): 250 INJECTION, POWDER, LYOPHILIZED, FOR SOLUTION SUBCUTANEOUS at 12:04

## 2021-03-21 RX ADMIN — ATOVAQUONE 1500 MILLIGRAM(S): 750 SUSPENSION ORAL at 12:04

## 2021-03-21 RX ADMIN — Medication 1 MILLIGRAM(S): at 22:45

## 2021-03-21 RX ADMIN — Medication 400 MILLIGRAM(S): at 05:10

## 2021-03-21 RX ADMIN — Medication 400 MILLIGRAM(S): at 13:37

## 2021-03-21 RX ADMIN — Medication 1 APPLICATION(S): at 05:10

## 2021-03-21 RX ADMIN — Medication 1 APPLICATION(S): at 05:09

## 2021-03-21 NOTE — PROGRESS NOTE ADULT - ASSESSMENT
21 y.o. Male h/o intellectual disability, autism, nonverbal at baseline, chronic ITP on 80mg of daily prednisone, hospitalized for thrombocytopenia noted on OP labs, admitted for management of refractory ITP  21 y.o. Male h/o intellectual disability, autism, nonverbal at baseline, chronic ITP on 80mg of daily prednisone, hospitalized for thrombocytopenia noted on OP labs, admitted for management of refractory ITP. Platelet count now markedly improved s/p ritoxan on 3/20. Needs wound care for lesions on shins.

## 2021-03-21 NOTE — PROGRESS NOTE ADULT - SUBJECTIVE AND OBJECTIVE BOX
Mother at bedside, does not describe any new events. Platelets made a fairly good improvement overnight 52,000 today.      MEDICATIONS  (STANDING):  atovaquone  Suspension 1500 milliGRAM(s) Oral daily  BACItracin   Ointment 1 Application(s) Topical two times a day  brexpiprazole 6 milliGRAM(s) Oral daily  cetirizine 10 milliGRAM(s) Oral at bedtime  chlorhexidine 4% Liquid 1 Application(s) Topical <User Schedule>  cimetidine 400 milliGRAM(s) Oral three times a day  fluticasone propionate 50 MICROgram(s)/spray Nasal Spray 1 Spray(s) Both Nostrils two times a day  influenza   Vaccine 0.5 milliLiter(s) IntraMuscular once  LORazepam     Tablet 1 milliGRAM(s) Oral at bedtime  multivitamin 1 Tablet(s) Oral daily  predniSONE   Tablet 60 milliGRAM(s) Oral daily  silver sulfADIAZINE 1% Cream 1 Application(s) Topical two times a day  traZODone 300 milliGRAM(s) Oral at bedtime    MEDICATIONS  (PRN):  ALBUTerol    0.083%. 2.5 milliGRAM(s) Nebulizer once PRN PRN Chemotherapy Reaction  diphenhydrAMINE   Injectable 50 milliGRAM(s) IV Push once PRN PRN Chemotherapy Reaction  EPINEPHrine     1 mG/mL Injectable 0.3 milliGRAM(s) IntraMuscular once PRN PRN Chemotherapy Reaction  hydrocortisone sodium succinate Injectable 100 milliGRAM(s) IV Push once PRN PRN Chemotherapy Reaction  meperidine     Injectable 25 milliGRAM(s) IV Push once PRN PRN Chemotherapy Reaction (Rigors)      03-21    139  |  104  |  23  ----------------------------<  127<H>  3.8   |  22  |  0.66    Ca    9.3      21 Mar 2021 07:00  Phos  3.3     03-21  Mg     1.9     03-21    TPro  6.5  /  Alb  3.4  /  TBili  0.6  /  DBili  x   /  AST  18  /  ALT  34  /  AlkPhos  49  03-21                        12.0   21.65 )-----------( 52       ( 21 Mar 2021 07:00 )             39.6   Vital Signs Last 24 Hrs  T(C): 36.5 (21 Mar 2021 05:05), Max: 36.8 (20 Mar 2021 15:15)  T(F): 97.7 (21 Mar 2021 05:05), Max: 98.3 (20 Mar 2021 15:15)  HR: 105 (21 Mar 2021 05:05) (100 - 120)  BP: 152/82 (21 Mar 2021 05:05) (121/92 - 153/94)  BP(mean): --  RR: 18 (21 Mar 2021 05:05) (17 - 18)  SpO2: 99% (21 Mar 2021 05:05) (96% - 99%)

## 2021-03-21 NOTE — PROGRESS NOTE ADULT - ASSESSMENT
21 yo M with a history of chronic ITP and severe autism (non-verbal at baseline) who presents with severe thrombocytopenia (Plt 7) while on home PO prednisone (80mg daily). ITP has worsened in the setting of recent COVID infection which has taken weeks to clear. He received IVIG 1gm/kg daily x 2 (completed 2/28) and dexamethasone 40mg IV x 4 days (completed 3/3), C1 Weekly rituxan with desensitization protocol on 3/6; C2 Rituximab on 3/13, and started Romiplastim (Nplate), 1mcg/kg on 2/28; next dose 3/7 was 2mcg/kg (pt only got 250mcg instead of 300mcg as pharmacy only had 250); next dose on 3/14- 5mcg/kg (750mcg). Now on prednisone 60mg since 3/19. Platelets now starting to respond after the 3 doses of rituxan so far.  Yesterday platelets 17,000, today platelets improved significantly to 52,000.  Given rapid improvement would administer 5mcg/kg Nplate today rather than the previous written dose of 1,500mcg.  With platelets  like it is currently, it is typical to maintain previous dose.   Improvement much more likely to have come from rituximab rather than Nplate.        Patient would likely continue current treatment throughout this next week, once platelets stable can consider eventual discharge to group home with Promacta 75mg rather than Nplate.  Given high doses Nplate, the fixed maximum dose of Promacta may not be sufficient in the long run.  Discussed possibility of eventual conversion to Doptelet in case the promacta is insufficient.     LE skin ulcerations from venous stasis.  This is not a rash specific to the rituxan.  LE edema from prolonged corticosteroid use.  Continue slow prednisone taper.  60mg step in taper started 3/19/21.  Consider lasix diuresis.  Wound care follow up. Continue Silvadene ointment.

## 2021-03-21 NOTE — PROGRESS NOTE ADULT - PROBLEM SELECTOR PLAN 1
Hx of chronic ITP since age 18 months. CTH negative, requiring multiple plt transfusions this admission. May be exacerbated by COVID, now s/p convalescent plasma (3/11)  -per heme recommendations will transfuse 1/2 unit platelets every 12 hrs for plt <5  or if bleeding.   -s/p IVIG x2, completed 2/28   -s/p Decadron 40 mg IV (2/28-3/3), cont Prednisone taper   -s/p Ritruxan infusion on 3/7 and 3/13  -C/w mepron for PCP ppx  - Hematology following  - Prednisone 70mg, decrease to 60mg 3/19   - received Rituximab on 3/20, and Romiplastin/Nplate dose 3/21  -Platelet count now markedly improved.   - hematology notified and they  thinks its stasis dermatitis on shins and not related to rituxan. Consulted wound care.

## 2021-03-21 NOTE — PROGRESS NOTE ADULT - SUBJECTIVE AND OBJECTIVE BOX
Patient is a 21y old  Male who presents with a chief complaint of low platelets (21 Mar 2021 12:48)      SUBJECTIVE / OVERNIGHT EVENTS:    MEDICATIONS  (STANDING):  atovaquone  Suspension 1500 milliGRAM(s) Oral daily  BACItracin   Ointment 1 Application(s) Topical two times a day  brexpiprazole 6 milliGRAM(s) Oral daily  cetirizine 10 milliGRAM(s) Oral at bedtime  chlorhexidine 4% Liquid 1 Application(s) Topical <User Schedule>  cimetidine 400 milliGRAM(s) Oral three times a day  fluticasone propionate 50 MICROgram(s)/spray Nasal Spray 1 Spray(s) Both Nostrils two times a day  influenza   Vaccine 0.5 milliLiter(s) IntraMuscular once  LORazepam     Tablet 1 milliGRAM(s) Oral at bedtime  multivitamin 1 Tablet(s) Oral daily  predniSONE   Tablet 60 milliGRAM(s) Oral daily  silver sulfADIAZINE 1% Cream 1 Application(s) Topical two times a day  traZODone 300 milliGRAM(s) Oral at bedtime    MEDICATIONS  (PRN):  ALBUTerol    0.083%. 2.5 milliGRAM(s) Nebulizer once PRN PRN Chemotherapy Reaction  diphenhydrAMINE   Injectable 50 milliGRAM(s) IV Push once PRN PRN Chemotherapy Reaction  EPINEPHrine     1 mG/mL Injectable 0.3 milliGRAM(s) IntraMuscular once PRN PRN Chemotherapy Reaction  hydrocortisone sodium succinate Injectable 100 milliGRAM(s) IV Push once PRN PRN Chemotherapy Reaction  meperidine     Injectable 25 milliGRAM(s) IV Push once PRN PRN Chemotherapy Reaction (Rigors)      Vital Signs Last 24 Hrs  T(C): 36.5 (21 Mar 2021 05:05), Max: 36.8 (20 Mar 2021 15:15)  T(F): 97.7 (21 Mar 2021 05:05), Max: 98.3 (20 Mar 2021 15:15)  HR: 105 (21 Mar 2021 05:05) (100 - 120)  BP: 152/82 (21 Mar 2021 05:05) (124/75 - 153/94)  BP(mean): --  RR: 18 (21 Mar 2021 05:05) (17 - 18)  SpO2: 99% (21 Mar 2021 05:05) (96% - 99%)  CAPILLARY BLOOD GLUCOSE        I&O's Summary      PHYSICAL EXAM:  CONSTITUTIONAL: NAD, well-appearing, obese male in NAD  RESPIRATORY: overall clear, does not participate in exam  CARDIOVASCULAR: Regular rate and rhythm, normal S1 and S2, no murmur/rub/gallops, b/l LE edema 2+ with blistering and ecchymosis    ABDOMEN: Nontender to palpation, normoactive bowel sounds  MUSCULOSKELETAL  no clubbing or cyanosis of digits; no joint swelling or tenderness to palpation; + large local are of erythema and skin flaking on shins, L > R.   PSYCH: calm, affect appropriate  NEUROLOGY: CN 2-12 are intact and symmetric; nonfocal, nonverbal, does not follow simple commands.   SKIN: scattered UE/LE ecchymosis  LABS:                        12.0   21.65 )-----------( 52       ( 21 Mar 2021 07:00 )             39.6     03-21    139  |  104  |  23  ----------------------------<  127<H>  3.8   |  22  |  0.66    Ca    9.3      21 Mar 2021 07:00  Phos  3.3     03-21  Mg     1.9     03-21    TPro  6.5  /  Alb  3.4  /  TBili  0.6  /  DBili  x   /  AST  18  /  ALT  34  /  AlkPhos  49  03-21              RADIOLOGY & ADDITIONAL TESTS:    Imaging Personally Reviewed:    Consultant(s) Notes Reviewed:      Care Discussed with Consultants/Other Providers:   Patient is a 21y old  Male who presents with a chief complaint of low platelets (21 Mar 2021 12:48)      SUBJECTIVE / OVERNIGHT EVENTS:  Patient has no new complaints. Denies cp, SOB, abdominal pain, N/V/D     MEDICATIONS  (STANDING):  atovaquone  Suspension 1500 milliGRAM(s) Oral daily  BACItracin   Ointment 1 Application(s) Topical two times a day  brexpiprazole 6 milliGRAM(s) Oral daily  cetirizine 10 milliGRAM(s) Oral at bedtime  chlorhexidine 4% Liquid 1 Application(s) Topical <User Schedule>  cimetidine 400 milliGRAM(s) Oral three times a day  fluticasone propionate 50 MICROgram(s)/spray Nasal Spray 1 Spray(s) Both Nostrils two times a day  influenza   Vaccine 0.5 milliLiter(s) IntraMuscular once  LORazepam     Tablet 1 milliGRAM(s) Oral at bedtime  multivitamin 1 Tablet(s) Oral daily  predniSONE   Tablet 60 milliGRAM(s) Oral daily  silver sulfADIAZINE 1% Cream 1 Application(s) Topical two times a day  traZODone 300 milliGRAM(s) Oral at bedtime    MEDICATIONS  (PRN):  ALBUTerol    0.083%. 2.5 milliGRAM(s) Nebulizer once PRN PRN Chemotherapy Reaction  diphenhydrAMINE   Injectable 50 milliGRAM(s) IV Push once PRN PRN Chemotherapy Reaction  EPINEPHrine     1 mG/mL Injectable 0.3 milliGRAM(s) IntraMuscular once PRN PRN Chemotherapy Reaction  hydrocortisone sodium succinate Injectable 100 milliGRAM(s) IV Push once PRN PRN Chemotherapy Reaction  meperidine     Injectable 25 milliGRAM(s) IV Push once PRN PRN Chemotherapy Reaction (Rigors)      Vital Signs Last 24 Hrs  T(C): 36.5 (21 Mar 2021 05:05), Max: 36.8 (20 Mar 2021 15:15)  T(F): 97.7 (21 Mar 2021 05:05), Max: 98.3 (20 Mar 2021 15:15)  HR: 105 (21 Mar 2021 05:05) (100 - 120)  BP: 152/82 (21 Mar 2021 05:05) (124/75 - 153/94)  BP(mean): --  RR: 18 (21 Mar 2021 05:05) (17 - 18)  SpO2: 99% (21 Mar 2021 05:05) (96% - 99%)  CAPILLARY BLOOD GLUCOSE        I&O's Summary      PHYSICAL EXAM:  CONSTITUTIONAL: NAD, well-appearing, obese male in NAD  RESPIRATORY: overall clear, does not participate in exam  CARDIOVASCULAR: Regular rate and rhythm, normal S1 and S2, no murmur/rub/gallops, b/l LE edema 2+ with blistering and ecchymosis    ABDOMEN: Nontender to palpation, normoactive bowel sounds  MUSCULOSKELETAL  no clubbing or cyanosis of digits; no joint swelling or tenderness to palpation; + large local are of erythema and skin flaking on shins, L > R.   PSYCH: calm, affect appropriate  NEUROLOGY: CN 2-12 are intact and symmetric; nonfocal, nonverbal, does not follow simple commands.   SKIN: scattered UE/LE ecchymosis  LABS:                        12.0   21.65 )-----------( 52       ( 21 Mar 2021 07:00 )             39.6     03-21    139  |  104  |  23  ----------------------------<  127<H>  3.8   |  22  |  0.66    Ca    9.3      21 Mar 2021 07:00  Phos  3.3     03-21  Mg     1.9     03-21    TPro  6.5  /  Alb  3.4  /  TBili  0.6  /  DBili  x   /  AST  18  /  ALT  34  /  AlkPhos  49  03-21              RADIOLOGY & ADDITIONAL TESTS:    Imaging Personally Reviewed:    Consultant(s) Notes Reviewed:      Care Discussed with Consultants/Other Providers:

## 2021-03-22 LAB
ALBUMIN SERPL ELPH-MCNC: 3.4 G/DL — SIGNIFICANT CHANGE UP (ref 3.3–5)
ALP SERPL-CCNC: 45 U/L — SIGNIFICANT CHANGE UP (ref 40–120)
ALT FLD-CCNC: 31 U/L — SIGNIFICANT CHANGE UP (ref 4–41)
ANION GAP SERPL CALC-SCNC: 14 MMOL/L — SIGNIFICANT CHANGE UP (ref 7–14)
AST SERPL-CCNC: 18 U/L — SIGNIFICANT CHANGE UP (ref 4–40)
BASOPHILS # BLD AUTO: 0.03 K/UL — SIGNIFICANT CHANGE UP (ref 0–0.2)
BASOPHILS NFR BLD AUTO: 0.3 % — SIGNIFICANT CHANGE UP (ref 0–2)
BILIRUB SERPL-MCNC: 0.5 MG/DL — SIGNIFICANT CHANGE UP (ref 0.2–1.2)
BUN SERPL-MCNC: 34 MG/DL — HIGH (ref 7–23)
CALCIUM SERPL-MCNC: 8.9 MG/DL — SIGNIFICANT CHANGE UP (ref 8.4–10.5)
CHLORIDE SERPL-SCNC: 104 MMOL/L — SIGNIFICANT CHANGE UP (ref 98–107)
CO2 SERPL-SCNC: 24 MMOL/L — SIGNIFICANT CHANGE UP (ref 22–31)
CREAT SERPL-MCNC: 0.82 MG/DL — SIGNIFICANT CHANGE UP (ref 0.5–1.3)
EOSINOPHIL # BLD AUTO: 0.02 K/UL — SIGNIFICANT CHANGE UP (ref 0–0.5)
EOSINOPHIL NFR BLD AUTO: 0.2 % — SIGNIFICANT CHANGE UP (ref 0–6)
GLUCOSE SERPL-MCNC: 109 MG/DL — HIGH (ref 70–99)
HCT VFR BLD CALC: 37.7 % — LOW (ref 39–50)
HGB BLD-MCNC: 11.4 G/DL — LOW (ref 13–17)
IANC: 7.24 K/UL — SIGNIFICANT CHANGE UP (ref 1.5–8.5)
IMM GRANULOCYTES NFR BLD AUTO: 1.4 % — SIGNIFICANT CHANGE UP (ref 0–1.5)
LYMPHOCYTES # BLD AUTO: 1.91 K/UL — SIGNIFICANT CHANGE UP (ref 1–3.3)
LYMPHOCYTES # BLD AUTO: 18.7 % — SIGNIFICANT CHANGE UP (ref 13–44)
MAGNESIUM SERPL-MCNC: 1.9 MG/DL — SIGNIFICANT CHANGE UP (ref 1.6–2.6)
MCHC RBC-ENTMCNC: 26.7 PG — LOW (ref 27–34)
MCHC RBC-ENTMCNC: 30.2 GM/DL — LOW (ref 32–36)
MCV RBC AUTO: 88.3 FL — SIGNIFICANT CHANGE UP (ref 80–100)
MONOCYTES # BLD AUTO: 0.89 K/UL — SIGNIFICANT CHANGE UP (ref 0–0.9)
MONOCYTES NFR BLD AUTO: 8.7 % — SIGNIFICANT CHANGE UP (ref 2–14)
NEUTROPHILS # BLD AUTO: 7.24 K/UL — SIGNIFICANT CHANGE UP (ref 1.8–7.4)
NEUTROPHILS NFR BLD AUTO: 70.7 % — SIGNIFICANT CHANGE UP (ref 43–77)
NRBC # BLD: 0 /100 WBCS — SIGNIFICANT CHANGE UP
NRBC # FLD: 0 K/UL — SIGNIFICANT CHANGE UP
PHOSPHATE SERPL-MCNC: 4.3 MG/DL — SIGNIFICANT CHANGE UP (ref 2.5–4.5)
PLATELET # BLD AUTO: 26 K/UL — LOW (ref 150–400)
POTASSIUM SERPL-MCNC: 3.7 MMOL/L — SIGNIFICANT CHANGE UP (ref 3.5–5.3)
POTASSIUM SERPL-SCNC: 3.7 MMOL/L — SIGNIFICANT CHANGE UP (ref 3.5–5.3)
PROT SERPL-MCNC: 6.4 G/DL — SIGNIFICANT CHANGE UP (ref 6–8.3)
RBC # BLD: 4.27 M/UL — SIGNIFICANT CHANGE UP (ref 4.2–5.8)
RBC # FLD: 17 % — HIGH (ref 10.3–14.5)
SODIUM SERPL-SCNC: 142 MMOL/L — SIGNIFICANT CHANGE UP (ref 135–145)
WBC # BLD: 10.23 K/UL — SIGNIFICANT CHANGE UP (ref 3.8–10.5)
WBC # FLD AUTO: 10.23 K/UL — SIGNIFICANT CHANGE UP (ref 3.8–10.5)

## 2021-03-22 PROCEDURE — 99232 SBSQ HOSP IP/OBS MODERATE 35: CPT | Mod: GC

## 2021-03-22 PROCEDURE — 99233 SBSQ HOSP IP/OBS HIGH 50: CPT

## 2021-03-22 RX ORDER — ELTROMBOPAG OLAMINE 50 MG/1
1 TABLET, FILM COATED ORAL
Qty: 30 | Refills: 0
Start: 2021-03-22 | End: 2021-04-20

## 2021-03-22 RX ADMIN — Medication 400 MILLIGRAM(S): at 22:29

## 2021-03-22 RX ADMIN — Medication 1 APPLICATION(S): at 06:35

## 2021-03-22 RX ADMIN — Medication 60 MILLIGRAM(S): at 06:23

## 2021-03-22 RX ADMIN — Medication 1 MILLIGRAM(S): at 22:19

## 2021-03-22 RX ADMIN — CHLORHEXIDINE GLUCONATE 1 APPLICATION(S): 213 SOLUTION TOPICAL at 06:35

## 2021-03-22 RX ADMIN — Medication 400 MILLIGRAM(S): at 06:35

## 2021-03-22 RX ADMIN — CETIRIZINE HYDROCHLORIDE 10 MILLIGRAM(S): 10 TABLET ORAL at 22:17

## 2021-03-22 RX ADMIN — Medication 300 MILLIGRAM(S): at 22:17

## 2021-03-22 RX ADMIN — ATOVAQUONE 1500 MILLIGRAM(S): 750 SUSPENSION ORAL at 11:18

## 2021-03-22 RX ADMIN — BREXPIPRAZOLE 6 MILLIGRAM(S): 0.25 TABLET ORAL at 11:18

## 2021-03-22 RX ADMIN — Medication 1 TABLET(S): at 11:18

## 2021-03-22 RX ADMIN — Medication 400 MILLIGRAM(S): at 13:22

## 2021-03-22 NOTE — PROGRESS NOTE ADULT - SUBJECTIVE AND OBJECTIVE BOX
Patient is a 21y old  Male who presents with a chief complaint of low platelets (22 Mar 2021 09:29)      SUBJECTIVE / OVERNIGHT EVENTS: Pt seen and examined with Mom at bedside. Pt is a poor historian due to his intellectual  disability. No complaints today, ambulating, calm     MEDICATIONS  (STANDING):  atovaquone  Suspension 1500 milliGRAM(s) Oral daily  BACItracin   Ointment 1 Application(s) Topical two times a day  brexpiprazole 6 milliGRAM(s) Oral daily  cetirizine 10 milliGRAM(s) Oral at bedtime  chlorhexidine 4% Liquid 1 Application(s) Topical <User Schedule>  cimetidine 400 milliGRAM(s) Oral three times a day  fluticasone propionate 50 MICROgram(s)/spray Nasal Spray 1 Spray(s) Both Nostrils two times a day  influenza   Vaccine 0.5 milliLiter(s) IntraMuscular once  LORazepam     Tablet 1 milliGRAM(s) Oral at bedtime  multivitamin 1 Tablet(s) Oral daily  silver sulfADIAZINE 1% Cream 1 Application(s) Topical two times a day  traZODone 300 milliGRAM(s) Oral at bedtime    MEDICATIONS  (PRN):  ALBUTerol    0.083%. 2.5 milliGRAM(s) Nebulizer once PRN PRN Chemotherapy Reaction  diphenhydrAMINE   Injectable 50 milliGRAM(s) IV Push once PRN PRN Chemotherapy Reaction  EPINEPHrine     1 mG/mL Injectable 0.3 milliGRAM(s) IntraMuscular once PRN PRN Chemotherapy Reaction  hydrocortisone sodium succinate Injectable 100 milliGRAM(s) IV Push once PRN PRN Chemotherapy Reaction  meperidine     Injectable 25 milliGRAM(s) IV Push once PRN PRN Chemotherapy Reaction (Rigors)      Vital Signs Last 24 Hrs  T(C): 36.4 (22 Mar 2021 06:33), Max: 36.8 (21 Mar 2021 11:45)  T(F): 97.5 (22 Mar 2021 06:33), Max: 98.2 (21 Mar 2021 11:45)  HR: 108 (22 Mar 2021 06:33) (102 - 108)  BP: 127/83 (22 Mar 2021 06:33) (127/83 - 145/82)  BP(mean): --  RR: 18 (22 Mar 2021 06:33) (17 - 18)  SpO2: 98% (22 Mar 2021 06:33) (98% - 98%)  CAPILLARY BLOOD GLUCOSE        I&O's Summary      PHYSICAL EXAM:  GENERAL: NAD, well-developed, obese   HEAD:  Atraumatic, Normocephalic  EYES: EOMI, PERRLA, conjunctiva and sclera clear  NECK: Supple, No JVD  CHEST/LUNG: breathing comfortably   HEART:   ABDOMEN: Soft, obese, non tender   EXTREMITIES:  (+) dressing intact   PSYCH: AAOx1  NEUROLOGY: non-focal      LABS:                        11.4   10.23 )-----------( 26       ( 22 Mar 2021 07:26 )             37.7     03-22    142  |  104  |  34<H>  ----------------------------<  109<H>  3.7   |  24  |  0.82    Ca    8.9      22 Mar 2021 07:26  Phos  4.3     03-22  Mg     1.9     03-22    TPro  6.4  /  Alb  3.4  /  TBili  0.5  /  DBili  x   /  AST  18  /  ALT  31  /  AlkPhos  45  03-22              RADIOLOGY & ADDITIONAL TESTS:    Imaging Personally Reviewed:    Consultant(s) Notes Reviewed:  Hematology, today's rec pending     Care Discussed with Consultants/Other Providers:

## 2021-03-22 NOTE — ADVANCED PRACTICE NURSE CONSULT - ASSESSMENT
Patient with severe cognitive disability, patient restless, follow commands only directed by mother, up in the room independently. Multiple sites of ecchymosis in bilateral arms, hands and thighs. Left arm PICC Line.     Scattered purpura. Severe 4+ edema of bilateral legs and feet. No temperature changes. Pulses unable to be palpated secondary to edema. Thicken long toenails in great toe. Biphasic doppler sounds.     Bilateral anterior lower legs with acute wounds of unknown etiology (DDX vasculitis secondary to low platelets vs deroof blisters secondary to edema and complicated by low platelets).   Right leg atypical wound- [id number]. Tissue type presents as deep purple maroon discoloration and an open ulceration measuring [id number]. Tissue type exposing 100% yellow fibrinous tissue. Periwound skin with a satellite lesion presenting as 100% purple maroon discoloration measuring 2cmx3.5cm.   Left leg atypical wound- [id number]. Tissue type presenting as 90% purple maroon discoloration and 10% denuded epidermis exposing red dermis.   Scant serosanguinous drainage. No odor. Periwound skin with a ring of non blanching erythema immediately around purple maroon discoloration. No increased warmth, no erythema, no induration. Goals of care: monitor for tissue type changes, manage drainage, r/o atypical ulcers.  *At this time I would hold off ACE wraps secondary to risk of bleedings and close monitoring.     Sacral fold to buttocks with severe moisture/Incontinent associated dermatitis as evident by moist and erythema more intense in the center.     Findings discussed at length with mother at length. Questions answer.   Sarah TOUSSAINT notified about findings.      Left leg

## 2021-03-22 NOTE — ADVANCED PRACTICE NURSE CONSULT - RECOMMEDATIONS
Elevate legs as tolerated     Topical Recommendations    Bilateral leg wounds: Clean with NS. Pat dry. Apply Liquid barrier film to periwound skin. Cover with Silicone foam with borders. Change daily or PRN.      Sacral fold to buttocks: Clean with skin cleanser. Pat dry. Apply a thin layer of ANTIFUNGAL Barrier CREAM twice a day or with incontinence.     Continue low air loss bed therapy, heel elevation with Z-flex fluidized positioning boots while he is in bed, continue to turn & reposition q2h with Z-bindu fluidized positioning device, continue moisture management with barrier creams & single breathable pad, continue measures to decrease friction/shear/pressure.     Please contact Wound Care Service Line if we can be of further assistance (ext [phone]).

## 2021-03-22 NOTE — ADVANCED PRACTICE NURSE CONSULT - REASON FOR CONSULT
Patient seen on skin care rounds after wound care referral received for assessment of skin impairment and recommendations of topical management. Chart reviewed: Rusty 17, platelet 26 (as low as 1), WBC 10.23, Hemoglobin/HCT: 11.4/37.1, US duplex of lower legs negative for DVT,. Patient H/O of intellectual disability, autism, nonverbal at baseline, chronic ITP on 80mg of daily prednisone, presents w/ outpatient labs showing low plt. Patient was sent in from [facility] with platelet of 7 and heme referral for admission w/ IVIG. Patient is at baseline mental status and has had no bleeding recently. Patient had recent admission for plt transfusion and ivig for a rectal bleed. Of note, patient was diagnosed with ITP since 18 months. Plt usually runs in 10K range. Follows with hematology at [place]. Over the years, they have become more conservative in treatments given that patient would have very low level of platelets without major bleeding events. Patient was treated with IVIG and steroids end of Jan. 2021. Prior to that, last episode of major flare was about 5 years ago when he had diffuse petechiae without bleeding, and plt was about 5K at that time. Patient has being seeing by Heme/Onc for ITP. Patient receiving IV prednisone.

## 2021-03-22 NOTE — PROGRESS NOTE ADULT - PROBLEM SELECTOR PLAN 1
Hx of chronic ITP since age 18 months. CTH negative, requiring multiple plt transfusions this admission. May be exacerbated by COVID, now s/p convalescent plasma (3/11)  -per heme recommendations will transfuse 1/2 unit platelets every 12 hrs for plt <5  or if bleeding.   -s/p IVIG x2, completed 2/28   -s/p Decadron 40 mg IV (2/28-3/3), cont Prednisone taper   -s/p Ritruxan infusion on 3/7 and 3/13  -C/w mepron for PCP ppx  - Hematology following  - Prednisone 70mg, decrease to 60mg 3/19   - received Rituximab on 3/20, and Romiplastin/Nplate dose 3/21  -Platelet count 26 today from 50 yesterday.   - Will f/u Hem rec.  -Cont. wound care for b/l leg wounds

## 2021-03-22 NOTE — PROGRESS NOTE ADULT - NSHPATTENDINGPLANDISCUSS_GEN_ALL_CORE
BREANA Her
ACP
ACP Shan
mother, Heme, ACP
mother, Heme, AI, MICU, ACP
team
team
mother, Heme, AI, MICU, ACP
patient's mother and team
team
ACP
MICU Team
mother, Heme, ACP
mother, Heme, ACP
mother, Heme, AI, MICU, ACP
mother, Heme, AI, MICU, ACP
team
mother, Heme, ACP
mother and ACP
mother, Heme, AI, MICU, ACP
mother, Heme, AI, MICU, ACP

## 2021-03-22 NOTE — PROGRESS NOTE ADULT - ATTENDING COMMENTS
21 yo M with a history of chronic ITP and severe autism (non-verbal at baseline) who presents with severe thrombocytopenia (Plt 7) while on home PO prednisone (80mg daily). ITP has worsened in the setting of recent COVID infection which has taken weeks to clear. He received IVIG 1gm/kg daily x 2 (completed 2/28) and dexamethasone 40mg IV x 4 days (completed 3/3), C1 Rituxan with desensitization protocol on 3/6; C2 Rituximab on 3/13, and started Romiplastin (Nplate), 1mcg/kg on 2/28; next dose 3/7 was 2mcg/kg (pt only got 250mcg instead of 300mcg as pharmacy only had 250); next dose on 3/14- 5mcg/kg (750mcg). So far he has had no response, so plan is to complete 4 doses of weekly Rituxan and continue to escalate Nplate dose up to 10 mcg/kg weekly and then reevaluate for further treatment if needed. As he is not responsive to steroids will start to taper prednisone and will decrease by 10mg every 3 days. Prednisone lowered to 70mg on 3/16 and should go to 60 mg on 3/19, then 50 today. Continue to support with platelet transfusions giving 1/2 unit platelets slowly infused over 3 hours q12h for platelets <5k or if bleeding.  Today he has a plt count of 26, and although higher yesterday, showing some fluctuation, overall is trending up. Rituxan C4 on 3/27. Will investigate if insurance covers Promacta as this may be an easier option than weekly Nplate injections. Will evaluate dosing and transition as well. Continue mepron for PCP ppx. Hematology will continue to follow.

## 2021-03-22 NOTE — PROGRESS NOTE ADULT - SUBJECTIVE AND OBJECTIVE BOX
INTERVAL History:    Allergies    Bactrim (Hives)  Rituxan (Hives)  WinRho SDF (Hives)    Intolerances        MEDICATIONS  (STANDING):  atovaquone  Suspension 1500 milliGRAM(s) Oral daily  BACItracin   Ointment 1 Application(s) Topical two times a day  brexpiprazole 6 milliGRAM(s) Oral daily  cetirizine 10 milliGRAM(s) Oral at bedtime  chlorhexidine 4% Liquid 1 Application(s) Topical <User Schedule>  cimetidine 400 milliGRAM(s) Oral three times a day  fluticasone propionate 50 MICROgram(s)/spray Nasal Spray 1 Spray(s) Both Nostrils two times a day  influenza   Vaccine 0.5 milliLiter(s) IntraMuscular once  LORazepam     Tablet 1 milliGRAM(s) Oral at bedtime  multivitamin 1 Tablet(s) Oral daily  silver sulfADIAZINE 1% Cream 1 Application(s) Topical two times a day  traZODone 300 milliGRAM(s) Oral at bedtime    MEDICATIONS  (PRN):  ALBUTerol    0.083%. 2.5 milliGRAM(s) Nebulizer once PRN PRN Chemotherapy Reaction  diphenhydrAMINE   Injectable 50 milliGRAM(s) IV Push once PRN PRN Chemotherapy Reaction  EPINEPHrine     1 mG/mL Injectable 0.3 milliGRAM(s) IntraMuscular once PRN PRN Chemotherapy Reaction  hydrocortisone sodium succinate Injectable 100 milliGRAM(s) IV Push once PRN PRN Chemotherapy Reaction  meperidine     Injectable 25 milliGRAM(s) IV Push once PRN PRN Chemotherapy Reaction (Rigors)      Vital Signs Last 24 Hrs  T(C): 36.4 (22 Mar 2021 06:33), Max: 36.8 (21 Mar 2021 11:45)  T(F): 97.5 (22 Mar 2021 06:33), Max: 98.2 (21 Mar 2021 11:45)  HR: 108 (22 Mar 2021 06:33) (102 - 108)  BP: 127/83 (22 Mar 2021 06:33) (127/83 - 145/82)  BP(mean): --  RR: 18 (22 Mar 2021 06:33) (17 - 18)  SpO2: 98% (22 Mar 2021 06:33) (98% - 98%)    PHYSICAL EXAM:          LABS:                        11.4   10.23 )-----------( 26       ( 22 Mar 2021 07:26 )             37.7     03-22    142  |  104  |  34<H>  ----------------------------<  109<H>  3.7   |  24  |  0.82    Ca    8.9      22 Mar 2021 07:26  Phos  4.3     03-22  Mg     1.9     03-22    TPro  6.4  /  Alb  3.4  /  TBili  0.5  /  DBili  x   /  AST  18  /  ALT  31  /  AlkPhos  45  03-22            RADIOLOGY & ADDITIONAL STUDIES:    PATHOLOGY:         INTERVAL History: No acute events overnight. Mother at bedside this morning. Pt still mildly agitated and with increased appetite. Plt count this morning 26.      Allergies    Bactrim (Hives)  Rituxan (Hives)  WinRho SDF (Hives)    Intolerances        MEDICATIONS  (STANDING):  atovaquone  Suspension 1500 milliGRAM(s) Oral daily  BACItracin   Ointment 1 Application(s) Topical two times a day  brexpiprazole 6 milliGRAM(s) Oral daily  cetirizine 10 milliGRAM(s) Oral at bedtime  chlorhexidine 4% Liquid 1 Application(s) Topical <User Schedule>  cimetidine 400 milliGRAM(s) Oral three times a day  fluticasone propionate 50 MICROgram(s)/spray Nasal Spray 1 Spray(s) Both Nostrils two times a day  influenza   Vaccine 0.5 milliLiter(s) IntraMuscular once  LORazepam     Tablet 1 milliGRAM(s) Oral at bedtime  multivitamin 1 Tablet(s) Oral daily  silver sulfADIAZINE 1% Cream 1 Application(s) Topical two times a day  traZODone 300 milliGRAM(s) Oral at bedtime    MEDICATIONS  (PRN):  ALBUTerol    0.083%. 2.5 milliGRAM(s) Nebulizer once PRN PRN Chemotherapy Reaction  diphenhydrAMINE   Injectable 50 milliGRAM(s) IV Push once PRN PRN Chemotherapy Reaction  EPINEPHrine     1 mG/mL Injectable 0.3 milliGRAM(s) IntraMuscular once PRN PRN Chemotherapy Reaction  hydrocortisone sodium succinate Injectable 100 milliGRAM(s) IV Push once PRN PRN Chemotherapy Reaction  meperidine     Injectable 25 milliGRAM(s) IV Push once PRN PRN Chemotherapy Reaction (Rigors)      Vital Signs Last 24 Hrs  T(C): 36.4 (22 Mar 2021 06:33), Max: 36.8 (21 Mar 2021 11:45)  T(F): 97.5 (22 Mar 2021 06:33), Max: 98.2 (21 Mar 2021 11:45)  HR: 108 (22 Mar 2021 06:33) (102 - 108)  BP: 127/83 (22 Mar 2021 06:33) (127/83 - 145/82)  BP(mean): --  RR: 18 (22 Mar 2021 06:33) (17 - 18)  SpO2: 98% (22 Mar 2021 06:33) (98% - 98%)    PHYSICAL EXAM:  General - NAD  HEENT - PERRLA, EOM intact, sclera and conjunctiva clear, oropharynx, nares clear  Neck - Supple, no thyromegaly or thyroid nodules, no bruits  Cardiovascular - RRR no m/r/g, no JVD, no carotid bruits  Lungs - CTAB, no use of acessory muscles, no w/c/r  Abdomen - Normal bowel sounds, NT/ND  Extremeties - b/l LE wrapped, with bruises        LABS:                        11.4   10.23 )-----------( 26       ( 22 Mar 2021 07:26 )             37.7     03-22    142  |  104  |  34<H>  ----------------------------<  109<H>  3.7   |  24  |  0.82    Ca    8.9      22 Mar 2021 07:26  Phos  4.3     03-22  Mg     1.9     03-22    TPro  6.4  /  Alb  3.4  /  TBili  0.5  /  DBili  x   /  AST  18  /  ALT  31  /  AlkPhos  45  03-22            RADIOLOGY & ADDITIONAL STUDIES:    PATHOLOGY:         INTERVAL History: No acute events overnight. Mother at bedside this morning. Pt still mildly agitated and with increased appetite. Plt count this morning 26.      Allergies    Bactrim (Hives)  Rituxan (Hives)  WinRho SDF (Hives)    Intolerances      MEDICATIONS  (STANDING):  atovaquone  Suspension 1500 milliGRAM(s) Oral daily  BACItracin   Ointment 1 Application(s) Topical two times a day  brexpiprazole 6 milliGRAM(s) Oral daily  cetirizine 10 milliGRAM(s) Oral at bedtime  chlorhexidine 4% Liquid 1 Application(s) Topical <User Schedule>  cimetidine 400 milliGRAM(s) Oral three times a day  fluticasone propionate 50 MICROgram(s)/spray Nasal Spray 1 Spray(s) Both Nostrils two times a day  influenza   Vaccine 0.5 milliLiter(s) IntraMuscular once  LORazepam     Tablet 1 milliGRAM(s) Oral at bedtime  multivitamin 1 Tablet(s) Oral daily  predniSONE   Tablet   Oral   silver sulfADIAZINE 1% Cream 1 Application(s) Topical two times a day  traZODone 300 milliGRAM(s) Oral at bedtime    MEDICATIONS  (PRN):  ALBUTerol    0.083%. 2.5 milliGRAM(s) Nebulizer once PRN PRN Chemotherapy Reaction  diphenhydrAMINE   Injectable 50 milliGRAM(s) IV Push once PRN PRN Chemotherapy Reaction  EPINEPHrine     1 mG/mL Injectable 0.3 milliGRAM(s) IntraMuscular once PRN PRN Chemotherapy Reaction  hydrocortisone sodium succinate Injectable 100 milliGRAM(s) IV Push once PRN PRN Chemotherapy Reaction  meperidine     Injectable 25 milliGRAM(s) IV Push once PRN PRN Chemotherapy Reaction (Rigors)        Vital Signs Last 24 Hrs  T(C): 36.4 (22 Mar 2021 06:33), Max: 36.8 (21 Mar 2021 11:45)  T(F): 97.5 (22 Mar 2021 06:33), Max: 98.2 (21 Mar 2021 11:45)  HR: 108 (22 Mar 2021 06:33) (102 - 108)  BP: 127/83 (22 Mar 2021 06:33) (127/83 - 145/82)  BP(mean): --  RR: 18 (22 Mar 2021 06:33) (17 - 18)  SpO2: 98% (22 Mar 2021 06:33) (98% - 98%)    PHYSICAL EXAM:  General - NAD  HEENT - PERRLA, EOM intact, sclera and conjunctiva clear, oropharynx, nares clear  Neck - Supple, no thyromegaly or thyroid nodules, no bruits  Cardiovascular - RRR no m/r/g, no JVD, no carotid bruits  Lungs - CTAB, no use of acessory muscles, no w/c/r  Abdomen - Normal bowel sounds, NT/ND  Extremeties - b/l LE wrapped, with bruises        LABS:                        11.4   10.23 )-----------( 26       ( 22 Mar 2021 07:26 )             37.7     03-22    142  |  104  |  34<H>  ----------------------------<  109<H>  3.7   |  24  |  0.82    Ca    8.9      22 Mar 2021 07:26  Phos  4.3     03-22  Mg     1.9     03-22    TPro  6.4  /  Alb  3.4  /  TBili  0.5  /  DBili  x   /  AST  18  /  ALT  31  /  AlkPhos  45  03-22            RADIOLOGY & ADDITIONAL STUDIES:    PATHOLOGY:

## 2021-03-22 NOTE — PROGRESS NOTE ADULT - ASSESSMENT
19 yo M with a history of chronic ITP and severe autism (non-verbal at baseline) who presents with severe thrombocytopenia (Plt 7) while on home PO prednisone (80mg daily).     # Chronic ITP exacerbated in the setting of recent COVID infection   -CT head neg for bleed  -s/p IVIG 1gm/kg daily x 2 (completed 2/28) and dexamethasone 40mg IV x4 days (completed 3/3)  -S/p C1 Rituxan with desensitization protocol on 3/6; C2 Rituximab on 3/13; s/p C3 Rituximab on 3/20 with desensitization protocol, tolerated well. Will likely plan to give Cycle 4 (final dose) of Rituxan with desensitization protocol on Saturday 3/27.  Plts were improving to 50K over the weekend, however dropped to 26 today. Will continue to trend plt count during the week.   -S/p romiplastin/Nplate, 1mcg/kg; given 2/28; next dose 3/7 was 2mcg/kg (pt only got 250mcg instead of 300mcg as pharmacy only had 250); next dose on 3/14- 5mcg/kg (750mcg). s/p 750mcg (5mcg/kg) on 3/21.    - Continue prednisone taper as ordered (Decrease 10mg every 3 days)  -transfuse platelets if bleeding (consider giving 1/2 unit platelets slowly infused over 3 hours q12h) OR if platelets <5k  -Continue mepron for PCP ppx   -monitor closely for any signs or symptoms of bleeding      Kenya Chávez MD  Hematology Oncology Fellow, PGY-5  [facility] Pager: [phone]/ [facility] Pager: [phone] 19 yo M with a history of chronic ITP and severe autism (non-verbal at baseline) who presents with severe thrombocytopenia (Plt 7) while on home PO prednisone (80mg daily).     # Chronic ITP exacerbated in the setting of recent COVID infection   -CT head neg for bleed  -s/p IVIG 1gm/kg daily x 2 (completed 2/28) and dexamethasone 40mg IV x4 days (completed 3/3)  -S/p C1 Rituxan with desensitization protocol on 3/6; C2 Rituximab on 3/13; s/p C3 Rituximab on 3/20 with desensitization protocol, tolerated well. Will likely plan to give Cycle 4 (final dose) of Rituxan with desensitization protocol on Saturday 3/27.  Plts were improving to 50K over the weekend, however dropped to 26 today. Will continue to trend plt count during the week.   -S/p romiplastin/Nplate, 1mcg/kg; given 2/28; next dose 3/7 was 2mcg/kg (pt only got 250mcg instead of 300mcg as pharmacy only had 250); next dose on 3/14- 5mcg/kg (750mcg). s/p 750mcg (5mcg/kg) on 3/21.    - Please taper prednisone by 10mg every TWO days starting tomorrow, should be on 50mg tomorrow   - Will discuss discharge planning to [facility] as outpatient medication. Please send test script to [facility] to assess for insurance coverage.   -transfuse platelets if bleeding (consider giving 1/2 unit platelets slowly infused over 3 hours q12h) OR if platelets <5k  -Continue mepron for PCP ppx   -monitor closely for any signs or symptoms of bleeding      Kenya Chávez MD  Hematology Oncology Fellow, PGY-5  [facility] Pager: [phone]/ [facility] Pager: [phone]

## 2021-03-22 NOTE — PROGRESS NOTE ADULT - ASSESSMENT
21 y.o. Male h/o intellectual disability, autism, nonverbal at baseline, chronic ITP on 80mg of daily prednisone, hospitalized for thrombocytopenia noted on OP labs, admitted for management of refractory ITP. Platelet count now markedly improved s/p ritoxan on 3/20. Needs wound care for lesions on shins.

## 2021-03-22 NOTE — CHART NOTE - NSCHARTNOTEFT_GEN_A_CORE
d/w hematology - will change steroid taper to decrease by 10mg Q 2 days   will need promacta on discharge - test script sent to pharmacy     Wound care consult appreciated - orders placed in [facility] . Recommending derm consult d/w hematology - will change steroid taper to decrease by 10mg Q 2 days   will need promacta on discharge - test script sent to pharmacy     Wound care consult appreciated - orders placed in [facility] . Recommending derm consult      ADDENDUM - d/w [facility] promacta not covered and requires prior authorization -   Will need to call [phone]  ID [id number] as d/w [facility]

## 2021-03-23 DIAGNOSIS — S81.801D UNSPECIFIED OPEN WOUND, RIGHT LOWER LEG, SUBSEQUENT ENCOUNTER: ICD-10-CM

## 2021-03-23 LAB
ANION GAP SERPL CALC-SCNC: 12 MMOL/L — SIGNIFICANT CHANGE UP (ref 7–14)
BUN SERPL-MCNC: 31 MG/DL — HIGH (ref 7–23)
CALCIUM SERPL-MCNC: 9.1 MG/DL — SIGNIFICANT CHANGE UP (ref 8.4–10.5)
CHLORIDE SERPL-SCNC: 104 MMOL/L — SIGNIFICANT CHANGE UP (ref 98–107)
CO2 SERPL-SCNC: 23 MMOL/L — SIGNIFICANT CHANGE UP (ref 22–31)
CREAT SERPL-MCNC: 0.79 MG/DL — SIGNIFICANT CHANGE UP (ref 0.5–1.3)
GLUCOSE SERPL-MCNC: 86 MG/DL — SIGNIFICANT CHANGE UP (ref 70–99)
HCT VFR BLD CALC: 39.1 % — SIGNIFICANT CHANGE UP (ref 39–50)
HGB BLD-MCNC: 11.7 G/DL — LOW (ref 13–17)
MAGNESIUM SERPL-MCNC: 1.9 MG/DL — SIGNIFICANT CHANGE UP (ref 1.6–2.6)
MCHC RBC-ENTMCNC: 26.4 PG — LOW (ref 27–34)
MCHC RBC-ENTMCNC: 29.9 GM/DL — LOW (ref 32–36)
MCV RBC AUTO: 88.1 FL — SIGNIFICANT CHANGE UP (ref 80–100)
NRBC # BLD: 0 /100 WBCS — SIGNIFICANT CHANGE UP
NRBC # FLD: 0 K/UL — SIGNIFICANT CHANGE UP
PLATELET # BLD AUTO: 11 K/UL — CRITICAL LOW (ref 150–400)
POTASSIUM SERPL-MCNC: 4.1 MMOL/L — SIGNIFICANT CHANGE UP (ref 3.5–5.3)
POTASSIUM SERPL-SCNC: 4.1 MMOL/L — SIGNIFICANT CHANGE UP (ref 3.5–5.3)
RBC # BLD: 4.44 M/UL — SIGNIFICANT CHANGE UP (ref 4.2–5.8)
RBC # FLD: 16.7 % — HIGH (ref 10.3–14.5)
SODIUM SERPL-SCNC: 139 MMOL/L — SIGNIFICANT CHANGE UP (ref 135–145)
WBC # BLD: 13.24 K/UL — HIGH (ref 3.8–10.5)
WBC # FLD AUTO: 13.24 K/UL — HIGH (ref 3.8–10.5)

## 2021-03-23 PROCEDURE — 99233 SBSQ HOSP IP/OBS HIGH 50: CPT

## 2021-03-23 PROCEDURE — 99233 SBSQ HOSP IP/OBS HIGH 50: CPT | Mod: GC

## 2021-03-23 PROCEDURE — 99223 1ST HOSP IP/OBS HIGH 75: CPT

## 2021-03-23 RX ORDER — ZINC OXIDE 200 MG/G
1 OINTMENT TOPICAL THREE TIMES A DAY
Refills: 0 | Status: DISCONTINUED | OUTPATIENT
Start: 2021-03-23 | End: 2021-03-24

## 2021-03-23 RX ORDER — MUPIROCIN 20 MG/G
1 OINTMENT TOPICAL
Refills: 0 | Status: COMPLETED | OUTPATIENT
Start: 2021-03-23 | End: 2021-04-06

## 2021-03-23 RX ADMIN — Medication 300 MILLIGRAM(S): at 20:47

## 2021-03-23 RX ADMIN — ATOVAQUONE 1500 MILLIGRAM(S): 750 SUSPENSION ORAL at 12:49

## 2021-03-23 RX ADMIN — CHLORHEXIDINE GLUCONATE 1 APPLICATION(S): 213 SOLUTION TOPICAL at 04:41

## 2021-03-23 RX ADMIN — CETIRIZINE HYDROCHLORIDE 10 MILLIGRAM(S): 10 TABLET ORAL at 20:46

## 2021-03-23 RX ADMIN — Medication 400 MILLIGRAM(S): at 20:47

## 2021-03-23 RX ADMIN — BREXPIPRAZOLE 6 MILLIGRAM(S): 0.25 TABLET ORAL at 13:41

## 2021-03-23 RX ADMIN — Medication 400 MILLIGRAM(S): at 13:42

## 2021-03-23 RX ADMIN — Medication 1 TABLET(S): at 12:49

## 2021-03-23 RX ADMIN — Medication 400 MILLIGRAM(S): at 04:41

## 2021-03-23 RX ADMIN — Medication 50 MILLIGRAM(S): at 04:29

## 2021-03-23 RX ADMIN — Medication 1 MILLIGRAM(S): at 20:47

## 2021-03-23 NOTE — CONSULT NOTE ADULT - SUBJECTIVE AND OBJECTIVE BOX
HPI:  21 y.o. Male h/o intellectual disability, autism, nonverbal at baseline, chronic ITP on 80mg of daily prednisone, hospitalized for thrombocytopenia noted on OP labs, admitted for management of refractory ITP (patient s/p high dose prednisone, IVIG, and rituxan.)     Dermatology consulted for rash on b/l shins that started as an area of pin-point involvement 10 days prior and has since spread. Mom does not suspect that the lesions are symptomatic for patient. Has not had similar lesions in the past. Endorses significant swelling, particularly of lower extremities, since being on prednisone. Patient also rarely walks around and refuses to elevate his legs.     Mom also reporting irritation in intergluteal cleft from frequent diarrhea, now bloody. Has been using Paige's Butt Paste as barrier cream and wipes to clean the area.      PAST MEDICAL & SURGICAL HISTORY:  Chronic ITP (idiopathic thrombocytopenia)    Intellectual disability    No significant past surgical history        REVIEW OF SYSTEMS      General: no fevers/chills, no lethargy	    Skin/Breast: see HPI  	  ENMT: no dysphagia     Respiratory and Thorax: no SOB or cough  	  Cardiovascular: no palpitations or chest pain    Gastrointestinal: no abdominal pain     Genitourinary: no dysuria or frequency    Musculoskeletal: no joint pains or weakness	    Neurological:no weakness, numbness , or tingling    MEDICATIONS  (STANDING):  atovaquone  Suspension 1500 milliGRAM(s) Oral daily  BACItracin   Ointment 1 Application(s) Topical two times a day  brexpiprazole 6 milliGRAM(s) Oral daily  cetirizine 10 milliGRAM(s) Oral at bedtime  chlorhexidine 4% Liquid 1 Application(s) Topical <User Schedule>  cimetidine 400 milliGRAM(s) Oral three times a day  fluticasone propionate 50 MICROgram(s)/spray Nasal Spray 1 Spray(s) Both Nostrils two times a day  influenza   Vaccine 0.5 milliLiter(s) IntraMuscular once  LORazepam     Tablet 1 milliGRAM(s) Oral at bedtime  multivitamin 1 Tablet(s) Oral daily  predniSONE   Tablet   Oral   predniSONE   Tablet 50 milliGRAM(s) Oral daily  silver sulfADIAZINE 1% Cream 1 Application(s) Topical two times a day  traZODone 300 milliGRAM(s) Oral at bedtime    MEDICATIONS  (PRN):  ALBUTerol    0.083%. 2.5 milliGRAM(s) Nebulizer once PRN PRN Chemotherapy Reaction  diphenhydrAMINE   Injectable 50 milliGRAM(s) IV Push once PRN PRN Chemotherapy Reaction  EPINEPHrine     1 mG/mL Injectable 0.3 milliGRAM(s) IntraMuscular once PRN PRN Chemotherapy Reaction  hydrocortisone sodium succinate Injectable 100 milliGRAM(s) IV Push once PRN PRN Chemotherapy Reaction  meperidine     Injectable 25 milliGRAM(s) IV Push once PRN PRN Chemotherapy Reaction (Rigors)      Allergies    Bactrim (Hives)  Rituxan (Hives)  WinRho SDF (Hives)    Intolerances        SOCIAL HISTORY: Patient with autism, lives in group home     FAMILY HISTORY:  FH: hypertension        Vital Signs Last 24 Hrs  T(C): 36.7 (23 Mar 2021 15:30), Max: 36.7 (22 Mar 2021 22:15)  T(F): 98 (23 Mar 2021 15:30), Max: 98 (22 Mar 2021 22:15)  HR: 112 (23 Mar 2021 15:30) (102 - 112)  BP: 130/68 (23 Mar 2021 15:30) (128/80 - 130/68)  BP(mean): --  RR: 18 (23 Mar 2021 15:30) (18 - 18)  SpO2: 100% (23 Mar 2021 15:30) (98% - 100%)    PHYSICAL EXAM:     The patient was alert, well nourished, and in no  apparent distress.  OP showed no ulcerations  There was no visible lymphadenopathy.  Conjunctiva were non injected  There was no clubbing or edema of extremities.  The scalp, hair, face, eyebrows, lips, OP, neck, chest, back,   extremities X 4, nails were examined.  There was no hyperhidrosis or bromhidrosis.    Of note on skin exam:   B/l shins with purpuric, centrally eroded, patches in the setting of significant weeping b/l lower extremity edema  Left anterior thigh with smaller purpuric patch   Intergluteal cleft with pink eroded patches and well-demarcated purpuric area in right medial buttock    LABS:                        11.7   13.24 )-----------( 11       ( 23 Mar 2021 05:25 )             39.1     03-23    139  |  104  |  31<H>  ----------------------------<  86  4.1   |  23  |  0.79    Ca    9.1      23 Mar 2021 05:25  Phos  4.3     03-22  Mg     1.9     03-23    TPro  6.4  /  Alb  3.4  /  TBili  0.5  /  DBili  x   /  AST  18  /  ALT  31  /  AlkPhos  45  03-22          RADIOLOGY & ADDITIONAL STUDIES:

## 2021-03-23 NOTE — PROGRESS NOTE ADULT - PROBLEM SELECTOR PLAN 1
Hx of chronic ITP since age 18 months. CTH negative, requiring multiple plt transfusions this admission. May be exacerbated by COVID, now s/p convalescent plasma (3/11)  -per heme recommendations will transfuse 1/2 unit platelets every 12 hrs for plt <5  or if bleeding.   -s/p IVIG x2, completed 2/28   -s/p Decadron 40 mg IV (2/28-3/3), cont Prednisone taper   -s/p Ritruxan infusion on 3/7 and 3/13  -C/w mepron for PCP ppx  - Hematology following  - Prednisone 70mg, decrease to 60mg 3/19   - received Rituximab on 3/20, and Romiplastin/Nplate dose 3/21  -Platelet count 11 today from 26 yesterday.   - Will f/u Hem rec.

## 2021-03-23 NOTE — PROGRESS NOTE ADULT - SUBJECTIVE AND OBJECTIVE BOX
Patient is a 21y old  Male who presents with a chief complaint of low platelets (22 Mar 2021 10:40)      SUBJECTIVE / OVERNIGHT EVENTS: No new complaints     MEDICATIONS  (STANDING):  atovaquone  Suspension 1500 milliGRAM(s) Oral daily  BACItracin   Ointment 1 Application(s) Topical two times a day  brexpiprazole 6 milliGRAM(s) Oral daily  cetirizine 10 milliGRAM(s) Oral at bedtime  chlorhexidine 4% Liquid 1 Application(s) Topical <User Schedule>  cimetidine 400 milliGRAM(s) Oral three times a day  fluticasone propionate 50 MICROgram(s)/spray Nasal Spray 1 Spray(s) Both Nostrils two times a day  influenza   Vaccine 0.5 milliLiter(s) IntraMuscular once  LORazepam     Tablet 1 milliGRAM(s) Oral at bedtime  multivitamin 1 Tablet(s) Oral daily  predniSONE   Tablet   Oral   predniSONE   Tablet 50 milliGRAM(s) Oral daily  silver sulfADIAZINE 1% Cream 1 Application(s) Topical two times a day  traZODone 300 milliGRAM(s) Oral at bedtime    MEDICATIONS  (PRN):  ALBUTerol    0.083%. 2.5 milliGRAM(s) Nebulizer once PRN PRN Chemotherapy Reaction  diphenhydrAMINE   Injectable 50 milliGRAM(s) IV Push once PRN PRN Chemotherapy Reaction  EPINEPHrine     1 mG/mL Injectable 0.3 milliGRAM(s) IntraMuscular once PRN PRN Chemotherapy Reaction  hydrocortisone sodium succinate Injectable 100 milliGRAM(s) IV Push once PRN PRN Chemotherapy Reaction  meperidine     Injectable 25 milliGRAM(s) IV Push once PRN PRN Chemotherapy Reaction (Rigors)      Vital Signs Last 24 Hrs  T(C): 36.7 (22 Mar 2021 22:15), Max: 36.8 (22 Mar 2021 13:05)  T(F): 98 (22 Mar 2021 22:15), Max: 98.2 (22 Mar 2021 13:05)  HR: 102 (22 Mar 2021 22:15) (102 - 119)  BP: 128/80 (22 Mar 2021 22:15) (128/80 - 131/77)  BP(mean): --  RR: 18 (22 Mar 2021 22:15) (18 - 18)  SpO2: 98% (22 Mar 2021 22:15) (97% - 98%)  CAPILLARY BLOOD GLUCOSE        I&O's Summary      PHYSICAL EXAM:  GENERAL: NAD, well-developed  HEAD:  Atraumatic, Normocephalic  EYES: EOMI, PERRLA, conjunctiva and sclera clear  NECK: Supple, No JVD  CHEST/LUNG: Clear to auscultation bilaterally; No wheeze  HEART: Regular rate and rhythm; No murmurs, rubs, or gallops  ABDOMEN: Soft, Nontender, obese ; Bowel sounds present  EXTREMITIES:  2+ Peripheral Pulses, No clubbing, cyanosis, or edema  PSYCH: AAOx1  NEUROLOGY: non-focal  SKIN: (+) rash and wound at right leg. (+) wound at decubitus area.     LABS:                        11.7   13.24 )-----------( 11       ( 23 Mar 2021 05:25 )             39.1     03-23    139  |  104  |  31<H>  ----------------------------<  86  4.1   |  23  |  0.79    Ca    9.1      23 Mar 2021 05:25  Phos  4.3     03-22  Mg     1.9     03-23    TPro  6.4  /  Alb  3.4  /  TBili  0.5  /  DBili  x   /  AST  18  /  ALT  31  /  AlkPhos  45  03-22              RADIOLOGY & ADDITIONAL TESTS:    Imaging Personally Reviewed:    Consultant(s) Notes Reviewed:      Care Discussed with Consultants/Other Providers:

## 2021-03-23 NOTE — CONSULT NOTE ADULT - ASSESSMENT
1) Purpuric patches of lower legs- Suspect lesions are secondary to significant underlying swelling of lower extremities. If rapidly progressing in size, differential may also include an inflammatory disorder called pyoderma gangrenosum (an ulcerative, non-infectious neutrophilic skin disease that may at times be associated with Rituxan.) Will continue to monitor for clinical signs pointing to such a diagnosis. Lesions may continue to persist as long as there is underlying edema- which we are anticipating will improve with time given patient is now on steroid taper.   -Recommend application of mupirocin ointment to eroded areas of lesions (on R shin)  -Recommend Aquacel dressing over right shin given its weeping nature  -Recommend wrapping legs with ACE wraps to help prevent fluid accumulation   -Try to keep legs elevated as much as possible, though understand that this may be difficult to accomplish   -Please continue to monitor for any changes in lesions, such as rapid expansion or ulceration     2) Irritant contact dermatitis of intergluteal fold  - Stop using wipes and fragranced barrier cream  - Apply zinc oxide 40% cream to area several times per day  - Please provide mom with pure mineral oil and several cotton swabs to clean the area after patient has bowel movements     Miranda August MD  Resident Physician, PGY2  [facility]  Pager: [phone]  Office: [phone]    The patient's chart was reviewed in addition to being seen and examined at bedside with the dermatology attending Dr. Mock  Recommendations were communicated with the primary team.  Please page [phone] for further related questions.

## 2021-03-24 LAB
HCT VFR BLD CALC: 34.9 % — LOW (ref 39–50)
HGB BLD-MCNC: 10.7 G/DL — LOW (ref 13–17)
MCHC RBC-ENTMCNC: 27.1 PG — SIGNIFICANT CHANGE UP (ref 27–34)
MCHC RBC-ENTMCNC: 30.7 GM/DL — LOW (ref 32–36)
MCV RBC AUTO: 88.4 FL — SIGNIFICANT CHANGE UP (ref 80–100)
NRBC # BLD: 0 /100 WBCS — SIGNIFICANT CHANGE UP
NRBC # FLD: 0.02 K/UL — HIGH
PLATELET # BLD AUTO: 5 K/UL — CRITICAL LOW (ref 150–400)
RBC # BLD: 3.95 M/UL — LOW (ref 4.2–5.8)
RBC # FLD: 16.1 % — HIGH (ref 10.3–14.5)
WBC # BLD: 11.48 K/UL — HIGH (ref 3.8–10.5)
WBC # FLD AUTO: 11.48 K/UL — HIGH (ref 3.8–10.5)

## 2021-03-24 PROCEDURE — 99232 SBSQ HOSP IP/OBS MODERATE 35: CPT | Mod: GC

## 2021-03-24 PROCEDURE — 99233 SBSQ HOSP IP/OBS HIGH 50: CPT

## 2021-03-24 RX ORDER — ZINC OXIDE 200 MG/G
1 OINTMENT TOPICAL THREE TIMES A DAY
Refills: 0 | Status: DISCONTINUED | OUTPATIENT
Start: 2021-03-24 | End: 2021-04-12

## 2021-03-24 RX ORDER — ELTROMBOPAG OLAMINE 50 MG/1
50 TABLET, FILM COATED ORAL DAILY
Qty: 30 | Refills: 2 | Status: DISCONTINUED | COMMUNITY
Start: 2021-02-26 | End: 2021-03-24

## 2021-03-24 RX ORDER — ELTROMBOPAG OLAMINE 50 MG/1
50 TABLET, FILM COATED ORAL DAILY
Qty: 30 | Refills: 0 | Status: DISCONTINUED | COMMUNITY
Start: 2021-03-23 | End: 2021-03-24

## 2021-03-24 RX ORDER — ELTROMBOPAG OLAMINE 50 MG/1
50 TABLET, FILM COATED ORAL DAILY
Refills: 0 | Status: DISCONTINUED | OUTPATIENT
Start: 2021-03-24 | End: 2021-03-25

## 2021-03-24 RX ORDER — MINERAL OIL
1 OIL (ML) MISCELLANEOUS THREE TIMES A DAY
Refills: 0 | Status: DISCONTINUED | OUTPATIENT
Start: 2021-03-24 | End: 2021-04-12

## 2021-03-24 RX ADMIN — Medication 400 MILLIGRAM(S): at 23:33

## 2021-03-24 RX ADMIN — BREXPIPRAZOLE 6 MILLIGRAM(S): 0.25 TABLET ORAL at 13:01

## 2021-03-24 RX ADMIN — MUPIROCIN 1 APPLICATION(S): 20 OINTMENT TOPICAL at 23:32

## 2021-03-24 RX ADMIN — Medication 1 APPLICATION(S): at 23:00

## 2021-03-24 RX ADMIN — ATOVAQUONE 1500 MILLIGRAM(S): 750 SUSPENSION ORAL at 13:01

## 2021-03-24 RX ADMIN — CHLORHEXIDINE GLUCONATE 1 APPLICATION(S): 213 SOLUTION TOPICAL at 04:34

## 2021-03-24 RX ADMIN — Medication 1 MILLIGRAM(S): at 23:33

## 2021-03-24 RX ADMIN — CETIRIZINE HYDROCHLORIDE 10 MILLIGRAM(S): 10 TABLET ORAL at 23:33

## 2021-03-24 RX ADMIN — Medication 400 MILLIGRAM(S): at 13:01

## 2021-03-24 RX ADMIN — Medication 400 MILLIGRAM(S): at 04:34

## 2021-03-24 RX ADMIN — Medication 1 TABLET(S): at 13:01

## 2021-03-24 RX ADMIN — Medication 50 MILLIGRAM(S): at 04:33

## 2021-03-24 RX ADMIN — Medication 300 MILLIGRAM(S): at 23:32

## 2021-03-24 NOTE — PROGRESS NOTE ADULT - PROBLEM SELECTOR PLAN 1
Hx of chronic ITP since age 18 months. CTH negative, requiring multiple plt transfusions this admission. May be exacerbated by COVID, now s/p convalescent plasma (3/11)  -per heme recommendations will transfuse 1/2 unit platelets every 12 hrs for plt <5  or if bleeding.   -s/p IVIG x2, completed 2/28   -s/p Decadron 40 mg IV (2/28-3/3), cont Prednisone taper   -s/p Ritruxan infusion on 3/7 and 3/13  -C/w mepron for PCP ppx  - Hematology following  - Prednisone 70mg, decrease to 60mg 3/19   - received Rituximab on 3/20, and Romiplastin/Nplate dose 3/21  -Platelet count 5 today from 11 yesterday.  Will transfuse 1/2unit of PLT today as per Hem rec  - Will f/u Hem rec.

## 2021-03-24 NOTE — PROGRESS NOTE ADULT - ASSESSMENT
21 yo M with a history of chronic ITP and severe autism (non-verbal at baseline) who presents with severe thrombocytopenia (Plt 7) while on home PO prednisone (80mg daily).     # Chronic ITP exacerbated in the setting of recent COVID infection   -CT head neg for bleed  -s/p IVIG 1gm/kg daily x 2 (completed 2/28) and dexamethasone 40mg IV x4 days (completed 3/3)  -S/p C1 Rituxan with desensitization protocol on 3/6; C2 Rituximab on 3/13; s/p C3 Rituximab on 3/20 with desensitization protocol, tolerated well. Will likely plan to give Cycle 4 (final dose) of Rituxan with desensitization protocol on Saturday 3/27.  Plts were improving to 50K over the weekend, however dropped again. Will continue to trend plt count during the week.   -S/p romiplastin/Nplate, 1mcg/kg; given 2/28; next dose 3/7 was 2mcg/kg (pt only got 250mcg instead of 300mcg as pharmacy only had 250); next dose on 3/14- 5mcg/kg (750mcg). s/p 750mcg (5mcg/kg) on 3/21. Plan for 10mcg/kg on 3/28.  -Cont. prednisone taper   -Discharge likely next week on Promacta.   -transfuse platelets if bleeding (consider giving 1/2 unit platelets slowly infused over 3 hours q12h) OR if platelets <5k  -Continue mepron for PCP ppx while on steroids  -monitor closely for any signs or symptoms of bleeding      Francisca Lutz, PGY-4  Hematology-Oncology Fellow  [phone] ([place]) [phone] ([facility])  I can also be reached on [employer] Teams  Please page fellow on call after 5pm and on weekends     21 yo M with a history of chronic ITP and severe autism (non-verbal at baseline) who presents with severe thrombocytopenia (Plt 7) while on home PO prednisone (80mg daily).     # Chronic ITP exacerbated in the setting of recent COVID infection   -CT head neg for bleed  -s/p IVIG 1gm/kg daily x 2 (completed 2/28) and dexamethasone 40mg IV x4 days (completed 3/3)  -S/p C1 Rituxan with desensitization protocol on 3/6; C2 Rituximab on 3/13; s/p C3 Rituximab on 3/20 with desensitization protocol, tolerated well. Will likely plan to give Cycle 4 (final dose) of Rituxan with desensitization protocol on Saturday 3/27.  Plts were improving to 50K over the weekend, however dropped again. Will continue to trend plt count during the week.   -S/p romiplastin/Nplate, 1mcg/kg; given 2/28; next dose 3/7 was 2mcg/kg (pt only got 250mcg instead of 300mcg as pharmacy only had 250); next dose on 3/14- 5mcg/kg (750mcg). s/p 750mcg (5mcg/kg) on 3/21.   -Patient should be discharged on 50mg Promacta; will d/w Dr. Bhagat to see if this can be started as inpatient instead of Nplate  -Cont. prednisone taper   -transfuse platelets if bleeding (consider giving 1/2 unit platelets slowly infused over 3 hours q12h) OR if platelets <5k  -Continue mepron for PCP ppx while on steroids  -monitor closely for any signs or symptoms of bleeding      Francisca Lutz, PGY-4  Hematology-Oncology Fellow  [phone] ([place]) [phone] ([facility])  I can also be reached on [employer] Teams  Please page fellow on call after 5pm and on weekends     19 yo M with a history of chronic ITP and severe autism (non-verbal at baseline) who presents with severe thrombocytopenia (Plt 7) while on home PO prednisone (80mg daily).     # Chronic ITP exacerbated in the setting of recent COVID infection   -CT head neg for bleed  -s/p IVIG 1gm/kg daily x 2 (completed 2/28) and dexamethasone 40mg IV x4 days (completed 3/3)  -S/p C1 Rituxan with desensitization protocol on 3/6; C2 Rituximab on 3/13; s/p C3 Rituximab on 3/20 with desensitization protocol, tolerated well. Will likely plan to give Cycle 4 (final dose) of Rituxan with desensitization protocol on Saturday 3/27.  Plts were improving to 50K over the weekend, however dropped again. Will continue to trend plt count during the week.   -S/p romiplastin/Nplate, 1mcg/kg; given 2/28; next dose 3/7 was 2mcg/kg (pt only got 250mcg instead of 300mcg as pharmacy only had 250); next dose on 3/14- 5mcg/kg (750mcg). s/p 750mcg (5mcg/kg) on 3/21.   -Cont. prednisone taper - decrease by 10mg every 2 days.   - Will start Promacta 50mg today and observe platelet count, will consider doubling dose to 100mg on Monday. Will consider another dose of Nplate on Phi 3/28.   -transfuse platelets if bleeding (consider giving 1/2 unit platelets slowly infused over 3 hours q12h) OR if platelets <5k  -Continue mepron for PCP ppx while on steroids  -monitor closely for any signs or symptoms of bleeding      Francisca Lutz, PGY-4  Hematology-Oncology Fellow  [phone] ([place]) [phone] ([facility])  I can also be reached on [employer] Teams  Please page fellow on call after 5pm and on weekends

## 2021-03-24 NOTE — PROGRESS NOTE ADULT - ATTENDING COMMENTS
19 yo M with a history of chronic ITP and severe autism (non-verbal at baseline) who presents with severe thrombocytopenia (Plt 7) while on home PO prednisone (80mg daily). ITP has worsened in the setting of recent COVID infection which has taken weeks to clear. He received IVIG 1gm/kg daily x 2 (completed 2/28) and dexamethasone 40mg IV x 4 days (completed 3/3), C1 Rituxan with desensitization protocol on 3/6; C2 Rituximab on 3/13, and started Romiplastin (Nplate), 1mcg/kg on 2/28; next dose 3/7 was 2mcg/kg (pt only got 250mcg instead of 300mcg as pharmacy only had 250); next dose on 3/14- 5mcg/kg (750mcg). So far he has had no response, so plan is to complete 4 doses of weekly Rituxan and continue to escalate Nplate dose up to 10 mcg/kg weekly and then reevaluate for further treatment if needed. As he is not responsive to steroids will start to taper prednisone and will decrease by 10mg every 3 days. Prednisone lowered to 70mg on 3/16 and should go to 60 mg on 3/19, then 50 today. Continue to support with platelet transfusions giving 1/2 unit platelets slowly infused over 3 hours q12h for platelets <5k or if bleeding.  Rituxan C4 on 3/27. Will be changing from NPlate to Promacta as this may be an easier option than weekly Nplate injections after discharge. Will start Promacta 50 mg daily now and continue to monitor and then increase as needed based on platelet counts. Will also consider trying IVIG again as he had always responded well to this in the past, before having COVID, and may respond again now that he has cleared his COVID infection. Continue mepron for PCP ppx. Hematology will continue to follow.

## 2021-03-24 NOTE — PROGRESS NOTE ADULT - SUBJECTIVE AND OBJECTIVE BOX
Patient is a 21y old  Male who presents with a chief complaint of low platelets (24 Mar 2021 09:26)      SUBJECTIVE / OVERNIGHT EVENTS: No complaints today. No bleeding     MEDICATIONS  (STANDING):  atovaquone  Suspension 1500 milliGRAM(s) Oral daily  BACItracin   Ointment 1 Application(s) Topical two times a day  brexpiprazole 6 milliGRAM(s) Oral daily  cetirizine 10 milliGRAM(s) Oral at bedtime  chlorhexidine 4% Liquid 1 Application(s) Topical <User Schedule>  cimetidine 400 milliGRAM(s) Oral three times a day  fluticasone propionate 50 MICROgram(s)/spray Nasal Spray 1 Spray(s) Both Nostrils two times a day  influenza   Vaccine 0.5 milliLiter(s) IntraMuscular once  LORazepam     Tablet 1 milliGRAM(s) Oral at bedtime  multivitamin 1 Tablet(s) Oral daily  mupirocin 2% Ointment 1 Application(s) Topical two times a day  predniSONE   Tablet   Oral   silver sulfADIAZINE 1% Cream 1 Application(s) Topical two times a day  traZODone 300 milliGRAM(s) Oral at bedtime    MEDICATIONS  (PRN):  ALBUTerol    0.083%. 2.5 milliGRAM(s) Nebulizer once PRN PRN Chemotherapy Reaction  diphenhydrAMINE   Injectable 50 milliGRAM(s) IV Push once PRN PRN Chemotherapy Reaction  EPINEPHrine     1 mG/mL Injectable 0.3 milliGRAM(s) IntraMuscular once PRN PRN Chemotherapy Reaction  hydrocortisone sodium succinate Injectable 100 milliGRAM(s) IV Push once PRN PRN Chemotherapy Reaction  meperidine     Injectable 25 milliGRAM(s) IV Push once PRN PRN Chemotherapy Reaction (Rigors)  mineral oil for Topical Use 1 Application(s) Topical three times a day PRN to clean area after BM  zinc oxide 20% Ointment 1 Application(s) Topical three times a day PRN rash, apply to the area after each BM      Vital Signs Last 24 Hrs  T(C): 36.4 (24 Mar 2021 04:28), Max: 36.7 (23 Mar 2021 15:30)  T(F): 97.6 (24 Mar 2021 04:28), Max: 98 (23 Mar 2021 15:30)  HR: 110 (24 Mar 2021 04:28) (110 - 112)  BP: 129/68 (24 Mar 2021 04:28) (129/68 - 130/68)  BP(mean): --  RR: 19 (24 Mar 2021 04:28) (18 - 19)  SpO2: 100% (24 Mar 2021 04:28) (100% - 100%)  CAPILLARY BLOOD GLUCOSE        I&O's Summary      PHYSICAL EXAM:  GENERAL: NAD, well-developed  HEAD:  Atraumatic, Normocephalic  EYES: EOMI, PERRLA, conjunctiva and sclera clear  NECK: Supple, No JVD  CHEST/LUNG: Clear to auscultation bilaterally; No wheeze  HEART: Regular rate and rhythm; No murmurs, rubs, or gallops  ABDOMEN: Soft, Nontender, Nondistended; Bowel sounds present  EXTREMITIES: (+) ACE wrap b/l legs    PSYCH: AAOx1  NEUROLOGY: non-focal  SKIN: No rashes or lesions    LABS:                        10.7   11.48 )-----------( 5        ( 24 Mar 2021 04:56 )             34.9     03-23    139  |  104  |  31<H>  ----------------------------<  86  4.1   |  23  |  0.79    Ca    9.1      23 Mar 2021 05:25  Mg     1.9     03-23                RADIOLOGY & ADDITIONAL TESTS:    Imaging Personally Reviewed:    Consultant(s) Notes Reviewed:  Derm, Hem     Care Discussed with Consultants/Other Providers: Hem consult, will transfuse 1/2 unit of PLT for PLT 5 as per Hem rec.

## 2021-03-24 NOTE — PROGRESS NOTE ADULT - SUBJECTIVE AND OBJECTIVE BOX
INTERVAL HPI/OVERNIGHT EVENTS:  No acute events overnight. Mother at bedside this morning.    MEDICATIONS  (STANDING):  atovaquone  Suspension 1500 milliGRAM(s) Oral daily  BACItracin   Ointment 1 Application(s) Topical two times a day  brexpiprazole 6 milliGRAM(s) Oral daily  cetirizine 10 milliGRAM(s) Oral at bedtime  chlorhexidine 4% Liquid 1 Application(s) Topical <User Schedule>  cimetidine 400 milliGRAM(s) Oral three times a day  fluticasone propionate 50 MICROgram(s)/spray Nasal Spray 1 Spray(s) Both Nostrils two times a day  influenza   Vaccine 0.5 milliLiter(s) IntraMuscular once  LORazepam     Tablet 1 milliGRAM(s) Oral at bedtime  multivitamin 1 Tablet(s) Oral daily  mupirocin 2% Ointment 1 Application(s) Topical two times a day  predniSONE   Tablet   Oral   silver sulfADIAZINE 1% Cream 1 Application(s) Topical two times a day  traZODone 300 milliGRAM(s) Oral at bedtime    MEDICATIONS  (PRN):  ALBUTerol    0.083%. 2.5 milliGRAM(s) Nebulizer once PRN PRN Chemotherapy Reaction  diphenhydrAMINE   Injectable 50 milliGRAM(s) IV Push once PRN PRN Chemotherapy Reaction  EPINEPHrine     1 mG/mL Injectable 0.3 milliGRAM(s) IntraMuscular once PRN PRN Chemotherapy Reaction  hydrocortisone sodium succinate Injectable 100 milliGRAM(s) IV Push once PRN PRN Chemotherapy Reaction  meperidine     Injectable 25 milliGRAM(s) IV Push once PRN PRN Chemotherapy Reaction (Rigors)  mineral oil for Topical Use 1 Application(s) Topical three times a day PRN to clean area after BM  zinc oxide 20% Ointment 1 Application(s) Topical three times a day PRN rash, apply to the area after each BM    Allergies    Bactrim (Hives)  Rituxan (Hives)  WinRho SDF (Hives)    Intolerances          VITAL SIGNS:  T(F): 97.6 (03-24-21 @ 04:28)  HR: 110 (03-24-21 @ 04:28)  BP: 129/68 (03-24-21 @ 04:28)  RR: 19 (03-24-21 @ 04:28)  SpO2: 100% (03-24-21 @ 04:28)  Wt(kg): --    PHYSICAL EXAM:  General - NAD  HEENT - PERRLA, EOM intact, sclera and conjunctiva clear, oropharynx, nares clear  Neck - Supple, no thyromegaly or thyroid nodules, no bruits  Cardiovascular - RRR no m/r/g, no JVD, no carotid bruits  Lungs - CTAB, no use of acessory muscles, no w/c/r  Abdomen - Normal bowel sounds, NT/ND  Extremeties - b/l LE wrapped, with bruises    LABS:                        10.7   11.48 )-----------( 5        ( 24 Mar 2021 04:56 )             34.9     03-23    139  |  104  |  31<H>  ----------------------------<  86  4.1   |  23  |  0.79    Ca    9.1      23 Mar 2021 05:25  Mg     1.9     03-23            RADIOLOGY & ADDITIONAL TESTS:  Studies reviewed.

## 2021-03-25 LAB
ALBUMIN SERPL ELPH-MCNC: 3.1 G/DL — LOW (ref 3.3–5)
ALP SERPL-CCNC: 44 U/L — SIGNIFICANT CHANGE UP (ref 40–120)
ALT FLD-CCNC: 28 U/L — SIGNIFICANT CHANGE UP (ref 4–41)
ANION GAP SERPL CALC-SCNC: 7 MMOL/L — SIGNIFICANT CHANGE UP (ref 7–14)
APPEARANCE UR: CLEAR — SIGNIFICANT CHANGE UP
AST SERPL-CCNC: 16 U/L — SIGNIFICANT CHANGE UP (ref 4–40)
BASOPHILS # BLD AUTO: 0.05 K/UL — SIGNIFICANT CHANGE UP (ref 0–0.2)
BASOPHILS NFR BLD AUTO: 0.5 % — SIGNIFICANT CHANGE UP (ref 0–2)
BILIRUB SERPL-MCNC: 0.5 MG/DL — SIGNIFICANT CHANGE UP (ref 0.2–1.2)
BILIRUB UR-MCNC: NEGATIVE — SIGNIFICANT CHANGE UP
BUN SERPL-MCNC: 30 MG/DL — HIGH (ref 7–23)
CALCIUM SERPL-MCNC: 9.1 MG/DL — SIGNIFICANT CHANGE UP (ref 8.4–10.5)
CHLORIDE SERPL-SCNC: 104 MMOL/L — SIGNIFICANT CHANGE UP (ref 98–107)
CO2 SERPL-SCNC: 26 MMOL/L — SIGNIFICANT CHANGE UP (ref 22–31)
COLOR SPEC: SIGNIFICANT CHANGE UP
CREAT SERPL-MCNC: 0.85 MG/DL — SIGNIFICANT CHANGE UP (ref 0.5–1.3)
DIFF PNL FLD: NEGATIVE — SIGNIFICANT CHANGE UP
EOSINOPHIL # BLD AUTO: 0.08 K/UL — SIGNIFICANT CHANGE UP (ref 0–0.5)
EOSINOPHIL NFR BLD AUTO: 0.8 % — SIGNIFICANT CHANGE UP (ref 0–6)
GLUCOSE SERPL-MCNC: 88 MG/DL — SIGNIFICANT CHANGE UP (ref 70–99)
GLUCOSE UR QL: NEGATIVE — SIGNIFICANT CHANGE UP
HCT VFR BLD CALC: 37.3 % — LOW (ref 39–50)
HGB BLD-MCNC: 11 G/DL — LOW (ref 13–17)
IANC: 7.04 K/UL — SIGNIFICANT CHANGE UP (ref 1.5–8.5)
IMM GRANULOCYTES NFR BLD AUTO: 1.7 % — HIGH (ref 0–1.5)
KETONES UR-MCNC: NEGATIVE — SIGNIFICANT CHANGE UP
LEUKOCYTE ESTERASE UR-ACNC: NEGATIVE — SIGNIFICANT CHANGE UP
LYMPHOCYTES # BLD AUTO: 2.4 K/UL — SIGNIFICANT CHANGE UP (ref 1–3.3)
LYMPHOCYTES # BLD AUTO: 22.8 % — SIGNIFICANT CHANGE UP (ref 13–44)
MAGNESIUM SERPL-MCNC: 2 MG/DL — SIGNIFICANT CHANGE UP (ref 1.6–2.6)
MCHC RBC-ENTMCNC: 26.2 PG — LOW (ref 27–34)
MCHC RBC-ENTMCNC: 29.5 GM/DL — LOW (ref 32–36)
MCV RBC AUTO: 88.8 FL — SIGNIFICANT CHANGE UP (ref 80–100)
MONOCYTES # BLD AUTO: 0.76 K/UL — SIGNIFICANT CHANGE UP (ref 0–0.9)
MONOCYTES NFR BLD AUTO: 7.2 % — SIGNIFICANT CHANGE UP (ref 2–14)
NEUTROPHILS # BLD AUTO: 7.04 K/UL — SIGNIFICANT CHANGE UP (ref 1.8–7.4)
NEUTROPHILS NFR BLD AUTO: 67 % — SIGNIFICANT CHANGE UP (ref 43–77)
NITRITE UR-MCNC: NEGATIVE — SIGNIFICANT CHANGE UP
NRBC # BLD: 0 /100 WBCS — SIGNIFICANT CHANGE UP
NRBC # FLD: 0.02 K/UL — HIGH
PH UR: 6.5 — SIGNIFICANT CHANGE UP (ref 5–8)
PHOSPHATE SERPL-MCNC: 4.6 MG/DL — HIGH (ref 2.5–4.5)
PLATELET # BLD AUTO: 5 K/UL — CRITICAL LOW (ref 150–400)
POTASSIUM SERPL-MCNC: 4 MMOL/L — SIGNIFICANT CHANGE UP (ref 3.5–5.3)
POTASSIUM SERPL-SCNC: 4 MMOL/L — SIGNIFICANT CHANGE UP (ref 3.5–5.3)
PROT SERPL-MCNC: 6 G/DL — SIGNIFICANT CHANGE UP (ref 6–8.3)
PROT UR-MCNC: ABNORMAL
RBC # BLD: 4.2 M/UL — SIGNIFICANT CHANGE UP (ref 4.2–5.8)
RBC # FLD: 16.3 % — HIGH (ref 10.3–14.5)
SODIUM SERPL-SCNC: 137 MMOL/L — SIGNIFICANT CHANGE UP (ref 135–145)
SP GR SPEC: 1.02 — SIGNIFICANT CHANGE UP (ref 1.01–1.02)
UROBILINOGEN FLD QL: SIGNIFICANT CHANGE UP
WBC # BLD: 10.51 K/UL — HIGH (ref 3.8–10.5)
WBC # FLD AUTO: 10.51 K/UL — HIGH (ref 3.8–10.5)

## 2021-03-25 PROCEDURE — 99233 SBSQ HOSP IP/OBS HIGH 50: CPT

## 2021-03-25 PROCEDURE — 99232 SBSQ HOSP IP/OBS MODERATE 35: CPT | Mod: GC

## 2021-03-25 RX ADMIN — Medication 400 MILLIGRAM(S): at 21:59

## 2021-03-25 RX ADMIN — ATOVAQUONE 1500 MILLIGRAM(S): 750 SUSPENSION ORAL at 09:04

## 2021-03-25 RX ADMIN — Medication 1 MILLIGRAM(S): at 21:59

## 2021-03-25 RX ADMIN — Medication 400 MILLIGRAM(S): at 14:19

## 2021-03-25 RX ADMIN — Medication 40 MILLIGRAM(S): at 09:04

## 2021-03-25 RX ADMIN — CETIRIZINE HYDROCHLORIDE 10 MILLIGRAM(S): 10 TABLET ORAL at 22:00

## 2021-03-25 RX ADMIN — CHLORHEXIDINE GLUCONATE 1 APPLICATION(S): 213 SOLUTION TOPICAL at 06:26

## 2021-03-25 RX ADMIN — Medication 400 MILLIGRAM(S): at 09:04

## 2021-03-25 RX ADMIN — Medication 1 APPLICATION(S): at 06:23

## 2021-03-25 RX ADMIN — MUPIROCIN 1 APPLICATION(S): 20 OINTMENT TOPICAL at 06:19

## 2021-03-25 RX ADMIN — MUPIROCIN 1 APPLICATION(S): 20 OINTMENT TOPICAL at 18:50

## 2021-03-25 RX ADMIN — BREXPIPRAZOLE 6 MILLIGRAM(S): 0.25 TABLET ORAL at 09:04

## 2021-03-25 RX ADMIN — Medication 1 TABLET(S): at 09:04

## 2021-03-25 RX ADMIN — Medication 300 MILLIGRAM(S): at 22:00

## 2021-03-25 NOTE — PROGRESS NOTE ADULT - ASSESSMENT
# Purpuric patches of lower legs- Suspect lesions are secondary to significant underlying swelling of lower extremities. If rapidly progressing in size, differential may also include an inflammatory disorder called pyoderma gangrenosum (an ulcerative, non-infectious neutrophilic skin disease that may at times be associated with Rituxan.) Will continue to monitor for clinical signs pointing to such a diagnosis. Lesions may continue to persist as long as there is underlying edema- which we are anticipating will improve with time given patient is now on steroid taper. Some areas with palpable erosions and inflammatory border reminiscent of vasculitis, however this is less likely and lower on the differential diagnosis given overall clinical picture.   -Recommend application of mupirocin ointment to eroded areas of lesions (on R shin)  -Recommend Aquacel dressing over shins given its weeping nature  -Recommend wrapping legs with ACE wraps to help prevent fluid accumulation   -Try to keep legs elevated as much as possible, though understand that this may be difficult to accomplish   -Consider diuretics to help with lower extremity edema if no contra-indications from medical standpoint  -Please check a baseline u/a on patient to check for hematuria/proteinuria   -Please continue to monitor for any changes in lesions, such as rapid expansion or ulceration     # Irritant contact dermatitis of intergluteal fold  - Stop using wipes and fragranced barrier cream  - Apply zinc oxide 40% cream to area several times per day  - Please provide mom with pure mineral oil and several cotton swabs to clean the area after patient has bowel movements    The patient's chart was reviewed in addition to being seen and examined at bedside with the dermatology attending Dr. Marily Mock.  Recommendations were communicated with the primary team.  Please page [phone] for further related questions (please leave 10 digit call back number because we cover several facilities).    Brenda Suarez MD  Resident Physician, PGY2  [facility]  # Purpuric patches of lower legs- Suspect lesions are secondary to significant underlying swelling of lower extremities. If lesions progress up the legs, then could also consider a cutaneous vasculitis (given inflammatory border of many of these macules with central erosion). Will continue to monitor for clinical signs pointing to such a diagnosis. Lesions may continue to persist as long as there is underlying edema- which we are anticipating will improve with time given patient is now on steroid taper.   -Recommend application of mupirocin ointment to eroded areas of lesions   -Recommend wrapping legs with ACE wraps to help prevent fluid accumulation   -Try to keep legs elevated as much as possible, though understand that this may be difficult to accomplish   -Consider diuretics to help with lower extremity edema if no contra-indications from medical standpoint. If lasix contraindicated due to sulfa allergy (although lasix typically does not exhibit sulfain patients with bactrim hypersensitivity) then would consider other diuretics.   -Please check a baseline u/a on patient to check for hematuria/proteinuria (can be seen in cutaneous vasculitis with systemic involvement, although the suspicion for vasculitis is low at this time)  -Please continue to monitor for any changes in lesions, such as rapid expansion or ulceration     # Irritant contact dermatitis of intergluteal fold  - Stop using wipes and fragranced barrier cream  - Apply zinc oxide 40% cream to area several times per day  - Please provide mom with pure mineral oil and several cotton swabs to clean the area after patient has bowel movements    The patient's chart was reviewed in addition to being seen and examined at bedside with the dermatology attending Dr. Marily Mock.  Recommendations were communicated with the primary team.  Please page [phone] for further related questions (please leave 10 digit call back number because we cover several facilities).    Brenda Suarez MD  Resident Physician, PGY2  [facility]ermat[facility]

## 2021-03-25 NOTE — PROGRESS NOTE ADULT - ATTENDING COMMENTS
Edema likely triggering erosions on legs, and thrombocytopenia causing hemorrhagic appearance. Some lesion reminiscent of IgA basculitis-- if new lesions appear more primally on legs then we would consider this diagnosis more. recommend aggressive control of leg edema if possible. Consider diuretics (can consider spironolactone or HCTZ if lasix contraindicated) if there are no other contraindications.

## 2021-03-25 NOTE — CHART NOTE - NSCHARTNOTEFT_GEN_A_CORE
Pt's plts 5k today on AM labs. Discussed with hematology, will order 1/2 units of plts to be given over 3 hrs.  Hematology also made aware that pt unable to tolerate Promacta as it cannot be crushed, awaiting alternative.

## 2021-03-25 NOTE — CHART NOTE - NSCHARTNOTEFT_GEN_A_CORE
Per discussion with Heme: will discontinue Promacta at this time as pt unable to tolerate without chewing (negative affect on absorption).  For now, tentative plan for Rituxan on Saturday and NPlate on Sunday. Long term discharge plan TBD (possible Nplate however may be difficult to coordinate with group home).   Dermatology recommended ACE bandages and consideration of lasix for LE swelling. Pt with known allergy to Bactrim, possible cross re-activity may occur. Per discussion with attending, will hold off for now and further discuss with mother tomorrow.  Pt's mother Nicole updated on above, all questions answered.

## 2021-03-25 NOTE — PROGRESS NOTE ADULT - ATTENDING COMMENTS
21 yo M with a history of chronic ITP and severe autism (non-verbal at baseline) who presents with severe thrombocytopenia (Plt 7) while on home PO prednisone (80mg daily). ITP has worsened in the setting of recent COVID infection which has taken weeks to clear. He received IVIG 1gm/kg daily x 2 (completed 2/28) and dexamethasone 40mg IV x 4 days (completed 3/3), C1 Rituxan with desensitization protocol on 3/6; C2 Rituximab on 3/13, and started Romiplastin (Nplate), 1mcg/kg on 2/28; next dose 3/7 was 2mcg/kg (pt only got 250mcg instead of 300mcg as pharmacy only had 250); next dose on 3/14- 5mcg/kg (750mcg)., then same on 3/21. So far he briefly responded for several days 3/20-23, so plan is to complete Rituxan 3/27 and continue to escalate NPlate. As he is not responsive to steroids will continue tapering prednisone by 10mg every 2 days.  Continue to support with platelet transfusions giving 1/2 unit platelets slowly infused over 3 hours q12h for platelets <5k or if bleeding.  Rituxan C4 on 3/27. Plan was to change from NPlate to Promacta as this may be an easier option than weekly Nplate injections after discharge, but we now find that the patient cannot take pills that are not crushed or in food and Promacta is supposed to be taken whole on an empty stomach. Will continue with Nplate for now and increase dose this weekend to 10 mcg/kg = 1500 mg. Will also consider trying IVIG again as he had always responded well to this in the past, before having COVID, and may respond again now that he has cleared his COVID infection. Continue mepron for PCP ppx. Hematology will continue to follow.

## 2021-03-25 NOTE — PROGRESS NOTE ADULT - ASSESSMENT
21 yo M with a history of chronic ITP and severe autism (non-verbal at baseline) who presents with severe thrombocytopenia (Plt 7) while on home PO prednisone (80mg daily).     # Chronic ITP exacerbated in the setting of recent COVID infection   -CT head neg for bleed  -s/p IVIG 1gm/kg daily x 2 (completed 2/28) and dexamethasone 40mg IV x4 days (completed 3/3)  -S/p C1 Rituxan with desensitization protocol on 3/6; C2 Rituximab on 3/13; s/p C3 Rituximab on 3/20 with desensitization protocol, tolerated well. Will plan to give Cycle 4 (final dose) of Rituxan with desensitization protocol on Saturday 3/27.  -S/p romiplastin/Nplate, 1mcg/kg; given 2/28; next dose 3/7 was 2mcg/kg (pt only got 250mcg instead of 300mcg as pharmacy only had 250); next dose on 3/14- 5mcg/kg (750mcg). s/p 750mcg (5mcg/kg) on 3/21.   -Cont. prednisone taper - decrease by 10mg every 2 days.   - Patient unable to swallow Promacta pills (Promacta cannot be crushed or chewed). In this case, will need to continue with Nplate for now (plan for 10mcg/mg = 1500mg on 3/28). Will continue to discuss other possible formulations or oral thrombopoietin receptor agonists.   -transfuse platelets if bleeding (consider giving 1/2 unit platelets slowly infused over 3 hours q12h) OR if platelets <5k  - Consider starting low dose Lasix per primary team for lower extremity swelling  -Continue mepron for PCP ppx while on steroids  -monitor closely for any signs or symptoms of bleeding      Kenya Chávez MD  Hematology Oncology Fellow, PGY-5  [facility] Pager: [phone]/ [facility] Pager: [phone]

## 2021-03-25 NOTE — PROGRESS NOTE ADULT - SUBJECTIVE AND OBJECTIVE BOX
INTERVAL History:    Allergies    Bactrim (Hives)  Rituxan (Hives)  WinRho SDF (Hives)    Intolerances        MEDICATIONS  (STANDING):  atovaquone  Suspension 1500 milliGRAM(s) Oral daily  BACItracin   Ointment 1 Application(s) Topical two times a day  brexpiprazole 6 milliGRAM(s) Oral daily  cetirizine 10 milliGRAM(s) Oral at bedtime  chlorhexidine 4% Liquid 1 Application(s) Topical <User Schedule>  cimetidine 400 milliGRAM(s) Oral three times a day  eltrombopag 50 milliGRAM(s) Oral daily  fluticasone propionate 50 MICROgram(s)/spray Nasal Spray 1 Spray(s) Both Nostrils two times a day  influenza   Vaccine 0.5 milliLiter(s) IntraMuscular once  LORazepam     Tablet 1 milliGRAM(s) Oral at bedtime  multivitamin 1 Tablet(s) Oral daily  mupirocin 2% Ointment 1 Application(s) Topical two times a day  predniSONE   Tablet   Oral   predniSONE   Tablet 40 milliGRAM(s) Oral daily  traZODone 300 milliGRAM(s) Oral at bedtime    MEDICATIONS  (PRN):  ALBUTerol    0.083%. 2.5 milliGRAM(s) Nebulizer once PRN PRN Chemotherapy Reaction  diphenhydrAMINE   Injectable 50 milliGRAM(s) IV Push once PRN PRN Chemotherapy Reaction  EPINEPHrine     1 mG/mL Injectable 0.3 milliGRAM(s) IntraMuscular once PRN PRN Chemotherapy Reaction  hydrocortisone sodium succinate Injectable 100 milliGRAM(s) IV Push once PRN PRN Chemotherapy Reaction  meperidine     Injectable 25 milliGRAM(s) IV Push once PRN PRN Chemotherapy Reaction (Rigors)  mineral oil for Topical Use 1 Application(s) Topical three times a day PRN to clean area after BM  zinc oxide 20% Ointment 1 Application(s) Topical three times a day PRN rash, apply to the area after each BM      Vital Signs Last 24 Hrs  T(C): 36.5 (25 Mar 2021 06:50), Max: 37 (24 Mar 2021 12:50)  T(F): 97.7 (25 Mar 2021 06:50), Max: 98.6 (24 Mar 2021 12:50)  HR: 98 (25 Mar 2021 06:50) (98 - 111)  BP: 113/63 (25 Mar 2021 06:50) (106/55 - 126/74)  BP(mean): 73 (25 Mar 2021 06:50) (73 - 73)  RR: 20 (25 Mar 2021 06:50) (18 - 20)  SpO2: 97% (25 Mar 2021 06:50) (96% - 100%)    PHYSICAL EXAM:          LABS:                        11.0   10.51 )-----------( 5        ( 25 Mar 2021 07:22 )             37.3     03-25    137  |  104  |  30<H>  ----------------------------<  88  4.0   |  26  |  0.85    Ca    9.1      25 Mar 2021 07:22  Phos  4.6     03-25  Mg     2.0     03-25    TPro  6.0  /  Alb  3.1<L>  /  TBili  0.5  /  DBili  x   /  AST  16  /  ALT  28  /  AlkPhos  44  03-25            RADIOLOGY & ADDITIONAL STUDIES:    PATHOLOGY:         INTERVAL History: Plt count at 5 this morning. Notified by team that patient unable to swallow promacta so pt was not able to receive a dose yesterday.     Allergies    Bactrim (Hives)  Rituxan (Hives)  WinRho SDF (Hives)    Intolerances        MEDICATIONS  (STANDING):  atovaquone  Suspension 1500 milliGRAM(s) Oral daily  BACItracin   Ointment 1 Application(s) Topical two times a day  brexpiprazole 6 milliGRAM(s) Oral daily  cetirizine 10 milliGRAM(s) Oral at bedtime  chlorhexidine 4% Liquid 1 Application(s) Topical <User Schedule>  cimetidine 400 milliGRAM(s) Oral three times a day  eltrombopag 50 milliGRAM(s) Oral daily  fluticasone propionate 50 MICROgram(s)/spray Nasal Spray 1 Spray(s) Both Nostrils two times a day  influenza   Vaccine 0.5 milliLiter(s) IntraMuscular once  LORazepam     Tablet 1 milliGRAM(s) Oral at bedtime  multivitamin 1 Tablet(s) Oral daily  mupirocin 2% Ointment 1 Application(s) Topical two times a day  predniSONE   Tablet   Oral   predniSONE   Tablet 40 milliGRAM(s) Oral daily  traZODone 300 milliGRAM(s) Oral at bedtime    MEDICATIONS  (PRN):  ALBUTerol    0.083%. 2.5 milliGRAM(s) Nebulizer once PRN PRN Chemotherapy Reaction  diphenhydrAMINE   Injectable 50 milliGRAM(s) IV Push once PRN PRN Chemotherapy Reaction  EPINEPHrine     1 mG/mL Injectable 0.3 milliGRAM(s) IntraMuscular once PRN PRN Chemotherapy Reaction  hydrocortisone sodium succinate Injectable 100 milliGRAM(s) IV Push once PRN PRN Chemotherapy Reaction  meperidine     Injectable 25 milliGRAM(s) IV Push once PRN PRN Chemotherapy Reaction (Rigors)  mineral oil for Topical Use 1 Application(s) Topical three times a day PRN to clean area after BM  zinc oxide 20% Ointment 1 Application(s) Topical three times a day PRN rash, apply to the area after each BM      Vital Signs Last 24 Hrs  T(C): 36.5 (25 Mar 2021 06:50), Max: 37 (24 Mar 2021 12:50)  T(F): 97.7 (25 Mar 2021 06:50), Max: 98.6 (24 Mar 2021 12:50)  HR: 98 (25 Mar 2021 06:50) (98 - 111)  BP: 113/63 (25 Mar 2021 06:50) (106/55 - 126/74)  BP(mean): 73 (25 Mar 2021 06:50) (73 - 73)  RR: 20 (25 Mar 2021 06:50) (18 - 20)  SpO2: 97% (25 Mar 2021 06:50) (96% - 100%)    PHYSICAL EXAM:          LABS:                        11.0   10.51 )-----------( 5        ( 25 Mar 2021 07:22 )             37.3     03-25    137  |  104  |  30<H>  ----------------------------<  88  4.0   |  26  |  0.85    Ca    9.1      25 Mar 2021 07:22  Phos  4.6     03-25  Mg     2.0     03-25    TPro  6.0  /  Alb  3.1<L>  /  TBili  0.5  /  DBili  x   /  AST  16  /  ALT  28  /  AlkPhos  44  03-25            RADIOLOGY & ADDITIONAL STUDIES:    PATHOLOGY:

## 2021-03-25 NOTE — PROGRESS NOTE ADULT - SUBJECTIVE AND OBJECTIVE BOX
Patient is a 21y old  Male who presents with a chief complaint of low platelets (25 Mar 2021 12:48)      SUBJECTIVE / OVERNIGHT EVENTS: No complaints, no signs of bleeding     MEDICATIONS  (STANDING):  atovaquone  Suspension 1500 milliGRAM(s) Oral daily  BACItracin   Ointment 1 Application(s) Topical two times a day  brexpiprazole 6 milliGRAM(s) Oral daily  cetirizine 10 milliGRAM(s) Oral at bedtime  chlorhexidine 4% Liquid 1 Application(s) Topical <User Schedule>  cimetidine 400 milliGRAM(s) Oral three times a day  eltrombopag 50 milliGRAM(s) Oral daily  fluticasone propionate 50 MICROgram(s)/spray Nasal Spray 1 Spray(s) Both Nostrils two times a day  influenza   Vaccine 0.5 milliLiter(s) IntraMuscular once  LORazepam     Tablet 1 milliGRAM(s) Oral at bedtime  multivitamin 1 Tablet(s) Oral daily  mupirocin 2% Ointment 1 Application(s) Topical two times a day  predniSONE   Tablet   Oral   predniSONE   Tablet 40 milliGRAM(s) Oral daily  traZODone 300 milliGRAM(s) Oral at bedtime    MEDICATIONS  (PRN):  ALBUTerol    0.083%. 2.5 milliGRAM(s) Nebulizer once PRN PRN Chemotherapy Reaction  diphenhydrAMINE   Injectable 50 milliGRAM(s) IV Push once PRN PRN Chemotherapy Reaction  EPINEPHrine     1 mG/mL Injectable 0.3 milliGRAM(s) IntraMuscular once PRN PRN Chemotherapy Reaction  hydrocortisone sodium succinate Injectable 100 milliGRAM(s) IV Push once PRN PRN Chemotherapy Reaction  meperidine     Injectable 25 milliGRAM(s) IV Push once PRN PRN Chemotherapy Reaction (Rigors)  mineral oil for Topical Use 1 Application(s) Topical three times a day PRN to clean area after BM  zinc oxide 20% Ointment 1 Application(s) Topical three times a day PRN rash, apply to the area after each BM      Vital Signs Last 24 Hrs  T(C): 36.5 (25 Mar 2021 06:50), Max: 36.5 (25 Mar 2021 06:50)  T(F): 97.7 (25 Mar 2021 06:50), Max: 97.7 (25 Mar 2021 06:50)  HR: 98 (25 Mar 2021 06:50) (98 - 104)  BP: 113/63 (25 Mar 2021 06:50) (106/55 - 113/63)  BP(mean): 73 (25 Mar 2021 06:50) (73 - 73)  RR: 20 (25 Mar 2021 06:50) (20 - 20)  SpO2: 97% (25 Mar 2021 06:50) (96% - 97%)  CAPILLARY BLOOD GLUCOSE        I&O's Summary      PHYSICAL EXAM:  GENERAL: NAD, well-developed  HEAD:  Atraumatic, Normocephalic  EYES: EOMI, PERRLA, conjunctiva and sclera clear  NECK: Supple, No JVD  CHEST/LUNG: Clear to auscultation bilaterally; No wheeze  HEART: Regular rate and rhythm; No murmurs, rubs, or gallops  ABDOMEN: Soft, Nontender, Nondistended; Bowel sounds present  EXTREMITIES:  (+) rash/wound at right  leg   PSYCH: AAOx1  NEUROLOGY: non-focal  SKIN: (+) rash/wound at right    LABS:                        11.0   10.51 )-----------( 5        ( 25 Mar 2021 07:22 )             37.3     03-25    137  |  104  |  30<H>  ----------------------------<  88  4.0   |  26  |  0.85    Ca    9.1      25 Mar 2021 07:22  Phos  4.6     03-25  Mg     2.0     03-25    TPro  6.0  /  Alb  3.1<L>  /  TBili  0.5  /  DBili  x   /  AST  16  /  ALT  28  /  AlkPhos  44  03-25              RADIOLOGY & ADDITIONAL TESTS:    Imaging Personally Reviewed:    Consultant(s) Notes Reviewed:  Hem     Care Discussed with Consultants/Other Providers:

## 2021-03-25 NOTE — PROGRESS NOTE ADULT - SUBJECTIVE AND OBJECTIVE BOX
INTERVAL HPI/OVERNIGHT EVENTS:    persistent LE edema with red macules on lower extremities     MEDICATIONS  (STANDING):  atovaquone  Suspension 1500 milliGRAM(s) Oral daily  BACItracin   Ointment 1 Application(s) Topical two times a day  brexpiprazole 6 milliGRAM(s) Oral daily  cetirizine 10 milliGRAM(s) Oral at bedtime  chlorhexidine 4% Liquid 1 Application(s) Topical <User Schedule>  cimetidine 400 milliGRAM(s) Oral three times a day  eltrombopag 50 milliGRAM(s) Oral daily  fluticasone propionate 50 MICROgram(s)/spray Nasal Spray 1 Spray(s) Both Nostrils two times a day  influenza   Vaccine 0.5 milliLiter(s) IntraMuscular once  LORazepam     Tablet 1 milliGRAM(s) Oral at bedtime  multivitamin 1 Tablet(s) Oral daily  mupirocin 2% Ointment 1 Application(s) Topical two times a day  predniSONE   Tablet   Oral   predniSONE   Tablet 40 milliGRAM(s) Oral daily  traZODone 300 milliGRAM(s) Oral at bedtime    MEDICATIONS  (PRN):  ALBUTerol    0.083%. 2.5 milliGRAM(s) Nebulizer once PRN PRN Chemotherapy Reaction  diphenhydrAMINE   Injectable 50 milliGRAM(s) IV Push once PRN PRN Chemotherapy Reaction  EPINEPHrine     1 mG/mL Injectable 0.3 milliGRAM(s) IntraMuscular once PRN PRN Chemotherapy Reaction  hydrocortisone sodium succinate Injectable 100 milliGRAM(s) IV Push once PRN PRN Chemotherapy Reaction  meperidine     Injectable 25 milliGRAM(s) IV Push once PRN PRN Chemotherapy Reaction (Rigors)  mineral oil for Topical Use 1 Application(s) Topical three times a day PRN to clean area after BM  zinc oxide 20% Ointment 1 Application(s) Topical three times a day PRN rash, apply to the area after each BM    Allergies    Bactrim (Hives)  Rituxan (Hives)  WinRho SDF (Hives)    Intolerances      REVIEW OF SYSTEMS    General: no fevers/chills, no NS	  Skin: see HPI  Ophthalmologic: no eye pain or change in vision  Genitourinary: no dysuria or hematuria  Musculoskeletal: no joint pains or weakness	  Neurological:no weakness or tingling      Vital Signs Last 24 Hrs  T(C): 36.5 (25 Mar 2021 06:50), Max: 36.5 (25 Mar 2021 06:50)  T(F): 97.7 (25 Mar 2021 06:50), Max: 97.7 (25 Mar 2021 06:50)  HR: 98 (25 Mar 2021 06:50) (98 - 104)  BP: 113/63 (25 Mar 2021 06:50) (106/55 - 113/63)  BP(mean): 73 (25 Mar 2021 06:50) (73 - 73)  RR: 20 (25 Mar 2021 06:50) (20 - 20)  SpO2: 97% (25 Mar 2021 06:50) (96% - 97%)    PHYSICAL EXAM:   The patient was well nourished, and in no apparent distress.  There was no visible lymphadenopathy.  Conjunctiva were non injected    Of note on skin exam:   - multiple red petechiae on bilateral lower extremities   - B/l shins with purpuric, centrally eroded, patches in the setting of significant weeping b/l lower extremity edema  - Left anterior thigh with smaller purpuric patch   - Intergluteal cleft with pink eroded patches and well-demarcated purpuric area in right medial buttock    LABS:                        11.0   10.51 )-----------( 5        ( 25 Mar 2021 07:22 )             37.3     03-25    137  |  104  |  30<H>  ----------------------------<  88  4.0   |  26  |  0.85    Ca    9.1      25 Mar 2021 07:22  Phos  4.6     03-25  Mg     2.0     03-25    TPro  6.0  /  Alb  3.1<L>  /  TBili  0.5  /  DBili  x   /  AST  16  /  ALT  28  /  AlkPhos  44  03-25

## 2021-03-25 NOTE — PROGRESS NOTE ADULT - PROBLEM SELECTOR PLAN 1
Patient to xray.       Eliu Parson RN  02/24/21 5635 Hx of chronic ITP since age 18 months. CTH negative, requiring multiple plt transfusions this admission. May be exacerbated by COVID, now s/p convalescent plasma (3/11)  -per heme recommendations will transfuse 1/2 unit platelets every 12 hrs for plt <5  or if bleeding.   -s/p IVIG x2, completed 2/28   -s/p Decadron 40 mg IV (2/28-3/3), cont Prednisone taper   -s/p Ritruxan infusion on 3/7 and 3/13  -C/w mepron for PCP ppx  - Hematology following  - Prednisone 70mg, decrease to 60mg 3/19   - received Rituximab on 3/20, and Romiplastin/Nplate dose 3/21  -S/P 1/2unit of PLT yesterday, Platelet count 5 today.  Will transfuse 1/2unit of PLT today as per Hem rec  -Started on Eltrombopag as per Hem   - Will f/u Hem rec.

## 2021-03-26 LAB
ANION GAP SERPL CALC-SCNC: 11 MMOL/L — SIGNIFICANT CHANGE UP (ref 7–14)
BASOPHILS # BLD AUTO: 0.12 K/UL — SIGNIFICANT CHANGE UP (ref 0–0.2)
BASOPHILS NFR BLD AUTO: 0.9 % — SIGNIFICANT CHANGE UP (ref 0–2)
BLD GP AB SCN SERPL QL: NEGATIVE — SIGNIFICANT CHANGE UP
BUN SERPL-MCNC: 28 MG/DL — HIGH (ref 7–23)
CALCIUM SERPL-MCNC: 8.9 MG/DL — SIGNIFICANT CHANGE UP (ref 8.4–10.5)
CHLORIDE SERPL-SCNC: 103 MMOL/L — SIGNIFICANT CHANGE UP (ref 98–107)
CO2 SERPL-SCNC: 26 MMOL/L — SIGNIFICANT CHANGE UP (ref 22–31)
CREAT SERPL-MCNC: 0.81 MG/DL — SIGNIFICANT CHANGE UP (ref 0.5–1.3)
EOSINOPHIL # BLD AUTO: 0.12 K/UL — SIGNIFICANT CHANGE UP (ref 0–0.5)
EOSINOPHIL NFR BLD AUTO: 0.9 % — SIGNIFICANT CHANGE UP (ref 0–6)
GLUCOSE SERPL-MCNC: 91 MG/DL — SIGNIFICANT CHANGE UP (ref 70–99)
HCT VFR BLD CALC: 35.8 % — LOW (ref 39–50)
HGB BLD-MCNC: 10.8 G/DL — LOW (ref 13–17)
IANC: 9.02 K/UL — HIGH (ref 1.5–8.5)
LYMPHOCYTES # BLD AUTO: 1.5 K/UL — SIGNIFICANT CHANGE UP (ref 1–3.3)
LYMPHOCYTES # BLD AUTO: 11.5 % — LOW (ref 13–44)
MCHC RBC-ENTMCNC: 26.3 PG — LOW (ref 27–34)
MCHC RBC-ENTMCNC: 30.2 GM/DL — LOW (ref 32–36)
MCV RBC AUTO: 87.3 FL — SIGNIFICANT CHANGE UP (ref 80–100)
MONOCYTES # BLD AUTO: 0.57 K/UL — SIGNIFICANT CHANGE UP (ref 0–0.9)
MONOCYTES NFR BLD AUTO: 4.4 % — SIGNIFICANT CHANGE UP (ref 2–14)
NEUTROPHILS # BLD AUTO: 9.58 K/UL — HIGH (ref 1.8–7.4)
NEUTROPHILS NFR BLD AUTO: 72.6 % — SIGNIFICANT CHANGE UP (ref 43–77)
PLATELET # BLD AUTO: 5 K/UL — CRITICAL LOW (ref 150–400)
POTASSIUM SERPL-MCNC: 4 MMOL/L — SIGNIFICANT CHANGE UP (ref 3.5–5.3)
POTASSIUM SERPL-SCNC: 4 MMOL/L — SIGNIFICANT CHANGE UP (ref 3.5–5.3)
RBC # BLD: 4.1 M/UL — LOW (ref 4.2–5.8)
RBC # FLD: 16.1 % — HIGH (ref 10.3–14.5)
RH IG SCN BLD-IMP: POSITIVE — SIGNIFICANT CHANGE UP
SODIUM SERPL-SCNC: 140 MMOL/L — SIGNIFICANT CHANGE UP (ref 135–145)
WBC # BLD: 13.04 K/UL — HIGH (ref 3.8–10.5)
WBC # FLD AUTO: 13.04 K/UL — HIGH (ref 3.8–10.5)

## 2021-03-26 PROCEDURE — 99233 SBSQ HOSP IP/OBS HIGH 50: CPT

## 2021-03-26 PROCEDURE — 99232 SBSQ HOSP IP/OBS MODERATE 35: CPT | Mod: GC

## 2021-03-26 RX ORDER — FUROSEMIDE 40 MG
20 TABLET ORAL DAILY
Refills: 0 | Status: DISCONTINUED | OUTPATIENT
Start: 2021-03-26 | End: 2021-03-29

## 2021-03-26 RX ORDER — DIPHENHYDRAMINE HCL 50 MG
25 CAPSULE ORAL ONCE
Refills: 0 | Status: COMPLETED | OUTPATIENT
Start: 2021-03-26 | End: 2021-03-26

## 2021-03-26 RX ADMIN — Medication 400 MILLIGRAM(S): at 13:26

## 2021-03-26 RX ADMIN — Medication 1 TABLET(S): at 09:00

## 2021-03-26 RX ADMIN — Medication 1 MILLIGRAM(S): at 21:11

## 2021-03-26 RX ADMIN — BREXPIPRAZOLE 6 MILLIGRAM(S): 0.25 TABLET ORAL at 09:00

## 2021-03-26 RX ADMIN — Medication 1 APPLICATION(S): at 16:11

## 2021-03-26 RX ADMIN — Medication 400 MILLIGRAM(S): at 21:10

## 2021-03-26 RX ADMIN — Medication 20 MILLIGRAM(S): at 18:37

## 2021-03-26 RX ADMIN — CETIRIZINE HYDROCHLORIDE 10 MILLIGRAM(S): 10 TABLET ORAL at 21:11

## 2021-03-26 RX ADMIN — MUPIROCIN 1 APPLICATION(S): 20 OINTMENT TOPICAL at 16:11

## 2021-03-26 RX ADMIN — CHLORHEXIDINE GLUCONATE 1 APPLICATION(S): 213 SOLUTION TOPICAL at 06:35

## 2021-03-26 RX ADMIN — ATOVAQUONE 1500 MILLIGRAM(S): 750 SUSPENSION ORAL at 09:00

## 2021-03-26 RX ADMIN — Medication 300 MILLIGRAM(S): at 21:11

## 2021-03-26 RX ADMIN — Medication 400 MILLIGRAM(S): at 06:35

## 2021-03-26 RX ADMIN — MUPIROCIN 1 APPLICATION(S): 20 OINTMENT TOPICAL at 06:35

## 2021-03-26 RX ADMIN — Medication 1 APPLICATION(S): at 06:35

## 2021-03-26 RX ADMIN — Medication 25 MILLIGRAM(S): at 18:37

## 2021-03-26 RX ADMIN — Medication 40 MILLIGRAM(S): at 06:35

## 2021-03-26 NOTE — PROGRESS NOTE ADULT - ASSESSMENT
21 yo M with a history of chronic ITP and severe autism (non-verbal at baseline) who presents with severe thrombocytopenia (Plt 7) while on home PO prednisone (80mg daily).     # Chronic ITP exacerbated in the setting of recent COVID infection   -CT head neg for bleed  -s/p IVIG 1gm/kg daily x 2 (completed 2/28) and dexamethasone 40mg IV x4 days (completed 3/3)  -S/p C1 Rituxan with desensitization protocol on 3/6; C2 Rituximab on 3/13; s/p C3 Rituximab on 3/20 with desensitization protocol, tolerated well. Will plan to give Cycle 4 (final dose) of Rituxan with desensitization protocol on Saturday 3/27.  -S/p romiplastin/Nplate, 1mcg/kg; given 2/28; next dose 3/7 was 2mcg/kg (pt only got 250mcg instead of 300mcg as pharmacy only had 250); next dose on 3/14- 5mcg/kg (750mcg). s/p 750mcg (5mcg/kg) on 3/21.   -Cont. prednisone taper - decrease by 10mg every 2 days.   - Patient unable to swallow Promacta pills (Promacta cannot be crushed or chewed). In this case, will need to continue with Nplate for now (plan for 10mcg/mg = 1500mg on 3/28). Will continue to discuss other possible formulations or oral thrombopoietin receptor agonists.   -transfuse platelets if bleeding (consider giving 1/2 unit platelets slowly infused over 3 hours q12h) OR if platelets <5k  -Continue mepron for PCP ppx while on steroids  -monitor closely for any signs or symptoms of bleeding      Kenya Chávez MD  Hematology Oncology Fellow, PGY-5  [facility] Pager: [phone]/ [facility] Pager: [phone]

## 2021-03-26 NOTE — PROGRESS NOTE ADULT - PROBLEM SELECTOR PLAN 4
Derm consult note appreciated.  Cont wound care/ skin care as per Derm   Consider Diuresis as per Derm rec.     D/W ACP

## 2021-03-26 NOTE — PROGRESS NOTE ADULT - ATTENDING COMMENTS
19 yo M with a history of chronic ITP and severe autism (non-verbal at baseline) who presents with severe thrombocytopenia (Plt 7) while on home PO prednisone (80mg daily). ITP has worsened in the setting of recent COVID infection which has taken weeks to clear. He received IVIG 1gm/kg daily x 2 (completed 2/28) and dexamethasone 40mg IV x 4 days (completed 3/3), C1 Rituxan with desensitization protocol on 3/6; C2 Rituximab on 3/13, and started Romiplastin (Nplate), 1mcg/kg on 2/28; next dose 3/7 was 2mcg/kg (pt only got 250mcg instead of 300mcg as pharmacy only had 250); next dose on 3/14- 5mcg/kg (750mcg)., then same on 3/21. So far he briefly responded for several days 3/20-23, so plan is to complete Rituxan C4 on 3/27 and continue to escalate NPlate. As he is not responsive to steroids will continue tapering prednisone by 10mg every 2 days.  Continue to support with platelet transfusions giving 1/2 unit platelets slowly infused over 3 hours q12h for platelets <5k or if bleeding.  Rituxan C4 on 3/27. Plan was to change from NPlate to Promacta as this may be an easier option than weekly Nplate injections after discharge, but patient cannot take pills that are not crushed or in food and Promacta is supposed to be taken whole on an empty stomach. Will continue with Nplate for now and increase dose this weekend to 10 mcg/kg = 1500 mg. Can consider other oral options in future. Will also consider trying IVIG again next week after assessing response to Rituxan C4/Nplate at 10 mcg, as he had always responded well to this in the past, before having COVID, and may respond again now that he has cleared his COVID infection. Continue mepron for PCP ppx. Hematology will continue to follow.

## 2021-03-26 NOTE — PROGRESS NOTE ADULT - SUBJECTIVE AND OBJECTIVE BOX
INTERVAL History: No acute events overnight. Plt count remains at 5 today.     Allergies    Bactrim (Hives)  Rituxan (Hives)  WinRho SDF (Hives)    Intolerances        MEDICATIONS  (STANDING):  atovaquone  Suspension 1500 milliGRAM(s) Oral daily  BACItracin   Ointment 1 Application(s) Topical two times a day  brexpiprazole 6 milliGRAM(s) Oral daily  cetirizine 10 milliGRAM(s) Oral at bedtime  chlorhexidine 4% Liquid 1 Application(s) Topical <User Schedule>  cimetidine 400 milliGRAM(s) Oral three times a day  fluticasone propionate 50 MICROgram(s)/spray Nasal Spray 1 Spray(s) Both Nostrils two times a day  influenza   Vaccine 0.5 milliLiter(s) IntraMuscular once  LORazepam     Tablet 1 milliGRAM(s) Oral at bedtime  multivitamin 1 Tablet(s) Oral daily  mupirocin 2% Ointment 1 Application(s) Topical two times a day  predniSONE   Tablet   Oral   traZODone 300 milliGRAM(s) Oral at bedtime    MEDICATIONS  (PRN):  ALBUTerol    0.083%. 2.5 milliGRAM(s) Nebulizer once PRN PRN Chemotherapy Reaction  diphenhydrAMINE   Injectable 50 milliGRAM(s) IV Push once PRN PRN Chemotherapy Reaction  EPINEPHrine     1 mG/mL Injectable 0.3 milliGRAM(s) IntraMuscular once PRN PRN Chemotherapy Reaction  hydrocortisone sodium succinate Injectable 100 milliGRAM(s) IV Push once PRN PRN Chemotherapy Reaction  meperidine     Injectable 25 milliGRAM(s) IV Push once PRN PRN Chemotherapy Reaction (Rigors)  mineral oil for Topical Use 1 Application(s) Topical three times a day PRN to clean area after BM  zinc oxide 20% Ointment 1 Application(s) Topical three times a day PRN rash, apply to the area after each BM      Vital Signs Last 24 Hrs  T(C): 36.9 (26 Mar 2021 06:34), Max: 37.2 (25 Mar 2021 14:00)  T(F): 98.5 (26 Mar 2021 06:34), Max: 98.9 (25 Mar 2021 14:00)  HR: 107 (26 Mar 2021 06:34) (100 - 111)  BP: 135/82 (26 Mar 2021 06:34) (125/65 - 136/64)  BP(mean): --  RR: 18 (26 Mar 2021 06:34) (18 - 18)  SpO2: 97% (26 Mar 2021 06:34) (95% - 99%)    PHYSICAL EXAM:          LABS:                        10.8   13.04 )-----------( 5        ( 26 Mar 2021 07:02 )             35.8     03-26    140  |  103  |  28<H>  ----------------------------<  91  4.0   |  26  |  0.81    Ca    8.9      26 Mar 2021 07:02  Phos  4.6     03-25  Mg     2.0     03-25    TPro  6.0  /  Alb  3.1<L>  /  TBili  0.5  /  DBili  x   /  AST  16  /  ALT  28  /  AlkPhos  44  03-25      Urinalysis Basic - ( 25 Mar 2021 19:59 )    Color: Light Yellow / Appearance: Clear / S.022 / pH: x  Gluc: x / Ketone: Negative  / Bili: Negative / Urobili: <2 mg/dL   Blood: x / Protein: Trace / Nitrite: Negative   Leuk Esterase: Negative / RBC: x / WBC x   Sq Epi: x / Non Sq Epi: x / Bacteria: x          RADIOLOGY & ADDITIONAL STUDIES:    PATHOLOGY:         INTERVAL History: No acute events overnight. Plt count remains at 5 today. No acute issues or complaints per mother at bedside.     Allergies    Bactrim (Hives)  Rituxan (Hives)  WinRho SDF (Hives)    Intolerances        MEDICATIONS  (STANDING):  atovaquone  Suspension 1500 milliGRAM(s) Oral daily  BACItracin   Ointment 1 Application(s) Topical two times a day  brexpiprazole 6 milliGRAM(s) Oral daily  cetirizine 10 milliGRAM(s) Oral at bedtime  chlorhexidine 4% Liquid 1 Application(s) Topical <User Schedule>  cimetidine 400 milliGRAM(s) Oral three times a day  fluticasone propionate 50 MICROgram(s)/spray Nasal Spray 1 Spray(s) Both Nostrils two times a day  influenza   Vaccine 0.5 milliLiter(s) IntraMuscular once  LORazepam     Tablet 1 milliGRAM(s) Oral at bedtime  multivitamin 1 Tablet(s) Oral daily  mupirocin 2% Ointment 1 Application(s) Topical two times a day  predniSONE   Tablet   Oral   traZODone 300 milliGRAM(s) Oral at bedtime    MEDICATIONS  (PRN):  ALBUTerol    0.083%. 2.5 milliGRAM(s) Nebulizer once PRN PRN Chemotherapy Reaction  diphenhydrAMINE   Injectable 50 milliGRAM(s) IV Push once PRN PRN Chemotherapy Reaction  EPINEPHrine     1 mG/mL Injectable 0.3 milliGRAM(s) IntraMuscular once PRN PRN Chemotherapy Reaction  hydrocortisone sodium succinate Injectable 100 milliGRAM(s) IV Push once PRN PRN Chemotherapy Reaction  meperidine     Injectable 25 milliGRAM(s) IV Push once PRN PRN Chemotherapy Reaction (Rigors)  mineral oil for Topical Use 1 Application(s) Topical three times a day PRN to clean area after BM  zinc oxide 20% Ointment 1 Application(s) Topical three times a day PRN rash, apply to the area after each BM      Vital Signs Last 24 Hrs  T(C): 36.9 (26 Mar 2021 06:34), Max: 37.2 (25 Mar 2021 14:00)  T(F): 98.5 (26 Mar 2021 06:34), Max: 98.9 (25 Mar 2021 14:00)  HR: 107 (26 Mar 2021 06:34) (100 - 111)  BP: 135/82 (26 Mar 2021 06:34) (125/65 - 136/64)  BP(mean): --  RR: 18 (26 Mar 2021 06:34) (18 - 18)  SpO2: 97% (26 Mar 2021 06:34) (95% - 99%)    PHYSICAL EXAM:  General - NAD  HEENT - PERRLA, EOM intact, sclera and conjunctiva clear, oropharynx, nares clear  Neck - Supple, no thyromegaly or thyroid nodules, no bruits  Cardiovascular - RRR no m/r/g, no JVD, no carotid bruits  Lungs - CTAB, no use of acessory muscles, no w/c/r  Abdomen - Normal bowel sounds, NT/ND  Extremeties - bruising on bilateral shins, erythematous         LABS:                        10.8   13.04 )-----------( 5        ( 26 Mar 2021 07:02 )             35.8     03-26    140  |  103  |  28<H>  ----------------------------<  91  4.0   |  26  |  0.81    Ca    8.9      26 Mar 2021 07:02  Phos  4.6     03-25  Mg     2.0     03-25    TPro  6.0  /  Alb  3.1<L>  /  TBili  0.5  /  DBili  x   /  AST  16  /  ALT  28  /  AlkPhos  44  03-25      Urinalysis Basic - ( 25 Mar 2021 19:59 )    Color: Light Yellow / Appearance: Clear / S.022 / pH: x  Gluc: x / Ketone: Negative  / Bili: Negative / Urobili: <2 mg/dL   Blood: x / Protein: Trace / Nitrite: Negative   Leuk Esterase: Negative / RBC: x / WBC x   Sq Epi: x / Non Sq Epi: x / Bacteria: x          RADIOLOGY & ADDITIONAL STUDIES:    PATHOLOGY:

## 2021-03-26 NOTE — PROGRESS NOTE ADULT - SUBJECTIVE AND OBJECTIVE BOX
Patient is a 21y old  Male who presents with a chief complaint of low platelets (26 Mar 2021 08:54)      SUBJECTIVE / OVERNIGHT EVENTS: No complaints today     MEDICATIONS  (STANDING):  atovaquone  Suspension 1500 milliGRAM(s) Oral daily  BACItracin   Ointment 1 Application(s) Topical two times a day  brexpiprazole 6 milliGRAM(s) Oral daily  cetirizine 10 milliGRAM(s) Oral at bedtime  chlorhexidine 4% Liquid 1 Application(s) Topical <User Schedule>  cimetidine 400 milliGRAM(s) Oral three times a day  fluticasone propionate 50 MICROgram(s)/spray Nasal Spray 1 Spray(s) Both Nostrils two times a day  influenza   Vaccine 0.5 milliLiter(s) IntraMuscular once  LORazepam     Tablet 1 milliGRAM(s) Oral at bedtime  multivitamin 1 Tablet(s) Oral daily  mupirocin 2% Ointment 1 Application(s) Topical two times a day  predniSONE   Tablet   Oral   traZODone 300 milliGRAM(s) Oral at bedtime    MEDICATIONS  (PRN):  ALBUTerol    0.083%. 2.5 milliGRAM(s) Nebulizer once PRN PRN Chemotherapy Reaction  diphenhydrAMINE   Injectable 50 milliGRAM(s) IV Push once PRN PRN Chemotherapy Reaction  EPINEPHrine     1 mG/mL Injectable 0.3 milliGRAM(s) IntraMuscular once PRN PRN Chemotherapy Reaction  hydrocortisone sodium succinate Injectable 100 milliGRAM(s) IV Push once PRN PRN Chemotherapy Reaction  meperidine     Injectable 25 milliGRAM(s) IV Push once PRN PRN Chemotherapy Reaction (Rigors)  mineral oil for Topical Use 1 Application(s) Topical three times a day PRN to clean area after BM  zinc oxide 20% Ointment 1 Application(s) Topical three times a day PRN rash, apply to the area after each BM      Vital Signs Last 24 Hrs  T(C): 36.4 (26 Mar 2021 13:12), Max: 37.1 (25 Mar 2021 21:59)  T(F): 97.6 (26 Mar 2021 13:12), Max: 98.8 (25 Mar 2021 21:59)  HR: 76 (26 Mar 2021 13:12) (76 - 111)  BP: 118/66 (26 Mar 2021 13:12) (118/66 - 135/82)  BP(mean): --  RR: 18 (26 Mar 2021 13:12) (18 - 18)  SpO2: 96% (26 Mar 2021 13:12) (95% - 97%)  CAPILLARY BLOOD GLUCOSE        I&O's Summary    26 Mar 2021 07:01  -  26 Mar 2021 14:05  --------------------------------------------------------  IN: 240 mL / OUT: 0 mL / NET: 240 mL        PHYSICAL EXAM:  GENERAL: NAD, well-developed  HEAD:  Atraumatic, Normocephalic  EYES: EOMI, PERRLA, conjunctiva and sclera clear  NECK: Supple, No JVD  CHEST/LUNG: Clear to auscultation bilaterally; No wheeze  HEART: Regular rate and rhythm; No murmurs, rubs, or gallops  ABDOMEN: Soft, Nontender, Nondistended; Bowel sounds present  EXTREMITIES:  (+) leg  edema b/l legs, (+) ACE dressing b/l legs   PSYCH: AAOx1  NEUROLOGY: non-focal  SKIN: No rashes or lesions    LABS:                        10.8   13.04 )-----------( 5        ( 26 Mar 2021 07:02 )             35.8     03-26    140  |  103  |  28<H>  ----------------------------<  91  4.0   |  26  |  0.81    Ca    8.9      26 Mar 2021 07:02  Phos  4.6     03-25  Mg     2.0     03-25    TPro  6.0  /  Alb  3.1<L>  /  TBili  0.5  /  DBili  x   /  AST  16  /  ALT  28  /  AlkPhos  44  03-25          Urinalysis Basic - ( 25 Mar 2021 19:59 )    Color: Light Yellow / Appearance: Clear / S.022 / pH: x  Gluc: x / Ketone: Negative  / Bili: Negative / Urobili: <2 mg/dL   Blood: x / Protein: Trace / Nitrite: Negative   Leuk Esterase: Negative / RBC: x / WBC x   Sq Epi: x / Non Sq Epi: x / Bacteria: x        RADIOLOGY & ADDITIONAL TESTS:    Imaging Personally Reviewed:    Consultant(s) Notes Reviewed:  Derm    Care Discussed with Consultants/Other Providers: Hematology

## 2021-03-26 NOTE — PROGRESS NOTE ADULT - PROBLEM SELECTOR PLAN 1
Hx of chronic ITP since age 18 months. CTH negative, requiring multiple plt transfusions this admission. May be exacerbated by COVID, now s/p convalescent plasma (3/11)  -per heme recommendations will transfuse 1/2 unit platelets every 12 hrs for plt <5  or if bleeding.   -s/p IVIG x2, completed 2/28   -s/p Decadron 40 mg IV (2/28-3/3), cont Prednisone taper   -s/p Ritruxan infusion on 3/7 and 3/13  -C/w mepron for PCP ppx  - Hematology following  - Prednisone 70mg, decrease to 60mg 3/19   - received Rituximab on 3/20, and Romiplastin/Nplate dose 3/21  -S/P 1/2unit of PLT yesterday, Platelet count 5 today.  Will transfuse 1/2unit of PLT today as per Hem rec  -Eltrombopag d/c'ed  as per Hem since pt can not take it.  -Rituximab on Sat. and Nplate on Sun as per Hem   - Will f/u Hem rec.

## 2021-03-27 LAB
ANION GAP SERPL CALC-SCNC: 12 MMOL/L — SIGNIFICANT CHANGE UP (ref 7–14)
BUN SERPL-MCNC: 22 MG/DL — SIGNIFICANT CHANGE UP (ref 7–23)
CALCIUM SERPL-MCNC: 9.1 MG/DL — SIGNIFICANT CHANGE UP (ref 8.4–10.5)
CHLORIDE SERPL-SCNC: 101 MMOL/L — SIGNIFICANT CHANGE UP (ref 98–107)
CO2 SERPL-SCNC: 27 MMOL/L — SIGNIFICANT CHANGE UP (ref 22–31)
CREAT SERPL-MCNC: 0.8 MG/DL — SIGNIFICANT CHANGE UP (ref 0.5–1.3)
GLUCOSE SERPL-MCNC: 79 MG/DL — SIGNIFICANT CHANGE UP (ref 70–99)
HCT VFR BLD CALC: 37.8 % — LOW (ref 39–50)
HGB BLD-MCNC: 11.5 G/DL — LOW (ref 13–17)
MAGNESIUM SERPL-MCNC: 2 MG/DL — SIGNIFICANT CHANGE UP (ref 1.6–2.6)
MCHC RBC-ENTMCNC: 26.8 PG — LOW (ref 27–34)
MCHC RBC-ENTMCNC: 30.4 GM/DL — LOW (ref 32–36)
MCV RBC AUTO: 88.1 FL — SIGNIFICANT CHANGE UP (ref 80–100)
NRBC # BLD: 0 /100 WBCS — SIGNIFICANT CHANGE UP
NRBC # FLD: 0.02 K/UL — HIGH
PHOSPHATE SERPL-MCNC: 5.1 MG/DL — HIGH (ref 2.5–4.5)
PLATELET # BLD AUTO: 6 K/UL — CRITICAL LOW (ref 150–400)
POTASSIUM SERPL-MCNC: 3.8 MMOL/L — SIGNIFICANT CHANGE UP (ref 3.5–5.3)
POTASSIUM SERPL-SCNC: 3.8 MMOL/L — SIGNIFICANT CHANGE UP (ref 3.5–5.3)
RBC # BLD: 4.29 M/UL — SIGNIFICANT CHANGE UP (ref 4.2–5.8)
RBC # FLD: 16 % — HIGH (ref 10.3–14.5)
SODIUM SERPL-SCNC: 140 MMOL/L — SIGNIFICANT CHANGE UP (ref 135–145)
WBC # BLD: 11.7 K/UL — HIGH (ref 3.8–10.5)
WBC # FLD AUTO: 11.7 K/UL — HIGH (ref 3.8–10.5)

## 2021-03-27 PROCEDURE — 99233 SBSQ HOSP IP/OBS HIGH 50: CPT | Mod: GC

## 2021-03-27 PROCEDURE — 99233 SBSQ HOSP IP/OBS HIGH 50: CPT

## 2021-03-27 RX ORDER — CETIRIZINE HYDROCHLORIDE 10 MG/1
10 TABLET ORAL ONCE
Refills: 0 | Status: COMPLETED | OUTPATIENT
Start: 2021-03-27 | End: 2021-03-27

## 2021-03-27 RX ORDER — HALOPERIDOL DECANOATE 100 MG/ML
0.5 INJECTION INTRAMUSCULAR ONCE
Refills: 0 | Status: DISCONTINUED | OUTPATIENT
Start: 2021-03-27 | End: 2021-03-27

## 2021-03-27 RX ORDER — EPINEPHRINE 0.3 MG/.3ML
0.3 INJECTION INTRAMUSCULAR; SUBCUTANEOUS ONCE
Refills: 0 | Status: DISCONTINUED | OUTPATIENT
Start: 2021-03-27 | End: 2021-04-07

## 2021-03-27 RX ORDER — DIPHENHYDRAMINE HCL 50 MG
50 CAPSULE ORAL ONCE
Refills: 0 | Status: DISCONTINUED | OUTPATIENT
Start: 2021-03-27 | End: 2021-04-07

## 2021-03-27 RX ORDER — HALOPERIDOL DECANOATE 100 MG/ML
0.25 INJECTION INTRAMUSCULAR ONCE
Refills: 0 | Status: COMPLETED | OUTPATIENT
Start: 2021-03-27 | End: 2021-03-27

## 2021-03-27 RX ORDER — MEPERIDINE HYDROCHLORIDE 50 MG/ML
25 INJECTION INTRAMUSCULAR; INTRAVENOUS; SUBCUTANEOUS ONCE
Refills: 0 | Status: DISCONTINUED | OUTPATIENT
Start: 2021-03-27 | End: 2021-04-02

## 2021-03-27 RX ORDER — ACETAMINOPHEN 500 MG
650 TABLET ORAL ONCE
Refills: 0 | Status: COMPLETED | OUTPATIENT
Start: 2021-03-27 | End: 2021-03-27

## 2021-03-27 RX ORDER — RITUXIMAB 10 MG/ML
993.62 INJECTION, SOLUTION INTRAVENOUS ONCE
Refills: 0 | Status: COMPLETED | OUTPATIENT
Start: 2021-03-27 | End: 2021-03-27

## 2021-03-27 RX ORDER — FAMOTIDINE 10 MG/ML
20 INJECTION INTRAVENOUS ONCE
Refills: 0 | Status: COMPLETED | OUTPATIENT
Start: 2021-03-27 | End: 2021-03-27

## 2021-03-27 RX ORDER — ALBUTEROL 90 UG/1
2.5 AEROSOL, METERED ORAL ONCE
Refills: 0 | Status: DISCONTINUED | OUTPATIENT
Start: 2021-03-27 | End: 2021-04-12

## 2021-03-27 RX ORDER — HYDROCORTISONE 20 MG
100 TABLET ORAL ONCE
Refills: 0 | Status: DISCONTINUED | OUTPATIENT
Start: 2021-03-27 | End: 2021-04-07

## 2021-03-27 RX ORDER — RITUXIMAB 10 MG/ML
17 INJECTION, SOLUTION INTRAVENOUS ONCE
Refills: 0 | Status: COMPLETED | OUTPATIENT
Start: 2021-03-27 | End: 2021-03-27

## 2021-03-27 RX ORDER — DIPHENHYDRAMINE HCL 50 MG
50 CAPSULE ORAL ONCE
Refills: 0 | Status: COMPLETED | OUTPATIENT
Start: 2021-03-27 | End: 2021-03-27

## 2021-03-27 RX ORDER — ACETAMINOPHEN 500 MG
650 TABLET ORAL ONCE
Refills: 0 | Status: DISCONTINUED | OUTPATIENT
Start: 2021-03-27 | End: 2021-03-27

## 2021-03-27 RX ORDER — SODIUM CHLORIDE 9 MG/ML
1000 INJECTION INTRAMUSCULAR; INTRAVENOUS; SUBCUTANEOUS
Refills: 0 | Status: DISCONTINUED | OUTPATIENT
Start: 2021-03-27 | End: 2021-04-01

## 2021-03-27 RX ADMIN — Medication 50 MILLIGRAM(S): at 17:44

## 2021-03-27 RX ADMIN — Medication 1 MILLIGRAM(S): at 12:22

## 2021-03-27 RX ADMIN — CETIRIZINE HYDROCHLORIDE 10 MILLIGRAM(S): 10 TABLET ORAL at 20:39

## 2021-03-27 RX ADMIN — BREXPIPRAZOLE 6 MILLIGRAM(S): 0.25 TABLET ORAL at 13:41

## 2021-03-27 RX ADMIN — RITUXIMAB 17 MILLIGRAM(S): 10 INJECTION, SOLUTION INTRAVENOUS at 13:13

## 2021-03-27 RX ADMIN — Medication 400 MILLIGRAM(S): at 06:05

## 2021-03-27 RX ADMIN — Medication 650 MILLIGRAM(S): at 12:22

## 2021-03-27 RX ADMIN — Medication 0.5 MILLIGRAM(S): at 14:16

## 2021-03-27 RX ADMIN — Medication 1 SPRAY(S): at 06:05

## 2021-03-27 RX ADMIN — Medication 1 TABLET(S): at 13:41

## 2021-03-27 RX ADMIN — Medication 1 APPLICATION(S): at 06:05

## 2021-03-27 RX ADMIN — Medication 1 MILLIGRAM(S): at 20:39

## 2021-03-27 RX ADMIN — Medication 50 MILLIGRAM(S): at 11:35

## 2021-03-27 RX ADMIN — RITUXIMAB 993.62 MILLIGRAM(S): 10 INJECTION, SOLUTION INTRAVENOUS at 14:58

## 2021-03-27 RX ADMIN — CETIRIZINE HYDROCHLORIDE 10 MILLIGRAM(S): 10 TABLET ORAL at 12:23

## 2021-03-27 RX ADMIN — Medication 0.5 MILLIGRAM(S): at 17:31

## 2021-03-27 RX ADMIN — HALOPERIDOL DECANOATE 0.25 MILLIGRAM(S): 100 INJECTION INTRAMUSCULAR at 20:50

## 2021-03-27 RX ADMIN — CHLORHEXIDINE GLUCONATE 1 APPLICATION(S): 213 SOLUTION TOPICAL at 06:04

## 2021-03-27 RX ADMIN — Medication 100 MILLIGRAM(S): at 11:34

## 2021-03-27 RX ADMIN — Medication 300 MILLIGRAM(S): at 20:38

## 2021-03-27 RX ADMIN — FAMOTIDINE 20 MILLIGRAM(S): 10 INJECTION INTRAVENOUS at 12:24

## 2021-03-27 RX ADMIN — Medication 400 MILLIGRAM(S): at 20:39

## 2021-03-27 RX ADMIN — Medication 400 MILLIGRAM(S): at 13:42

## 2021-03-27 RX ADMIN — Medication 30 MILLIGRAM(S): at 06:05

## 2021-03-27 RX ADMIN — MUPIROCIN 1 APPLICATION(S): 20 OINTMENT TOPICAL at 06:06

## 2021-03-27 RX ADMIN — Medication 20 MILLIGRAM(S): at 06:05

## 2021-03-27 RX ADMIN — ATOVAQUONE 1500 MILLIGRAM(S): 750 SUSPENSION ORAL at 13:41

## 2021-03-27 NOTE — PROGRESS NOTE ADULT - SUBJECTIVE AND OBJECTIVE BOX
No acute events overnight. Plt count 6K this am. No acute issues or complaints per mother at bedside. Patient is nonverbal.    Allergies  Bactrim (Hives)  Rituxan (Hives)  WinRho SDF (Hives)    MEDICATIONS  (STANDING):  atovaquone  Suspension 1500 milliGRAM(s) Oral daily  BACItracin   Ointment 1 Application(s) Topical two times a day  brexpiprazole 6 milliGRAM(s) Oral daily  cetirizine 10 milliGRAM(s) Oral at bedtime  chlorhexidine 4% Liquid 1 Application(s) Topical <User Schedule>  cimetidine 400 milliGRAM(s) Oral three times a day  fluticasone propionate 50 MICROgram(s)/spray Nasal Spray 1 Spray(s) Both Nostrils two times a day  influenza   Vaccine 0.5 milliLiter(s) IntraMuscular once  LORazepam     Tablet 1 milliGRAM(s) Oral at bedtime  multivitamin 1 Tablet(s) Oral daily  mupirocin 2% Ointment 1 Application(s) Topical two times a day  predniSONE   Tablet   Oral   traZODone 300 milliGRAM(s) Oral at bedtime    MEDICATIONS  (PRN):  ALBUTerol    0.083%. 2.5 milliGRAM(s) Nebulizer once PRN PRN Chemotherapy Reaction  diphenhydrAMINE   Injectable 50 milliGRAM(s) IV Push once PRN PRN Chemotherapy Reaction  EPINEPHrine     1 mG/mL Injectable 0.3 milliGRAM(s) IntraMuscular once PRN PRN Chemotherapy Reaction  hydrocortisone sodium succinate Injectable 100 milliGRAM(s) IV Push once PRN PRN Chemotherapy Reaction  meperidine     Injectable 25 milliGRAM(s) IV Push once PRN PRN Chemotherapy Reaction (Rigors)  mineral oil for Topical Use 1 Application(s) Topical three times a day PRN to clean area after BM  zinc oxide 20% Ointment 1 Application(s) Topical three times a day PRN rash, apply to the area after each BM      VSS and afebrile    PHYSICAL EXAM:  General - No distress, non-communicative due to autism  HEENT - PERRLA, EOM intact, sclera and conjunctiva clear, oropharynx, nares clear  Neck - Supple  Cardiovascular - RRR  Lungs - CTAB  Abdomen - nontender  Lower extremities - bruising on bilateral shins, closed wounds on shins, not swollen/nonerythematous        LABS:  plt 6K

## 2021-03-27 NOTE — PROGRESS NOTE ADULT - PROBLEM SELECTOR PLAN 4
Derm consult note appreciated.  Cont wound care/ skin care as per Derm   Cont. lasix 20mg daily as per Derm rec. Pt tolerated Lasix yesterday w/o any adverse reaction.     D/W ACP

## 2021-03-27 NOTE — CHART NOTE - NSCHARTNOTEFT_GEN_A_CORE
Pt noted with yellow tinged crusting with some drainage from RLE shin wound. Reviewed with Dermatology c/w serous drainage, will continue with current regimen.

## 2021-03-27 NOTE — PROGRESS NOTE ADULT - SUBJECTIVE AND OBJECTIVE BOX
Patient is a 21y old  Male who presents with a chief complaint of low platelets (26 Mar 2021 14:05)      SUBJECTIVE / OVERNIGHT EVENTS: Pt has no complaints, tolerated Lasix, No bleeding     MEDICATIONS  (STANDING):  acetaminophen   Tablet .. 650 milliGRAM(s) Oral once  atovaquone  Suspension 1500 milliGRAM(s) Oral daily  BACItracin   Ointment 1 Application(s) Topical two times a day  brexpiprazole 6 milliGRAM(s) Oral daily  cetirizine 10 milliGRAM(s) Oral once  cetirizine 10 milliGRAM(s) Oral at bedtime  chlorhexidine 4% Liquid 1 Application(s) Topical <User Schedule>  cimetidine 400 milliGRAM(s) Oral three times a day  famotidine    Tablet 20 milliGRAM(s) Oral once  fluticasone propionate 50 MICROgram(s)/spray Nasal Spray 1 Spray(s) Both Nostrils two times a day  furosemide    Tablet 20 milliGRAM(s) Oral daily  influenza   Vaccine 0.5 milliLiter(s) IntraMuscular once  LORazepam     Tablet 1 milliGRAM(s) Oral once  LORazepam     Tablet 1 milliGRAM(s) Oral at bedtime  multivitamin 1 Tablet(s) Oral daily  mupirocin 2% Ointment 1 Application(s) Topical two times a day  predniSONE   Tablet   Oral   predniSONE   Tablet 30 milliGRAM(s) Oral daily  riTUXimab IVPB (Non - oncologic) 17 milliGRAM(s) IV Intermittent once  riTUXimab IVPB (Non - oncologic) 993.625 milliGRAM(s) IV Intermittent once  traZODone 300 milliGRAM(s) Oral at bedtime    MEDICATIONS  (PRN):  ALBUTerol    0.083%. 2.5 milliGRAM(s) Nebulizer once PRN PRN Chemotherapy Reaction  ALBUTerol    0.083%. 2.5 milliGRAM(s) Nebulizer once PRN PRN Chemotherapy Reaction  diphenhydrAMINE   Injectable 50 milliGRAM(s) IV Push once PRN PRN Chemotherapy Reaction  diphenhydrAMINE   Injectable 50 milliGRAM(s) IV Push once PRN PRN Chemotherapy Reaction  EPINEPHrine     1 mG/mL Injectable 0.3 milliGRAM(s) IntraMuscular once PRN PRN Chemotherapy Reaction  EPINEPHrine     1 mG/mL Injectable 0.3 milliGRAM(s) IntraMuscular once PRN PRN Chemotherapy Reaction  hydrocortisone sodium succinate Injectable 100 milliGRAM(s) IV Push once PRN PRN Chemotherapy Reaction  hydrocortisone sodium succinate Injectable 100 milliGRAM(s) IV Push once PRN PRN Chemotherapy Reaction  meperidine     Injectable 25 milliGRAM(s) IV Push once PRN PRN Chemotherapy Reaction (Rigors)  meperidine     Injectable 25 milliGRAM(s) IV Push once PRN PRN Chemotherapy Reaction (Rigors)  mineral oil for Topical Use 1 Application(s) Topical three times a day PRN to clean area after BM  zinc oxide 20% Ointment 1 Application(s) Topical three times a day PRN rash, apply to the area after each BM      Vital Signs Last 24 Hrs  T(C): 37 (27 Mar 2021 11:51), Max: 37 (27 Mar 2021 11:51)  T(F): 98.6 (27 Mar 2021 11:51), Max: 98.6 (27 Mar 2021 11:51)  HR: 63 (27 Mar 2021 11:51) (63 - 97)  BP: 131/89 (27 Mar 2021 11:51) (118/66 - 132/85)  BP(mean): --  RR: 18 (27 Mar 2021 11:51) (16 - 18)  SpO2: 96% (27 Mar 2021 11:51) (96% - 100%)  CAPILLARY BLOOD GLUCOSE        I&O's Summary    26 Mar 2021 07:  -  27 Mar 2021 07:00  --------------------------------------------------------  IN: 480 mL / OUT: 0 mL / NET: 480 mL    27 Mar 2021 07:01  -  27 Mar 2021 12:10  --------------------------------------------------------  IN: 240 mL / OUT: 0 mL / NET: 240 mL        PHYSICAL EXAM:  GENERAL: NAD, well-developed  HEAD:  Atraumatic, Normocephalic  EYES: EOMI, PERRLA, conjunctiva and sclera clear  NECK: Supple, No JVD  CHEST/LUNG: Clear to auscultation bilaterally; No wheeze  HEART: Regular rate and rhythm; No murmurs, rubs, or gallops  ABDOMEN: Soft, Nontender, Nondistended; Bowel sounds present  EXTREMITIES:  2+ Peripheral Pulses, No clubbing, cyanosis, (+) edema at b/l legs, (+) ACE dressing b/l legs   PSYCH: AAOx1  NEUROLOGY: non-focal  SKIN: (+) ACE dressing at b/l legs     LABS:                        11.5   11.70 )-----------( 6        ( 27 Mar 2021 07:50 )             37.8     03-27    140  |  101  |  22  ----------------------------<  79  3.8   |  27  |  0.80    Ca    9.1      27 Mar 2021 07:50  Phos  5.1     -  Mg     2.0     -            Urinalysis Basic - ( 25 Mar 2021 19:59 )    Color: Light Yellow / Appearance: Clear / S.022 / pH: x  Gluc: x / Ketone: Negative  / Bili: Negative / Urobili: <2 mg/dL   Blood: x / Protein: Trace / Nitrite: Negative   Leuk Esterase: Negative / RBC: x / WBC x   Sq Epi: x / Non Sq Epi: x / Bacteria: x        RADIOLOGY & ADDITIONAL TESTS:    Imaging Personally Reviewed:    Consultant(s) Notes Reviewed:      Care Discussed with Consultants/Other Providers: Hematology: Will give Rituximab today, no PLT transfusion as per Hem rec.

## 2021-03-27 NOTE — PROGRESS NOTE ADULT - PROBLEM SELECTOR PLAN 1
Hx of chronic ITP since age 18 months. CTH negative, requiring multiple plt transfusions this admission. May be exacerbated by COVID, now s/p convalescent plasma (3/11)  -per heme recommendations will transfuse 1/2 unit platelets every 12 hrs for plt <5  or if bleeding.   -s/p IVIG x2, completed 2/28   -s/p Decadron 40 mg IV (2/28-3/3), cont Prednisone taper   -s/p Ritruxan infusion on 3/7 and 3/13  -C/w mepron for PCP ppx  - Hematology following  - Prednisone 70mg, decrease to 60mg 3/19   - received Rituximab on 3/20, and Romiplastin/Nplate dose 3/21  -S/P 1/2unit of PLT yesterday, Platelet count 6 today.  no signs of bleeding   -Rituximab today and Nplate on Sun as per Hem   -Will hold off PLT transfusion today as per Hem rec.   -Monitor PLT in AM, PLT transfusion for PLT<5 or pt develops bleeding as per Hem rec.   - Will f/u further  Hem rec.

## 2021-03-27 NOTE — PROGRESS NOTE ADULT - ASSESSMENT
19 yo M with a history of chronic ITP and severe autism (non-verbal at baseline) who presents with severe thrombocytopenia (Plt 7) while on home PO prednisone (80mg daily).     # Chronic ITP exacerbated in the setting of recent COVID infection   -CT head neg for bleed  -s/p IVIG 1gm/kg daily x 2 (completed 2/28) and dexamethasone 40mg IV x4 days (completed 3/3)  -S/p W1 Rituxan with desensitization protocol on 3/6; W2 Rituximab on 3/13; s/p W3 Rituximab on 3/20 with desensitization protocol, tolerated well. Week 4 of Rituxan with desensitization protocol today.  -S/p romiplastin/Nplate, 1mcg/kg; given 2/28; next dose 3/7 was 2mcg/kg (pt only got 250mcg instead of 300mcg as pharmacy only had 250); next dose on 3/14- 5mcg/kg (750mcg). s/p 750mcg (5mcg/kg) on 3/21.  Plan to continue with romiplostin for now (plan for 10mcg/mg = 1500mg on 3/28/21, confirmed with mother).  -Given autism, patient is unable to swallow eltrombopag pills (cannot be crushed or chewed). Will continue to discuss other possible formulations or oral thrombopoietin receptor agonists.  -Cont. prednisone taper - decrease by 10mg every 2 days.   -transfuse platelets only if bleeding (consider giving 1/2 unit platelets slowly infused over 3 hours q12h) OR if platelets <5k  -Continue mepron for PCP ppx while on steroids  -monitor closely for any signs or symptoms of bleeding, please call us with any questions.

## 2021-03-28 LAB
ANION GAP SERPL CALC-SCNC: 14 MMOL/L — SIGNIFICANT CHANGE UP (ref 7–14)
BUN SERPL-MCNC: 22 MG/DL — SIGNIFICANT CHANGE UP (ref 7–23)
CALCIUM SERPL-MCNC: 8.9 MG/DL — SIGNIFICANT CHANGE UP (ref 8.4–10.5)
CHLORIDE SERPL-SCNC: 102 MMOL/L — SIGNIFICANT CHANGE UP (ref 98–107)
CO2 SERPL-SCNC: 22 MMOL/L — SIGNIFICANT CHANGE UP (ref 22–31)
CREAT SERPL-MCNC: 0.7 MG/DL — SIGNIFICANT CHANGE UP (ref 0.5–1.3)
GLUCOSE SERPL-MCNC: 167 MG/DL — HIGH (ref 70–99)
HCT VFR BLD CALC: 38.4 % — LOW (ref 39–50)
HGB BLD-MCNC: 11.9 G/DL — LOW (ref 13–17)
MAGNESIUM SERPL-MCNC: 1.9 MG/DL — SIGNIFICANT CHANGE UP (ref 1.6–2.6)
MCHC RBC-ENTMCNC: 26.9 PG — LOW (ref 27–34)
MCHC RBC-ENTMCNC: 31 GM/DL — LOW (ref 32–36)
MCV RBC AUTO: 86.7 FL — SIGNIFICANT CHANGE UP (ref 80–100)
NRBC # BLD: 0 /100 WBCS — SIGNIFICANT CHANGE UP
NRBC # FLD: 0.02 K/UL — HIGH
PHOSPHATE SERPL-MCNC: 3.3 MG/DL — SIGNIFICANT CHANGE UP (ref 2.5–4.5)
PLATELET # BLD AUTO: 24 K/UL — LOW (ref 150–400)
POTASSIUM SERPL-MCNC: 3.9 MMOL/L — SIGNIFICANT CHANGE UP (ref 3.5–5.3)
POTASSIUM SERPL-SCNC: 3.9 MMOL/L — SIGNIFICANT CHANGE UP (ref 3.5–5.3)
RBC # BLD: 4.43 M/UL — SIGNIFICANT CHANGE UP (ref 4.2–5.8)
RBC # FLD: 15.7 % — HIGH (ref 10.3–14.5)
SODIUM SERPL-SCNC: 138 MMOL/L — SIGNIFICANT CHANGE UP (ref 135–145)
WBC # BLD: 22.73 K/UL — HIGH (ref 3.8–10.5)
WBC # FLD AUTO: 22.73 K/UL — HIGH (ref 3.8–10.5)

## 2021-03-28 PROCEDURE — 99233 SBSQ HOSP IP/OBS HIGH 50: CPT | Mod: GC

## 2021-03-28 PROCEDURE — 99233 SBSQ HOSP IP/OBS HIGH 50: CPT

## 2021-03-28 RX ORDER — ROMIPLOSTIM 250 UG/.5ML
1500 INJECTION, POWDER, LYOPHILIZED, FOR SOLUTION SUBCUTANEOUS ONCE
Refills: 0 | Status: COMPLETED | OUTPATIENT
Start: 2021-03-28 | End: 2021-03-28

## 2021-03-28 RX ADMIN — ATOVAQUONE 1500 MILLIGRAM(S): 750 SUSPENSION ORAL at 13:25

## 2021-03-28 RX ADMIN — Medication 300 MILLIGRAM(S): at 21:51

## 2021-03-28 RX ADMIN — ROMIPLOSTIM 1500 MICROGRAM(S): 250 INJECTION, POWDER, LYOPHILIZED, FOR SOLUTION SUBCUTANEOUS at 13:25

## 2021-03-28 RX ADMIN — Medication 1 APPLICATION(S): at 17:37

## 2021-03-28 RX ADMIN — Medication 400 MILLIGRAM(S): at 04:47

## 2021-03-28 RX ADMIN — Medication 1 APPLICATION(S): at 04:48

## 2021-03-28 RX ADMIN — Medication 30 MILLIGRAM(S): at 04:47

## 2021-03-28 RX ADMIN — CHLORHEXIDINE GLUCONATE 1 APPLICATION(S): 213 SOLUTION TOPICAL at 04:47

## 2021-03-28 RX ADMIN — Medication 400 MILLIGRAM(S): at 13:26

## 2021-03-28 RX ADMIN — CETIRIZINE HYDROCHLORIDE 10 MILLIGRAM(S): 10 TABLET ORAL at 21:51

## 2021-03-28 RX ADMIN — Medication 1 MILLIGRAM(S): at 21:56

## 2021-03-28 RX ADMIN — Medication 1 TABLET(S): at 13:25

## 2021-03-28 RX ADMIN — MUPIROCIN 1 APPLICATION(S): 20 OINTMENT TOPICAL at 04:48

## 2021-03-28 RX ADMIN — BREXPIPRAZOLE 6 MILLIGRAM(S): 0.25 TABLET ORAL at 13:25

## 2021-03-28 RX ADMIN — Medication 20 MILLIGRAM(S): at 04:49

## 2021-03-28 RX ADMIN — MUPIROCIN 1 APPLICATION(S): 20 OINTMENT TOPICAL at 17:37

## 2021-03-28 NOTE — PROGRESS NOTE ADULT - SUBJECTIVE AND OBJECTIVE BOX
Patient is a 21y old  Male who presents with a chief complaint of low platelets (28 Mar 2021 11:38)    SUBJECTIVE / OVERNIGHT EVENTS:  Patient received W4 of Rituximab with desensitization protocol yesterday without acute complications. No acute events overnight. Patient seen this afternoon. He was sitting comfortably in a chair watching an Ipad at time of visit. Unable to obtain history from patient as he is nonverbal at baseline. Per PCA at bedside, patient has been doing well today. No evidence of bleeding to report. PLT count is improved today (6k-->24k).    MEDICATIONS  (STANDING):  atovaquone  Suspension 1500 milliGRAM(s) Oral daily  BACItracin   Ointment 1 Application(s) Topical two times a day  brexpiprazole 6 milliGRAM(s) Oral daily  cetirizine 10 milliGRAM(s) Oral at bedtime  chlorhexidine 4% Liquid 1 Application(s) Topical <User Schedule>  cimetidine 400 milliGRAM(s) Oral three times a day  fluticasone propionate 50 MICROgram(s)/spray Nasal Spray 1 Spray(s) Both Nostrils two times a day  furosemide    Tablet 20 milliGRAM(s) Oral daily  influenza   Vaccine 0.5 milliLiter(s) IntraMuscular once  LORazepam     Tablet 1 milliGRAM(s) Oral at bedtime  multivitamin 1 Tablet(s) Oral daily  mupirocin 2% Ointment 1 Application(s) Topical two times a day  predniSONE   Tablet   Oral   sodium chloride 0.9%. 1000 milliLiter(s) (10 mL/Hr) IV Continuous <Continuous>  traZODone 300 milliGRAM(s) Oral at bedtime    MEDICATIONS  (PRN):  ALBUTerol    0.083%. 2.5 milliGRAM(s) Nebulizer once PRN PRN Chemotherapy Reaction  ALBUTerol    0.083%. 2.5 milliGRAM(s) Nebulizer once PRN PRN Chemotherapy Reaction  diphenhydrAMINE   Injectable 50 milliGRAM(s) IV Push once PRN PRN Chemotherapy Reaction  EPINEPHrine     1 mG/mL Injectable 0.3 milliGRAM(s) IntraMuscular once PRN PRN Chemotherapy Reaction  EPINEPHrine     1 mG/mL Injectable 0.3 milliGRAM(s) IntraMuscular once PRN PRN Chemotherapy Reaction  hydrocortisone sodium succinate Injectable 100 milliGRAM(s) IV Push once PRN PRN Chemotherapy Reaction  hydrocortisone sodium succinate Injectable 100 milliGRAM(s) IV Push once PRN PRN Chemotherapy Reaction  meperidine     Injectable 25 milliGRAM(s) IV Push once PRN PRN Chemotherapy Reaction (Rigors)  mineral oil for Topical Use 1 Application(s) Topical three times a day PRN to clean area after BM  zinc oxide 20% Ointment 1 Application(s) Topical three times a day PRN rash, apply to the area after each BM        CAPILLARY BLOOD GLUCOSE        I&O's Summary    27 Mar 2021 07:01  -  28 Mar 2021 07:00  --------------------------------------------------------  IN: 480 mL / OUT: 0 mL / NET: 480 mL    Vital Signs Last 24 Hrs  T(C): 36.6 (28 Mar 2021 04:45), Max: 36.8 (27 Mar 2021 20:37)  T(F): 97.8 (28 Mar 2021 04:45), Max: 98.3 (27 Mar 2021 20:37)  HR: 78 (28 Mar 2021 04:45) (78 - 88)  BP: 137/88 (28 Mar 2021 04:45) (137/88 - 140/88)  BP(mean): --  RR: 16 (28 Mar 2021 04:45) (16 - 17)  SpO2: 100% (28 Mar 2021 04:45) (99% - 100%)    PHYSICAL EXAM:  GENERAL: NAD, Sitting in a chair watching Ipad  HEENT: NC/AT, Sclera anicteric  NECK: Supple  CHEST/LUNG: Respirations grossly nonlabored  HEART: RRR  ABDOMEN: +BS, Soft, NT  EXTREMITIES: Bruising on B/L shins, Closed wounds on shins, No discharge seen  NEUROLOGY: Awake and alert, Non-communicative due to autism, Moving all extremities  SKIN: Warm and dry, Appearance of LEs as noted above  PSYCH: Unable to assess    LABS:                        11.9   22.73 )-----------( 24       ( 28 Mar 2021 05:54 )             38.4     03-28    138  |  102  |  22  ----------------------------<  167<H>  3.9   |  22  |  0.70    Ca    8.9      28 Mar 2021 05:54  Phos  3.3     03-28  Mg     1.9     03-28    RADIOLOGY & ADDITIONAL TESTS:  Studies reviewed.

## 2021-03-28 NOTE — PROGRESS NOTE ADULT - PROBLEM SELECTOR PLAN 1
Hx of chronic ITP since age 18 months. CTH negative, requiring multiple plt transfusions this admission. May be exacerbated by COVID, now s/p convalescent plasma (3/11)  -per heme recommendations will transfuse 1/2 unit platelets every 12 hrs for plt <5  or if bleeding.   -s/p IVIG x2, completed 2/28   -s/p Decadron 40 mg IV (2/28-3/3), cont Prednisone taper   -s/p Ritruxan infusion on 3/7 and 3/13  -C/w mepron for PCP ppx  - Hematology following  - Prednisone 70mg, decrease to 60mg 3/19   - received Rituximab on 3/20, and Romiplastin/Nplate dose 3/21  -S/P 1/2unit of PLT yesterday, Platelet count 6 today.  no signs of bleeding   -Rituximab yesterday  and Nplate today as per Hem, PLT 24 today   -Monitor PLT in AM, PLT transfusion for PLT<5 or pt develops bleeding as per Hem rec.   - Will f/u further  Hem rec.

## 2021-03-28 NOTE — PROGRESS NOTE ADULT - SUBJECTIVE AND OBJECTIVE BOX
Patient is a 21y old  Male who presents with a chief complaint of low platelets (27 Mar 2021 13:03)      SUBJECTIVE / OVERNIGHT EVENTS: No complaints today.     MEDICATIONS  (STANDING):  atovaquone  Suspension 1500 milliGRAM(s) Oral daily  BACItracin   Ointment 1 Application(s) Topical two times a day  brexpiprazole 6 milliGRAM(s) Oral daily  cetirizine 10 milliGRAM(s) Oral at bedtime  chlorhexidine 4% Liquid 1 Application(s) Topical <User Schedule>  cimetidine 400 milliGRAM(s) Oral three times a day  fluticasone propionate 50 MICROgram(s)/spray Nasal Spray 1 Spray(s) Both Nostrils two times a day  furosemide    Tablet 20 milliGRAM(s) Oral daily  influenza   Vaccine 0.5 milliLiter(s) IntraMuscular once  LORazepam     Tablet 1 milliGRAM(s) Oral at bedtime  multivitamin 1 Tablet(s) Oral daily  mupirocin 2% Ointment 1 Application(s) Topical two times a day  predniSONE   Tablet   Oral   romiPLOStim Injectable 1500 MICROGram(s) SubCutaneous once  sodium chloride 0.9%. 1000 milliLiter(s) (10 mL/Hr) IV Continuous <Continuous>  traZODone 300 milliGRAM(s) Oral at bedtime    MEDICATIONS  (PRN):  ALBUTerol    0.083%. 2.5 milliGRAM(s) Nebulizer once PRN PRN Chemotherapy Reaction  ALBUTerol    0.083%. 2.5 milliGRAM(s) Nebulizer once PRN PRN Chemotherapy Reaction  diphenhydrAMINE   Injectable 50 milliGRAM(s) IV Push once PRN PRN Chemotherapy Reaction  EPINEPHrine     1 mG/mL Injectable 0.3 milliGRAM(s) IntraMuscular once PRN PRN Chemotherapy Reaction  EPINEPHrine     1 mG/mL Injectable 0.3 milliGRAM(s) IntraMuscular once PRN PRN Chemotherapy Reaction  hydrocortisone sodium succinate Injectable 100 milliGRAM(s) IV Push once PRN PRN Chemotherapy Reaction  hydrocortisone sodium succinate Injectable 100 milliGRAM(s) IV Push once PRN PRN Chemotherapy Reaction  meperidine     Injectable 25 milliGRAM(s) IV Push once PRN PRN Chemotherapy Reaction (Rigors)  mineral oil for Topical Use 1 Application(s) Topical three times a day PRN to clean area after BM  zinc oxide 20% Ointment 1 Application(s) Topical three times a day PRN rash, apply to the area after each BM      Vital Signs Last 24 Hrs  T(C): 36.6 (28 Mar 2021 04:45), Max: 37 (27 Mar 2021 11:51)  T(F): 97.8 (28 Mar 2021 04:45), Max: 98.6 (27 Mar 2021 11:51)  HR: 78 (28 Mar 2021 04:45) (63 - 88)  BP: 137/88 (28 Mar 2021 04:45) (120/72 - 140/88)  BP(mean): --  RR: 16 (28 Mar 2021 04:45) (16 - 18)  SpO2: 100% (28 Mar 2021 04:45) (96% - 100%)  CAPILLARY BLOOD GLUCOSE        I&O's Summary    27 Mar 2021 07:01  -  28 Mar 2021 07:00  --------------------------------------------------------  IN: 480 mL / OUT: 0 mL / NET: 480 mL        PHYSICAL EXAM:  GENERAL: NAD, well-developed  HEAD:  Atraumatic, Normocephalic  EYES: EOMI, PERRLA, conjunctiva and sclera clear  NECK: Supple, No JVD  CHEST/LUNG: Clear to auscultation bilaterally; No wheeze  HEART: Regular rate and rhythm; No murmurs, rubs, or gallops  ABDOMEN: Soft, Nontender, Nondistended; Bowel sounds present  EXTREMITIES:  (+) edema b/l legs, (+) rash and wound at shin b/l   PSYCH: AAOx3  NEUROLOGY: non-focal    LABS:                        11.9   22.73 )-----------( 24       ( 28 Mar 2021 05:54 )             38.4     03-28    138  |  102  |  22  ----------------------------<  167<H>  3.9   |  22  |  0.70    Ca    8.9      28 Mar 2021 05:54  Phos  3.3     03-28  Mg     1.9     03-28                RADIOLOGY & ADDITIONAL TESTS:    Imaging Personally Reviewed:    Consultant(s) Notes Reviewed:      Care Discussed with Consultants/Other Providers:

## 2021-03-28 NOTE — PROGRESS NOTE ADULT - ASSESSMENT
21 yo M with a history of chronic ITP and severe autism (non-verbal at baseline) who presented with severe thrombocytopenia (Plt 7) while on home PO prednisone (80mg daily), and was admitted to Medicine for further management. Hematology consulted for assistance with management.    # Chronic ITP exacerbated in the setting of recent COVID infection   - Previous CT head neg for bleed  - S/p IVIG 1gm/kg daily x 2 (completed 2/28) and dexamethasone 40mg IV x4 days (completed 3/3)  - S/p W1 Rituximab with desensitization protocol on 3/6; W2 Rituximab on 3/13; s/p W3 Rituximab on 3/20 with desensitization protocol, tolerated well. S/p W4 Rituximab with desensitization protocol yesterday (3/27), tolerated well.   - S/p romiplastim/Nplate, 1mcg/kg; given 2/28; 2mcg/kg given on 3/7 (pt only got 250mcg instead of 300mcg as pharmacy only had 250); 5mcg/kg (750mcg) given on 3/14, S/p 750mcg (5mcg/kg) on 3/21. Plan to continue with romiplostin for now. Plan to give 10mcg/kg (1500mcg) today.   - Given autism, patient is unable to swallow eltrombopag pills (cannot be crushed or chewed). Will continue to discuss other possible formulations or oral thrombopoietin receptor agonists.  - Cont. prednisone taper - decrease by 10mg every 2 days.   - Transfuse platelets only if bleeding (consider giving 1/2 unit platelets slowly infused over 3 hours q12h) OR if platelets <5k  - Continue mepron for PCP ppx while on steroids  - Monitor closely for any signs or symptoms of bleeding  - Will continue to follow    Plan d/w primary team    Scott Banerjee, PGY5  Hematology-Oncology Fellow  Pager; [phone] / [phone]

## 2021-03-28 NOTE — PROGRESS NOTE ADULT - PROBLEM SELECTOR PLAN 4
Derm consult note appreciated.  Cont wound care/ skin care as per Derm   Cont. lasix 20mg daily as per Derm rec. Pt tolerated Lasix w/o any adverse reaction.     D/W ACP

## 2021-03-28 NOTE — PROGRESS NOTE ADULT - ATTENDING COMMENTS
20 y.o WM w severe ITP s/p W4 rituximab yesterday, due for romiplostin today. No active bleeding, plt increased to 20s today. Cont tapering steroids.

## 2021-03-29 LAB
ANION GAP SERPL CALC-SCNC: 11 MMOL/L — SIGNIFICANT CHANGE UP (ref 7–14)
BUN SERPL-MCNC: 15 MG/DL — SIGNIFICANT CHANGE UP (ref 7–23)
CALCIUM SERPL-MCNC: 8.9 MG/DL — SIGNIFICANT CHANGE UP (ref 8.4–10.5)
CHLORIDE SERPL-SCNC: 97 MMOL/L — LOW (ref 98–107)
CO2 SERPL-SCNC: 27 MMOL/L — SIGNIFICANT CHANGE UP (ref 22–31)
CREAT SERPL-MCNC: 0.95 MG/DL — SIGNIFICANT CHANGE UP (ref 0.5–1.3)
GLUCOSE SERPL-MCNC: 96 MG/DL — SIGNIFICANT CHANGE UP (ref 70–99)
HCT VFR BLD CALC: 36.6 % — LOW (ref 39–50)
HGB BLD-MCNC: 10.9 G/DL — LOW (ref 13–17)
MAGNESIUM SERPL-MCNC: 1.7 MG/DL — SIGNIFICANT CHANGE UP (ref 1.6–2.6)
MCHC RBC-ENTMCNC: 25.8 PG — LOW (ref 27–34)
MCHC RBC-ENTMCNC: 29.8 GM/DL — LOW (ref 32–36)
MCV RBC AUTO: 86.7 FL — SIGNIFICANT CHANGE UP (ref 80–100)
NRBC # BLD: 0 /100 WBCS — SIGNIFICANT CHANGE UP
NRBC # FLD: 0 K/UL — SIGNIFICANT CHANGE UP
PHOSPHATE SERPL-MCNC: 3.5 MG/DL — SIGNIFICANT CHANGE UP (ref 2.5–4.5)
PLATELET # BLD AUTO: 4 K/UL — CRITICAL LOW (ref 150–400)
POTASSIUM SERPL-MCNC: 3.6 MMOL/L — SIGNIFICANT CHANGE UP (ref 3.5–5.3)
POTASSIUM SERPL-SCNC: 3.6 MMOL/L — SIGNIFICANT CHANGE UP (ref 3.5–5.3)
RBC # BLD: 4.22 M/UL — SIGNIFICANT CHANGE UP (ref 4.2–5.8)
RBC # FLD: 15.9 % — HIGH (ref 10.3–14.5)
SODIUM SERPL-SCNC: 135 MMOL/L — SIGNIFICANT CHANGE UP (ref 135–145)
WBC # BLD: 18.98 K/UL — HIGH (ref 3.8–10.5)
WBC # FLD AUTO: 18.98 K/UL — HIGH (ref 3.8–10.5)

## 2021-03-29 PROCEDURE — 99233 SBSQ HOSP IP/OBS HIGH 50: CPT

## 2021-03-29 PROCEDURE — 93970 EXTREMITY STUDY: CPT | Mod: 26

## 2021-03-29 PROCEDURE — 99232 SBSQ HOSP IP/OBS MODERATE 35: CPT | Mod: GC

## 2021-03-29 RX ORDER — ELTROMBOPAG OLAMINE 50 MG/1
50 TABLET, FILM COATED ORAL DAILY
Qty: 30 | Refills: 0 | Status: DISCONTINUED | COMMUNITY
Start: 2021-03-24 | End: 2021-03-29

## 2021-03-29 RX ORDER — CEFAZOLIN SODIUM 1 G
2000 VIAL (EA) INJECTION EVERY 8 HOURS
Refills: 0 | Status: DISCONTINUED | OUTPATIENT
Start: 2021-03-29 | End: 2021-03-30

## 2021-03-29 RX ORDER — ACETAMINOPHEN 500 MG
650 TABLET ORAL ONCE
Refills: 0 | Status: COMPLETED | OUTPATIENT
Start: 2021-03-29 | End: 2021-03-29

## 2021-03-29 RX ORDER — PREDNISONE 20 MG/1
20 TABLET ORAL DAILY
Qty: 80 | Refills: 3 | Status: DISCONTINUED | COMMUNITY
Start: 2021-02-09 | End: 2021-03-29

## 2021-03-29 RX ORDER — ACETAMINOPHEN 500 MG
1000 TABLET ORAL ONCE
Refills: 0 | Status: COMPLETED | OUTPATIENT
Start: 2021-03-29 | End: 2021-03-29

## 2021-03-29 RX ORDER — FUROSEMIDE 40 MG
40 TABLET ORAL DAILY
Refills: 0 | Status: DISCONTINUED | OUTPATIENT
Start: 2021-03-29 | End: 2021-04-12

## 2021-03-29 RX ORDER — CEFAZOLIN SODIUM 1 G
VIAL (EA) INJECTION
Refills: 0 | Status: DISCONTINUED | OUTPATIENT
Start: 2021-03-29 | End: 2021-03-29

## 2021-03-29 RX ADMIN — Medication 20 MILLIGRAM(S): at 05:53

## 2021-03-29 RX ADMIN — Medication 110 MILLIGRAM(S): at 13:48

## 2021-03-29 RX ADMIN — ATOVAQUONE 1500 MILLIGRAM(S): 750 SUSPENSION ORAL at 14:19

## 2021-03-29 RX ADMIN — Medication 100 MILLIGRAM(S): at 21:12

## 2021-03-29 RX ADMIN — BREXPIPRAZOLE 6 MILLIGRAM(S): 0.25 TABLET ORAL at 14:19

## 2021-03-29 RX ADMIN — Medication 1 MILLIGRAM(S): at 21:12

## 2021-03-29 RX ADMIN — Medication 1 TABLET(S): at 14:19

## 2021-03-29 RX ADMIN — Medication 300 MILLIGRAM(S): at 21:11

## 2021-03-29 RX ADMIN — Medication 100 MILLIGRAM(S): at 14:29

## 2021-03-29 RX ADMIN — Medication 400 MILLIGRAM(S): at 21:11

## 2021-03-29 RX ADMIN — Medication 650 MILLIGRAM(S): at 21:12

## 2021-03-29 RX ADMIN — Medication 400 MILLIGRAM(S): at 14:18

## 2021-03-29 RX ADMIN — Medication 400 MILLIGRAM(S): at 14:19

## 2021-03-29 RX ADMIN — MUPIROCIN 1 APPLICATION(S): 20 OINTMENT TOPICAL at 17:48

## 2021-03-29 RX ADMIN — Medication 400 MILLIGRAM(S): at 05:53

## 2021-03-29 RX ADMIN — CETIRIZINE HYDROCHLORIDE 10 MILLIGRAM(S): 10 TABLET ORAL at 21:11

## 2021-03-29 RX ADMIN — CHLORHEXIDINE GLUCONATE 1 APPLICATION(S): 213 SOLUTION TOPICAL at 07:22

## 2021-03-29 RX ADMIN — Medication 1000 MILLIGRAM(S): at 15:00

## 2021-03-29 NOTE — PROGRESS NOTE ADULT - SUBJECTIVE AND OBJECTIVE BOX
Patient is a 21y old  Male who presents with a chief complaint of low platelets (29 Mar 2021 09:06)      SUBJECTIVE / OVERNIGHT EVENTS:    Pt seen and examined at bedside  Today AM he was OOB to chair sitting up comfortably  per mom pt in usual mood, denies anxiety, agitation.  has good appetite.  per RN and mom pt has worsening RLE erythema and swelling.    MEDICATIONS  (STANDING):  atovaquone  Suspension 1500 milliGRAM(s) Oral daily  BACItracin   Ointment 1 Application(s) Topical two times a day  brexpiprazole 6 milliGRAM(s) Oral daily  cetirizine 10 milliGRAM(s) Oral at bedtime  chlorhexidine 4% Liquid 1 Application(s) Topical <User Schedule>  cimetidine 400 milliGRAM(s) Oral three times a day  fluticasone propionate 50 MICROgram(s)/spray Nasal Spray 1 Spray(s) Both Nostrils two times a day  furosemide    Tablet 20 milliGRAM(s) Oral daily  influenza   Vaccine 0.5 milliLiter(s) IntraMuscular once  LORazepam     Tablet 1 milliGRAM(s) Oral at bedtime  multivitamin 1 Tablet(s) Oral daily  mupirocin 2% Ointment 1 Application(s) Topical two times a day  predniSONE   Tablet   Oral   predniSONE   Tablet 20 milliGRAM(s) Oral daily  sodium chloride 0.9%. 1000 milliLiter(s) (10 mL/Hr) IV Continuous <Continuous>  traZODone 300 milliGRAM(s) Oral at bedtime    MEDICATIONS  (PRN):  ALBUTerol    0.083%. 2.5 milliGRAM(s) Nebulizer once PRN PRN Chemotherapy Reaction  ALBUTerol    0.083%. 2.5 milliGRAM(s) Nebulizer once PRN PRN Chemotherapy Reaction  diphenhydrAMINE   Injectable 50 milliGRAM(s) IV Push once PRN PRN Chemotherapy Reaction  EPINEPHrine     1 mG/mL Injectable 0.3 milliGRAM(s) IntraMuscular once PRN PRN Chemotherapy Reaction  EPINEPHrine     1 mG/mL Injectable 0.3 milliGRAM(s) IntraMuscular once PRN PRN Chemotherapy Reaction  hydrocortisone sodium succinate Injectable 100 milliGRAM(s) IV Push once PRN PRN Chemotherapy Reaction  hydrocortisone sodium succinate Injectable 100 milliGRAM(s) IV Push once PRN PRN Chemotherapy Reaction  meperidine     Injectable 25 milliGRAM(s) IV Push once PRN PRN Chemotherapy Reaction (Rigors)  mineral oil for Topical Use 1 Application(s) Topical three times a day PRN to clean area after BM  zinc oxide 20% Ointment 1 Application(s) Topical three times a day PRN rash, apply to the area after each BM      Vital Signs Last 24 Hrs  T(C): 37.2 (29 Mar 2021 05:49), Max: 37.2 (29 Mar 2021 05:49)  T(F): 98.9 (29 Mar 2021 05:49), Max: 98.9 (29 Mar 2021 05:49)  HR: 102 (29 Mar 2021 05:49) (97 - 106)  BP: 131/50 (29 Mar 2021 05:49) (119/63 - 131/50)  BP(mean): --  RR: 19 (29 Mar 2021 05:49) (16 - 19)  SpO2: 99% (29 Mar 2021 05:49) (99% - 100%)      PHYSICAL EXAM:      PHYSICAL EXAM:  GENERAL: NAD, well-developed  HEAD:  Atraumatic, Normocephalic  EYES: EOMI, PERRLA, conjunctiva and sclera clear  NECK: Supple, No JVD  CHEST/LUNG: Clear to auscultation bilaterally; No wheeze  HEART: Regular rate and rhythm; No murmurs, rubs, or gallops  ABDOMEN: Soft, Nontender, Nondistended; Bowel sounds present  EXTREMITIES:  (+) edema b/l legs, (+) rash and wound at shin b/l   PSYCH: AAOx3  NEUROLOGY: non-focal      LABS:                        10.9   18.98 )-----------( 4        ( 29 Mar 2021 07:12 )             36.6     03-29    135  |  97<L>  |  15  ----------------------------<  96  3.6   |  27  |  0.95    Ca    8.9      29 Mar 2021 07:12  Phos  3.5     03-29  Mg     1.7     03-29                RADIOLOGY & ADDITIONAL TESTS:    Imaging Personally Reviewed:    Consultant(s) Notes Reviewed:      Care Discussed with Consultants/Other Providers:   Patient is a 21y old  Male who presents with a chief complaint of low platelets (29 Mar 2021 09:06)      SUBJECTIVE / OVERNIGHT EVENTS:    Pt seen and examined at bedside  Today AM he was OOB to chair sitting up comfortably  per mom pt in usual mood, denies anxiety, agitation.  has good appetite.  per RN and mom pt has worsening RLE erythema and swelling.    MEDICATIONS  (STANDING):  atovaquone  Suspension 1500 milliGRAM(s) Oral daily  BACItracin   Ointment 1 Application(s) Topical two times a day  brexpiprazole 6 milliGRAM(s) Oral daily  cetirizine 10 milliGRAM(s) Oral at bedtime  chlorhexidine 4% Liquid 1 Application(s) Topical <User Schedule>  cimetidine 400 milliGRAM(s) Oral three times a day  fluticasone propionate 50 MICROgram(s)/spray Nasal Spray 1 Spray(s) Both Nostrils two times a day  furosemide    Tablet 20 milliGRAM(s) Oral daily  influenza   Vaccine 0.5 milliLiter(s) IntraMuscular once  LORazepam     Tablet 1 milliGRAM(s) Oral at bedtime  multivitamin 1 Tablet(s) Oral daily  mupirocin 2% Ointment 1 Application(s) Topical two times a day  predniSONE   Tablet   Oral   predniSONE   Tablet 20 milliGRAM(s) Oral daily  sodium chloride 0.9%. 1000 milliLiter(s) (10 mL/Hr) IV Continuous <Continuous>  traZODone 300 milliGRAM(s) Oral at bedtime    MEDICATIONS  (PRN):  ALBUTerol    0.083%. 2.5 milliGRAM(s) Nebulizer once PRN PRN Chemotherapy Reaction  ALBUTerol    0.083%. 2.5 milliGRAM(s) Nebulizer once PRN PRN Chemotherapy Reaction  diphenhydrAMINE   Injectable 50 milliGRAM(s) IV Push once PRN PRN Chemotherapy Reaction  EPINEPHrine     1 mG/mL Injectable 0.3 milliGRAM(s) IntraMuscular once PRN PRN Chemotherapy Reaction  EPINEPHrine     1 mG/mL Injectable 0.3 milliGRAM(s) IntraMuscular once PRN PRN Chemotherapy Reaction  hydrocortisone sodium succinate Injectable 100 milliGRAM(s) IV Push once PRN PRN Chemotherapy Reaction  hydrocortisone sodium succinate Injectable 100 milliGRAM(s) IV Push once PRN PRN Chemotherapy Reaction  meperidine     Injectable 25 milliGRAM(s) IV Push once PRN PRN Chemotherapy Reaction (Rigors)  mineral oil for Topical Use 1 Application(s) Topical three times a day PRN to clean area after BM  zinc oxide 20% Ointment 1 Application(s) Topical three times a day PRN rash, apply to the area after each BM      Vital Signs Last 24 Hrs  T(C): 37.2 (29 Mar 2021 05:49), Max: 37.2 (29 Mar 2021 05:49)  T(F): 98.9 (29 Mar 2021 05:49), Max: 98.9 (29 Mar 2021 05:49)  HR: 102 (29 Mar 2021 05:49) (97 - 106)  BP: 131/50 (29 Mar 2021 05:49) (119/63 - 131/50)  BP(mean): --  RR: 19 (29 Mar 2021 05:49) (16 - 19)  SpO2: 99% (29 Mar 2021 05:49) (99% - 100%)      PHYSICAL EXAM:      PHYSICAL EXAM:  GENERAL: NAD, well-developed  HEAD:  Atraumatic, Normocephalic  EYES: EOMI, PERRLA, conjunctiva and sclera clear  NECK: Supple, No JVD  CHEST/LUNG: Clear to auscultation bilaterally; No wheeze  HEART: Regular rate and rhythm; No murmurs, rubs, or gallops  ABDOMEN: Soft, Nontender, Nondistended; Bowel sounds present  EXTREMITIES:  (+) edema b/l legs, (+) rash and wound at shin b/l with worsening RLE erythema  PSYCH: AAOx3  NEUROLOGY: non-focal      LABS:                        10.9   18.98 )-----------( 4        ( 29 Mar 2021 07:12 )             36.6     03-29    135  |  97<L>  |  15  ----------------------------<  96  3.6   |  27  |  0.95    Ca    8.9      29 Mar 2021 07:12  Phos  3.5     03-29  Mg     1.7     03-29                RADIOLOGY & ADDITIONAL TESTS:    Imaging Personally Reviewed:    Consultant(s) Notes Reviewed:      Care Discussed with Consultants/Other Providers:

## 2021-03-29 NOTE — CHART NOTE - NSCHARTNOTEFT_GEN_A_CORE
Patient  with increased RLE edema and erythema. Per nurse both have increased from beginning of shift.   Patient seen and examined at bedside. Right lower extremity with erythema from ankle to knee. RLE warm to touch. Both right and left lower extremities swollen. Will sign off to day team. Bilateral lower extremity dopplers ordered.

## 2021-03-29 NOTE — PROGRESS NOTE ADULT - ASSESSMENT
# Purpuric patches of lower legs- Suspect lesions are secondary to significant underlying swelling of lower extremities. Edema likely triggering erosions on legs, and thrombocytopenia causing hemorrhagic appearance. If lesions progress up the legs, then could also consider a cutaneous vasculitis (given inflammatory border of many of these macules with central erosion). Will continue to monitor for clinical signs pointing to such a diagnosis. Lesions may continue to persist as long as there is underlying edema- which we are anticipating will improve with time given patient is now on steroid taper. Now with superinfection that is consistent with right lower extremity cellulitis.   - Continue application of mupirocin ointment to eroded lesions on bilateral shins  - Continue IV antibiotics per primary team   - STOP Aquacel dressing as wounds no longer weeping  - Recommend continuing to control leg edema with diuresis and ACE wraps. Please ensure that ACE wraps cover the bilateral lower extremities/calves and not just the areas with overlying skin changes/erosions.   -Try to keep legs elevated as much as possible, though understand that this may be difficult to accomplish   -Please continue to monitor for any changes in lesions, such as rapid expansion or ulceration     2) Irritant contact dermatitis of intergluteal fold  - Stop using wipes and fragranced barrier cream  - Apply zinc oxide 40% cream to area several times per day  - Please provide mom with pure mineral oil and several cotton swabs to clean the area after patient has bowel movements    The patient's chart was reviewed in addition to being seen and examined at bedside with the dermatology attending Dr. Marily Mock.  Recommendations were communicated with the primary team.  Please page [phone] for further related questions (please leave 10 digit call back number because we cover several facilities).    Brenda Suarez MD  Resident Physician, PGY2  [facility]

## 2021-03-29 NOTE — PROGRESS NOTE ADULT - PROBLEM SELECTOR PLAN 4
Derm consult note appreciated.  Cont wound care/ skin care as per Derm   Escalate to lasix 40mg daily as per Derm rec. Pt tolerated Lasix 20 mg w/o any adverse reaction.   obtain RLE US duplex to r/o DVT, initial US b/l LE 03/15 neg for DVT    D/W ACP

## 2021-03-29 NOTE — PROGRESS NOTE ADULT - ATTENDING COMMENTS
bilateral legs with edema bullae--now eroded and with superimposed right leg cellulitis. Low suspicion for vasculitis- favor underlying thrombocytopenia as reason for purpuric morphology on legs.

## 2021-03-29 NOTE — PROGRESS NOTE ADULT - ASSESSMENT
21 y.o. Male h/o intellectual disability, autism, nonverbal at baseline, chronic ITP on 80mg of daily prednisone, hospitalized for thrombocytopenia noted on OP labs, admitted for management of refractory ITP. Platelet count now markedly improved s/p ritoxan on 3/27 however now downtrended to 4. Needs wound care for lesions on shins.

## 2021-03-29 NOTE — PROVIDER CONTACT NOTE (OTHER) - ASSESSMENT
Post platelet transfusion VS, elevated temp earlier in day. Blood cultures drawn during day shift. Post platelet transfusion VS, elevated temp earlier in day. Blood cultures drawn during day shift. Patient asymptomatic, laying in bed sleeping.

## 2021-03-29 NOTE — PROGRESS NOTE ADULT - PROBLEM SELECTOR PLAN 1
Hx of chronic ITP since age 18 months. CTH negative, requiring multiple plt transfusions this admission. May be exacerbated by COVID, now s/p convalescent plasma (3/11)  -per heme recommendations will transfuse 1/2 unit platelets every 12 hrs for plt <5  or if bleeding.   -s/p IVIG x2, completed 2/28, heme may recommend repeat IVIG since pt recovered from COVID and may respond to tx  -s/p Decadron 40 mg IV (2/28-3/3), cont Prednisone taper   -s/p Ritruxan infusion on 3/7 and 3/13  -C/w mepron for PCP ppx  - Hematology following  - Prednisone 70mg, decrease to 60mg 3/19   - received Rituximab on 3/20, and Romiplastin/Nplate dose 3/21  -S/P 1/2unit of PLT yesterday, Platelet count 6 today.  no signs of bleeding   -Rituximab 3/20,3/27 and Nplate 3/28 as per Hem, PLT 4 today  -Monitor PLT in AM, PLT transfusion for PLT<5 or pt develops bleeding as per Hem rec.   - Will f/u further  Hem rec.

## 2021-03-29 NOTE — CHART NOTE - NSCHARTNOTEFT_GEN_A_CORE
Patient with temperature of 100.2 orally taken by staff , mother at bedside reports 100.6 axillary . Otherwise hemodynamically stable at this time . Unable to transfer patient to bed to check rectal temp.   Given Worsening erythema / oral temperature will start antibiotics.  Patient unable to take oral meds consistently, therefore will start IV Ancef/ Doxy for now .   LE dopplers changed to portable   d/w dermatology will see patient today   Nursing staff d/w blood bank - will need to hold platelet  transfusion for now .   IF afebrile will reattempt to give platelets this afternoon   Above plan discussed with attending Patient with temperature of 100.2 orally taken by staff , mother at bedside reports 100.6 axillary . Otherwise hemodynamically stable at this time . Unable to transfer patient to bed to check rectal temp.   Given Worsening erythema / oral temperature will check blood cultures and start antibiotics.  Patient unable to take oral meds consistently, therefore will start IV Ancef/ Doxy for now .   LE dopplers changed to portable   d/w dermatology will see patient today   Nursing staff d/w blood bank - will need to hold platelet  transfusion for now .   IF afebrile will reattempt to give platelets this afternoon   Above plan discussed with attending Patient with temperature of 100.2 orally taken by staff , mother at bedside reports 100.6 axillary . Otherwise hemodynamically stable at this time . Unable to transfer patient to bed to check rectal temp.   Given Worsening erythema / oral temperature will check blood cultures and start antibiotics.  Patient unable to take oral meds consistently, therefore will start IV Ancef/ Doxy for now .   LE dopplers changed to portable   d/w dermatology will see patient today   Nursing staff d/w blood bank - will need to hold platelet  transfusion for now .   IF afebrile will reattempt to give platelets this afternoon   Above plan discussed with attending    Addendum 3: 45 pm   d/w hematology / blood bank / RN - ok to proceed with platelet transfusion at this time .

## 2021-03-29 NOTE — PROGRESS NOTE ADULT - ATTENDING COMMENTS
Pt seen with mother at bedside. Pt with BLE edema along with leg ulceration over the shin and new erythema over the back of the RLE. He had fever felt due to cellulitis of the RLE and started on cefazolin. He will have platelet transfusion: slow transfusion. We reviewed overall plan which has not changed: will proceed with higher dose Nplate next week and IVIG tomorrow if fever curve is stable. Continue to trend CBC for refractory ITP.

## 2021-03-29 NOTE — PROGRESS NOTE ADULT - SUBJECTIVE AND OBJECTIVE BOX
INTERVAL HPI/OVERNIGHT EVENTS:    Patient febrile today  Increased erythema and pain of RLE     MEDICATIONS  (STANDING):  atovaquone  Suspension 1500 milliGRAM(s) Oral daily  BACItracin   Ointment 1 Application(s) Topical two times a day  brexpiprazole 6 milliGRAM(s) Oral daily  ceFAZolin   IVPB 2000 milliGRAM(s) IV Intermittent every 8 hours  cetirizine 10 milliGRAM(s) Oral at bedtime  chlorhexidine 4% Liquid 1 Application(s) Topical <User Schedule>  cimetidine 400 milliGRAM(s) Oral three times a day  doxycycline IVPB      fluticasone propionate 50 MICROgram(s)/spray Nasal Spray 1 Spray(s) Both Nostrils two times a day  furosemide    Tablet 40 milliGRAM(s) Oral daily  influenza   Vaccine 0.5 milliLiter(s) IntraMuscular once  LORazepam     Tablet 1 milliGRAM(s) Oral at bedtime  multivitamin 1 Tablet(s) Oral daily  mupirocin 2% Ointment 1 Application(s) Topical two times a day  predniSONE   Tablet   Oral   predniSONE   Tablet 20 milliGRAM(s) Oral daily  sodium chloride 0.9%. 1000 milliLiter(s) (10 mL/Hr) IV Continuous <Continuous>  traZODone 300 milliGRAM(s) Oral at bedtime    MEDICATIONS  (PRN):  ALBUTerol    0.083%. 2.5 milliGRAM(s) Nebulizer once PRN PRN Chemotherapy Reaction  ALBUTerol    0.083%. 2.5 milliGRAM(s) Nebulizer once PRN PRN Chemotherapy Reaction  diphenhydrAMINE   Injectable 50 milliGRAM(s) IV Push once PRN PRN Chemotherapy Reaction  EPINEPHrine     1 mG/mL Injectable 0.3 milliGRAM(s) IntraMuscular once PRN PRN Chemotherapy Reaction  EPINEPHrine     1 mG/mL Injectable 0.3 milliGRAM(s) IntraMuscular once PRN PRN Chemotherapy Reaction  hydrocortisone sodium succinate Injectable 100 milliGRAM(s) IV Push once PRN PRN Chemotherapy Reaction  hydrocortisone sodium succinate Injectable 100 milliGRAM(s) IV Push once PRN PRN Chemotherapy Reaction  meperidine     Injectable 25 milliGRAM(s) IV Push once PRN PRN Chemotherapy Reaction (Rigors)  mineral oil for Topical Use 1 Application(s) Topical three times a day PRN to clean area after BM  zinc oxide 20% Ointment 1 Application(s) Topical three times a day PRN rash, apply to the area after each BM      Allergies    Bactrim (Hives)  Rituxan (Hives)  WinRho SDF (Hives)    Intolerances    REVIEW OF SYSTEMS - patient non-verbal, unable to obtain    General: see HPI  Skin: see HPI    Vital Signs Last 24 Hrs  T(C): 38.1 (29 Mar 2021 12:15), Max: 38.1 (29 Mar 2021 12:15)  T(F): 100.6 (29 Mar 2021 12:15), Max: 100.6 (29 Mar 2021 12:15)  HR: 118 (29 Mar 2021 12:15) (97 - 118)  BP: 113/57 (29 Mar 2021 12:15) (113/57 - 131/50)  BP(mean): --  RR: 19 (29 Mar 2021 12:15) (17 - 19)  SpO2: 97% (29 Mar 2021 12:15) (97% - 100%)    PHYSICAL EXAM:   The patient was in no apparent distress.  There was no visible lymphadenopathy.  Conjunctiva were non injected    Of note on skin exam:   - B/l shins with purpuric, centrally eroded, patches in the setting of significant b/l lower extremity edema (improved from previous)  - red, warm, erythematous patch on right lower extremity extending to lower thigh  - Left anterior thigh with smaller purpuric patch   - Intergluteal cleft with pink eroded patches and well-demarcated purpuric area in right medial buttock    LABS:                        10.9   18.98 )-----------( 4        ( 29 Mar 2021 07:12 )             36.6     03-29    135  |  97<L>  |  15  ----------------------------<  96  3.6   |  27  |  0.95    Ca    8.9      29 Mar 2021 07:12  Phos  3.5     03-29  Mg     1.7     03-29

## 2021-03-29 NOTE — PROGRESS NOTE ADULT - ASSESSMENT
19 yo M with a history of chronic ITP and severe autism (non-verbal at baseline) who presented with severe thrombocytopenia (Plt 7) while on home PO prednisone (80mg daily), and was admitted to Medicine for further management. Hematology consulted for assistance with management.    # Chronic ITP exacerbated in the setting of recent COVID infection   - Previous CT head neg for bleed  - S/p IVIG 1gm/kg daily x 2 (completed 2/28) and dexamethasone 40mg IV x4 days (completed 3/3)  - S/p W1 Rituximab with desensitization protocol on 3/6; W2 Rituximab on 3/13; s/p W3 Rituximab on 3/20 with desensitization protocol, tolerated well. S/p W4 Rituximab with desensitization protocol yesterday (3/27), tolerated well.   - S/p romiplastim/Nplate, 1mcg/kg; given 2/28; 2mcg/kg given on 3/7 (pt only got 250mcg instead of 300mcg as pharmacy only had 250); 5mcg/kg (750mcg) given on 3/14, S/p 750mcg (5mcg/kg) on 3/21. S/p 4 weekly doses of Rituxan. S/p 1500mcg of NPLATE on 3/29.  Given autism, patient is unable to swallow eltrombopag pills (cannot be crushed or chewed). Will continue to discuss other possible formulations or oral thrombopoietin receptor agonists.  - Cont. prednisone taper - decrease by 10mg every 2 days.   - Transfuse platelets only if bleeding (consider giving 1/2 unit platelets slowly infused over 3 hours q12h) OR if platelets <5k. Can give 1/2 unit plt over 3 hours q12H today.   - Given minimal response in plts will consider another trial of IVIG  - F/u lower extremity dopplers to r/o DVT  - Lasix per primary team  - Continue mepron for PCP ppx while on steroids  - Monitor closely for any signs or symptoms of bleeding    INCOMPLETE NOTE PENDING ATTENDING ATTESTATION    Kenya Chávez MD  Hematology Oncology Fellow, PGY-5  [facility] Pager: [phone]/ [facility] Pager: [phone]   21 yo M with a history of chronic ITP and severe autism (non-verbal at baseline) who presented with severe thrombocytopenia (Plt 7) while on home PO prednisone (80mg daily), and was admitted to Medicine for further management. Hematology consulted for assistance with management.    # Chronic ITP exacerbated in the setting of recent COVID infection   - Previous CT head neg for bleed  - S/p IVIG 1gm/kg daily x 2 (completed 2/28) and dexamethasone 40mg IV x4 days (completed 3/3)  - S/p W1 Rituximab with desensitization protocol on 3/6; W2 Rituximab on 3/13; s/p W3 Rituximab on 3/20 with desensitization protocol, tolerated well. S/p W4 Rituximab with desensitization protocol yesterday (3/27), tolerated well.   - S/p romiplastim/Nplate, 1mcg/kg; given 2/28; 2mcg/kg given on 3/7 (pt only got 250mcg instead of 300mcg as pharmacy only had 250); 5mcg/kg (750mcg) given on 3/14, S/p 750mcg (5mcg/kg) on 3/21. S/p 4 weekly doses of Rituxan. S/p 1500mcg of NPLATE on 3/29.  Given autism, patient is unable to swallow eltrombopag pills (cannot be crushed or chewed). Will continue to discuss other possible formulations or oral thrombopoietin receptor agonists.  - Cont. prednisone taper - decrease by 10mg every 2 days.   - Transfuse platelets only if bleeding (consider giving 1/2 unit platelets slowly infused over 3 hours q12h) OR if platelets <5k. Can give 1/2 unit plt over 3 hours q12H today.   - Given minimal response in plts will consider another trial of IVIG tomorrow 3/30 x 2 days, will hold off on starting today since pt spiked a fever to 100.6 (possibly from cellulitis)- will monitor fever curve, and if fever defervesce will start IVIG tomorrow.   - Will also plan for another 10mcg/mg of Nplate this Sunday 4/4. In the mean time we are also running insurance authorization on Doptelet to see if it is covered.   - F/u lower extremity dopplers to r/o DVT  - Lasix per primary team  - Continue mepron for PCP ppx while on steroids  - Monitor closely for any signs or symptoms of bleeding      Kenya Chávez MD  Hematology Oncology Fellow, PGY-5  [facility] Pager: [phone]/ [facility] Pager: [phone]

## 2021-03-29 NOTE — PROGRESS NOTE ADULT - SUBJECTIVE AND OBJECTIVE BOX
INTERVAL History:    Allergies    Bactrim (Hives)  Rituxan (Hives)  WinRho SDF (Hives)    Intolerances        MEDICATIONS  (STANDING):  atovaquone  Suspension 1500 milliGRAM(s) Oral daily  BACItracin   Ointment 1 Application(s) Topical two times a day  brexpiprazole 6 milliGRAM(s) Oral daily  cetirizine 10 milliGRAM(s) Oral at bedtime  chlorhexidine 4% Liquid 1 Application(s) Topical <User Schedule>  cimetidine 400 milliGRAM(s) Oral three times a day  fluticasone propionate 50 MICROgram(s)/spray Nasal Spray 1 Spray(s) Both Nostrils two times a day  furosemide    Tablet 20 milliGRAM(s) Oral daily  influenza   Vaccine 0.5 milliLiter(s) IntraMuscular once  LORazepam     Tablet 1 milliGRAM(s) Oral at bedtime  multivitamin 1 Tablet(s) Oral daily  mupirocin 2% Ointment 1 Application(s) Topical two times a day  predniSONE   Tablet   Oral   predniSONE   Tablet 20 milliGRAM(s) Oral daily  sodium chloride 0.9%. 1000 milliLiter(s) (10 mL/Hr) IV Continuous <Continuous>  traZODone 300 milliGRAM(s) Oral at bedtime    MEDICATIONS  (PRN):  ALBUTerol    0.083%. 2.5 milliGRAM(s) Nebulizer once PRN PRN Chemotherapy Reaction  ALBUTerol    0.083%. 2.5 milliGRAM(s) Nebulizer once PRN PRN Chemotherapy Reaction  diphenhydrAMINE   Injectable 50 milliGRAM(s) IV Push once PRN PRN Chemotherapy Reaction  EPINEPHrine     1 mG/mL Injectable 0.3 milliGRAM(s) IntraMuscular once PRN PRN Chemotherapy Reaction  EPINEPHrine     1 mG/mL Injectable 0.3 milliGRAM(s) IntraMuscular once PRN PRN Chemotherapy Reaction  hydrocortisone sodium succinate Injectable 100 milliGRAM(s) IV Push once PRN PRN Chemotherapy Reaction  hydrocortisone sodium succinate Injectable 100 milliGRAM(s) IV Push once PRN PRN Chemotherapy Reaction  meperidine     Injectable 25 milliGRAM(s) IV Push once PRN PRN Chemotherapy Reaction (Rigors)  mineral oil for Topical Use 1 Application(s) Topical three times a day PRN to clean area after BM  zinc oxide 20% Ointment 1 Application(s) Topical three times a day PRN rash, apply to the area after each BM      Vital Signs Last 24 Hrs  T(C): 37.2 (29 Mar 2021 05:49), Max: 37.2 (29 Mar 2021 05:49)  T(F): 98.9 (29 Mar 2021 05:49), Max: 98.9 (29 Mar 2021 05:49)  HR: 102 (29 Mar 2021 05:49) (97 - 106)  BP: 131/50 (29 Mar 2021 05:49) (119/63 - 131/50)  BP(mean): --  RR: 19 (29 Mar 2021 05:49) (16 - 19)  SpO2: 99% (29 Mar 2021 05:49) (99% - 100%)    PHYSICAL EXAM:          LABS:                        10.9   18.98 )-----------( 4        ( 29 Mar 2021 07:12 )             36.6     03-29    135  |  97<L>  |  15  ----------------------------<  96  3.6   |  27  |  0.95    Ca    8.9      29 Mar 2021 07:12  Phos  3.5     03-29  Mg     1.7     03-29              RADIOLOGY & ADDITIONAL STUDIES:    PATHOLOGY:         INTERVAL History: Plt this morning are 4. Pt received Rituxan and Nplate over the weekend. Per mother, pt's legs noted to be much more swollen and erythematous this morning with weeping and warm to touch.     Allergies    Bactrim (Hives)  Rituxan (Hives)  WinRho SDF (Hives)    Intolerances        MEDICATIONS  (STANDING):  atovaquone  Suspension 1500 milliGRAM(s) Oral daily  BACItracin   Ointment 1 Application(s) Topical two times a day  brexpiprazole 6 milliGRAM(s) Oral daily  cetirizine 10 milliGRAM(s) Oral at bedtime  chlorhexidine 4% Liquid 1 Application(s) Topical <User Schedule>  cimetidine 400 milliGRAM(s) Oral three times a day  fluticasone propionate 50 MICROgram(s)/spray Nasal Spray 1 Spray(s) Both Nostrils two times a day  furosemide    Tablet 20 milliGRAM(s) Oral daily  influenza   Vaccine 0.5 milliLiter(s) IntraMuscular once  LORazepam     Tablet 1 milliGRAM(s) Oral at bedtime  multivitamin 1 Tablet(s) Oral daily  mupirocin 2% Ointment 1 Application(s) Topical two times a day  predniSONE   Tablet   Oral   predniSONE   Tablet 20 milliGRAM(s) Oral daily  sodium chloride 0.9%. 1000 milliLiter(s) (10 mL/Hr) IV Continuous <Continuous>  traZODone 300 milliGRAM(s) Oral at bedtime    MEDICATIONS  (PRN):  ALBUTerol    0.083%. 2.5 milliGRAM(s) Nebulizer once PRN PRN Chemotherapy Reaction  ALBUTerol    0.083%. 2.5 milliGRAM(s) Nebulizer once PRN PRN Chemotherapy Reaction  diphenhydrAMINE   Injectable 50 milliGRAM(s) IV Push once PRN PRN Chemotherapy Reaction  EPINEPHrine     1 mG/mL Injectable 0.3 milliGRAM(s) IntraMuscular once PRN PRN Chemotherapy Reaction  EPINEPHrine     1 mG/mL Injectable 0.3 milliGRAM(s) IntraMuscular once PRN PRN Chemotherapy Reaction  hydrocortisone sodium succinate Injectable 100 milliGRAM(s) IV Push once PRN PRN Chemotherapy Reaction  hydrocortisone sodium succinate Injectable 100 milliGRAM(s) IV Push once PRN PRN Chemotherapy Reaction  meperidine     Injectable 25 milliGRAM(s) IV Push once PRN PRN Chemotherapy Reaction (Rigors)  mineral oil for Topical Use 1 Application(s) Topical three times a day PRN to clean area after BM  zinc oxide 20% Ointment 1 Application(s) Topical three times a day PRN rash, apply to the area after each BM      Vital Signs Last 24 Hrs  T(C): 37.2 (29 Mar 2021 05:49), Max: 37.2 (29 Mar 2021 05:49)  T(F): 98.9 (29 Mar 2021 05:49), Max: 98.9 (29 Mar 2021 05:49)  HR: 102 (29 Mar 2021 05:49) (97 - 106)  BP: 131/50 (29 Mar 2021 05:49) (119/63 - 131/50)  BP(mean): --  RR: 19 (29 Mar 2021 05:49) (16 - 19)  SpO2: 99% (29 Mar 2021 05:49) (99% - 100%)    PHYSICAL EXAM:  General - NAD  HEENT - PERRLA, EOM intact, sclera and conjunctiva clear, oropharynx, nares clear  Neck - Supple, no thyromegaly or thyroid nodules, no bruits  Cardiovascular - RRR no m/r/g, no JVD, no carotid bruits  Lungs - CTAB, no use of acessory muscles, no w/c/r  Abdomen - Normal bowel sounds, NT/ND  Extremities - bruising on bilateral shins, erythematous       LABS:                        10.9   18.98 )-----------( 4        ( 29 Mar 2021 07:12 )             36.6     03-29    135  |  97<L>  |  15  ----------------------------<  96  3.6   |  27  |  0.95    Ca    8.9      29 Mar 2021 07:12  Phos  3.5     03-29  Mg     1.7     03-29              RADIOLOGY & ADDITIONAL STUDIES:    PATHOLOGY:

## 2021-03-30 LAB
ANION GAP SERPL CALC-SCNC: 9 MMOL/L — SIGNIFICANT CHANGE UP (ref 7–14)
BLD GP AB SCN SERPL QL: NEGATIVE — SIGNIFICANT CHANGE UP
BUN SERPL-MCNC: 22 MG/DL — SIGNIFICANT CHANGE UP (ref 7–23)
CALCIUM SERPL-MCNC: 8.9 MG/DL — SIGNIFICANT CHANGE UP (ref 8.4–10.5)
CHLORIDE SERPL-SCNC: 99 MMOL/L — SIGNIFICANT CHANGE UP (ref 98–107)
CO2 SERPL-SCNC: 29 MMOL/L — SIGNIFICANT CHANGE UP (ref 22–31)
CREAT SERPL-MCNC: 0.91 MG/DL — SIGNIFICANT CHANGE UP (ref 0.5–1.3)
GLUCOSE SERPL-MCNC: 96 MG/DL — SIGNIFICANT CHANGE UP (ref 70–99)
HCT VFR BLD CALC: 36.2 % — LOW (ref 39–50)
HGB BLD-MCNC: 10.6 G/DL — LOW (ref 13–17)
MCHC RBC-ENTMCNC: 25.3 PG — LOW (ref 27–34)
MCHC RBC-ENTMCNC: 29.3 GM/DL — LOW (ref 32–36)
MCV RBC AUTO: 86.4 FL — SIGNIFICANT CHANGE UP (ref 80–100)
NRBC # BLD: 0 /100 WBCS — SIGNIFICANT CHANGE UP
NRBC # FLD: 0 K/UL — SIGNIFICANT CHANGE UP
PLATELET # BLD AUTO: 3 K/UL — CRITICAL LOW (ref 150–400)
POTASSIUM SERPL-MCNC: 3.7 MMOL/L — SIGNIFICANT CHANGE UP (ref 3.5–5.3)
POTASSIUM SERPL-SCNC: 3.7 MMOL/L — SIGNIFICANT CHANGE UP (ref 3.5–5.3)
RBC # BLD: 4.19 M/UL — LOW (ref 4.2–5.8)
RBC # FLD: 15.9 % — HIGH (ref 10.3–14.5)
RH IG SCN BLD-IMP: POSITIVE — SIGNIFICANT CHANGE UP
SODIUM SERPL-SCNC: 137 MMOL/L — SIGNIFICANT CHANGE UP (ref 135–145)
WBC # BLD: 17.09 K/UL — HIGH (ref 3.8–10.5)
WBC # FLD AUTO: 17.09 K/UL — HIGH (ref 3.8–10.5)

## 2021-03-30 PROCEDURE — 99233 SBSQ HOSP IP/OBS HIGH 50: CPT | Mod: GC

## 2021-03-30 PROCEDURE — 99223 1ST HOSP IP/OBS HIGH 75: CPT

## 2021-03-30 PROCEDURE — 99233 SBSQ HOSP IP/OBS HIGH 50: CPT

## 2021-03-30 PROCEDURE — 99232 SBSQ HOSP IP/OBS MODERATE 35: CPT | Mod: GC

## 2021-03-30 RX ORDER — ACETAMINOPHEN 500 MG
1000 TABLET ORAL ONCE
Refills: 0 | Status: COMPLETED | OUTPATIENT
Start: 2021-03-30 | End: 2021-03-30

## 2021-03-30 RX ORDER — VANCOMYCIN HCL 1 G
1000 VIAL (EA) INTRAVENOUS EVERY 12 HOURS
Refills: 0 | Status: ACTIVE | OUTPATIENT
Start: 2021-03-30 | End: 2022-02-26

## 2021-03-30 RX ORDER — ACETAMINOPHEN 500 MG
1000 TABLET ORAL ONCE
Refills: 0 | Status: COMPLETED | OUTPATIENT
Start: 2021-03-30 | End: 2021-03-31

## 2021-03-30 RX ADMIN — Medication 400 MILLIGRAM(S): at 01:32

## 2021-03-30 RX ADMIN — BREXPIPRAZOLE 6 MILLIGRAM(S): 0.25 TABLET ORAL at 13:18

## 2021-03-30 RX ADMIN — Medication 300 MILLIGRAM(S): at 21:14

## 2021-03-30 RX ADMIN — Medication 1 APPLICATION(S): at 06:20

## 2021-03-30 RX ADMIN — Medication 20 MILLIGRAM(S): at 06:21

## 2021-03-30 RX ADMIN — Medication 400 MILLIGRAM(S): at 13:28

## 2021-03-30 RX ADMIN — ATOVAQUONE 1500 MILLIGRAM(S): 750 SUSPENSION ORAL at 13:18

## 2021-03-30 RX ADMIN — MUPIROCIN 1 APPLICATION(S): 20 OINTMENT TOPICAL at 06:20

## 2021-03-30 RX ADMIN — Medication 1 APPLICATION(S): at 18:16

## 2021-03-30 RX ADMIN — MUPIROCIN 1 APPLICATION(S): 20 OINTMENT TOPICAL at 18:18

## 2021-03-30 RX ADMIN — Medication 1 TABLET(S): at 13:18

## 2021-03-30 RX ADMIN — Medication 650 MILLIGRAM(S): at 01:00

## 2021-03-30 RX ADMIN — Medication 110 MILLIGRAM(S): at 03:10

## 2021-03-30 RX ADMIN — Medication 1000 MILLIGRAM(S): at 02:30

## 2021-03-30 RX ADMIN — Medication 40 MILLIGRAM(S): at 06:21

## 2021-03-30 RX ADMIN — Medication 400 MILLIGRAM(S): at 06:21

## 2021-03-30 RX ADMIN — CETIRIZINE HYDROCHLORIDE 10 MILLIGRAM(S): 10 TABLET ORAL at 21:14

## 2021-03-30 RX ADMIN — Medication 250 MILLIGRAM(S): at 19:24

## 2021-03-30 RX ADMIN — Medication 1 MILLIGRAM(S): at 21:14

## 2021-03-30 RX ADMIN — Medication 100 MILLIGRAM(S): at 04:20

## 2021-03-30 RX ADMIN — Medication 400 MILLIGRAM(S): at 21:14

## 2021-03-30 RX ADMIN — CHLORHEXIDINE GLUCONATE 1 APPLICATION(S): 213 SOLUTION TOPICAL at 06:21

## 2021-03-30 RX ADMIN — Medication 100 MILLIGRAM(S): at 13:19

## 2021-03-30 NOTE — CONSULT NOTE ADULT - CONSULT REQUESTED DATE/TIME
23-Mar-2021 14:00
27-Feb-2021 09:59
05-Mar-2021 11:25
05-Mar-2021 14:15
09-Mar-2021 17:32
30-Mar-2021 17:47

## 2021-03-30 NOTE — PROGRESS NOTE ADULT - SUBJECTIVE AND OBJECTIVE BOX
INTERVAL History: Pt spiked fever overnight to 100.9 and 101.4 last night and early this morning. Plt count this morning is 3.     Allergies    Bactrim (Hives)  Rituxan (Hives)  WinRho SDF (Hives)    Intolerances        MEDICATIONS  (STANDING):  atovaquone  Suspension 1500 milliGRAM(s) Oral daily  BACItracin   Ointment 1 Application(s) Topical two times a day  brexpiprazole 6 milliGRAM(s) Oral daily  ceFAZolin   IVPB 2000 milliGRAM(s) IV Intermittent every 8 hours  cetirizine 10 milliGRAM(s) Oral at bedtime  chlorhexidine 4% Liquid 1 Application(s) Topical <User Schedule>  cimetidine 400 milliGRAM(s) Oral three times a day  doxycycline IVPB 100 milliGRAM(s) IV Intermittent every 12 hours  doxycycline IVPB      fluticasone propionate 50 MICROgram(s)/spray Nasal Spray 1 Spray(s) Both Nostrils two times a day  furosemide    Tablet 40 milliGRAM(s) Oral daily  influenza   Vaccine 0.5 milliLiter(s) IntraMuscular once  LORazepam     Tablet 1 milliGRAM(s) Oral at bedtime  multivitamin 1 Tablet(s) Oral daily  mupirocin 2% Ointment 1 Application(s) Topical two times a day  predniSONE   Tablet   Oral   sodium chloride 0.9%. 1000 milliLiter(s) (10 mL/Hr) IV Continuous <Continuous>  traZODone 300 milliGRAM(s) Oral at bedtime    MEDICATIONS  (PRN):  ALBUTerol    0.083%. 2.5 milliGRAM(s) Nebulizer once PRN PRN Chemotherapy Reaction  ALBUTerol    0.083%. 2.5 milliGRAM(s) Nebulizer once PRN PRN Chemotherapy Reaction  diphenhydrAMINE   Injectable 50 milliGRAM(s) IV Push once PRN PRN Chemotherapy Reaction  EPINEPHrine     1 mG/mL Injectable 0.3 milliGRAM(s) IntraMuscular once PRN PRN Chemotherapy Reaction  EPINEPHrine     1 mG/mL Injectable 0.3 milliGRAM(s) IntraMuscular once PRN PRN Chemotherapy Reaction  hydrocortisone sodium succinate Injectable 100 milliGRAM(s) IV Push once PRN PRN Chemotherapy Reaction  hydrocortisone sodium succinate Injectable 100 milliGRAM(s) IV Push once PRN PRN Chemotherapy Reaction  meperidine     Injectable 25 milliGRAM(s) IV Push once PRN PRN Chemotherapy Reaction (Rigors)  mineral oil for Topical Use 1 Application(s) Topical three times a day PRN to clean area after BM  zinc oxide 20% Ointment 1 Application(s) Topical three times a day PRN rash, apply to the area after each BM      Vital Signs Last 24 Hrs  T(C): 37.3 (30 Mar 2021 06:15), Max: 38.4 (30 Mar 2021 01:01)  T(F): 99.1 (30 Mar 2021 06:15), Max: 101.1 (30 Mar 2021 01:01)  HR: 108 (30 Mar 2021 06:15) (105 - 119)  BP: 162/67 (30 Mar 2021 06:15) (109/55 - 162/67)  BP(mean): --  RR: 18 (30 Mar 2021 06:15) (18 - 19)  SpO2: 98% (30 Mar 2021 06:15) (97% - 99%)    PHYSICAL EXAM:        LABS:                        10.6   17.09 )-----------( 3        ( 30 Mar 2021 06:51 )             36.2     03-30    137  |  99  |  22  ----------------------------<  96  3.7   |  29  |  0.91    Ca    8.9      30 Mar 2021 06:51  Phos  3.5     03-29  Mg     1.7     03-29              RADIOLOGY & ADDITIONAL STUDIES:    PATHOLOGY:         INTERVAL History: Pt spiked fever overnight to 100.9 and 101.4 last night and early this morning. Plt count this morning is 3. Per mother, pt continues to spike fevers, leg is still swollen and warm to touch     Allergies    Bactrim (Hives)  Rituxan (Hives)  WinRho SDF (Hives)    Intolerances        MEDICATIONS  (STANDING):  atovaquone  Suspension 1500 milliGRAM(s) Oral daily  BACItracin   Ointment 1 Application(s) Topical two times a day  brexpiprazole 6 milliGRAM(s) Oral daily  ceFAZolin   IVPB 2000 milliGRAM(s) IV Intermittent every 8 hours  cetirizine 10 milliGRAM(s) Oral at bedtime  chlorhexidine 4% Liquid 1 Application(s) Topical <User Schedule>  cimetidine 400 milliGRAM(s) Oral three times a day  doxycycline IVPB 100 milliGRAM(s) IV Intermittent every 12 hours  doxycycline IVPB      fluticasone propionate 50 MICROgram(s)/spray Nasal Spray 1 Spray(s) Both Nostrils two times a day  furosemide    Tablet 40 milliGRAM(s) Oral daily  influenza   Vaccine 0.5 milliLiter(s) IntraMuscular once  LORazepam     Tablet 1 milliGRAM(s) Oral at bedtime  multivitamin 1 Tablet(s) Oral daily  mupirocin 2% Ointment 1 Application(s) Topical two times a day  predniSONE   Tablet   Oral   sodium chloride 0.9%. 1000 milliLiter(s) (10 mL/Hr) IV Continuous <Continuous>  traZODone 300 milliGRAM(s) Oral at bedtime    MEDICATIONS  (PRN):  ALBUTerol    0.083%. 2.5 milliGRAM(s) Nebulizer once PRN PRN Chemotherapy Reaction  ALBUTerol    0.083%. 2.5 milliGRAM(s) Nebulizer once PRN PRN Chemotherapy Reaction  diphenhydrAMINE   Injectable 50 milliGRAM(s) IV Push once PRN PRN Chemotherapy Reaction  EPINEPHrine     1 mG/mL Injectable 0.3 milliGRAM(s) IntraMuscular once PRN PRN Chemotherapy Reaction  EPINEPHrine     1 mG/mL Injectable 0.3 milliGRAM(s) IntraMuscular once PRN PRN Chemotherapy Reaction  hydrocortisone sodium succinate Injectable 100 milliGRAM(s) IV Push once PRN PRN Chemotherapy Reaction  hydrocortisone sodium succinate Injectable 100 milliGRAM(s) IV Push once PRN PRN Chemotherapy Reaction  meperidine     Injectable 25 milliGRAM(s) IV Push once PRN PRN Chemotherapy Reaction (Rigors)  mineral oil for Topical Use 1 Application(s) Topical three times a day PRN to clean area after BM  zinc oxide 20% Ointment 1 Application(s) Topical three times a day PRN rash, apply to the area after each BM      Vital Signs Last 24 Hrs  T(C): 37.3 (30 Mar 2021 06:15), Max: 38.4 (30 Mar 2021 01:01)  T(F): 99.1 (30 Mar 2021 06:15), Max: 101.1 (30 Mar 2021 01:01)  HR: 108 (30 Mar 2021 06:15) (105 - 119)  BP: 162/67 (30 Mar 2021 06:15) (109/55 - 162/67)  BP(mean): --  RR: 18 (30 Mar 2021 06:15) (18 - 19)  SpO2: 98% (30 Mar 2021 06:15) (97% - 99%)    PHYSICAL EXAM:  General - NAD  HEENT - PERRLA, EOM intact, sclera and conjunctiva clear, oropharynx, nares clear  Neck - Supple, no thyromegaly or thyroid nodules, no bruits  Cardiovascular - RRR no m/r/g, no JVD, no carotid bruits  Lungs - CTAB, no use of acessory muscles, no w/c/r  Abdomen - Normal bowel sounds, NT/ND  Extremities - bruising on bilateral shins, erythematous, warm to touch       LABS:                        10.6   17.09 )-----------( 3        ( 30 Mar 2021 06:51 )             36.2     03-30    137  |  99  |  22  ----------------------------<  96  3.7   |  29  |  0.91    Ca    8.9      30 Mar 2021 06:51  Phos  3.5     03-29  Mg     1.7     03-29              RADIOLOGY & ADDITIONAL STUDIES:    PATHOLOGY:

## 2021-03-30 NOTE — CONSULT NOTE ADULT - SUBJECTIVE AND OBJECTIVE BOX
VASCULAR SURGERY CONSULT NOTE  VINNY MOON  |  [id number]  |  03-30-21 @ 14:59    Patient was seen and examined at:     CC: Patient is a 21y old  Male who presents with a chief complaint of low platelets (30 Mar 2021 13:39)    HPI:  21yo M h/o intellectual disability, autism, nonverbal at baseline, chronic ITP on 80mg of daily prednisone, presents w/ outpatient labs showing low plt. Patient was sent in from [facility] with platelet of 7 and heme referral for admission w/ IVIG. Patient is at baseline mental status and has had no bleeding recently. Patient had recent admission for plt transfusion and ivig for a rectal bleed.    Of note, patient was diagnosed with ITP since 18 months. Plt usually runs in 10K range. Follows with hematology at [facility]. Over the years, they have become more conservative in treatments given that patient would have very low level of platelets without major bleeding events. Patient was treated with IVIG and steroids end of Jan. 2021. Prior to that, last episode of major flare was about 5 years ago when he had diffuse petechiae without bleeding, and plt was about 5K at that time. (27 Feb 2021 01:52)    INTERVAL EVENTS:  Vascular surgery was consulted for dark cellulitis concerning for venous stasis disease for 3d prior to consultation. Per mother, pt with progressive discoloration for 3d and pain 1d prior to examination.  At present time, pt does not have pain / tenderness but has not gotten OOBTC since day prior to consultation. No rest pain. Moving toes and feet spontaneously.     PAST MEDICAL & SURGICAL HISTORY:  Intellectual disability  Chronic ITP (idiopathic thrombocytopenia)    No significant past surgical history    MEDICATIONS  (STANDING):  acetaminophen  IVPB .. 1000 milliGRAM(s) IV Intermittent once  atovaquone  Suspension 1500 milliGRAM(s) Oral daily  BACItracin   Ointment 1 Application(s) Topical two times a day  brexpiprazole 6 milliGRAM(s) Oral daily  ceFAZolin   IVPB 2000 milliGRAM(s) IV Intermittent every 8 hours  cetirizine 10 milliGRAM(s) Oral at bedtime  chlorhexidine 4% Liquid 1 Application(s) Topical <User Schedule>  cimetidine 400 milliGRAM(s) Oral three times a day  doxycycline IVPB 100 milliGRAM(s) IV Intermittent every 12 hours  doxycycline IVPB      fluticasone propionate 50 MICROgram(s)/spray Nasal Spray 1 Spray(s) Both Nostrils two times a day  furosemide    Tablet 40 milliGRAM(s) Oral daily  influenza   Vaccine 0.5 milliLiter(s) IntraMuscular once  LORazepam     Tablet 1 milliGRAM(s) Oral at bedtime  multivitamin 1 Tablet(s) Oral daily  mupirocin 2% Ointment 1 Application(s) Topical two times a day  predniSONE   Tablet   Oral   sodium chloride 0.9%. 1000 milliLiter(s) (10 mL/Hr) IV Continuous <Continuous>  traZODone 300 milliGRAM(s) Oral at bedtime    MEDICATIONS  (PRN):  ALBUTerol    0.083%. 2.5 milliGRAM(s) Nebulizer once PRN PRN Chemotherapy Reaction  ALBUTerol    0.083%. 2.5 milliGRAM(s) Nebulizer once PRN PRN Chemotherapy Reaction  diphenhydrAMINE   Injectable 50 milliGRAM(s) IV Push once PRN PRN Chemotherapy Reaction  EPINEPHrine     1 mG/mL Injectable 0.3 milliGRAM(s) IntraMuscular once PRN PRN Chemotherapy Reaction  EPINEPHrine     1 mG/mL Injectable 0.3 milliGRAM(s) IntraMuscular once PRN PRN Chemotherapy Reaction  hydrocortisone sodium succinate Injectable 100 milliGRAM(s) IV Push once PRN PRN Chemotherapy Reaction  hydrocortisone sodium succinate Injectable 100 milliGRAM(s) IV Push once PRN PRN Chemotherapy Reaction  meperidine     Injectable 25 milliGRAM(s) IV Push once PRN PRN Chemotherapy Reaction ([place])  mineral oil for Topical Use 1 Application(s) Topical three times a day PRN to clean area after BM  zinc oxide 20% Ointment 1 Application(s) Topical three times a day PRN rash, apply to the area after each BM    ALLERGIES:  Bactrim (Hives)  Rituxan (Hives)  WinRho SDF (Hives)    SOCIAL HISTORY:  No active toxic habits. (27 Feb 2021 01:52)    FAMILY HISTORY:  FH: hypertension    Objective:   Vital Signs Last 24 Hrs  T(C): 37.5 (30 Mar 2021 14:26), Max: 38.4 (30 Mar 2021 01:01)  T(F): 99.5 (30 Mar 2021 14:26), Max: 101.1 (30 Mar 2021 01:01)  HR: 114 (30 Mar 2021 14:26) (104 - 119)  BP: 106/61 (30 Mar 2021 14:26) (106/61 - 162/67)  RR: 18 (30 Mar 2021 14:26) (17 - 18)  SpO2: 95% (30 Mar 2021 14:26) (95% - 99%)    Physical Exam:  General: Well developed, well nourished, cooperative, and appears to be in no acute distress.  HEENT: normocephalic, vision is grossly intact  Chest: respirations grossly unlabored, supine on ra  Extremities:     RLE: Cellulitus with small superficial healilng wound on the l. anterior shin. +Edema. +DP pulse. +PT pulse. Nontender. Good capillary refill.      LLE: Cellulitus, less than right. Small superficial wound, healing. +DP pulse. +PT pulse.     LABS:                        10.6   17.09 )-----------( 3        ( 30 Mar 2021 06:51 )             36.2     03-30    137  |  99  |  22  ----------------------------<  96  3.7   |  29  |  0.91    Ca    8.9      30 Mar 2021 06:51  Phos  3.5     03-29  Mg     1.7     03-29    RADIOLOGY & ADDITIONAL STUDIES:    < from: US Duplex Venous Lower Ext Complete, Bilateral (03.29.21 @ 14:03) >  FINDINGS:    RIGHT:  Normal compressibility of the RIGHT common femoral, femoral and popliteal veins.  Doppler examination shows normal spontaneous and phasic flow.  The calf veins were not visualized due to marked edema    LEFT:  Normal compressibility of the LEFT common femoral, femoral and popliteal veins.  Doppler examination shows normal spontaneous and phasic flow.  No LEFT calf vein thrombosis is detected.    IMPRESSION:  No evidence of deep venous thrombosis in either lower extremity.    < end of copied text >

## 2021-03-30 NOTE — PROGRESS NOTE ADULT - ASSESSMENT
ASSESSMENT:    Antimicrobials, Steroids, COVID19 therapies in this admission  IVIG x2, completed 2/28   Decadron 40 mg IV 2/28-3/3  Prednisone 80mg 2/27-> tapering since then  Solumedrol 3/13, 3/20, 3/27  IVIG 2/27, 2/28  Rituxan infusion 3/7, 3/13, 3/20  convalescent plasma on 3/11  Atovaquone 3/5->  Cefazolin 3/29->  Doxy 3/29->        RECOMMENDATIONS:    WILL Ramires, MD  Fellow, Infectious Diseases  Pager: [phone]  If no response, after 5pm and on Weekends: Call [phone] 21/M with PMH chronic ITP (prev well-controlled, last IVIG Jan 2021, on prednisone 80mg/d), intellectual disability, non-verbal, autism, COVID19 (Jan 2021)  Adm 2/27 for low PLT.  Hosp course c/b low PLT s/p IVIG, Dexa, Rituximab (MICU stay 3/5-3/7 for desensitization); convalescent plasma 3/11; 3/22 Wound Care and Dermatology consulted for LE wounds; 3/29 new fevers  ID consulted for recs  ========    Chronic ITP on tapering steroids with LE lesions and fevers  - fevers could be related to LE lesions (?superadded cellulitis)  - H/o fevers reported, with inability to bear weight on rt leg.   - HIV NR Jan 25, 2021    Antimicrobials, Steroids, COVID19 therapies in this admission  IVIG x2, completed 2/28   Decadron 40 mg IV 2/28-3/3  Prednisone 80mg 2/27-> tapering since then  Solumedrol 3/13, 3/20, 3/27  IVIG 2/27, 2/28  Rituxan infusion 3/7, 3/13, 3/20  convalescent plasma on 3/11  Atovaquone 3/5->  Cefazolin 3/29->  Doxy 3/29->    RECOMMENDATIONS:    WILL Ramires, MD  Fellow, Infectious Diseases  Pager: [phone]  If no response, after 5pm and on Weekends: Call [phone] 21/M with PMH chronic ITP (prev well-controlled, last IVIG Jan 2021, on prednisone 80mg/d), intellectual disability, non-verbal, autism, COVID19 (Jan 2021)  Adm 2/27 for low PLT.  Hosp course c/b low PLT s/p IVIG, Dexa, Rituximab (MICU stay 3/5-3/7 for desensitization); convalescent plasma 3/11; 3/22 Wound Care and Dermatology consulted for LE wounds; 3/29 new fevers  ID consulted for recs  ========    Chronic ITP on tapering steroids with LE lesions and fevers  - fevers could be related to LE lesions (?superadded cellulitis)  - H/o fevers reported, with inability to bear weight on rt leg. Lesions noted - RLE appears more warm, erythematous  - HIV NR Jan 25, 2021  - U/S LE negative for DVT    Antimicrobials, Steroids, COVID19 therapies in this admission  IVIG x2, completed 2/28   Decadron 40 mg IV 2/28-3/3  Prednisone 80mg 2/27-> tapering since then  Solumedrol 3/13, 3/20, 3/27  IVIG 2/27, 2/28  Rituxan infusion 3/7, 3/13, 3/20, 3/27  convalescent plasma on 3/11  Atovaquone 3/5->  Cefazolin 3/29->  Doxy 3/29->    RECOMMENDATIONS:  - discontinue Doxycycline and Cefazolin  - start Vancomycin 1g IV q12h  - Please check Vancomycin trough before 4th sequential dose. Target trough levels 10-15  - check UA, urine cx  - f/u blood cx  Recs conveyed to primary team NP    WILL Ramires, MD  Fellow, Infectious Diseases  Pager: [phone]  If no response, after 5pm and on Weekends: Call [phone]

## 2021-03-30 NOTE — PROVIDER CONTACT NOTE (OTHER) - RECOMMENDATIONS
pending
Evans Thomas Amanda notified and made aware. Rhoda Rothman stated it was okay to continue with transfusion.
ACP Sarah Ashley made aware, Ofirmev ordered and administered per MAR
draw blood cultures and give abx as ordered by acp

## 2021-03-30 NOTE — CONSULT NOTE ADULT - ATTENDING COMMENTS
Agree with above.  Patient will need to be in the ICU for monitoring for desensitization.
Suspect edema bullae that appear hemorrhagic due to underlying ITP. Recommend local wound care (aquacel to left shin wound) and compression. Will monitor for possible progression.
I have reviewed the case with Dr. Lutz, the oncology consult fellow and agree with the assessment and plan of care as outlined in the note.  21 y/o M patient with chronic ITP and autism on daily prednisone 80mg, [place] [facility] [place] with platelet count of 7. Patient also +COVID PCR. No symptoms/signs of active bleeding at this time. Given critically low platelet count of 1K, recommend platelet transfusion to keep plt >5K. Please continue IVIG 1g/kg daily x 2 days s/p 1st dose overnight. Start pulse dose steroids 40mg IV x 4 days. Daily CBCs. Patient to follow with Dr. Bhagat as [place] [facility] [place].
Shelbi Weaver MD  Pager: [phone]  After 5 PM or weekends please call fellow on call or office [phone]
non vascular etiology.  rec local wound care, no intervention warranted
20 year old male with ITP, needs Rituximab.  As skin testing will be hard to interpret, recommend a short desensitization protocol as above.  Please see above for premedications, aside of regular steroids, etc. used for rituximab. Use Lorazepam PRN only for anxiety.

## 2021-03-30 NOTE — PROGRESS NOTE ADULT - SUBJECTIVE AND OBJECTIVE BOX
Follow Up: ID reconsulted for fevers    Interval History/ROS:Patient is a 21y old  Male who presents with a chief complaint of low platelets (30 Mar 2021 11:42)  ==========  - Adm 2/27 for low PLT  - Hem/Onc foll - received IVIG and Dexa 40mg/d IV pulse X 4 days  - Allergy consulted for h/o allergies -  reaction with Rituxan about 15 years prior, which she describes as onset of hypertension and tachycardia, and an associated erythematous, macular rash. She denies any associated dyspnea, angioedema, GI symptoms or hypotension. She cannot recall further details, due to the remote nature of the reaction. She reports that after that reaction, he was transitioned to other treatments, and his symptoms improved with IVIG. She reports that he was in remission for many years and has required no specific treatment. However, recently, he had a COVID infection, which has triggered a relapse of thrombocytopenic episodes. Pt's mom reports other reactions with treatments as below.  Winrho- about 15 years prior; developed acute dyspnea and was possibly given epinephrine injection for suspected anaphylaxis  Bactrim- about 15 years prio; developed diffuse rash, but denies any associated dyspnea, angioedema, GI symptoms or hypotension  - Txf to MICU 3/5 for desensitization protocol for Rituximab  -  Pt received desensitization overnight without any issues. Pt did not develop any rash, shortness of breath or other symptoms.  Txf to floors 3/7  - ID initially consulted for monoclonal ab - did not meet criteria  - family consented for convalescent plasma - received 3/11  - 3/22 Wound Care consulted for LE wounds - Bilateral anterior lower legs with acute wounds of unknown etiology (DDX vasculitis secondary to low platelets vs deroof blisters secondary to edema and complicated by low platelets)  - 3/23 Dermatology consulted for rash on b/l shins that started as an area of pin-point involvement 10 days prior and has since spread.  - 3/29 Fevers      Allergies    Bactrim (Hives)  Rituxan (Hives)  WinRho SDF (Hives)    Intolerances        ANTIMICROBIALS:  atovaquone  Suspension 1500 daily  ceFAZolin   IVPB 2000 every 8 hours  doxycycline IVPB 100 every 12 hours  doxycycline IVPB        OTHER MEDS:  acetaminophen  IVPB .. 1000 milliGRAM(s) IV Intermittent once  ALBUTerol    0.083%. 2.5 milliGRAM(s) Nebulizer once PRN  ALBUTerol    0.083%. 2.5 milliGRAM(s) Nebulizer once PRN  BACItracin   Ointment 1 Application(s) Topical two times a day  brexpiprazole 6 milliGRAM(s) Oral daily  cetirizine 10 milliGRAM(s) Oral at bedtime  chlorhexidine 4% Liquid 1 Application(s) Topical <User Schedule>  cimetidine 400 milliGRAM(s) Oral three times a day  diphenhydrAMINE   Injectable 50 milliGRAM(s) IV Push once PRN  EPINEPHrine     1 mG/mL Injectable 0.3 milliGRAM(s) IntraMuscular once PRN  EPINEPHrine     1 mG/mL Injectable 0.3 milliGRAM(s) IntraMuscular once PRN  fluticasone propionate 50 MICROgram(s)/spray Nasal Spray 1 Spray(s) Both Nostrils two times a day  furosemide    Tablet 40 milliGRAM(s) Oral daily  hydrocortisone sodium succinate Injectable 100 milliGRAM(s) IV Push once PRN  hydrocortisone sodium succinate Injectable 100 milliGRAM(s) IV Push once PRN  influenza   Vaccine 0.5 milliLiter(s) IntraMuscular once  LORazepam     Tablet 1 milliGRAM(s) Oral at bedtime  meperidine     Injectable 25 milliGRAM(s) IV Push once PRN  mineral oil for Topical Use 1 Application(s) Topical three times a day PRN  multivitamin 1 Tablet(s) Oral daily  mupirocin 2% Ointment 1 Application(s) Topical two times a day  predniSONE   Tablet   Oral   sodium chloride 0.9%. 1000 milliLiter(s) IV Continuous <Continuous>  traZODone 300 milliGRAM(s) Oral at bedtime  zinc oxide 20% Ointment 1 Application(s) Topical three times a day PRN      Vital Signs Last 24 Hrs  T(C): 38 (30 Mar 2021 12:18), Max: 38.4 (30 Mar 2021 01:01)  T(F): 100.4 (30 Mar 2021 12:18), Max: 101.1 (30 Mar 2021 01:01)  HR: 104 (30 Mar 2021 08:57) (104 - 119)  BP: 132/62 (30 Mar 2021 08:57) (109/55 - 162/67)  BP(mean): --  RR: 17 (30 Mar 2021 08:57) (17 - 18)  SpO2: 98% (30 Mar 2021 08:57) (98% - 99%)    EXAM:  Constitutional: Not in acute distress  Eyes: No icterus. Pupils b/l reactive to light.  Oral cavity: Clear, no lesions  Neck: No neck vein distension noted  RS: Chest clear to auscultation bilaterally. No wheeze/rhonchi/crepitations.  CVS: S1, S2 heard. Regular rate and rhythm. No murmurs/rubs/gallops.  Abdomen: Soft. No guarding/rigidity/tenderness.  : No acute abnormalities  Extremities: Warm. No pedal edema  Skin: No lesions noted  Vascular: No evidence of phlebitis  Neuro: Alert, oriented to time/place/person                        10.6   17.09 )-----------( 3        ( 30 Mar 2021 06:51 )             36.2       03-30    137  |  99  |  22  ----------------------------<  96  3.7   |  29  |  0.91    Ca    8.9      30 Mar 2021 06:51  Phos  3.5     03-29  Mg     1.7     03-29            MICROBIOLOGY:    RADIOLOGY:  < from: US Duplex Venous Lower Ext Complete, Bilateral (03.29.21 @ 14:03) >  IMPRESSION:  No evidence of deep venous thrombosis in either lower extremity.  < end of copied text >    OTHER INVESTIGATIONS:     Follow Up: ID reconsulted for fevers    Interval History/ROS:Patient is a 21y old  Male who presents with a chief complaint of low platelets (30 Mar 2021 11:42)  ==========  - Adm 2/27 for low PLT  - Hem/Onc foll - received IVIG and Dexa 40mg/d IV pulse X 4 days  - Allergy consulted for h/o allergies -  reaction with Rituxan about 15 years prior, which she describes as onset of hypertension and tachycardia, and an associated erythematous, macular rash. She denies any associated dyspnea, angioedema, GI symptoms or hypotension. She cannot recall further details, due to the remote nature of the reaction. She reports that after that reaction, he was transitioned to other treatments, and his symptoms improved with IVIG. She reports that he was in remission for many years and has required no specific treatment. However, recently, he had a COVID infection, which has triggered a relapse of thrombocytopenic episodes. Pt's mom reports other reactions with treatments as below.  Winrho- about 15 years prior; developed acute dyspnea and was possibly given epinephrine injection for suspected anaphylaxis  Bactrim- about 15 years prio; developed diffuse rash, but denies any associated dyspnea, angioedema, GI symptoms or hypotension  - Txf to MICU 3/5 for desensitization protocol for Rituximab  -  Pt received desensitization overnight without any issues. Pt did not develop any rash, shortness of breath or other symptoms.  Txf to floors 3/7  - ID initially consulted for monoclonal ab - did not meet criteria  - family consented for convalescent plasma - received 3/11  - 3/22 Wound Care consulted for LE wounds - Bilateral anterior lower legs with acute wounds of unknown etiology (DDX vasculitis secondary to low platelets vs deroof blisters secondary to edema and complicated by low platelets)  - 3/23 Dermatology consulted for rash on b/l shins that started as an area of pin-point involvement 10 days prior and has since spread.  - 3/29 Fevers  noted. Started on IV abx. ID consulted for recs  ========  - Pt assessed at bedside, with family. Non-verbal, ROS provided by mother  - Above HPI reviewed and reconciled with patient's mother  - H/o fevers reported, with inability to bear weight on rt leg.     Allergies    Bactrim (Hives)  Rituxan (Hives)  WinRho SDF (Hives)    Intolerances        ANTIMICROBIALS:  atovaquone  Suspension 1500 daily  ceFAZolin   IVPB 2000 every 8 hours  doxycycline IVPB 100 every 12 hours  doxycycline IVPB        OTHER MEDS:  acetaminophen  IVPB .. 1000 milliGRAM(s) IV Intermittent once  ALBUTerol    0.083%. 2.5 milliGRAM(s) Nebulizer once PRN  ALBUTerol    0.083%. 2.5 milliGRAM(s) Nebulizer once PRN  BACItracin   Ointment 1 Application(s) Topical two times a day  brexpiprazole 6 milliGRAM(s) Oral daily  cetirizine 10 milliGRAM(s) Oral at bedtime  chlorhexidine 4% Liquid 1 Application(s) Topical <User Schedule>  cimetidine 400 milliGRAM(s) Oral three times a day  diphenhydrAMINE   Injectable 50 milliGRAM(s) IV Push once PRN  EPINEPHrine     1 mG/mL Injectable 0.3 milliGRAM(s) IntraMuscular once PRN  EPINEPHrine     1 mG/mL Injectable 0.3 milliGRAM(s) IntraMuscular once PRN  fluticasone propionate 50 MICROgram(s)/spray Nasal Spray 1 Spray(s) Both Nostrils two times a day  furosemide    Tablet 40 milliGRAM(s) Oral daily  hydrocortisone sodium succinate Injectable 100 milliGRAM(s) IV Push once PRN  hydrocortisone sodium succinate Injectable 100 milliGRAM(s) IV Push once PRN  influenza   Vaccine 0.5 milliLiter(s) IntraMuscular once  LORazepam     Tablet 1 milliGRAM(s) Oral at bedtime  meperidine     Injectable 25 milliGRAM(s) IV Push once PRN  mineral oil for Topical Use 1 Application(s) Topical three times a day PRN  multivitamin 1 Tablet(s) Oral daily  mupirocin 2% Ointment 1 Application(s) Topical two times a day  predniSONE   Tablet   Oral   sodium chloride 0.9%. 1000 milliLiter(s) IV Continuous <Continuous>  traZODone 300 milliGRAM(s) Oral at bedtime  zinc oxide 20% Ointment 1 Application(s) Topical three times a day PRN      Vital Signs Last 24 Hrs  T(C): 38 (30 Mar 2021 12:18), Max: 38.4 (30 Mar 2021 01:01)  T(F): 100.4 (30 Mar 2021 12:18), Max: 101.1 (30 Mar 2021 01:01)  HR: 104 (30 Mar 2021 08:57) (104 - 119)  BP: 132/62 (30 Mar 2021 08:57) (109/55 - 162/67)  BP(mean): --  RR: 17 (30 Mar 2021 08:57) (17 - 18)  SpO2: 98% (30 Mar 2021 08:57) (98% - 99%)    EXAM:  Constitutional: Not in acute distress. On RA  Eyes: No icterus. Pupils b/l reactive to light.  Oral cavity: Clear, no lesions  Neck: No neck vein distension noted  RS: Chest clear to auscultation bilaterally. No wheeze/rhonchi/crepitations.  CVS: S1, S2 heard. Regular rate and rhythm. No murmurs/rubs/gallops.  Abdomen: Soft. No guarding/rigidity/tenderness.  : No acute abnormalities  Extremities: Warm. B/l LE pedal edema  Skin: B/l LE lesions over anterior shins  Vascular: No evidence of phlebitis  Neuro: Alert, non-verbal                        10.6   17.09 )-----------( 3        ( 30 Mar 2021 06:51 )             36.2       03-30    137  |  99  |  22  ----------------------------<  96  3.7   |  29  |  0.91    Ca    8.9      30 Mar 2021 06:51  Phos  3.5     03-29  Mg     1.7     03-29            MICROBIOLOGY:    RADIOLOGY:  < from: US Duplex Venous Lower Ext Complete, Bilateral (03.29.21 @ 14:03) >  IMPRESSION:  No evidence of deep venous thrombosis in either lower extremity.  < end of copied text >    OTHER INVESTIGATIONS:     Follow Up: ID reconsulted for fevers    Interval History/ROS:Patient is a 21y old  Male who presents with a chief complaint of low platelets (30 Mar 2021 11:42)  ==========  - Adm 2/27 for low PLT  - Hem/Onc foll - received IVIG and Dexa 40mg/d IV pulse X 4 days  - Allergy consulted for h/o allergies -  reaction with Rituxan about 15 years prior, which she describes as onset of hypertension and tachycardia, and an associated erythematous, macular rash. She denies any associated dyspnea, angioedema, GI symptoms or hypotension. She cannot recall further details, due to the remote nature of the reaction. She reports that after that reaction, he was transitioned to other treatments, and his symptoms improved with IVIG. She reports that he was in remission for many years and has required no specific treatment. However, recently, he had a COVID infection, which has triggered a relapse of thrombocytopenic episodes. Pt's mom reports other reactions with treatments as below.  Winrho- about 15 years prior; developed acute dyspnea and was possibly given epinephrine injection for suspected anaphylaxis  Bactrim- about 15 years prio; developed diffuse rash, but denies any associated dyspnea, angioedema, GI symptoms or hypotension  - Txf to MICU 3/5 for desensitization protocol for Rituximab  -  Pt received desensitization overnight without any issues. Pt did not develop any rash, shortness of breath or other symptoms.  Txf to floors 3/7  - ID initially consulted for monoclonal ab - did not meet criteria  - family consented for convalescent plasma - received 3/11  - 3/22 Wound Care consulted for LE wounds - Bilateral anterior lower legs with acute wounds of unknown etiology (DDX vasculitis secondary to low platelets vs deroof blisters secondary to edema and complicated by low platelets)  - 3/23 Dermatology consulted for rash on b/l shins that started as an area of pin-point involvement 10 days prior and has since spread.  - 3/29 Fevers  noted. Started on IV abx. ID consulted for recs  ========  - Pt assessed at bedside, with family. Non-verbal, ROS provided by mother  - Above HPI reviewed and reconciled with patient's mother  - H/o fevers reported, with inability to bear weight on rt leg.     Allergies    Bactrim (Hives)  Rituxan (Hives)  WinRho SDF (Hives)    Intolerances        ANTIMICROBIALS:  atovaquone  Suspension 1500 daily  ceFAZolin   IVPB 2000 every 8 hours  doxycycline IVPB 100 every 12 hours  doxycycline IVPB        OTHER MEDS:  acetaminophen  IVPB .. 1000 milliGRAM(s) IV Intermittent once  ALBUTerol    0.083%. 2.5 milliGRAM(s) Nebulizer once PRN  ALBUTerol    0.083%. 2.5 milliGRAM(s) Nebulizer once PRN  BACItracin   Ointment 1 Application(s) Topical two times a day  brexpiprazole 6 milliGRAM(s) Oral daily  cetirizine 10 milliGRAM(s) Oral at bedtime  chlorhexidine 4% Liquid 1 Application(s) Topical <User Schedule>  cimetidine 400 milliGRAM(s) Oral three times a day  diphenhydrAMINE   Injectable 50 milliGRAM(s) IV Push once PRN  EPINEPHrine     1 mG/mL Injectable 0.3 milliGRAM(s) IntraMuscular once PRN  EPINEPHrine     1 mG/mL Injectable 0.3 milliGRAM(s) IntraMuscular once PRN  fluticasone propionate 50 MICROgram(s)/spray Nasal Spray 1 Spray(s) Both Nostrils two times a day  furosemide    Tablet 40 milliGRAM(s) Oral daily  hydrocortisone sodium succinate Injectable 100 milliGRAM(s) IV Push once PRN  hydrocortisone sodium succinate Injectable 100 milliGRAM(s) IV Push once PRN  influenza   Vaccine 0.5 milliLiter(s) IntraMuscular once  LORazepam     Tablet 1 milliGRAM(s) Oral at bedtime  meperidine     Injectable 25 milliGRAM(s) IV Push once PRN  mineral oil for Topical Use 1 Application(s) Topical three times a day PRN  multivitamin 1 Tablet(s) Oral daily  mupirocin 2% Ointment 1 Application(s) Topical two times a day  predniSONE   Tablet   Oral   sodium chloride 0.9%. 1000 milliLiter(s) IV Continuous <Continuous>  traZODone 300 milliGRAM(s) Oral at bedtime  zinc oxide 20% Ointment 1 Application(s) Topical three times a day PRN      Vital Signs Last 24 Hrs  T(C): 38 (30 Mar 2021 12:18), Max: 38.4 (30 Mar 2021 01:01)  T(F): 100.4 (30 Mar 2021 12:18), Max: 101.1 (30 Mar 2021 01:01)  HR: 104 (30 Mar 2021 08:57) (104 - 119)  BP: 132/62 (30 Mar 2021 08:57) (109/55 - 162/67)  BP(mean): --  RR: 17 (30 Mar 2021 08:57) (17 - 18)  SpO2: 98% (30 Mar 2021 08:57) (98% - 99%)    EXAM:  Constitutional: Not in acute distress. On RA  Eyes: No icterus. Pupils b/l reactive to light.  Oral cavity: Clear, no lesions  Neck: No neck vein distension noted  RS: Chest clear to auscultation bilaterally. No wheeze/rhonchi/crepitations.  CVS: S1, S2 heard. Regular rate and rhythm. No murmurs/rubs/gallops.  Abdomen: Soft. No guarding/rigidity/tenderness.  : No acute abnormalities  Extremities: Warm. B/l LE pedal edema  Skin: B/l LE lesions over anterior legs. Rt LE appears particularly erythematous, warm, tender.  Vascular: No evidence of phlebitis. LUE midline noted  Neuro: Alert, non-verbal                        10.6   17.09 )-----------( 3        ( 30 Mar 2021 06:51 )             36.2       03-30    137  |  99  |  22  ----------------------------<  96  3.7   |  29  |  0.91    Ca    8.9      30 Mar 2021 06:51  Phos  3.5     03-29  Mg     1.7     03-29            MICROBIOLOGY:    RADIOLOGY:  < from: US Duplex Venous Lower Ext Complete, Bilateral (03.29.21 @ 14:03) >  IMPRESSION:  No evidence of deep venous thrombosis in either lower extremity.  < end of copied text >    OTHER INVESTIGATIONS:

## 2021-03-30 NOTE — PROGRESS NOTE ADULT - ATTENDING COMMENTS
Pt with refractory chronic ITP s/p Rituxan x 4 and currently on NPlate: dose titrated to 1500 mcg and will repeat this Sunday. He is having prednisone tapered. Dermatology at bedside wrapping legs that have been edematous due to steroids with RLE cellulitis. Pt continuing to have fevers and will hold off on IVIG until fevers reza. Continue with platelet slow infusion. Await blood cultures.

## 2021-03-30 NOTE — PROGRESS NOTE ADULT - PROBLEM SELECTOR PLAN 1
Hx of chronic ITP since age 18 months. CTH negative, requiring multiple plt transfusions this admission. May be exacerbated by COVID, now s/p convalescent plasma (3/11)  -per heme recommendations will transfuse 1/2 unit platelets every 12 hrs for plt <5  or if bleeding.   -s/p IVIG x2, completed 2/28, heme may recommend repeat IVIG since pt recovered from COVID and may respond to tx, although currently with cellulitis  -s/p Decadron 40 mg IV (2/28-3/3), cont Prednisone taper   -s/p Ritruxan infusion on 3/7 and 3/13  -C/w mepron for PCP ppx  - Hematology following  - received Rituximab on 3/20,3/27 and Romiplastin/Nplate dose 3/21,3/28

## 2021-03-30 NOTE — PROGRESS NOTE ADULT - PROBLEM SELECTOR PLAN 4
Derm consult note appreciated.  Cont wound care/ skin care as per Derm   Escalate to lasix 40mg daily.   repeat RLE Va duplex neg for DVT, initial US b/l LE 03/15 neg for DVT  pt with fever 100.6 on 3/29, concern for RLE cellulitis, start tx with IV doxyclycline and IV cefazolin (pt unable to tolerate oral formulation)  f/u bcx x 2    D/W ACP Derm consult note appreciated.  Cont wound care/ skin care as per Derm   Escalate to lasix 40mg daily.   repeat RLE Va duplex neg for DVT, initial US b/l LE 03/15 neg for DVT  pt with fever 100.6 on 3/29, concern for RLE cellulitis, start tx with IV doxyclycline and IV cefazolin (pt unable to tolerate oral formulation)  f/u bcx x 2  consult vascular sx

## 2021-03-30 NOTE — PROVIDER CONTACT NOTE (OTHER) - ASSESSMENT
Patient elevated temp earlier in day. Blood cultures drawn during day shift. Patient asymptomatic, laying in bed sleeping.

## 2021-03-30 NOTE — PROGRESS NOTE ADULT - ASSESSMENT
21 y.o. Male h/o intellectual disability, autism, nonverbal at baseline, chronic ITP on 80mg of daily prednisone, hospitalized for thrombocytopenia noted on OP labs, admitted for management of refractory ITP. Platelet count now markedly improved s/p ritoxan on 3/27 however now downtrended. Needs wound care for lesions on shins.

## 2021-03-30 NOTE — PROGRESS NOTE ADULT - SUBJECTIVE AND OBJECTIVE BOX
Patient is a 21y old  Male who presents with a chief complaint of low platelets (30 Mar 2021 08:01)      SUBJECTIVE / OVERNIGHT EVENTS: Yesterday pt had a low grade axillary temperature of 100.6. Concern for RLE cellulitis. Bcx were drawn and empiric abx were started. Pt remains afebrile today. Per mom has slightly decreased appetite otherwise mood is at baseline. Pt is non verbal but moving all extremities and alert.    MEDICATIONS  (STANDING):  atovaquone  Suspension 1500 milliGRAM(s) Oral daily  BACItracin   Ointment 1 Application(s) Topical two times a day  brexpiprazole 6 milliGRAM(s) Oral daily  ceFAZolin   IVPB 2000 milliGRAM(s) IV Intermittent every 8 hours  cetirizine 10 milliGRAM(s) Oral at bedtime  chlorhexidine 4% Liquid 1 Application(s) Topical <User Schedule>  cimetidine 400 milliGRAM(s) Oral three times a day  doxycycline IVPB 100 milliGRAM(s) IV Intermittent every 12 hours  doxycycline IVPB      fluticasone propionate 50 MICROgram(s)/spray Nasal Spray 1 Spray(s) Both Nostrils two times a day  furosemide    Tablet 40 milliGRAM(s) Oral daily  influenza   Vaccine 0.5 milliLiter(s) IntraMuscular once  LORazepam     Tablet 1 milliGRAM(s) Oral at bedtime  multivitamin 1 Tablet(s) Oral daily  mupirocin 2% Ointment 1 Application(s) Topical two times a day  predniSONE   Tablet   Oral   sodium chloride 0.9%. 1000 milliLiter(s) (10 mL/Hr) IV Continuous <Continuous>  traZODone 300 milliGRAM(s) Oral at bedtime    MEDICATIONS  (PRN):  ALBUTerol    0.083%. 2.5 milliGRAM(s) Nebulizer once PRN PRN Chemotherapy Reaction  ALBUTerol    0.083%. 2.5 milliGRAM(s) Nebulizer once PRN PRN Chemotherapy Reaction  diphenhydrAMINE   Injectable 50 milliGRAM(s) IV Push once PRN PRN Chemotherapy Reaction  EPINEPHrine     1 mG/mL Injectable 0.3 milliGRAM(s) IntraMuscular once PRN PRN Chemotherapy Reaction  EPINEPHrine     1 mG/mL Injectable 0.3 milliGRAM(s) IntraMuscular once PRN PRN Chemotherapy Reaction  hydrocortisone sodium succinate Injectable 100 milliGRAM(s) IV Push once PRN PRN Chemotherapy Reaction  hydrocortisone sodium succinate Injectable 100 milliGRAM(s) IV Push once PRN PRN Chemotherapy Reaction  meperidine     Injectable 25 milliGRAM(s) IV Push once PRN PRN Chemotherapy Reaction ([place])  mineral oil for Topical Use 1 Application(s) Topical three times a day PRN to clean area after BM  zinc oxide 20% Ointment 1 Application(s) Topical three times a day PRN rash, apply to the area after each BM    Vital Signs Last 24 Hrs  T(C): 36.9 (30 Mar 2021 08:57), Max: 38.4 (30 Mar 2021 01:01)  T(F): 98.5 (30 Mar 2021 08:57), Max: 101.1 (30 Mar 2021 01:01)  HR: 104 (30 Mar 2021 08:57) (104 - 119)  BP: 132/62 (30 Mar 2021 08:57) (109/55 - 162/67)  BP(mean): --  RR: 17 (30 Mar 2021 08:57) (17 - 19)  SpO2: 98% (30 Mar 2021 08:57) (97% - 99%)      PHYSICAL EXAM:      PHYSICAL EXAM:  GENERAL: NAD, non verval  HEAD:  Atraumatic, Normocephalic  EYES: EOMI, PERRLA, conjunctiva and sclera clear  NECK: Supple, No JVD  CHEST/LUNG: Clear to auscultation bilaterally; No wheeze  HEART: Regular rate and rhythm; No murmurs, rubs, or gallops  ABDOMEN: morbidly obese, Soft, Nontender, Nondistended; Bowel sounds present  EXTREMITIES:  (+) edema b/l legs, (+) rash and wound at shin b/l with worsening RLE erythema  NEUROLOGY: NICHOLS, alert    LABS:                        10.6   17.09 )-----------( 3        ( 30 Mar 2021 06:51 )             36.2     03-30    137  |  99  |  22  ----------------------------<  96  3.7   |  29  |  0.91    Ca    8.9      30 Mar 2021 06:51  Phos  3.5     03-29  Mg     1.7     03-29

## 2021-03-30 NOTE — PROGRESS NOTE ADULT - ASSESSMENT
19 yo M with a history of chronic ITP and severe autism (non-verbal at baseline) who presented with severe thrombocytopenia (Plt 7) while on home PO prednisone (80mg daily), and was admitted to Medicine for further management. Hematology consulted for assistance with management.    # Chronic ITP exacerbated in the setting of recent COVID infection   - Previous CT head neg for bleed  - S/p IVIG 1gm/kg daily x 2 (completed 2/28) and dexamethasone 40mg IV x4 days (completed 3/3)  - S/p W1 Rituximab with desensitization protocol on 3/6; W2 Rituximab on 3/13; s/p W3 Rituximab on 3/20 with desensitization protocol, tolerated well. S/p W4 Rituximab with desensitization protocol yesterday (3/27), tolerated well.   - S/p romiplastim/Nplate, 1mcg/kg; given 2/28; 2mcg/kg given on 3/7 (pt only got 250mcg instead of 300mcg as pharmacy only had 250); 5mcg/kg (750mcg) given on 3/14, S/p 750mcg (5mcg/kg) on 3/21. S/p 4 weekly doses of Rituxan. S/p 1500mcg of NPLATE on 3/29.  Given autism, patient is unable to swallow eltrombopag pills (cannot be crushed or chewed). Will continue to discuss other possible formulations or oral thrombopoietin receptor agonists.  - Cont. prednisone taper - decrease by 10mg every 2 days.   - Transfuse platelets only if bleeding (consider giving 1/2 unit platelets slowly infused over 3 hours q12h) OR if platelets <5k. Can give 1/2 unit plt over 3 hours q12H today.   - Will hold off on starting IVIG today since pt continues to spike fever. Will await 24 hours of fever- free period before starting.   - Will also plan for another 10mcg/mg of Nplate this Sunday 4/4.   - DVT studies negative  - Lasix increased per primary team   - Consider ID consult for cellulitis and leg wounds   - Continue mepron for PCP ppx while on steroids  - Monitor closely for any signs or symptoms of bleeding      Kenya Chávez MD  Hematology Oncology Fellow, PGY-5  [facility] Pager: [phone]/ [facility] Pager: [phone]

## 2021-03-30 NOTE — CONSULT NOTE ADULT - ASSESSMENT
Please contact Surgery C-Team (P: [phone]) with any questions.    SATNAM Banerjee MD, PGY-III  Surgery, C-Team  Pager: [phone]  [facility]  of autism (nonverbal), itp now with 3d of progressive cellulitus. Pt with palpable pedal pulses, b/l LE and potential petichae in the setting of ITP / thrombocytopenia.     - no emergent vascular surgery intervention at present time  - would recommend elevation of leg, ace wrap for compression  - appreciate continued management by primary team.     Plan discussed with vascular surgery fellow JANUSZ SOLIS.     Please contact Surgery C-Team (P: [phone]) with any questions.    SATNAM Banerjee MD, PGY-III  Surgery, C-Team  Pager: [phone]  [facility]    21M hx of autism (nonverbal), itp now with 3d of progressive cellulitus. Pt with palpable pedal pulses, b/l LE and potential petichae in the setting of ITP / thrombocytopenia.     - no vascular surgery intervention at present time  - would recommend elevation of leg, ace wrap for compression  - recommend local wound care.   - appreciate continued management by primary team.     Plan discussed with vascular surgery fellow JANUSZ SOLIS.     Please contact Surgery C-Team (P: [phone]) with any new or ongoing concerns for the patient.     SATNAM Banerjee MD, PGY-III  Surgery, C-Team  Pager: [phone]  [facility]

## 2021-03-31 LAB
ANION GAP SERPL CALC-SCNC: 8 MMOL/L — SIGNIFICANT CHANGE UP (ref 7–14)
APPEARANCE UR: CLEAR — SIGNIFICANT CHANGE UP
BACTERIA # UR AUTO: NEGATIVE — SIGNIFICANT CHANGE UP
BASOPHILS # BLD AUTO: 0.03 K/UL — SIGNIFICANT CHANGE UP (ref 0–0.2)
BASOPHILS NFR BLD AUTO: 0.2 % — SIGNIFICANT CHANGE UP (ref 0–2)
BILIRUB UR-MCNC: NEGATIVE — SIGNIFICANT CHANGE UP
BUN SERPL-MCNC: 23 MG/DL — SIGNIFICANT CHANGE UP (ref 7–23)
CALCIUM SERPL-MCNC: 8.6 MG/DL — SIGNIFICANT CHANGE UP (ref 8.4–10.5)
CHLORIDE SERPL-SCNC: 101 MMOL/L — SIGNIFICANT CHANGE UP (ref 98–107)
CO2 SERPL-SCNC: 29 MMOL/L — SIGNIFICANT CHANGE UP (ref 22–31)
COLOR SPEC: YELLOW — SIGNIFICANT CHANGE UP
CREAT SERPL-MCNC: 0.8 MG/DL — SIGNIFICANT CHANGE UP (ref 0.5–1.3)
DIFF PNL FLD: NEGATIVE — SIGNIFICANT CHANGE UP
EOSINOPHIL # BLD AUTO: 0.08 K/UL — SIGNIFICANT CHANGE UP (ref 0–0.5)
EOSINOPHIL NFR BLD AUTO: 0.5 % — SIGNIFICANT CHANGE UP (ref 0–6)
EPI CELLS # UR: 0 /HPF — SIGNIFICANT CHANGE UP (ref 0–5)
GLUCOSE SERPL-MCNC: 97 MG/DL — SIGNIFICANT CHANGE UP (ref 70–99)
GLUCOSE UR QL: NEGATIVE — SIGNIFICANT CHANGE UP
HCT VFR BLD CALC: 35.1 % — LOW (ref 39–50)
HGB BLD-MCNC: 10.5 G/DL — LOW (ref 13–17)
IANC: 11.35 K/UL — HIGH (ref 1.5–8.5)
IMM GRANULOCYTES NFR BLD AUTO: 2.1 % — HIGH (ref 0–1.5)
KETONES UR-MCNC: NEGATIVE — SIGNIFICANT CHANGE UP
LEUKOCYTE ESTERASE UR-ACNC: NEGATIVE — SIGNIFICANT CHANGE UP
LYMPHOCYTES # BLD AUTO: 1.92 K/UL — SIGNIFICANT CHANGE UP (ref 1–3.3)
LYMPHOCYTES # BLD AUTO: 12.9 % — LOW (ref 13–44)
MAGNESIUM SERPL-MCNC: 2 MG/DL — SIGNIFICANT CHANGE UP (ref 1.6–2.6)
MCHC RBC-ENTMCNC: 25.5 PG — LOW (ref 27–34)
MCHC RBC-ENTMCNC: 29.9 GM/DL — LOW (ref 32–36)
MCV RBC AUTO: 85.2 FL — SIGNIFICANT CHANGE UP (ref 80–100)
MONOCYTES # BLD AUTO: 1.23 K/UL — HIGH (ref 0–0.9)
MONOCYTES NFR BLD AUTO: 8.2 % — SIGNIFICANT CHANGE UP (ref 2–14)
NEUTROPHILS # BLD AUTO: 11.35 K/UL — HIGH (ref 1.8–7.4)
NEUTROPHILS NFR BLD AUTO: 76.1 % — SIGNIFICANT CHANGE UP (ref 43–77)
NITRITE UR-MCNC: NEGATIVE — SIGNIFICANT CHANGE UP
NRBC # BLD: 0 /100 WBCS — SIGNIFICANT CHANGE UP
NRBC # FLD: 0 K/UL — SIGNIFICANT CHANGE UP
PH UR: 6.5 — SIGNIFICANT CHANGE UP (ref 5–8)
PLATELET # BLD AUTO: 6 K/UL — CRITICAL LOW (ref 150–400)
POTASSIUM SERPL-MCNC: 4 MMOL/L — SIGNIFICANT CHANGE UP (ref 3.5–5.3)
POTASSIUM SERPL-SCNC: 4 MMOL/L — SIGNIFICANT CHANGE UP (ref 3.5–5.3)
PROT UR-MCNC: ABNORMAL
RBC # BLD: 4.12 M/UL — LOW (ref 4.2–5.8)
RBC # FLD: 15.6 % — HIGH (ref 10.3–14.5)
RBC CASTS # UR COMP ASSIST: 6 /HPF — HIGH (ref 0–4)
SODIUM SERPL-SCNC: 138 MMOL/L — SIGNIFICANT CHANGE UP (ref 135–145)
SP GR SPEC: 1.03 — HIGH (ref 1.01–1.02)
UROBILINOGEN FLD QL: ABNORMAL
WBC # BLD: 14.93 K/UL — HIGH (ref 3.8–10.5)
WBC # FLD AUTO: 14.93 K/UL — HIGH (ref 3.8–10.5)
WBC UR QL: 1 /HPF — SIGNIFICANT CHANGE UP (ref 0–5)

## 2021-03-31 PROCEDURE — 99233 SBSQ HOSP IP/OBS HIGH 50: CPT

## 2021-03-31 PROCEDURE — 99232 SBSQ HOSP IP/OBS MODERATE 35: CPT | Mod: GC

## 2021-03-31 RX ADMIN — Medication 1 TABLET(S): at 12:39

## 2021-03-31 RX ADMIN — MUPIROCIN 1 APPLICATION(S): 20 OINTMENT TOPICAL at 06:06

## 2021-03-31 RX ADMIN — MUPIROCIN 1 APPLICATION(S): 20 OINTMENT TOPICAL at 17:24

## 2021-03-31 RX ADMIN — Medication 400 MILLIGRAM(S): at 06:06

## 2021-03-31 RX ADMIN — Medication 400 MILLIGRAM(S): at 00:54

## 2021-03-31 RX ADMIN — Medication 1000 MILLIGRAM(S): at 01:29

## 2021-03-31 RX ADMIN — Medication 10 MILLIGRAM(S): at 06:06

## 2021-03-31 RX ADMIN — Medication 400 MILLIGRAM(S): at 21:59

## 2021-03-31 RX ADMIN — CETIRIZINE HYDROCHLORIDE 10 MILLIGRAM(S): 10 TABLET ORAL at 21:59

## 2021-03-31 RX ADMIN — Medication 1 APPLICATION(S): at 17:23

## 2021-03-31 RX ADMIN — Medication 40 MILLIGRAM(S): at 06:06

## 2021-03-31 RX ADMIN — Medication 250 MILLIGRAM(S): at 06:07

## 2021-03-31 RX ADMIN — Medication 1 MILLIGRAM(S): at 22:01

## 2021-03-31 RX ADMIN — Medication 250 MILLIGRAM(S): at 17:24

## 2021-03-31 RX ADMIN — Medication 300 MILLIGRAM(S): at 21:59

## 2021-03-31 RX ADMIN — BREXPIPRAZOLE 6 MILLIGRAM(S): 0.25 TABLET ORAL at 15:16

## 2021-03-31 RX ADMIN — Medication 400 MILLIGRAM(S): at 14:51

## 2021-03-31 RX ADMIN — CHLORHEXIDINE GLUCONATE 1 APPLICATION(S): 213 SOLUTION TOPICAL at 06:06

## 2021-03-31 RX ADMIN — Medication 1 APPLICATION(S): at 06:05

## 2021-03-31 RX ADMIN — ATOVAQUONE 1500 MILLIGRAM(S): 750 SUSPENSION ORAL at 12:39

## 2021-03-31 NOTE — PROGRESS NOTE ADULT - ATTENDING COMMENTS
Pt seen with mother at bedside. Pt with chronic refractory ITP s/p Rituxan x 4, N Plate titrated weekly and currently high dose, and tapering on steroids. Behavior is less labile with steroid taper. He currently has improved cellulitis of the BLE and on iv vancomycin. Fevers improved without repeated dosing of Tylenol. Will plan to administer IVIG tomorrow if fevers remain abated in attempt to improve thrombocytopenia.

## 2021-03-31 NOTE — PROGRESS NOTE ADULT - ASSESSMENT
21/M with PMH chronic ITP (prev well-controlled, last IVIG Jan 2021, on prednisone 80mg/d), intellectual disability, non-verbal, autism, COVID19 (Jan 2021)  Adm 2/27 for low PLT.  Hosp course c/b low PLT s/p IVIG, Dexa, Rituximab ([facility] stay 3/5-3/7 for desensitization); convalescent plasma 3/11; 3/22 Wound Care and Dermatology consulted for LE wounds; 3/29 new fevers  ID consulted for recs  ========    Chronic ITP on tapering steroids with LE lesions and fevers  - fevers could be related to LE lesions (?superadded cellulitis)  - H/o fevers reported, with inability to bear weight on rt leg. Lesions noted - RLE appears more warm, erythematous  - HIV NR Jan 25, 2021  - U/S LE negative for DVT. Repeat UA 3/30 negative    Antimicrobials, Steroids, COVID19 therapies in this admission  IVIG x2, completed 2/28   Decadron 40 mg IV 2/28-3/3  Prednisone 80mg 2/27-> tapering since then  Solumedrol 3/13, 3/20, 3/27  IVIG 2/27, 2/28  Rituxan infusion 3/7, 3/13, 3/20, 3/27  convalescent plasma on 3/11  Atovaquone 3/5->  Cefazolin 3/29-3/30  Doxy 3/29-3/30  Vanc 3/31->    RECOMMENDATIONS:  - continue Vancomycin 1g IV q12h  - Please check Vancomycin trough before 4th sequential dose. Target trough levels 10-15  - f/u blood cx    WILL aRmires, MD  Fellow, Infectious Diseases  Pager: [phone]  If no response, after 5pm and on Weekends: Call [phone]

## 2021-03-31 NOTE — PROGRESS NOTE ADULT - SUBJECTIVE AND OBJECTIVE BOX
INTERVAL History: No acute events. Pt had fever to 100.4 overnight. On Vancomycin for abx.     Allergies    Bactrim (Hives)  Rituxan (Hives)  WinRho SDF (Hives)    Intolerances        MEDICATIONS  (STANDING):  atovaquone  Suspension 1500 milliGRAM(s) Oral daily  BACItracin   Ointment 1 Application(s) Topical two times a day  brexpiprazole 6 milliGRAM(s) Oral daily  cetirizine 10 milliGRAM(s) Oral at bedtime  chlorhexidine 4% Liquid 1 Application(s) Topical <User Schedule>  cimetidine 400 milliGRAM(s) Oral three times a day  fluticasone propionate 50 MICROgram(s)/spray Nasal Spray 1 Spray(s) Both Nostrils two times a day  furosemide    Tablet 40 milliGRAM(s) Oral daily  influenza   Vaccine 0.5 milliLiter(s) IntraMuscular once  LORazepam     Tablet 1 milliGRAM(s) Oral at bedtime  multivitamin 1 Tablet(s) Oral daily  mupirocin 2% Ointment 1 Application(s) Topical two times a day  predniSONE   Tablet   Oral   predniSONE   Tablet 10 milliGRAM(s) Oral daily  sodium chloride 0.9%. 1000 milliLiter(s) (10 mL/Hr) IV Continuous <Continuous>  traZODone 300 milliGRAM(s) Oral at bedtime  vancomycin  IVPB 1000 milliGRAM(s) IV Intermittent every 12 hours    MEDICATIONS  (PRN):  ALBUTerol    0.083%. 2.5 milliGRAM(s) Nebulizer once PRN PRN Chemotherapy Reaction  ALBUTerol    0.083%. 2.5 milliGRAM(s) Nebulizer once PRN PRN Chemotherapy Reaction  diphenhydrAMINE   Injectable 50 milliGRAM(s) IV Push once PRN PRN Chemotherapy Reaction  EPINEPHrine     1 mG/mL Injectable 0.3 milliGRAM(s) IntraMuscular once PRN PRN Chemotherapy Reaction  EPINEPHrine     1 mG/mL Injectable 0.3 milliGRAM(s) IntraMuscular once PRN PRN Chemotherapy Reaction  hydrocortisone sodium succinate Injectable 100 milliGRAM(s) IV Push once PRN PRN Chemotherapy Reaction  hydrocortisone sodium succinate Injectable 100 milliGRAM(s) IV Push once PRN PRN Chemotherapy Reaction  meperidine     Injectable 25 milliGRAM(s) IV Push once PRN PRN Chemotherapy Reaction (Rigors)  mineral oil for Topical Use 1 Application(s) Topical three times a day PRN to clean area after BM  zinc oxide 20% Ointment 1 Application(s) Topical three times a day PRN rash, apply to the area after each BM      Vital Signs Last 24 Hrs  T(C): 36.8 (31 Mar 2021 09:39), Max: 38 (30 Mar 2021 12:18)  T(F): 98.3 (31 Mar 2021 09:39), Max: 100.4 (30 Mar 2021 12:18)  HR: 102 (31 Mar 2021 09:39) (94 - 118)  BP: 131/78 (31 Mar 2021 09:39) (106/61 - 144/71)  BP(mean): --  RR: 18 (31 Mar 2021 09:39) (17 - 18)  SpO2: 97% (31 Mar 2021 09:39) (95% - 99%)    PHYSICAL EXAM:          LABS:                        10.5   14.93 )-----------( 6        ( 31 Mar 2021 08:33 )             35.1     03-31    138  |  101  |  23  ----------------------------<  97  4.0   |  29  |  0.80    Ca    8.6      31 Mar 2021 08:33  Mg     2.0     03-31        Urinalysis Basic - ( 31 Mar 2021 07:59 )    Color: Yellow / Appearance: Clear / S.033 / pH: x  Gluc: x / Ketone: Negative  / Bili: Negative / Urobili: 3 mg/dL   Blood: x / Protein: 30 mg/dL / Nitrite: Negative   Leuk Esterase: Negative / RBC: 6 /HPF / WBC 1 /HPF   Sq Epi: x / Non Sq Epi: 0 /HPF / Bacteria: Negative          RADIOLOGY & ADDITIONAL STUDIES:    PATHOLOGY:         INTERVAL History: No acute events. Pt had fever to 100.4 overnight. On Vancomycin for abx. Left leg swelling significantly better. Right leg still erythematous and warm to touch.     Allergies    Bactrim (Hives)  Rituxan (Hives)  WinRho SDF (Hives)    Intolerances        MEDICATIONS  (STANDING):  atovaquone  Suspension 1500 milliGRAM(s) Oral daily  BACItracin   Ointment 1 Application(s) Topical two times a day  brexpiprazole 6 milliGRAM(s) Oral daily  cetirizine 10 milliGRAM(s) Oral at bedtime  chlorhexidine 4% Liquid 1 Application(s) Topical <User Schedule>  cimetidine 400 milliGRAM(s) Oral three times a day  fluticasone propionate 50 MICROgram(s)/spray Nasal Spray 1 Spray(s) Both Nostrils two times a day  furosemide    Tablet 40 milliGRAM(s) Oral daily  influenza   Vaccine 0.5 milliLiter(s) IntraMuscular once  LORazepam     Tablet 1 milliGRAM(s) Oral at bedtime  multivitamin 1 Tablet(s) Oral daily  mupirocin 2% Ointment 1 Application(s) Topical two times a day  predniSONE   Tablet   Oral   predniSONE   Tablet 10 milliGRAM(s) Oral daily  sodium chloride 0.9%. 1000 milliLiter(s) (10 mL/Hr) IV Continuous <Continuous>  traZODone 300 milliGRAM(s) Oral at bedtime  vancomycin  IVPB 1000 milliGRAM(s) IV Intermittent every 12 hours    MEDICATIONS  (PRN):  ALBUTerol    0.083%. 2.5 milliGRAM(s) Nebulizer once PRN PRN Chemotherapy Reaction  ALBUTerol    0.083%. 2.5 milliGRAM(s) Nebulizer once PRN PRN Chemotherapy Reaction  diphenhydrAMINE   Injectable 50 milliGRAM(s) IV Push once PRN PRN Chemotherapy Reaction  EPINEPHrine     1 mG/mL Injectable 0.3 milliGRAM(s) IntraMuscular once PRN PRN Chemotherapy Reaction  EPINEPHrine     1 mG/mL Injectable 0.3 milliGRAM(s) IntraMuscular once PRN PRN Chemotherapy Reaction  hydrocortisone sodium succinate Injectable 100 milliGRAM(s) IV Push once PRN PRN Chemotherapy Reaction  hydrocortisone sodium succinate Injectable 100 milliGRAM(s) IV Push once PRN PRN Chemotherapy Reaction  meperidine     Injectable 25 milliGRAM(s) IV Push once PRN PRN Chemotherapy Reaction (Rigors)  mineral oil for Topical Use 1 Application(s) Topical three times a day PRN to clean area after BM  zinc oxide 20% Ointment 1 Application(s) Topical three times a day PRN rash, apply to the area after each BM      Vital Signs Last 24 Hrs  T(C): 36.8 (31 Mar 2021 09:39), Max: 38 (30 Mar 2021 12:18)  T(F): 98.3 (31 Mar 2021 09:39), Max: 100.4 (30 Mar 2021 12:18)  HR: 102 (31 Mar 2021 09:39) (94 - 118)  BP: 131/78 (31 Mar 2021 09:39) (106/61 - 144/71)  BP(mean): --  RR: 18 (31 Mar 2021 09:39) (17 - 18)  SpO2: 97% (31 Mar 2021 09:39) (95% - 99%)    PHYSICAL EXAM:  General - NAD  HEENT - PERRLA, EOM intact, sclera and conjunctiva clear, oropharynx, nares clear  Neck - Supple, no thyromegaly or thyroid nodules, no bruits  Cardiovascular - RRR no m/r/g, no JVD, no carotid bruits  Lungs - CTAB, no use of acessory muscles, no w/c/r  Abdomen - Normal bowel sounds, NT/ND  Extremities - bruising on bilateral shins, right lower extremity, erythematous, warm touch, wrapped in ace bandage        LABS:                        10.5   14.93 )-----------( 6        ( 31 Mar 2021 08:33 )             35.1         138  |  101  |  23  ----------------------------<  97  4.0   |  29  |  0.80    Ca    8.6      31 Mar 2021 08:33  Mg     2.0             Urinalysis Basic - ( 31 Mar 2021 07:59 )    Color: Yellow / Appearance: Clear / S.033 / pH: x  Gluc: x / Ketone: Negative  / Bili: Negative / Urobili: 3 mg/dL   Blood: x / Protein: 30 mg/dL / Nitrite: Negative   Leuk Esterase: Negative / RBC: 6 /HPF / WBC 1 /HPF   Sq Epi: x / Non Sq Epi: 0 /HPF / Bacteria: Negative          RADIOLOGY & ADDITIONAL STUDIES:    PATHOLOGY:

## 2021-03-31 NOTE — PROGRESS NOTE ADULT - ATTENDING COMMENTS
Cellulitis RLE improving. ulcers crusting. erythema decreased intensity.  WBC trending down.  Low grade fever.  continue Vanco.  Follow blood culture.    Shelbi Weaver MD  Pager: [phone]  After 5 PM or weekends please call fellow on call or office [phone]

## 2021-03-31 NOTE — PROGRESS NOTE ADULT - PROBLEM SELECTOR PLAN 3
-c/w home regimen: trazodone 300 mg qhs, brexpiprazole (rixulti) 6 mg AM, ativan 1mg qhs (home meds)

## 2021-03-31 NOTE — PROGRESS NOTE ADULT - PROBLEM SELECTOR PLAN 4
hx of COVID +1/22, covid negative on 3/12, not requiring supplemental O2  -CXR w/out infiltrates  -s/p convalescent plasma therapy on 3/11

## 2021-03-31 NOTE — PROGRESS NOTE ADULT - SUBJECTIVE AND OBJECTIVE BOX
Patient is a 21y old  Male who presents with a chief complaint of low platelets (31 Mar 2021 10:24)      SUBJECTIVE / OVERNIGHT EVENTS: overnight he was febrile to 100.4. Pt had a soft BM yesterday. per mom RLE erythema has mildly improved. In addition LLE edema has improved however RLE remains edematous.    MEDICATIONS  (STANDING):  atovaquone  Suspension 1500 milliGRAM(s) Oral daily  BACItracin   Ointment 1 Application(s) Topical two times a day  brexpiprazole 6 milliGRAM(s) Oral daily  cetirizine 10 milliGRAM(s) Oral at bedtime  chlorhexidine 4% Liquid 1 Application(s) Topical <User Schedule>  cimetidine 400 milliGRAM(s) Oral three times a day  fluticasone propionate 50 MICROgram(s)/spray Nasal Spray 1 Spray(s) Both Nostrils two times a day  furosemide    Tablet 40 milliGRAM(s) Oral daily  influenza   Vaccine 0.5 milliLiter(s) IntraMuscular once  LORazepam     Tablet 1 milliGRAM(s) Oral at bedtime  multivitamin 1 Tablet(s) Oral daily  mupirocin 2% Ointment 1 Application(s) Topical two times a day  predniSONE   Tablet   Oral   predniSONE   Tablet 10 milliGRAM(s) Oral daily  sodium chloride 0.9%. 1000 milliLiter(s) (10 mL/Hr) IV Continuous <Continuous>  traZODone 300 milliGRAM(s) Oral at bedtime  vancomycin  IVPB 1000 milliGRAM(s) IV Intermittent every 12 hours    MEDICATIONS  (PRN):  ALBUTerol    0.083%. 2.5 milliGRAM(s) Nebulizer once PRN PRN Chemotherapy Reaction  ALBUTerol    0.083%. 2.5 milliGRAM(s) Nebulizer once PRN PRN Chemotherapy Reaction  diphenhydrAMINE   Injectable 50 milliGRAM(s) IV Push once PRN PRN Chemotherapy Reaction  EPINEPHrine     1 mG/mL Injectable 0.3 milliGRAM(s) IntraMuscular once PRN PRN Chemotherapy Reaction  EPINEPHrine     1 mG/mL Injectable 0.3 milliGRAM(s) IntraMuscular once PRN PRN Chemotherapy Reaction  hydrocortisone sodium succinate Injectable 100 milliGRAM(s) IV Push once PRN PRN Chemotherapy Reaction  hydrocortisone sodium succinate Injectable 100 milliGRAM(s) IV Push once PRN PRN Chemotherapy Reaction  meperidine     Injectable 25 milliGRAM(s) IV Push once PRN PRN Chemotherapy Reaction (Rigors)  mineral oil for Topical Use 1 Application(s) Topical three times a day PRN to clean area after BM  zinc oxide 20% Ointment 1 Application(s) Topical three times a day PRN rash, apply to the area after each BM        CAPILLARY BLOOD GLUCOSE        I&O's Summary    30 Mar 2021 07:01  -  31 Mar 2021 07:00  --------------------------------------------------------  IN: 0 mL / OUT: 4100 mL / NET: -4100 mL    Vital Signs Last 24 Hrs  T(C): 36.8 (31 Mar 2021 12:41), Max: 38 (31 Mar 2021 00:47)  T(F): 98.3 (31 Mar 2021 12:41), Max: 100.4 (31 Mar 2021 00:47)  HR: 115 (31 Mar 2021 12:41) (80 - 118)  BP: 138/68 (31 Mar 2021 12:41) (131/78 - 144/71)  BP(mean): --  RR: 18 (31 Mar 2021 12:41) (17 - 18)  SpO2: 97% (31 Mar 2021 12:41) (95% - 99%)        PHYSICAL EXAM:  GENERAL: NAD, non verbal  HEAD:  Atraumatic, Normocephalic  EYES: EOMI, PERRLA, conjunctiva and sclera clear  NECK: Supple, No JVD  CHEST/LUNG: Clear to auscultation bilaterally; No wheeze  HEART: Regular rate and rhythm; No murmurs, rubs, or gallops  ABDOMEN: morbidly obese, Soft, Nontender, Nondistended; Bowel sounds present  EXTREMITIES:  (+) edema b/l legs, R>L (+) rash and wound at shin b/l with mild improvement in RLE erythema  NEUROLOGY: NICHOLS, alert    LABS:                        10.5   14.93 )-----------( 6        ( 31 Mar 2021 08:33 )             35.1     03-31    138  |  101  |  23  ----------------------------<  97  4.0   |  29  |  0.80    Ca    8.6      31 Mar 2021 08:33  Mg     2.0     -            Urinalysis Basic - ( 31 Mar 2021 07:59 )    Color: Yellow / Appearance: Clear / S.033 / pH: x  Gluc: x / Ketone: Negative  / Bili: Negative / Urobili: 3 mg/dL   Blood: x / Protein: 30 mg/dL / Nitrite: Negative   Leuk Esterase: Negative / RBC: 6 /HPF / WBC 1 /HPF   Sq Epi: x / Non Sq Epi: 0 /HPF / Bacteria: Negative        RADIOLOGY & ADDITIONAL TESTS:    Imaging Personally Reviewed:    Consultant(s) Notes Reviewed:      Care Discussed with Consultants/Other Providers:

## 2021-03-31 NOTE — PROGRESS NOTE ADULT - ASSESSMENT
21 yo M with a history of chronic ITP and severe autism (non-verbal at baseline) who presented with severe thrombocytopenia (Plt 7) while on home PO prednisone (80mg daily), and was admitted to Medicine for further management. Hematology consulted for assistance with management.    # Chronic ITP exacerbated in the setting of recent COVID infection   - Previous CT head neg for bleed  - S/p IVIG 1gm/kg daily x 2 (completed 2/28) and dexamethasone 40mg IV x4 days (completed 3/3)  - S/p W1 Rituximab with desensitization protocol on 3/6; W2 Rituximab on 3/13; s/p W3 Rituximab on 3/20 with desensitization protocol, tolerated well. S/p W4 Rituximab with desensitization protocol yesterday (3/27), tolerated well.   - S/p romiplastim/Nplate, 1mcg/kg; given 2/28; 2mcg/kg given on 3/7 (pt only got 250mcg instead of 300mcg as pharmacy only had 250); 5mcg/kg (750mcg) given on 3/14, S/p 750mcg (5mcg/kg) on 3/21. S/p 4 weekly doses of Rituxan. S/p 1500mcg of NPLATE on 3/29.  Given autism, patient is unable to swallow eltrombopag pills (cannot be crushed or chewed). Will continue to discuss other possible formulations or oral thrombopoietin receptor agonists.  - Cont. prednisone taper - decrease by 10mg every 2 days.   - Transfuse platelets only if bleeding (consider giving 1/2 unit platelets slowly infused over 3 hours q12h) OR if platelets <5k.  - Fever of 100.4 early this morning, but fever curve appears to be improving. Will await 24 hours of fever- free period before starting.   - Will also plan for another 10mcg/mg of Nplate this Sunday 4/4.   - DVT studies negative  - Lasix increased per primary team   - Appreciate ID eval and recs- now on Vancomycin  - Steroids tapering off, now on 10mg- can likely DC mepron ppx soon  - Monitor closely for any signs or symptoms of bleeding      Kenya Chávez MD  Hematology Oncology Fellow, PGY-5  [facility] Pager: [phone]/ [facility] Pager: [phone]

## 2021-03-31 NOTE — PROGRESS NOTE ADULT - SUBJECTIVE AND OBJECTIVE BOX
Follow Up: ID following for fevers, cellulitis    Interval History/ROS:Patient is a 21y old  Male who presents with a chief complaint of low platelets (31 Mar 2021 14:41)  - Tmax 100.4 in past 24 hours  - no acute events reported overnight  - patient assessed at bedside with mother. No acute complaints. B/l LE out of compression stockings, decreasing erythema noted  - per mother - no new cough, appetite unchanged, LE swelling appears improved    Allergies  Bactrim (Hives)  Rituxan (Hives)  WinRho SDF (Hives)    Intolerances        ANTIMICROBIALS:  atovaquone  Suspension 1500 daily  vancomycin  IVPB 1000 every 12 hours      OTHER MEDS:  ALBUTerol    0.083%. 2.5 milliGRAM(s) Nebulizer once PRN  ALBUTerol    0.083%. 2.5 milliGRAM(s) Nebulizer once PRN  BACItracin   Ointment 1 Application(s) Topical two times a day  brexpiprazole 6 milliGRAM(s) Oral daily  cetirizine 10 milliGRAM(s) Oral at bedtime  chlorhexidine 4% Liquid 1 Application(s) Topical <User Schedule>  cimetidine 400 milliGRAM(s) Oral three times a day  diphenhydrAMINE   Injectable 50 milliGRAM(s) IV Push once PRN  EPINEPHrine     1 mG/mL Injectable 0.3 milliGRAM(s) IntraMuscular once PRN  EPINEPHrine     1 mG/mL Injectable 0.3 milliGRAM(s) IntraMuscular once PRN  fluticasone propionate 50 MICROgram(s)/spray Nasal Spray 1 Spray(s) Both Nostrils two times a day  furosemide    Tablet 40 milliGRAM(s) Oral daily  hydrocortisone sodium succinate Injectable 100 milliGRAM(s) IV Push once PRN  hydrocortisone sodium succinate Injectable 100 milliGRAM(s) IV Push once PRN  influenza   Vaccine 0.5 milliLiter(s) IntraMuscular once  LORazepam     Tablet 1 milliGRAM(s) Oral at bedtime  meperidine     Injectable 25 milliGRAM(s) IV Push once PRN  mineral oil for Topical Use 1 Application(s) Topical three times a day PRN  multivitamin 1 Tablet(s) Oral daily  mupirocin 2% Ointment 1 Application(s) Topical two times a day  predniSONE   Tablet   Oral   predniSONE   Tablet 10 milliGRAM(s) Oral daily  sodium chloride 0.9%. 1000 milliLiter(s) IV Continuous <Continuous>  traZODone 300 milliGRAM(s) Oral at bedtime  zinc oxide 20% Ointment 1 Application(s) Topical three times a day PRN      Vital Signs Last 24 Hrs  T(C): 36.8 (31 Mar 2021 12:41), Max: 38 (31 Mar 2021 00:47)  T(F): 98.3 (31 Mar 2021 12:41), Max: 100.4 (31 Mar 2021 00:47)  HR: 115 (31 Mar 2021 12:41) (80 - 118)  BP: 138/68 (31 Mar 2021 12:41) (131/78 - 144/71)  BP(mean): --  RR: 18 (31 Mar 2021 12:41) (17 - 18)  SpO2: 97% (31 Mar 2021 12:41) (95% - 99%)    EXAM:  Constitutional: Not in acute distress. On RA  Eyes: No icterus.  RS: Chest clear to auscultation bilaterally. No wheeze/rhonchi/crepitations.  CVS: S1, S2 heard. Regular rate and rhythm. No murmurs/rubs/gallops.  Abdomen: Soft. No guarding/rigidity/tenderness.  : Condom catheter present  Extremities: Warm. B/l pedal edema  Skin: RLE - erythematous, with purplish lesions over anterior aspect of shin  LLE - purplish lesions noted  Vascular: No evidence of phlebitis  Neuro: Alert, non-verbal                        10.5   14.93 )-----------( 6        ( 31 Mar 2021 08:33 )             35.1       -    138  |  101  |  23  ----------------------------<  97  4.0   |  29  |  0.80    Ca    8.6      31 Mar 2021 08:33  Mg     2.0     03-        Urinalysis Basic - ( 31 Mar 2021 07:59 )    Color: Yellow / Appearance: Clear / S.033 / pH: x  Gluc: x / Ketone: Negative  / Bili: Negative / Urobili: 3 mg/dL   Blood: x / Protein: 30 mg/dL / Nitrite: Negative   Leuk Esterase: Negative / RBC: 6 /HPF / WBC 1 /HPF   Sq Epi: x / Non Sq Epi: 0 /HPF / Bacteria: Negative        MICROBIOLOGY:Culture Results:   No growth to date. ( @ 16:38)  Culture Results:   No growth to date. ( @ 16:47)  Culture Results:   No growth to date. ( @ 16:47)

## 2021-04-01 LAB
CULTURE RESULTS: NO GROWTH — SIGNIFICANT CHANGE UP
SPECIMEN SOURCE: SIGNIFICANT CHANGE UP

## 2021-04-01 PROCEDURE — 99232 SBSQ HOSP IP/OBS MODERATE 35: CPT | Mod: GC

## 2021-04-01 PROCEDURE — 99233 SBSQ HOSP IP/OBS HIGH 50: CPT

## 2021-04-01 RX ORDER — ACETAMINOPHEN 500 MG
650 TABLET ORAL ONCE
Refills: 0 | Status: DISCONTINUED | OUTPATIENT
Start: 2021-04-01 | End: 2021-04-01

## 2021-04-01 RX ORDER — VANCOMYCIN HCL 1 G
VIAL (EA) INTRAVENOUS
Refills: 0 | Status: DISCONTINUED | OUTPATIENT
Start: 2021-04-01 | End: 2021-04-08

## 2021-04-01 RX ORDER — IMMUNE GLOBULIN (HUMAN) 10 G/100ML
115 INJECTION INTRAVENOUS; SUBCUTANEOUS DAILY
Refills: 0 | Status: COMPLETED | OUTPATIENT
Start: 2021-04-01 | End: 2021-04-02

## 2021-04-01 RX ORDER — VANCOMYCIN HCL 1 G
1250 VIAL (EA) INTRAVENOUS EVERY 8 HOURS
Refills: 0 | Status: DISCONTINUED | OUTPATIENT
Start: 2021-04-01 | End: 2021-04-08

## 2021-04-01 RX ORDER — VANCOMYCIN HCL 1 G
1250 VIAL (EA) INTRAVENOUS ONCE
Refills: 0 | Status: COMPLETED | OUTPATIENT
Start: 2021-04-01 | End: 2021-04-01

## 2021-04-01 RX ORDER — ACETAMINOPHEN 500 MG
650 TABLET ORAL ONCE
Refills: 0 | Status: COMPLETED | OUTPATIENT
Start: 2021-04-01 | End: 2021-04-01

## 2021-04-01 RX ORDER — DIPHENHYDRAMINE HCL 50 MG
25 CAPSULE ORAL ONCE
Refills: 0 | Status: COMPLETED | OUTPATIENT
Start: 2021-04-01 | End: 2021-04-01

## 2021-04-01 RX ADMIN — MUPIROCIN 1 APPLICATION(S): 20 OINTMENT TOPICAL at 05:59

## 2021-04-01 RX ADMIN — Medication 1 APPLICATION(S): at 05:57

## 2021-04-01 RX ADMIN — ATOVAQUONE 1500 MILLIGRAM(S): 750 SUSPENSION ORAL at 13:47

## 2021-04-01 RX ADMIN — Medication 166.67 MILLIGRAM(S): at 08:23

## 2021-04-01 RX ADMIN — CHLORHEXIDINE GLUCONATE 1 APPLICATION(S): 213 SOLUTION TOPICAL at 05:58

## 2021-04-01 RX ADMIN — Medication 10 MILLIGRAM(S): at 05:58

## 2021-04-01 RX ADMIN — Medication 400 MILLIGRAM(S): at 05:58

## 2021-04-01 RX ADMIN — Medication 25 MILLIGRAM(S): at 16:06

## 2021-04-01 RX ADMIN — MUPIROCIN 1 APPLICATION(S): 20 OINTMENT TOPICAL at 18:57

## 2021-04-01 RX ADMIN — Medication 1 TABLET(S): at 13:47

## 2021-04-01 RX ADMIN — Medication 300 MILLIGRAM(S): at 21:47

## 2021-04-01 RX ADMIN — Medication 40 MILLIGRAM(S): at 05:58

## 2021-04-01 RX ADMIN — BREXPIPRAZOLE 6 MILLIGRAM(S): 0.25 TABLET ORAL at 14:13

## 2021-04-01 RX ADMIN — Medication 400 MILLIGRAM(S): at 13:48

## 2021-04-01 RX ADMIN — Medication 650 MILLIGRAM(S): at 16:06

## 2021-04-01 RX ADMIN — Medication 166.67 MILLIGRAM(S): at 15:36

## 2021-04-01 RX ADMIN — IMMUNE GLOBULIN (HUMAN) 115 GRAM(S): 10 INJECTION INTRAVENOUS; SUBCUTANEOUS at 17:58

## 2021-04-01 RX ADMIN — Medication 1 APPLICATION(S): at 18:57

## 2021-04-01 RX ADMIN — Medication 1 MILLIGRAM(S): at 21:47

## 2021-04-01 RX ADMIN — Medication 166.67 MILLIGRAM(S): at 22:35

## 2021-04-01 RX ADMIN — CETIRIZINE HYDROCHLORIDE 10 MILLIGRAM(S): 10 TABLET ORAL at 21:47

## 2021-04-01 RX ADMIN — Medication 400 MILLIGRAM(S): at 21:48

## 2021-04-01 NOTE — PROGRESS NOTE ADULT - PROBLEM SELECTOR PLAN 4
resolved  hx of COVID +1/22, covid negative on 3/12, not requiring supplemental O2  -CXR w/out infiltrates  -s/p convalescent plasma therapy on 3/11

## 2021-04-01 NOTE — PROGRESS NOTE ADULT - ATTENDING COMMENTS
Pt seen with his mother at bedside. Cellulitis continues to improve on intravenous vancomycin. Chronic refractory ITP s/p Rituxan and Nplate high dose weekly injection. To have IVIG today and tomorrow and will trend counts.

## 2021-04-01 NOTE — PROGRESS NOTE ADULT - ASSESSMENT
21/M with PMH chronic ITP (prev well-controlled, last IVIG Jan 2021, on prednisone 80mg/d), intellectual disability, non-verbal, autism, COVID19 (Jan 2021)  Adm 2/27 for low PLT.  Hosp course c/b low PLT s/p IVIG, Dexa, Rituximab ([facility] stay 3/5-3/7 for desensitization); convalescent plasma 3/11; 3/22 Wound Care and Dermatology consulted for LE wounds; 3/29 new fevers  ID consulted for recs  ========    Chronic ITP on tapering steroids with LE lesions and fevers  - fevers could be related to LE lesions (?superadded cellulitis)  - H/o fevers reported, with inability to bear weight on rt leg. Lesions noted - RLE appears more warm, erythematous  - HIV NR Jan 25, 2021  - U/S LE negative for DVT. Repeat UA 3/30 negative    Antimicrobials, Steroids, COVID19 therapies in this admission  IVIG x2, completed 2/28   Decadron 40 mg IV 2/28-3/3  Prednisone 80mg 2/27-> tapering since then  Solumedrol 3/13, 3/20, 3/27  IVIG 2/27, 2/28, 4/1  Rituxan infusion 3/7, 3/13, 3/20, 3/27  convalescent plasma on 3/11  Atovaquone 3/5->  Cefazolin 3/29-3/30  Doxy 3/29-3/30  Vanc 3/31->    RECOMMENDATIONS:  - Vancomycin trough noted to be 3.8. Switched to Vancomycin 1250mg IV q8h by primary team  - Please check Vancomycin trough before 4th sequential dose. Target trough levels 10-15  - f/u blood cx    WILL Ramires, MD  Fellow, Infectious Diseases  Pager: [phone]  If no response, after 5pm and on Weekends: Call [phone]

## 2021-04-01 NOTE — PROVIDER CONTACT NOTE (OTHER) - ASSESSMENT
Axillary temp 99.0 before IVIG. Mother requested for rectal temp, 99.7. Vitals in chart. Mother, at bedside, is requesting for hematology team to "OK" the temperature before IVIG is started.

## 2021-04-01 NOTE — PROGRESS NOTE ADULT - SUBJECTIVE AND OBJECTIVE BOX
Follow Up: ID following for concern for cellulitis    Interval History/ROS:Patient is a 21y old  Male who presents with a chief complaint of low platelets (2021 13:54)  - no acute events reported overnight. Afebrile in past 24 hours  - patient assessed at bedside. Mother denied any acute complaints  - Vanc trough low (3.8) - increased to Vancomycin 1250mg IV q8h  - Planned for IVIG today    Allergies    Bactrim (Hives)  Rituxan (Hives)  WinRho SDF (Hives)    Intolerances        ANTIMICROBIALS:  atovaquone  Suspension 1500 daily  vancomycin  IVPB 1250 every 8 hours  vancomycin  IVPB        OTHER MEDS:  ALBUTerol    0.083%. 2.5 milliGRAM(s) Nebulizer once PRN  ALBUTerol    0.083%. 2.5 milliGRAM(s) Nebulizer once PRN  BACItracin   Ointment 1 Application(s) Topical two times a day  brexpiprazole 6 milliGRAM(s) Oral daily  cetirizine 10 milliGRAM(s) Oral at bedtime  chlorhexidine 4% Liquid 1 Application(s) Topical <User Schedule>  cimetidine 400 milliGRAM(s) Oral three times a day  diphenhydrAMINE   Injectable 50 milliGRAM(s) IV Push once PRN  EPINEPHrine     1 mG/mL Injectable 0.3 milliGRAM(s) IntraMuscular once PRN  EPINEPHrine     1 mG/mL Injectable 0.3 milliGRAM(s) IntraMuscular once PRN  fluticasone propionate 50 MICROgram(s)/spray Nasal Spray 1 Spray(s) Both Nostrils two times a day  furosemide    Tablet 40 milliGRAM(s) Oral daily  hydrocortisone sodium succinate Injectable 100 milliGRAM(s) IV Push once PRN  hydrocortisone sodium succinate Injectable 100 milliGRAM(s) IV Push once PRN  immune   globulin 10% (GAMMAGARD) IVPB 115 Gram(s) IV Intermittent daily  influenza   Vaccine 0.5 milliLiter(s) IntraMuscular once  LORazepam     Tablet 1 milliGRAM(s) Oral at bedtime  meperidine     Injectable 25 milliGRAM(s) IV Push once PRN  mineral oil for Topical Use 1 Application(s) Topical three times a day PRN  multivitamin 1 Tablet(s) Oral daily  mupirocin 2% Ointment 1 Application(s) Topical two times a day  traZODone 300 milliGRAM(s) Oral at bedtime  zinc oxide 20% Ointment 1 Application(s) Topical three times a day PRN      Vital Signs Last 24 Hrs  T(C): 37.6 (2021 17:26), Max: 37.6 (2021 17:26)  T(F): 99.7 (2021 17:26), Max: 99.7 (2021 17:26)  HR: 116 (2021 17:25) (100 - 121)  BP: 117/66 (2021 17:25) (117/66 - 162/67)  BP(mean): --  RR: 16 (2021 17:25) (16 - 18)  SpO2: 94% (2021 17:25) (94% - 97%)    EXAM:  Constitutional: Not in acute distress. On RA.  Eyes: No icterus. Pupils b/l reactive to light.  Oral cavity: Clear, no lesions  Neck: No neck vein distension noted  RS: Chest clear to auscultation bilaterally. No wheeze/rhonchi/crepitations.  CVS: S1, S2 heard. Regular rate and rhythm. No murmurs/rubs/gallops.  Abdomen: Soft. No guarding/rigidity/tenderness.  : No acute abnormalities  Extremities: Warm. B/l pitting pedal edema improving  Skin: B/l LE skin lesions appear to be improving  Vascular: No evidence of phlebitis. LUE Midline site appears clear  Neuro: Alert, non-verbal                        10.7   16.27 )-----------( 8        ( 2021 06:35 )             35.6       04-    139  |  101  |  22  ----------------------------<  95  3.8   |  27  |  0.83    Ca    8.8      2021 06:35  Phos  4.1     04-01  Mg     2.0     04-        Urinalysis Basic - ( 31 Mar 2021 07:59 )    Color: Yellow / Appearance: Clear / S.033 / pH: x  Gluc: x / Ketone: Negative  / Bili: Negative / Urobili: 3 mg/dL   Blood: x / Protein: 30 mg/dL / Nitrite: Negative   Leuk Esterase: Negative / RBC: 6 /HPF / WBC 1 /HPF   Sq Epi: x / Non Sq Epi: 0 /HPF / Bacteria: Negative        MICROBIOLOGY:Culture Results:   No growth ( @ 15:58)  Culture Results:   No growth to date. ( @ 16:38)  Culture Results:   No growth to date. ( @ 16:47)  Culture Results:   No growth to date. ( @ 16:47)

## 2021-04-01 NOTE — PROVIDER CONTACT NOTE (OTHER) - DATE AND TIME:
01-Apr-2021 17:30
27-Feb-2021 21:50
29-Mar-2021 20:34
31-Mar-2021 00:51
30-Mar-2021 12:32
13-Mar-2021 12:30
16-Mar-2021 09:38
30-Mar-2021 01:05
13-Mar-2021 13:50
29-Mar-2021 12:17
01-Apr-2021 17:47

## 2021-04-01 NOTE — PROGRESS NOTE ADULT - ASSESSMENT
19 yo M with a history of chronic ITP and severe autism (non-verbal at baseline) who presented with severe thrombocytopenia (Plt 7) while on home PO prednisone (80mg daily), and was admitted to Medicine for further management. Hematology consulted for assistance with management.    # Chronic ITP exacerbated in the setting of recent COVID infection   - Previous CT head neg for bleed  - S/p IVIG 1gm/kg daily x 2 (completed 2/28) and dexamethasone 40mg IV x4 days (completed 3/3)  - S/p W1 Rituximab with desensitization protocol on 3/6; W2 Rituximab on 3/13; s/p W3 Rituximab on 3/20 with desensitization protocol, tolerated well. S/p W4 Rituximab with desensitization protocol yesterday (3/27), tolerated well.   - S/p romiplastim/Nplate, 1mcg/kg; given 2/28; 2mcg/kg given on 3/7 (pt only got 250mcg instead of 300mcg as pharmacy only had 250); 5mcg/kg (750mcg) given on 3/14, S/p 750mcg (5mcg/kg) on 3/21. S/p 4 weekly doses of Rituxan. S/p 1500mcg of NPLATE on 3/29.  Given autism, patient is unable to swallow eltrombopag pills (cannot be crushed or chewed). Will continue to discuss other possible formulations or oral thrombopoietin receptor agonists.  - Cont. prednisone taper - decrease by 10mg every 2 days.   - Transfuse platelets only if bleeding (consider giving 1/2 unit platelets slowly infused over 3 hours q12h) OR if platelets <5k.  - afebrile overnight. will give IVIG 1mg/kg over 6 hours for 2 days. Premedicate with tyrenol and benadryl  - Will also plan for another 10mcg/mg of Nplate this Sunday 4/4.   - DVT studies negative  - Lasix increased per primary team   - Appreciate ID eval and recs- now on Vancomycin  - Steroids tapering off, now on 10mg- can likely DC mepron ppx soon  - Monitor closely for any signs or symptoms of bleeding    Quiroz-in MD Lalo, PGY-4  Translational Medical Oncology Fellow  Pager: [phone]

## 2021-04-01 NOTE — PROGRESS NOTE ADULT - SUBJECTIVE AND OBJECTIVE BOX
VINNY MOON  MRN-[id number]    Interval hx:  Patient was seen and examined at the bedside. Afebrile overnight.      MEDICATIONS  (STANDING):  acetaminophen    Suspension .. 650 milliGRAM(s) Oral once  atovaquone  Suspension 1500 milliGRAM(s) Oral daily  BACItracin   Ointment 1 Application(s) Topical two times a day  brexpiprazole 6 milliGRAM(s) Oral daily  cetirizine 10 milliGRAM(s) Oral at bedtime  chlorhexidine 4% Liquid 1 Application(s) Topical <User Schedule>  cimetidine 400 milliGRAM(s) Oral three times a day  diphenhydrAMINE   Injectable 25 milliGRAM(s) IV Push once  fluticasone propionate 50 MICROgram(s)/spray Nasal Spray 1 Spray(s) Both Nostrils two times a day  furosemide    Tablet 40 milliGRAM(s) Oral daily  immune   globulin 10% (GAMMAGARD) IVPB 115 Gram(s) IV Intermittent daily  influenza   Vaccine 0.5 milliLiter(s) IntraMuscular once  LORazepam     Tablet 1 milliGRAM(s) Oral at bedtime  multivitamin 1 Tablet(s) Oral daily  mupirocin 2% Ointment 1 Application(s) Topical two times a day  sodium chloride 0.9%. 1000 milliLiter(s) (10 mL/Hr) IV Continuous <Continuous>  traZODone 300 milliGRAM(s) Oral at bedtime  vancomycin  IVPB 1250 milliGRAM(s) IV Intermittent every 8 hours  vancomycin  IVPB        MEDICATIONS  (PRN):  ALBUTerol    0.083%. 2.5 milliGRAM(s) Nebulizer once PRN PRN Chemotherapy Reaction  ALBUTerol    0.083%. 2.5 milliGRAM(s) Nebulizer once PRN PRN Chemotherapy Reaction  diphenhydrAMINE   Injectable 50 milliGRAM(s) IV Push once PRN PRN Chemotherapy Reaction  EPINEPHrine     1 mG/mL Injectable 0.3 milliGRAM(s) IntraMuscular once PRN PRN Chemotherapy Reaction  EPINEPHrine     1 mG/mL Injectable 0.3 milliGRAM(s) IntraMuscular once PRN PRN Chemotherapy Reaction  hydrocortisone sodium succinate Injectable 100 milliGRAM(s) IV Push once PRN PRN Chemotherapy Reaction  hydrocortisone sodium succinate Injectable 100 milliGRAM(s) IV Push once PRN PRN Chemotherapy Reaction  meperidine     Injectable 25 milliGRAM(s) IV Push once PRN PRN Chemotherapy Reaction (Rigors)  mineral oil for Topical Use 1 Application(s) Topical three times a day PRN to clean area after BM  zinc oxide 20% Ointment 1 Application(s) Topical three times a day PRN rash, apply to the area after each BM      Allergies    Bactrim (Hives)  Rituxan (Hives)  WinRho SDF (Hives)    Intolerances        Objectives:  Vital Signs Last 24 Hrs  T(C): 37 (2021 05:56), Max: 37.2 (31 Mar 2021 21:59)  T(F): 98.6 (2021 05:56), Max: 99 (31 Mar 2021 21:59)  HR: 100 (2021 05:56) (100 - 118)  BP: 125/67 (2021 05:56) (125/67 - 138/68)  BP(mean): --  RR: 18 (2021 05:56) (18 - 18)  SpO2: 96% (2021 05:56) (95% - 97%)    PHYSICAL EXAM:  General - NAD  HEENT - PERRLA, EOM intact, sclera and conjunctiva clear, oropharynx, nares clear  Neck - Supple, no thyromegaly or thyroid nodules, no bruits  Cardiovascular - RRR no m/r/g, no JVD, no carotid bruits  Lungs - CTAB, no use of acessory muscles, no w/c/r  Abdomen - Normal bowel sounds, NT/ND  Extremities - bruising on bilateral shins, right lower extremity, erythematous, warm touch, wrapped in ace bandage        21 @ 07:01  -  21 @ 07:00  --------------------------------------------------------  IN: 590 mL / OUT: 3250 mL / NET: -2660 mL        Labs:    CBC Full  -  ( 2021 06:35 )  WBC Count : 16.27 K/uL  RBC Count : 4.12 M/uL  Hemoglobin : 10.7 g/dL  Hematocrit : 35.6 %  Platelet Count - Automated : 8 K/uL  Mean Cell Volume : 86.4 fL  Mean Cell Hemoglobin : 26.0 pg  Mean Cell Hemoglobin Concentration : 30.1 gm/dL  Auto Neutrophil # : 11.69 K/uL  Auto Lymphocyte # : 2.44 K/uL  Auto Monocyte # : 1.44 K/uL  Auto Eosinophil # : 0.14 K/uL  Auto Basophil # : 0.05 K/uL  Auto Neutrophil % : 71.8 %  Auto Lymphocyte % : 15.0 %  Auto Monocyte % : 8.8 %  Auto Eosinophil % : 0.9 %  Auto Basophil % : 0.3 %            139  |  101  |  22  ----------------------------<  95  3.8   |  27  |  0.83    Ca    8.8      2021 06:35  Phos  4.1       Mg     2.0               Urinalysis Basic - ( 31 Mar 2021 07:59 )    Color: Yellow / Appearance: Clear / S.033 / pH: x  Gluc: x / Ketone: Negative  / Bili: Negative / Urobili: 3 mg/dL   Blood: x / Protein: 30 mg/dL / Nitrite: Negative   Leuk Esterase: Negative / RBC: 6 /HPF / WBC 1 /HPF   Sq Epi: x / Non Sq Epi: 0 /HPF / Bacteria: Negative          Culture - Blood (collected 30 Mar 2021 16:38)  Source: .Blood Blood  Preliminary Report (31 Mar 2021 17:01):    No growth to date.    Culture - Blood (collected 29 Mar 2021 16:47)  Source: .Blood Blood  Preliminary Report (30 Mar 2021 17:02):    No growth to date.    Culture - Blood (collected 29 Mar 2021 16:47)  Source: .Blood Blood  Preliminary Report (30 Mar 2021 17:02):    No growth to date.        Imagings:       VINNY MOON  MRN-[id number]    Interval hx:  Patient was seen and examined at the bedside. Afebrile overnight.      MEDICATIONS  (STANDING):  acetaminophen    Suspension .. 650 milliGRAM(s) Oral once  atovaquone  Suspension 1500 milliGRAM(s) Oral daily  BACItracin   Ointment 1 Application(s) Topical two times a day  brexpiprazole 6 milliGRAM(s) Oral daily  cetirizine 10 milliGRAM(s) Oral at bedtime  chlorhexidine 4% Liquid 1 Application(s) Topical <User Schedule>  cimetidine 400 milliGRAM(s) Oral three times a day  diphenhydrAMINE   Injectable 25 milliGRAM(s) IV Push once  fluticasone propionate 50 MICROgram(s)/spray Nasal Spray 1 Spray(s) Both Nostrils two times a day  furosemide    Tablet 40 milliGRAM(s) Oral daily  immune   globulin 10% (GAMMAGARD) IVPB 115 Gram(s) IV Intermittent daily  influenza   Vaccine 0.5 milliLiter(s) IntraMuscular once  LORazepam     Tablet 1 milliGRAM(s) Oral at bedtime  multivitamin 1 Tablet(s) Oral daily  mupirocin 2% Ointment 1 Application(s) Topical two times a day  sodium chloride 0.9%. 1000 milliLiter(s) (10 mL/Hr) IV Continuous <Continuous>  traZODone 300 milliGRAM(s) Oral at bedtime  vancomycin  IVPB 1250 milliGRAM(s) IV Intermittent every 8 hours  vancomycin  IVPB        MEDICATIONS  (PRN):  ALBUTerol    0.083%. 2.5 milliGRAM(s) Nebulizer once PRN PRN Chemotherapy Reaction  ALBUTerol    0.083%. 2.5 milliGRAM(s) Nebulizer once PRN PRN Chemotherapy Reaction  diphenhydrAMINE   Injectable 50 milliGRAM(s) IV Push once PRN PRN Chemotherapy Reaction  EPINEPHrine     1 mG/mL Injectable 0.3 milliGRAM(s) IntraMuscular once PRN PRN Chemotherapy Reaction  EPINEPHrine     1 mG/mL Injectable 0.3 milliGRAM(s) IntraMuscular once PRN PRN Chemotherapy Reaction  hydrocortisone sodium succinate Injectable 100 milliGRAM(s) IV Push once PRN PRN Chemotherapy Reaction  hydrocortisone sodium succinate Injectable 100 milliGRAM(s) IV Push once PRN PRN Chemotherapy Reaction  meperidine     Injectable 25 milliGRAM(s) IV Push once PRN PRN Chemotherapy Reaction (Rigors)  mineral oil for Topical Use 1 Application(s) Topical three times a day PRN to clean area after BM  zinc oxide 20% Ointment 1 Application(s) Topical three times a day PRN rash, apply to the area after each BM      Allergies    Bactrim (Hives)  Rituxan (Hives)  WinRho SDF (Hives)    Intolerances        Objectives:  Vital Signs Last 24 Hrs  T(C): 37 (2021 05:56), Max: 37.2 (31 Mar 2021 21:59)  T(F): 98.6 (2021 05:56), Max: 99 (31 Mar 2021 21:59)  HR: 100 (2021 05:56) (100 - 118)  BP: 125/67 (2021 05:56) (125/67 - 138/68)  BP(mean): --  RR: 18 (2021 05:56) (18 - 18)  SpO2: 96% (2021 05:56) (95% - 97%)    PHYSICAL EXAM:  General - NAD  HEENT - PERRLA, EOM intact, sclera and conjunctiva clear, oropharynx, nares clear  Neck - Supple, no thyromegaly or thyroid nodules, no bruits  Cardiovascular - RRR no m/r/g, no JVD, no carotid bruits  Lungs - CTAB, no use of acessory muscles, no w/c/r  Abdomen - Normal bowel sounds, NT/ND  Extremities - bruising on bilateral shins, right lower extremity, erythematous, warm touch, wrapped in ace bandage        21 @ 07:01  -  21 @ 07:00  --------------------------------------------------------  IN: 590 mL / OUT: 3250 mL / NET: -2660 mL        Labs:    CBC Full  -  ( 2021 06:35 )  WBC Count : 16.27 K/uL  RBC Count : 4.12 M/uL  Hemoglobin : 10.7 g/dL  Hematocrit : 35.6 %  Platelet Count - Automated : 8 K/uL  Mean Cell Volume : 86.4 fL  Mean Cell Hemoglobin : 26.0 pg  Mean Cell Hemoglobin Concentration : 30.1 gm/dL  Auto Neutrophil # : 11.69 K/uL  Auto Lymphocyte # : 2.44 K/uL  Auto Monocyte # : 1.44 K/uL  Auto Eosinophil # : 0.14 K/uL  Auto Basophil # : 0.05 K/uL  Auto Neutrophil % : 71.8 %  Auto Lymphocyte % : 15.0 %  Auto Monocyte % : 8.8 %  Auto Eosinophil % : 0.9 %  Auto Basophil % : 0.3 %            139  |  101  |  22  ----------------------------<  95  3.8   |  27  |  0.83    Ca    8.8      2021 06:35  Phos  4.1       Mg     2.0               Urinalysis Basic - ( 31 Mar 2021 07:59 )    Color: Yellow / Appearance: Clear / S.033 / pH: x  Gluc: x / Ketone: Negative  / Bili: Negative / Urobili: 3 mg/dL   Blood: x / Protein: 30 mg/dL / Nitrite: Negative   Leuk Esterase: Negative / RBC: 6 /HPF / WBC 1 /HPF   Sq Epi: x / Non Sq Epi: 0 /HPF / Bacteria: Negative          Culture - Blood (collected 30 Mar 2021 16:38)  Source: .Blood Blood  Preliminary Report (31 Mar 2021 17:01):    No growth to date.    Culture - Blood (collected 29 Mar 2021 16:47)  Source: .Blood Blood  Preliminary Report (30 Mar 2021 17:02):    No growth to date.    Culture - Blood (collected 29 Mar 2021 16:47)  Source: .Blood Blood  Preliminary Report (30 Mar 2021 17:02):    No growth to date.

## 2021-04-01 NOTE — PROVIDER CONTACT NOTE (OTHER) - NAME OF MD/NP/PA/DO NOTIFIED:
Hematology MD Robert Waite
Tele Pa, Maggy Krueger
tele linda Ramirez
TREVON Nieves
TREVON Nieves
TREVON Roach
TREVON Smith
tele linda Ramirez
TREVON Ashley
Benson Segovia
Griselda TOUSSAINT

## 2021-04-01 NOTE — PROVIDER CONTACT NOTE (OTHER) - SITUATION
Vital signs before IVIG
Vital signs before IVIG
pt was supposed to be transfused platelets, pre transfusion v/s showed low grade fever, blood bank said to send back platelets,
Patient temp 101.1 axillary.
unable to hang IV platelet at this time as per pt IV kinked
Patient is a 20 year old male, non-verbal speaking, Patient Heart rate pre-transfusion of 1 Unit of platelet's is 115.
Patient Temp 100.9 axillary
Temp 100.4 axillary
PLt count is 1 with AM lab.
Patient with an axillary temp 100.4

## 2021-04-01 NOTE — PROGRESS NOTE ADULT - ATTENDING COMMENTS
Right leg cellulitis improving.  Would change vanco 1 gm iv q 8 h  check trough prior to 3rd dose  follow blood cultures no growth to date  anticipate 5- 7 day duration    Shelbi Weaver MD  Pager: [phone]  After 5 PM or weekends please call fellow on call or office [phone]

## 2021-04-01 NOTE — PROVIDER CONTACT NOTE (OTHER) - REASON
HR
Vital signs before IVIG
febrile
IV platelets
Temp 100.9 axillary
Temp 100.4 axillary
high temp
Temp 101.1 axillary
Vital signs before IVIG
critical lab value

## 2021-04-01 NOTE — PROGRESS NOTE ADULT - SUBJECTIVE AND OBJECTIVE BOX
Patient is a 21y old  Male who presents with a chief complaint of low platelets (2021 11:34)      SUBJECTIVE / OVERNIGHT EVENTS: No events overnight. Mom said RLE erythema improving. LLLE and RLE wounds improving as well. LLE edema has resolved, RLE edema persists.    MEDICATIONS  (STANDING):  acetaminophen    Suspension .. 650 milliGRAM(s) Oral once  atovaquone  Suspension 1500 milliGRAM(s) Oral daily  BACItracin   Ointment 1 Application(s) Topical two times a day  brexpiprazole 6 milliGRAM(s) Oral daily  cetirizine 10 milliGRAM(s) Oral at bedtime  chlorhexidine 4% Liquid 1 Application(s) Topical <User Schedule>  cimetidine 400 milliGRAM(s) Oral three times a day  diphenhydrAMINE   Injectable 25 milliGRAM(s) IV Push once  fluticasone propionate 50 MICROgram(s)/spray Nasal Spray 1 Spray(s) Both Nostrils two times a day  furosemide    Tablet 40 milliGRAM(s) Oral daily  immune   globulin 10% (GAMMAGARD) IVPB 115 Gram(s) IV Intermittent daily  influenza   Vaccine 0.5 milliLiter(s) IntraMuscular once  LORazepam     Tablet 1 milliGRAM(s) Oral at bedtime  multivitamin 1 Tablet(s) Oral daily  mupirocin 2% Ointment 1 Application(s) Topical two times a day  sodium chloride 0.9%. 1000 milliLiter(s) (10 mL/Hr) IV Continuous <Continuous>  traZODone 300 milliGRAM(s) Oral at bedtime  vancomycin  IVPB 1250 milliGRAM(s) IV Intermittent every 8 hours  vancomycin  IVPB        MEDICATIONS  (PRN):  ALBUTerol    0.083%. 2.5 milliGRAM(s) Nebulizer once PRN PRN Chemotherapy Reaction  ALBUTerol    0.083%. 2.5 milliGRAM(s) Nebulizer once PRN PRN Chemotherapy Reaction  diphenhydrAMINE   Injectable 50 milliGRAM(s) IV Push once PRN PRN Chemotherapy Reaction  EPINEPHrine     1 mG/mL Injectable 0.3 milliGRAM(s) IntraMuscular once PRN PRN Chemotherapy Reaction  EPINEPHrine     1 mG/mL Injectable 0.3 milliGRAM(s) IntraMuscular once PRN PRN Chemotherapy Reaction  hydrocortisone sodium succinate Injectable 100 milliGRAM(s) IV Push once PRN PRN Chemotherapy Reaction  hydrocortisone sodium succinate Injectable 100 milliGRAM(s) IV Push once PRN PRN Chemotherapy Reaction  meperidine     Injectable 25 milliGRAM(s) IV Push once PRN PRN Chemotherapy Reaction (Rigors)  mineral oil for Topical Use 1 Application(s) Topical three times a day PRN to clean area after BM  zinc oxide 20% Ointment 1 Application(s) Topical three times a day PRN rash, apply to the area after each BM        CAPILLARY BLOOD GLUCOSE        I&O's Summary    31 Mar 2021 07:01  -  2021 07:00  --------------------------------------------------------  IN: 590 mL / OUT: 3250 mL / NET: -2660 mL      Vital Signs Last 24 Hrs  T(C): 37 (2021 12:34), Max: 37.2 (31 Mar 2021 21:59)  T(F): 98.6 (2021 12:34), Max: 99 (31 Mar 2021 21:59)  HR: 121 (2021 12:34) (100 - 121)  BP: 162/67 (2021 12:34) (125/67 - 162/67)  BP(mean): --  RR: 18 (2021 12:34) (18 - 18)  SpO2: 97% (2021 12:34) (95% - 97%)    PHYSICAL EXAM:  GENERAL: NAD, non verbal  HEAD:  Atraumatic, Normocephalic  EYES: EOMI, PERRLA, conjunctiva and sclera clear  NECK: Supple, No JVD  CHEST/LUNG: Clear to auscultation bilaterally; No wheeze  HEART: Regular rate and rhythm; No murmurs, rubs, or gallops  ABDOMEN: morbidly obese, Soft, Nontender, Nondistended; Bowel sounds present  EXTREMITIES:  (+) edema b/l legs, R>L (+) rash and wound at shin b/l with improvement in RLE erythema  NEUROLOGY: NICHOLS, alert        LABS:                        10.7   16.27 )-----------( 8        ( 2021 06:35 )             35.6     04-    139  |  101  |  22  ----------------------------<  95  3.8   |  27  |  0.83    Ca    8.8      2021 06:35  Phos  4.1     -  Mg     2.0     -            Urinalysis Basic - ( 31 Mar 2021 07:59 )    Color: Yellow / Appearance: Clear / S.033 / pH: x  Gluc: x / Ketone: Negative  / Bili: Negative / Urobili: 3 mg/dL   Blood: x / Protein: 30 mg/dL / Nitrite: Negative   Leuk Esterase: Negative / RBC: 6 /HPF / WBC 1 /HPF   Sq Epi: x / Non Sq Epi: 0 /HPF / Bacteria: Negative

## 2021-04-01 NOTE — PROGRESS NOTE ADULT - PROBLEM SELECTOR PLAN 1
Hx of chronic ITP since age 18 months. CTH negative, requiring multiple plt transfusions this admission. May be exacerbated by COVID, now s/p convalescent plasma (3/11)  -per heme recommendations will transfuse 1/2 unit platelets every 12 hrs for plt <5  or if bleeding.   -s/p IVIG x2, completed 2/28, per heme reccs will give IVIG 4/1 x 2 days  -s/p Decadron 40 mg IV (2/28-3/3), cont Prednisone taper   -s/p Ritruxan infusion on 3/7 and 3/13  -C/w mepron for PCP ppx, may dc when prednisone taper completed  - Hematology following  - received Rituximab on 3/20,3/27 and Romiplastin/Nplate dose 3/21,3/28, additional dose to be given this sunday 4/4

## 2021-04-01 NOTE — PROVIDER CONTACT NOTE (OTHER) - BACKGROUND
Patient admitted for immune thrombocytopenic purpura.
Patient admitted 2/27 for immune thrombocytopenia
Patient admitted for immune thrombocytopenic purpura.
immune thrombocytopenic purpura
Diagnosis Immune thrombocytopenic purpura
Patient admitted for immune thrombocytopenic purpura.
admitted for immune thrombocytopenic purpura
Patient admitted for ITP, requiring multiple platelet transfusions. Now requiring IVIG.
Patient admitted for ITP, requiring IVIG now.
Patient is a 20 year old male admitted for Immune Thrombocytopenic pupura, has a past medical history of Chronic ITP and Autism.
platelet count 1   IV platelets ordered

## 2021-04-01 NOTE — CHART NOTE - NSCHARTNOTEFT_GEN_A_CORE
Source: Patient [ ]    Family [ ]     other [ x] patient's mother at bedside. Pt is nonverbal with autism.     Nutrition f/u for extended Length Of Stay. 21 y.o. Male h/o intellectual disability, autism, nonverbal at baseline, chronic ITP on 80mg of daily prednisone, hospitalized for thrombocytopenia noted on OP labs, admitted for management of refractory ITP. Platelet count now markedly improved s/p ritoxan on 3/27 however now downtrended. Needs wound care for lesions on shins.     Pt was on prednisone taper; as per mom, no longer on prednisone and he is no longer ravenously hungry. His appetite has returned to baseline. Denies any GI issues (nausea/vomiting/diarrhea/constipation.)     PO intake:  < 50% [ ] 50-75% [ ]   % [ x]  other :  Current Weight:   3/28 170.8 kg   3/19 164.2 kg   3/8: 153.7 kg       Edema: 1+ generalized, 2+ LLE, 3+ RLE   Pressure Injuries: none    __________________ Pertinent Medications__________________   MEDICATIONS  (STANDING):  acetaminophen   Tablet .. 650 milliGRAM(s) Oral once  atovaquone  Suspension 1500 milliGRAM(s) Oral daily  BACItracin   Ointment 1 Application(s) Topical two times a day  brexpiprazole 6 milliGRAM(s) Oral daily  cetirizine 10 milliGRAM(s) Oral at bedtime  chlorhexidine 4% Liquid 1 Application(s) Topical <User Schedule>  cimetidine 400 milliGRAM(s) Oral three times a day  diphenhydrAMINE   Injectable 25 milliGRAM(s) IV Push once  fluticasone propionate 50 MICROgram(s)/spray Nasal Spray 1 Spray(s) Both Nostrils two times a day  furosemide    Tablet 40 milliGRAM(s) Oral daily  immune   globulin 10% (GAMMAGARD) IVPB 115 Gram(s) IV Intermittent daily  influenza   Vaccine 0.5 milliLiter(s) IntraMuscular once  LORazepam     Tablet 1 milliGRAM(s) Oral at bedtime  multivitamin 1 Tablet(s) Oral daily  mupirocin 2% Ointment 1 Application(s) Topical two times a day  traZODone 300 milliGRAM(s) Oral at bedtime  vancomycin  IVPB 1250 milliGRAM(s) IV Intermittent every 8 hours  vancomycin  IVPB          __________________ Pertinent Labs__________________   04-01 Na139 mmol/L Glu 95 mg/dL K+ 3.8 mmol/L Cr  0.83 mg/dL BUN 22 mg/dL 04-01 Phos 4.1 mg/dL        Estimated Needs:   [ x] no change since previous assessment  [ ] recalculated:       Previous Nutrition Diagnosis: Obesity     Nutrition Diagnosis is [x ] ongoing  [ ] resolved [ ] not applicable       Recommendations:  1. Continue current diet order.           Monitoring and Evaluation:     [x ] PO intake [ x] Tolerance to diet prescription [x ] weights [x ] follow up per protocol  [ ] other:

## 2021-04-02 LAB
ANION GAP SERPL CALC-SCNC: 10 MMOL/L — SIGNIFICANT CHANGE UP (ref 7–14)
BASOPHILS # BLD AUTO: 0.06 K/UL — SIGNIFICANT CHANGE UP (ref 0–0.2)
BASOPHILS NFR BLD AUTO: 0.5 % — SIGNIFICANT CHANGE UP (ref 0–2)
BUN SERPL-MCNC: 23 MG/DL — SIGNIFICANT CHANGE UP (ref 7–23)
CALCIUM SERPL-MCNC: 8.8 MG/DL — SIGNIFICANT CHANGE UP (ref 8.4–10.5)
CHLORIDE SERPL-SCNC: 102 MMOL/L — SIGNIFICANT CHANGE UP (ref 98–107)
CO2 SERPL-SCNC: 27 MMOL/L — SIGNIFICANT CHANGE UP (ref 22–31)
CREAT SERPL-MCNC: 0.81 MG/DL — SIGNIFICANT CHANGE UP (ref 0.5–1.3)
EOSINOPHIL # BLD AUTO: 0.12 K/UL — SIGNIFICANT CHANGE UP (ref 0–0.5)
EOSINOPHIL NFR BLD AUTO: 1 % — SIGNIFICANT CHANGE UP (ref 0–6)
GLUCOSE SERPL-MCNC: 95 MG/DL — SIGNIFICANT CHANGE UP (ref 70–99)
HCT VFR BLD CALC: 35.2 % — LOW (ref 39–50)
HGB BLD-MCNC: 10.6 G/DL — LOW (ref 13–17)
IANC: 8.58 K/UL — HIGH (ref 1.5–8.5)
IMM GRANULOCYTES NFR BLD AUTO: 3 % — HIGH (ref 0–1.5)
LYMPHOCYTES # BLD AUTO: 1.5 K/UL — SIGNIFICANT CHANGE UP (ref 1–3.3)
LYMPHOCYTES # BLD AUTO: 12.9 % — LOW (ref 13–44)
MAGNESIUM SERPL-MCNC: 2 MG/DL — SIGNIFICANT CHANGE UP (ref 1.6–2.6)
MCHC RBC-ENTMCNC: 26.2 PG — LOW (ref 27–34)
MCHC RBC-ENTMCNC: 30.1 GM/DL — LOW (ref 32–36)
MCV RBC AUTO: 86.9 FL — SIGNIFICANT CHANGE UP (ref 80–100)
MONOCYTES # BLD AUTO: 1.04 K/UL — HIGH (ref 0–0.9)
MONOCYTES NFR BLD AUTO: 8.9 % — SIGNIFICANT CHANGE UP (ref 2–14)
NEUTROPHILS # BLD AUTO: 8.58 K/UL — HIGH (ref 1.8–7.4)
NEUTROPHILS NFR BLD AUTO: 73.7 % — SIGNIFICANT CHANGE UP (ref 43–77)
NRBC # BLD: 0 /100 WBCS — SIGNIFICANT CHANGE UP
NRBC # FLD: 0.02 K/UL — HIGH
PHOSPHATE SERPL-MCNC: 4.5 MG/DL — SIGNIFICANT CHANGE UP (ref 2.5–4.5)
PLATELET # BLD AUTO: 25 K/UL — LOW (ref 150–400)
POTASSIUM SERPL-MCNC: 3.9 MMOL/L — SIGNIFICANT CHANGE UP (ref 3.5–5.3)
POTASSIUM SERPL-SCNC: 3.9 MMOL/L — SIGNIFICANT CHANGE UP (ref 3.5–5.3)
RBC # BLD: 4.05 M/UL — LOW (ref 4.2–5.8)
RBC # FLD: 15.7 % — HIGH (ref 10.3–14.5)
SODIUM SERPL-SCNC: 139 MMOL/L — SIGNIFICANT CHANGE UP (ref 135–145)
VANCOMYCIN TROUGH SERPL-MCNC: 14.1 UG/ML — SIGNIFICANT CHANGE UP (ref 10–20)
WBC # BLD: 11.65 K/UL — HIGH (ref 3.8–10.5)
WBC # FLD AUTO: 11.65 K/UL — HIGH (ref 3.8–10.5)

## 2021-04-02 PROCEDURE — 99232 SBSQ HOSP IP/OBS MODERATE 35: CPT | Mod: GC

## 2021-04-02 PROCEDURE — 99233 SBSQ HOSP IP/OBS HIGH 50: CPT

## 2021-04-02 PROCEDURE — 99232 SBSQ HOSP IP/OBS MODERATE 35: CPT

## 2021-04-02 RX ORDER — DIPHENHYDRAMINE HCL 50 MG
25 CAPSULE ORAL ONCE
Refills: 0 | Status: COMPLETED | OUTPATIENT
Start: 2021-04-02 | End: 2021-04-02

## 2021-04-02 RX ORDER — ACETAMINOPHEN 500 MG
650 TABLET ORAL ONCE
Refills: 0 | Status: COMPLETED | OUTPATIENT
Start: 2021-04-02 | End: 2021-04-02

## 2021-04-02 RX ORDER — DIPHENHYDRAMINE HCL 50 MG
25 CAPSULE ORAL ONCE
Refills: 0 | Status: DISCONTINUED | OUTPATIENT
Start: 2021-04-02 | End: 2021-04-02

## 2021-04-02 RX ADMIN — Medication 1 MILLIGRAM(S): at 22:15

## 2021-04-02 RX ADMIN — MUPIROCIN 1 APPLICATION(S): 20 OINTMENT TOPICAL at 17:02

## 2021-04-02 RX ADMIN — Medication 400 MILLIGRAM(S): at 06:23

## 2021-04-02 RX ADMIN — MUPIROCIN 1 APPLICATION(S): 20 OINTMENT TOPICAL at 06:22

## 2021-04-02 RX ADMIN — Medication 400 MILLIGRAM(S): at 13:44

## 2021-04-02 RX ADMIN — Medication 1 APPLICATION(S): at 06:20

## 2021-04-02 RX ADMIN — Medication 1 APPLICATION(S): at 17:02

## 2021-04-02 RX ADMIN — IMMUNE GLOBULIN (HUMAN) 115 GRAM(S): 10 INJECTION INTRAVENOUS; SUBCUTANEOUS at 14:46

## 2021-04-02 RX ADMIN — BREXPIPRAZOLE 6 MILLIGRAM(S): 0.25 TABLET ORAL at 13:50

## 2021-04-02 RX ADMIN — Medication 166.67 MILLIGRAM(S): at 17:02

## 2021-04-02 RX ADMIN — ATOVAQUONE 1500 MILLIGRAM(S): 750 SUSPENSION ORAL at 13:42

## 2021-04-02 RX ADMIN — Medication 40 MILLIGRAM(S): at 06:20

## 2021-04-02 RX ADMIN — CETIRIZINE HYDROCHLORIDE 10 MILLIGRAM(S): 10 TABLET ORAL at 22:15

## 2021-04-02 RX ADMIN — Medication 300 MILLIGRAM(S): at 22:15

## 2021-04-02 RX ADMIN — Medication 650 MILLIGRAM(S): at 13:42

## 2021-04-02 RX ADMIN — Medication 166.67 MILLIGRAM(S): at 09:15

## 2021-04-02 RX ADMIN — Medication 1 TABLET(S): at 13:42

## 2021-04-02 RX ADMIN — Medication 25 MILLIGRAM(S): at 13:54

## 2021-04-02 RX ADMIN — Medication 400 MILLIGRAM(S): at 22:16

## 2021-04-02 NOTE — PROGRESS NOTE ADULT - SUBJECTIVE AND OBJECTIVE BOX
Patient is a 21y old  Male who presents with a chief complaint of low platelets (02 Apr 2021 09:01)      SUBJECTIVE / OVERNIGHT EVENTS: no events overnight. pt tolerated IVIG yesterday without issues. per mom RLE erythema has improved. b/l LE swelling has improved. No epistaxis or Gi bleed overnight.    MEDICATIONS  (STANDING):  atovaquone  Suspension 1500 milliGRAM(s) Oral daily  BACItracin   Ointment 1 Application(s) Topical two times a day  brexpiprazole 6 milliGRAM(s) Oral daily  cetirizine 10 milliGRAM(s) Oral at bedtime  chlorhexidine 4% Liquid 1 Application(s) Topical <User Schedule>  cimetidine 400 milliGRAM(s) Oral three times a day  fluticasone propionate 50 MICROgram(s)/spray Nasal Spray 1 Spray(s) Both Nostrils two times a day  furosemide    Tablet 40 milliGRAM(s) Oral daily  immune   globulin 10% (GAMMAGARD) IVPB 115 Gram(s) IV Intermittent daily  influenza   Vaccine 0.5 milliLiter(s) IntraMuscular once  LORazepam     Tablet 1 milliGRAM(s) Oral at bedtime  multivitamin 1 Tablet(s) Oral daily  mupirocin 2% Ointment 1 Application(s) Topical two times a day  traZODone 300 milliGRAM(s) Oral at bedtime  vancomycin  IVPB 1250 milliGRAM(s) IV Intermittent every 8 hours  vancomycin  IVPB        MEDICATIONS  (PRN):  ALBUTerol    0.083%. 2.5 milliGRAM(s) Nebulizer once PRN PRN Chemotherapy Reaction  ALBUTerol    0.083%. 2.5 milliGRAM(s) Nebulizer once PRN PRN Chemotherapy Reaction  diphenhydrAMINE   Injectable 50 milliGRAM(s) IV Push once PRN PRN Chemotherapy Reaction  EPINEPHrine     1 mG/mL Injectable 0.3 milliGRAM(s) IntraMuscular once PRN PRN Chemotherapy Reaction  EPINEPHrine     1 mG/mL Injectable 0.3 milliGRAM(s) IntraMuscular once PRN PRN Chemotherapy Reaction  hydrocortisone sodium succinate Injectable 100 milliGRAM(s) IV Push once PRN PRN Chemotherapy Reaction  hydrocortisone sodium succinate Injectable 100 milliGRAM(s) IV Push once PRN PRN Chemotherapy Reaction  meperidine     Injectable 25 milliGRAM(s) IV Push once PRN PRN Chemotherapy Reaction (Rigors)  mineral oil for Topical Use 1 Application(s) Topical three times a day PRN to clean area after BM  zinc oxide 20% Ointment 1 Application(s) Topical three times a day PRN rash, apply to the area after each BM      Vital Signs Last 24 Hrs  T(C): 36.8 (02 Apr 2021 06:15), Max: 37.6 (01 Apr 2021 17:26)  T(F): 98.2 (02 Apr 2021 06:15), Max: 99.7 (01 Apr 2021 17:26)  HR: 93 (02 Apr 2021 06:15) (93 - 117)  BP: 125/66 (02 Apr 2021 06:15) (117/66 - 133/74)  BP(mean): --  RR: 18 (02 Apr 2021 06:15) (16 - 18)  SpO2: 100% (02 Apr 2021 06:15) (94% - 100%)      PHYSICAL EXAM:  GENERAL: NAD, non verbal  HEAD:  Atraumatic, Normocephalic  EYES: EOMI, PERRLA, conjunctiva and sclera clear  NECK: Supple, No JVD  CHEST/LUNG: Clear to auscultation bilaterally; No wheeze  HEART: Regular rate and rhythm; No murmurs, rubs, or gallops  ABDOMEN: morbidly obese, Soft, Nontender, Nondistended; Bowel sounds present  EXTREMITIES:  (+) edema b/l legs, R>L (+) rash and wound at shin b/l with improvement in RLE erythema  NEUROLOGY: NICHOLS, alert    LABS:                        10.6   11.65 )-----------( 25       ( 02 Apr 2021 08:15 )             35.2     04-02    139  |  102  |  23  ----------------------------<  95  3.9   |  27  |  0.81    Ca    8.8      02 Apr 2021 08:15  Phos  4.5     04-02  Mg     2.0     04-02

## 2021-04-02 NOTE — PROGRESS NOTE ADULT - ASSESSMENT
21/M with PMH chronic ITP, intellectual disability, non-verbal, autism, COVID19 (Jan 2021)  Adm 2/27 for low PLT.  Hosp course c/b low PLT s/p IVIG, Dexa, Rituximab (MIC[facility] stay 3/5-3/7 for desensitization); convalescent plasma 3/11; 3/22 Wound Care and Dermatology consulted for LE wounds; 3/29 new fevers  ID consulted for recs  ========    Chronic ITP on tapering steroids with LE lesions     Cellulitis right lower leg  -improving with vancomycin  -afebrile  - WBC 11K  -blood culture no growth  - U/S LE negative for DVT. Repeat UA 3/30 negative    Antimicrobials, Steroids, COVID19 therapies in this admission  IVIG x2, completed 2/28   Decadron 40 mg IV 2/28-3/3  Prednisone 80mg 2/27-> tapering since then  Solumedrol 3/13, 3/20, 3/27  IVIG 2/27, 2/28, 4/1  Rituxan infusion 3/7, 3/13, 3/20, 3/27  convalescent plasma on 3/11  Atovaquone 3/5->  Cefazolin 3/29-3/30  Doxy 3/29-3/30  Vanc 3/31->    RECOMMENDATIONS:  - vancomycin 1 gm iv q 8 h  -anticipate duration 5- 7 days  -monitor creat, vanco trough    ID service available for questions over weekend.    Shelbi Weaver MD  Pager: [phone]  After 5 PM or weekends please call fellow on call or office [phone]

## 2021-04-02 NOTE — PROGRESS NOTE ADULT - ATTENDING COMMENTS
Pt with chronic refractory ITP s/p Rituxan x 4 and will be getting 2nd high dose N Plate on 4/4/2021. Currently on 2nd dose of IVIG and will trend counts for stability. His BLE cellulitis remains improved along with resolved fevers. Seen with mother at bedside.

## 2021-04-02 NOTE — PROGRESS NOTE ADULT - ASSESSMENT
19 yo M with a history of chronic ITP and severe autism (non-verbal at baseline) who presented with severe thrombocytopenia (Plt 7) while on home PO prednisone (80mg daily), and was admitted to Medicine for further management. Hematology consulted for assistance with management.    # Chronic ITP exacerbated in the setting of recent COVID infection   - Previous CT head neg for bleed  - S/p IVIG 1gm/kg daily x 2 (completed 2/28) and dexamethasone 40mg IV x4 days (completed 3/3)  - S/p W1 Rituximab with desensitization protocol on 3/6; W2 Rituximab on 3/13; s/p W3 Rituximab on 3/20 with desensitization protocol, tolerated well. S/p W4 Rituximab with desensitization protocol yesterday (3/27), tolerated well.   - S/p romiplastim/Nplate, 1mcg/kg; given 2/28; 2mcg/kg given on 3/7 (pt only got 250mcg instead of 300mcg as pharmacy only had 250); 5mcg/kg (750mcg) given on 3/14, S/p 750mcg (5mcg/kg) on 3/21. S/p 4 weekly doses of Rituxan. S/p 1500mcg of NPLATE on 3/29.  Given autism, patient is unable to swallow eltrombopag pills (cannot be crushed or chewed). Will continue to discuss other possible formulations or oral thrombopoietin receptor agonists.  - Cont. prednisone taper - decrease by 10mg every 2 days.   - Transfuse platelets only if bleeding (consider giving 1/2 unit platelets slowly infused over 3 hours q12h) OR if platelets <5k.  - Started trial of IVIG on 4/1, today with be dose 2 of 2. Platelet improved today to 25 K.   - Will also plan for another 10mcg/mg of Nplate this Sunday 4/4.   - DVT studies negative  - Lasix increased per primary team   - Appreciate ID eval and recs- now on Vancomycin  - Steroids now tapered off.   - Monitor closely for any signs or symptoms of bleeding    Kenya Chávez MD  Hematology Oncology Fellow, PGY-5  [facility] Pager: [phone]/ [facility] Pager: [phone]

## 2021-04-02 NOTE — PROGRESS NOTE ADULT - SUBJECTIVE AND OBJECTIVE BOX
INTERVAL History: No acute events overnight. Pt remains afebrile. Received 1st dose IVIG yesterday with no issues. Due for 2nd dose today. Plt count this morning 25.     Allergies    Bactrim (Hives)  Rituxan (Hives)  WinRho SDF (Hives)    Intolerances        MEDICATIONS  (STANDING):  atovaquone  Suspension 1500 milliGRAM(s) Oral daily  BACItracin   Ointment 1 Application(s) Topical two times a day  brexpiprazole 6 milliGRAM(s) Oral daily  cetirizine 10 milliGRAM(s) Oral at bedtime  chlorhexidine 4% Liquid 1 Application(s) Topical <User Schedule>  cimetidine 400 milliGRAM(s) Oral three times a day  fluticasone propionate 50 MICROgram(s)/spray Nasal Spray 1 Spray(s) Both Nostrils two times a day  furosemide    Tablet 40 milliGRAM(s) Oral daily  immune   globulin 10% (GAMMAGARD) IVPB 115 Gram(s) IV Intermittent daily  influenza   Vaccine 0.5 milliLiter(s) IntraMuscular once  LORazepam     Tablet 1 milliGRAM(s) Oral at bedtime  multivitamin 1 Tablet(s) Oral daily  mupirocin 2% Ointment 1 Application(s) Topical two times a day  traZODone 300 milliGRAM(s) Oral at bedtime  vancomycin  IVPB 1250 milliGRAM(s) IV Intermittent every 8 hours  vancomycin  IVPB        MEDICATIONS  (PRN):  ALBUTerol    0.083%. 2.5 milliGRAM(s) Nebulizer once PRN PRN Chemotherapy Reaction  ALBUTerol    0.083%. 2.5 milliGRAM(s) Nebulizer once PRN PRN Chemotherapy Reaction  diphenhydrAMINE   Injectable 50 milliGRAM(s) IV Push once PRN PRN Chemotherapy Reaction  EPINEPHrine     1 mG/mL Injectable 0.3 milliGRAM(s) IntraMuscular once PRN PRN Chemotherapy Reaction  EPINEPHrine     1 mG/mL Injectable 0.3 milliGRAM(s) IntraMuscular once PRN PRN Chemotherapy Reaction  hydrocortisone sodium succinate Injectable 100 milliGRAM(s) IV Push once PRN PRN Chemotherapy Reaction  hydrocortisone sodium succinate Injectable 100 milliGRAM(s) IV Push once PRN PRN Chemotherapy Reaction  meperidine     Injectable 25 milliGRAM(s) IV Push once PRN PRN Chemotherapy Reaction (Rigors)  mineral oil for Topical Use 1 Application(s) Topical three times a day PRN to clean area after BM  zinc oxide 20% Ointment 1 Application(s) Topical three times a day PRN rash, apply to the area after each BM      Vital Signs Last 24 Hrs  T(C): 36.8 (02 Apr 2021 06:15), Max: 37.6 (01 Apr 2021 17:26)  T(F): 98.2 (02 Apr 2021 06:15), Max: 99.7 (01 Apr 2021 17:26)  HR: 93 (02 Apr 2021 06:15) (93 - 121)  BP: 125/66 (02 Apr 2021 06:15) (117/66 - 162/67)  BP(mean): --  RR: 18 (02 Apr 2021 06:15) (16 - 18)  SpO2: 100% (02 Apr 2021 06:15) (94% - 100%)    PHYSICAL EXAM:        LABS:                        10.6   11.65 )-----------( 25       ( 02 Apr 2021 08:15 )             35.2     04-02    139  |  102  |  23  ----------------------------<  95  3.9   |  27  |  0.81    Ca    8.8      02 Apr 2021 08:15  Phos  4.5     04-02  Mg     2.0     04-02              RADIOLOGY & ADDITIONAL STUDIES:    PATHOLOGY:

## 2021-04-02 NOTE — PROGRESS NOTE ADULT - PROBLEM SELECTOR PLAN 1
Hx of chronic ITP since age 18 months. CTH negative, requiring multiple plt transfusions this admission. May be exacerbated by COVID, now s/p convalescent plasma (3/11)  Received IVIG 2/28 without adequate response poss d/t COVID infxn, repeat IVIG on 4/1 with improvement in platelets today to 26K, pending another dose 4/2  -per heme recommendations will transfuse 1/2 unit platelets every 12 hrs for plt <5  or if bleeding.   -s/p Decadron 40 mg IV (2/28-3/3), completed prolong prednisone taper  -C/w mepron for PCP ppx, may dc when prednisone taper completed, confirm with heme  - Hematology following  - received Rituximab on 3/20,3/27 and Romiplastin/Nplate dose 3/21,3/28, additional dose to be given this sunday 4/4

## 2021-04-02 NOTE — PROGRESS NOTE ADULT - SUBJECTIVE AND OBJECTIVE BOX
Follow Up: ID following for concern for cellulitis    Interval History/ROS:Patient is a 21y old  Male who presents with a chief complaint of low platelets (01 Apr 2021 13:54)  - no acute events reported overnight  - afebrile  - IIVIG 4/1     Allergies    Bactrim (Hives)  Rituxan (Hives)  WinRho SDF (Hives)    Intolerances        ANTIMICROBIALS: vancomycin  IVPB 1250 every 8 hours  vancomycin  IVPB          OTHER MEDS:  ALBUTerol    0.083%. 2.5 milliGRAM(s) Nebulizer once PRN  ALBUTerol    0.083%. 2.5 milliGRAM(s) Nebulizer once PRN  BACItracin   Ointment 1 Application(s) Topical two times a day  brexpiprazole 6 milliGRAM(s) Oral daily  cetirizine 10 milliGRAM(s) Oral at bedtime  chlorhexidine 4% Liquid 1 Application(s) Topical <User Schedule>  cimetidine 400 milliGRAM(s) Oral three times a day  diphenhydrAMINE   Injectable 50 milliGRAM(s) IV Push once PRN  EPINEPHrine     1 mG/mL Injectable 0.3 milliGRAM(s) IntraMuscular once PRN  EPINEPHrine     1 mG/mL Injectable 0.3 milliGRAM(s) IntraMuscular once PRN  fluticasone propionate 50 MICROgram(s)/spray Nasal Spray 1 Spray(s) Both Nostrils two times a day  furosemide    Tablet 40 milliGRAM(s) Oral daily  hydrocortisone sodium succinate Injectable 100 milliGRAM(s) IV Push once PRN  hydrocortisone sodium succinate Injectable 100 milliGRAM(s) IV Push once PRN  immune   globulin 10% (GAMMAGARD) IVPB 115 Gram(s) IV Intermittent daily  influenza   Vaccine 0.5 milliLiter(s) IntraMuscular once  LORazepam     Tablet 1 milliGRAM(s) Oral at bedtime  meperidine     Injectable 25 milliGRAM(s) IV Push once PRN  mineral oil for Topical Use 1 Application(s) Topical three times a day PRN  multivitamin 1 Tablet(s) Oral daily  mupirocin 2% Ointment 1 Application(s) Topical two times a day  traZODone 300 milliGRAM(s) Oral at bedtime  zinc oxide 20% Ointment 1 Application(s) Topical three times a day PRN    Vital Signs Last 24 Hrs  T(F): 98.7 (04-02-21 @ 16:45), Max: 98.9 (04-01-21 @ 18:30)  HR: 108 (04-02-21 @ 16:45)  BP: 125/68 (04-02-21 @ 16:45)  RR: 18 (04-02-21 @ 16:45)  SpO2  : 98% (04-02-21 @ 16:45) (96% - 100%)    EXAM:  Constitutional: Not in acute distress. On RA.  Eyes: No icterus.   RS: Chest clear to auscultation bilaterally. No wheeze/rhonchi/crepitations.  CVS: S1, S2 heard. Regular rate and rhythm. No murmurs/rubs/gallops.  Abdomen: Soft. No guarding/rigidity/tenderness.  : No acute abnormalities  Extremities: right lower leg less erythematous, dry ulcers  Skin: B/l LE skin lesions appear to be improving  Neuro: Alert, non-verbal                                   10.6   11.65 )-----------( 25       ( 02 Apr 2021 08:15 )             35.2 04-02    139  |  102  |  23  ----------------------------<  95  3.9   |  27  |  0.81  Ca    8.8      02 Apr 2021 08:15Phos  4.5     04-02Mg     2.0     04-02          MICROBIOLOGY:Culture Results:   No growth (03-31 @ 15:58)  Culture Results:   No growth to date. (03-30 @ 16:38)  Culture Results:   No growth to date. (03-29 @ 16:47)  Culture Results:   No growth to date. (03-29 @ 16:47)      aVancomycin Level, Trough -Pre 4th Dose, order if dosed q6/8/12h (04.02.21 @ 08:15)   Vancomycin Level, Trough: 14.1

## 2021-04-03 LAB
ANION GAP SERPL CALC-SCNC: 8 MMOL/L — SIGNIFICANT CHANGE UP (ref 7–14)
BASOPHILS # BLD AUTO: 0.07 K/UL — SIGNIFICANT CHANGE UP (ref 0–0.2)
BASOPHILS NFR BLD AUTO: 0.6 % — SIGNIFICANT CHANGE UP (ref 0–2)
BUN SERPL-MCNC: 20 MG/DL — SIGNIFICANT CHANGE UP (ref 7–23)
CALCIUM SERPL-MCNC: 9.3 MG/DL — SIGNIFICANT CHANGE UP (ref 8.4–10.5)
CHLORIDE SERPL-SCNC: 100 MMOL/L — SIGNIFICANT CHANGE UP (ref 98–107)
CO2 SERPL-SCNC: 27 MMOL/L — SIGNIFICANT CHANGE UP (ref 22–31)
CREAT SERPL-MCNC: 0.89 MG/DL — SIGNIFICANT CHANGE UP (ref 0.5–1.3)
CULTURE RESULTS: SIGNIFICANT CHANGE UP
CULTURE RESULTS: SIGNIFICANT CHANGE UP
EOSINOPHIL # BLD AUTO: 0.09 K/UL — SIGNIFICANT CHANGE UP (ref 0–0.5)
EOSINOPHIL NFR BLD AUTO: 0.7 % — SIGNIFICANT CHANGE UP (ref 0–6)
GLUCOSE SERPL-MCNC: 90 MG/DL — SIGNIFICANT CHANGE UP (ref 70–99)
HCT VFR BLD CALC: 39.5 % — SIGNIFICANT CHANGE UP (ref 39–50)
HGB BLD-MCNC: 12 G/DL — LOW (ref 13–17)
IANC: 8.91 K/UL — HIGH (ref 1.5–8.5)
IMM GRANULOCYTES NFR BLD AUTO: 3.8 % — HIGH (ref 0–1.5)
LYMPHOCYTES # BLD AUTO: 1.79 K/UL — SIGNIFICANT CHANGE UP (ref 1–3.3)
LYMPHOCYTES # BLD AUTO: 14.6 % — SIGNIFICANT CHANGE UP (ref 13–44)
MAGNESIUM SERPL-MCNC: 2 MG/DL — SIGNIFICANT CHANGE UP (ref 1.6–2.6)
MCHC RBC-ENTMCNC: 26.5 PG — LOW (ref 27–34)
MCHC RBC-ENTMCNC: 30.4 GM/DL — LOW (ref 32–36)
MCV RBC AUTO: 87.4 FL — SIGNIFICANT CHANGE UP (ref 80–100)
MONOCYTES # BLD AUTO: 0.97 K/UL — HIGH (ref 0–0.9)
MONOCYTES NFR BLD AUTO: 7.9 % — SIGNIFICANT CHANGE UP (ref 2–14)
NEUTROPHILS # BLD AUTO: 8.91 K/UL — HIGH (ref 1.8–7.4)
NEUTROPHILS NFR BLD AUTO: 72.4 % — SIGNIFICANT CHANGE UP (ref 43–77)
NRBC # BLD: 0 /100 WBCS — SIGNIFICANT CHANGE UP
NRBC # FLD: 0.03 K/UL — HIGH
PHOSPHATE SERPL-MCNC: 4.6 MG/DL — HIGH (ref 2.5–4.5)
PLATELET # BLD AUTO: 68 K/UL — LOW (ref 150–400)
POTASSIUM SERPL-MCNC: 4 MMOL/L — SIGNIFICANT CHANGE UP (ref 3.5–5.3)
POTASSIUM SERPL-SCNC: 4 MMOL/L — SIGNIFICANT CHANGE UP (ref 3.5–5.3)
RBC # BLD: 4.52 M/UL — SIGNIFICANT CHANGE UP (ref 4.2–5.8)
RBC # FLD: 16.2 % — HIGH (ref 10.3–14.5)
SODIUM SERPL-SCNC: 135 MMOL/L — SIGNIFICANT CHANGE UP (ref 135–145)
SPECIMEN SOURCE: SIGNIFICANT CHANGE UP
SPECIMEN SOURCE: SIGNIFICANT CHANGE UP
WBC # BLD: 12.3 K/UL — HIGH (ref 3.8–10.5)
WBC # FLD AUTO: 12.3 K/UL — HIGH (ref 3.8–10.5)

## 2021-04-03 PROCEDURE — 99233 SBSQ HOSP IP/OBS HIGH 50: CPT

## 2021-04-03 RX ORDER — LANOLIN ALCOHOL/MO/W.PET/CERES
3 CREAM (GRAM) TOPICAL ONCE
Refills: 0 | Status: COMPLETED | OUTPATIENT
Start: 2021-04-03 | End: 2021-04-03

## 2021-04-03 RX ADMIN — Medication 400 MILLIGRAM(S): at 13:54

## 2021-04-03 RX ADMIN — Medication 1 TABLET(S): at 13:53

## 2021-04-03 RX ADMIN — BREXPIPRAZOLE 6 MILLIGRAM(S): 0.25 TABLET ORAL at 13:53

## 2021-04-03 RX ADMIN — Medication 166.67 MILLIGRAM(S): at 09:21

## 2021-04-03 RX ADMIN — Medication 40 MILLIGRAM(S): at 06:54

## 2021-04-03 RX ADMIN — Medication 400 MILLIGRAM(S): at 22:14

## 2021-04-03 RX ADMIN — Medication 1 MILLIGRAM(S): at 21:50

## 2021-04-03 RX ADMIN — Medication 400 MILLIGRAM(S): at 06:54

## 2021-04-03 RX ADMIN — CETIRIZINE HYDROCHLORIDE 10 MILLIGRAM(S): 10 TABLET ORAL at 21:50

## 2021-04-03 RX ADMIN — Medication 300 MILLIGRAM(S): at 21:50

## 2021-04-03 RX ADMIN — Medication 166.67 MILLIGRAM(S): at 01:27

## 2021-04-03 RX ADMIN — MUPIROCIN 1 APPLICATION(S): 20 OINTMENT TOPICAL at 17:24

## 2021-04-03 RX ADMIN — Medication 166.67 MILLIGRAM(S): at 17:48

## 2021-04-03 RX ADMIN — MUPIROCIN 1 APPLICATION(S): 20 OINTMENT TOPICAL at 06:50

## 2021-04-03 NOTE — PROGRESS NOTE ADULT - PROBLEM SELECTOR PLAN 1
Hx of chronic ITP since age 18 months. CTH negative, requiring multiple plt transfusions this admission. May be exacerbated by COVID, now s/p convalescent plasma (3/11)  Received IVIG 2/28 without adequate response poss d/t COVID infxn, repeat IVIG x2 on 4/1-4/2, plts improved to 68 today  -per heme recommendations will transfuse 1/2 unit platelets every 12 hrs for plt <5  or if bleeding.   -s/p Decadron 40 mg IV (2/28-3/3), completed prolong prednisone taper  -C/w mepron for PCP ppx, may dc when prednisone taper completed, confirm with heme  - Hematology following  - received Rituximab on 3/20,3/27 and Romiplastin/Nplate dose 3/21,3/28, additional dose to be given this Sunday 4/4

## 2021-04-03 NOTE — PHYSICAL THERAPY INITIAL EVALUATION ADULT - PERTINENT HX OF CURRENT PROBLEM, REHAB EVAL
21yo M h/o intellectual disability, autism, nonverbal at baseline, chronic ITP on 80mg of daily prednisone, presents w/ outpatient labs showing low plt. 21yo M h/o intellectual disability, autism, nonverbal at baseline, chronic ITP on 80mg of daily prednisone, presents w/ outpatient labs showing low platelets

## 2021-04-03 NOTE — PHYSICAL THERAPY INITIAL EVALUATION ADULT - DIAGNOSIS, PT EVAL
Pt admitted for Idiopathic Thrombocytopenia purpura and Cellulitis; US Duplex of bilateral LE (-)DVT; pt presents with decreased strength

## 2021-04-03 NOTE — PHYSICAL THERAPY INITIAL EVALUATION ADULT - ADDITIONAL COMMENTS
Information regarding pt's prior level of function was obtained from pt's mother @ bedside 2/2 pt's Intellectual disability. Pt resides in a group home. Prior to hospital admission pt was ambulating independently using no assistive device. Pt has had no recent falls.    Pt left comfortable in chair, NAD, all lines intact, all precautions maintained, with mother @bedside, and RN aware of PT evaluation.

## 2021-04-03 NOTE — PHYSICAL THERAPY INITIAL EVALUATION ADULT - PATIENT PROFILE REVIEW, REHAB EVAL
ACTIVITY: Increase as Tolerated; spoke with RN Kaitlyn Huitron prior to PT evaluation--> Pt OK for PT consult/OOB activity./yes

## 2021-04-03 NOTE — PROGRESS NOTE ADULT - SUBJECTIVE AND OBJECTIVE BOX
Dr. Chelsea May  Pager [phone]    PROGRESS NOTE:     Patient is a 21y old  Male who presents with a chief complaint of low platelets (02 Apr 2021 17:37)      SUBJECTIVE / OVERNIGHT EVENTS: pt has no specific complaints, watching movie on Ipad  ADDITIONAL REVIEW OF SYSTEMS: mother at bedside, reports right leg cellulitis and edema improving , waiting for lab     MEDICATIONS  (STANDING):  BACItracin   Ointment 1 Application(s) Topical two times a day  brexpiprazole 6 milliGRAM(s) Oral daily  cetirizine 10 milliGRAM(s) Oral at bedtime  chlorhexidine 4% Liquid 1 Application(s) Topical <User Schedule>  cimetidine 400 milliGRAM(s) Oral three times a day  fluticasone propionate 50 MICROgram(s)/spray Nasal Spray 1 Spray(s) Both Nostrils two times a day  furosemide    Tablet 40 milliGRAM(s) Oral daily  influenza   Vaccine 0.5 milliLiter(s) IntraMuscular once  LORazepam     Tablet 1 milliGRAM(s) Oral at bedtime  multivitamin 1 Tablet(s) Oral daily  mupirocin 2% Ointment 1 Application(s) Topical two times a day  traZODone 300 milliGRAM(s) Oral at bedtime  vancomycin  IVPB 1250 milliGRAM(s) IV Intermittent every 8 hours  vancomycin  IVPB        MEDICATIONS  (PRN):  ALBUTerol    0.083%. 2.5 milliGRAM(s) Nebulizer once PRN PRN Chemotherapy Reaction  ALBUTerol    0.083%. 2.5 milliGRAM(s) Nebulizer once PRN PRN Chemotherapy Reaction  diphenhydrAMINE   Injectable 50 milliGRAM(s) IV Push once PRN PRN Chemotherapy Reaction  EPINEPHrine     1 mG/mL Injectable 0.3 milliGRAM(s) IntraMuscular once PRN PRN Chemotherapy Reaction  EPINEPHrine     1 mG/mL Injectable 0.3 milliGRAM(s) IntraMuscular once PRN PRN Chemotherapy Reaction  hydrocortisone sodium succinate Injectable 100 milliGRAM(s) IV Push once PRN PRN Chemotherapy Reaction  hydrocortisone sodium succinate Injectable 100 milliGRAM(s) IV Push once PRN PRN Chemotherapy Reaction  mineral oil for Topical Use 1 Application(s) Topical three times a day PRN to clean area after BM  zinc oxide 20% Ointment 1 Application(s) Topical three times a day PRN rash, apply to the area after each BM      CAPILLARY BLOOD GLUCOSE        I&O's Summary      PHYSICAL EXAM:  Vital Signs Last 24 Hrs  T(C): 36.8 (03 Apr 2021 06:45), Max: 37.2 (02 Apr 2021 14:46)  T(F): 98.3 (03 Apr 2021 06:45), Max: 98.9 (02 Apr 2021 14:46)  HR: 104 (03 Apr 2021 06:45) (101 - 111)  BP: 138/78 (03 Apr 2021 06:45) (118/64 - 146/64)  BP(mean): --  RR: 18 (03 Apr 2021 06:45) (17 - 18)  SpO2: 97% (03 Apr 2021 06:45) (97% - 100%)  GENERAL: NAD, non verbal  HEAD:  Atraumatic, Normocephalic  EYES: EOMI, PERRLA, conjunctiva and sclera clear  NECK: Supple, No JVD  CHEST/LUNG: Clear to auscultation bilaterally; No wheeze  HEART: Regular rate and rhythm; No murmurs, rubs, or gallops  ABDOMEN: morbidly obese, Soft, Nontender, Nondistended; Bowel sounds present  EXTREMITIES:  (+) edema b/l legs, RLE cellulitis improved   NEUROLOGY: NICHOLS, alert    LABS:                        12.0   12.30 )-----------( 68       ( 03 Apr 2021 12:15 )             39.5     04-03    135  |  100  |  20  ----------------------------<  90  4.0   |  27  |  0.89    Ca    9.3      03 Apr 2021 07:10  Phos  4.6     04-03  Mg     2.0     04-03      Culture - Urine (collected 31 Mar 2021 15:58)  Source: .Urine Clean Catch (Midstream)  Final Report (01 Apr 2021 17:27):    No growth      RADIOLOGY & ADDITIONAL TESTS:  Results Reviewed:   Imaging Personally Reviewed:    Electrocardiogram Personally Reviewed:    COORDINATION OF CARE:  Care Discussed with Consultants/Other Providers [Y/N]:  Prior or Outpatient Records Reviewed [Y/N]:

## 2021-04-03 NOTE — PHYSICAL THERAPY INITIAL EVALUATION ADULT - MANUAL MUSCLE TESTING RESULTS, REHAB EVAL
Pt has difficulty following commands; bilateral UE at least 3/5, bilateral LE 3/5/grossly assessed due to

## 2021-04-04 LAB
ANION GAP SERPL CALC-SCNC: 9 MMOL/L — SIGNIFICANT CHANGE UP (ref 7–14)
BASOPHILS # BLD AUTO: 0.06 K/UL — SIGNIFICANT CHANGE UP (ref 0–0.2)
BASOPHILS NFR BLD AUTO: 0.5 % — SIGNIFICANT CHANGE UP (ref 0–2)
BUN SERPL-MCNC: 21 MG/DL — SIGNIFICANT CHANGE UP (ref 7–23)
CALCIUM SERPL-MCNC: 9.1 MG/DL — SIGNIFICANT CHANGE UP (ref 8.4–10.5)
CHLORIDE SERPL-SCNC: 99 MMOL/L — SIGNIFICANT CHANGE UP (ref 98–107)
CO2 SERPL-SCNC: 26 MMOL/L — SIGNIFICANT CHANGE UP (ref 22–31)
CREAT SERPL-MCNC: 0.9 MG/DL — SIGNIFICANT CHANGE UP (ref 0.5–1.3)
CULTURE RESULTS: SIGNIFICANT CHANGE UP
EOSINOPHIL # BLD AUTO: 0.06 K/UL — SIGNIFICANT CHANGE UP (ref 0–0.5)
EOSINOPHIL NFR BLD AUTO: 0.5 % — SIGNIFICANT CHANGE UP (ref 0–6)
GLUCOSE SERPL-MCNC: 100 MG/DL — HIGH (ref 70–99)
HCT VFR BLD CALC: 34.7 % — LOW (ref 39–50)
HGB BLD-MCNC: 10.4 G/DL — LOW (ref 13–17)
IANC: 10.08 K/UL — HIGH (ref 1.5–8.5)
IMM GRANULOCYTES NFR BLD AUTO: 2.5 % — HIGH (ref 0–1.5)
LYMPHOCYTES # BLD AUTO: 1.45 K/UL — SIGNIFICANT CHANGE UP (ref 1–3.3)
LYMPHOCYTES # BLD AUTO: 11 % — LOW (ref 13–44)
MAGNESIUM SERPL-MCNC: 1.9 MG/DL — SIGNIFICANT CHANGE UP (ref 1.6–2.6)
MCHC RBC-ENTMCNC: 25.7 PG — LOW (ref 27–34)
MCHC RBC-ENTMCNC: 30 GM/DL — LOW (ref 32–36)
MCV RBC AUTO: 85.7 FL — SIGNIFICANT CHANGE UP (ref 80–100)
MONOCYTES # BLD AUTO: 1.18 K/UL — HIGH (ref 0–0.9)
MONOCYTES NFR BLD AUTO: 9 % — SIGNIFICANT CHANGE UP (ref 2–14)
NEUTROPHILS # BLD AUTO: 10.08 K/UL — HIGH (ref 1.8–7.4)
NEUTROPHILS NFR BLD AUTO: 76.5 % — SIGNIFICANT CHANGE UP (ref 43–77)
NRBC # BLD: 0 /100 WBCS — SIGNIFICANT CHANGE UP
NRBC # FLD: 0.02 K/UL — HIGH
PHOSPHATE SERPL-MCNC: 4.6 MG/DL — HIGH (ref 2.5–4.5)
PLATELET # BLD AUTO: 32 K/UL — LOW (ref 150–400)
POTASSIUM SERPL-MCNC: 3.9 MMOL/L — SIGNIFICANT CHANGE UP (ref 3.5–5.3)
POTASSIUM SERPL-SCNC: 3.9 MMOL/L — SIGNIFICANT CHANGE UP (ref 3.5–5.3)
RBC # BLD: 4.05 M/UL — LOW (ref 4.2–5.8)
RBC # FLD: 16.3 % — HIGH (ref 10.3–14.5)
SODIUM SERPL-SCNC: 134 MMOL/L — LOW (ref 135–145)
SPECIMEN SOURCE: SIGNIFICANT CHANGE UP
VANCOMYCIN TROUGH SERPL-MCNC: 18 UG/ML — SIGNIFICANT CHANGE UP (ref 10–20)
VANCOMYCIN TROUGH SERPL-MCNC: 20 UG/ML — SIGNIFICANT CHANGE UP (ref 10–20)
WBC # BLD: 13.16 K/UL — HIGH (ref 3.8–10.5)
WBC # FLD AUTO: 13.16 K/UL — HIGH (ref 3.8–10.5)

## 2021-04-04 PROCEDURE — 99233 SBSQ HOSP IP/OBS HIGH 50: CPT

## 2021-04-04 PROCEDURE — 71045 X-RAY EXAM CHEST 1 VIEW: CPT | Mod: 26

## 2021-04-04 PROCEDURE — 99232 SBSQ HOSP IP/OBS MODERATE 35: CPT

## 2021-04-04 RX ORDER — ACETAMINOPHEN 500 MG
500 TABLET ORAL ONCE
Refills: 0 | Status: COMPLETED | OUTPATIENT
Start: 2021-04-04 | End: 2021-04-04

## 2021-04-04 RX ORDER — ROMIPLOSTIM 250 UG/.5ML
1500 INJECTION, POWDER, LYOPHILIZED, FOR SOLUTION SUBCUTANEOUS ONCE
Refills: 0 | Status: COMPLETED | OUTPATIENT
Start: 2021-04-04 | End: 2021-04-04

## 2021-04-04 RX ADMIN — Medication 1 TABLET(S): at 11:13

## 2021-04-04 RX ADMIN — Medication 1 APPLICATION(S): at 06:35

## 2021-04-04 RX ADMIN — CHLORHEXIDINE GLUCONATE 1 APPLICATION(S): 213 SOLUTION TOPICAL at 06:35

## 2021-04-04 RX ADMIN — Medication 1 MILLIGRAM(S): at 21:44

## 2021-04-04 RX ADMIN — Medication 400 MILLIGRAM(S): at 21:44

## 2021-04-04 RX ADMIN — Medication 40 MILLIGRAM(S): at 06:35

## 2021-04-04 RX ADMIN — Medication 166.67 MILLIGRAM(S): at 09:44

## 2021-04-04 RX ADMIN — CETIRIZINE HYDROCHLORIDE 10 MILLIGRAM(S): 10 TABLET ORAL at 21:44

## 2021-04-04 RX ADMIN — Medication 166.67 MILLIGRAM(S): at 01:41

## 2021-04-04 RX ADMIN — Medication 166.67 MILLIGRAM(S): at 17:38

## 2021-04-04 RX ADMIN — MUPIROCIN 1 APPLICATION(S): 20 OINTMENT TOPICAL at 17:38

## 2021-04-04 RX ADMIN — Medication 1 APPLICATION(S): at 17:38

## 2021-04-04 RX ADMIN — MUPIROCIN 1 APPLICATION(S): 20 OINTMENT TOPICAL at 06:34

## 2021-04-04 RX ADMIN — Medication 300 MILLIGRAM(S): at 21:44

## 2021-04-04 RX ADMIN — Medication 400 MILLIGRAM(S): at 13:00

## 2021-04-04 RX ADMIN — BREXPIPRAZOLE 6 MILLIGRAM(S): 0.25 TABLET ORAL at 11:13

## 2021-04-04 RX ADMIN — Medication 400 MILLIGRAM(S): at 06:34

## 2021-04-04 RX ADMIN — ROMIPLOSTIM 1500 MICROGRAM(S): 250 INJECTION, POWDER, LYOPHILIZED, FOR SOLUTION SUBCUTANEOUS at 11:13

## 2021-04-04 RX ADMIN — Medication 500 MILLIGRAM(S): at 18:03

## 2021-04-04 RX ADMIN — Medication 1 SPRAY(S): at 06:34

## 2021-04-04 NOTE — PROGRESS NOTE ADULT - ASSESSMENT
19 yo M with a history of chronic ITP and severe autism (non-verbal at baseline) who presented with severe thrombocytopenia (Plt 7) while on home PO prednisone (80mg daily), and was admitted to Medicine for further management. Hematology consulted for assistance with management.    # Chronic ITP exacerbated in the setting of recent COVID infection   - Previous CT head neg for bleed  - S/p IVIG 1gm/kg daily x 2 (completed 2/28) and dexamethasone 40mg IV x4 days (completed 3/3)  - S/p W1 Rituximab with desensitization protocol on 3/6; W2 Rituximab on 3/13; s/p W3 Rituximab on 3/20 with desensitization protocol, S/p W4 Rituximab with desensitization protocol 3/27. Tolerated well  - S/p romiplastim/Nplate, 1mcg/kg; given 2/28; 2mcg/kg given on 3/7 (pt only got 250 mcg instead of 300mcg as pharmacy only had 250); 5mcg/kg (750mcg) given on 3/14, S/p 750mcg (5mcg/kg) on 3/21. S/p 4 weekly doses of Rituxan. S/p 1500mcg of NPLATE on 3/29 and 4/4.  -Given autism, patient is unable to swallow eltrombopag pills (cannot be crushed or chewed). Will continue to discuss other possible formulations or oral thrombopoietin receptor agonists.  - Transfuse platelets only if bleeding (consider giving 1/2 unit platelets slowly infused over 3 hours q12h) OR if platelets <5k.  - Given IVIG on 4/1 and 4/2 with Platelet response from 8 on 4/1 to 25 on 4/2, 68 on 4/3 and 32 on 4/4.  -Received 10mcg/mg = 1500 mcg Nplate today 4/4.   - DVT studies negative  - Lasix increased per primary team and will continue to monitor LE edema.  - Continue Vancomycin for RLE cellulitis, as per ID, which is improving. He is afebrile.  - Prednisone taper completed last week as he was not steroid responsive.  - Monitor closely for any signs or symptoms of bleeding.  -Consider d/c planning if platelets remain above 30 this week.  -Following discharge he will follow up with Dr. Rosaura Bhagat at [facility] [place] and continue on weekly NPlate  -Change to an oral thrombopoietin receptor agonist can be considered at a later time as an outpatient

## 2021-04-04 NOTE — PROGRESS NOTE ADULT - SUBJECTIVE AND OBJECTIVE BOX
INTERVAL History: No acute events overnight. Pt remains afebrile.      Allergies    Bactrim (Hives)  Rituxan (Hives)  WinRho SDF (Hives)    Intolerances    MEDICATIONS  (STANDING):  BACItracin   Ointment 1 Application(s) Topical two times a day  brexpiprazole 6 milliGRAM(s) Oral daily  cetirizine 10 milliGRAM(s) Oral at bedtime  chlorhexidine 4% Liquid 1 Application(s) Topical <User Schedule>  cimetidine 400 milliGRAM(s) Oral three times a day  fluticasone propionate 50 MICROgram(s)/spray Nasal Spray 1 Spray(s) Both Nostrils two times a day  furosemide    Tablet 40 milliGRAM(s) Oral daily  influenza   Vaccine 0.5 milliLiter(s) IntraMuscular once  LORazepam     Tablet 1 milliGRAM(s) Oral at bedtime  multivitamin 1 Tablet(s) Oral daily  mupirocin 2% Ointment 1 Application(s) Topical two times a day  traZODone 300 milliGRAM(s) Oral at bedtime  vancomycin  IVPB 1250 milliGRAM(s) IV Intermittent every 8 hours  vancomycin  IVPB        MEDICATIONS  (PRN):  ALBUTerol    0.083%. 2.5 milliGRAM(s) Nebulizer once PRN PRN Chemotherapy Reaction  ALBUTerol    0.083%. 2.5 milliGRAM(s) Nebulizer once PRN PRN Chemotherapy Reaction  diphenhydrAMINE   Injectable 50 milliGRAM(s) IV Push once PRN PRN Chemotherapy Reaction  EPINEPHrine     1 mG/mL Injectable 0.3 milliGRAM(s) IntraMuscular once PRN PRN Chemotherapy Reaction  EPINEPHrine     1 mG/mL Injectable 0.3 milliGRAM(s) IntraMuscular once PRN PRN Chemotherapy Reaction  hydrocortisone sodium succinate Injectable 100 milliGRAM(s) IV Push once PRN PRN Chemotherapy Reaction  hydrocortisone sodium succinate Injectable 100 milliGRAM(s) IV Push once PRN PRN Chemotherapy Reaction  mineral oil for Topical Use 1 Application(s) Topical three times a day PRN to clean area after BM  zinc oxide 20% Ointment 1 Application(s) Topical three times a day PRN rash, apply to the area after each BM    Vital Signs Last 24 Hrs  T(C): 37.3 (04 Apr 2021 06:33), Max: 37.4 (03 Apr 2021 21:49)  T(F): 99.1 (04 Apr 2021 06:33), Max: 99.3 (03 Apr 2021 21:49)  HR: 98 (04 Apr 2021 06:33) (98 - 100)  BP: 118/69 (04 Apr 2021 06:33) (118/69 - 125/64)  BP(mean): --  RR: 18 (04 Apr 2021 06:33) (18 - 18)  SpO2: 98% (04 Apr 2021 06:33) (98% - 98%)    PHYSICAL EXAM:  Constitutional: obese, NAD  Respiratory: breathing comfortably  Lower extremities: LLE no edema, RLE with residual erythema and edema  Skin: fewer ecchymoses, LE wounds crusting over and healing  Neuro: nonverbal due to autism, moving all 4 extremities      LABS:                  10.4   13.16 )-----------( 32       ( 04 Apr 2021 07:23 )             34.7     04-04    134<L>  |  99  |  21  ----------------------------<  100<H>  3.9   |  26  |  0.90    Ca    9.1      04 Apr 2021 07:23  Phos  4.6     04-04  Mg     1.9     04-04        RADIOLOGY & ADDITIONAL STUDIES:    PATHOLOGY:

## 2021-04-04 NOTE — PROGRESS NOTE ADULT - PROBLEM SELECTOR PLAN 1
Hx of chronic ITP since age 18 months. CTH negative, requiring multiple plt transfusions this admission. May be exacerbated by COVID, now s/p convalescent plasma (3/11)  Received IVIG 2/28 without adequate response poss d/t COVID infxn, repeat IVIG x2 on 4/1-4/2, plts improved to 68 yesterday but trended down to 32 today  -per heme recommendations will transfuse 1/2 unit platelets every 12 hrs for plt <5  or if bleeding.   -s/p Decadron 40 mg IV (2/28-3/3), completed prolong prednisone taper  -C/w mepron for PCP ppx, may dc when prednisone taper completed, confirm with heme  - Hematology following  - received Rituximab on 3/20,3/27 and Romiplastin/Nplate dose 3/21,3/28, got additional 1500 mcg Nplate today 4/4

## 2021-04-04 NOTE — PROGRESS NOTE ADULT - SUBJECTIVE AND OBJECTIVE BOX
Dr. Chelsea May  Pager [phone]    PROGRESS NOTE:     Patient is a 21y old  Male who presents with a chief complaint of low platelets (04 Apr 2021 11:29)      SUBJECTIVE / OVERNIGHT EVENTS: pt watching movie on Ipad, no specific complaints, nonverbal   ADDITIONAL REVIEW OF SYSTEMS: afebrile, mother at bedside     MEDICATIONS  (STANDING):  BACItracin   Ointment 1 Application(s) Topical two times a day  brexpiprazole 6 milliGRAM(s) Oral daily  cetirizine 10 milliGRAM(s) Oral at bedtime  chlorhexidine 4% Liquid 1 Application(s) Topical <User Schedule>  cimetidine 400 milliGRAM(s) Oral three times a day  fluticasone propionate 50 MICROgram(s)/spray Nasal Spray 1 Spray(s) Both Nostrils two times a day  furosemide    Tablet 40 milliGRAM(s) Oral daily  influenza   Vaccine 0.5 milliLiter(s) IntraMuscular once  LORazepam     Tablet 1 milliGRAM(s) Oral at bedtime  multivitamin 1 Tablet(s) Oral daily  mupirocin 2% Ointment 1 Application(s) Topical two times a day  traZODone 300 milliGRAM(s) Oral at bedtime  vancomycin  IVPB 1250 milliGRAM(s) IV Intermittent every 8 hours  vancomycin  IVPB        MEDICATIONS  (PRN):  ALBUTerol    0.083%. 2.5 milliGRAM(s) Nebulizer once PRN PRN Chemotherapy Reaction  ALBUTerol    0.083%. 2.5 milliGRAM(s) Nebulizer once PRN PRN Chemotherapy Reaction  diphenhydrAMINE   Injectable 50 milliGRAM(s) IV Push once PRN PRN Chemotherapy Reaction  EPINEPHrine     1 mG/mL Injectable 0.3 milliGRAM(s) IntraMuscular once PRN PRN Chemotherapy Reaction  EPINEPHrine     1 mG/mL Injectable 0.3 milliGRAM(s) IntraMuscular once PRN PRN Chemotherapy Reaction  hydrocortisone sodium succinate Injectable 100 milliGRAM(s) IV Push once PRN PRN Chemotherapy Reaction  hydrocortisone sodium succinate Injectable 100 milliGRAM(s) IV Push once PRN PRN Chemotherapy Reaction  mineral oil for Topical Use 1 Application(s) Topical three times a day PRN to clean area after BM  zinc oxide 20% Ointment 1 Application(s) Topical three times a day PRN rash, apply to the area after each BM      CAPILLARY BLOOD GLUCOSE        I&O's Summary      PHYSICAL EXAM:  Vital Signs Last 24 Hrs  T(C): 37.3 (04 Apr 2021 06:33), Max: 37.4 (03 Apr 2021 21:49)  T(F): 99.1 (04 Apr 2021 06:33), Max: 99.3 (03 Apr 2021 21:49)  HR: 98 (04 Apr 2021 06:33) (98 - 100)  BP: 118/69 (04 Apr 2021 06:33) (118/69 - 125/64)  BP(mean): --  RR: 18 (04 Apr 2021 06:33) (18 - 18)  SpO2: 98% (04 Apr 2021 06:33) (98% - 98%)  GENERAL: NAD, non verbal, obese  HEAD:  Atraumatic, Normocephalic  EYES: EOMI, PERRLA, conjunctiva and sclera clear  NECK: Supple, No JVD  CHEST/LUNG: Clear to auscultation bilaterally; No wheeze  HEART: Regular rate and rhythm; No murmurs, rubs, or gallops  ABDOMEN: morbidly obese, Soft, Nontender, Nondistended; Bowel sounds present  EXTREMITIES:  (+) edema b/l legs, RLE cellulitis (erythema /edema) improved   NEUROLOGY: NICHOLS, alert      LABS:                        10.4   13.16 )-----------( 32       ( 04 Apr 2021 07:23 )             34.7     04-04    134<L>  |  99  |  21  ----------------------------<  100<H>  3.9   |  26  |  0.90    Ca    9.1      04 Apr 2021 07:23  Phos  4.6     04-04  Mg     1.9     04-04      RADIOLOGY & ADDITIONAL TESTS:  Results Reviewed:   Imaging Personally Reviewed:  Electrocardiogram Personally Reviewed:    COORDINATION OF CARE:  Care Discussed with Consultants/Other Providers [Y/N]: hemonc fellow Dr. Jacqueline Nplatatum 1500 mcg sc toay  Prior or Outpatient Records Reviewed [Y/N]:

## 2021-04-04 NOTE — CHART NOTE - NSCHARTNOTEFT_GEN_A_CORE
PA medicine note  21 y.o. Male h/o intellectual disability, autism, nonverbal at baseline, chronic ITP on 80mg of daily prednisone, hospitalized for thrombocytopenia noted on OP labs, admitted for management of refractory ITP.     Informed by RN approximately that pt's mother was concerned pt had a temperature.   Pt. non verbal. Per mother pt has "not been acting like himself" and mother was concerned pt had an axillary temperature of 99.3 Axillary. Pt. will not allow for oral or rectal temp.    Examined pt at bedside   General - Pt. is nonverbal, in NAD.  Neuro - EOMI, PEERLA. Pt. able to follow commands grossly. No obvious facial droop noted.   Heart - RRR w/o MRG   Lungs - CTA w/o WRR  Abdomen - flat, symmetrical. Soft, non tender.     Vital Signs Last 24 Hrs  T(C): 37.1 (04 Apr 2021 12:53), Max: 37.4 (03 Apr 2021 21:49)  T(F): 98.7 (04 Apr 2021 12:53), Max: 99.3 (03 Apr 2021 21:49)  HR: 112 (04 Apr 2021 12:53) (98 - 112)  BP: 125/68 (04 Apr 2021 12:53) (118/69 - 125/68)  BP(mean): --  RR: 17 (04 Apr 2021 12:53) (17 - 18)  SpO2: 98% (04 Apr 2021 12:53) (98% - 98%)    A/P  mild pyrexia  Pt is being followed by house ID for cellulitis and is on vancomycin IV. Ordered 500mg PO tylenol x 1, UA and CXR. Attending aware and in agreement with plan. Will continue to closely monitor patient. Will inform night PA to monitor temperature.     Luis Bertrand PA-C  Pager [phone]

## 2021-04-05 DIAGNOSIS — Z29.9 ENCOUNTER FOR PROPHYLACTIC MEASURES, UNSPECIFIED: ICD-10-CM

## 2021-04-05 LAB
ANION GAP SERPL CALC-SCNC: 11 MMOL/L — SIGNIFICANT CHANGE UP (ref 7–14)
BASOPHILS # BLD AUTO: 0 K/UL — SIGNIFICANT CHANGE UP (ref 0–0.2)
BASOPHILS NFR BLD AUTO: 0 % — SIGNIFICANT CHANGE UP (ref 0–2)
BUN SERPL-MCNC: 18 MG/DL — SIGNIFICANT CHANGE UP (ref 7–23)
CALCIUM SERPL-MCNC: 8.7 MG/DL — SIGNIFICANT CHANGE UP (ref 8.4–10.5)
CHLORIDE SERPL-SCNC: 101 MMOL/L — SIGNIFICANT CHANGE UP (ref 98–107)
CO2 SERPL-SCNC: 25 MMOL/L — SIGNIFICANT CHANGE UP (ref 22–31)
CREAT SERPL-MCNC: 0.9 MG/DL — SIGNIFICANT CHANGE UP (ref 0.5–1.3)
EOSINOPHIL # BLD AUTO: 0.13 K/UL — SIGNIFICANT CHANGE UP (ref 0–0.5)
EOSINOPHIL NFR BLD AUTO: 0.9 % — SIGNIFICANT CHANGE UP (ref 0–6)
GLUCOSE SERPL-MCNC: 123 MG/DL — HIGH (ref 70–99)
HCT VFR BLD CALC: 33.5 % — LOW (ref 39–50)
HGB BLD-MCNC: 10.6 G/DL — LOW (ref 13–17)
IANC: 10.83 K/UL — HIGH (ref 1.5–8.5)
LYMPHOCYTES # BLD AUTO: 1.23 K/UL — SIGNIFICANT CHANGE UP (ref 1–3.3)
LYMPHOCYTES # BLD AUTO: 8.8 % — LOW (ref 13–44)
MAGNESIUM SERPL-MCNC: 1.9 MG/DL — SIGNIFICANT CHANGE UP (ref 1.6–2.6)
MCHC RBC-ENTMCNC: 26.1 PG — LOW (ref 27–34)
MCHC RBC-ENTMCNC: 31.6 GM/DL — LOW (ref 32–36)
MCV RBC AUTO: 82.5 FL — SIGNIFICANT CHANGE UP (ref 80–100)
MONOCYTES # BLD AUTO: 0.73 K/UL — SIGNIFICANT CHANGE UP (ref 0–0.9)
MONOCYTES NFR BLD AUTO: 5.2 % — SIGNIFICANT CHANGE UP (ref 2–14)
NEUTROPHILS # BLD AUTO: 11.07 K/UL — HIGH (ref 1.8–7.4)
NEUTROPHILS NFR BLD AUTO: 73.7 % — SIGNIFICANT CHANGE UP (ref 43–77)
PHOSPHATE SERPL-MCNC: 3.3 MG/DL — SIGNIFICANT CHANGE UP (ref 2.5–4.5)
PLATELET # BLD AUTO: 19 K/UL — CRITICAL LOW (ref 150–400)
POTASSIUM SERPL-MCNC: 3.2 MMOL/L — LOW (ref 3.5–5.3)
POTASSIUM SERPL-SCNC: 3.2 MMOL/L — LOW (ref 3.5–5.3)
RBC # BLD: 4.06 M/UL — LOW (ref 4.2–5.8)
RBC # FLD: 16.2 % — HIGH (ref 10.3–14.5)
SODIUM SERPL-SCNC: 137 MMOL/L — SIGNIFICANT CHANGE UP (ref 135–145)
VANCOMYCIN TROUGH SERPL-MCNC: 18.1 UG/ML — SIGNIFICANT CHANGE UP (ref 10–20)
WBC # BLD: 14.01 K/UL — HIGH (ref 3.8–10.5)
WBC # FLD AUTO: 14.01 K/UL — HIGH (ref 3.8–10.5)

## 2021-04-05 PROCEDURE — 99233 SBSQ HOSP IP/OBS HIGH 50: CPT | Mod: CS

## 2021-04-05 PROCEDURE — 99232 SBSQ HOSP IP/OBS MODERATE 35: CPT

## 2021-04-05 PROCEDURE — 99232 SBSQ HOSP IP/OBS MODERATE 35: CPT | Mod: GC

## 2021-04-05 RX ORDER — ACETAMINOPHEN 500 MG
650 TABLET ORAL EVERY 6 HOURS
Refills: 0 | Status: DISCONTINUED | OUTPATIENT
Start: 2021-04-05 | End: 2021-04-12

## 2021-04-05 RX ORDER — POTASSIUM CHLORIDE 20 MEQ
40 PACKET (EA) ORAL EVERY 4 HOURS
Refills: 0 | Status: COMPLETED | OUTPATIENT
Start: 2021-04-05 | End: 2021-04-05

## 2021-04-05 RX ADMIN — Medication 40 MILLIEQUIVALENT(S): at 11:44

## 2021-04-05 RX ADMIN — Medication 400 MILLIGRAM(S): at 13:10

## 2021-04-05 RX ADMIN — Medication 1 APPLICATION(S): at 17:39

## 2021-04-05 RX ADMIN — Medication 1 MILLIGRAM(S): at 21:42

## 2021-04-05 RX ADMIN — Medication 1 TABLET(S): at 11:36

## 2021-04-05 RX ADMIN — Medication 166.67 MILLIGRAM(S): at 09:21

## 2021-04-05 RX ADMIN — Medication 1 APPLICATION(S): at 06:46

## 2021-04-05 RX ADMIN — Medication 400 MILLIGRAM(S): at 06:45

## 2021-04-05 RX ADMIN — CHLORHEXIDINE GLUCONATE 1 APPLICATION(S): 213 SOLUTION TOPICAL at 06:46

## 2021-04-05 RX ADMIN — Medication 40 MILLIGRAM(S): at 06:46

## 2021-04-05 RX ADMIN — Medication 650 MILLIGRAM(S): at 19:36

## 2021-04-05 RX ADMIN — Medication 166.67 MILLIGRAM(S): at 17:58

## 2021-04-05 RX ADMIN — BREXPIPRAZOLE 6 MILLIGRAM(S): 0.25 TABLET ORAL at 11:36

## 2021-04-05 RX ADMIN — Medication 300 MILLIGRAM(S): at 21:42

## 2021-04-05 RX ADMIN — Medication 400 MILLIGRAM(S): at 21:42

## 2021-04-05 RX ADMIN — CETIRIZINE HYDROCHLORIDE 10 MILLIGRAM(S): 10 TABLET ORAL at 21:42

## 2021-04-05 RX ADMIN — MUPIROCIN 1 APPLICATION(S): 20 OINTMENT TOPICAL at 06:46

## 2021-04-05 RX ADMIN — Medication 166.67 MILLIGRAM(S): at 00:04

## 2021-04-05 RX ADMIN — Medication 40 MILLIEQUIVALENT(S): at 17:58

## 2021-04-05 RX ADMIN — Medication 650 MILLIGRAM(S): at 18:36

## 2021-04-05 RX ADMIN — MUPIROCIN 1 APPLICATION(S): 20 OINTMENT TOPICAL at 17:58

## 2021-04-05 NOTE — PROGRESS NOTE ADULT - SUBJECTIVE AND OBJECTIVE BOX
Follow Up: ID following for concern for cellulitis    Interval History/ROS:Patient is a 21y old  Male who presents with a chief complaint of low platelets (01 Apr 2021 13:54)    - IIVIG 4/1, 4/2  -febrile 4/4  -afebrile today    Allergies    Bactrim (Hives)  Rituxan (Hives)  WinRho SDF (Hives)    Intolerances        ANTIMICROBIALS: vancomycin  IVPB 1250 every 8 hours  vancomycin  IVPB              OTHER MEDS:  ALBUTerol    0.083%. 2.5 milliGRAM(s) Nebulizer once PRN  ALBUTerol    0.083%. 2.5 milliGRAM(s) Nebulizer once PRN  BACItracin   Ointment 1 Application(s) Topical two times a day  brexpiprazole 6 milliGRAM(s) Oral daily  cetirizine 10 milliGRAM(s) Oral at bedtime  chlorhexidine 4% Liquid 1 Application(s) Topical <User Schedule>  cimetidine 400 milliGRAM(s) Oral three times a day  diphenhydrAMINE   Injectable 50 milliGRAM(s) IV Push once PRN  EPINEPHrine     1 mG/mL Injectable 0.3 milliGRAM(s) IntraMuscular once PRN  EPINEPHrine     1 mG/mL Injectable 0.3 milliGRAM(s) IntraMuscular once PRN  fluticasone propionate 50 MICROgram(s)/spray Nasal Spray 1 Spray(s) Both Nostrils two times a day  furosemide    Tablet 40 milliGRAM(s) Oral daily  hydrocortisone sodium succinate Injectable 100 milliGRAM(s) IV Push once PRN  hydrocortisone sodium succinate Injectable 100 milliGRAM(s) IV Push once PRN  immune   globulin 10% (GAMMAGARD) IVPB 115 Gram(s) IV Intermittent daily  influenza   Vaccine 0.5 milliLiter(s) IntraMuscular once  LORazepam     Tablet 1 milliGRAM(s) Oral at bedtime  meperidine     Injectable 25 milliGRAM(s) IV Push once PRN  mineral oil for Topical Use 1 Application(s) Topical three times a day PRN  multivitamin 1 Tablet(s) Oral daily  mupirocin 2% Ointment 1 Application(s) Topical two times a day  traZODone 300 milliGRAM(s) Oral at bedtime  zinc oxide 20% Ointment 1 Application(s) Topical three times a day PRN    Vital Signs Last 24 Hrs  T(F): 100.4 (04-05-21 @ 18:13), Max: 100.4 (04-05-21 @ 18:13)  HR: 112 (04-05-21 @ 11:57)  BP: 113/73 (04-05-21 @ 11:57)  RR: 17 (04-05-21 @ 11:57)  SpO2: 97% (04-05-21 @ 11:57) (97% - 98%)    EXAM:  Constitutional: Not in acute distress. On RA.  Eyes: No icterus.   RS: Chest clear to auscultation bilaterally. No wheeze/rhonchi/crepitations.  CVS: S1, S2 heard. Regular rate and rhythm. No murmurs/rubs/gallops.  Abdomen: Soft. No guarding/rigidity/tenderness.  : No acute abnormalities  Extremities: right lower leg less erythematous, dry eschar, starting to desquamate proximally  Skin: B/l LE skin lesions appear to be improving  Neuro: Alert, non-verbal  IV: left arm peripheral and arrow                                              10.6   14.01 )-----------( 19       ( 05 Apr 2021 08:26 )             33.5 04-05    137  |  101  |  18  ----------------------------<  123  3.2   |  25  |  0.90  Ca    8.7      05 Apr 2021 08:26Phos  3.3     04-05Mg     1.9     04-05            MICROBIOLOGY:Culture Results:   No growth (03-31 @ 15:58)  Culture Results:   No growth to date. (03-30 @ 16:38)  Culture Results:   No growth to date. (03-29 @ 16:47)  Culture Results:   No growth to date. (03-29 @ 16:47)      Vancomycin Level, Trough -Pre 4th Dose, order if dosed q6/8/12h (04.04.21 @ 23:19)   Vancomycin Level, Trough: 20.0:  Vancomycin Level, Trough: 14.1

## 2021-04-05 NOTE — PROGRESS NOTE ADULT - SUBJECTIVE AND OBJECTIVE BOX
[facility] Division of [facility] Medicine  Luis M Damon DO  Pager ([facility], 9B-5P): [phone]    Patient is a 21y old  Male who presents with a chief complaint of low platelets (05 Apr 2021 09:33)    SUBJECTIVE / OVERNIGHT EVENTS: Pt unable to report ROS given developmental disability.  However per mother at bedisde, pt is in much better spirits and acting like his normal self.  Currenlty watching a movie on his ipad, laughing sporadically.  Afebrile for last 24 hours.     MEDICATIONS  (STANDING):  BACItracin   Ointment 1 Application(s) Topical two times a day  brexpiprazole 6 milliGRAM(s) Oral daily  cetirizine 10 milliGRAM(s) Oral at bedtime  chlorhexidine 4% Liquid 1 Application(s) Topical <User Schedule>  cimetidine 400 milliGRAM(s) Oral three times a day  fluticasone propionate 50 MICROgram(s)/spray Nasal Spray 1 Spray(s) Both Nostrils two times a day  furosemide    Tablet 40 milliGRAM(s) Oral daily  influenza   Vaccine 0.5 milliLiter(s) IntraMuscular once  LORazepam     Tablet 1 milliGRAM(s) Oral at bedtime  multivitamin 1 Tablet(s) Oral daily  mupirocin 2% Ointment 1 Application(s) Topical two times a day  potassium chloride    Tablet ER 40 milliEquivalent(s) Oral every 4 hours  traZODone 300 milliGRAM(s) Oral at bedtime  vancomycin  IVPB 1250 milliGRAM(s) IV Intermittent every 8 hours  vancomycin  IVPB        MEDICATIONS  (PRN):  ALBUTerol    0.083%. 2.5 milliGRAM(s) Nebulizer once PRN PRN Chemotherapy Reaction  ALBUTerol    0.083%. 2.5 milliGRAM(s) Nebulizer once PRN PRN Chemotherapy Reaction  diphenhydrAMINE   Injectable 50 milliGRAM(s) IV Push once PRN PRN Chemotherapy Reaction  EPINEPHrine     1 mG/mL Injectable 0.3 milliGRAM(s) IntraMuscular once PRN PRN Chemotherapy Reaction  EPINEPHrine     1 mG/mL Injectable 0.3 milliGRAM(s) IntraMuscular once PRN PRN Chemotherapy Reaction  hydrocortisone sodium succinate Injectable 100 milliGRAM(s) IV Push once PRN PRN Chemotherapy Reaction  hydrocortisone sodium succinate Injectable 100 milliGRAM(s) IV Push once PRN PRN Chemotherapy Reaction  mineral oil for Topical Use 1 Application(s) Topical three times a day PRN to clean area after BM  zinc oxide 20% Ointment 1 Application(s) Topical three times a day PRN rash, apply to the area after each BM      PHYSICAL EXAM:  Vital Signs Last 24 Hrs  T(C): 37.3 (05 Apr 2021 11:57), Max: 37.7 (04 Apr 2021 21:43)  T(F): 99.2 (05 Apr 2021 11:57), Max: 99.9 (04 Apr 2021 21:43)  HR: 112 (05 Apr 2021 11:57) (100 - 112)  BP: 113/73 (05 Apr 2021 11:57) (113/73 - 147/85)  BP(mean): --  RR: 17 (05 Apr 2021 11:57) (17 - 18)  SpO2: 97% (05 Apr 2021 11:57) (97% - 98%)    CONSTITUTIONAL: NAD, well-developed, well-groomed,obese  EYES: PERRLA; conjunctiva and sclera clear  RESPIRATORY: Normal respiratory effort; lungs are clear to auscultation bilaterally  CARDIOVASCULAR: Regular rate and rhythm, normal S1 and S2, no murmur/rub/gallop; trace b/l LE edema   ABDOMEN: Nontender to palpation, normoactive bowel sounds, no rebound/guarding  MUSCLOSKELETAL:  No clubbing or cyanosis of digits; no joint swelling or tenderness to palpation  NEUROLOGY: CN 2-12 are intact and symmetric  SKIN: +excoriations along R shin, healing with scabs, non purulent, mild surrounding cellulitis     LABS:                        10.6   14.01 )-----------( 19       ( 05 Apr 2021 08:26 )             33.5     04-05    137  |  101  |  18  ----------------------------<  123<H>  3.2<L>   |  25  |  0.90    Ca    8.7      05 Apr 2021 08:26  Phos  3.3     04-05  Mg     1.9     04-05    Culture - Blood (03.30.21 @ 13:05)    Specimen Source: .Blood Blood    Culture Results:   No Growth Final        RADIOLOGY & ADDITIONAL TESTS:  Results Reviewed:   < from: Xray Chest 1 View- PORTABLE-Urgent (Xray Chest 1 View- PORTABLE-Urgent .) (04.04.21 @ 18:35) >  IMPRESSION:  Low lung volumes.    Clear lungs.

## 2021-04-05 NOTE — PROGRESS NOTE ADULT - ASSESSMENT
19 yo M with a history of chronic ITP and severe autism (non-verbal at baseline) who presented with severe thrombocytopenia (Plt 7) while on home PO prednisone (80mg daily), and was admitted to Medicine for further management. Hematology consulted for assistance with management.    # Chronic ITP exacerbated in the setting of recent COVID infection   - Previous CT head neg for bleed  - S/p IVIG 1gm/kg daily x 2 (completed 2/28) and dexamethasone 40mg IV x4 days (completed 3/3)  - S/p W1 Rituximab with desensitization protocol on 3/6; W2 Rituximab on 3/13; s/p W3 Rituximab on 3/20 with desensitization protocol, tolerated well. S/p W4 Rituximab with desensitization protocol yesterday (3/27), tolerated well.   - S/p romiplastim/Nplate, 1mcg/kg; given 2/28; 2mcg/kg given on 3/7 (pt only got 250mcg instead of 300mcg as pharmacy only had 250); 5mcg/kg (750mcg) given on 3/14, S/p 750mcg (5mcg/kg) on 3/21. S/p 4 weekly doses of Rituxan. S/p 1500mcg of NPLATE on 3/29 and 4/4.  Given autism, patient is unable to swallow eltrombopag pills (cannot be crushed or chewed). Will continue to discuss other possible formulations or oral thrombopoietin receptor agonists.  - Steroids now tapered off.   - Transfuse platelets only if bleeding (consider giving 1/2 unit platelets slowly infused over 3 hours q12h) OR if platelets <5k.  - s/p IVIG on 4/1~2, w/ brief response  - DVT studies negative  - Lasix increased per primary team   - Appreciate ID eval and recs- now on Vancomycin    - Monitor closely for any signs or symptoms of bleeding 19 yo M with a history of chronic ITP and severe autism (non-verbal at baseline) who presented with severe thrombocytopenia (Plt 7) while on home PO prednisone (80mg daily), and was admitted to Medicine for further management. Hematology consulted for assistance with management.    # Chronic ITP exacerbated in the setting of recent COVID infection   - Previous CT head neg for bleed  - S/p IVIG 1gm/kg daily x 2 (completed 2/28) and dexamethasone 40mg IV x4 days (completed 3/3)  - S/p W1 Rituximab with desensitization protocol on 3/6; W2 Rituximab on 3/13; s/p W3 Rituximab on 3/20 with desensitization protocol, tolerated well. S/p W4 Rituximab with desensitization protocol yesterday (3/27), tolerated well.   - S/p romiplastim/Nplate, 1mcg/kg; given 2/28; 2mcg/kg given on 3/7 (pt only got 250mcg instead of 300mcg as pharmacy only had 250); 5mcg/kg (750mcg) given on 3/14, S/p 750mcg (5mcg/kg) on 3/21. S/p 4 weekly doses of Rituxan. S/p 1500mcg of NPLATE on 3/29 and 4/4.  Given autism, patient is unable to swallow eltrombopag pills (cannot be crushed or chewed). Will continue to discuss other possible formulations or oral thrombopoietin receptor agonists.  - Steroids now tapered off.   - Transfuse platelets only if bleeding (consider giving 1/2 unit platelets slowly infused over 3 hours q12h) OR if platelets <5k.  - s/p IVIG on 4/1~2, w/ brief response  - DVT studies negative  - Lasix increased per primary team   - Appreciate ID eval and recs- now on Vancomycin  - Monitor closely for any signs or symptoms of bleeding  -will consider starting PO Dopetelet after insurance approval    Tiago May MD, PGY-4  Translational Medical Oncology Fellow  Pager: [phone]

## 2021-04-05 NOTE — PROGRESS NOTE ADULT - ASSESSMENT
21/M with PMH chronic ITP, intellectual disability, non-verbal, autism, COVID19 (Jan 2021)  Adm 2/27 for low PLT.  Hosp course c/b low PLT s/p IVIG, Dexa, Rituximab ([facility] stay 3/5-3/7 for desensitization); convalescent plasma 3/11; 3/22 Wound Care and Dermatology consulted for LE wounds; 3/29 new fevers  ID consulted for recs  ========    Chronic ITP on tapering steroids with LE lesions     Cellulitis right lower leg  -improving with vancomycin  -low grade fever  - WBC 14K  -blood culture no growth  - U/S LE negative for DVT. Repeat UA 3/30 negative    Antimicrobials, Steroids, COVID19 therapies in this admission  IVIG x2, completed 2/28   Decadron 40 mg IV 2/28-3/3  Prednisone 80mg 2/27-> tapering since then  Solumedrol 3/13, 3/20, 3/27  IVIG 2/27, 2/28, 4/1  Rituxan infusion 3/7, 3/13, 3/20, 3/27  convalescent plasma on 3/11  Atovaquone 3/5->  Cefazolin 3/29-3/30  Doxy 3/29-3/30  Vanc 3/31->    RECOMMENDATIONS:  -check vanco trough with next dose  - vancomycin 1 gm iv q 8 h  -anticipate d/c vanco in next day or so  - check time course arrow iv      Shelbi Weaver MD  Pager: [phone]  After 5 PM or weekends please call fellow on call or office [phone]

## 2021-04-05 NOTE — PROGRESS NOTE ADULT - PROBLEM SELECTOR PLAN 1
- Hx of chronic ITP since age 18 months. CTH negative, requiring multiple plt transfusions this admission. May be exacerbated by COVID, now s/p convalescent plasma (3/11)  0 Received IVIG 2/28 without adequate response,, repeat IVIG x2 on 4/1-4/2, plts improved to 68 but trended down to 32 today and now back to 19  -per heme recommendations will transfuse 1/2 unit platelets every 12 hrs for plt <5  or if bleeding.   -s/p Decadron 40 mg IV (2/28-3/3), completed prolonged prednisone taper  -C/w mepron for PCP ppx, may dc when prednisone taper completed, confirm with heme  - Hematology following  - received Rituximab on 3/20,3/27 and Romiplastin/Nplate dose 3/21,3/28, got additional 1500 mcg Nplate 4/4  - Heme considering additional agents for refractory ITP, will f/u

## 2021-04-05 NOTE — PROGRESS NOTE ADULT - ASSESSMENT
21M h/o intellectual disability, autism, nonverbal at baseline, chronic ITP formerly on 80mg of daily prednisone, hospitalized for thrombocytopenia noted on OP labs, admitted for management of refractory ITP since COVID in Jan 2021.

## 2021-04-05 NOTE — PROGRESS NOTE ADULT - SUBJECTIVE AND OBJECTIVE BOX
VINNY MOON  MRN-[id number]    Interval hx:          MEDICATIONS  (STANDING):  BACItracin   Ointment 1 Application(s) Topical two times a day  brexpiprazole 6 milliGRAM(s) Oral daily  cetirizine 10 milliGRAM(s) Oral at bedtime  chlorhexidine 4% Liquid 1 Application(s) Topical <User Schedule>  cimetidine 400 milliGRAM(s) Oral three times a day  fluticasone propionate 50 MICROgram(s)/spray Nasal Spray 1 Spray(s) Both Nostrils two times a day  furosemide    Tablet 40 milliGRAM(s) Oral daily  influenza   Vaccine 0.5 milliLiter(s) IntraMuscular once  LORazepam     Tablet 1 milliGRAM(s) Oral at bedtime  multivitamin 1 Tablet(s) Oral daily  mupirocin 2% Ointment 1 Application(s) Topical two times a day  potassium chloride    Tablet ER 40 milliEquivalent(s) Oral every 4 hours  traZODone 300 milliGRAM(s) Oral at bedtime  vancomycin  IVPB 1250 milliGRAM(s) IV Intermittent every 8 hours  vancomycin  IVPB        MEDICATIONS  (PRN):  ALBUTerol    0.083%. 2.5 milliGRAM(s) Nebulizer once PRN PRN Chemotherapy Reaction  ALBUTerol    0.083%. 2.5 milliGRAM(s) Nebulizer once PRN PRN Chemotherapy Reaction  diphenhydrAMINE   Injectable 50 milliGRAM(s) IV Push once PRN PRN Chemotherapy Reaction  EPINEPHrine     1 mG/mL Injectable 0.3 milliGRAM(s) IntraMuscular once PRN PRN Chemotherapy Reaction  EPINEPHrine     1 mG/mL Injectable 0.3 milliGRAM(s) IntraMuscular once PRN PRN Chemotherapy Reaction  hydrocortisone sodium succinate Injectable 100 milliGRAM(s) IV Push once PRN PRN Chemotherapy Reaction  hydrocortisone sodium succinate Injectable 100 milliGRAM(s) IV Push once PRN PRN Chemotherapy Reaction  mineral oil for Topical Use 1 Application(s) Topical three times a day PRN to clean area after BM  zinc oxide 20% Ointment 1 Application(s) Topical three times a day PRN rash, apply to the area after each BM      Allergies    Bactrim (Hives)  Rituxan (Hives)  WinRho SDF (Hives)    Intolerances        Objectives:  Vital Signs Last 24 Hrs  T(C): 36.9 (05 Apr 2021 06:45), Max: 37.7 (04 Apr 2021 21:43)  T(F): 98.5 (05 Apr 2021 06:45), Max: 99.9 (04 Apr 2021 21:43)  HR: 100 (05 Apr 2021 06:45) (100 - 112)  BP: 147/85 (05 Apr 2021 06:45) (125/68 - 147/85)  BP(mean): --  RR: 18 (05 Apr 2021 06:45) (17 - 18)  SpO2: 98% (05 Apr 2021 06:45) (98% - 98%)        Labs:    CBC Full  -  ( 05 Apr 2021 08:26 )  WBC Count : 14.01 K/uL  RBC Count : 4.06 M/uL  Hemoglobin : 10.6 g/dL  Hematocrit : 33.5 %  Platelet Count - Automated : 19 K/uL  Mean Cell Volume : 82.5 fL  Mean Cell Hemoglobin : 26.1 pg  Mean Cell Hemoglobin Concentration : 31.6 gm/dL  Auto Neutrophil # : x  Auto Lymphocyte # : x  Auto Monocyte # : x  Auto Eosinophil # : x  Auto Basophil # : x  Auto Neutrophil % : x  Auto Lymphocyte % : x  Auto Monocyte % : x  Auto Eosinophil % : x  Auto Basophil % : x        04-05    137  |  101  |  18  ----------------------------<  123<H>  3.2<L>   |  25  |  0.90    Ca    8.7      05 Apr 2021 08:26  Phos  3.3     04-05  Mg     1.9     04-05                  Imagings:       VINNY MOON  [id number]    Interval hx:  Patient was seen and examined at the bedside        MEDICATIONS  (STANDING):  BACItracin   Ointment 1 Application(s) Topical two times a day  brexpiprazole 6 milliGRAM(s) Oral daily  cetirizine 10 milliGRAM(s) Oral at bedtime  chlorhexidine 4% Liquid 1 Application(s) Topical <User Schedule>  cimetidine 400 milliGRAM(s) Oral three times a day  fluticasone propionate 50 MICROgram(s)/spray Nasal Spray 1 Spray(s) Both Nostrils two times a day  furosemide    Tablet 40 milliGRAM(s) Oral daily  influenza   Vaccine 0.5 milliLiter(s) IntraMuscular once  LORazepam     Tablet 1 milliGRAM(s) Oral at bedtime  multivitamin 1 Tablet(s) Oral daily  mupirocin 2% Ointment 1 Application(s) Topical two times a day  potassium chloride    Tablet ER 40 milliEquivalent(s) Oral every 4 hours  traZODone 300 milliGRAM(s) Oral at bedtime  vancomycin  IVPB 1250 milliGRAM(s) IV Intermittent every 8 hours  vancomycin  IVPB        MEDICATIONS  (PRN):  ALBUTerol    0.083%. 2.5 milliGRAM(s) Nebulizer once PRN PRN Chemotherapy Reaction  ALBUTerol    0.083%. 2.5 milliGRAM(s) Nebulizer once PRN PRN Chemotherapy Reaction  diphenhydrAMINE   Injectable 50 milliGRAM(s) IV Push once PRN PRN Chemotherapy Reaction  EPINEPHrine     1 mG/mL Injectable 0.3 milliGRAM(s) IntraMuscular once PRN PRN Chemotherapy Reaction  EPINEPHrine     1 mG/mL Injectable 0.3 milliGRAM(s) IntraMuscular once PRN PRN Chemotherapy Reaction  hydrocortisone sodium succinate Injectable 100 milliGRAM(s) IV Push once PRN PRN Chemotherapy Reaction  hydrocortisone sodium succinate Injectable 100 milliGRAM(s) IV Push once PRN PRN Chemotherapy Reaction  mineral oil for Topical Use 1 Application(s) Topical three times a day PRN to clean area after BM  zinc oxide 20% Ointment 1 Application(s) Topical three times a day PRN rash, apply to the area after each BM      Allergies    Bactrim (Hives)  Rituxan (Hives)  WinRho SDF (Hives)    Intolerances        Objectives:  Vital Signs Last 24 Hrs  T(C): 36.9 (05 Apr 2021 06:45), Max: 37.7 (04 Apr 2021 21:43)  T(F): 98.5 (05 Apr 2021 06:45), Max: 99.9 (04 Apr 2021 21:43)  HR: 100 (05 Apr 2021 06:45) (100 - 112)  BP: 147/85 (05 Apr 2021 06:45) (125/68 - 147/85)  BP(mean): --  RR: 18 (05 Apr 2021 06:45) (17 - 18)  SpO2: 98% (05 Apr 2021 06:45) (98% - 98%)        Labs:    CBC Full  -  ( 05 Apr 2021 08:26 )  WBC Count : 14.01 K/uL  RBC Count : 4.06 M/uL  Hemoglobin : 10.6 g/dL  Hematocrit : 33.5 %  Platelet Count - Automated : 19 K/uL  Mean Cell Volume : 82.5 fL  Mean Cell Hemoglobin : 26.1 pg  Mean Cell Hemoglobin Concentration : 31.6 gm/dL  Auto Neutrophil # : x  Auto Lymphocyte # : x  Auto Monocyte # : x  Auto Eosinophil # : x  Auto Basophil # : x  Auto Neutrophil % : x  Auto Lymphocyte % : x  Auto Monocyte % : x  Auto Eosinophil % : x  Auto Basophil % : x        04-05    137  |  101  |  18  ----------------------------<  123<H>  3.2<L>   |  25  |  0.90    Ca    8.7      05 Apr 2021 08:26  Phos  3.3     04-05  Mg     1.9     04-05                  Imagings:

## 2021-04-05 NOTE — PROGRESS NOTE ADULT - ATTENDING COMMENTS
Pt with chronic refractory ITP: s/p Rituxan x 4 with Nplate with elevation of platelets after IVIG but short-lived and currently downtrending. We reviewed with his mother current options. Will see if oral TPO medication covered by his insurance and will try to start while in hospital. If plt falls further below 10 K/microL, will consider Tavalisse versus cytotoxic chemotherapy addition.

## 2021-04-05 NOTE — PROVIDER CONTACT NOTE (CRITICAL VALUE NOTIFICATION) - SITUATION
Platelet 6
platelet level 13
Patient is non-verbal speaking, Oriented to self, Patient's Platelet count came back as 3. Patient asymptomatic.
Plt count of 1
lab representative notified RN regarding critical value- platelets 8
Platelet 8.0
Ashley Mcguire
Patient is non-verbal speaking, Oriented to self, Patient's Platelet count came back as 3. Patient asymptomatic.
patient has critical value of platelet 6
platelet count 4
Patient here for thrombocytopenia
Pt plt was 5
Platelet 19
lab representative notified RN of critical value, platelets 3
lab representative notified RN of critical value- platelets 9
Patient with critical Platelet count 10,000
lab representative notified RN of critical value- platelets 5
Patient with critical result. Platelet count 4.
Platelet 4
lab representative notified RN of critical value- platelets 5
Patient with critical platelet count 3
platelet 12
platelets 4
Patient with critical platelet count 17
Patient had low platelet count in AM labs

## 2021-04-05 NOTE — PROVIDER CONTACT NOTE (CRITICAL VALUE NOTIFICATION) - BACKGROUND
(Admit Diagnosis) Immune thrombocytopenic purpura  (PMH) Chronic ITP (idiopathic thrombocytopenia)  (PMH) Intellectual disability  (Principal DC/DX) Idiopathic thrombocytopenia purpura
Patient is a 20 year olf male, admitted for Immune thrombocytopenic purpura has a past medical history of Chronic ITP and autism.
admitted for immune thrombocytopenic purpura
Patient admitted for ITP, requiring multiple platelet transfusions s/p 2 doses of IVIG on 4/1 and 4/2.
admitted +ITP, status post covid
admitted +ITP, status post covid. last platelet transfusion yesterday 1/2 unit
(Admit Diagnosis) Immune thrombocytopenic purpura  (PMH) Chronic ITP (idiopathic thrombocytopenia)  (PMH) Intellectual disability  (Principal DC/DX) Idiopathic thrombocytopenia purpura
Patient admitted for thrombocytopenic purpura, requiring platelet transfusions every 12 hours this admission
Patient hospitalized for immune thrombocytopenia
Patient hospitalized for immune thrombocytopenia
Pt has been thrombocytopenic
admitted +ITP, hx of covid
immune thrombocytopenic purpura
admitted +ITP, status post covid
Patient admitted for immune thrombocytopenic purpura requiring multiple platelet transfusions.
history of chronic ideopathic thrombocytopenia, COVID-19, intellectual disability, autism
history of chronic ideopathic thrombocytopenia, COVID-19, intellectual disability, autism
Patient admitted 2/27 for immune thrombocytopenia
Patient admitted for Immune thrombocytopenic purpura requiring multiple platelet transfusions.
Patient is a 20 year olf male, admitted for Immune thrombocytopenic purpura has a past medical history of Chronic ITP and autism.
Pt has ITP
admitted + ITP, status post covid
admitted for immune thrombocytopenic purpura
21y/o male w/ autism admitted for Immune Thrombo purpura

## 2021-04-05 NOTE — PROVIDER CONTACT NOTE (CRITICAL VALUE NOTIFICATION) - ACTION/TREATMENT ORDERED:
1/2 unit of Platelets running now, keep platelets running as per Attending Dr. Daniels.
Do not transfuse platelets unless under 5.
bleeding precautions maintained. team aware. postpone dexamethasone IV treatment until f/u with hemonc. will continue to monitor
no new orders at this time, awaiting transfer to MICU for rixultan infusion
no orders at this time
TREVON Hill made aware, MD will put in orders for platelet transfusion.
ACP Sarah Ashley made aware, No intervention at this time, will continue to monitor
Evans Thomas Amanda notified and made aware. Rhoda Rothman ordered STAT 1 unit's of Platelet's. Will continue to monitor,
tele pa notified
will continue to monitor labs
None at this time
Provider notified. Willl order transfusion. Will update with changes
RN spoke to ACP Clara directly on phone to make ACP aware of critical value. ACP Clara will reach out to hematology for plan of care. RN will follow up with any new platelet transfusion orders to come.
orders to follow, possible platelet transfusion
RN spoke to TREVON galicia on phone. RN will follow up with any new orders to come.
give 1/2 unit of plt
no further treatments ordered at this time
no treatments ordered at this time
Follow up if transfusion needed. No interventions at this time. Patient not bleeding
tele pa notified
ACP Hope made aware, No intervention at this time, will continue to monitor
Evans Thomas Amanda notified and made aware. Rhoda Rothman spoke to hematology, will continue with IGG IVPB in AM as ordered. Will continue to monitor.
TREVON Tyler made aware, no intervention at this time, will continue to monitor
no treatments ordered at this time
bleeding precautions maintained. team aware. no interventions at this time. will continue to monitor

## 2021-04-05 NOTE — PROVIDER CONTACT NOTE (CRITICAL VALUE NOTIFICATION) - ASSESSMENT
Critical value notification of platelets 4. S/p 1/2 Unit platelet transfusion at 1 AM 3/18.
Patient is non-verbal speaking, Oriented to self, Patient's Platelet count came back as 2. Patient asymptomatic resting comfortably in bed.
Pt skin is ecchymotic
generalized bruising
pt asymptomatic
Critical value notification platelets 19
Patient ecchymotic, scratching legs.
generalized ecchymosis, no active bleeding as per mother at bedside
generalized small bruising, no active bleeding or melena reported
patient alert, nonverbal, no acute distress noted. calm, no signs of shortness of breath, pain. mother at bedside. no signs of bleeding noted.
generalized bruising noted
Patient is non-verbal speaking, Oriented to self, Patient's Platelet count came back as 3. Patient asymptomatic resting comfortably in bed.
no active bleeding noted
Critical value notification platelets 6
platelet count 4
Critical value notification of platelet 8.0
Patient resting in bed with mom at bedside, Platelet infusion running
Patient sitting in chair with mom at bedside
asymptomatic
platelet 1
Patient asymptomatic
patient alert, nonverbal, no acute distress noted. calm, no signs of shortness of breath, pain. mother at bedside. no signs of bleeding noted.

## 2021-04-06 LAB
ANION GAP SERPL CALC-SCNC: 9 MMOL/L — SIGNIFICANT CHANGE UP (ref 7–14)
BASOPHILS # BLD AUTO: 0.05 K/UL — SIGNIFICANT CHANGE UP (ref 0–0.2)
BASOPHILS NFR BLD AUTO: 0.4 % — SIGNIFICANT CHANGE UP (ref 0–2)
BUN SERPL-MCNC: 15 MG/DL — SIGNIFICANT CHANGE UP (ref 7–23)
CALCIUM SERPL-MCNC: 8.8 MG/DL — SIGNIFICANT CHANGE UP (ref 8.4–10.5)
CHLORIDE SERPL-SCNC: 104 MMOL/L — SIGNIFICANT CHANGE UP (ref 98–107)
CO2 SERPL-SCNC: 27 MMOL/L — SIGNIFICANT CHANGE UP (ref 22–31)
CREAT SERPL-MCNC: 0.86 MG/DL — SIGNIFICANT CHANGE UP (ref 0.5–1.3)
EOSINOPHIL # BLD AUTO: 0.1 K/UL — SIGNIFICANT CHANGE UP (ref 0–0.5)
EOSINOPHIL NFR BLD AUTO: 0.8 % — SIGNIFICANT CHANGE UP (ref 0–6)
GLUCOSE SERPL-MCNC: 87 MG/DL — SIGNIFICANT CHANGE UP (ref 70–99)
HCT VFR BLD CALC: 33.2 % — LOW (ref 39–50)
HGB BLD-MCNC: 10.4 G/DL — LOW (ref 13–17)
IANC: 9.19 K/UL — HIGH (ref 1.5–8.5)
IMM GRANULOCYTES NFR BLD AUTO: 3.5 % — HIGH (ref 0–1.5)
LYMPHOCYTES # BLD AUTO: 1.65 K/UL — SIGNIFICANT CHANGE UP (ref 1–3.3)
LYMPHOCYTES # BLD AUTO: 13.2 % — SIGNIFICANT CHANGE UP (ref 13–44)
MAGNESIUM SERPL-MCNC: 2 MG/DL — SIGNIFICANT CHANGE UP (ref 1.6–2.6)
MCHC RBC-ENTMCNC: 26.1 PG — LOW (ref 27–34)
MCHC RBC-ENTMCNC: 31.3 GM/DL — LOW (ref 32–36)
MCV RBC AUTO: 83.4 FL — SIGNIFICANT CHANGE UP (ref 80–100)
MONOCYTES # BLD AUTO: 1.07 K/UL — HIGH (ref 0–0.9)
MONOCYTES NFR BLD AUTO: 8.6 % — SIGNIFICANT CHANGE UP (ref 2–14)
NEUTROPHILS # BLD AUTO: 9.19 K/UL — HIGH (ref 1.8–7.4)
NEUTROPHILS NFR BLD AUTO: 73.5 % — SIGNIFICANT CHANGE UP (ref 43–77)
NRBC # BLD: 0 /100 WBCS — SIGNIFICANT CHANGE UP
NRBC # FLD: 0.07 K/UL — HIGH
PHOSPHATE SERPL-MCNC: 4.8 MG/DL — HIGH (ref 2.5–4.5)
PLATELET # BLD AUTO: 24 K/UL — LOW (ref 150–400)
POTASSIUM SERPL-MCNC: 3.8 MMOL/L — SIGNIFICANT CHANGE UP (ref 3.5–5.3)
POTASSIUM SERPL-SCNC: 3.8 MMOL/L — SIGNIFICANT CHANGE UP (ref 3.5–5.3)
RBC # BLD: 3.98 M/UL — LOW (ref 4.2–5.8)
RBC # FLD: 16.3 % — HIGH (ref 10.3–14.5)
SODIUM SERPL-SCNC: 140 MMOL/L — SIGNIFICANT CHANGE UP (ref 135–145)
VANCOMYCIN TROUGH SERPL-MCNC: 22.8 UG/ML — HIGH (ref 10–20)
WBC # BLD: 12.5 K/UL — HIGH (ref 3.8–10.5)
WBC # FLD AUTO: 12.5 K/UL — HIGH (ref 3.8–10.5)

## 2021-04-06 PROCEDURE — 99232 SBSQ HOSP IP/OBS MODERATE 35: CPT | Mod: GC

## 2021-04-06 PROCEDURE — 99233 SBSQ HOSP IP/OBS HIGH 50: CPT | Mod: CS

## 2021-04-06 PROCEDURE — 99232 SBSQ HOSP IP/OBS MODERATE 35: CPT

## 2021-04-06 RX ADMIN — Medication 166.67 MILLIGRAM(S): at 00:10

## 2021-04-06 RX ADMIN — Medication 400 MILLIGRAM(S): at 13:12

## 2021-04-06 RX ADMIN — BREXPIPRAZOLE 6 MILLIGRAM(S): 0.25 TABLET ORAL at 13:12

## 2021-04-06 RX ADMIN — Medication 1 TABLET(S): at 13:12

## 2021-04-06 RX ADMIN — CETIRIZINE HYDROCHLORIDE 10 MILLIGRAM(S): 10 TABLET ORAL at 22:05

## 2021-04-06 RX ADMIN — Medication 1 MILLIGRAM(S): at 22:05

## 2021-04-06 RX ADMIN — Medication 1 APPLICATION(S): at 05:30

## 2021-04-06 RX ADMIN — Medication 166.67 MILLIGRAM(S): at 10:11

## 2021-04-06 RX ADMIN — Medication 400 MILLIGRAM(S): at 05:30

## 2021-04-06 RX ADMIN — Medication 400 MILLIGRAM(S): at 22:04

## 2021-04-06 RX ADMIN — CHLORHEXIDINE GLUCONATE 1 APPLICATION(S): 213 SOLUTION TOPICAL at 05:30

## 2021-04-06 RX ADMIN — Medication 166.67 MILLIGRAM(S): at 17:52

## 2021-04-06 RX ADMIN — MUPIROCIN 1 APPLICATION(S): 20 OINTMENT TOPICAL at 05:31

## 2021-04-06 RX ADMIN — MUPIROCIN 1 APPLICATION(S): 20 OINTMENT TOPICAL at 17:43

## 2021-04-06 RX ADMIN — Medication 40 MILLIGRAM(S): at 05:30

## 2021-04-06 RX ADMIN — Medication 300 MILLIGRAM(S): at 22:05

## 2021-04-06 RX ADMIN — Medication 1 APPLICATION(S): at 17:42

## 2021-04-06 NOTE — PROGRESS NOTE ADULT - PROBLEM SELECTOR PLAN 1
- Hx of chronic ITP since age 18 months. CTH negative, requiring multiple plt transfusions this admission. May be exacerbated by COVID, now s/p convalescent plasma (3/11)  - Received IVIG 2/28 without adequate response,, repeat IVIG x2 on 4/1-4/2, plts improved to 68 but trended down to 32 today and now at 24   -per heme recommendations will transfuse 1/2 unit platelets every 12 hrs for plt <5  or if bleeding.   -s/p Decadron 40 mg IV (2/28-3/3), completed prolonged prednisone taper  -C/w mepron for PCP ppx, may dc when prednisone taper completed, confirm with heme  - received Rituximab on 3/20,3/27 and Romiplastin/Nplate dose 3/21,3/28, got additional 1500 mcg Nplate 4/4  - Awaiting insurance approval for PO Doptelet, to be started while in hospital

## 2021-04-06 NOTE — PROGRESS NOTE ADULT - SUBJECTIVE AND OBJECTIVE BOX
Follow Up: ID following for concern for cellulitis    Interval History/ROS:Patient is a 21y old  Male who presents with a chief complaint of low platelets (01 Apr 2021 13:54)    - IIVIG 4/1, 4/2   -afebrile    Allergies    Bactrim (Hives)  Rituxan (Hives)  WinRho SDF (Hives)    Intolerances        ANTIMICROBIALS:  vancomycin  IVPB 1250 every 8 hours  vancomycin  IVPB                OTHER MEDS:  ALBUTerol    0.083%. 2.5 milliGRAM(s) Nebulizer once PRN  ALBUTerol    0.083%. 2.5 milliGRAM(s) Nebulizer once PRN  BACItracin   Ointment 1 Application(s) Topical two times a day  brexpiprazole 6 milliGRAM(s) Oral daily  cetirizine 10 milliGRAM(s) Oral at bedtime  chlorhexidine 4% Liquid 1 Application(s) Topical <User Schedule>  cimetidine 400 milliGRAM(s) Oral three times a day  diphenhydrAMINE   Injectable 50 milliGRAM(s) IV Push once PRN  EPINEPHrine     1 mG/mL Injectable 0.3 milliGRAM(s) IntraMuscular once PRN  EPINEPHrine     1 mG/mL Injectable 0.3 milliGRAM(s) IntraMuscular once PRN  fluticasone propionate 50 MICROgram(s)/spray Nasal Spray 1 Spray(s) Both Nostrils two times a day  furosemide    Tablet 40 milliGRAM(s) Oral daily  hydrocortisone sodium succinate Injectable 100 milliGRAM(s) IV Push once PRN  hydrocortisone sodium succinate Injectable 100 milliGRAM(s) IV Push once PRN  immune   globulin 10% (GAMMAGARD) IVPB 115 Gram(s) IV Intermittent daily  influenza   Vaccine 0.5 milliLiter(s) IntraMuscular once  LORazepam     Tablet 1 milliGRAM(s) Oral at bedtime  meperidine     Injectable 25 milliGRAM(s) IV Push once PRN  mineral oil for Topical Use 1 Application(s) Topical three times a day PRN  multivitamin 1 Tablet(s) Oral daily  mupirocin 2% Ointment 1 Application(s) Topical two times a day  traZODone 300 milliGRAM(s) Oral at bedtime  zinc oxide 20% Ointment 1 Application(s) Topical three times a day PRN    Vital Signs Last 24 Hrs  T(F): 98.8 (04-06-21 @ 18:37), Max: 100 (04-05-21 @ 19:36)  HR: 101 (04-06-21 @ 18:37)  BP: 104/80 (04-06-21 @ 18:37)  RR: 18 (04-06-21 @ 18:37)  SpO2: 98% (04-06-21 @ 18:37) (96% - 100%)    EXAM:  Constitutional: Not in acute distress. On RA. sitting in chair.  Eyes: No icterus.   RS: Chest clear to auscultation bilaterally. No wheeze/rhonchi/crepitations.  CVS: S1, S2 heard. Regular rate and rhythm. No murmurs/rubs/gallops.  Abdomen: Soft. No guarding/rigidity/tenderness.  : No acute abnormalities  Extremities: right lower leg decreased erythema, swelling  Skin: B/l LE skin lesions appear to be improving  Neuro: Alert, non-verbal  IV: left arm peripheral and arrow                                              10.4   12.50 )-----------( 24       ( 06 Apr 2021 06:59 )             33.2 04-06    140  |  104  |  15  ----------------------------<  87  3.8   |  27  |  0.86  Ca    8.8      06 Apr 2021 06:59Phos  4.8     04-06Mg     2.0     04-06            MICROBIOLOGY:Culture Results:   No growth (03-31 @ 15:58)  Culture Results:   No growth to date. (03-30 @ 16:38)  Culture Results:   No growth to date. (03-29 @ 16:47)  Culture Results:   No growth to date. (03-29 @ 16:47)      Vancomycin Level, Trough -Pre 4th Dose, order if dosed q6/8/12h (04.04.21 @ 23:19)   Vancomycin Level, Trough: 20.0:  Vancomycin Level, Trough: 14.1

## 2021-04-06 NOTE — PROGRESS NOTE ADULT - SUBJECTIVE AND OBJECTIVE BOX
INTERVAL History: No acute events overnight. Plt count this morning 24.     Allergies    Bactrim (Hives)  Rituxan (Hives)  WinRho SDF (Hives)    Intolerances        MEDICATIONS  (STANDING):  BACItracin   Ointment 1 Application(s) Topical two times a day  brexpiprazole 6 milliGRAM(s) Oral daily  cetirizine 10 milliGRAM(s) Oral at bedtime  chlorhexidine 4% Liquid 1 Application(s) Topical <User Schedule>  cimetidine 400 milliGRAM(s) Oral three times a day  fluticasone propionate 50 MICROgram(s)/spray Nasal Spray 1 Spray(s) Both Nostrils two times a day  furosemide    Tablet 40 milliGRAM(s) Oral daily  influenza   Vaccine 0.5 milliLiter(s) IntraMuscular once  LORazepam     Tablet 1 milliGRAM(s) Oral at bedtime  multivitamin 1 Tablet(s) Oral daily  mupirocin 2% Ointment 1 Application(s) Topical two times a day  traZODone 300 milliGRAM(s) Oral at bedtime  vancomycin  IVPB 1250 milliGRAM(s) IV Intermittent every 8 hours  vancomycin  IVPB        MEDICATIONS  (PRN):  acetaminophen   Tablet .. 650 milliGRAM(s) Oral every 6 hours PRN Temp greater or equal to 38C (100.4F)  ALBUTerol    0.083%. 2.5 milliGRAM(s) Nebulizer once PRN PRN Chemotherapy Reaction  ALBUTerol    0.083%. 2.5 milliGRAM(s) Nebulizer once PRN PRN Chemotherapy Reaction  diphenhydrAMINE   Injectable 50 milliGRAM(s) IV Push once PRN PRN Chemotherapy Reaction  EPINEPHrine     1 mG/mL Injectable 0.3 milliGRAM(s) IntraMuscular once PRN PRN Chemotherapy Reaction  EPINEPHrine     1 mG/mL Injectable 0.3 milliGRAM(s) IntraMuscular once PRN PRN Chemotherapy Reaction  hydrocortisone sodium succinate Injectable 100 milliGRAM(s) IV Push once PRN PRN Chemotherapy Reaction  hydrocortisone sodium succinate Injectable 100 milliGRAM(s) IV Push once PRN PRN Chemotherapy Reaction  mineral oil for Topical Use 1 Application(s) Topical three times a day PRN to clean area after BM  zinc oxide 20% Ointment 1 Application(s) Topical three times a day PRN rash, apply to the area after each BM      Vital Signs Last 24 Hrs  T(C): 36.9 (06 Apr 2021 05:28), Max: 38 (05 Apr 2021 18:13)  T(F): 98.5 (06 Apr 2021 05:28), Max: 100.4 (05 Apr 2021 18:13)  HR: 102 (06 Apr 2021 05:28) (102 - 112)  BP: 133/69 (06 Apr 2021 05:28) (113/73 - 133/69)  BP(mean): --  RR: 18 (06 Apr 2021 05:28) (17 - 18)  SpO2: 96% (06 Apr 2021 05:28) (96% - 100%)    PHYSICAL EXAM:  Constitutional: obese, NAD  Respiratory: breathing comfortably  Lower extremities: LLE no edema, RLE with residual erythema and edema  Skin: fewer ecchymoses, LE wounds crusting over and healing  Neuro: nonverbal due to autism, moving all 4 extremities      LABS:                        10.4   12.50 )-----------( 24       ( 06 Apr 2021 06:59 )             33.2     04-06    140  |  104  |  15  ----------------------------<  87  3.8   |  27  |  0.86    Ca    8.8      06 Apr 2021 06:59  Phos  4.8     04-06  Mg     2.0     04-06              RADIOLOGY & ADDITIONAL STUDIES:    PATHOLOGY:

## 2021-04-06 NOTE — PROGRESS NOTE ADULT - ASSESSMENT
21/M with PMH chronic ITP, intellectual disability, non-verbal, autism, COVID19 (Jan 2021)  Adm 2/27 for low PLT.  Hosp course c/b low PLT s/p IVIG, Dexa, Rituximab (MICU stay 3/5-3/7 for desensitization); convalescent plasma 3/11; 3/22 Wound Care and Dermatology consulted for LE wounds; 3/29 new fevers  ID consulted for recs  ========    Chronic ITP on tapering steroids with LE lesions     Cellulitis right lower leg  -continues to show improvement with vancomycin  - WBC 12K  -blood culture no growth  - U/S LE negative for DVT. Repeat UA 3/30 negative  -vanco trough therapeutic     Antimicrobials, Steroids, COVID19 therapies in this admission  IVIG x2, completed 2/28   Decadron 40 mg IV 2/28-3/3  Prednisone 80mg 2/27-> tapering since then  Solumedrol 3/13, 3/20, 3/27  IVIG 2/27, 2/28, 4/1  Rituxan infusion 3/7, 3/13, 3/20, 3/27  convalescent plasma on 3/11  Atovaquone 3/5->  Cefazolin 3/29-3/30  Doxy 3/29-3/30  Vanc 3/31->    RECOMMENDATIONS:    - continue IV vanco  -anticipate stopping 4/8          Shelbi Weaver MD  Pager: [phone]  After 5 PM or weekends please call fellow on call or office [phone]

## 2021-04-06 NOTE — PROGRESS NOTE ADULT - ASSESSMENT
19 yo M with a history of chronic ITP and severe autism (non-verbal at baseline) who presented with severe thrombocytopenia (Plt 7) while on home PO prednisone (80mg daily), and was admitted to Medicine for further management. Hematology consulted for assistance with management.    # Chronic ITP exacerbated in the setting of recent COVID infection   - Previous CT head neg for bleed  - S/p IVIG 1gm/kg daily x 2 (completed 2/28) and dexamethasone 40mg IV x4 days (completed 3/3)  - S/p W1 Rituximab with desensitization protocol on 3/6; W2 Rituximab on 3/13; s/p W3 Rituximab on 3/20 with desensitization protocol, tolerated well. S/p W4 Rituximab with desensitization protocol yesterday (3/27), tolerated well.   - S/p romiplastim/Nplate, 1mcg/kg; given 2/28; 2mcg/kg given on 3/7 (pt only got 250mcg instead of 300mcg as pharmacy only had 250); 5mcg/kg (750mcg) given on 3/14, S/p 750mcg (5mcg/kg) on 3/21. S/p 4 weekly doses of Rituxan. S/p 1500mcg of NPLATE on 3/29 and 4/4.  Given autism, patient is unable to swallow eltrombopag pills (cannot be crushed or chewed).   - Steroids now tapered off.   - Transfuse platelets only if bleeding (consider giving 1/2 unit platelets slowly infused over 3 hours q12h) OR if platelets <5k.  - s/p IVIG on 4/1~2, w/ brief response  - Appreciate ID eval and recs for cellulitis- now on Vancomycin  - Monitor closely for any signs or symptoms of bleeding  - Plt count stable today at 24K- Awaiting mother to bring in pt's insurance information to see if Doptelet is covered under his insurance. Will start inpatient if it is covered.     Kenya Chávez MD  Hematology Oncology Fellow, PGY-5  [facility] Pager: [phone]/ [facility] Pager: [phone]   21 yo M with a history of chronic ITP and severe autism (non-verbal at baseline) who presented with severe thrombocytopenia (Plt 7) while on home PO prednisone (80mg daily), and was admitted to Medicine for further management. Hematology consulted for assistance with management.    # Chronic ITP exacerbated in the setting of recent COVID infection   - Previous CT head neg for bleed  - S/p IVIG 1gm/kg daily x 2 (completed 2/28) and dexamethasone 40mg IV x4 days (completed 3/3)  - S/p W1 Rituximab with desensitization protocol on 3/6; W2 Rituximab on 3/13; s/p W3 Rituximab on 3/20 with desensitization protocol, tolerated well. S/p W4 Rituximab with desensitization protocol yesterday (3/27), tolerated well.   - S/p romiplastim/Nplate, 1mcg/kg; given 2/28; 2mcg/kg given on 3/7 (pt only got 250mcg instead of 300mcg as pharmacy only had 250); 5mcg/kg (750mcg) given on 3/14, S/p 750mcg (5mcg/kg) on 3/21. S/p 4 weekly doses of Rituxan. S/p 1500mcg of NPLATE on 3/29 and 4/4.  Given autism, patient is unable to swallow eltrombopag pills (cannot be crushed or chewed).   - Steroids now tapered off.   - Transfuse platelets only if bleeding (consider giving 1/2 unit platelets slowly infused over 3 hours q12h) OR if platelets <5k.  - s/p IVIG on 4/1~2, w/ brief response  - Appreciate ID eval and recs for cellulitis- now on Vancomycin  - Monitor closely for any signs or symptoms of bleeding  - Plt count stable today at 24K- Doptelet is covered by pt's insurance. Ordered to start 40mg starting tomorrow 4/7 (Can be crushed with yogurt or pudding)    Kenya Chávez MD  Hematology Oncology Fellow, PGY-5  [facility] Pager: [phone]/ [facility] Pager: [phone]

## 2021-04-06 NOTE — PROGRESS NOTE ADULT - ATTENDING COMMENTS
Pt with chronic refractory ITP s/p 4 weeks of rituxan and 2 weeks of high dose NPlate; s/p IVIG and slow taper off prednisone along with treatment for cellulitis. Will start oral Doptelet while patient in house and monitor plts to see if will remain stable. The Doptelet will take place of Nplate to allow easier administration at [facility] and able to be placed in applesauce compared to Promacta. Continue to trend counts for stability.

## 2021-04-06 NOTE — PROGRESS NOTE ADULT - SUBJECTIVE AND OBJECTIVE BOX
[facility] Division of Hospital Medicine  Luis M Damon DO  Pager (RUFUS-FRANKLYN, 8A-5P): [phone]    Patient is a 21y old  Male who presents with a chief complaint of low platelets (06 Apr 2021 08:08)    SUBJECTIVE / OVERNIGHT EVENTS: Mother not at bedside this AM.  Pt had temp to 100.4 rectally this AM.  Pt appears at baseline, happily watching his IPAD.  Nontoxic appearing     MEDICATIONS  (STANDING):  BACItracin   Ointment 1 Application(s) Topical two times a day  brexpiprazole 6 milliGRAM(s) Oral daily  cetirizine 10 milliGRAM(s) Oral at bedtime  chlorhexidine 4% Liquid 1 Application(s) Topical <User Schedule>  cimetidine 400 milliGRAM(s) Oral three times a day  fluticasone propionate 50 MICROgram(s)/spray Nasal Spray 1 Spray(s) Both Nostrils two times a day  furosemide    Tablet 40 milliGRAM(s) Oral daily  influenza   Vaccine 0.5 milliLiter(s) IntraMuscular once  LORazepam     Tablet 1 milliGRAM(s) Oral at bedtime  multivitamin 1 Tablet(s) Oral daily  mupirocin 2% Ointment 1 Application(s) Topical two times a day  traZODone 300 milliGRAM(s) Oral at bedtime  vancomycin  IVPB 1250 milliGRAM(s) IV Intermittent every 8 hours  vancomycin  IVPB        MEDICATIONS  (PRN):  acetaminophen   Tablet .. 650 milliGRAM(s) Oral every 6 hours PRN Temp greater or equal to 38C (100.4F)  ALBUTerol    0.083%. 2.5 milliGRAM(s) Nebulizer once PRN PRN Chemotherapy Reaction  ALBUTerol    0.083%. 2.5 milliGRAM(s) Nebulizer once PRN PRN Chemotherapy Reaction  diphenhydrAMINE   Injectable 50 milliGRAM(s) IV Push once PRN PRN Chemotherapy Reaction  EPINEPHrine     1 mG/mL Injectable 0.3 milliGRAM(s) IntraMuscular once PRN PRN Chemotherapy Reaction  EPINEPHrine     1 mG/mL Injectable 0.3 milliGRAM(s) IntraMuscular once PRN PRN Chemotherapy Reaction  hydrocortisone sodium succinate Injectable 100 milliGRAM(s) IV Push once PRN PRN Chemotherapy Reaction  hydrocortisone sodium succinate Injectable 100 milliGRAM(s) IV Push once PRN PRN Chemotherapy Reaction  mineral oil for Topical Use 1 Application(s) Topical three times a day PRN to clean area after BM  zinc oxide 20% Ointment 1 Application(s) Topical three times a day PRN rash, apply to the area after each BM      PHYSICAL EXAM:  Vital Signs Last 24 Hrs  T(C): 37.3 (06 Apr 2021 11:46), Max: 38 (05 Apr 2021 18:13)  T(F): 99.1 (06 Apr 2021 11:46), Max: 100.4 (05 Apr 2021 18:13)  HR: 105 (06 Apr 2021 11:46) (102 - 109)  BP: 127/79 (06 Apr 2021 11:46) (123/62 - 133/69)  BP(mean): --  RR: 17 (06 Apr 2021 11:46) (17 - 18)  SpO2: 97% (06 Apr 2021 11:46) (96% - 100%)    CONSTITUTIONAL: NAD, well-developed, well-groomed, morbidly obese  EYES: PERRLA; conjunctiva and sclera clear  RESPIRATORY: Normal respiratory effort; lungs are clear to auscultation bilaterally  CARDIOVASCULAR: Regular rate and rhythm, normal S1 and S2, no murmur/rub/gallop; Trace b/l LE edema  ABDOMEN: Nontender to palpation, normoactive bowel sounds, no rebound/guarding  MUSCLOSKELETAL:  No clubbing or cyanosis of digits; no joint swelling or tenderness to palpation  NEUROLOGY: CN 2-12 are intact and symmetric; no gross sensory deficits; no motor deficits  SKIN: +healing shin wounds, nonpurulent    LABS:                        10.4   12.50 )-----------( 24       ( 06 Apr 2021 06:59 )             33.2     04-06    140  |  104  |  15  ----------------------------<  87  3.8   |  27  |  0.86    Ca    8.8      06 Apr 2021 06:59  Phos  4.8     04-06  Mg     2.0     04-06    Vancomycin Level, Trough -Pre 4th Dose, order if dosed q6/8/12h (04.05.21 @ 23:13)    Vancomycin Level, Trough: 18.1: Vancomycin trough levels should be rapidly reached and maintained at  15-20 ug/ml for life threatening MRSA infections such as sepsis,  endocarditis, osteomyelitis and pneumonia. A first trough level should be  drawn before the 3rd or 4th dose. Risk of renal toxicity is increased for  levels >15 ug/mL, in patients on other nephrotoxic drugs, who are  hemodynamically unstable, have unstable renal function, or are on  vancomycin therapy for >14 days. Renal function with creatinine levels  should bemonitored for those patients. ug/mL

## 2021-04-07 LAB
HCT VFR BLD CALC: 37.2 % — LOW (ref 39–50)
HGB BLD-MCNC: 11 G/DL — LOW (ref 13–17)
MCHC RBC-ENTMCNC: 25.1 PG — LOW (ref 27–34)
MCHC RBC-ENTMCNC: 29.6 GM/DL — LOW (ref 32–36)
MCV RBC AUTO: 84.7 FL — SIGNIFICANT CHANGE UP (ref 80–100)
NRBC # BLD: 0 /100 WBCS — SIGNIFICANT CHANGE UP
NRBC # FLD: 0.09 K/UL — HIGH
PLATELET # BLD AUTO: 31 K/UL — LOW (ref 150–400)
RBC # BLD: 4.39 M/UL — SIGNIFICANT CHANGE UP (ref 4.2–5.8)
RBC # FLD: 17.1 % — HIGH (ref 10.3–14.5)
VANCOMYCIN TROUGH SERPL-MCNC: 11.6 UG/ML — SIGNIFICANT CHANGE UP (ref 10–20)
WBC # BLD: 12.78 K/UL — HIGH (ref 3.8–10.5)
WBC # FLD AUTO: 12.78 K/UL — HIGH (ref 3.8–10.5)

## 2021-04-07 PROCEDURE — 99233 SBSQ HOSP IP/OBS HIGH 50: CPT | Mod: CS

## 2021-04-07 PROCEDURE — 99232 SBSQ HOSP IP/OBS MODERATE 35: CPT | Mod: GC

## 2021-04-07 PROCEDURE — 99232 SBSQ HOSP IP/OBS MODERATE 35: CPT

## 2021-04-07 RX ADMIN — Medication 1 TABLET(S): at 12:31

## 2021-04-07 RX ADMIN — Medication 400 MILLIGRAM(S): at 22:12

## 2021-04-07 RX ADMIN — Medication 400 MILLIGRAM(S): at 06:13

## 2021-04-07 RX ADMIN — Medication 400 MILLIGRAM(S): at 12:31

## 2021-04-07 RX ADMIN — Medication 40 MILLIGRAM(S): at 06:13

## 2021-04-07 RX ADMIN — Medication 300 MILLIGRAM(S): at 22:13

## 2021-04-07 RX ADMIN — CHLORHEXIDINE GLUCONATE 1 APPLICATION(S): 213 SOLUTION TOPICAL at 07:44

## 2021-04-07 RX ADMIN — Medication 166.67 MILLIGRAM(S): at 17:41

## 2021-04-07 RX ADMIN — Medication 1 APPLICATION(S): at 06:13

## 2021-04-07 RX ADMIN — Medication 166.67 MILLIGRAM(S): at 09:19

## 2021-04-07 RX ADMIN — Medication 1 MILLIGRAM(S): at 22:12

## 2021-04-07 RX ADMIN — Medication 1 APPLICATION(S): at 17:42

## 2021-04-07 RX ADMIN — CETIRIZINE HYDROCHLORIDE 10 MILLIGRAM(S): 10 TABLET ORAL at 22:12

## 2021-04-07 RX ADMIN — BREXPIPRAZOLE 6 MILLIGRAM(S): 0.25 TABLET ORAL at 12:31

## 2021-04-07 NOTE — PROGRESS NOTE ADULT - PROBLEM SELECTOR PLAN 1
- Hx of chronic ITP since age 18 months. CTH negative, requiring multiple plt transfusions this admission. May be exacerbated by COVID, now s/p convalescent plasma (3/11)  - Received IVIG 2/28 without adequate response,, repeat IVIG x2 on 4/1-4/2, plts improved to 68 but trended down to 32 today and now at 24   -per heme recommendations will transfuse 1/2 unit platelets every 12 hrs for plt <5  or if bleeding.   -s/p Decadron 40 mg IV (2/28-3/3), completed prolonged prednisone taper  -C/w mepron for PCP ppx, may dc when prednisone taper completed, confirm with heme  - received Rituximab on 3/20,3/27 and Romiplastin/Nplate dose 3/21,3/28, got additional 1500 mcg Nplate 4/4  - Insurance approved PO Doptelet, to be started today

## 2021-04-07 NOTE — PROGRESS NOTE ADULT - SUBJECTIVE AND OBJECTIVE BOX
[facility] Division of Hospital Medicine  Luis M Damon DO  Pager (ÁNGEL, 4S-5P): [phone]    Patient is a 21y old  Male who presents with a chief complaint of low platelets (07 Apr 2021 08:26)    SUBJECTIVE / OVERNIGHT EVENTS: Pt at baseline, happily watching his IPAD. No further fevers noted, mother is concerned that the new medication will cause worsening swelling in his legs.  She is also concerned about discontinuing the abx too early as he still has redness in RLE.     MEDICATIONS  (STANDING):  Avatrombopag (Doptelet) 20mg Tablet 40 milliGRAM(s) 40 milliGRAM(s) Oral daily  BACItracin   Ointment 1 Application(s) Topical two times a day  brexpiprazole 6 milliGRAM(s) Oral daily  cetirizine 10 milliGRAM(s) Oral at bedtime  chlorhexidine 4% Liquid 1 Application(s) Topical <User Schedule>  cimetidine 400 milliGRAM(s) Oral three times a day  fluticasone propionate 50 MICROgram(s)/spray Nasal Spray 1 Spray(s) Both Nostrils two times a day  furosemide    Tablet 40 milliGRAM(s) Oral daily  influenza   Vaccine 0.5 milliLiter(s) IntraMuscular once  LORazepam     Tablet 1 milliGRAM(s) Oral at bedtime  multivitamin 1 Tablet(s) Oral daily  traZODone 300 milliGRAM(s) Oral at bedtime  vancomycin  IVPB 1250 milliGRAM(s) IV Intermittent every 8 hours  vancomycin  IVPB        MEDICATIONS  (PRN):  acetaminophen   Tablet .. 650 milliGRAM(s) Oral every 6 hours PRN Temp greater or equal to 38C (100.4F)  ALBUTerol    0.083%. 2.5 milliGRAM(s) Nebulizer once PRN PRN Chemotherapy Reaction  ALBUTerol    0.083%. 2.5 milliGRAM(s) Nebulizer once PRN PRN Chemotherapy Reaction  diphenhydrAMINE   Injectable 50 milliGRAM(s) IV Push once PRN PRN Chemotherapy Reaction  EPINEPHrine     1 mG/mL Injectable 0.3 milliGRAM(s) IntraMuscular once PRN PRN Chemotherapy Reaction  EPINEPHrine     1 mG/mL Injectable 0.3 milliGRAM(s) IntraMuscular once PRN PRN Chemotherapy Reaction  hydrocortisone sodium succinate Injectable 100 milliGRAM(s) IV Push once PRN PRN Chemotherapy Reaction  hydrocortisone sodium succinate Injectable 100 milliGRAM(s) IV Push once PRN PRN Chemotherapy Reaction  mineral oil for Topical Use 1 Application(s) Topical three times a day PRN to clean area after BM  zinc oxide 20% Ointment 1 Application(s) Topical three times a day PRN rash, apply to the area after each BM      CAPILLARY BLOOD GLUCOSE        I&O's Summary      PHYSICAL EXAM:  Vital Signs Last 24 Hrs  T(C): 37.4 (07 Apr 2021 12:09), Max: 37.4 (07 Apr 2021 12:09)  T(F): 99.3 (07 Apr 2021 12:09), Max: 99.3 (07 Apr 2021 12:09)  HR: 102 (07 Apr 2021 12:09) (101 - 106)  BP: 137/84 (07 Apr 2021 12:09) (104/80 - 137/84)  BP(mean): --  RR: 18 (07 Apr 2021 12:09) (18 - 19)  SpO2: 97% (07 Apr 2021 12:09) (97% - 98%)  CONSTITUTIONAL: NAD, well-developed, well-groomed  EYES: PERRLA; conjunctiva and sclera clear  ENMT: Moist oral mucosa, no pharyngeal injection or exudates; normal dentition  NECK: Supple, no palpable masses; no thyromegaly  RESPIRATORY: Normal respiratory effort; lungs are clear to auscultation bilaterally  CARDIOVASCULAR: Regular rate and rhythm, normal S1 and S2, no murmur/rub/gallop; No lower extremity edema; Peripheral pulses are 2+ bilaterally  ABDOMEN: Nontender to palpation, normoactive bowel sounds, no rebound/guarding; No hepatosplenomegaly  MUSCLOSKELETAL:  Normal gait; no clubbing or cyanosis of digits; no joint swelling or tenderness to palpation  PSYCH: A+O to person, place, and time; affect appropriate  NEUROLOGY: CN 2-12 are intact and symmetric; no gross sensory deficits;   SKIN: No rashes; no palpable lesions    LABS:                        11.0   12.78 )-----------( 31       ( 07 Apr 2021 07:17 )             37.2     04-06    140  |  104  |  15  ----------------------------<  87  3.8   |  27  |  0.86    Ca    8.8      06 Apr 2021 06:59  Phos  4.8     04-06  Mg     2.0     04-06                  RADIOLOGY & ADDITIONAL TESTS:  Results Reviewed:   Imaging Personally Reviewed:  Electrocardiogram Personally Reviewed:    COORDINATION OF CARE:  Care Discussed with Consultants/Other Providers [Y/N]:  Prior or Outpatient Records Reviewed [Y/N]:   [facility] Division of Hospital Medicine  Luis M Damon DO  Pager (ÁNGEL, 7Y-5P): [phone]    Patient is a 21y old  Male who presents with a chief complaint of low platelets (07 Apr 2021 08:26)    SUBJECTIVE / OVERNIGHT EVENTS: Pt at baseline, happily watching his IPAD. No further fevers noted, mother is concerned that the new medication will cause worsening swelling in his legs.  She is also concerned about discontinuing the abx too early as he still has redness in RLE.     MEDICATIONS  (STANDING):  Avatrombopag (Doptelet) 20mg Tablet 40 milliGRAM(s) 40 milliGRAM(s) Oral daily  BACItracin   Ointment 1 Application(s) Topical two times a day  brexpiprazole 6 milliGRAM(s) Oral daily  cetirizine 10 milliGRAM(s) Oral at bedtime  chlorhexidine 4% Liquid 1 Application(s) Topical <User Schedule>  cimetidine 400 milliGRAM(s) Oral three times a day  fluticasone propionate 50 MICROgram(s)/spray Nasal Spray 1 Spray(s) Both Nostrils two times a day  furosemide    Tablet 40 milliGRAM(s) Oral daily  influenza   Vaccine 0.5 milliLiter(s) IntraMuscular once  LORazepam     Tablet 1 milliGRAM(s) Oral at bedtime  multivitamin 1 Tablet(s) Oral daily  traZODone 300 milliGRAM(s) Oral at bedtime  vancomycin  IVPB 1250 milliGRAM(s) IV Intermittent every 8 hours  vancomycin  IVPB        MEDICATIONS  (PRN):  acetaminophen   Tablet .. 650 milliGRAM(s) Oral every 6 hours PRN Temp greater or equal to 38C (100.4F)  ALBUTerol    0.083%. 2.5 milliGRAM(s) Nebulizer once PRN PRN Chemotherapy Reaction  ALBUTerol    0.083%. 2.5 milliGRAM(s) Nebulizer once PRN PRN Chemotherapy Reaction  diphenhydrAMINE   Injectable 50 milliGRAM(s) IV Push once PRN PRN Chemotherapy Reaction  EPINEPHrine     1 mG/mL Injectable 0.3 milliGRAM(s) IntraMuscular once PRN PRN Chemotherapy Reaction  EPINEPHrine     1 mG/mL Injectable 0.3 milliGRAM(s) IntraMuscular once PRN PRN Chemotherapy Reaction  hydrocortisone sodium succinate Injectable 100 milliGRAM(s) IV Push once PRN PRN Chemotherapy Reaction  hydrocortisone sodium succinate Injectable 100 milliGRAM(s) IV Push once PRN PRN Chemotherapy Reaction  mineral oil for Topical Use 1 Application(s) Topical three times a day PRN to clean area after BM  zinc oxide 20% Ointment 1 Application(s) Topical three times a day PRN rash, apply to the area after each BM      PHYSICAL EXAM:  Vital Signs Last 24 Hrs  T(C): 37.4 (07 Apr 2021 12:09), Max: 37.4 (07 Apr 2021 12:09)  T(F): 99.3 (07 Apr 2021 12:09), Max: 99.3 (07 Apr 2021 12:09)  HR: 102 (07 Apr 2021 12:09) (101 - 106)  BP: 137/84 (07 Apr 2021 12:09) (104/80 - 137/84)  BP(mean): --  RR: 18 (07 Apr 2021 12:09) (18 - 19)  SpO2: 97% (07 Apr 2021 12:09) (97% - 98%)    CONSTITUTIONAL: NAD, well-developed, well-groomed, morbidly obese  EYES: PERRLA; conjunctiva and sclera clear  RESPIRATORY: Normal respiratory effort; lungs are clear to auscultation bilaterally  CARDIOVASCULAR: Regular rate and rhythm, normal S1 and S2, no murmur/rub/gallop; +RLE trace edema   ABDOMEN: Nontender to palpation, normoactive bowel sounds, no rebound/guarding  MUSCULOSKELETAL: No clubbing or cyanosis of digits; no joint swelling or tenderness to palpation  NEUROLOGY: CN 2-12 are intact and symmetric; no gross motor deficits  SKIN: +RLE redness, improved per mother, non purulent     LABS:                        11.0   12.78 )-----------( 31       ( 07 Apr 2021 07:17 )             37.2     04-06    140  |  104  |  15  ----------------------------<  87  3.8   |  27  |  0.86    Ca    8.8      06 Apr 2021 06:59  Phos  4.8     04-06  Mg     2.0     04-06

## 2021-04-07 NOTE — PROGRESS NOTE ADULT - SUBJECTIVE AND OBJECTIVE BOX
Follow Up: ID following for concern for cellulitis    Interval History/ROS:Patient is a 21y old  Male who presents with a chief complaint of low platelets (01 Apr 2021 13:54)    -afebrile.  planning d/c to group home soon.      Allergies    Bactrim (Hives)  Rituxan (Hives)  WinRho SDF (Hives)    Intolerances        ANTIMICROBIALS:  vancomycin  IVPB 1250 every 8 hours  vancomycin  IVPB                OTHER MEDS:  ALBUTerol    0.083%. 2.5 milliGRAM(s) Nebulizer once PRN  ALBUTerol    0.083%. 2.5 milliGRAM(s) Nebulizer once PRN  BACItracin   Ointment 1 Application(s) Topical two times a day  brexpiprazole 6 milliGRAM(s) Oral daily  cetirizine 10 milliGRAM(s) Oral at bedtime  chlorhexidine 4% Liquid 1 Application(s) Topical <User Schedule>  cimetidine 400 milliGRAM(s) Oral three times a day  diphenhydrAMINE   Injectable 50 milliGRAM(s) IV Push once PRN  EPINEPHrine     1 mG/mL Injectable 0.3 milliGRAM(s) IntraMuscular once PRN  EPINEPHrine     1 mG/mL Injectable 0.3 milliGRAM(s) IntraMuscular once PRN  fluticasone propionate 50 MICROgram(s)/spray Nasal Spray 1 Spray(s) Both Nostrils two times a day  furosemide    Tablet 40 milliGRAM(s) Oral daily  hydrocortisone sodium succinate Injectable 100 milliGRAM(s) IV Push once PRN  hydrocortisone sodium succinate Injectable 100 milliGRAM(s) IV Push once PRN  immune   globulin 10% (GAMMAGARD) IVPB 115 Gram(s) IV Intermittent daily  influenza   Vaccine 0.5 milliLiter(s) IntraMuscular once  LORazepam     Tablet 1 milliGRAM(s) Oral at bedtime  meperidine     Injectable 25 milliGRAM(s) IV Push once PRN  mineral oil for Topical Use 1 Application(s) Topical three times a day PRN  multivitamin 1 Tablet(s) Oral daily  mupirocin 2% Ointment 1 Application(s) Topical two times a day  traZODone 300 milliGRAM(s) Oral at bedtime  zinc oxide 20% Ointment 1 Application(s) Topical three times a day PRN    Vital Signs Last 24 Hrs  T(F): 99.3 (04-07-21 @ 12:09), Max: 99.3 (04-07-21 @ 12:09)  HR: 102 (04-07-21 @ 12:09)  BP: 137/84 (04-07-21 @ 12:09)  RR: 18 (04-07-21 @ 12:09)  SpO2: 97% (04-07-21 @ 12:09) (97% - 97%)    EXAM:  Constitutional: Not in acute distress. On RA. sitting in bed.   Eyes: No icterus.   RS: Chest clear.  CVS: S1, S2 heard.   Abdomen: Soft.   : No acute abnormalities  Extremities: right lower leg decreased swelling, minimal erythema, eschar over shin dry  Skin: B/l LE skin lesions appear to be improving  Neuro: Alert, non-verbal  IV: left arm peripheral                                               11.0   12.78 )-----------( 31       ( 07 Apr 2021 07:17 )             37.2 04-06    140  |  104  |  15  ----------------------------<  87  3.8   |  27  |  0.86  Ca    8.8      06 Apr 2021 06:59Phos  4.8     04-06Mg     2.0     04-06              MICROBIOLOGY:Culture Results:   No growth (03-31 @ 15:58)  Culture Results:   No growth (03-30 @ 16:38)  Culture Results:   No growth (03-29 @ 16:47)  Culture Results:   No growth  (03-29 @ 16:47)      vncoVancomycin Level, Trough (04.07.21 @ 07:17)   Vancomycin Level, Trough: 11.6:

## 2021-04-07 NOTE — PROGRESS NOTE ADULT - ASSESSMENT
21 yo M with a history of chronic ITP and severe autism (non-verbal at baseline) who presented with severe thrombocytopenia (Plt 7) while on home PO prednisone (80mg daily), and was admitted to Medicine for further management. Hematology consulted for assistance with management.    # Chronic ITP exacerbated in the setting of recent COVID infection   - Previous CT head neg for bleed  - S/p IVIG 1gm/kg daily x 2 (completed 2/28) and dexamethasone 40mg IV x4 days (completed 3/3)  - S/p W1 Rituximab with desensitization protocol on 3/6; W2 Rituximab on 3/13; s/p W3 Rituximab on 3/20 with desensitization protocol, tolerated well. S/p W4 Rituximab with desensitization protocol yesterday (3/27), tolerated well.   - S/p romiplastim/Nplate, 1mcg/kg; given 2/28; 2mcg/kg given on 3/7 (pt only got 250mcg instead of 300mcg as pharmacy only had 250); 5mcg/kg (750mcg) given on 3/14, S/p 750mcg (5mcg/kg) on 3/21. S/p 4 weekly doses of Rituxan. S/p 1500mcg of NPLATE on 3/29 and 4/4.  Given autism, patient is unable to swallow eltrombopag pills (cannot be crushed or chewed).   - Steroids now tapered off.   - Transfuse platelets only if bleeding (consider giving 1/2 unit platelets slowly infused over 3 hours q12h) OR if platelets <5k.  - s/p 2nd trial of IVIG on 4/1-4/2  - Appreciate ID eval and recs for cellulitis- now on Vancomycin  - Monitor closely for any signs or symptoms of bleeding  - Plt count stable today at 31K- Doptelet is covered by pt's insurance. Will start 40mg today (Can be crushed with yogurt or pudding)    Kenya Chávez MD  Hematology Oncology Fellow, PGY-5  [facility] Pager: [phone]/ [facility] Pager: [phone]

## 2021-04-07 NOTE — PROGRESS NOTE ADULT - ASSESSMENT
21/M with PMH chronic ITP, intellectual disability, non-verbal, autism, COVID19 (Jan 2021)  Adm 2/27 for low PLT.  Hosp course c/b low PLT s/p IVIG, Dexa, Rituximab ([facility] stay 3/5-3/7 for desensitization); convalescent plasma 3/11; 3/22 Wound Care and Dermatology consulted for LE wounds; 3/29 new fevers  ID consulted for recs  ========    Chronic ITP on tapering steroids with LLE cellulitis    Cellulitis right lower leg  -continues to show improvement with vancomycin  - WBC 12K  -blood culture no growth  - U/S LE negative for DVT.  -vanco trough therapeutic     Antimicrobials, Steroids, COVID19 therapies in this admission  IVIG x2, completed 2/28   Decadron 40 mg IV 2/28-3/3  Prednisone 80mg 2/27-> tapering since then  Solumedrol 3/13, 3/20, 3/27  IVIG 2/27, 2/28, 4/1  Rituxan infusion 3/7, 3/13, 3/20, 3/27  convalescent plasma on 3/11  Atovaquone 3/5->  Cefazolin 3/29-3/30  Doxy 3/29-3/30  Vanc 3/31->    RECOMMENDATIONS:    - continue IV vanco  -anticipate stopping 4/8  -then doxy po --> 4/12      Shelbi Weaver MD  Pager: [phone]  After 5 PM or weekends please call fellow on call or office [phone]

## 2021-04-07 NOTE — PROGRESS NOTE ADULT - ATTENDING COMMENTS
Pt with chronic refractory ITP s/p Rituxan x4 and Nplate high dose weekly. Currently plts continue to remain improved and will have oral trial of Doptelet to maintain platelet counts at [place]. He will need outpatient follow up at our office to continue to trend CBC and will arrange for next week if able to be discharged end of this week. We reviewed his mother's questions: has concerns about his ability to weight bear from LLE cellulitis: ongoing PT for pt.

## 2021-04-07 NOTE — PROGRESS NOTE ADULT - SUBJECTIVE AND OBJECTIVE BOX
INTERVAL History: No acute events overnight. Plt count this morning 31.     Allergies    Bactrim (Hives)  Rituxan (Hives)  WinRho SDF (Hives)    Intolerances        MEDICATIONS  (STANDING):  Avatrombopag (Doptelet) 20mg Tablet 40 milliGRAM(s) 40 milliGRAM(s) Oral daily  BACItracin   Ointment 1 Application(s) Topical two times a day  brexpiprazole 6 milliGRAM(s) Oral daily  cetirizine 10 milliGRAM(s) Oral at bedtime  chlorhexidine 4% Liquid 1 Application(s) Topical <User Schedule>  cimetidine 400 milliGRAM(s) Oral three times a day  fluticasone propionate 50 MICROgram(s)/spray Nasal Spray 1 Spray(s) Both Nostrils two times a day  furosemide    Tablet 40 milliGRAM(s) Oral daily  influenza   Vaccine 0.5 milliLiter(s) IntraMuscular once  LORazepam     Tablet 1 milliGRAM(s) Oral at bedtime  multivitamin 1 Tablet(s) Oral daily  traZODone 300 milliGRAM(s) Oral at bedtime  vancomycin  IVPB 1250 milliGRAM(s) IV Intermittent every 8 hours  vancomycin  IVPB        MEDICATIONS  (PRN):  acetaminophen   Tablet .. 650 milliGRAM(s) Oral every 6 hours PRN Temp greater or equal to 38C (100.4F)  ALBUTerol    0.083%. 2.5 milliGRAM(s) Nebulizer once PRN PRN Chemotherapy Reaction  ALBUTerol    0.083%. 2.5 milliGRAM(s) Nebulizer once PRN PRN Chemotherapy Reaction  diphenhydrAMINE   Injectable 50 milliGRAM(s) IV Push once PRN PRN Chemotherapy Reaction  EPINEPHrine     1 mG/mL Injectable 0.3 milliGRAM(s) IntraMuscular once PRN PRN Chemotherapy Reaction  EPINEPHrine     1 mG/mL Injectable 0.3 milliGRAM(s) IntraMuscular once PRN PRN Chemotherapy Reaction  hydrocortisone sodium succinate Injectable 100 milliGRAM(s) IV Push once PRN PRN Chemotherapy Reaction  hydrocortisone sodium succinate Injectable 100 milliGRAM(s) IV Push once PRN PRN Chemotherapy Reaction  mineral oil for Topical Use 1 Application(s) Topical three times a day PRN to clean area after BM  zinc oxide 20% Ointment 1 Application(s) Topical three times a day PRN rash, apply to the area after each BM      Vital Signs Last 24 Hrs  T(C): 37.2 (07 Apr 2021 06:12), Max: 37.3 (06 Apr 2021 11:46)  T(F): 99 (07 Apr 2021 06:12), Max: 99.1 (06 Apr 2021 11:46)  HR: 102 (07 Apr 2021 06:12) (101 - 106)  BP: 130/71 (07 Apr 2021 06:12) (104/80 - 130/71)  BP(mean): --  RR: 19 (07 Apr 2021 06:12) (17 - 19)  SpO2: 97% (07 Apr 2021 06:12) (97% - 98%)    PHYSICAL EXAM:    Constitutional: obese, NAD  Respiratory: breathing comfortably  Lower extremities: LLE no edema, RLE with residual erythema and edema  Skin: fewer ecchymoses, LE wounds crusting over and healing  Neuro: nonverbal due to autism, moving all 4 extremities      LABS:                        11.0   12.78 )-----------( 31       ( 07 Apr 2021 07:17 )             37.2     04-06    140  |  104  |  15  ----------------------------<  87  3.8   |  27  |  0.86    Ca    8.8      06 Apr 2021 06:59  Phos  4.8     04-06  Mg     2.0     04-06              RADIOLOGY & ADDITIONAL STUDIES:    PATHOLOGY:

## 2021-04-08 LAB
ANISOCYTOSIS BLD QL: SLIGHT — SIGNIFICANT CHANGE UP
GIANT PLATELETS BLD QL SMEAR: PRESENT — SIGNIFICANT CHANGE UP
HCT VFR BLD CALC: 39.1 % — SIGNIFICANT CHANGE UP (ref 39–50)
HGB BLD-MCNC: 12.1 G/DL — LOW (ref 13–17)
MACROCYTES BLD QL: SLIGHT — SIGNIFICANT CHANGE UP
MANUAL SMEAR VERIFICATION: SIGNIFICANT CHANGE UP
MCHC RBC-ENTMCNC: 25.7 PG — LOW (ref 27–34)
MCHC RBC-ENTMCNC: 30.9 GM/DL — LOW (ref 32–36)
MCV RBC AUTO: 83 FL — SIGNIFICANT CHANGE UP (ref 80–100)
MICROCYTES BLD QL: SLIGHT — SIGNIFICANT CHANGE UP
NRBC # BLD: 0 /100 WBCS — SIGNIFICANT CHANGE UP
NRBC # FLD: 0.03 K/UL — HIGH
PLAT MORPH BLD: ABNORMAL
PLATELET # BLD AUTO: 8 K/UL — CRITICAL LOW (ref 150–400)
PLATELET COUNT - ESTIMATE: ABNORMAL
POIKILOCYTOSIS BLD QL AUTO: SLIGHT — SIGNIFICANT CHANGE UP
POLYCHROMASIA BLD QL SMEAR: SLIGHT — SIGNIFICANT CHANGE UP
RBC # BLD: 4.71 M/UL — SIGNIFICANT CHANGE UP (ref 4.2–5.8)
RBC # FLD: 16.8 % — HIGH (ref 10.3–14.5)
RBC BLD AUTO: ABNORMAL
SMUDGE CELLS # BLD: PRESENT — SIGNIFICANT CHANGE UP
VANCOMYCIN TROUGH SERPL-MCNC: 28.3 UG/ML — CRITICAL HIGH (ref 10–20)
WBC # BLD: 14.57 K/UL — HIGH (ref 3.8–10.5)
WBC # FLD AUTO: 14.57 K/UL — HIGH (ref 3.8–10.5)

## 2021-04-08 PROCEDURE — 99232 SBSQ HOSP IP/OBS MODERATE 35: CPT | Mod: GC

## 2021-04-08 PROCEDURE — 99233 SBSQ HOSP IP/OBS HIGH 50: CPT | Mod: CS

## 2021-04-08 RX ADMIN — Medication 40 MILLIGRAM(S): at 05:03

## 2021-04-08 RX ADMIN — Medication 400 MILLIGRAM(S): at 05:02

## 2021-04-08 RX ADMIN — CHLORHEXIDINE GLUCONATE 1 APPLICATION(S): 213 SOLUTION TOPICAL at 13:47

## 2021-04-08 RX ADMIN — Medication 1 APPLICATION(S): at 17:49

## 2021-04-08 RX ADMIN — Medication 1 TABLET(S): at 13:47

## 2021-04-08 RX ADMIN — Medication 1 APPLICATION(S): at 05:02

## 2021-04-08 RX ADMIN — BREXPIPRAZOLE 6 MILLIGRAM(S): 0.25 TABLET ORAL at 13:47

## 2021-04-08 RX ADMIN — Medication 400 MILLIGRAM(S): at 13:48

## 2021-04-08 RX ADMIN — CETIRIZINE HYDROCHLORIDE 10 MILLIGRAM(S): 10 TABLET ORAL at 23:57

## 2021-04-08 RX ADMIN — Medication 166.67 MILLIGRAM(S): at 01:06

## 2021-04-08 RX ADMIN — Medication 1 MILLIGRAM(S): at 23:57

## 2021-04-08 NOTE — PROVIDER CONTACT NOTE (CRITICAL VALUE NOTIFICATION) - NS PROVIDER READ BACK TO LAB
Corinne, K/yes
yes
platelets 5000/yes
yes
Corinne, K/yes
yes

## 2021-04-08 NOTE — PROGRESS NOTE ADULT - SUBJECTIVE AND OBJECTIVE BOX
[facility] Division of Hospital Medicine  Luis M Damon DO  Pager (RUFUS-F, 8A-5P): [phone]    Patient is a 21y old  Male who presents with a chief complaint of low platelets (08 Apr 2021 10:05)    SUBJECTIVE / OVERNIGHT EVENTS: Pt at baseline, watching his ipad.  Plt count came back down to 8 today and mother is understandably very upset and emotional.  Pt without any evidence of bruising, bleeding.  Remains afebrile     MEDICATIONS  (STANDING):  Avatrombopag (Doptelet) 20mg Tablet 40 milliGRAM(s) 40 milliGRAM(s) Oral daily  BACItracin   Ointment 1 Application(s) Topical two times a day  brexpiprazole 6 milliGRAM(s) Oral daily  cetirizine 10 milliGRAM(s) Oral at bedtime  chlorhexidine 4% Liquid 1 Application(s) Topical <User Schedule>  cimetidine 400 milliGRAM(s) Oral three times a day  doxycycline hyclate Capsule 100 milliGRAM(s) Oral every 12 hours  fluticasone propionate 50 MICROgram(s)/spray Nasal Spray 1 Spray(s) Both Nostrils two times a day  furosemide    Tablet 40 milliGRAM(s) Oral daily  influenza   Vaccine 0.5 milliLiter(s) IntraMuscular once  LORazepam     Tablet 1 milliGRAM(s) Oral at bedtime  multivitamin 1 Tablet(s) Oral daily  traZODone 300 milliGRAM(s) Oral at bedtime    MEDICATIONS  (PRN):  acetaminophen   Tablet .. 650 milliGRAM(s) Oral every 6 hours PRN Temp greater or equal to 38C (100.4F)  ALBUTerol    0.083%. 2.5 milliGRAM(s) Nebulizer once PRN PRN Chemotherapy Reaction  ALBUTerol    0.083%. 2.5 milliGRAM(s) Nebulizer once PRN PRN Chemotherapy Reaction  mineral oil for Topical Use 1 Application(s) Topical three times a day PRN to clean area after BM  zinc oxide 20% Ointment 1 Application(s) Topical three times a day PRN rash, apply to the area after each BM      PHYSICAL EXAM:  Vital Signs Last 24 Hrs  T(C): 36.3 (08 Apr 2021 11:30), Max: 37.2 (07 Apr 2021 22:10)  T(F): 97.4 (08 Apr 2021 11:30), Max: 99 (07 Apr 2021 22:10)  HR: 108 (08 Apr 2021 11:30) (100 - 108)  BP: 147/70 (08 Apr 2021 11:30) (128/70 - 147/70)  BP(mean): --  RR: 16 (08 Apr 2021 11:30) (16 - 19)  SpO2: 96% (08 Apr 2021 11:30) (96% - 98%)    CONSTITUTIONAL: NAD, well-developed, well-groomed, morbidly obese  EYES: PERRLA; conjunctiva and sclera clear  RESPIRATORY: Normal respiratory effort; lungs are clear to auscultation bilaterally  CARDIOVASCULAR: Regular rate and rhythm, normal S1 and S2, no murmur/rub/gallop; trace RLE edema  ABDOMEN: Nontender to palpation, normoactive bowel sounds, no rebound/guarding  MUSCULOSKELETAL:  No clubbing or cyanosis of digits; no joint swelling or tenderness to palpation  NEUROLOGY: CN 2-12 are intact and symmetric; no gross motor deficits  SKIN: +healing scabs in RLE shin, cellulitis improving     LABS:                        12.1   14.57 )-----------( 8        ( 08 Apr 2021 05:48 )             39.1     COORDINATION OF CARE:  Care Discussed with Consultants/Other Providers [Y/N]: Hematology Dr. Chávez, ID Dr. Weaver

## 2021-04-08 NOTE — PROVIDER CONTACT NOTE (CRITICAL VALUE NOTIFICATION) - NS PROVIDER READ BACK
yes
Maggy Krueger/yes
yes
Provider on unit at this time/yes
no
yes
platelets 5000/yes
yes
Maggy Krueger/yes
yes

## 2021-04-08 NOTE — PROVIDER CONTACT NOTE (CRITICAL VALUE NOTIFICATION) - TEST AND RESULT REPORTED:
Platelet 4
Platelet count 10,000
platelets 8
Platelet 12
Platelet count 3
platelets 8000
plt 1
Platelet 11
Platelet 6
Platelet count 17
platelet 1
Platelet 3
Platelets 4
platelet 6
vanco trough 28.3
Platelet 3
Platelet count at 11
platelets 3
Platelet 19
Platelets 5000
platelets 9
plt 5
Platelet 8.0
Platelet count 13
Platelet count 4
platelets 5
platelet count 4
platelets 5
Platelet's 2

## 2021-04-08 NOTE — PROGRESS NOTE ADULT - ASSESSMENT
19 yo M with a history of chronic ITP and severe autism (non-verbal at baseline) who presented with severe thrombocytopenia (Plt 7) while on home PO prednisone (80mg daily), and was admitted to Medicine for further management. Hematology consulted for assistance with management.    # Chronic ITP exacerbated in the setting of recent COVID infection   - Previous CT head neg for bleed  - S/p IVIG 1gm/kg daily x 2 (completed 2/28) and dexamethasone 40mg IV x4 days (completed 3/3)  - S/p W1 Rituximab with desensitization protocol on 3/6; W2 Rituximab on 3/13; s/p W3 Rituximab on 3/20 with desensitization protocol, tolerated well. S/p W4 Rituximab with desensitization protocol yesterday (3/27), tolerated well.   - S/p romiplastim/Nplate, 1mcg/kg; given 2/28; 2mcg/kg given on 3/7 (pt only got 250mcg instead of 300mcg as pharmacy only had 250); 5mcg/kg (750mcg) given on 3/14, S/p 750mcg (5mcg/kg) on 3/21. S/p 4 weekly doses of Rituxan. S/p 1500mcg of NPLATE on 3/29 and 4/4.  Given autism, patient is unable to swallow eltrombopag pills (cannot be crushed or chewed).   - Steroids now tapered off.   - Transfuse platelets only if bleeding (consider giving 1/2 unit platelets slowly infused over 3 hours q12h) OR if platelets <5k.  - s/p 2nd trial of IVIG on 4/1-4/2  - Appreciate ID eval and recs for cellulitis- now on Vancomycin  - Monitor closely for any signs or symptoms of bleeding  - c/w Doptelet 40mg daily (Can be crushed with yogurt or pudding)    Richie-in MD Lalo, PGY-4  Translational Medical Oncology Fellow  Pager: [phone] 21 yo M with a history of chronic ITP and severe autism (non-verbal at baseline) who presented with severe thrombocytopenia (Plt 7) while on home PO prednisone (80mg daily), and was admitted to Medicine for further management. Hematology consulted for assistance with management.    # Chronic ITP exacerbated in the setting of recent COVID infection   - Previous CT head neg for bleed  - S/p IVIG 1gm/kg daily x 2 (completed 2/28) and dexamethasone 40mg IV x4 days (completed 3/3)  - S/p W1 Rituximab with desensitization protocol on 3/6; W2 Rituximab on 3/13; s/p W3 Rituximab on 3/20 with desensitization protocol, tolerated well. S/p W4 Rituximab with desensitization protocol yesterday (3/27), tolerated well.   - S/p romiplastim/Nplate, 1mcg/kg; given 2/28; 2mcg/kg given on 3/7 (pt only got 250mcg instead of 300mcg as pharmacy only had 250); 5mcg/kg (750mcg) given on 3/14, S/p 750mcg (5mcg/kg) on 3/21. S/p 4 weekly doses of Rituxan. S/p 1500mcg of NPLATE on 3/29 and 4/4.  Given autism, patient is unable to swallow eltrombopag pills (cannot be crushed or chewed).   - Steroids now tapered off.   - Transfuse platelets only if bleeding (consider giving 1/2 unit platelets slowly infused over 3 hours q12h) OR if platelets <5k.  - s/p 2nd trial of IVIG on 4/1-4/2  - Appreciate ID eval and recs for cellulitis- now on Vancomycin  - Monitor closely for any signs or symptoms of bleeding  - c/w Doptelet 40mg daily (Can be crushed with yogurt or pudding)  - Given drop in plt count today, looking into alternative oral chemotherapy options that can be crushed  - Please reach out to  to see if pt's group home can accommodate a visiting nurse who can draw labs at least twice a week with lab results sent to [facility] with a 1-day turnaround time, if this is possible, potentially can discharge pt if plts > = 10     Quiroz-in MD Lalo, PGY-4  Translational Medical Oncology Fellow  Pager: [phone]

## 2021-04-08 NOTE — PROGRESS NOTE ADULT - SUBJECTIVE AND OBJECTIVE BOX
VINNY MOON  MRN-[id number]    Interval hx:  Patient was seen and examined    MEDICATIONS  (STANDING):  Avatrombopag (Doptelet) 20mg Tablet 40 milliGRAM(s) 40 milliGRAM(s) Oral daily  BACItracin   Ointment 1 Application(s) Topical two times a day  brexpiprazole 6 milliGRAM(s) Oral daily  cetirizine 10 milliGRAM(s) Oral at bedtime  chlorhexidine 4% Liquid 1 Application(s) Topical <User Schedule>  cimetidine 400 milliGRAM(s) Oral three times a day  fluticasone propionate 50 MICROgram(s)/spray Nasal Spray 1 Spray(s) Both Nostrils two times a day  furosemide    Tablet 40 milliGRAM(s) Oral daily  influenza   Vaccine 0.5 milliLiter(s) IntraMuscular once  LORazepam     Tablet 1 milliGRAM(s) Oral at bedtime  multivitamin 1 Tablet(s) Oral daily  traZODone 300 milliGRAM(s) Oral at bedtime    MEDICATIONS  (PRN):  acetaminophen   Tablet .. 650 milliGRAM(s) Oral every 6 hours PRN Temp greater or equal to 38C (100.4F)  ALBUTerol    0.083%. 2.5 milliGRAM(s) Nebulizer once PRN PRN Chemotherapy Reaction  ALBUTerol    0.083%. 2.5 milliGRAM(s) Nebulizer once PRN PRN Chemotherapy Reaction  mineral oil for Topical Use 1 Application(s) Topical three times a day PRN to clean area after BM  zinc oxide 20% Ointment 1 Application(s) Topical three times a day PRN rash, apply to the area after each BM      Allergies    Bactrim (Hives)  Rituxan (Hives)  WinRho SDF (Hives)    Intolerances        Objectives:  Vital Signs Last 24 Hrs  T(C): 37.2 (07 Apr 2021 22:10), Max: 37.4 (07 Apr 2021 12:09)  T(F): 99 (07 Apr 2021 22:10), Max: 99.3 (07 Apr 2021 12:09)  HR: 100 (07 Apr 2021 22:10) (100 - 102)  BP: 128/70 (07 Apr 2021 22:10) (128/70 - 137/84)  BP(mean): --  RR: 19 (07 Apr 2021 22:10) (18 - 19)  SpO2: 98% (07 Apr 2021 22:10) (97% - 98%)      PHYSICAL EXAM:    Constitutional: obese, NAD  Respiratory: breathing comfortably  Lower extremities: LLE no edema, RLE with residual erythema and edema  Skin: fewer ecchymoses, LE wounds crusting over and healing  Neuro: nonverbal due to autism, moving all 4 extremities          Labs:    CBC Full  -  ( 08 Apr 2021 05:48 )  WBC Count : 14.57 K/uL  RBC Count : 4.71 M/uL  Hemoglobin : 12.1 g/dL  Hematocrit : 39.1 %  Platelet Count - Automated : x  Mean Cell Volume : 83.0 fL  Mean Cell Hemoglobin : 25.7 pg  Mean Cell Hemoglobin Concentration : 30.9 gm/dL  Auto Neutrophil # : x  Auto Lymphocyte # : x  Auto Monocyte # : x  Auto Eosinophil # : x  Auto Basophil # : x  Auto Neutrophil % : x  Auto Lymphocyte % : x  Auto Monocyte % : x  Auto Eosinophil % : x  Auto Basophil % : x                          Imagings:       VINNY MOON  MRN-[id number]    Interval hx:  Patient was seen and examined    MEDICATIONS  (STANDING):  Avatrombopag (Doptelet) 20mg Tablet 40 milliGRAM(s) 40 milliGRAM(s) Oral daily  BACItracin   Ointment 1 Application(s) Topical two times a day  brexpiprazole 6 milliGRAM(s) Oral daily  cetirizine 10 milliGRAM(s) Oral at bedtime  chlorhexidine 4% Liquid 1 Application(s) Topical <User Schedule>  cimetidine 400 milliGRAM(s) Oral three times a day  fluticasone propionate 50 MICROgram(s)/spray Nasal Spray 1 Spray(s) Both Nostrils two times a day  furosemide    Tablet 40 milliGRAM(s) Oral daily  influenza   Vaccine 0.5 milliLiter(s) IntraMuscular once  LORazepam     Tablet 1 milliGRAM(s) Oral at bedtime  multivitamin 1 Tablet(s) Oral daily  traZODone 300 milliGRAM(s) Oral at bedtime    MEDICATIONS  (PRN):  acetaminophen   Tablet .. 650 milliGRAM(s) Oral every 6 hours PRN Temp greater or equal to 38C (100.4F)  ALBUTerol    0.083%. 2.5 milliGRAM(s) Nebulizer once PRN PRN Chemotherapy Reaction  ALBUTerol    0.083%. 2.5 milliGRAM(s) Nebulizer once PRN PRN Chemotherapy Reaction  mineral oil for Topical Use 1 Application(s) Topical three times a day PRN to clean area after BM  zinc oxide 20% Ointment 1 Application(s) Topical three times a day PRN rash, apply to the area after each BM      Allergies    Bactrim (Hives)  Rituxan (Hives)  WinRho SDF (Hives)    Intolerances        Objectives:  Vital Signs Last 24 Hrs  T(C): 37.2 (07 Apr 2021 22:10), Max: 37.4 (07 Apr 2021 12:09)  T(F): 99 (07 Apr 2021 22:10), Max: 99.3 (07 Apr 2021 12:09)  HR: 100 (07 Apr 2021 22:10) (100 - 102)  BP: 128/70 (07 Apr 2021 22:10) (128/70 - 137/84)  BP(mean): --  RR: 19 (07 Apr 2021 22:10) (18 - 19)  SpO2: 98% (07 Apr 2021 22:10) (97% - 98%)      PHYSICAL EXAM:    Constitutional: obese, NAD  Respiratory: breathing comfortably  Lower extremities: LLE no edema, RLE with residual erythema and edema  Skin: fewer ecchymoses, LE wounds crusting over and healing  Neuro: nonverbal due to autism, moving all 4 extremities          Labs:    CBC Full  -  ( 08 Apr 2021 05:48 )  WBC Count : 14.57 K/uL  RBC Count : 4.71 M/uL  Hemoglobin : 12.1 g/dL  Hematocrit : 39.1 %  Platelet Count - Automated : x  Mean Cell Volume : 83.0 fL  Mean Cell Hemoglobin : 25.7 pg  Mean Cell Hemoglobin Concentration : 30.9 gm/dL  Auto Neutrophil # : x  Auto Lymphocyte # : x  Auto Monocyte # : x  Auto Eosinophil # : x  Auto Basophil # : x  Auto Neutrophil % : x  Auto Lymphocyte % : x  Auto Monocyte % : x  Auto Eosinophil % : x  Auto Basophil % : x

## 2021-04-08 NOTE — PROGRESS NOTE ADULT - PROBLEM SELECTOR PLAN 1
- Hx of chronic ITP since age 18 months however has been in remission and then emerged again after COVID in Jan 2021, now s/p convalescent plasma (3/11)  - Received IVIG 2/28 without adequate response,, repeat IVIG x2 on 4/1-4/2, plts improved to 68 but trended down again.  Plts down to 8 after few days of uptrend.   -per heme recommendations will transfuse 1/2 unit platelets every 12 hrs for plt <5  or if bleeding.   -s/p Decadron 40 mg IV (2/28-3/3), completed prolonged prednisone taper  - received Rituximab on 3/20,3/27 and Romiplastin/Nplate dose 3/21,3/28, got additional 1500 mcg Nplate 4/4  - Insurance approved PO Doptelet, started 4/7  - Pt's mother is understandably adamantly opposed to splenectomy due to concern of poor wound healing in a pt who can't communicate needs  - Discussed with heme, will continue to monitor on Doptelet and consider Cytoxan as next resort.  - Reviewed all pt medications and none are known to cause thrombocytopenia as adverse effect - Hx of chronic ITP since age 18 months however has been in remission and then emerged again after COVID in Jan 2021, now s/p convalescent plasma (3/11)  - Received IVIG 2/28 without adequate response,, repeat IVIG x2 on 4/1-4/2, plts improved to 68 but trended down again.  Plts down to 8 after few days of uptrend.   -per heme recommendations will transfuse 1/2 unit platelets every 12 hrs for plt <5  or if bleeding.   -s/p Decadron 40 mg IV (2/28-3/3), completed prolonged prednisone taper  - received Rituximab on 3/20,3/27 and Romiplastin/Nplate dose 3/21,3/28, got additional 1500 mcg Nplate 4/4  - Insurance approved PO Doptelet, started 4/7  - Pt's mother is understandably adamantly opposed to splenectomy due to concern of poor wound healing in a pt who can't communicate needs  - Discussed with heme, will continue to monitor on Doptelet and consider Cytoxan as next resort (this will require a PICC vs. chemoport).    - Heme to review peripheral smear 4/8 and determine if IR guided BM biopsy is indicated  - Reviewed all pt medications and none are known to cause thrombocytopenia as adverse effect

## 2021-04-08 NOTE — PROVIDER CONTACT NOTE (CRITICAL VALUE NOTIFICATION) - PERSON GIVING RESULT:
Paresh Clayton
Hematology/ toxicology
Paresh Mutasha
lab/ Shaneka Medina
Anali Sun / Hematology
Ham GUERRERO
Hematology MOO Gray
JANUSZ Tuttle
Siapno
hematology MOO Gray
Dereck
RUFUS De La Fuente
Arben Wiseman
Rob Fuller
Dominick Gray
NIEVES Zeng
CELSO Shay
Jayesh Clayton Hematology
Leticia
NIEVES Wilson
JANUSZ Scott
lab
evangelina
Anali Sun / Hematology
Emilia Hematology
Hematology Chelsea Fuller
Rob Fuller
TERESA cortés

## 2021-04-08 NOTE — PROGRESS NOTE ADULT - ATTENDING COMMENTS
Chronic refractory ITP where pt was affected by covid and cellulitis infection. S/p 4 weekly doses of Rituxan, tapered off steroids, given weekly Nplate and last had high dose Nplate 4/4/2021 and currently taking oral Doptelet. We reviewed peripheral smear: areas of rbc agglutination, neutrophil predominance with monocytes, lymphs, few atypical lymphs, and large platelets. Consistent with ITP. Reviewed with his mother regarding 2nd line treatments and complicating issue that he cannot swallow pills and many of the drugs cannot be crushed. We will consider oral dapsone and if iv cyclophosphamide needed, will consider BM biopsy. Will trend CBC and see if group home can bring [facility] to [facility] weekly for CBC check and iv treatment.

## 2021-04-09 LAB
HCT VFR BLD CALC: 35.5 % — LOW (ref 39–50)
HGB BLD-MCNC: 11.1 G/DL — LOW (ref 13–17)
MCHC RBC-ENTMCNC: 25.7 PG — LOW (ref 27–34)
MCHC RBC-ENTMCNC: 31.3 GM/DL — LOW (ref 32–36)
MCV RBC AUTO: 82.2 FL — SIGNIFICANT CHANGE UP (ref 80–100)
NRBC # BLD: 0 /100 WBCS — SIGNIFICANT CHANGE UP
NRBC # FLD: 0.03 K/UL — HIGH
PLATELET # BLD AUTO: 25 K/UL — LOW (ref 150–400)
RBC # BLD: 4.32 M/UL — SIGNIFICANT CHANGE UP (ref 4.2–5.8)
RBC # FLD: 16.4 % — HIGH (ref 10.3–14.5)
WBC # BLD: 16.26 K/UL — HIGH (ref 3.8–10.5)
WBC # FLD AUTO: 16.26 K/UL — HIGH (ref 3.8–10.5)

## 2021-04-09 PROCEDURE — 99232 SBSQ HOSP IP/OBS MODERATE 35: CPT | Mod: GC

## 2021-04-09 PROCEDURE — 99233 SBSQ HOSP IP/OBS HIGH 50: CPT | Mod: CS

## 2021-04-09 PROCEDURE — 99232 SBSQ HOSP IP/OBS MODERATE 35: CPT

## 2021-04-09 RX ADMIN — Medication 1 SPRAY(S): at 06:14

## 2021-04-09 RX ADMIN — Medication 400 MILLIGRAM(S): at 11:50

## 2021-04-09 RX ADMIN — Medication 300 MILLIGRAM(S): at 00:00

## 2021-04-09 RX ADMIN — Medication 40 MILLIGRAM(S): at 06:14

## 2021-04-09 RX ADMIN — Medication 1 TABLET(S): at 11:49

## 2021-04-09 RX ADMIN — Medication 1 MILLIGRAM(S): at 20:30

## 2021-04-09 RX ADMIN — CETIRIZINE HYDROCHLORIDE 10 MILLIGRAM(S): 10 TABLET ORAL at 20:30

## 2021-04-09 RX ADMIN — BREXPIPRAZOLE 6 MILLIGRAM(S): 0.25 TABLET ORAL at 11:49

## 2021-04-09 RX ADMIN — CHLORHEXIDINE GLUCONATE 1 APPLICATION(S): 213 SOLUTION TOPICAL at 06:13

## 2021-04-09 RX ADMIN — Medication 1 APPLICATION(S): at 17:01

## 2021-04-09 RX ADMIN — Medication 300 MILLIGRAM(S): at 20:30

## 2021-04-09 RX ADMIN — Medication 1 SPRAY(S): at 17:03

## 2021-04-09 RX ADMIN — Medication 400 MILLIGRAM(S): at 00:16

## 2021-04-09 RX ADMIN — Medication 1 APPLICATION(S): at 06:13

## 2021-04-09 RX ADMIN — Medication 400 MILLIGRAM(S): at 06:13

## 2021-04-09 RX ADMIN — Medication 400 MILLIGRAM(S): at 20:30

## 2021-04-09 NOTE — PROGRESS NOTE ADULT - ASSESSMENT
21/M with PMH chronic ITP, intellectual disability, non-verbal, autism, COVID19 (Jan 2021)  Adm 2/27 for low PLT.  Hosp course c/b low PLT s/p IVIG, Dexa, Rituximab ([facility] stay 3/5-3/7 for desensitization); convalescent plasma 3/11; 3/22 Wound Care and Dermatology consulted for LE wounds; 3/29 new fevers  ID consulted for recs  ========    Chronic ITP on tapering steroids with LLE cellulitis    Cellulitis right lower leg  -continues to show improvement   -blood culture no growth  - U/S LE negative for DVT.      Antimicrobials, Steroids, COVID19 therapies in this admission  IVIG x2, completed 2/28   Decadron 40 mg IV 2/28-3/3  Prednisone 80mg 2/27-> tapering since then  Solumedrol 3/13, 3/20, 3/27  IVIG 2/27, 2/28, 4/1  Rituxan infusion 3/7, 3/13, 3/20, 3/27  convalescent plasma on 3/11  Atovaquone 3/5->  Cefazolin 3/29-3/30  Doxy 3/29-3/30  Vanc 3/31->4/8    RECOMMENDATIONS:  - doxy 100 mg po q 12 h --> 4/12    will d/c f/u   please call with questions      Shelbi Weaver MD  Pager: [phone]  After 5 PM or weekends please call fellow on call or office [phone]

## 2021-04-09 NOTE — PROGRESS NOTE ADULT - ASSESSMENT
21 yo M with a history of chronic ITP and severe autism (non-verbal at baseline) who presented with severe thrombocytopenia (Plt 7) while on home PO prednisone (80mg daily), and was admitted to Medicine for further management. Hematology consulted for assistance with management.    # Chronic ITP exacerbated in the setting of recent COVID infection   - Previous CT head neg for bleed  - S/p IVIG 1gm/kg daily x 2 (completed 2/28) and dexamethasone 40mg IV x4 days (completed 3/3)  - S/p W1 Rituximab with desensitization protocol on 3/6; W2 Rituximab on 3/13; s/p W3 Rituximab on 3/20 with desensitization protocol, tolerated well. S/p W4 Rituximab with desensitization protocol yesterday (3/27), tolerated well.   - S/p romiplastim/Nplate, 1mcg/kg; given 2/28; 2mcg/kg given on 3/7 (pt only got 250mcg instead of 300mcg as pharmacy only had 250); 5mcg/kg (750mcg) given on 3/14, S/p 750mcg (5mcg/kg) on 3/21. S/p 4 weekly doses of Rituxan. S/p 1500mcg of NPLATE on 3/29 and 4/4.  Given autism, patient is unable to swallow eltrombopag pills (cannot be crushed or chewed).   - Steroids now tapered off.   - Transfuse platelets only if bleeding (consider giving 1/2 unit platelets slowly infused over 3 hours q12h) OR if platelets <5k.  - s/p 2nd trial of IVIG on 4/1-4/2  - Appreciate ID eval and recs for cellulitis- now on Vancomycin  - Monitor closely for any signs or symptoms of bleeding  - c/w Doptelet 40mg daily (Can be crushed with yogurt or pudding)  - Plt stable to 25 today- spoke with primary team regarding reaching out to group home to see if they can accommodate bringing pt to [facility] once a week to see a hematologist and blood work. If possible, then we can start discharge planning if plts remain stable over the weekend.     Kenya Chávez MD  Hematology Oncology Fellow, PGY-5  [facility] Pager: [phone]/ [facility] Pager: [phone]

## 2021-04-09 NOTE — PROGRESS NOTE ADULT - SUBJECTIVE AND OBJECTIVE BOX
INTERVAL History:    Allergies    Bactrim (Hives)  Rituxan (Hives)  WinRho SDF (Hives)    Intolerances        MEDICATIONS  (STANDING):  Avatrombopag (Doptelet) 20mg Tablet 40 milliGRAM(s) 40 milliGRAM(s) Oral daily  BACItracin   Ointment 1 Application(s) Topical two times a day  brexpiprazole 6 milliGRAM(s) Oral daily  cetirizine 10 milliGRAM(s) Oral at bedtime  chlorhexidine 4% Liquid 1 Application(s) Topical <User Schedule>  cimetidine 400 milliGRAM(s) Oral three times a day  Doxycycline hyclate tablet 100 milliGRAM(s) 1 Tablet(s) Oral two times a day  fluticasone propionate 50 MICROgram(s)/spray Nasal Spray 1 Spray(s) Both Nostrils two times a day  furosemide    Tablet 40 milliGRAM(s) Oral daily  influenza   Vaccine 0.5 milliLiter(s) IntraMuscular once  multivitamin 1 Tablet(s) Oral daily  traZODone 300 milliGRAM(s) Oral at bedtime    MEDICATIONS  (PRN):  acetaminophen   Tablet .. 650 milliGRAM(s) Oral every 6 hours PRN Temp greater or equal to 38C (100.4F)  ALBUTerol    0.083%. 2.5 milliGRAM(s) Nebulizer once PRN PRN Chemotherapy Reaction  ALBUTerol    0.083%. 2.5 milliGRAM(s) Nebulizer once PRN PRN Chemotherapy Reaction  mineral oil for Topical Use 1 Application(s) Topical three times a day PRN to clean area after BM  zinc oxide 20% Ointment 1 Application(s) Topical three times a day PRN rash, apply to the area after each BM      Vital Signs Last 24 Hrs  T(C): 36.6 (09 Apr 2021 06:12), Max: 36.7 (08 Apr 2021 23:49)  T(F): 97.9 (09 Apr 2021 06:12), Max: 98.1 (08 Apr 2021 23:49)  HR: 104 (09 Apr 2021 06:12) (103 - 108)  BP: 115/60 (09 Apr 2021 06:12) (102/51 - 147/70)  BP(mean): --  RR: 16 (09 Apr 2021 06:12) (16 - 16)  SpO2: 95% (09 Apr 2021 06:12) (95% - 97%)    PHYSICAL EXAM:        LABS:                        11.1   16.26 )-----------( x        ( 09 Apr 2021 08:21 )             35.5                   RADIOLOGY & ADDITIONAL STUDIES:    PATHOLOGY:         INTERVAL History: No acute events overnight. Plt level this morning 25. Spoke with mother at bedside.     Allergies    Bactrim (Hives)  Rituxan (Hives)  WinRho SDF (Hives)    Intolerances        MEDICATIONS  (STANDING):  Avatrombopag (Doptelet) 20mg Tablet 40 milliGRAM(s) 40 milliGRAM(s) Oral daily  BACItracin   Ointment 1 Application(s) Topical two times a day  brexpiprazole 6 milliGRAM(s) Oral daily  cetirizine 10 milliGRAM(s) Oral at bedtime  chlorhexidine 4% Liquid 1 Application(s) Topical <User Schedule>  cimetidine 400 milliGRAM(s) Oral three times a day  Doxycycline hyclate tablet 100 milliGRAM(s) 1 Tablet(s) Oral two times a day  fluticasone propionate 50 MICROgram(s)/spray Nasal Spray 1 Spray(s) Both Nostrils two times a day  furosemide    Tablet 40 milliGRAM(s) Oral daily  influenza   Vaccine 0.5 milliLiter(s) IntraMuscular once  multivitamin 1 Tablet(s) Oral daily  traZODone 300 milliGRAM(s) Oral at bedtime    MEDICATIONS  (PRN):  acetaminophen   Tablet .. 650 milliGRAM(s) Oral every 6 hours PRN Temp greater or equal to 38C (100.4F)  ALBUTerol    0.083%. 2.5 milliGRAM(s) Nebulizer once PRN PRN Chemotherapy Reaction  ALBUTerol    0.083%. 2.5 milliGRAM(s) Nebulizer once PRN PRN Chemotherapy Reaction  mineral oil for Topical Use 1 Application(s) Topical three times a day PRN to clean area after BM  zinc oxide 20% Ointment 1 Application(s) Topical three times a day PRN rash, apply to the area after each BM      Vital Signs Last 24 Hrs  T(C): 36.6 (09 Apr 2021 06:12), Max: 36.7 (08 Apr 2021 23:49)  T(F): 97.9 (09 Apr 2021 06:12), Max: 98.1 (08 Apr 2021 23:49)  HR: 104 (09 Apr 2021 06:12) (103 - 108)  BP: 115/60 (09 Apr 2021 06:12) (102/51 - 147/70)  BP(mean): --  RR: 16 (09 Apr 2021 06:12) (16 - 16)  SpO2: 95% (09 Apr 2021 06:12) (95% - 97%)    PHYSICAL EXAM:  Constitutional: obese, NAD  Respiratory: breathing comfortably  Lower extremities: LLE no edema, RLE with residual erythema and edema  Skin: fewer ecchymoses, LE wounds crusting over and healing  Neuro: nonverbal due to autism, moving all 4 extremities        LABS:                        11.1   16.26 )-----------( x        ( 09 Apr 2021 08:21 )             35.5                   RADIOLOGY & ADDITIONAL STUDIES:    PATHOLOGY:         INTERVAL History: No acute events overnight. Plt level this morning 25. Spoke with mother at bedside.     Allergies    Bactrim (Hives)  Rituxan (Hives)  WinRho SDF (Hives)    Intolerances        MEDICATIONS  (STANDING):  Avatrombopag (Doptelet) 20mg Tablet 40 milliGRAM(s) 40 milliGRAM(s) Oral daily  BACItracin   Ointment 1 Application(s) Topical two times a day  brexpiprazole 6 milliGRAM(s) Oral daily  cetirizine 10 milliGRAM(s) Oral at bedtime  chlorhexidine 4% Liquid 1 Application(s) Topical <User Schedule>  cimetidine 400 milliGRAM(s) Oral three times a day  Doxycycline hyclate tablet 100 milliGRAM(s) 1 Tablet(s) Oral two times a day  fluticasone propionate 50 MICROgram(s)/spray Nasal Spray 1 Spray(s) Both Nostrils two times a day  furosemide    Tablet 40 milliGRAM(s) Oral daily  influenza   Vaccine 0.5 milliLiter(s) IntraMuscular once  multivitamin 1 Tablet(s) Oral daily  traZODone 300 milliGRAM(s) Oral at bedtime    MEDICATIONS  (PRN):  acetaminophen   Tablet .. 650 milliGRAM(s) Oral every 6 hours PRN Temp greater or equal to 38C (100.4F)  ALBUTerol    0.083%. 2.5 milliGRAM(s) Nebulizer once PRN PRN Chemotherapy Reaction  ALBUTerol    0.083%. 2.5 milliGRAM(s) Nebulizer once PRN PRN Chemotherapy Reaction  mineral oil for Topical Use 1 Application(s) Topical three times a day PRN to clean area after BM  zinc oxide 20% Ointment 1 Application(s) Topical three times a day PRN rash, apply to the area after each BM      Vital Signs Last 24 Hrs  T(C): 36.6 (09 Apr 2021 06:12), Max: 36.7 (08 Apr 2021 23:49)  T(F): 97.9 (09 Apr 2021 06:12), Max: 98.1 (08 Apr 2021 23:49)  HR: 104 (09 Apr 2021 06:12) (103 - 108)  BP: 115/60 (09 Apr 2021 06:12) (102/51 - 147/70)  BP(mean): --  RR: 16 (09 Apr 2021 06:12) (16 - 16)  SpO2: 95% (09 Apr 2021 06:12) (95% - 97%)    PHYSICAL EXAM:  Constitutional: obese, NAD  Respiratory: breathing comfortably  Lower extremities: LLE no edema, RLE with residual erythema and edema  Skin: fewer ecchymoses, LE wounds crusting over and healing  Neuro: nonverbal due to autism, moving all 4 extremities        LABS:                        11.1   16.26 )-----------( 25        ( 09 Apr 2021 08:21 )             35.5                   RADIOLOGY & ADDITIONAL STUDIES:    PATHOLOGY:

## 2021-04-09 NOTE — PROGRESS NOTE ADULT - ATTENDING SUPERVISION STATEMENT
Fellow
Resident
Fellow
Resident
Fellow
ACP
ACP
Fellow
ACP

## 2021-04-09 NOTE — PROGRESS NOTE ADULT - PROBLEM SELECTOR PLAN 1
- Hx of chronic ITP since age 18 months however has been in remission and then emerged again after COVID in Jan 2021, now s/p convalescent plasma (3/11)  - Received IVIG 2/28 without adequate response,, repeat IVIG x2 on 4/1-4/2, plts improved to 68 but trended down again.  Plts down to 8 after few days of uptrend.   -per heme recommendations will transfuse 1/2 unit platelets every 12 hrs for plt <5  or if bleeding.   -s/p Decadron 40 mg IV (2/28-3/3), completed prolonged prednisone taper  - received Rituximab on 3/20,3/27 and Romiplastin/Nplate dose 3/21,3/28, got additional 1500 mcg Nplate 4/4  - Insurance approved PO Doptelet, started 4/7  - Pt's mother is understandably adamantly opposed to splenectomy due to concern of poor wound healing in a pt who can't communicate needs  - Discussed with heme, will continue to monitor on Doptelet and consider Cytoxan as next resort (this will require a PICC vs. chemoport).    - Reviewed all pt medications and none are known to cause thrombocytopenia as adverse effect

## 2021-04-09 NOTE — PROGRESS NOTE ADULT - ATTENDING COMMENTS
Pt with chronic refractory ITP s/p Rituxan x 4 along with weekly Nplate switched over the oral Doptelet in preparation for eventual d/c to group home. Platelets today improved and will need to trend over the weekend for stability and solidify plan for discharge to group home and once a week follow up at [facility] with Dr Bhagat.

## 2021-04-09 NOTE — PROGRESS NOTE ADULT - PROBLEM SELECTOR PLAN 5
- No DVT ppx 2/2 ITP  - Remains medically active, awaiting plt count recovery to >30    Dispo- to group home. F/u SW/Cm re: anticipated needs and whether or not facility can accommodate. Hopefully DC soon once counts are stable.

## 2021-04-09 NOTE — PROGRESS NOTE ADULT - SUBJECTIVE AND OBJECTIVE BOX
Follow Up: ID following for concern for cellulitis    Interval History/ROS:Patient is a 21y old  Male who presents with a chief complaint of low platelets (01 Apr 2021 13:54)    -afebrile. mom reports patient was able to walk on right foot without pain.      Allergies    Bactrim (Hives)  Rituxan (Hives)  WinRho SDF (Hives)    Intolerances        ANTIMICROBIALS:          OTHER MEDS:  ALBUTerol    0.083%. 2.5 milliGRAM(s) Nebulizer once PRN  ALBUTerol    0.083%. 2.5 milliGRAM(s) Nebulizer once PRN  BACItracin   Ointment 1 Application(s) Topical two times a day  brexpiprazole 6 milliGRAM(s) Oral daily  cetirizine 10 milliGRAM(s) Oral at bedtime  chlorhexidine 4% Liquid 1 Application(s) Topical <User Schedule>  cimetidine 400 milliGRAM(s) Oral three times a day  diphenhydrAMINE   Injectable 50 milliGRAM(s) IV Push once PRN  EPINEPHrine     1 mG/mL Injectable 0.3 milliGRAM(s) IntraMuscular once PRN  EPINEPHrine     1 mG/mL Injectable 0.3 milliGRAM(s) IntraMuscular once PRN  fluticasone propionate 50 MICROgram(s)/spray Nasal Spray 1 Spray(s) Both Nostrils two times a day  furosemide    Tablet 40 milliGRAM(s) Oral daily  hydrocortisone sodium succinate Injectable 100 milliGRAM(s) IV Push once PRN  hydrocortisone sodium succinate Injectable 100 milliGRAM(s) IV Push once PRN  immune   globulin 10% (GAMMAGARD) IVPB 115 Gram(s) IV Intermittent daily  influenza   Vaccine 0.5 milliLiter(s) IntraMuscular once  LORazepam     Tablet 1 milliGRAM(s) Oral at bedtime  meperidine     Injectable 25 milliGRAM(s) IV Push once PRN  mineral oil for Topical Use 1 Application(s) Topical three times a day PRN  multivitamin 1 Tablet(s) Oral daily  mupirocin 2% Ointment 1 Application(s) Topical two times a day  traZODone 300 milliGRAM(s) Oral at bedtime  zinc oxide 20% Ointment 1 Application(s) Topical three times a day PRN    Vital Signs Last 24 Hrs  T(F): 99.3 (04-09-21 @ 13:07), Max: 99.3 (04-09-21 @ 13:07)  HR: 115 (04-09-21 @ 13:07)  BP: 109/82 (04-09-21 @ 13:07)  RR: 18 (04-09-21 @ 13:07)  SpO2: 96% (04-09-21 @ 13:07) (95% - 97%)    EXAM:  Constitutional: Not in acute distress. On RA. sitting in chair  Eyes: No icterus.   RS: Chest clear.  CVS: S1, S2 heard.   Abdomen: Soft.   : No acute abnormalities  Extremities: right lower leg decreased swelling. desquamating.   Skin: B/l LE skin lesions appear to be improving  Neuro: Alert, non-verbal  IV: left arm peripheral                                                          11.1   16.26 )-----------( 25       ( 09 Apr 2021 08:21 )             35.5                   MICROBIOLOGY:Culture Results:   No growth (03-31 @ 15:58)  Culture Results:   No growth (03-30 @ 16:38)  Culture Results:   No growth (03-29 @ 16:47)  Culture Results:   No growth  (03-29 @ 16:47)      vncoVancomycin Level, Trough (04.07.21 @ 07:17)   Vancomycin Level, Trough: 11.6:

## 2021-04-09 NOTE — PROGRESS NOTE ADULT - SUBJECTIVE AND OBJECTIVE BOX
[facility] Division of Hospital Medicine  Jono Omer) MD [place]  Pager [phone]    SUBJECTIVE:  Follow up for ITP.    Pt seen and evaluated at bedside this AM. No o/n events. Pt is nonverbal. He was found sitting in chair looking at an iPad. Mother was present. Pt was doing well otherwise.      ROS: All systems negative except as noted.      Vital Signs Last 24 Hrs  T(C): 37.4 (09 Apr 2021 13:07), Max: 37.4 (09 Apr 2021 13:07)  T(F): 99.3 (09 Apr 2021 13:07), Max: 99.3 (09 Apr 2021 13:07)  HR: 115 (09 Apr 2021 13:07) (103 - 115)  BP: 109/82 (09 Apr 2021 13:07) (102/51 - 115/60)  BP(mean): --  RR: 18 (09 Apr 2021 13:07) (16 - 18)  SpO2: 96% (09 Apr 2021 13:07) (95% - 97%)      PHYSICAL EXAM:  Gen- In chair, comfortable, NAD  Eyes- EOMI, PERRLA, nonicteric.  EMNT- Fair dentition. MMM. No tonsilar exudates. No posterior pharynx erythema.  Neck- Supple. No masses. No tracheal deviation.  Resp- CTAB, adequate effort. No r/r/w. No accessory muscle use.  CVS- RRR, S1S2, no g/r/m. No LE edema.  GI- Soft abd, NT, ND, +BSx4. No HSM.  MSK- No C/C. ROM intact. No crepitus.  Skin- B/l LE dressing in place. C/d/i.     MEDICATION:  MEDICATIONS  (STANDING):  Avatrombopag (Doptelet) 20mg Tablet 40 milliGRAM(s) 40 milliGRAM(s) Oral daily  BACItracin   Ointment 1 Application(s) Topical two times a day  brexpiprazole 6 milliGRAM(s) Oral daily  cetirizine 10 milliGRAM(s) Oral at bedtime  chlorhexidine 4% Liquid 1 Application(s) Topical <User Schedule>  cimetidine 400 milliGRAM(s) Oral three times a day  Doxycycline hyclate tablet 100 milliGRAM(s) 1 Tablet(s) Oral two times a day  fluticasone propionate 50 MICROgram(s)/spray Nasal Spray 1 Spray(s) Both Nostrils two times a day  furosemide    Tablet 40 milliGRAM(s) Oral daily  influenza   Vaccine 0.5 milliLiter(s) IntraMuscular once  LORazepam     Tablet 1 milliGRAM(s) Oral at bedtime  multivitamin 1 Tablet(s) Oral daily  traZODone 300 milliGRAM(s) Oral at bedtime    MEDICATIONS  (PRN):  acetaminophen   Tablet .. 650 milliGRAM(s) Oral every 6 hours PRN Temp greater or equal to 38C (100.4F)  ALBUTerol    0.083%. 2.5 milliGRAM(s) Nebulizer once PRN PRN Chemotherapy Reaction  ALBUTerol    0.083%. 2.5 milliGRAM(s) Nebulizer once PRN PRN Chemotherapy Reaction  mineral oil for Topical Use 1 Application(s) Topical three times a day PRN to clean area after BM  zinc oxide 20% Ointment 1 Application(s) Topical three times a day PRN rash, apply to the area after each BM        LABORATORY:                          11.1   16.26 )-----------( 25       ( 09 Apr 2021 08:21 )             35.5           COVID-19 PCR: NotDetec (12 Mar 2021 15:23)  COVID-19 PCR: Detected (27 Feb 2021 00:59)  COVID-19 PCR: Detected (04 Feb 2021 14:33)  COVID-19 PCR: Detected (30 Jan 2021 16:14)  COVID-19 PCR: Detected (25 Jan 2021 19:56)  COVID-19 PCR: Detected (22 Jan 2021 22:01)               [facility] Division of Hospital Medicine  Jono Omer) MD [place]  Pager [phone]    SUBJECTIVE:  Follow up for ITP.    Pt seen and evaluated at bedside this AM. No o/n events. Pt is nonverbal. He was found sitting in chair looking at an iPad. Mother was present. Pt was doing well otherwise.      ROS: All systems negative except as noted.      Vital Signs Last 24 Hrs  T(C): 37.4 (09 Apr 2021 13:07), Max: 37.4 (09 Apr 2021 13:07)  T(F): 99.3 (09 Apr 2021 13:07), Max: 99.3 (09 Apr 2021 13:07)  HR: 115 (09 Apr 2021 13:07) (103 - 115)  BP: 109/82 (09 Apr 2021 13:07) (102/51 - 115/60)  BP(mean): --  RR: 18 (09 Apr 2021 13:07) (16 - 18)  SpO2: 96% (09 Apr 2021 13:07) (95% - 97%)      PHYSICAL EXAM:  Gen- In chair, comfortable, NAD  Eyes- EOMI, PERRLA, nonicteric.  EMNT- Fair dentition. MMM. No tonsilar exudates. No posterior pharynx erythema.  Neck- Supple. No masses. No tracheal deviation.  Resp- CTAB, adequate effort. No r/r/w. No accessory muscle use.  CVS- RRR, S1S2, no g/r/m. R>L LE edema.  GI- Soft abd, NT, ND, +BSx4. No HSM.  MSK- No C/C. ROM intact. No crepitus.  Skin- B/l LE dressing in place. C/d/i.     MEDICATION:  MEDICATIONS  (STANDING):  Avatrombopag (Doptelet) 20mg Tablet 40 milliGRAM(s) 40 milliGRAM(s) Oral daily  BACItracin   Ointment 1 Application(s) Topical two times a day  brexpiprazole 6 milliGRAM(s) Oral daily  cetirizine 10 milliGRAM(s) Oral at bedtime  chlorhexidine 4% Liquid 1 Application(s) Topical <User Schedule>  cimetidine 400 milliGRAM(s) Oral three times a day  Doxycycline hyclate tablet 100 milliGRAM(s) 1 Tablet(s) Oral two times a day  fluticasone propionate 50 MICROgram(s)/spray Nasal Spray 1 Spray(s) Both Nostrils two times a day  furosemide    Tablet 40 milliGRAM(s) Oral daily  influenza   Vaccine 0.5 milliLiter(s) IntraMuscular once  LORazepam     Tablet 1 milliGRAM(s) Oral at bedtime  multivitamin 1 Tablet(s) Oral daily  traZODone 300 milliGRAM(s) Oral at bedtime    MEDICATIONS  (PRN):  acetaminophen   Tablet .. 650 milliGRAM(s) Oral every 6 hours PRN Temp greater or equal to 38C (100.4F)  ALBUTerol    0.083%. 2.5 milliGRAM(s) Nebulizer once PRN PRN Chemotherapy Reaction  ALBUTerol    0.083%. 2.5 milliGRAM(s) Nebulizer once PRN PRN Chemotherapy Reaction  mineral oil for Topical Use 1 Application(s) Topical three times a day PRN to clean area after BM  zinc oxide 20% Ointment 1 Application(s) Topical three times a day PRN rash, apply to the area after each BM        LABORATORY:                          11.1   16.26 )-----------( 25       ( 09 Apr 2021 08:21 )             35.5           COVID-19 PCR: NotDetec (12 Mar 2021 15:23)  COVID-19 PCR: Detected (27 Feb 2021 00:59)  COVID-19 PCR: Detected (04 Feb 2021 14:33)  COVID-19 PCR: Detected (30 Jan 2021 16:14)  COVID-19 PCR: Detected (25 Jan 2021 19:56)  COVID-19 PCR: Detected (22 Jan 2021 22:01)

## 2021-04-10 LAB
HCT VFR BLD CALC: 33.1 % — LOW (ref 39–50)
HGB BLD-MCNC: 10.5 G/DL — LOW (ref 13–17)
MCHC RBC-ENTMCNC: 26.3 PG — LOW (ref 27–34)
MCHC RBC-ENTMCNC: 31.7 GM/DL — LOW (ref 32–36)
MCV RBC AUTO: 82.8 FL — SIGNIFICANT CHANGE UP (ref 80–100)
NRBC # BLD: 0 /100 WBCS — SIGNIFICANT CHANGE UP
NRBC # FLD: 0.03 K/UL — HIGH
PLATELET # BLD AUTO: 37 K/UL — LOW (ref 150–400)
RBC # BLD: 4 M/UL — LOW (ref 4.2–5.8)
RBC # FLD: 16.5 % — HIGH (ref 10.3–14.5)
WBC # BLD: 14.75 K/UL — HIGH (ref 3.8–10.5)
WBC # FLD AUTO: 14.75 K/UL — HIGH (ref 3.8–10.5)

## 2021-04-10 PROCEDURE — 99232 SBSQ HOSP IP/OBS MODERATE 35: CPT | Mod: CS

## 2021-04-10 RX ADMIN — Medication 400 MILLIGRAM(S): at 19:59

## 2021-04-10 RX ADMIN — Medication 40 MILLIGRAM(S): at 06:15

## 2021-04-10 RX ADMIN — Medication 400 MILLIGRAM(S): at 06:12

## 2021-04-10 RX ADMIN — Medication 1 TABLET(S): at 12:19

## 2021-04-10 RX ADMIN — Medication 1 MILLIGRAM(S): at 19:59

## 2021-04-10 RX ADMIN — CETIRIZINE HYDROCHLORIDE 10 MILLIGRAM(S): 10 TABLET ORAL at 19:59

## 2021-04-10 RX ADMIN — BREXPIPRAZOLE 6 MILLIGRAM(S): 0.25 TABLET ORAL at 12:20

## 2021-04-10 RX ADMIN — Medication 1 APPLICATION(S): at 06:12

## 2021-04-10 RX ADMIN — CHLORHEXIDINE GLUCONATE 1 APPLICATION(S): 213 SOLUTION TOPICAL at 06:12

## 2021-04-10 RX ADMIN — Medication 300 MILLIGRAM(S): at 19:59

## 2021-04-10 RX ADMIN — Medication 400 MILLIGRAM(S): at 12:20

## 2021-04-10 NOTE — PROGRESS NOTE ADULT - PROBLEM SELECTOR PLAN 1
- Hx of chronic ITP since age 18 months however has been in remission and then emerged again after COVID in Jan 2021, now s/p convalescent plasma (3/11)  - Received IVIG 2/28 without adequate response,, repeat IVIG x2 on 4/1-4/2  -per heme recommendations will transfuse 1/2 unit platelets every 12 hrs for plt <5  or if bleeding.   -s/p Decadron 40 mg IV (2/28-3/3), completed prolonged prednisone taper  - received Rituximab on 3/20,3/27 and Romiplastin/Nplate dose 3/21,3/28, got additional 1500 mcg Nplate 4/4  - Insurance approved PO Doptelet, started 4/7  - Pt's mother is understandably adamantly opposed to splenectomy due to concern of poor wound healing in a pt who can't communicate needs  - Discussed with heme, will continue to monitor on Doptelet and consider Cytoxan as next resort (this will require a PICC vs. chemoport).    - Reviewed all pt medications and none are known to cause thrombocytopenia as adverse effect  - Plt count recovering, today >30. Cont to monitor trend.

## 2021-04-10 NOTE — PROGRESS NOTE ADULT - SUBJECTIVE AND OBJECTIVE BOX
[facility] Division of Hospital Medicine  Jono Omer) MD [place]  Pager [phone]    SUBJECTIVE:  Follow up for ITP.    Pt seen and evaluated at bedside this AM. No o/n events. Appeared comfortable. Mother at bedside. No complaints. Overall pt is doing better. Leg wounds healing nicely. Still has right >left swelling.      ROS: Unable to obtain - pt is nonverbal.      Vital Signs Last 24 Hrs  T(C): 37.2 (10 Apr 2021 06:10), Max: 37.5 (09 Apr 2021 20:25)  T(F): 98.9 (10 Apr 2021 06:10), Max: 99.5 (09 Apr 2021 20:25)  HR: 98 (10 Apr 2021 06:10) (98 - 99)  BP: 107/75 (10 Apr 2021 06:10) (107/75 - 120/59)  BP(mean): --  RR: 18 (10 Apr 2021 06:10) (18 - 18)  SpO2: 98% (10 Apr 2021 06:10) (97% - 98%)      PHYSICAL EXAM:  Gen- In chair, comfortable, NAD  Eyes- EOMI, PERRLA, nonicteric.  EMNT- Fair dentition. MMM. No tonsilar exudates. No posterior pharynx erythema.  Neck- Supple. No masses. No tracheal deviation.  Resp- CTAB, adequate effort. No r/r/w. No accessory muscle use.  CVS- RRR, S1S2, no g/r/m. R>L LE edema.  GI- Soft abd, NT, ND, +BSx4. No HSM.  MSK- No C/C. ROM intact. No crepitus.  Skin- LE wounds healing. No drainage/DC.    MEDICATION:  MEDICATIONS  (STANDING):  Avatrombopag (Doptelet) 20mg Tablet 40 milliGRAM(s) 40 milliGRAM(s) Oral daily  BACItracin   Ointment 1 Application(s) Topical two times a day  brexpiprazole 6 milliGRAM(s) Oral daily  cetirizine 10 milliGRAM(s) Oral at bedtime  chlorhexidine 4% Liquid 1 Application(s) Topical <User Schedule>  cimetidine 400 milliGRAM(s) Oral three times a day  Doxycycline hyclate tablet 100 milliGRAM(s) 1 Tablet(s) Oral two times a day  fluticasone propionate 50 MICROgram(s)/spray Nasal Spray 1 Spray(s) Both Nostrils two times a day  furosemide    Tablet 40 milliGRAM(s) Oral daily  influenza   Vaccine 0.5 milliLiter(s) IntraMuscular once  LORazepam     Tablet 1 milliGRAM(s) Oral at bedtime  multivitamin 1 Tablet(s) Oral daily  traZODone 300 milliGRAM(s) Oral at bedtime    MEDICATIONS  (PRN):  acetaminophen   Tablet .. 650 milliGRAM(s) Oral every 6 hours PRN Temp greater or equal to 38C (100.4F)  ALBUTerol    0.083%. 2.5 milliGRAM(s) Nebulizer once PRN PRN Chemotherapy Reaction  ALBUTerol    0.083%. 2.5 milliGRAM(s) Nebulizer once PRN PRN Chemotherapy Reaction  mineral oil for Topical Use 1 Application(s) Topical three times a day PRN to clean area after BM  zinc oxide 20% Ointment 1 Application(s) Topical three times a day PRN rash, apply to the area after each BM            LABORATORY:                          10.5   14.75 )-----------( 37       ( 10 Apr 2021 07:22 )             33.1             COVID-19 PCR: NotDetec (12 Mar 2021 15:23)  COVID-19 PCR: Detected (27 Feb 2021 00:59)  COVID-19 PCR: Detected (04 Feb 2021 14:33)  COVID-19 PCR: Detected (30 Jan 2021 16:14)  COVID-19 PCR: Detected (25 Jan 2021 19:56)  COVID-19 PCR: Detected (22 Jan 2021 22:01)

## 2021-04-10 NOTE — PROGRESS NOTE ADULT - PROBLEM SELECTOR PLAN 5
- No DVT ppx 2/2 ITP  - Remains medically active, awaiting plt count recovery to >30    Dispo- Appreciate SW f/u. Group home can assist w/ pt's follow up needs. As long as plt remain stable or uptrends more - would plan for DC, most likely on Monday at this point. COVID swab in anticipation. POC discussed w/ ACP and pt's mother.

## 2021-04-11 ENCOUNTER — TRANSCRIPTION ENCOUNTER (OUTPATIENT)
Age: 21
End: 2021-04-11

## 2021-04-11 DIAGNOSIS — E87.6 HYPOKALEMIA: ICD-10-CM

## 2021-04-11 LAB
ANION GAP SERPL CALC-SCNC: 10 MMOL/L — SIGNIFICANT CHANGE UP (ref 7–14)
BUN SERPL-MCNC: 15 MG/DL — SIGNIFICANT CHANGE UP (ref 7–23)
CALCIUM SERPL-MCNC: 9 MG/DL — SIGNIFICANT CHANGE UP (ref 8.4–10.5)
CHLORIDE SERPL-SCNC: 103 MMOL/L — SIGNIFICANT CHANGE UP (ref 98–107)
CO2 SERPL-SCNC: 27 MMOL/L — SIGNIFICANT CHANGE UP (ref 22–31)
CREAT SERPL-MCNC: 0.81 MG/DL — SIGNIFICANT CHANGE UP (ref 0.5–1.3)
GLUCOSE SERPL-MCNC: 107 MG/DL — HIGH (ref 70–99)
HCT VFR BLD CALC: 36.9 % — LOW (ref 39–50)
HGB BLD-MCNC: 11.4 G/DL — LOW (ref 13–17)
MAGNESIUM SERPL-MCNC: 1.9 MG/DL — SIGNIFICANT CHANGE UP (ref 1.6–2.6)
MCHC RBC-ENTMCNC: 25.3 PG — LOW (ref 27–34)
MCHC RBC-ENTMCNC: 30.9 GM/DL — LOW (ref 32–36)
MCV RBC AUTO: 82 FL — SIGNIFICANT CHANGE UP (ref 80–100)
NRBC # BLD: 0 /100 WBCS — SIGNIFICANT CHANGE UP
NRBC # FLD: 0.02 K/UL — HIGH
PHOSPHATE SERPL-MCNC: 4.3 MG/DL — SIGNIFICANT CHANGE UP (ref 2.5–4.5)
PLATELET # BLD AUTO: 85 K/UL — LOW (ref 150–400)
POTASSIUM SERPL-MCNC: 3.3 MMOL/L — LOW (ref 3.5–5.3)
POTASSIUM SERPL-SCNC: 3.3 MMOL/L — LOW (ref 3.5–5.3)
RBC # BLD: 4.5 M/UL — SIGNIFICANT CHANGE UP (ref 4.2–5.8)
RBC # FLD: 16.4 % — HIGH (ref 10.3–14.5)
SARS-COV-2 RNA SPEC QL NAA+PROBE: SIGNIFICANT CHANGE UP
SODIUM SERPL-SCNC: 140 MMOL/L — SIGNIFICANT CHANGE UP (ref 135–145)
WBC # BLD: 12.72 K/UL — HIGH (ref 3.8–10.5)
WBC # FLD AUTO: 12.72 K/UL — HIGH (ref 3.8–10.5)

## 2021-04-11 PROCEDURE — 99232 SBSQ HOSP IP/OBS MODERATE 35: CPT | Mod: CS

## 2021-04-11 RX ORDER — POTASSIUM CHLORIDE 20 MEQ
40 PACKET (EA) ORAL ONCE
Refills: 0 | Status: DISCONTINUED | OUTPATIENT
Start: 2021-04-11 | End: 2021-04-12

## 2021-04-11 RX ADMIN — Medication 400 MILLIGRAM(S): at 20:59

## 2021-04-11 RX ADMIN — Medication 400 MILLIGRAM(S): at 05:50

## 2021-04-11 RX ADMIN — Medication 1 MILLIGRAM(S): at 20:59

## 2021-04-11 RX ADMIN — Medication 1 MILLIGRAM(S): at 00:09

## 2021-04-11 RX ADMIN — Medication 300 MILLIGRAM(S): at 20:59

## 2021-04-11 RX ADMIN — CETIRIZINE HYDROCHLORIDE 10 MILLIGRAM(S): 10 TABLET ORAL at 20:59

## 2021-04-11 RX ADMIN — Medication 40 MILLIGRAM(S): at 05:50

## 2021-04-11 RX ADMIN — CHLORHEXIDINE GLUCONATE 1 APPLICATION(S): 213 SOLUTION TOPICAL at 05:51

## 2021-04-11 NOTE — DISCHARGE NOTE PROVIDER - NSDCCPCAREPLAN_GEN_ALL_CORE_FT
PRINCIPAL DISCHARGE DIAGNOSIS  Diagnosis: Idiopathic thrombocytopenia purpura  Assessment and Plan of Treatment: You were treated at [facility] by hematology in order to optimize your platelet count.  You were given intermittent transfusions, IVIG medication and started on Doptelet  You need to follow up weekly for lab work and treatments at the [facility].        SECONDARY DISCHARGE DIAGNOSES  Diagnosis: Wound of right lower extremity, subsequent encounter  Assessment and Plan of Treatment: You were treated with antibiotics during your stay    Diagnosis: Intellectual disability  Assessment and Plan of Treatment: Continue medications     PRINCIPAL DISCHARGE DIAGNOSIS  Diagnosis: Idiopathic thrombocytopenia purpura  Assessment and Plan of Treatment: Idiopathic thrombocytopenia purpura.     - Hx of chronic ITP since age 18 months however has been in remission and then emerged again after COVID in Jan 2021, now s/p convalescent plasma (3/11)  - Received IVIG 2/28 without adequate response,, repeat IVIG x2 on 4/1-4/2  -s/p Decadron 40 mg IV (2/28-3/3), completed prolonged prednisone taper  - received Rituximab on 3/20,3/27 and Romiplastin/Nplate dose 3/21,3/28, got additional 1500 mcg Nplate 4/4  - Insurance approved PO Doptelet, started 4/7  - Plt count recovering now up to 170  - Discussed with heme team, plan to resume PO Doptelet at current dose. N/V can be a side effect, however would not dose adjust as platlet count is just starting to recover.  Can be crushed with yogurt or pudding   - Pt will need close f/u with [facility], confirmed that group home can accommodate blood draw needs.   -Will confirm follow-up appointment with Dr. Bhagat. Patient will need [place] [facility] [place]-up  You were treated at length by hematology in order to optimize your platelet count.  You were given intermittent transfusions, IVIG medication and started on Doptelet  You need to follow up weekly for lab work and treatments at the [facility] cancer center.        SECONDARY DISCHARGE DIAGNOSES  Diagnosis: Wound of right lower extremity, subsequent encounter  Assessment and Plan of Treatment: Wound of right lower extremity,    - Dermatology  consulted Right Lower extremity erythema improving, b/l lower extreemity wounds improving as well  - Cont wound care/ skin care as per Derm   - continue with lasix 40mg daily.   - repeat right lower extremtiy Va duplex negative for DVT, initial US b/l Lower extremity  03/15 negative for DVT  - evaluated by vascular surgery wounds don't appear to be vascular in nature  - Much improved with PO Doxy    You were treated with antibiotics during your stay      Diagnosis: Intellectual disability  Assessment and Plan of Treatment: Continue medications as perscribed    Diagnosis: 2019 novel coronavirus disease (COVID-19)  Assessment and Plan of Treatment:   hx of COVID 1/22, covid negative on 3/12, not requiring supplemental O2  -CXR w/out infiltrates  -s/p convalescent plasma therapy on 3/11.     PRINCIPAL DISCHARGE DIAGNOSIS  Diagnosis: Idiopathic thrombocytopenia purpura  Assessment and Plan of Treatment: Idiopathic thrombocytopenia purpura.     - Hx of chronic ITP since age 18 months however has been in remission and then emerged again after COVID in Jan 2021, now s/p convalescent plasma (3/11)  - Received IVIG 2/28 without adequate response,, repeat IVIG x2 on 4/1-4/2  -s/p Decadron 40 mg IV (2/28-3/3), completed prolonged prednisone taper  - received Rituximab on 3/20,3/27 and Romiplastin/Nplate dose 3/21,3/28, got additional 1500 mcg Nplate 4/4  - Insurance approved PO Doptelet, started 4/7  - Platlet count recovering now up to 170  - Discussed with hemeatology  team, plan to resume PO Doptelet at current dose. Nasuea and vomiting can be a side effect, however would not dose adjust as platlet count is just starting to recover.  Can be crushed with yogurt or pudding   - Pt will need close f/u with [facility], confirmed that group home can accommodate blood draw needs.   -follow-up appointment with Dr. Bhagat 4/15 at 3 pm.   Patient will need to be [place] [facility] [place]-up  You were treated at length by hematology in order to optimize your platelet count.  You were given intermittent transfusions, IVIG medication and started on Doptelet  You need to follow up weekly for lab work and treatments at the [facility] cancer center.        SECONDARY DISCHARGE DIAGNOSES  Diagnosis: Wound of right lower extremity, subsequent encounter  Assessment and Plan of Treatment: Wound of right lower extremity,    - Dermatology  consulted Right Lower extremity erythema improving, b/l lower extreemity wounds improving as well  - Cont wound care/ skin care as per Derm   - continue with lasix 40mg daily.   - repeat right lower extremtiy Va duplex negative for DVT, initial US b/l Lower extremity  03/15 negative for DVT  - evaluated by vascular surgery wounds don't appear to be vascular in nature  - Much improved with PO Doxy    You were treated with antibiotics during your stay  wound care orders - LE wound care:  -- -Recommend application of mupirocin ointment to eroded areas of lesions (on R shin)  -Recommend wrapping b/l lower legs/calves with ACE wraps to help prevent fluid accumulation   -Try to keep legs elevated as much as possible,   -Please continue to monitor for any changes in lesions, such as rapid expansion or ulceration   Irritant contact dermatitis of intergluteal fold  - Stop using wipes and fragranced barrier cream  - Apply zinc oxide 20% cream to area several times per day  - pure mineral oil and cotton swabs to clean the area after patient has bowel movements       Diagnosis: Intellectual disability  Assessment and Plan of Treatment: Continue medications as perscribed    Diagnosis: 2019 novel coronavirus disease (COVID-19)  Assessment and Plan of Treatment:   hx of COVID 1/22, covid negative on 3/12, not requiring supplemental O2  -CXR w/out infiltrates  -s/p convalescent plasma therapy on 3/11.     PRINCIPAL DISCHARGE DIAGNOSIS  Diagnosis: Idiopathic thrombocytopenia purpura  Assessment and Plan of Treatment: Idiopathic thrombocytopenia purpura.     - Hx of chronic ITP since age 18 months however has been in remission and then emerged again after COVID in Jan 2021, now s/p convalescent plasma (3/11)  - Received IVIG 2/28 without adequate response,, repeat IVIG x2 on 4/1-4/2  -s/p Decadron 40 mg IV (2/28-3/3), completed prolonged prednisone taper  - received Rituximab on 3/20,3/27 and Romiplastin/Nplate dose 3/21,3/28, got additional 1500 mcg Nplate 4/4  - Insurance approved PO Doptelet, started 4/7  - Platlet count recovering now up to 170  - Discussed with hemeatology  team, plan to resume PO Doptelet at current dose. Nasuea and vomiting can be a side effect, however would not dose adjust as platlet count is just starting to recover.  Can be crushed with yogurt or pudding   - Pt will need close f/u with [facility], confirmed that group home can accommodate blood draw needs.   -follow-up appointment with Dr. Bhagat 4/15 at 3 pm.   Patient will need to [place] [facility] [place]-up  You were treated at length by hematology in order to optimize your platelet count.  You were given intermittent transfusions, IVIG medication and started on Doptelet  You need to follow up weekly for lab work and treatments at the [facility] cancer center.        SECONDARY DISCHARGE DIAGNOSES  Diagnosis: Wound of right lower extremity, subsequent encounter  Assessment and Plan of Treatment: Wound of right lower extremity,    - Dermatology  consulted Right Lower extremity erythema improved, b/l lower extremity wounds improving as well  s/p lasix   - repeat right lower extremtiy Va duplex negative for DVT, initial US b/l Lower extremity  03/15 negative for DVT  - evaluated by vascular surgery wounds don't appear to be vascular in nature  - Much improved with PO Doxy    You were treated with antibiotics during your stay      Diagnosis: Intellectual disability  Assessment and Plan of Treatment: Continue medications as perscribed    Diagnosis: 2019 novel coronavirus disease (COVID-19)  Assessment and Plan of Treatment:   hx of COVID 1/22, covid negative on 3/12, not requiring supplemental O2  -CXR w/out infiltrates  -s/p convalescent plasma therapy on 3/11.     PRINCIPAL DISCHARGE DIAGNOSIS  Diagnosis: Idiopathic thrombocytopenia purpura  Assessment and Plan of Treatment: Idiopathic thrombocytopenia purpura.     - Hx of chronic ITP since age 18 months however has been in remission and then emerged again after COVID in Jan 2021, now s/p convalescent plasma (3/11)  - Received IVIG 2/28 without adequate response,, repeat IVIG x2 on 4/1-4/2  -s/p Decadron 40 mg IV (2/28-3/3), completed prolonged prednisone taper  - received Rituximab on 3/20,3/27 and Romiplastin/Nplate dose 3/21,3/28, got additional 1500 mcg Nplate 4/4  - Insurance approved PO Doptelet, started 4/7  - Platlet count recovering now up to 170  - Discussed with hemeatology  team, plan to resume PO Doptelet at current dose. Nasuea and vomiting can be a side effect, however would not dose adjust as platlet count is just starting to recover.  Can be crushed with yogurt or pudding   - Pt will need close f/u with [facility], confirmed that group home can accommodate blood draw needs.   -follow-up appointment with Dr. Bhagat 4/15 at 3 pm.   Patient will need to be seen at [facility] this week for close follow-up  You were treated at length by hematology in order to optimize your platelet count.  You were given intermittent transfusions, IVIG medication and started on Doptelet  You need to follow up weekly for lab work and treatments at the [facility] cancer center.        SECONDARY DISCHARGE DIAGNOSES  Diagnosis: Wound of right lower extremity, subsequent encounter  Assessment and Plan of Treatment: Wound of right lower extremity,    - Dermatology  consulted Right Lower extremity erythema improved, b/l lower extremity wounds improving as well  s/p lasix   - repeat right lower extremtiy Va duplex negative for DVT, initial US b/l Lower extremity  03/15 negative for DVT  - evaluated by vascular surgery wounds don't appear to be vascular in nature  - Much improved with PO Doxy    -triad to sacrum    You were treated with antibiotics during your stay      Diagnosis: Intellectual disability  Assessment and Plan of Treatment: Continue medications as perscribed    Diagnosis: 2019 novel coronavirus disease (COVID-19)  Assessment and Plan of Treatment:   hx of COVID 1/22, covid negative on 3/12, not requiring supplemental O2  -CXR w/out infiltrates  -s/p convalescent plasma therapy on 3/11.

## 2021-04-11 NOTE — PROGRESS NOTE ADULT - PROBLEM SELECTOR PLAN 3
-c/w home regimen: trazodone 300 mg qhs, brexpiprazole (rixulti) 6 mg AM, ativan 1mg qhs (home meds) -c/w home regimen: trazodone 300 mg qhs, brexpiprazole (rixulti) 6 mg AM, ativan 1mg qhs (home meds)  -Agitation overnight. Pt's mom showed a video of pt's eyes rolling back. Pt was awake/crying at the time. This has occurred in the past before w/ high dose steroids per mom (pt not currently on it). No reports of fall or other seizure like activities. Monitor for now. If any changes to clinical status - would obtain CTH. Replete KCl. -Hold off supplement KCl. Encourage K -rich food. Pt's mother is concern re: his ability to tolerate it. Reassess in AM.

## 2021-04-11 NOTE — DISCHARGE NOTE PROVIDER - CARE PROVIDERS DIRECT ADDRESSES
,[email or web address]

## 2021-04-11 NOTE — PROGRESS NOTE ADULT - PROBLEM SELECTOR PLAN 1
- Hx of chronic ITP since age 18 months however has been in remission and then emerged again after COVID in Jan 2021, now s/p convalescent plasma (3/11)  - Received IVIG 2/28 without adequate response,, repeat IVIG x2 on 4/1-4/2  -per heme recommendations will transfuse 1/2 unit platelets every 12 hrs for plt <5  or if bleeding.   -s/p Decadron 40 mg IV (2/28-3/3), completed prolonged prednisone taper  - received Rituximab on 3/20,3/27 and Romiplastin/Nplate dose 3/21,3/28, got additional 1500 mcg Nplate 4/4  - Insurance approved PO Doptelet, started 4/7  - Pt's mother is understandably adamantly opposed to splenectomy due to concern of poor wound healing in a pt who can't communicate needs  - Discussed with heme, will continue to monitor on Doptelet and consider Cytoxan as next resort (this will require a PICC vs. chemoport).    - Reviewed all pt medications and none are known to cause thrombocytopenia as adverse effect  - Plt count recovering, up to 85. Cont to monitor trend. - Hx of chronic ITP since age 18 months however has been in remission and then emerged again after COVID in Jan 2021, now s/p convalescent plasma (3/11)  - Received IVIG 2/28 without adequate response,, repeat IVIG x2 on 4/1-4/2  -per heme recommendations will transfuse 1/2 unit platelets every 12 hrs for plt <5  or if bleeding.   -s/p Decadron 40 mg IV (2/28-3/3), completed prolonged prednisone taper  - received Rituximab on 3/20,3/27 and Romiplastin/Nplate dose 3/21,3/28, got additional 1500 mcg Nplate 4/4  - Insurance approved PO Doptelet, started 4/7  - Pt's mother is understandably adamantly opposed to splenectomy due to concern of poor wound healing in a pt who can't communicate needs  - Discussed with heme, will continue to monitor on Doptelet and consider Cytoxan as next resort (this will require a PICC vs. chemoport).    - Reviewed all pt medications and none are known to cause thrombocytopenia as adverse effect  - Plt count recovering, up to 85. Cont to monitor trend.  - Vomited not too long after receiving dose of Doptelet. No need to give additional dose today. Upon DC - pt should cont Doptelet 40 daily, can Rx 14 days. Discussed w/ hematology fellow. - Hx of chronic ITP since age 18 months however has been in remission and then emerged again after COVID in Jan 2021, now s/p convalescent plasma (3/11)  - Received IVIG 2/28 without adequate response,, repeat IVIG x2 on 4/1-4/2  -per heme recommendations will transfuse 1/2 unit platelets every 12 hrs for plt <5  or if bleeding.   -s/p Decadron 40 mg IV (2/28-3/3), completed prolonged prednisone taper  - received Rituximab on 3/20,3/27 and Romiplastin/Nplate dose 3/21,3/28, got additional 1500 mcg Nplate 4/4  - Insurance approved PO Doptelet, started 4/7  - Pt's mother is understandably adamantly opposed to splenectomy due to concern of poor wound healing in a pt who can't communicate needs  - Discussed with heme, will continue to monitor on Doptelet and consider Cytoxan as next resort (this will require a PICC vs. chemoport).    - Reviewed all pt medications and none are known to cause thrombocytopenia as adverse effect  - Plt count recovering, up to 85. Cont to monitor trend.  - Vomited not too long after receiving dose of Doptelet. Will give supplemental dose now per heme. Upon DC - pt should cont Doptelet 40 daily, can Rx 14 days. Discussed w/ hematology fellow.

## 2021-04-11 NOTE — DISCHARGE NOTE PROVIDER - NSDCMRMEDTOKEN_GEN_ALL_CORE_FT
brexpiprazole 3 mg oral tablet: 2 tab(s) orally once a day MDD:2  calcium carbonate 500 mg (200 mg elemental calcium) oral tablet, chewable: 1 tab(s) orally 4 times a day, As needed, Indigestion  loratadine 10 mg oral tablet: 1 tab(s) orally once  Multiple Vitamins oral tablet: 1 tab(s) orally once a day  pantoprazole 40 mg oral delayed release tablet: 1 tab(s) orally once a day (before a meal)  predniSONE 20 mg oral tablet: 4 tab(s) orally once a day  Promacta 75 mg oral tablet: 1 tab(s) orally once a day   - TEST SCRIPT - please verify coverage and page [id number]- Sarah TOUSSAINT   traZODone 300 mg oral tablet: 1 tab(s) orally once a day (at bedtime)   acetaminophen 325 mg oral tablet: 2 tab(s) orally every 6 hours, As needed, Temp greater or equal to 38C (100.4F)  brexpiprazole 3 mg oral tablet: 2 tab(s) orally once a day MDD:2  cetirizine 10 mg oral tablet: 1 tab(s) orally once a day (at bedtime)  cimetidine 400 mg oral tablet: 1 tab(s) orally 3 times a day  Doptelet 20 mg oral tablet: 2 tab(s) orally once a day   doxycycline hyclate 100 mg oral tablet: 1 tab(s) orally 2 times a day     1 tab tonight 4/12 and 1 tab am   fluticasone 50 mcg/inh nasal spray: 1 spray(s) nasal 2 times a day  Multiple Vitamins oral tablet: 1 tab(s) orally once a day  pantoprazole 40 mg oral delayed release tablet: 1 tab(s) orally once a day (before a meal)  traZODone 300 mg oral tablet: 1 tab(s) orally once a day (at bedtime)   acetaminophen 325 mg oral tablet: 2 tab(s) orally every 6 hours, As needed, Temp greater or equal to 38C (100.4F)  brexpiprazole 3 mg oral tablet: 2 tab(s) orally once a day MDD:2  cetirizine 10 mg oral tablet: 1 tab(s) orally once a day (at bedtime)  cimetidine 400 mg oral tablet: 1 tab(s) orally 3 times a day  Doptelet 20 mg oral tablet: 2 tab(s) orally once a day   doxycycline hyclate 100 mg oral tablet: 1 tab(s) orally 2 times a day     1 tab tonight 4/12 and 1 tab am   fluticasone 50 mcg/inh nasal spray: 1 spray(s) nasal 2 times a day  LORazepam 1 mg oral tablet: 1 tab(s) orally once a day (at bedtime) MDD:1  Multiple Vitamins oral tablet: 1 tab(s) orally once a day  pantoprazole 40 mg oral delayed release tablet: 1 tab(s) orally once a day (before a meal)  traZODone 300 mg oral tablet: 1 tab(s) orally once a day (at bedtime)

## 2021-04-11 NOTE — PROGRESS NOTE ADULT - PROBLEM SELECTOR PLAN 5
- No DVT ppx 2/2 ITP    Dispo- Appreciate SW f/u. Group home can assist w/ pt's follow up needs. PLt count continues to improve. COVID swab today. Dispo in next 24h if he remains stable, cleared by heme, and facility can take him back. - No DVT ppx 2/2 ITP    Dispo- Appreciate SW f/u. Anticipating DC in next 24h if he remains stable. COVID swab today. Needs heme appt prior to DC. resolved  hx of COVID +1/22, covid negative on 3/12, not requiring supplemental O2  -CXR w/out infiltrates  -s/p convalescent plasma therapy on 3/11

## 2021-04-11 NOTE — DISCHARGE NOTE PROVIDER - CARE PROVIDER_API CALL
Von Deleon)  Hematology; HospicePalliative Medicine; Internal Medicine; Medical Oncology  [address] [place] [address]  Phone: [phone]  Fax: [phone]  Follow Up Time:    Von Deleon (MD)  Hematology; [facility]; Internal Medicine; Medical Oncology  [address] [place] [address]  Phone: [phone]  Fax: [phone]  Follow Up Time:     Shelbi Weaver)  Infectious Disease; Internal Medicine  [address] [place] [address]  Phone: [phone]  Fax: [phone]  Follow Up Time:     Devan Singh)  Vascular Surgery  [address] [facility] [place] [address]  Phone: [phone]  Fax: [phone]  Follow Up Time:     Marily Mock)  Medicine  Dermatology  [address] [facility], [address]  [place] [address]  Phone: [phone]  Fax: [phone]  Follow Up Time:     Rosaura Bhagat ()  Hematology; Medical Oncology  [address] [place] [address]  Phone: [phone]  Fax: [phone]  Follow Up Time:

## 2021-04-11 NOTE — PROGRESS NOTE ADULT - PROBLEM SELECTOR PLAN 4
resolved  hx of COVID +1/22, covid negative on 3/12, not requiring supplemental O2  -CXR w/out infiltrates  -s/p convalescent plasma therapy on 3/11 -c/w home regimen: trazodone 300 mg qhs, brexpiprazole (rixulti) 6 mg AM, ativan 1mg qhs (home meds)  -Agitation overnight. Pt's mom showed a video of pt's eyes rolling back. Pt was awake/crying at the time. This has occurred in the past before w/ high dose steroids per mom (pt not currently on it). No reports of fall or other seizure like activities. Monitor for now. If any changes to clinical status - would obtain CTH.

## 2021-04-11 NOTE — DISCHARGE NOTE PROVIDER - HOSPITAL COURSE
21M h/o intellectual disability, autism, nonverbal at baseline, chronic ITP formerly on 80mg of daily prednisone, hospitalized for thrombocytopenia noted on OP labs, admitted for management of refractory ITP since COVID in Jan 2021.  Plt count now improving with Doptelet    Idiopathic thrombocytopenia purpura.     - Hx of chronic ITP since age 18 months however has been in remission and then emerged again after COVID in Jan 2021, now s/p convalescent plasma (3/11)  - Received IVIG 2/28 without adequate response,, repeat IVIG x2 on 4/1-4/2  -per heme recommendations will transfuse 1/2 unit platelets every 12 hrs for plt <5  or if bleeding.   -s/p Decadron 40 mg IV (2/28-3/3), completed prolonged prednisone taper  - received Rituximab on 3/20,3/27 and Romiplastin/Nplate dose 3/21,3/28, got additional 1500 mcg Nplate 4/4  - Insurance approved PO Doptelet, started 4/7  - Plt count recovering now up to 170  - Discussed with heme team, plan to resume PO Doptelet at current dose. N/V can be a side effect, however would not dose adjust as plt count is just starting to recover  - Pt will need close f/u with [facility], which team from last week confirmed that group home can accommodate blood draw needs.   -Will confirm follow-up appointment with Dr. Bhagat. Patient will need to be seen at [facility] this week for close follow-up.    Wound of right lower extremity, subsequent encounter.     - Derm consult note appreciated. RLE erythema improving, b/l LE wounds improving as well  - Cont wound care/ skin care as per Derm   - c/w lasix 40mg daily.   - repeat RLE [facility] duplex neg for DVT, initial US b/l LE 03/15 neg for DVT  - Bcx NTD  - evaluated by vascular sx, wounds don't appear to be vascular in nature  - Much improved with PO Doxy      Hypokalemia.     - Resolved today.     Intellectual disability.    -c/w home regimen: trazodone, brexpiprazole, ativan     2019 novel coronavirus disease (COVID-19  hx of COVID +1/22, covid negative on 3/12, not requiring supplemental O2  -CXR w/out infiltrates  -s/p convalescent plasma therapy on 3/11.      Problem/Plan - 6:  Problem: Prophylactic measure. Plan: - No DVT ppx 2/2 ITP    Dispo- Plan to dc back to group home today once hematology clears patient.   Will need f/u with hematology @ [facility] within 1-2 weeks of discharge   DC time: 32 minutes.       21M h/o intellectual disability, autism, nonverbal at baseline, chronic ITP formerly on 80mg of daily prednisone, hospitalized for thrombocytopenia noted on OP labs, admitted for management of refractory ITP since COVID in Jan 2021.  Plt count now improving with Doptelet    Idiopathic thrombocytopenia purpura.     - Hx of chronic ITP since age 18 months however has been in remission and then emerged again after COVID in Jan 2021, now s/p convalescent plasma (3/11)  - Received IVIG 2/28 without adequate response,, repeat IVIG x2 on 4/1-4/2  -per heme recommendations will transfuse 1/2 unit platelets every 12 hrs for plt <5  or if bleeding.   -s/p Decadron 40 mg IV (2/28-3/3), completed prolonged prednisone taper  - received Rituximab on 3/20,3/27 and Romiplastin/Nplate dose 3/21,3/28, got additional 1500 mcg Nplate 4/4  - Insurance approved PO Doptelet, started 4/7  - Plt count recovering now up to 170  - Discussed with heme team, plan to resume PO Doptelet at current dose. N/V can be a side effect, however would not dose adjust as plt count is just starting to recover.  Can be crushed with yogurt or pudding   - Pt will need close f/u with [facility], which team from last week confirmed that group home can accommodate blood draw needs.   -Will confirm follow-up appointment with Dr. Bhagat. Patient will need to be seen at [facility] this week for close follow-up.    Wound of right lower extremity, subsequent encounter.     - Derm consult note appreciated. RLE erythema improving, b/l LE wounds improving as well  - Cont wound care/ skin care as per Derm   - c/w lasix 40mg daily.   - repeat RLE Va duplex neg for DVT, initial US b/l LE 03/15 neg for DVT  - Bcx NTD  - evaluated by vascular sx, wounds don't appear to be vascular in nature  - Much improved with PO Doxy      Hypokalemia.     - Resolved today.     Intellectual disability.    -c/w home regimen: trazodone, brexpiprazole, ativan     2019 novel coronavirus disease (COVID-19  hx of COVID +1/22, covid negative on 3/12, not requiring supplemental O2  -CXR w/out infiltrates  -s/p convalescent plasma therapy on 3/11.      Problem/Plan - 6:  Problem: Prophylactic measure. Plan: - No DVT ppx 2/2 ITP    Dispo- Plan to dc back to group home today once hematology clears patient.   Will need f/u with hematology @ [facility] within 1-2 weeks of discharge   DC time: 32 minutes.       21M h/o intellectual disability, autism, nonverbal at baseline, chronic ITP formerly on 80mg of daily prednisone, hospitalized for thrombocytopenia noted on OP labs, admitted for management of refractory ITP since COVID in Jan 2021.  Plt count now improving with Doptelet    Idiopathic thrombocytopenia purpura.     - Hx of chronic ITP since age 18 months however has been in remission and then emerged again after COVID in Jan 2021, now s/p convalescent plasma (3/11)  - Received IVIG 2/28 without adequate response,, repeat IVIG x2 on 4/1-4/2  -per heme recommendations will transfuse 1/2 unit platelets every 12 hrs for plt <5  or if bleeding.   -s/p Decadron 40 mg IV (2/28-3/3), completed prolonged prednisone taper  - received Rituximab on 3/20,3/27 and Romiplastin/Nplate dose 3/21,3/28, got additional 1500 mcg Nplate 4/4  - Insurance approved PO Doptelet, started 4/7  - Plt count recovering now up to 170  - Discussed with heme team, plan to resume PO Doptelet at current dose. N/V can be a side effect, however would not dose adjust as plt count is just starting to recover.  Can be crushed with yogurt or pudding   - Pt will need close f/u with [facility], which team from last week confirmed that group home can accommodate blood draw needs.   -Will confirm follow-up appointment with Dr. Bhagat. Patient will need to be seen at [facility] this week for close follow-up.    Wound of right lower extremity, subsequent encounter.     - Derm consult note appreciated. RLE erythema improving, b/l LE wounds improving as well  - Cont wound care/ skin care as per Derm   - c/w lasix 40mg daily.   - repeat RLE Va duplex neg for DVT, initial US b/l LE 03/15 neg for DVT  - Bcx NTD  - evaluated by vascular sx, wounds don't appear to be vascular in nature  - Much improved with PO Doxy      Hypokalemia.     - Resolved today.     Intellectual disability.    -c/w home regimen: trazodone, brexpiprazole, ativan     2019 novel coronavirus disease (COVID-19  hx of COVID +1/22, covid negative on 3/12, not requiring supplemental O2  -CXR w/out infiltrates  -s/p convalescent plasma therapy on 3/11.     Dispo- Plan to dc back to group home today          21M h/o intellectual disability, autism, nonverbal at baseline, chronic ITP formerly on 80mg of daily prednisone, hospitalized for thrombocytopenia noted on OP labs, admitted for management of refractory ITP since COVID in Jan 2021 s/p convalescent [facility].  Plt count now improving with Doptelet.  Pt received IVIG x 3 with no sustained response, he also received Decadron and completed a prolonged Prednisone taper (now completed).  Other agents included Rituxan and Nplate x 4 doses, with minimal response in plt count.  Hematology reviewed peripheral smear, findings consistent with ITP.  Pt then was started on PO doptelet and by time of discharge, plt counts steadily gustavo and at time of discharge was at 173.   Pt developed RLE rash/cellulitis for which he completed 7 days of IV Vanco with additional 5 days of PO doxycycline.  DVT study was negative.   Pt was medically stable for dc back to group home with PO Doptelet. He was scheduled hematology f/u for April 15th @ 3PM.

## 2021-04-11 NOTE — DISCHARGE NOTE PROVIDER - PROVIDER TOKENS
PROVIDER:[TOKEN:[[id number]]] PROVIDER:[TOKEN:[[id number]]],PROVIDER:[TOKEN:[[id number]]],PROVIDER:[TOKEN:[[id number]]],PROVIDER:[TOKEN:[[id number]]],PROVIDER:[TOKEN:[[id number]]]

## 2021-04-11 NOTE — PROGRESS NOTE ADULT - SUBJECTIVE AND OBJECTIVE BOX
[facility] Division of Hospital Medicine  Jono Omer) MD Polo  Pager [phone]    SUBJECTIVE:  Follow up for thrombocytopenia.      ROS: All systems negative except as noted.      Vital Signs Last 24 Hrs  T(C): 36.8 (11 Apr 2021 05:50), Max: 36.9 (10 Apr 2021 20:02)  T(F): 98.3 (11 Apr 2021 05:50), Max: 98.5 (10 Apr 2021 20:02)  HR: 78 (11 Apr 2021 05:50) (78 - 112)  BP: 121/51 (11 Apr 2021 05:50) (120/95 - 121/51)  BP(mean): --  RR: 18 (11 Apr 2021 05:50) (17 - 18)  SpO2: 100% (11 Apr 2021 05:50) (98% - 100%)      PHYSICAL EXAM:              MEDICATION:  MEDICATIONS  (STANDING):  Avatrombopag (Doptelet) 20mg Tablet 40 milliGRAM(s) 40 milliGRAM(s) Oral daily  BACItracin   Ointment 1 Application(s) Topical two times a day  brexpiprazole 6 milliGRAM(s) Oral daily  cetirizine 10 milliGRAM(s) Oral at bedtime  chlorhexidine 4% Liquid 1 Application(s) Topical <User Schedule>  cimetidine 400 milliGRAM(s) Oral three times a day  Doxycycline hyclate tablet 100 milliGRAM(s) 1 Tablet(s) Oral two times a day  fluticasone propionate 50 MICROgram(s)/spray Nasal Spray 1 Spray(s) Both Nostrils two times a day  furosemide    Tablet 40 milliGRAM(s) Oral daily  influenza   Vaccine 0.5 milliLiter(s) IntraMuscular once  LORazepam     Tablet 1 milliGRAM(s) Oral at bedtime  multivitamin 1 Tablet(s) Oral daily  traZODone 300 milliGRAM(s) Oral at bedtime    MEDICATIONS  (PRN):  acetaminophen   Tablet .. 650 milliGRAM(s) Oral every 6 hours PRN Temp greater or equal to 38C (100.4F)  ALBUTerol    0.083%. 2.5 milliGRAM(s) Nebulizer once PRN PRN Chemotherapy Reaction  ALBUTerol    0.083%. 2.5 milliGRAM(s) Nebulizer once PRN PRN Chemotherapy Reaction  mineral oil for Topical Use 1 Application(s) Topical three times a day PRN to clean area after BM  zinc oxide 20% Ointment 1 Application(s) Topical three times a day PRN rash, apply to the area after each BM            LABORATORY:                          11.4   12.72 )-----------( 85       ( 11 Apr 2021 11:29 )             36.9     04-11    140  |  103  |  15  ----------------------------<  107<H>  3.3<L>   |  27  |  0.81    Ca    9.0      11 Apr 2021 11:29  Phos  4.3     04-11  Mg     1.9     04-11                COVID-19 PCR: NotDetec (12 Mar 2021 15:23)  COVID-19 PCR: Detected (27 Feb 2021 00:59)  COVID-19 PCR: Detected (04 Feb 2021 14:33)  COVID-19 PCR: Detected (30 Jan 2021 16:14)  COVID-19 PCR: Detected (25 Jan 2021 19:56)  COVID-19 PCR: Detected (22 Jan 2021 22:01)               [facility] Division of Hospital Medicine  Jono Omer) MD [place]  Pager [phone]    SUBJECTIVE:  Follow up for thrombocytopenia.    Pt seen and evaluated at bedside    ROS: All systems negative except as noted.      Vital Signs Last 24 Hrs  T(C): 36.8 (11 Apr 2021 05:50), Max: 36.9 (10 Apr 2021 20:02)  T(F): 98.3 (11 Apr 2021 05:50), Max: 98.5 (10 Apr 2021 20:02)  HR: 78 (11 Apr 2021 05:50) (78 - 112)  BP: 121/51 (11 Apr 2021 05:50) (120/95 - 121/51)  BP(mean): --  RR: 18 (11 Apr 2021 05:50) (17 - 18)  SpO2: 100% (11 Apr 2021 05:50) (98% - 100%)      PHYSICAL EXAM:              MEDICATION:  MEDICATIONS  (STANDING):  Avatrombopag (Doptelet) 20mg Tablet 40 milliGRAM(s) 40 milliGRAM(s) Oral daily  BACItracin   Ointment 1 Application(s) Topical two times a day  brexpiprazole 6 milliGRAM(s) Oral daily  cetirizine 10 milliGRAM(s) Oral at bedtime  chlorhexidine 4% Liquid 1 Application(s) Topical <User Schedule>  cimetidine 400 milliGRAM(s) Oral three times a day  Doxycycline hyclate tablet 100 milliGRAM(s) 1 Tablet(s) Oral two times a day  fluticasone propionate 50 MICROgram(s)/spray Nasal Spray 1 Spray(s) Both Nostrils two times a day  furosemide    Tablet 40 milliGRAM(s) Oral daily  influenza   Vaccine 0.5 milliLiter(s) IntraMuscular once  LORazepam     Tablet 1 milliGRAM(s) Oral at bedtime  multivitamin 1 Tablet(s) Oral daily  traZODone 300 milliGRAM(s) Oral at bedtime    MEDICATIONS  (PRN):  acetaminophen   Tablet .. 650 milliGRAM(s) Oral every 6 hours PRN Temp greater or equal to 38C (100.4F)  ALBUTerol    0.083%. 2.5 milliGRAM(s) Nebulizer once PRN PRN Chemotherapy Reaction  ALBUTerol    0.083%. 2.5 milliGRAM(s) Nebulizer once PRN PRN Chemotherapy Reaction  mineral oil for Topical Use 1 Application(s) Topical three times a day PRN to clean area after BM  zinc oxide 20% Ointment 1 Application(s) Topical three times a day PRN rash, apply to the area after each BM            LABORATORY:                          11.4   12.72 )-----------( 85       ( 11 Apr 2021 11:29 )             36.9     04-11    140  |  103  |  15  ----------------------------<  107<H>  3.3<L>   |  27  |  0.81    Ca    9.0      11 Apr 2021 11:29  Phos  4.3     04-11  Mg     1.9     04-11      COVID-19 PCR: NotDetec (12 Mar 2021 15:23)  COVID-19 PCR: Detected (27 Feb 2021 00:59)  COVID-19 PCR: Detected (04 Feb 2021 14:33)  COVID-19 PCR: Detected (30 Jan 2021 16:14)  COVID-19 PCR: Detected (25 Jan 2021 19:56)  COVID-19 PCR: Detected (22 Jan 2021 22:01)               [facility] Division of Hospital Medicine  Jono Omer) MD [place]  Pager [phone]    SUBJECTIVE:  Follow up for thrombocytopenia.    Pt seen and evaluated at bedside this morning. OVernight events noted. Discussed w/ staff and pt's mother. No falls reported. Pt reportedly became agitated and had eyes roll back while he was crying. No other seizure like activities. Episode resolved. He did receive ativan injection. On assessment this AM - pt' was upset and crying. He was eventually redirectable. Otherwise, no new issues.    ROS: All systems negative except as noted.      Vital Signs Last 24 Hrs  T(C): 36.8 (11 Apr 2021 05:50), Max: 36.9 (10 Apr 2021 20:02)  T(F): 98.3 (11 Apr 2021 05:50), Max: 98.5 (10 Apr 2021 20:02)  HR: 78 (11 Apr 2021 05:50) (78 - 112)  BP: 121/51 (11 Apr 2021 05:50) (120/95 - 121/51)  BP(mean): --  RR: 18 (11 Apr 2021 05:50) (17 - 18)  SpO2: 100% (11 Apr 2021 05:50) (98% - 100%)      PHYSICAL EXAM:  Gen- In chair, comfortable, NAD  Head- NCAT.  Eyes- EOMI, PERRLA, nonicteric.  EMNT- Fair dentition. MMM. No tonsilar exudates. No posterior pharynx erythema.  Neck- Supple. No masses. No tracheal deviation.  Resp- CTAB, adequate effort. No r/r/w. No accessory muscle use.  CVS- RRR, S1S2, no g/r/m. R>L LE edema.  GI- Soft abd, NT, ND, +BSx4. No HSM.  MSK- No C/C. ROM intact. No crepitus.  Neuro- Nonverbal. Moves all extremities. Strength 5/5. Limited exam, but neurologically nonfocal.  Skin- LE wounds healing. No drainage/DC.      MEDICATION:  MEDICATIONS  (STANDING):  Avatrombopag (Doptelet) 20mg Tablet 40 milliGRAM(s) 40 milliGRAM(s) Oral daily  BACItracin   Ointment 1 Application(s) Topical two times a day  brexpiprazole 6 milliGRAM(s) Oral daily  cetirizine 10 milliGRAM(s) Oral at bedtime  chlorhexidine 4% Liquid 1 Application(s) Topical <User Schedule>  cimetidine 400 milliGRAM(s) Oral three times a day  Doxycycline hyclate tablet 100 milliGRAM(s) 1 Tablet(s) Oral two times a day  fluticasone propionate 50 MICROgram(s)/spray Nasal Spray 1 Spray(s) Both Nostrils two times a day  furosemide    Tablet 40 milliGRAM(s) Oral daily  influenza   Vaccine 0.5 milliLiter(s) IntraMuscular once  LORazepam     Tablet 1 milliGRAM(s) Oral at bedtime  multivitamin 1 Tablet(s) Oral daily  traZODone 300 milliGRAM(s) Oral at bedtime    MEDICATIONS  (PRN):  acetaminophen   Tablet .. 650 milliGRAM(s) Oral every 6 hours PRN Temp greater or equal to 38C (100.4F)  ALBUTerol    0.083%. 2.5 milliGRAM(s) Nebulizer once PRN PRN Chemotherapy Reaction  ALBUTerol    0.083%. 2.5 milliGRAM(s) Nebulizer once PRN PRN Chemotherapy Reaction  mineral oil for Topical Use 1 Application(s) Topical three times a day PRN to clean area after BM  zinc oxide 20% Ointment 1 Application(s) Topical three times a day PRN rash, apply to the area after each BM        LABORATORY:                          11.4   12.72 )-----------( 85       ( 11 Apr 2021 11:29 )             36.9     04-11    140  |  103  |  15  ----------------------------<  107<H>  3.3<L>   |  27  |  0.81    Ca    9.0      11 Apr 2021 11:29  Phos  4.3     04-11  Mg     1.9     04-11      COVID-19 PCR: NotDetec (12 Mar 2021 15:23)  COVID-19 PCR: Detected (27 Feb 2021 00:59)  COVID-19 PCR: Detected (04 Feb 2021 14:33)  COVID-19 PCR: Detected (30 Jan 2021 16:14)  COVID-19 PCR: Detected (25 Jan 2021 19:56)  COVID-19 PCR: Detected (22 Jan 2021 22:01)

## 2021-04-12 ENCOUNTER — TRANSCRIPTION ENCOUNTER (OUTPATIENT)
Age: 21
End: 2021-04-12

## 2021-04-12 VITALS
HEART RATE: 112 BPM | DIASTOLIC BLOOD PRESSURE: 60 MMHG | SYSTOLIC BLOOD PRESSURE: 126 MMHG | TEMPERATURE: 99 F | OXYGEN SATURATION: 98 % | RESPIRATION RATE: 18 BRPM

## 2021-04-12 LAB
ANION GAP SERPL CALC-SCNC: 11 MMOL/L — SIGNIFICANT CHANGE UP (ref 7–14)
BUN SERPL-MCNC: 13 MG/DL — SIGNIFICANT CHANGE UP (ref 7–23)
CALCIUM SERPL-MCNC: 9.2 MG/DL — SIGNIFICANT CHANGE UP (ref 8.4–10.5)
CHLORIDE SERPL-SCNC: 103 MMOL/L — SIGNIFICANT CHANGE UP (ref 98–107)
CO2 SERPL-SCNC: 25 MMOL/L — SIGNIFICANT CHANGE UP (ref 22–31)
CREAT SERPL-MCNC: 0.9 MG/DL — SIGNIFICANT CHANGE UP (ref 0.5–1.3)
GLUCOSE SERPL-MCNC: 113 MG/DL — HIGH (ref 70–99)
HCT VFR BLD CALC: 37.2 % — LOW (ref 39–50)
HGB BLD-MCNC: 11.6 G/DL — LOW (ref 13–17)
MAGNESIUM SERPL-MCNC: 1.7 MG/DL — SIGNIFICANT CHANGE UP (ref 1.6–2.6)
MCHC RBC-ENTMCNC: 25.4 PG — LOW (ref 27–34)
MCHC RBC-ENTMCNC: 31.2 GM/DL — LOW (ref 32–36)
MCV RBC AUTO: 81.6 FL — SIGNIFICANT CHANGE UP (ref 80–100)
NRBC # BLD: 0 /100 WBCS — SIGNIFICANT CHANGE UP
NRBC # FLD: 0.02 K/UL — HIGH
PHOSPHATE SERPL-MCNC: 4 MG/DL — SIGNIFICANT CHANGE UP (ref 2.5–4.5)
PLATELET # BLD AUTO: 173 K/UL — SIGNIFICANT CHANGE UP (ref 150–400)
POTASSIUM SERPL-MCNC: 3.6 MMOL/L — SIGNIFICANT CHANGE UP (ref 3.5–5.3)
POTASSIUM SERPL-SCNC: 3.6 MMOL/L — SIGNIFICANT CHANGE UP (ref 3.5–5.3)
RBC # BLD: 4.56 M/UL — SIGNIFICANT CHANGE UP (ref 4.2–5.8)
RBC # FLD: 16.4 % — HIGH (ref 10.3–14.5)
SODIUM SERPL-SCNC: 139 MMOL/L — SIGNIFICANT CHANGE UP (ref 135–145)
WBC # BLD: 12.77 K/UL — HIGH (ref 3.8–10.5)
WBC # FLD AUTO: 12.77 K/UL — HIGH (ref 3.8–10.5)

## 2021-04-12 PROCEDURE — 99239 HOSP IP/OBS DSCHRG MGMT >30: CPT | Mod: CS

## 2021-04-12 RX ORDER — AVATROMBOPAG MALEATE 20 MG/1
2 TABLET, FILM COATED ORAL
Qty: 60 | Refills: 0
Start: 2021-04-12 | End: 2021-05-11

## 2021-04-12 RX ORDER — CIMETIDINE 200 MG
1 TABLET ORAL
Qty: 90 | Refills: 0
Start: 2021-04-12 | End: 2021-05-11

## 2021-04-12 RX ORDER — CETIRIZINE HYDROCHLORIDE 10 MG/1
1 TABLET ORAL
Qty: 0 | Refills: 0 | DISCHARGE
Start: 2021-04-12

## 2021-04-12 RX ORDER — ACETAMINOPHEN 500 MG
2 TABLET ORAL
Qty: 0 | Refills: 0 | DISCHARGE
Start: 2021-04-12

## 2021-04-12 RX ORDER — FLUTICASONE PROPIONATE 50 MCG
1 SPRAY, SUSPENSION NASAL
Qty: 0 | Refills: 0 | DISCHARGE
Start: 2021-04-12

## 2021-04-12 RX ADMIN — CHLORHEXIDINE GLUCONATE 1 APPLICATION(S): 213 SOLUTION TOPICAL at 06:11

## 2021-04-12 RX ADMIN — Medication 40 MILLIGRAM(S): at 06:10

## 2021-04-12 RX ADMIN — BREXPIPRAZOLE 6 MILLIGRAM(S): 0.25 TABLET ORAL at 12:31

## 2021-04-12 RX ADMIN — Medication 1 TABLET(S): at 12:31

## 2021-04-12 RX ADMIN — Medication 400 MILLIGRAM(S): at 12:32

## 2021-04-12 RX ADMIN — Medication 400 MILLIGRAM(S): at 06:10

## 2021-04-12 NOTE — PROGRESS NOTE ADULT - PROBLEM SELECTOR PLAN 6
- No DVT ppx 2/2 ITP    Dispo- Plan to dc back to group home today once hematology clears patient.   Will need f/u with hematology @ [facility] within 1-2 weeks of discharge   DC time: 32 minutes
- No DVT ppx 2/2 ITP    Dispo- Appreciate SW f/u. Anticipating DC in next 24h if he remains stable. COVID swab today. Needs heme appt prior to DC.

## 2021-04-12 NOTE — DISCHARGE NOTE NURSING/CASE MANAGEMENT/SOCIAL WORK - PATIENT PORTAL LINK FT
You can access the FollowMyHealth Patient Portal offered by [facility] by registering at the following website: [email or web address]. By joining [employer]’s FollowMyHealth portal, you will also be able to view your health information using other applications (apps) compatible with our system.

## 2021-04-12 NOTE — PROGRESS NOTE ADULT - ASSESSMENT
21M h/o intellectual disability, autism, nonverbal at baseline, chronic ITP formerly on 80mg of daily prednisone, hospitalized for thrombocytopenia noted on OP labs, admitted for management of refractory ITP since COVID in Jan 2021.  Plt count now improving with Doptelet

## 2021-04-12 NOTE — PROGRESS NOTE ADULT - REASON FOR ADMISSION
low platelets

## 2021-04-12 NOTE — PROGRESS NOTE ADULT - PROVIDER SPECIALTY LIST ADULT
Heme/Onc
Hospitalist
Dermatology
Heme/Onc
Hospitalist
Hospitalist
Infectious Disease
Infectious Disease
Dermatology
Heme/Onc
Hospitalist
Hospitalist
Infectious Disease
MICU
Heme/Onc
Hospitalist
Infectious Disease
Infectious Disease
Hospitalist
Hospitalist
Infectious Disease
Hospitalist
Hospitalist
Heme/Onc
Hospitalist
Heme/Onc

## 2021-04-12 NOTE — PROGRESS NOTE ADULT - PROBLEM SELECTOR PROBLEM 1
Idiopathic thrombocytopenia purpura

## 2021-04-12 NOTE — CHART NOTE - NSCHARTNOTEFT_GEN_A_CORE
HEMATOLOGY FELLOW NOTE    Patient seen at bedside with mother. Awaiting platelet count form this morning to trend platelets. Patient has been more nauseous, which mother is concerned is due to Doptelet.     Recommendations:    19 yo M with a history of chronic ITP and severe autism (non-verbal at baseline) who presented with severe thrombocytopenia (Plt 7) while on home PO prednisone (80mg daily), and was admitted to Medicine for further management. Hematology consulted for assistance with management.    # Chronic ITP exacerbated in the setting of recent COVID infection   - S/p IVIG 1gm/kg daily x 2 (completed 2/28) and dexamethasone 40mg IV x4 days (completed 3/3)  - S/p W1 Rituximab with desensitization protocol on 3/6; W2 Rituximab on 3/13; s/p W3 Rituximab on 3/20 with desensitization protocol, tolerated well. S/p W4 Rituximab with desensitization protocol yesterday (3/27), tolerated well.   - S/p romiplastim/Nplate, 1mcg/kg; given 2/28; 2mcg/kg given on 3/7 (pt only got 250mcg instead of 300mcg as pharmacy only had 250); 5mcg/kg (750mcg) given on 3/14, S/p 750mcg (5mcg/kg) on 3/21. S/p 4 weekly doses of Rituxan. S/p 1500mcg of NPLATE on 3/29 and 4/4.  Given autism, patient is unable to swallow eltrombopag pills (cannot be crushed or chewed).   - s/p 2nd trial of IVIG on 4/1-4/2  - c/w Doptelet 40mg daily (Can be crushed with yogurt or pudding). Would not recommend any dose adjustments at this time as patient's platelet count has only been rising for 72 hours.   - If today's CBC shows a stable/rising platelet count, can consider discharge. Will confirm follow-up appointment with Dr. Bhagat. Patient will need to be seen at [facility] this week for close follow-up.    Gabbi Phillips MD  Hematology/Oncology Fellow, PGY-4  Pager: [phone]  After 5pm and on weekends please page on-call fellow HEMATOLOGY FELLOW NOTE    Patient seen at bedside with mother. Patient has been more nauseous, which mother is concerned is due to Doptelet.     Recommendations:    # Chronic ITP exacerbated in the setting of recent COVID infection   -Platelets 173 today  - c/w Doptelet 40mg daily on discharge (Can be crushed with yogurt or pudding). Would not recommend any dose adjustments at this time as patient's platelet count has only been rising for 72 hours.   -Patient has a follow-up appointment with Dr. Bhagat on 4/15 at 3 pm. Please include this information in the discharge summary.    Gabbi Phillips MD  Hematology/Oncology Fellow, PGY-4  Pager: [phone]  After 5pm and on weekends please page on-call fellow

## 2021-04-12 NOTE — PROGRESS NOTE ADULT - PROBLEM SELECTOR PLAN 2
- Derm consult note appreciated. RLE erythema improving, b/l LE wounds improving as well  - Cont wound care/ skin care as per Derm   - c/w lasix 40mg daily.   - repeat RLE [facility] duplex neg for DVT, initial US b/l LE 03/15 neg for DVT  - Bcx NTD  - evaluated by vascular sx, wounds don't appear to be vascular in nature  - Discussed with ID, cellulitis improving, will switch to Doxy for 5 days
- Derm consult note appreciated. RLE erythema improving, b/l LE wounds improving as well  - Cont wound care/ skin care as per Derm   - c/w lasix 40mg daily.   - repeat RLE Va duplex neg for DVT, initial US b/l LE 03/15 neg for DVT  - Bcx NTD  - evaluated by vascular sx, wounds don't appear to be vascular in nature  - Discussed with ID, cellulitis improving, will switch to Doxy for 5 days
-c/w home regimen: trazodone 300 mg qhs, brexpiprazole (rixulti) 6 mg AM, ativan 1mg qhs (home meds)
Derm consult note appreciated. RLE erythema improving, b/l LE wounds improving as well  Cont wound care/ skin care as per Derm   c/w lasix 40mg daily.   repeat RLE Va duplex neg for DVT, initial US b/l LE 03/15 neg for DVT  pt with fever 100.6 on 3/29, concern for RLE cellulitis, per ID switch to IV vancomycin, check vanc trough routinely  Bcx NTD  evaluated by vascular sx, wounds don't appear to be vascular in nature therefore
- Derm consult note appreciated. RLE erythema improving, b/l LE wounds improving as well  - Cont wound care/ skin care as per Derm   - c/w lasix 40mg daily.   - repeat RLE Va duplex neg for DVT, initial US b/l LE 03/15 neg for DVT  - Bcx NTD  - evaluated by vascular sx, wounds don't appear to be vascular in nature  - Discussed with ID, cellulitis improving, will switch to Doxy for 5 days
- Derm consult note appreciated. RLE erythema improving, b/l LE wounds improving as well  - Cont wound care/ skin care as per Derm   - c/w lasix 40mg daily.   - repeat RLE [facility] duplex neg for DVT, initial US b/l LE 03/15 neg for DVT  - pt with fever 100.6 on 3/29, concern for RLE cellulitis, per ID switch to IV vancomycin, check vanc trough routinely, plan for total 7 day course  - Bcx NTD  - evaluated by vascular sx, wounds don't appear to be vascular in nature
-c/w home regimen: trazodone 300 mg qhs, brexpiprazole (rixulti) 6 mg AM, ativan 1mg qhs (home meds)
resume home regimen: trazodone 300 mg qhs, brexpiprazole (rixulti) 6 mg AM, ativan 1mg qhs (home meds).
-c/w home regimen: trazodone 300 mg qhs, brexpiprazole (rixulti) 6 mg AM, ativan 1mg qhs (home meds)
Derm consult note appreciated. RLE erythema improving, b/l LE wounds improving as well  Cont wound care/ skin care as per Derm   c/w lasix 40mg daily.   repeat RLE Va duplex neg for DVT, initial US b/l LE 03/15 neg for DVT  pt with fever 100.6 on 3/29, concern for RLE cellulitis, per ID switch to IV vancomycin, check vanc trough routinely  Bcx NTD  evaluated by vascular sx, wounds don't appear to be vascular in nature therefore
cw home regimen: trazodone 300 mg qhs, brexpiprazole (rixulti) 6 mg AM, ativan 1mg qhs (home meds)
-c/w home regimen: trazodone 300 mg qhs, brexpiprazole (rixulti) 6 mg AM, ativan 1mg qhs (home meds)
Derm consult note appreciated.  Cont wound care/ skin care as per Derm   Escalate to lasix 40mg daily.   repeat RLE Va duplex neg for DVT, initial US b/l LE 03/15 neg for DVT  pt with fever 100.6 on 3/29, concern for RLE cellulitis, per ID switch to IV vancomycin, check vanc trough routinely  Bcx NTD  evaluated by vascular sx, wounds don't appear to be vascular in nature therefore c/w IV ABX.
cw home regimen: trazodone 300 mg qhs, brexpiprazole (rixulti) 6 mg AM, ativan 1mg qhs (home meds)
- Derm consult note appreciated. RLE erythema improving, b/l LE wounds improving as well  - Cont wound care/ skin care as per Derm   - c/w lasix 40mg daily.   - repeat RLE Va duplex neg for DVT, initial US b/l LE 03/15 neg for DVT  - Bcx NTD  - evaluated by vascular sx, wounds don't appear to be vascular in nature  - Much improved today, continue PO Doxy to completion
-c/w home regimen: trazodone 300 mg qhs, brexpiprazole (rixulti) 6 mg AM, ativan 1mg qhs (home meds)
-c/w home regimen: trazodone 300 mg qhs, brexpiprazole (rixulti) 6 mg AM, ativan 1mg qhs (home meds)
- Derm consult note appreciated. RLE erythema improving, b/l LE wounds improving as well  - Cont wound care/ skin care as per Derm   - c/w lasix 40mg daily.   - repeat RLE Va duplex neg for DVT, initial US b/l LE 03/15 neg for DVT  - pt with fever 100.4 on 4/6, concern for RLE cellulitis, per ID switch to IV vancomycin, vanc trough therapeutic, plan to resume for 1 more day   - Bcx NTD  - evaluated by vascular sx, wounds don't appear to be vascular in nature
-c/w home regimen: trazodone 300 mg qhs, brexpiprazole (rixulti) 6 mg AM, ativan 1mg qhs (home meds)
-c/w home regimen: trazodone 300 mg qhs, brexpiprazole (rixulti) 6 mg AM, ativan 1mg qhs (home meds)
cw home regimen: trazodone 300 mg qhs, brexpiprazole (rixulti) 6 mg AM, ativan 1mg qhs (home meds)
-c/w home regimen: trazodone 300 mg qhs, brexpiprazole (rixulti) 6 mg AM, ativan 1mg qhs (home meds)
-c/w home regimen: trazodone 300 mg qhs, brexpiprazole (rixulti) 6 mg AM, ativan 1mg qhs (home meds)
Derm consult note appreciated. RLE erythema improving, b/l LE wounds improving as well  Cont wound care/ skin care as per Derm   c/w lasix 40mg daily.   repeat RLE Va duplex neg for DVT, initial US b/l LE 03/15 neg for DVT  pt with fever 100.6 on 3/29, concern for RLE cellulitis, per ID switch to IV vancomycin, check vanc trough routinely  Bcx NTD  evaluated by vascular sx, wounds don't appear to be vascular in nature therefore
- Derm consult note appreciated. RLE erythema improving, b/l LE wounds improving as well  - Cont wound care/ skin care as per Derm   - c/w lasix 40mg daily.   - repeat RLE Va duplex neg for DVT, initial US b/l LE 03/15 neg for DVT  - Bcx NTD  - evaluated by vascular sx, wounds don't appear to be vascular in nature  - Discussed with ID, cellulitis improving, will switch to Doxy for 5 days
-c/w home regimen: trazodone 300 mg qhs, brexpiprazole (rixulti) 6 mg AM, ativan 1mg qhs (home meds)
-c/w home regimen: trazodone 300 mg qhs, brexpiprazole (rixulti) 6 mg AM, ativan 1mg qhs (home meds)
cw home regimen: trazodone 300 mg qhs, brexpiprazole (rixulti) 6 mg AM, ativan 1mg qhs (home meds)
resume home regimen: trazodone 300 mg qhs, brexpiprazole (rixulti) 6 mg AM, ativan 1mg qhs (home meds)
resume home regimen: trazodone 300 mg qhs, brexpiprazole (rixulti) 6 mg AM, ativan 1mg qhs (home meds)
- Derm consult note appreciated. RLE erythema improving, b/l LE wounds improving as well  - Cont wound care/ skin care as per Derm   - c/w lasix 40mg daily.   - repeat RLE Va duplex neg for DVT, initial US b/l LE 03/15 neg for DVT  - pt with fever 100.4 on 4/6, concern for RLE cellulitis, per ID switch to IV vancomycin, vanc trough therapeutic, plan to resume for 1 more day   - Bcx NTD  - evaluated by vascular sx, wounds don't appear to be vascular in nature
Derm consult note appreciated. RLE erythema improving, b/l LE wounds improving as well  Cont wound care/ skin care as per Derm   c/w lasix 40mg daily.   repeat RLE Va duplex neg for DVT, initial US b/l LE 03/15 neg for DVT  pt with fever 100.6 on 3/29, concern for RLE cellulitis, per ID switch to IV vancomycin, check vanc trough routinely  Bcx NTD  evaluated by vascular sx, wounds don't appear to be vascular in nature therefore
-c/w home regimen: trazodone 300 mg qhs, brexpiprazole (rixulti) 6 mg AM, ativan 1mg qhs (home meds)
-c/w home regimen: trazodone 300 mg qhs, brexpiprazole (rixulti) 6 mg AM, ativan 1mg qhs (home meds)

## 2021-04-12 NOTE — PROGRESS NOTE ADULT - PROBLEM SELECTOR PROBLEM 2
Intellectual disability
Wound of right lower extremity, subsequent encounter
Wound of right lower extremity, subsequent encounter
Intellectual disability
Wound of right lower extremity, subsequent encounter
Intellectual disability
Wound of right lower extremity, subsequent encounter
Intellectual disability
Wound of right lower extremity, subsequent encounter
Wound of right lower extremity, subsequent encounter
Intellectual disability
Wound of right lower extremity, subsequent encounter
Intellectual disability
Wound of right lower extremity, subsequent encounter
Intellectual disability
Intellectual disability
Wound of right lower extremity, subsequent encounter
Intellectual disability
Wound of right lower extremity, subsequent encounter

## 2021-04-12 NOTE — PROGRESS NOTE ADULT - NSICDXPILOT_GEN_ALL_CORE
[place]

## 2021-04-12 NOTE — PROGRESS NOTE ADULT - SUBJECTIVE AND OBJECTIVE BOX
[facility] Division of Hospital Medicine  Luis M Damon DO  Pager (ÁNGEL, [phone]-[phone]): [phone]    Patient is a 21y old  Male who presents with a chief complaint of low platelets (11 Apr 2021 16:05)    SUBJECTIVE / OVERNIGHT EVENTS: Pt vomited this morning, mother believes it may be due to side effect of Doptelet.  Remains afebrile, in good spirits.  Mother asking for hematology team to speak to her, which they will be doing this morning. Pt is standing up, smiling happily, mother notes that RLE looks much improved and less swollen.     MEDICATIONS  (STANDING):  Avatrombopag (Doptelet) 20mg Tablet 40 milliGRAM(s) 40 milliGRAM(s) Oral daily  Avatrombopag (Doptelet) 20mg Tablet 40 milliGRAM(s),Avatrombopag (Doptelet) 20mg Tablet 40 milliGRAM(s) 40 milliGRAM(s) Oral daily  BACItracin   Ointment 1 Application(s) Topical two times a day  brexpiprazole 6 milliGRAM(s) Oral daily  cetirizine 10 milliGRAM(s) Oral at bedtime  chlorhexidine 4% Liquid 1 Application(s) Topical <User Schedule>  cimetidine 400 milliGRAM(s) Oral three times a day  Doxycycline hyclate tablet 100 milliGRAM(s) 1 Tablet(s) Oral two times a day  fluticasone propionate 50 MICROgram(s)/spray Nasal Spray 1 Spray(s) Both Nostrils two times a day  furosemide    Tablet 40 milliGRAM(s) Oral daily  influenza   Vaccine 0.5 milliLiter(s) IntraMuscular once  LORazepam     Tablet 1 milliGRAM(s) Oral at bedtime  multivitamin 1 Tablet(s) Oral daily  potassium chloride   Powder 40 milliEquivalent(s) Oral once  traZODone 300 milliGRAM(s) Oral at bedtime    MEDICATIONS  (PRN):  acetaminophen   Tablet .. 650 milliGRAM(s) Oral every 6 hours PRN Temp greater or equal to 38C (100.4F)  ALBUTerol    0.083%. 2.5 milliGRAM(s) Nebulizer once PRN PRN Chemotherapy Reaction  ALBUTerol    0.083%. 2.5 milliGRAM(s) Nebulizer once PRN PRN Chemotherapy Reaction  mineral oil for Topical Use 1 Application(s) Topical three times a day PRN to clean area after BM  zinc oxide 20% Ointment 1 Application(s) Topical three times a day PRN rash, apply to the area after each BM      PHYSICAL EXAM:  Vital Signs Last 24 Hrs  T(C): 36.8 (12 Apr 2021 06:09), Max: 36.8 (11 Apr 2021 12:44)  T(F): 98.3 (12 Apr 2021 06:09), Max: 98.3 (12 Apr 2021 06:09)  HR: 97 (12 Apr 2021 06:09) (96 - 103)  BP: 130/82 (12 Apr 2021 06:09) (124/76 - 137/70)  BP(mean): --  RR: 18 (12 Apr 2021 06:09) (17 - 18)  SpO2: 99% (12 Apr 2021 06:09) (98% - 99%)    CONSTITUTIONAL: NAD, well-developed, well-groomed, morbidly obese  EYES: PERRLA; conjunctiva and sclera clear  MUSCLOSKELETAL:  Normal gait; no clubbing or cyanosis of digits; no joint swelling  NEUROLOGY: CN 2-12 are intact and symmetric; no gross sensory deficits; no motor deficits  SKIN: No rashes; RLE erythema much improved, open sores now healed over    LABS:                        11.6   12.77 )-----------( 173      ( 12 Apr 2021 11:03 )             37.2     04-12    139  |  103  |  13  ----------------------------<  113<H>  3.6   |  25  |  0.90    Ca    9.2      12 Apr 2021 11:03  Phos  4.0     04-12  Mg     1.7     04-12      COORDINATION OF CARE:  Care Discussed with Consultants/Other Providers [Y/N]: Yes hematology Dr. Phillips

## 2021-04-13 ENCOUNTER — OUTPATIENT (OUTPATIENT)
Dept: OUTPATIENT SERVICES | Facility: HOSPITAL | Age: 21
LOS: 1 days | Discharge: HOME | End: 2021-04-13

## 2021-04-13 DIAGNOSIS — D69.3 IMMUNE THROMBOCYTOPENIC PURPURA: ICD-10-CM

## 2021-04-15 ENCOUNTER — RESULT REVIEW (OUTPATIENT)
Age: 21
End: 2021-04-15

## 2021-04-15 ENCOUNTER — APPOINTMENT (OUTPATIENT)
Dept: HEMATOLOGY ONCOLOGY | Facility: CLINIC | Age: 21
End: 2021-04-15
Payer: MEDICARE

## 2021-04-15 VITALS
HEART RATE: 122 BPM | SYSTOLIC BLOOD PRESSURE: 135 MMHG | RESPIRATION RATE: 17 BRPM | OXYGEN SATURATION: 98 % | TEMPERATURE: 96.6 F | DIASTOLIC BLOOD PRESSURE: 85 MMHG

## 2021-04-15 DIAGNOSIS — Z83.3 FAMILY HISTORY OF DIABETES MELLITUS: ICD-10-CM

## 2021-04-15 DIAGNOSIS — Z78.9 OTHER SPECIFIED HEALTH STATUS: ICD-10-CM

## 2021-04-15 DIAGNOSIS — Z82.61 FAMILY HISTORY OF ARTHRITIS: ICD-10-CM

## 2021-04-15 LAB
BASOPHILS # BLD AUTO: 0.07 K/UL — SIGNIFICANT CHANGE UP (ref 0–0.2)
BASOPHILS NFR BLD AUTO: 0.7 % — SIGNIFICANT CHANGE UP (ref 0–2)
EOSINOPHIL # BLD AUTO: 0.09 K/UL — SIGNIFICANT CHANGE UP (ref 0–0.5)
EOSINOPHIL NFR BLD AUTO: 0.9 % — SIGNIFICANT CHANGE UP (ref 0–6)
HCT VFR BLD CALC: 35.5 % — LOW (ref 39–50)
HGB BLD-MCNC: 11.5 G/DL — LOW (ref 13–17)
IMM GRANULOCYTES NFR BLD AUTO: 1.4 % — SIGNIFICANT CHANGE UP (ref 0–1.5)
LYMPHOCYTES # BLD AUTO: 18.9 % — SIGNIFICANT CHANGE UP (ref 13–44)
LYMPHOCYTES # BLD AUTO: 2 K/UL — SIGNIFICANT CHANGE UP (ref 1–3.3)
MCHC RBC-ENTMCNC: 26 PG — LOW (ref 27–34)
MCHC RBC-ENTMCNC: 32.4 G/DL — SIGNIFICANT CHANGE UP (ref 32–36)
MCV RBC AUTO: 80.1 FL — SIGNIFICANT CHANGE UP (ref 80–100)
MONOCYTES # BLD AUTO: 0.94 K/UL — HIGH (ref 0–0.9)
MONOCYTES NFR BLD AUTO: 8.9 % — SIGNIFICANT CHANGE UP (ref 2–14)
NEUTROPHILS # BLD AUTO: 7.31 K/UL — SIGNIFICANT CHANGE UP (ref 1.8–7.4)
NEUTROPHILS NFR BLD AUTO: 69.2 % — SIGNIFICANT CHANGE UP (ref 43–77)
NRBC # BLD: 0 /100 WBCS — SIGNIFICANT CHANGE UP (ref 0–0)
PLATELET # BLD AUTO: 53 K/UL — LOW (ref 150–400)
RBC # BLD: 4.43 M/UL — SIGNIFICANT CHANGE UP (ref 4.2–5.8)
RBC # FLD: 16.7 % — HIGH (ref 10.3–14.5)
WBC # BLD: 10.56 K/UL — HIGH (ref 3.8–10.5)
WBC # FLD AUTO: 10.56 K/UL — HIGH (ref 3.8–10.5)

## 2021-04-15 PROCEDURE — 99214 OFFICE O/P EST MOD 30 MIN: CPT

## 2021-04-15 RX ORDER — PANTOPRAZOLE 40 MG/1
40 TABLET, DELAYED RELEASE ORAL
Qty: 90 | Refills: 0 | Status: DISCONTINUED | COMMUNITY
Start: 2021-02-09 | End: 2021-04-15

## 2021-04-15 RX ORDER — LORATADINE 10 MG/1
10 TABLET ORAL
Qty: 30 | Refills: 0 | Status: DISCONTINUED | COMMUNITY
Start: 2021-02-09 | End: 2021-04-15

## 2021-04-15 NOTE — HISTORY OF PRESENT ILLNESS
[Disease:__________________________] : Disease: [unfilled] [de-identified] : 20 year old male presenting to [facility] for hematologic care. He is referred here from [facility]. Patient's past medical history is significant for intellectual disability, autism (non-verbal), ITP (diagnosed since he was 18 months of age), CARLITOS (not on CPAP). He was admitted on 1/23/2021 due to acute lower GI bleed in setting of thrombocytopenia; his platelet count was 1,000 and he was also found to be COVID +. During the course of his hospital stay, he received 1 unit platelet transfusion, 2 days of IVIG, 2 days of IV steroids, then started on oral steroid therapy. Patient was discharged on 2/5/2021, on prednisone 80 mg daily and advised to follow up at [facility] to taper his steroid dosage down in an outpatient setting. \par \par Patient is accompanied by a formal caregiver, presented to [facility] for blood count assessment and management of his steroid therapy. Patient's mother believed his ITP began after he received MMR vaccination around 18 months of age. His ITP was managed by Dr. Rachana Blanco (hematology) at [place], intermittently receiving IVIG treatments. Attempts were made with various treatments but he was believed to have experienced reactions (Rituxan, WinRho, etc). Prior to his January 2021 [facility] hospitalization, he did not receive any treatment for at least 10 years for ITP. Patient's mother also reported that he appeared withdrawn since birth. [FreeTextEntry1] : oral prednisone 80 mg daily [de-identified] : Patient presents for follow up appointment accompanied by his mother. He resides at [facility]. Patient was being maintained on 80mg daily prednisone for his chronic ITP but required admission to [facility] (2/27-4/12) after it became refractory since his COVID infection (1/2021) s/p convalescent plasma. During his hospitalization he had a suboptimal response to IVIG x 3 doses, Decadron with a prolonged Prednisone taper, Rituxan, and Nplate x 4 doses. Eventually his platelets started to improve once started on Doptelet and when his PCR started to test negative for COVID. Today, he seems playful and in a good mood. His mother does not note the patient to have any bleeding, easy bruising, fever, chills, abdominal pain, nausea, vomiting, diarrhea, chest pain, shortness of breath, or peripheral edema. Good appetite, stable weight.

## 2021-04-15 NOTE — PHYSICAL EXAM
[Completely disabled. Cannot carry on any self care. Totally confined to bed or chair] : Status 4- Completely disabled. Cannot carry on any self care. Totally confined to bed or chair [Obese] : obese [Normal] : normal appearance, no rash, nodules, vesicles, ulcers, erythema [de-identified] : no active bleeding/ecchymosis/petechiae noted

## 2021-04-15 NOTE — ASSESSMENT
[Supportive] : Goals of care discussed with patient: Supportive [FreeTextEntry1] : 21 year old Autistic male with developmental disability who resides at [facility], seen by hematology for chronic ITP. Per mother, the patient's platelet counts remain stable for years between 25,000 - 35,000 prior to him being seen at [facility]. \par [facility] (1/2021) admission for rectal bleeding and acute COVID 19 infection. He had a transient rise in platelet count to IV IgG during his January 2021 hospitalization. \par Peripheral blood smear (2/9/21): Large platelet forms. Normal white cell morphology.\par [facility] (2/27-4/12) for refractory ITP following COVID infection in [facility].\par Suboptimal responses to IVIG x 3 doses, Decadron with a prolonged Prednisone taper, Rituxan, and Nplate x 4 doses\par Cannot take promacta as it cannot be crushed and mixed into food.\par \par CBC weekly\par IVIG if platelets < 20\par Continue Doptelet 40mg daily\par Will contact [facility] to set up transportation [place] [facility] Will decrease frequency of visits when platelets show stability.\par \par Follow up in 2 months\par Case and management discussed with Dr. Bhagat

## 2021-04-21 ENCOUNTER — RESULT REVIEW (OUTPATIENT)
Age: 21
End: 2021-04-21

## 2021-04-21 ENCOUNTER — APPOINTMENT (OUTPATIENT)
Dept: INFUSION THERAPY | Facility: HOSPITAL | Age: 21
End: 2021-04-21

## 2021-04-21 LAB
BASOPHILS # BLD AUTO: 0.03 K/UL — SIGNIFICANT CHANGE UP (ref 0–0.2)
BASOPHILS NFR BLD AUTO: 0.3 % — SIGNIFICANT CHANGE UP (ref 0–2)
EOSINOPHIL # BLD AUTO: 0.03 K/UL — SIGNIFICANT CHANGE UP (ref 0–0.5)
EOSINOPHIL NFR BLD AUTO: 0.3 % — SIGNIFICANT CHANGE UP (ref 0–6)
HCT VFR BLD CALC: 35.4 % — LOW (ref 39–50)
HGB BLD-MCNC: 11.1 G/DL — LOW (ref 13–17)
IMM GRANULOCYTES NFR BLD AUTO: 0.4 % — SIGNIFICANT CHANGE UP (ref 0–1.5)
LYMPHOCYTES # BLD AUTO: 1.43 K/UL — SIGNIFICANT CHANGE UP (ref 1–3.3)
LYMPHOCYTES # BLD AUTO: 15.7 % — SIGNIFICANT CHANGE UP (ref 13–44)
MCHC RBC-ENTMCNC: 25.5 PG — LOW (ref 27–34)
MCHC RBC-ENTMCNC: 31.4 G/DL — LOW (ref 32–36)
MCV RBC AUTO: 81.2 FL — SIGNIFICANT CHANGE UP (ref 80–100)
MONOCYTES # BLD AUTO: 0.84 K/UL — SIGNIFICANT CHANGE UP (ref 0–0.9)
MONOCYTES NFR BLD AUTO: 9.2 % — SIGNIFICANT CHANGE UP (ref 2–14)
NEUTROPHILS # BLD AUTO: 6.73 K/UL — SIGNIFICANT CHANGE UP (ref 1.8–7.4)
NEUTROPHILS NFR BLD AUTO: 74.1 % — SIGNIFICANT CHANGE UP (ref 43–77)
NRBC # BLD: 0 /100 WBCS — SIGNIFICANT CHANGE UP (ref 0–0)
PLATELET # BLD AUTO: 14 K/UL — CRITICAL LOW (ref 150–400)
RBC # BLD: 4.36 M/UL — SIGNIFICANT CHANGE UP (ref 4.2–5.8)
RBC # FLD: 16.1 % — HIGH (ref 10.3–14.5)
WBC # BLD: 9.1 K/UL — SIGNIFICANT CHANGE UP (ref 3.8–10.5)
WBC # FLD AUTO: 9.1 K/UL — SIGNIFICANT CHANGE UP (ref 3.8–10.5)

## 2021-04-22 DIAGNOSIS — R11.2 NAUSEA WITH VOMITING, UNSPECIFIED: ICD-10-CM

## 2021-04-22 DIAGNOSIS — Z51.11 ENCOUNTER FOR ANTINEOPLASTIC CHEMOTHERAPY: ICD-10-CM

## 2021-04-28 ENCOUNTER — APPOINTMENT (OUTPATIENT)
Dept: INFUSION THERAPY | Facility: HOSPITAL | Age: 21
End: 2021-04-28

## 2021-04-28 ENCOUNTER — RESULT REVIEW (OUTPATIENT)
Age: 21
End: 2021-04-28

## 2021-04-28 ENCOUNTER — OUTPATIENT (OUTPATIENT)
Dept: OUTPATIENT SERVICES | Facility: HOSPITAL | Age: 21
LOS: 1 days | End: 2021-04-28
Payer: COMMERCIAL

## 2021-04-28 DIAGNOSIS — D69.3 IMMUNE THROMBOCYTOPENIC PURPURA: ICD-10-CM

## 2021-04-28 LAB
BASOPHILS # BLD AUTO: 0.02 K/UL — SIGNIFICANT CHANGE UP (ref 0–0.2)
BASOPHILS NFR BLD AUTO: 0.3 % — SIGNIFICANT CHANGE UP (ref 0–2)
EOSINOPHIL # BLD AUTO: 0.03 K/UL — SIGNIFICANT CHANGE UP (ref 0–0.5)
EOSINOPHIL NFR BLD AUTO: 0.4 % — SIGNIFICANT CHANGE UP (ref 0–6)
HCT VFR BLD CALC: 36.5 % — LOW (ref 39–50)
HGB BLD-MCNC: 11.3 G/DL — LOW (ref 13–17)
IMM GRANULOCYTES NFR BLD AUTO: 0.3 % — SIGNIFICANT CHANGE UP (ref 0–1.5)
LYMPHOCYTES # BLD AUTO: 1.3 K/UL — SIGNIFICANT CHANGE UP (ref 1–3.3)
LYMPHOCYTES # BLD AUTO: 17.8 % — SIGNIFICANT CHANGE UP (ref 13–44)
MCHC RBC-ENTMCNC: 24.8 PG — LOW (ref 27–34)
MCHC RBC-ENTMCNC: 31 G/DL — LOW (ref 32–36)
MCV RBC AUTO: 80.2 FL — SIGNIFICANT CHANGE UP (ref 80–100)
MONOCYTES # BLD AUTO: 0.62 K/UL — SIGNIFICANT CHANGE UP (ref 0–0.9)
MONOCYTES NFR BLD AUTO: 8.5 % — SIGNIFICANT CHANGE UP (ref 2–14)
NEUTROPHILS # BLD AUTO: 5.32 K/UL — SIGNIFICANT CHANGE UP (ref 1.8–7.4)
NEUTROPHILS NFR BLD AUTO: 72.7 % — SIGNIFICANT CHANGE UP (ref 43–77)
NRBC # BLD: 0 /100 WBCS — SIGNIFICANT CHANGE UP (ref 0–0)
PLATELET # BLD AUTO: 8 K/UL — CRITICAL LOW (ref 150–400)
RBC # BLD: 4.55 M/UL — SIGNIFICANT CHANGE UP (ref 4.2–5.8)
RBC # FLD: 15.9 % — HIGH (ref 10.3–14.5)
WBC # BLD: 7.31 K/UL — SIGNIFICANT CHANGE UP (ref 3.8–10.5)
WBC # FLD AUTO: 7.31 K/UL — SIGNIFICANT CHANGE UP (ref 3.8–10.5)

## 2021-04-28 PROCEDURE — 86850 RBC ANTIBODY SCREEN: CPT

## 2021-04-28 PROCEDURE — 86900 BLOOD TYPING SEROLOGIC ABO: CPT

## 2021-04-28 PROCEDURE — 86901 BLOOD TYPING SEROLOGIC RH(D): CPT

## 2021-04-30 ENCOUNTER — APPOINTMENT (OUTPATIENT)
Dept: HEMATOLOGY ONCOLOGY | Facility: CLINIC | Age: 21
End: 2021-04-30

## 2021-04-30 ENCOUNTER — RESULT REVIEW (OUTPATIENT)
Age: 21
End: 2021-04-30

## 2021-04-30 LAB
BASOPHILS # BLD AUTO: 0.03 K/UL — SIGNIFICANT CHANGE UP (ref 0–0.2)
BASOPHILS NFR BLD AUTO: 0.4 % — SIGNIFICANT CHANGE UP (ref 0–2)
EOSINOPHIL # BLD AUTO: 0.02 K/UL — SIGNIFICANT CHANGE UP (ref 0–0.5)
EOSINOPHIL NFR BLD AUTO: 0.3 % — SIGNIFICANT CHANGE UP (ref 0–6)
HCT VFR BLD CALC: 36.8 % — LOW (ref 39–50)
HGB BLD-MCNC: 11.3 G/DL — LOW (ref 13–17)
IMM GRANULOCYTES NFR BLD AUTO: 0.5 % — SIGNIFICANT CHANGE UP (ref 0–1.5)
LYMPHOCYTES # BLD AUTO: 1.19 K/UL — SIGNIFICANT CHANGE UP (ref 1–3.3)
LYMPHOCYTES # BLD AUTO: 15.9 % — SIGNIFICANT CHANGE UP (ref 13–44)
MCHC RBC-ENTMCNC: 24.6 PG — LOW (ref 27–34)
MCHC RBC-ENTMCNC: 30.7 G/DL — LOW (ref 32–36)
MCV RBC AUTO: 80.2 FL — SIGNIFICANT CHANGE UP (ref 80–100)
MONOCYTES # BLD AUTO: 0.54 K/UL — SIGNIFICANT CHANGE UP (ref 0–0.9)
MONOCYTES NFR BLD AUTO: 7.2 % — SIGNIFICANT CHANGE UP (ref 2–14)
NEUTROPHILS # BLD AUTO: 5.68 K/UL — SIGNIFICANT CHANGE UP (ref 1.8–7.4)
NEUTROPHILS NFR BLD AUTO: 75.7 % — SIGNIFICANT CHANGE UP (ref 43–77)
NRBC # BLD: 0 /100 WBCS — SIGNIFICANT CHANGE UP (ref 0–0)
PLATELET # BLD AUTO: 22 K/UL — LOW (ref 150–400)
RBC # BLD: 4.59 M/UL — SIGNIFICANT CHANGE UP (ref 4.2–5.8)
RBC # FLD: 16.3 % — HIGH (ref 10.3–14.5)
WBC # BLD: 7.5 K/UL — SIGNIFICANT CHANGE UP (ref 3.8–10.5)
WBC # FLD AUTO: 7.5 K/UL — SIGNIFICANT CHANGE UP (ref 3.8–10.5)

## 2021-05-05 ENCOUNTER — APPOINTMENT (OUTPATIENT)
Dept: INFUSION THERAPY | Facility: HOSPITAL | Age: 21
End: 2021-05-05

## 2021-05-05 ENCOUNTER — RESULT REVIEW (OUTPATIENT)
Age: 21
End: 2021-05-05

## 2021-05-05 LAB
BASOPHILS # BLD AUTO: 0.03 K/UL — SIGNIFICANT CHANGE UP (ref 0–0.2)
BASOPHILS NFR BLD AUTO: 0.4 % — SIGNIFICANT CHANGE UP (ref 0–2)
EOSINOPHIL # BLD AUTO: 0.03 K/UL — SIGNIFICANT CHANGE UP (ref 0–0.5)
EOSINOPHIL NFR BLD AUTO: 0.4 % — SIGNIFICANT CHANGE UP (ref 0–6)
HCT VFR BLD CALC: 37.6 % — LOW (ref 39–50)
HGB BLD-MCNC: 11.5 G/DL — LOW (ref 13–17)
IMM GRANULOCYTES NFR BLD AUTO: 0.6 % — SIGNIFICANT CHANGE UP (ref 0–1.5)
LYMPHOCYTES # BLD AUTO: 1.19 K/UL — SIGNIFICANT CHANGE UP (ref 1–3.3)
LYMPHOCYTES # BLD AUTO: 17.3 % — SIGNIFICANT CHANGE UP (ref 13–44)
MCHC RBC-ENTMCNC: 24.2 PG — LOW (ref 27–34)
MCHC RBC-ENTMCNC: 30.6 G/DL — LOW (ref 32–36)
MCV RBC AUTO: 79.2 FL — LOW (ref 80–100)
MONOCYTES # BLD AUTO: 0.52 K/UL — SIGNIFICANT CHANGE UP (ref 0–0.9)
MONOCYTES NFR BLD AUTO: 7.6 % — SIGNIFICANT CHANGE UP (ref 2–14)
NEUTROPHILS # BLD AUTO: 5.07 K/UL — SIGNIFICANT CHANGE UP (ref 1.8–7.4)
NEUTROPHILS NFR BLD AUTO: 73.7 % — SIGNIFICANT CHANGE UP (ref 43–77)
NRBC # BLD: 0 /100 WBCS — SIGNIFICANT CHANGE UP (ref 0–0)
PLATELET # BLD AUTO: 16 K/UL — CRITICAL LOW (ref 150–400)
RBC # BLD: 4.75 M/UL — SIGNIFICANT CHANGE UP (ref 4.2–5.8)
RBC # FLD: 15.9 % — HIGH (ref 10.3–14.5)
WBC # BLD: 6.88 K/UL — SIGNIFICANT CHANGE UP (ref 3.8–10.5)
WBC # FLD AUTO: 6.88 K/UL — SIGNIFICANT CHANGE UP (ref 3.8–10.5)

## 2021-05-06 ENCOUNTER — NON-APPOINTMENT (OUTPATIENT)
Age: 21
End: 2021-05-06

## 2021-05-12 ENCOUNTER — RESULT REVIEW (OUTPATIENT)
Age: 21
End: 2021-05-12

## 2021-05-12 ENCOUNTER — APPOINTMENT (OUTPATIENT)
Dept: INFUSION THERAPY | Facility: HOSPITAL | Age: 21
End: 2021-05-12

## 2021-05-12 LAB
BASOPHILS # BLD AUTO: 0.03 K/UL — SIGNIFICANT CHANGE UP (ref 0–0.2)
BASOPHILS NFR BLD AUTO: 0.4 % — SIGNIFICANT CHANGE UP (ref 0–2)
EOSINOPHIL # BLD AUTO: 0.02 K/UL — SIGNIFICANT CHANGE UP (ref 0–0.5)
EOSINOPHIL NFR BLD AUTO: 0.3 % — SIGNIFICANT CHANGE UP (ref 0–6)
HCT VFR BLD CALC: 39.3 % — SIGNIFICANT CHANGE UP (ref 39–50)
HGB BLD-MCNC: 12.1 G/DL — LOW (ref 13–17)
IMM GRANULOCYTES NFR BLD AUTO: 0.3 % — SIGNIFICANT CHANGE UP (ref 0–1.5)
LYMPHOCYTES # BLD AUTO: 1.27 K/UL — SIGNIFICANT CHANGE UP (ref 1–3.3)
LYMPHOCYTES # BLD AUTO: 16.1 % — SIGNIFICANT CHANGE UP (ref 13–44)
MCHC RBC-ENTMCNC: 24.4 PG — LOW (ref 27–34)
MCHC RBC-ENTMCNC: 30.8 G/DL — LOW (ref 32–36)
MCV RBC AUTO: 79.2 FL — LOW (ref 80–100)
MONOCYTES # BLD AUTO: 0.52 K/UL — SIGNIFICANT CHANGE UP (ref 0–0.9)
MONOCYTES NFR BLD AUTO: 6.6 % — SIGNIFICANT CHANGE UP (ref 2–14)
NEUTROPHILS # BLD AUTO: 6.02 K/UL — SIGNIFICANT CHANGE UP (ref 1.8–7.4)
NEUTROPHILS NFR BLD AUTO: 76.3 % — SIGNIFICANT CHANGE UP (ref 43–77)
NRBC # BLD: 0 /100 WBCS — SIGNIFICANT CHANGE UP (ref 0–0)
PLATELET # BLD AUTO: 21 K/UL — LOW (ref 150–400)
RBC # BLD: 4.96 M/UL — SIGNIFICANT CHANGE UP (ref 4.2–5.8)
RBC # FLD: 16.2 % — HIGH (ref 10.3–14.5)
WBC # BLD: 7.88 K/UL — SIGNIFICANT CHANGE UP (ref 3.8–10.5)
WBC # FLD AUTO: 7.88 K/UL — SIGNIFICANT CHANGE UP (ref 3.8–10.5)

## 2021-05-18 ENCOUNTER — OUTPATIENT (OUTPATIENT)
Dept: OUTPATIENT SERVICES | Facility: HOSPITAL | Age: 21
LOS: 1 days | Discharge: HOME | End: 2021-05-18

## 2021-05-18 DIAGNOSIS — D69.3 IMMUNE THROMBOCYTOPENIC PURPURA: ICD-10-CM

## 2021-05-21 ENCOUNTER — RESULT REVIEW (OUTPATIENT)
Age: 21
End: 2021-05-21

## 2021-05-21 ENCOUNTER — APPOINTMENT (OUTPATIENT)
Dept: INFUSION THERAPY | Facility: HOSPITAL | Age: 21
End: 2021-05-21

## 2021-05-21 LAB
BASOPHILS # BLD AUTO: 0.02 K/UL — SIGNIFICANT CHANGE UP (ref 0–0.2)
BASOPHILS NFR BLD AUTO: 0.3 % — SIGNIFICANT CHANGE UP (ref 0–2)
EOSINOPHIL # BLD AUTO: 0.01 K/UL — SIGNIFICANT CHANGE UP (ref 0–0.5)
EOSINOPHIL NFR BLD AUTO: 0.2 % — SIGNIFICANT CHANGE UP (ref 0–6)
HCT VFR BLD CALC: 38.8 % — LOW (ref 39–50)
HGB BLD-MCNC: 11.9 G/DL — LOW (ref 13–17)
IMM GRANULOCYTES NFR BLD AUTO: 0.3 % — SIGNIFICANT CHANGE UP (ref 0–1.5)
LYMPHOCYTES # BLD AUTO: 0.99 K/UL — LOW (ref 1–3.3)
LYMPHOCYTES # BLD AUTO: 15.3 % — SIGNIFICANT CHANGE UP (ref 13–44)
MCHC RBC-ENTMCNC: 23.8 PG — LOW (ref 27–34)
MCHC RBC-ENTMCNC: 30.7 G/DL — LOW (ref 32–36)
MCV RBC AUTO: 77.8 FL — LOW (ref 80–100)
MONOCYTES # BLD AUTO: 0.56 K/UL — SIGNIFICANT CHANGE UP (ref 0–0.9)
MONOCYTES NFR BLD AUTO: 8.6 % — SIGNIFICANT CHANGE UP (ref 2–14)
NEUTROPHILS # BLD AUTO: 4.89 K/UL — SIGNIFICANT CHANGE UP (ref 1.8–7.4)
NEUTROPHILS NFR BLD AUTO: 75.3 % — SIGNIFICANT CHANGE UP (ref 43–77)
NRBC # BLD: 0 /100 WBCS — SIGNIFICANT CHANGE UP (ref 0–0)
PLATELET # BLD AUTO: 17 K/UL — CRITICAL LOW (ref 150–400)
RBC # BLD: 4.99 M/UL — SIGNIFICANT CHANGE UP (ref 4.2–5.8)
RBC # FLD: 16.8 % — HIGH (ref 10.3–14.5)
WBC # BLD: 6.49 K/UL — SIGNIFICANT CHANGE UP (ref 3.8–10.5)
WBC # FLD AUTO: 6.49 K/UL — SIGNIFICANT CHANGE UP (ref 3.8–10.5)

## 2021-05-24 DIAGNOSIS — Z51.11 ENCOUNTER FOR ANTINEOPLASTIC CHEMOTHERAPY: ICD-10-CM

## 2021-05-24 DIAGNOSIS — R11.2 NAUSEA WITH VOMITING, UNSPECIFIED: ICD-10-CM

## 2021-05-26 ENCOUNTER — APPOINTMENT (OUTPATIENT)
Dept: INFUSION THERAPY | Facility: HOSPITAL | Age: 21
End: 2021-05-26

## 2021-05-26 ENCOUNTER — RESULT REVIEW (OUTPATIENT)
Age: 21
End: 2021-05-26

## 2021-05-26 LAB
BASOPHILS # BLD AUTO: 0.01 K/UL — SIGNIFICANT CHANGE UP (ref 0–0.2)
BASOPHILS NFR BLD AUTO: 0.2 % — SIGNIFICANT CHANGE UP (ref 0–2)
EOSINOPHIL # BLD AUTO: 0.02 K/UL — SIGNIFICANT CHANGE UP (ref 0–0.5)
EOSINOPHIL NFR BLD AUTO: 0.4 % — SIGNIFICANT CHANGE UP (ref 0–6)
HCT VFR BLD CALC: 39 % — SIGNIFICANT CHANGE UP (ref 39–50)
HGB BLD-MCNC: 11.8 G/DL — LOW (ref 13–17)
IMM GRANULOCYTES NFR BLD AUTO: 0.4 % — SIGNIFICANT CHANGE UP (ref 0–1.5)
LYMPHOCYTES # BLD AUTO: 1.03 K/UL — SIGNIFICANT CHANGE UP (ref 1–3.3)
LYMPHOCYTES # BLD AUTO: 18.2 % — SIGNIFICANT CHANGE UP (ref 13–44)
MCHC RBC-ENTMCNC: 23.4 PG — LOW (ref 27–34)
MCHC RBC-ENTMCNC: 30.3 G/DL — LOW (ref 32–36)
MCV RBC AUTO: 77.2 FL — LOW (ref 80–100)
MONOCYTES # BLD AUTO: 0.44 K/UL — SIGNIFICANT CHANGE UP (ref 0–0.9)
MONOCYTES NFR BLD AUTO: 7.8 % — SIGNIFICANT CHANGE UP (ref 2–14)
NEUTROPHILS # BLD AUTO: 4.15 K/UL — SIGNIFICANT CHANGE UP (ref 1.8–7.4)
NEUTROPHILS NFR BLD AUTO: 73 % — SIGNIFICANT CHANGE UP (ref 43–77)
NRBC # BLD: 0 /100 WBCS — SIGNIFICANT CHANGE UP (ref 0–0)
PLATELET # BLD AUTO: 13 K/UL — CRITICAL LOW (ref 150–400)
RBC # BLD: 5.05 M/UL — SIGNIFICANT CHANGE UP (ref 4.2–5.8)
RBC # FLD: 16.7 % — HIGH (ref 10.3–14.5)
WBC # BLD: 5.67 K/UL — SIGNIFICANT CHANGE UP (ref 3.8–10.5)
WBC # FLD AUTO: 5.67 K/UL — SIGNIFICANT CHANGE UP (ref 3.8–10.5)

## 2021-06-03 ENCOUNTER — RESULT REVIEW (OUTPATIENT)
Age: 21
End: 2021-06-03

## 2021-06-03 ENCOUNTER — APPOINTMENT (OUTPATIENT)
Dept: INFUSION THERAPY | Facility: HOSPITAL | Age: 21
End: 2021-06-03

## 2021-06-03 LAB
BASOPHILS # BLD AUTO: 0.03 K/UL — SIGNIFICANT CHANGE UP (ref 0–0.2)
BASOPHILS NFR BLD AUTO: 0.5 % — SIGNIFICANT CHANGE UP (ref 0–2)
EOSINOPHIL # BLD AUTO: 0.02 K/UL — SIGNIFICANT CHANGE UP (ref 0–0.5)
EOSINOPHIL NFR BLD AUTO: 0.3 % — SIGNIFICANT CHANGE UP (ref 0–6)
HCT VFR BLD CALC: 38.8 % — LOW (ref 39–50)
HGB BLD-MCNC: 11.9 G/DL — LOW (ref 13–17)
IMM GRANULOCYTES NFR BLD AUTO: 0.5 % — SIGNIFICANT CHANGE UP (ref 0–1.5)
LYMPHOCYTES # BLD AUTO: 1.19 K/UL — SIGNIFICANT CHANGE UP (ref 1–3.3)
LYMPHOCYTES # BLD AUTO: 18.1 % — SIGNIFICANT CHANGE UP (ref 13–44)
MCHC RBC-ENTMCNC: 23.7 PG — LOW (ref 27–34)
MCHC RBC-ENTMCNC: 30.7 G/DL — LOW (ref 32–36)
MCV RBC AUTO: 77.1 FL — LOW (ref 80–100)
MONOCYTES # BLD AUTO: 0.52 K/UL — SIGNIFICANT CHANGE UP (ref 0–0.9)
MONOCYTES NFR BLD AUTO: 7.9 % — SIGNIFICANT CHANGE UP (ref 2–14)
NEUTROPHILS # BLD AUTO: 4.79 K/UL — SIGNIFICANT CHANGE UP (ref 1.8–7.4)
NEUTROPHILS NFR BLD AUTO: 72.7 % — SIGNIFICANT CHANGE UP (ref 43–77)
NRBC # BLD: 0 /100 WBCS — SIGNIFICANT CHANGE UP (ref 0–0)
PLATELET # BLD AUTO: 18 K/UL — CRITICAL LOW (ref 150–400)
RBC # BLD: 5.03 M/UL — SIGNIFICANT CHANGE UP (ref 4.2–5.8)
RBC # FLD: 17 % — HIGH (ref 10.3–14.5)
WBC # BLD: 6.58 K/UL — SIGNIFICANT CHANGE UP (ref 3.8–10.5)
WBC # FLD AUTO: 6.58 K/UL — SIGNIFICANT CHANGE UP (ref 3.8–10.5)

## 2021-06-09 ENCOUNTER — APPOINTMENT (OUTPATIENT)
Dept: INFUSION THERAPY | Facility: HOSPITAL | Age: 21
End: 2021-06-09

## 2021-06-09 ENCOUNTER — RESULT REVIEW (OUTPATIENT)
Age: 21
End: 2021-06-09

## 2021-06-09 LAB
BASOPHILS # BLD AUTO: 0.02 K/UL — SIGNIFICANT CHANGE UP (ref 0–0.2)
BASOPHILS NFR BLD AUTO: 0.3 % — SIGNIFICANT CHANGE UP (ref 0–2)
EOSINOPHIL # BLD AUTO: 0.01 K/UL — SIGNIFICANT CHANGE UP (ref 0–0.5)
EOSINOPHIL NFR BLD AUTO: 0.2 % — SIGNIFICANT CHANGE UP (ref 0–6)
HCT VFR BLD CALC: 36.6 % — LOW (ref 39–50)
HGB BLD-MCNC: 11.2 G/DL — LOW (ref 13–17)
IMM GRANULOCYTES NFR BLD AUTO: 0.5 % — SIGNIFICANT CHANGE UP (ref 0–1.5)
LYMPHOCYTES # BLD AUTO: 1.11 K/UL — SIGNIFICANT CHANGE UP (ref 1–3.3)
LYMPHOCYTES # BLD AUTO: 16.8 % — SIGNIFICANT CHANGE UP (ref 13–44)
MCHC RBC-ENTMCNC: 23.6 PG — LOW (ref 27–34)
MCHC RBC-ENTMCNC: 30.6 G/DL — LOW (ref 32–36)
MCV RBC AUTO: 77.2 FL — LOW (ref 80–100)
MONOCYTES # BLD AUTO: 0.63 K/UL — SIGNIFICANT CHANGE UP (ref 0–0.9)
MONOCYTES NFR BLD AUTO: 9.6 % — SIGNIFICANT CHANGE UP (ref 2–14)
NEUTROPHILS # BLD AUTO: 4.79 K/UL — SIGNIFICANT CHANGE UP (ref 1.8–7.4)
NEUTROPHILS NFR BLD AUTO: 72.6 % — SIGNIFICANT CHANGE UP (ref 43–77)
NRBC # BLD: 0 /100 WBCS — SIGNIFICANT CHANGE UP (ref 0–0)
PLATELET # BLD AUTO: 14 K/UL — CRITICAL LOW (ref 150–400)
RBC # BLD: 4.74 M/UL — SIGNIFICANT CHANGE UP (ref 4.2–5.8)
RBC # FLD: 16.9 % — HIGH (ref 10.3–14.5)
WBC # BLD: 6.59 K/UL — SIGNIFICANT CHANGE UP (ref 3.8–10.5)
WBC # FLD AUTO: 6.59 K/UL — SIGNIFICANT CHANGE UP (ref 3.8–10.5)

## 2021-06-16 ENCOUNTER — OUTPATIENT (OUTPATIENT)
Dept: OUTPATIENT SERVICES | Facility: HOSPITAL | Age: 21
LOS: 1 days | Discharge: HOME | End: 2021-06-16

## 2021-06-16 DIAGNOSIS — D69.3 IMMUNE THROMBOCYTOPENIC PURPURA: ICD-10-CM

## 2021-06-18 ENCOUNTER — RESULT REVIEW (OUTPATIENT)
Age: 21
End: 2021-06-18

## 2021-06-18 ENCOUNTER — APPOINTMENT (OUTPATIENT)
Dept: INFUSION THERAPY | Facility: HOSPITAL | Age: 21
End: 2021-06-18

## 2021-06-18 LAB
BASOPHILS # BLD AUTO: 0.01 K/UL — SIGNIFICANT CHANGE UP (ref 0–0.2)
BASOPHILS NFR BLD AUTO: 0.2 % — SIGNIFICANT CHANGE UP (ref 0–2)
EOSINOPHIL # BLD AUTO: 0.01 K/UL — SIGNIFICANT CHANGE UP (ref 0–0.5)
EOSINOPHIL NFR BLD AUTO: 0.2 % — SIGNIFICANT CHANGE UP (ref 0–6)
HCT VFR BLD CALC: 37.9 % — LOW (ref 39–50)
HGB BLD-MCNC: 11.8 G/DL — LOW (ref 13–17)
IMM GRANULOCYTES NFR BLD AUTO: 0.3 % — SIGNIFICANT CHANGE UP (ref 0–1.5)
LYMPHOCYTES # BLD AUTO: 1.03 K/UL — SIGNIFICANT CHANGE UP (ref 1–3.3)
LYMPHOCYTES # BLD AUTO: 17.7 % — SIGNIFICANT CHANGE UP (ref 13–44)
MCHC RBC-ENTMCNC: 23.9 PG — LOW (ref 27–34)
MCHC RBC-ENTMCNC: 31.1 G/DL — LOW (ref 32–36)
MCV RBC AUTO: 76.7 FL — LOW (ref 80–100)
MONOCYTES # BLD AUTO: 0.46 K/UL — SIGNIFICANT CHANGE UP (ref 0–0.9)
MONOCYTES NFR BLD AUTO: 7.9 % — SIGNIFICANT CHANGE UP (ref 2–14)
NEUTROPHILS # BLD AUTO: 4.3 K/UL — SIGNIFICANT CHANGE UP (ref 1.8–7.4)
NEUTROPHILS NFR BLD AUTO: 73.7 % — SIGNIFICANT CHANGE UP (ref 43–77)
NRBC # BLD: 0 /100 WBCS — SIGNIFICANT CHANGE UP (ref 0–0)
PLATELET # BLD AUTO: 14 K/UL — CRITICAL LOW (ref 150–400)
RBC # BLD: 4.94 M/UL — SIGNIFICANT CHANGE UP (ref 4.2–5.8)
RBC # FLD: 16.6 % — HIGH (ref 10.3–14.5)
WBC # BLD: 5.83 K/UL — SIGNIFICANT CHANGE UP (ref 3.8–10.5)
WBC # FLD AUTO: 5.83 K/UL — SIGNIFICANT CHANGE UP (ref 3.8–10.5)

## 2021-06-20 DIAGNOSIS — R11.2 NAUSEA WITH VOMITING, UNSPECIFIED: ICD-10-CM

## 2021-06-20 DIAGNOSIS — Z51.11 ENCOUNTER FOR ANTINEOPLASTIC CHEMOTHERAPY: ICD-10-CM

## 2021-06-25 ENCOUNTER — RESULT REVIEW (OUTPATIENT)
Age: 21
End: 2021-06-25

## 2021-06-25 ENCOUNTER — APPOINTMENT (OUTPATIENT)
Dept: INFUSION THERAPY | Facility: HOSPITAL | Age: 21
End: 2021-06-25

## 2021-06-25 LAB
BASOPHILS # BLD AUTO: 0 K/UL — SIGNIFICANT CHANGE UP (ref 0–0.2)
BASOPHILS NFR BLD AUTO: 0 % — SIGNIFICANT CHANGE UP (ref 0–2)
EOSINOPHIL # BLD AUTO: 0 K/UL — SIGNIFICANT CHANGE UP (ref 0–0.5)
EOSINOPHIL NFR BLD AUTO: 0 % — SIGNIFICANT CHANGE UP (ref 0–6)
HCT VFR BLD CALC: 38.8 % — LOW (ref 39–50)
HGB BLD-MCNC: 12 G/DL — LOW (ref 13–17)
LYMPHOCYTES # BLD AUTO: 1.41 K/UL — SIGNIFICANT CHANGE UP (ref 1–3.3)
LYMPHOCYTES # BLD AUTO: 19 % — SIGNIFICANT CHANGE UP (ref 13–44)
MCHC RBC-ENTMCNC: 23.4 PG — LOW (ref 27–34)
MCHC RBC-ENTMCNC: 30.9 G/DL — LOW (ref 32–36)
MCV RBC AUTO: 75.6 FL — LOW (ref 80–100)
MONOCYTES # BLD AUTO: 0.59 K/UL — SIGNIFICANT CHANGE UP (ref 0–0.9)
MONOCYTES NFR BLD AUTO: 8 % — SIGNIFICANT CHANGE UP (ref 2–14)
NEUTROPHILS # BLD AUTO: 5.4 K/UL — SIGNIFICANT CHANGE UP (ref 1.8–7.4)
NEUTROPHILS NFR BLD AUTO: 73 % — SIGNIFICANT CHANGE UP (ref 43–77)
NRBC # BLD: 0 /100 — SIGNIFICANT CHANGE UP (ref 0–0)
NRBC # BLD: SIGNIFICANT CHANGE UP /100 WBCS (ref 0–0)
PLAT MORPH BLD: NORMAL — SIGNIFICANT CHANGE UP
PLATELET # BLD AUTO: 18 K/UL — CRITICAL LOW (ref 150–400)
RBC # BLD: 5.13 M/UL — SIGNIFICANT CHANGE UP (ref 4.2–5.8)
RBC # FLD: 16.3 % — HIGH (ref 10.3–14.5)
RBC BLD AUTO: SIGNIFICANT CHANGE UP
WBC # BLD: 7.4 K/UL — SIGNIFICANT CHANGE UP (ref 3.8–10.5)
WBC # FLD AUTO: 7.4 K/UL — SIGNIFICANT CHANGE UP (ref 3.8–10.5)

## 2021-06-30 ENCOUNTER — RESULT REVIEW (OUTPATIENT)
Age: 21
End: 2021-06-30

## 2021-06-30 ENCOUNTER — APPOINTMENT (OUTPATIENT)
Dept: HEMATOLOGY ONCOLOGY | Facility: CLINIC | Age: 21
End: 2021-06-30
Payer: MEDICARE

## 2021-06-30 VITALS
BODY MASS INDEX: 44.1 KG/M2 | OXYGEN SATURATION: 98 % | WEIGHT: 315 LBS | SYSTOLIC BLOOD PRESSURE: 151 MMHG | HEART RATE: 115 BPM | HEIGHT: 71 IN | RESPIRATION RATE: 18 BRPM | TEMPERATURE: 97.1 F | DIASTOLIC BLOOD PRESSURE: 93 MMHG

## 2021-06-30 LAB
BASOPHILS # BLD AUTO: 0.08 K/UL — SIGNIFICANT CHANGE UP (ref 0–0.2)
BASOPHILS NFR BLD AUTO: 1 % — SIGNIFICANT CHANGE UP (ref 0–2)
EOSINOPHIL # BLD AUTO: 0 K/UL — SIGNIFICANT CHANGE UP (ref 0–0.5)
EOSINOPHIL NFR BLD AUTO: 0 % — SIGNIFICANT CHANGE UP (ref 0–6)
HCT VFR BLD CALC: 38.8 % — LOW (ref 39–50)
HGB BLD-MCNC: 12 G/DL — LOW (ref 13–17)
LYMPHOCYTES # BLD AUTO: 1.19 K/UL — SIGNIFICANT CHANGE UP (ref 1–3.3)
LYMPHOCYTES # BLD AUTO: 15 % — SIGNIFICANT CHANGE UP (ref 13–44)
MCHC RBC-ENTMCNC: 23.8 PG — LOW (ref 27–34)
MCHC RBC-ENTMCNC: 30.9 G/DL — LOW (ref 32–36)
MCV RBC AUTO: 76.8 FL — LOW (ref 80–100)
MONOCYTES # BLD AUTO: 0.24 K/UL — SIGNIFICANT CHANGE UP (ref 0–0.9)
MONOCYTES NFR BLD AUTO: 3 % — SIGNIFICANT CHANGE UP (ref 2–14)
NEUTROPHILS # BLD AUTO: 6.42 K/UL — SIGNIFICANT CHANGE UP (ref 1.8–7.4)
NEUTROPHILS NFR BLD AUTO: 81 % — HIGH (ref 43–77)
NRBC # BLD: 0 /100 — SIGNIFICANT CHANGE UP (ref 0–0)
NRBC # BLD: SIGNIFICANT CHANGE UP /100 WBCS (ref 0–0)
PLAT MORPH BLD: NORMAL — SIGNIFICANT CHANGE UP
PLATELET # BLD AUTO: 29 K/UL — LOW (ref 150–400)
RBC # BLD: 5.05 M/UL — SIGNIFICANT CHANGE UP (ref 4.2–5.8)
RBC # FLD: 16.8 % — HIGH (ref 10.3–14.5)
RBC BLD AUTO: SIGNIFICANT CHANGE UP
WBC # BLD: 7.93 K/UL — SIGNIFICANT CHANGE UP (ref 3.8–10.5)
WBC # FLD AUTO: 7.93 K/UL — SIGNIFICANT CHANGE UP (ref 3.8–10.5)

## 2021-06-30 PROCEDURE — 99213 OFFICE O/P EST LOW 20 MIN: CPT

## 2021-07-02 ENCOUNTER — APPOINTMENT (OUTPATIENT)
Dept: INFUSION THERAPY | Facility: HOSPITAL | Age: 21
End: 2021-07-02

## 2021-07-09 ENCOUNTER — APPOINTMENT (OUTPATIENT)
Dept: INFUSION THERAPY | Facility: HOSPITAL | Age: 21
End: 2021-07-09

## 2021-07-10 NOTE — ASSESSMENT
[FreeTextEntry1] : 21 year old autistic male with developmental disability who resides at [facility], seen by hematology for chronic ITP. Per mother, the patient's platelet counts remain stable for years between 25,000 - 35,000 prior to him being seen at [facility]. \par [facility] (1/2021) admission for rectal bleeding and acute COVID 19 infection. He had a transient rise in platelet count to IV IgG during his January 2021 hospitalization. \par Peripheral blood smear (2/9/21): Large platelet forms. Normal white cell morphology.\par [facility] (2/27-4/12) for refractory ITP following COVID infection in [facility].\par Suboptimal responses to IVIG x 3 doses, Decadron with a prolonged Prednisone taper, Rituxan (last 3/27/21), and Nplate x 4 doses\par Cannot take promacta as it cannot be crushed and mixed into food.  Started on doptelet 40 mg on 4/7/21. \par \par His plt count is 29 today.  No IVIG this week. \par Will change his lab check to every other week and plan for IVIG if his plt are less than 15.  If his plt remain stable in the next month, will then plan for monthly check at [facility] and count checks at the [facility].  \par Continue doptelet daily.  \par \par Discussed that I am leaving the practice, he will follow up with one of my colleagues.  \par Follow up in 2 months

## 2021-07-10 NOTE — PHYSICAL EXAM
[Completely disabled. Cannot carry on any self care. Totally confined to bed or chair] : Status 4- Completely disabled. Cannot carry on any self care. Totally confined to bed or chair [Obese] : obese [Normal] : normal appearance, no rash, nodules, vesicles, ulcers, erythema [de-identified] : no active bleeding/ecchymosis/petechiae noted

## 2021-07-10 NOTE — HISTORY OF PRESENT ILLNESS
[Disease:__________________________] : Disease: [unfilled] [de-identified] : 20 year old male presenting to [facility] for hematologic care. He is referred here from [facility]. Patient's past medical history is significant for intellectual disability, autism (non-verbal), ITP (diagnosed since he was 18 months of age), CARLITOS (not on CPAP). He was admitted on 1/23/2021 due to acute lower GI bleed in setting of thrombocytopenia; his platelet count was 1,000 and he was also found to be COVID +. During the course of his hospital stay, he received 1 unit platelet transfusion, 2 days of IVIG, 2 days of IV steroids, then started on oral steroid therapy. Patient was discharged on 2/5/2021, on prednisone 80 mg daily and advised to follow up at [facility] to taper his steroid dosage down in an outpatient setting. \par \par Patient is accompanied by a formal caregiver, presented to [facility] for blood count assessment and management of his steroid therapy. Patient's mother believed his ITP began after he received MMR vaccination around 18 months of age. His ITP was managed by Dr. Rachana Blanco (hematology) at [place], intermittently receiving IVIG treatments. Attempts were made with various treatments but he was believed to have experienced reactions (Rituxan, WinRho, etc). Prior to his January 2021 [facility] hospitalization, he did not receive any treatment for at least 10 years for ITP. Patient's mother also reported that he appeared withdrawn since birth. [FreeTextEntry1] : oral prednisone 80 mg daily [de-identified] : Patient presents for follow up appointment accompanied by his mother.  He continues to reside at [facility] without any new issues/complications.  He has been taking the doptelet per his mother.  He has been on IVIG either weekly or every other week.  His mother states he has not been having any bleeding.   \par Unable to get a review of symptoms from patient.

## 2021-07-14 ENCOUNTER — RESULT REVIEW (OUTPATIENT)
Age: 21
End: 2021-07-14

## 2021-07-14 ENCOUNTER — APPOINTMENT (OUTPATIENT)
Dept: INFUSION THERAPY | Facility: HOSPITAL | Age: 21
End: 2021-07-14

## 2021-07-14 LAB
BASOPHILS # BLD AUTO: 0.03 K/UL — SIGNIFICANT CHANGE UP (ref 0–0.2)
BASOPHILS NFR BLD AUTO: 0.3 % — SIGNIFICANT CHANGE UP (ref 0–2)
EOSINOPHIL # BLD AUTO: 0.04 K/UL — SIGNIFICANT CHANGE UP (ref 0–0.5)
EOSINOPHIL NFR BLD AUTO: 0.4 % — SIGNIFICANT CHANGE UP (ref 0–6)
HCT VFR BLD CALC: 40.1 % — SIGNIFICANT CHANGE UP (ref 39–50)
HGB BLD-MCNC: 12.1 G/DL — LOW (ref 13–17)
IMM GRANULOCYTES NFR BLD AUTO: 0.5 % — SIGNIFICANT CHANGE UP (ref 0–1.5)
LYMPHOCYTES # BLD AUTO: 1.36 K/UL — SIGNIFICANT CHANGE UP (ref 1–3.3)
LYMPHOCYTES # BLD AUTO: 14.8 % — SIGNIFICANT CHANGE UP (ref 13–44)
MCHC RBC-ENTMCNC: 23.1 PG — LOW (ref 27–34)
MCHC RBC-ENTMCNC: 30.2 G/DL — LOW (ref 32–36)
MCV RBC AUTO: 76.7 FL — LOW (ref 80–100)
MONOCYTES # BLD AUTO: 0.65 K/UL — SIGNIFICANT CHANGE UP (ref 0–0.9)
MONOCYTES NFR BLD AUTO: 7.1 % — SIGNIFICANT CHANGE UP (ref 2–14)
NEUTROPHILS # BLD AUTO: 7.03 K/UL — SIGNIFICANT CHANGE UP (ref 1.8–7.4)
NEUTROPHILS NFR BLD AUTO: 76.9 % — SIGNIFICANT CHANGE UP (ref 43–77)
NRBC # BLD: 0 /100 WBCS — SIGNIFICANT CHANGE UP (ref 0–0)
PLATELET # BLD AUTO: 15 K/UL — CRITICAL LOW (ref 150–400)
RBC # BLD: 5.23 M/UL — SIGNIFICANT CHANGE UP (ref 4.2–5.8)
RBC # FLD: 15.9 % — HIGH (ref 10.3–14.5)
WBC # BLD: 9.16 K/UL — SIGNIFICANT CHANGE UP (ref 3.8–10.5)
WBC # FLD AUTO: 9.16 K/UL — SIGNIFICANT CHANGE UP (ref 3.8–10.5)

## 2021-07-16 ENCOUNTER — APPOINTMENT (OUTPATIENT)
Dept: INFUSION THERAPY | Facility: HOSPITAL | Age: 21
End: 2021-07-16

## 2021-07-16 NOTE — ED ADULT NURSE NOTE - NSFALLRSKUNASSIST_ED_ALL_ED
VSS  Elevated ESR, CRP  Unclear if septic bursitis or septic bursitis associated with cellulitis  Was prescribed Keflex and bactrim by PMD but took only total of 2-3 doses  Ortho recs no intervention or arthocentesis, continue Ancef, considering adding Unasyn if no improvement, NSAIDs for bursitis     Plan:   - F/u ortho recs  - c/w Cefazolin 500mg IV q8   - f/u Blood cultures  - f/u Xray of the L forearm VSS  Elevated ESR, CRP  Unclear if septic bursitis or septic bursitis associated with cellulitis  Was prescribed Keflex and bactrim by PMD but took only total of 2-3 doses  Ortho recs no intervention or arthocentesis, continue Ancef, considering adding Unasyn if no improvement, NSAIDs for bursitis     Plan:   - F/u ortho recs  - C/w Cefazolin 500mg IV q8h with plan to transition to PO Abx tomorrow  - F/u BCx  - f/u Xray of the L forearm read no

## 2021-07-26 ENCOUNTER — OUTPATIENT (OUTPATIENT)
Dept: OUTPATIENT SERVICES | Facility: HOSPITAL | Age: 21
LOS: 1 days | Discharge: HOME | End: 2021-07-26

## 2021-07-26 DIAGNOSIS — D69.3 IMMUNE THROMBOCYTOPENIC PURPURA: ICD-10-CM

## 2021-07-28 ENCOUNTER — APPOINTMENT (OUTPATIENT)
Dept: INFUSION THERAPY | Facility: HOSPITAL | Age: 21
End: 2021-07-28

## 2021-07-28 ENCOUNTER — RESULT REVIEW (OUTPATIENT)
Age: 21
End: 2021-07-28

## 2021-07-28 LAB
BASOPHILS # BLD AUTO: 0.03 K/UL — SIGNIFICANT CHANGE UP (ref 0–0.2)
BASOPHILS NFR BLD AUTO: 0.3 % — SIGNIFICANT CHANGE UP (ref 0–2)
EOSINOPHIL # BLD AUTO: 0.02 K/UL — SIGNIFICANT CHANGE UP (ref 0–0.5)
EOSINOPHIL NFR BLD AUTO: 0.2 % — SIGNIFICANT CHANGE UP (ref 0–6)
HCT VFR BLD CALC: 40 % — SIGNIFICANT CHANGE UP (ref 39–50)
HGB BLD-MCNC: 12 G/DL — LOW (ref 13–17)
IMM GRANULOCYTES NFR BLD AUTO: 0.4 % — SIGNIFICANT CHANGE UP (ref 0–1.5)
LYMPHOCYTES # BLD AUTO: 1.27 K/UL — SIGNIFICANT CHANGE UP (ref 1–3.3)
LYMPHOCYTES # BLD AUTO: 14 % — SIGNIFICANT CHANGE UP (ref 13–44)
MCHC RBC-ENTMCNC: 23.1 PG — LOW (ref 27–34)
MCHC RBC-ENTMCNC: 30 G/DL — LOW (ref 32–36)
MCV RBC AUTO: 77.1 FL — LOW (ref 80–100)
MONOCYTES # BLD AUTO: 0.72 K/UL — SIGNIFICANT CHANGE UP (ref 0–0.9)
MONOCYTES NFR BLD AUTO: 7.9 % — SIGNIFICANT CHANGE UP (ref 2–14)
NEUTROPHILS # BLD AUTO: 7 K/UL — SIGNIFICANT CHANGE UP (ref 1.8–7.4)
NEUTROPHILS NFR BLD AUTO: 77.2 % — HIGH (ref 43–77)
NRBC # BLD: 0 /100 WBCS — SIGNIFICANT CHANGE UP (ref 0–0)
PLATELET # BLD AUTO: 18 K/UL — CRITICAL LOW (ref 150–400)
RBC # BLD: 5.19 M/UL — SIGNIFICANT CHANGE UP (ref 4.2–5.8)
RBC # FLD: 15.8 % — HIGH (ref 10.3–14.5)
WBC # BLD: 9.08 K/UL — SIGNIFICANT CHANGE UP (ref 3.8–10.5)
WBC # FLD AUTO: 9.08 K/UL — SIGNIFICANT CHANGE UP (ref 3.8–10.5)

## 2021-08-11 ENCOUNTER — RESULT REVIEW (OUTPATIENT)
Age: 21
End: 2021-08-11

## 2021-08-11 ENCOUNTER — APPOINTMENT (OUTPATIENT)
Dept: INFUSION THERAPY | Facility: HOSPITAL | Age: 21
End: 2021-08-11

## 2021-08-11 LAB
BASOPHILS # BLD AUTO: 0.02 K/UL — SIGNIFICANT CHANGE UP (ref 0–0.2)
BASOPHILS NFR BLD AUTO: 0.3 % — SIGNIFICANT CHANGE UP (ref 0–2)
EOSINOPHIL # BLD AUTO: 0.03 K/UL — SIGNIFICANT CHANGE UP (ref 0–0.5)
EOSINOPHIL NFR BLD AUTO: 0.4 % — SIGNIFICANT CHANGE UP (ref 0–6)
HCT VFR BLD CALC: 36.4 % — LOW (ref 39–50)
HGB BLD-MCNC: 11.1 G/DL — LOW (ref 13–17)
IMM GRANULOCYTES NFR BLD AUTO: 0.3 % — SIGNIFICANT CHANGE UP (ref 0–1.5)
LYMPHOCYTES # BLD AUTO: 1.04 K/UL — SIGNIFICANT CHANGE UP (ref 1–3.3)
LYMPHOCYTES # BLD AUTO: 14.4 % — SIGNIFICANT CHANGE UP (ref 13–44)
MCHC RBC-ENTMCNC: 23.3 PG — LOW (ref 27–34)
MCHC RBC-ENTMCNC: 30.5 G/DL — LOW (ref 32–36)
MCV RBC AUTO: 76.5 FL — LOW (ref 80–100)
MONOCYTES # BLD AUTO: 0.51 K/UL — SIGNIFICANT CHANGE UP (ref 0–0.9)
MONOCYTES NFR BLD AUTO: 7.1 % — SIGNIFICANT CHANGE UP (ref 2–14)
NEUTROPHILS # BLD AUTO: 5.61 K/UL — SIGNIFICANT CHANGE UP (ref 1.8–7.4)
NEUTROPHILS NFR BLD AUTO: 77.5 % — HIGH (ref 43–77)
NRBC # BLD: 0 /100 WBCS — SIGNIFICANT CHANGE UP (ref 0–0)
PLATELET # BLD AUTO: 4 K/UL — CRITICAL LOW (ref 150–400)
RBC # BLD: 4.76 M/UL — SIGNIFICANT CHANGE UP (ref 4.2–5.8)
RBC # FLD: 15.9 % — HIGH (ref 10.3–14.5)
WBC # BLD: 7.23 K/UL — SIGNIFICANT CHANGE UP (ref 3.8–10.5)
WBC # FLD AUTO: 7.23 K/UL — SIGNIFICANT CHANGE UP (ref 3.8–10.5)

## 2021-08-12 DIAGNOSIS — Z51.11 ENCOUNTER FOR ANTINEOPLASTIC CHEMOTHERAPY: ICD-10-CM

## 2021-08-12 DIAGNOSIS — R11.2 NAUSEA WITH VOMITING, UNSPECIFIED: ICD-10-CM

## 2021-08-16 ENCOUNTER — APPOINTMENT (OUTPATIENT)
Dept: HEMATOLOGY ONCOLOGY | Facility: CLINIC | Age: 21
End: 2021-08-16
Payer: MEDICARE

## 2021-08-16 ENCOUNTER — RESULT REVIEW (OUTPATIENT)
Age: 21
End: 2021-08-16

## 2021-08-16 VITALS
SYSTOLIC BLOOD PRESSURE: 134 MMHG | TEMPERATURE: 97 F | BODY MASS INDEX: 44.1 KG/M2 | HEART RATE: 101 BPM | OXYGEN SATURATION: 99 % | RESPIRATION RATE: 16 BRPM | HEIGHT: 71 IN | WEIGHT: 315 LBS | DIASTOLIC BLOOD PRESSURE: 68 MMHG

## 2021-08-16 LAB
BASOPHILS # BLD AUTO: 0.04 K/UL — SIGNIFICANT CHANGE UP (ref 0–0.2)
BASOPHILS NFR BLD AUTO: 0.4 % — SIGNIFICANT CHANGE UP (ref 0–2)
EOSINOPHIL # BLD AUTO: 0.03 K/UL — SIGNIFICANT CHANGE UP (ref 0–0.5)
EOSINOPHIL NFR BLD AUTO: 0.3 % — SIGNIFICANT CHANGE UP (ref 0–6)
HCT VFR BLD CALC: 36.3 % — LOW (ref 39–50)
HGB BLD-MCNC: 11 G/DL — LOW (ref 13–17)
IMM GRANULOCYTES NFR BLD AUTO: 0.5 % — SIGNIFICANT CHANGE UP (ref 0–1.5)
LYMPHOCYTES # BLD AUTO: 1.46 K/UL — SIGNIFICANT CHANGE UP (ref 1–3.3)
LYMPHOCYTES # BLD AUTO: 13.6 % — SIGNIFICANT CHANGE UP (ref 13–44)
MCHC RBC-ENTMCNC: 23.1 PG — LOW (ref 27–34)
MCHC RBC-ENTMCNC: 30.3 G/DL — LOW (ref 32–36)
MCV RBC AUTO: 76.3 FL — LOW (ref 80–100)
MONOCYTES # BLD AUTO: 0.7 K/UL — SIGNIFICANT CHANGE UP (ref 0–0.9)
MONOCYTES NFR BLD AUTO: 6.5 % — SIGNIFICANT CHANGE UP (ref 2–14)
NEUTROPHILS # BLD AUTO: 8.42 K/UL — HIGH (ref 1.8–7.4)
NEUTROPHILS NFR BLD AUTO: 78.7 % — HIGH (ref 43–77)
NRBC # BLD: 0 /100 WBCS — SIGNIFICANT CHANGE UP (ref 0–0)
PLATELET # BLD AUTO: 26 K/UL — LOW (ref 150–400)
RBC # BLD: 4.76 M/UL — SIGNIFICANT CHANGE UP (ref 4.2–5.8)
RBC # FLD: 16 % — HIGH (ref 10.3–14.5)
WBC # BLD: 10.7 K/UL — HIGH (ref 3.8–10.5)
WBC # FLD AUTO: 10.7 K/UL — HIGH (ref 3.8–10.5)

## 2021-08-16 PROCEDURE — 99215 OFFICE O/P EST HI 40 MIN: CPT

## 2021-08-16 RX ORDER — CIMETIDINE 400 MG
400 TABLET ORAL 3 TIMES DAILY
Refills: 0 | Status: ACTIVE | COMMUNITY
Start: 2021-02-09

## 2021-08-17 ENCOUNTER — APPOINTMENT (OUTPATIENT)
Dept: HEMATOLOGY ONCOLOGY | Facility: CLINIC | Age: 21
End: 2021-08-17

## 2021-08-19 NOTE — PHYSICAL EXAM
[Obese] : obese [Normal] : full range of motion and no deformities appreciated [de-identified] : non verbal.   [de-identified] : no hepatomegally.   [de-identified] : mild echymoses under eyes and left arm.

## 2021-08-19 NOTE — ASSESSMENT
[FreeTextEntry1] : 21 year old autistic male with developmental disability who resides at [facility], seen by hematology for chronic ITP. Per mother, the patient's platelet counts remain stable for years between 25,000 - 35,000 prior to him being seen at [facility]. \par [facility] (1/2021) admission for rectal bleeding and acute COVID 19 infection. He had a transient rise in platelet count to IV IgG during his January 2021 hospitalization. \par Peripheral blood smear (2/9/21): Large platelet forms. Normal white cell morphology.\par [facility] (2/27-4/12) for refractory ITP following COVID infection in [facility].\par Suboptimal responses to IVIG x 3 doses, Decadron with a prolonged Prednisone taper, Rituxan (last 3/27/21), and Nplate x 4 doses\par Cannot take Promacta as it cannot be crushed and mixed into food. Started on Dopplet 40 mg on 4/7/21 along with IVIG, more recently decreased in frequency to every 2 weeks. IVIG given for platelets < 15,000.  Today platets 26,000. Doing well so far.  Doptlet dose is at its maximal range.  Will continue follow up every 3 months with IVIG every 2 weeks as needed.  \par \par Discussed the medication Tavalise as a possible alternative to periodic IVIG therapy.

## 2021-08-19 NOTE — HISTORY OF PRESENT ILLNESS
[de-identified] : \par 20 year old male presenting to [facility] for hematologic care. He is referred here from [facility]. Patient's past medical history is significant for intellectual disability, autism (non-verbal), ITP (diagnosed since he was 18 months of age), CARLITOS (not on CPAP). He was admitted on 1/23/2021 due to acute lower GI bleed in setting of thrombocytopenia; his platelet count was 1,000 and he was also found to be COVID +. During the course of his hospital stay, he received 1 unit platelet transfusion, 2 days of IVIG, 2 days of IV steroids, then started on oral steroid therapy. Patient was discharged on 2/5/2021, on prednisone 80 mg daily and advised to follow up at [facility] to taper his steroid dosage down in an outpatient setting. \par \par Patient is accompanied by a formal caregiver, presented to [facility] for blood count assessment and management of his steroid therapy. Patient's mother believed his ITP began after he received MMR vaccination around 18 months of age. His ITP was managed by Dr. Rachana Blanco (hematology) at [place], intermittently receiving IVIG treatments. Attempts were made with various treatments but he was believed to have experienced reactions (Rituxan, WinRho, etc). Prior to his January 2021 [facility] hospitalization, he did not receive any treatment for at least 10 years for ITP. Patient's mother also reported that he appeared withdrawn since birth.  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   \par Severe exacerbation post covid that lead to the current scenario of dotplet at max dose, and pateint still requiring IVIG every 1-2 weeks. More erecntly decreaed to every 2 qweek frequency. Mroe recently on this paltelts dropepd to 4 on 8/11/21 on the dosign date.  \par Patient presetns accompanied by his monther.  Patient receiving IVIG Q 2 weeks for ITP with platlets < 15.    Last 8/11 on IVIG dosing patient platelts down to 4,000.  Paitent \par Patient was started on doptlet 40mg Daily since the hospital. Malia had some behavorial issues and suffered some bruising on face and right arm at the group home. \par \par Tried to confirm doptlet dosing 20mg BID however we are usnuraas the medication is adminteerd by the group home and jocynets mother had thought the dose was a signle 20mmg tablet a day.   Hopwever we were able to confirm he is recivving the 20mg BID dosing.  \par \par

## 2021-08-25 ENCOUNTER — RESULT REVIEW (OUTPATIENT)
Age: 21
End: 2021-08-25

## 2021-08-25 ENCOUNTER — APPOINTMENT (OUTPATIENT)
Dept: INFUSION THERAPY | Facility: HOSPITAL | Age: 21
End: 2021-08-25

## 2021-08-25 ENCOUNTER — APPOINTMENT (OUTPATIENT)
Dept: HEMATOLOGY ONCOLOGY | Facility: CLINIC | Age: 21
End: 2021-08-25
Payer: MEDICARE

## 2021-08-25 LAB
BASOPHILS # BLD AUTO: 0.03 K/UL — SIGNIFICANT CHANGE UP (ref 0–0.2)
BASOPHILS NFR BLD AUTO: 0.3 % — SIGNIFICANT CHANGE UP (ref 0–2)
EOSINOPHIL # BLD AUTO: 0.01 K/UL — SIGNIFICANT CHANGE UP (ref 0–0.5)
EOSINOPHIL NFR BLD AUTO: 0.1 % — SIGNIFICANT CHANGE UP (ref 0–6)
HCT VFR BLD CALC: 40 % — SIGNIFICANT CHANGE UP (ref 39–50)
HGB BLD-MCNC: 12.1 G/DL — LOW (ref 13–17)
IMM GRANULOCYTES NFR BLD AUTO: 0.4 % — SIGNIFICANT CHANGE UP (ref 0–1.5)
LYMPHOCYTES # BLD AUTO: 1.48 K/UL — SIGNIFICANT CHANGE UP (ref 1–3.3)
LYMPHOCYTES # BLD AUTO: 13.1 % — SIGNIFICANT CHANGE UP (ref 13–44)
MCHC RBC-ENTMCNC: 23.2 PG — LOW (ref 27–34)
MCHC RBC-ENTMCNC: 30.3 G/DL — LOW (ref 32–36)
MCV RBC AUTO: 76.8 FL — LOW (ref 80–100)
MONOCYTES # BLD AUTO: 0.76 K/UL — SIGNIFICANT CHANGE UP (ref 0–0.9)
MONOCYTES NFR BLD AUTO: 6.7 % — SIGNIFICANT CHANGE UP (ref 2–14)
NEUTROPHILS # BLD AUTO: 9.02 K/UL — HIGH (ref 1.8–7.4)
NEUTROPHILS NFR BLD AUTO: 79.4 % — HIGH (ref 43–77)
NRBC # BLD: 0 /100 WBCS — SIGNIFICANT CHANGE UP (ref 0–0)
PLATELET # BLD AUTO: 13 K/UL — CRITICAL LOW (ref 150–400)
RBC # BLD: 5.21 M/UL — SIGNIFICANT CHANGE UP (ref 4.2–5.8)
RBC # FLD: 16 % — HIGH (ref 10.3–14.5)
WBC # BLD: 11.34 K/UL — HIGH (ref 3.8–10.5)
WBC # FLD AUTO: 11.34 K/UL — HIGH (ref 3.8–10.5)

## 2021-08-25 PROCEDURE — 99214 OFFICE O/P EST MOD 30 MIN: CPT

## 2021-08-27 NOTE — ASSESSMENT
[FreeTextEntry1] : 21 year old autistic male with developmental disability who resides at [facility], seen by hematology for chronic ITP. Per mother, the patient's platelet counts remain stable for years between 25,000 - 35,000 prior to him being seen at [facility]. \par [facility] (1/2021) admission for rectal bleeding and acute COVID 19 infection. He had a transient rise in platelet count to IV IgG during his January 2021 hospitalization. \par Peripheral blood smear (2/9/21): Large platelet forms. Normal white cell morphology.\par [facility] (2/27-4/12) for refractory ITP following COVID infection in January.\par Suboptimal responses to IVIG x 3 doses, Decadron with a prolonged Prednisone taper, Rituxan (last 3/27/21), and Nplate x 4 doses\par Cannot take Promacta as it cannot be crushed and mixed into food. Started on Doptlet 40 mg on 4/7/21 along with IVIG, more recently decreased in frequency to every 2 weeks. IVIG given for platelets < 15,000.  Today platelets 13,000.  Returned today to discuss propoisal to start Tavalise as a replacement or adjunct to IVIG.  Mother had opportunity to think about this over the last week.  After some discussion, mother is uneasy about starting yet another new medication. Will continue plan to have patient return for CBC and possible IVIG infusions for platelts under 15,000.

## 2021-08-27 NOTE — HISTORY OF PRESENT ILLNESS
[de-identified] : \par 21 year old male presenting to [facility] for hematologic care. He is referred here from [facility]. Patient's past medical history is significant for intellectual disability, autism (non-verbal), ITP (diagnosed since he was 18 months of age), CARLITOS (not on CPAP). He was admitted on 1/23/2021 due to acute lower GI bleed in setting of thrombocytopenia; his platelet count was 1,000 and he was also found to be COVID +. During the course of his hospital stay, he received 1 unit platelet transfusion, 2 days of IVIG, 2 days of IV steroids, then started on oral steroid therapy. Patient was discharged on 2/5/2021, on prednisone 80 mg daily and advised to follow up at [facility] to taper his steroid dosage down in an outpatient setting. \par \par Patient is accompanied by a formal caregiver, presented to [facility] for blood count assessment and management of his steroid therapy. Patient's mother believed his ITP began after he received MMR vaccination around 18 months of age. His ITP was managed by Dr. Rachana Blanco (hematology) at [place], intermittently receiving IVIG treatments. Attempts were made with various treatments but he was believed to have experienced reactions (Rituxan, WinRho, etc). Prior to his January 2021 [facility] hospitalization, he did not receive any treatment for at least 10 years for ITP. Patient's mother also reported that he appeared withdrawn since birth.  [de-identified] : Patient accompanied by his mother at infusion for IVIG.  He continues to have some scrapes and bruises from self mutilation though appears mild at this point.  OTherwise doing well. Platelet counts 13,000 today.  We discussed the proposal to start tavalise for long term maintenance as opposed to the IVIG.

## 2021-09-07 ENCOUNTER — OUTPATIENT (OUTPATIENT)
Dept: OUTPATIENT SERVICES | Facility: HOSPITAL | Age: 21
LOS: 1 days | Discharge: HOME | End: 2021-09-07

## 2021-09-07 DIAGNOSIS — D69.3 IMMUNE THROMBOCYTOPENIC PURPURA: ICD-10-CM

## 2021-09-10 ENCOUNTER — APPOINTMENT (OUTPATIENT)
Dept: INFUSION THERAPY | Facility: HOSPITAL | Age: 21
End: 2021-09-10

## 2021-09-10 ENCOUNTER — RESULT REVIEW (OUTPATIENT)
Age: 21
End: 2021-09-10

## 2021-09-10 LAB
BASOPHILS # BLD AUTO: 0.02 K/UL — SIGNIFICANT CHANGE UP (ref 0–0.2)
BASOPHILS NFR BLD AUTO: 0.2 % — SIGNIFICANT CHANGE UP (ref 0–2)
EOSINOPHIL # BLD AUTO: 0.03 K/UL — SIGNIFICANT CHANGE UP (ref 0–0.5)
EOSINOPHIL NFR BLD AUTO: 0.4 % — SIGNIFICANT CHANGE UP (ref 0–6)
HCT VFR BLD CALC: 39.8 % — SIGNIFICANT CHANGE UP (ref 39–50)
HGB BLD-MCNC: 11.8 G/DL — LOW (ref 13–17)
IMM GRANULOCYTES NFR BLD AUTO: 0.6 % — SIGNIFICANT CHANGE UP (ref 0–1.5)
LYMPHOCYTES # BLD AUTO: 1.29 K/UL — SIGNIFICANT CHANGE UP (ref 1–3.3)
LYMPHOCYTES # BLD AUTO: 15.5 % — SIGNIFICANT CHANGE UP (ref 13–44)
MCHC RBC-ENTMCNC: 22.4 PG — LOW (ref 27–34)
MCHC RBC-ENTMCNC: 29.6 G/DL — LOW (ref 32–36)
MCV RBC AUTO: 75.5 FL — LOW (ref 80–100)
MONOCYTES # BLD AUTO: 0.58 K/UL — SIGNIFICANT CHANGE UP (ref 0–0.9)
MONOCYTES NFR BLD AUTO: 7 % — SIGNIFICANT CHANGE UP (ref 2–14)
NEUTROPHILS # BLD AUTO: 6.35 K/UL — SIGNIFICANT CHANGE UP (ref 1.8–7.4)
NEUTROPHILS NFR BLD AUTO: 76.3 % — SIGNIFICANT CHANGE UP (ref 43–77)
NRBC # BLD: 0 /100 WBCS — SIGNIFICANT CHANGE UP (ref 0–0)
PLATELET # BLD AUTO: 20 K/UL — CRITICAL LOW (ref 150–400)
RBC # BLD: 5.27 M/UL — SIGNIFICANT CHANGE UP (ref 4.2–5.8)
RBC # FLD: 15.9 % — HIGH (ref 10.3–14.5)
WBC # BLD: 8.32 K/UL — SIGNIFICANT CHANGE UP (ref 3.8–10.5)
WBC # FLD AUTO: 8.32 K/UL — SIGNIFICANT CHANGE UP (ref 3.8–10.5)

## 2021-09-27 ENCOUNTER — RESULT REVIEW (OUTPATIENT)
Age: 21
End: 2021-09-27

## 2021-09-27 ENCOUNTER — APPOINTMENT (OUTPATIENT)
Dept: INFUSION THERAPY | Facility: HOSPITAL | Age: 21
End: 2021-09-27

## 2021-09-27 ENCOUNTER — NON-APPOINTMENT (OUTPATIENT)
Age: 21
End: 2021-09-27

## 2021-09-27 DIAGNOSIS — R11.2 NAUSEA WITH VOMITING, UNSPECIFIED: ICD-10-CM

## 2021-09-27 LAB
BASOPHILS # BLD AUTO: 0.03 K/UL — SIGNIFICANT CHANGE UP (ref 0–0.2)
BASOPHILS NFR BLD AUTO: 0.3 % — SIGNIFICANT CHANGE UP (ref 0–2)
EOSINOPHIL # BLD AUTO: 0.01 K/UL — SIGNIFICANT CHANGE UP (ref 0–0.5)
EOSINOPHIL NFR BLD AUTO: 0.1 % — SIGNIFICANT CHANGE UP (ref 0–6)
HCT VFR BLD CALC: 39.7 % — SIGNIFICANT CHANGE UP (ref 39–50)
HGB BLD-MCNC: 12.1 G/DL — LOW (ref 13–17)
IMM GRANULOCYTES NFR BLD AUTO: 0.4 % — SIGNIFICANT CHANGE UP (ref 0–1.5)
LYMPHOCYTES # BLD AUTO: 1.17 K/UL — SIGNIFICANT CHANGE UP (ref 1–3.3)
LYMPHOCYTES # BLD AUTO: 12.1 % — LOW (ref 13–44)
MCHC RBC-ENTMCNC: 22.9 PG — LOW (ref 27–34)
MCHC RBC-ENTMCNC: 30.5 G/DL — LOW (ref 32–36)
MCV RBC AUTO: 75 FL — LOW (ref 80–100)
MONOCYTES # BLD AUTO: 0.59 K/UL — SIGNIFICANT CHANGE UP (ref 0–0.9)
MONOCYTES NFR BLD AUTO: 6.1 % — SIGNIFICANT CHANGE UP (ref 2–14)
NEUTROPHILS # BLD AUTO: 7.86 K/UL — HIGH (ref 1.8–7.4)
NEUTROPHILS NFR BLD AUTO: 81 % — HIGH (ref 43–77)
NRBC # BLD: 0 /100 WBCS — SIGNIFICANT CHANGE UP (ref 0–0)
PLATELET # BLD AUTO: 8 K/UL — CRITICAL LOW (ref 150–400)
RBC # BLD: 5.29 M/UL — SIGNIFICANT CHANGE UP (ref 4.2–5.8)
RBC # FLD: 15.9 % — HIGH (ref 10.3–14.5)
WBC # BLD: 9.7 K/UL — SIGNIFICANT CHANGE UP (ref 3.8–10.5)
WBC # FLD AUTO: 9.7 K/UL — SIGNIFICANT CHANGE UP (ref 3.8–10.5)

## 2021-10-06 ENCOUNTER — APPOINTMENT (OUTPATIENT)
Dept: INFUSION THERAPY | Facility: HOSPITAL | Age: 21
End: 2021-10-06

## 2021-10-12 ENCOUNTER — OUTPATIENT (OUTPATIENT)
Dept: OUTPATIENT SERVICES | Facility: HOSPITAL | Age: 21
LOS: 1 days | Discharge: HOME | End: 2021-10-12

## 2021-10-12 DIAGNOSIS — D69.3 IMMUNE THROMBOCYTOPENIC PURPURA: ICD-10-CM

## 2021-10-15 ENCOUNTER — APPOINTMENT (OUTPATIENT)
Dept: INFUSION THERAPY | Facility: HOSPITAL | Age: 21
End: 2021-10-15

## 2021-10-15 ENCOUNTER — RESULT REVIEW (OUTPATIENT)
Age: 21
End: 2021-10-15

## 2021-10-15 LAB
BASOPHILS # BLD AUTO: 0.02 K/UL — SIGNIFICANT CHANGE UP (ref 0–0.2)
BASOPHILS NFR BLD AUTO: 0.2 % — SIGNIFICANT CHANGE UP (ref 0–2)
EOSINOPHIL # BLD AUTO: 0.01 K/UL — SIGNIFICANT CHANGE UP (ref 0–0.5)
EOSINOPHIL NFR BLD AUTO: 0.1 % — SIGNIFICANT CHANGE UP (ref 0–6)
HCT VFR BLD CALC: 42.5 % — SIGNIFICANT CHANGE UP (ref 39–50)
HGB BLD-MCNC: 12.9 G/DL — LOW (ref 13–17)
IMM GRANULOCYTES NFR BLD AUTO: 0.5 % — SIGNIFICANT CHANGE UP (ref 0–1.5)
LYMPHOCYTES # BLD AUTO: 1.2 K/UL — SIGNIFICANT CHANGE UP (ref 1–3.3)
LYMPHOCYTES # BLD AUTO: 14.2 % — SIGNIFICANT CHANGE UP (ref 13–44)
MCHC RBC-ENTMCNC: 23 PG — LOW (ref 27–34)
MCHC RBC-ENTMCNC: 30.4 G/DL — LOW (ref 32–36)
MCV RBC AUTO: 75.8 FL — LOW (ref 80–100)
MONOCYTES # BLD AUTO: 0.7 K/UL — SIGNIFICANT CHANGE UP (ref 0–0.9)
MONOCYTES NFR BLD AUTO: 8.3 % — SIGNIFICANT CHANGE UP (ref 2–14)
NEUTROPHILS # BLD AUTO: 6.49 K/UL — SIGNIFICANT CHANGE UP (ref 1.8–7.4)
NEUTROPHILS NFR BLD AUTO: 76.7 % — SIGNIFICANT CHANGE UP (ref 43–77)
NRBC # BLD: 0 /100 WBCS — SIGNIFICANT CHANGE UP (ref 0–0)
PLATELET # BLD AUTO: 14 K/UL — CRITICAL LOW (ref 150–400)
RBC # BLD: 5.61 M/UL — SIGNIFICANT CHANGE UP (ref 4.2–5.8)
RBC # FLD: 16.9 % — HIGH (ref 10.3–14.5)
WBC # BLD: 8.46 K/UL — SIGNIFICANT CHANGE UP (ref 3.8–10.5)
WBC # FLD AUTO: 8.46 K/UL — SIGNIFICANT CHANGE UP (ref 3.8–10.5)

## 2021-10-18 DIAGNOSIS — R11.2 NAUSEA WITH VOMITING, UNSPECIFIED: ICD-10-CM

## 2021-10-18 DIAGNOSIS — Z51.11 ENCOUNTER FOR ANTINEOPLASTIC CHEMOTHERAPY: ICD-10-CM

## 2021-10-29 ENCOUNTER — RESULT REVIEW (OUTPATIENT)
Age: 21
End: 2021-10-29

## 2021-10-29 ENCOUNTER — APPOINTMENT (OUTPATIENT)
Dept: INFUSION THERAPY | Facility: HOSPITAL | Age: 21
End: 2021-10-29

## 2021-10-29 LAB
BASOPHILS # BLD AUTO: 0.02 K/UL — SIGNIFICANT CHANGE UP (ref 0–0.2)
BASOPHILS NFR BLD AUTO: 0.2 % — SIGNIFICANT CHANGE UP (ref 0–2)
EOSINOPHIL # BLD AUTO: 0.02 K/UL — SIGNIFICANT CHANGE UP (ref 0–0.5)
EOSINOPHIL NFR BLD AUTO: 0.2 % — SIGNIFICANT CHANGE UP (ref 0–6)
HCT VFR BLD CALC: 40.8 % — SIGNIFICANT CHANGE UP (ref 39–50)
HGB BLD-MCNC: 12.4 G/DL — LOW (ref 13–17)
IMM GRANULOCYTES NFR BLD AUTO: 0.1 % — SIGNIFICANT CHANGE UP (ref 0–1.5)
LYMPHOCYTES # BLD AUTO: 1.38 K/UL — SIGNIFICANT CHANGE UP (ref 1–3.3)
LYMPHOCYTES # BLD AUTO: 17.1 % — SIGNIFICANT CHANGE UP (ref 13–44)
MCHC RBC-ENTMCNC: 23.1 PG — LOW (ref 27–34)
MCHC RBC-ENTMCNC: 30.4 G/DL — LOW (ref 32–36)
MCV RBC AUTO: 76.1 FL — LOW (ref 80–100)
MONOCYTES # BLD AUTO: 0.58 K/UL — SIGNIFICANT CHANGE UP (ref 0–0.9)
MONOCYTES NFR BLD AUTO: 7.2 % — SIGNIFICANT CHANGE UP (ref 2–14)
NEUTROPHILS # BLD AUTO: 6.04 K/UL — SIGNIFICANT CHANGE UP (ref 1.8–7.4)
NEUTROPHILS NFR BLD AUTO: 75.2 % — SIGNIFICANT CHANGE UP (ref 43–77)
NRBC # BLD: 0 /100 WBCS — SIGNIFICANT CHANGE UP (ref 0–0)
PLATELET # BLD AUTO: 12 K/UL — CRITICAL LOW (ref 150–400)
RBC # BLD: 5.36 M/UL — SIGNIFICANT CHANGE UP (ref 4.2–5.8)
RBC # FLD: 17.4 % — HIGH (ref 10.3–14.5)
WBC # BLD: 8.05 K/UL — SIGNIFICANT CHANGE UP (ref 3.8–10.5)
WBC # FLD AUTO: 8.05 K/UL — SIGNIFICANT CHANGE UP (ref 3.8–10.5)

## 2021-11-10 ENCOUNTER — NON-APPOINTMENT (OUTPATIENT)
Age: 21
End: 2021-11-10

## 2021-11-11 ENCOUNTER — RESULT REVIEW (OUTPATIENT)
Age: 21
End: 2021-11-11

## 2021-11-11 ENCOUNTER — APPOINTMENT (OUTPATIENT)
Dept: INFUSION THERAPY | Facility: HOSPITAL | Age: 21
End: 2021-11-11

## 2021-11-11 ENCOUNTER — APPOINTMENT (OUTPATIENT)
Dept: HEMATOLOGY ONCOLOGY | Facility: CLINIC | Age: 21
End: 2021-11-11
Payer: MEDICARE

## 2021-11-11 LAB
BASOPHILS # BLD AUTO: 0.04 K/UL — SIGNIFICANT CHANGE UP (ref 0–0.2)
BASOPHILS NFR BLD AUTO: 0.4 % — SIGNIFICANT CHANGE UP (ref 0–2)
EOSINOPHIL # BLD AUTO: 0.01 K/UL — SIGNIFICANT CHANGE UP (ref 0–0.5)
EOSINOPHIL NFR BLD AUTO: 0.1 % — SIGNIFICANT CHANGE UP (ref 0–6)
HCT VFR BLD CALC: 42.1 % — SIGNIFICANT CHANGE UP (ref 39–50)
HGB BLD-MCNC: 13 G/DL — SIGNIFICANT CHANGE UP (ref 13–17)
IMM GRANULOCYTES NFR BLD AUTO: 0.5 % — SIGNIFICANT CHANGE UP (ref 0–1.5)
LYMPHOCYTES # BLD AUTO: 1.29 K/UL — SIGNIFICANT CHANGE UP (ref 1–3.3)
LYMPHOCYTES # BLD AUTO: 13.4 % — SIGNIFICANT CHANGE UP (ref 13–44)
MCHC RBC-ENTMCNC: 23.7 PG — LOW (ref 27–34)
MCHC RBC-ENTMCNC: 30.9 G/DL — LOW (ref 32–36)
MCV RBC AUTO: 76.8 FL — LOW (ref 80–100)
MONOCYTES # BLD AUTO: 0.59 K/UL — SIGNIFICANT CHANGE UP (ref 0–0.9)
MONOCYTES NFR BLD AUTO: 6.1 % — SIGNIFICANT CHANGE UP (ref 2–14)
NEUTROPHILS # BLD AUTO: 7.64 K/UL — HIGH (ref 1.8–7.4)
NEUTROPHILS NFR BLD AUTO: 79.5 % — HIGH (ref 43–77)
NRBC # BLD: 0 /100 WBCS — SIGNIFICANT CHANGE UP (ref 0–0)
PLATELET # BLD AUTO: 31 K/UL — LOW (ref 150–400)
RBC # BLD: 5.48 M/UL — SIGNIFICANT CHANGE UP (ref 4.2–5.8)
RBC # FLD: 17.7 % — HIGH (ref 10.3–14.5)
WBC # BLD: 9.62 K/UL — SIGNIFICANT CHANGE UP (ref 3.8–10.5)
WBC # FLD AUTO: 9.62 K/UL — SIGNIFICANT CHANGE UP (ref 3.8–10.5)

## 2021-11-11 PROCEDURE — 99214 OFFICE O/P EST MOD 30 MIN: CPT

## 2021-11-12 ENCOUNTER — APPOINTMENT (OUTPATIENT)
Dept: INFUSION THERAPY | Facility: HOSPITAL | Age: 21
End: 2021-11-12

## 2021-11-13 NOTE — HISTORY OF PRESENT ILLNESS
[de-identified] : 21 year old male presenting to [facility] for hematologic care. He is referred here from [facility]. Patient's past medical history is significant for intellectual disability, autism (non-verbal), ITP (diagnosed since he was 18 months of age), CARLITOS (not on CPAP). He was admitted on 1/23/2021 due to acute lower GI bleed in setting of thrombocytopenia; his platelet count was 1,000 and he was also found to be COVID +. During the course of his hospital stay, he received 1 unit platelet transfusion, 2 days of IVIG, 2 days of IV steroids, then started on oral steroid therapy. Patient was discharged on 2/5/2021, on prednisone 80 mg daily and advised to follow up at [facility] to taper his steroid dosage down in an outpatient setting. \par \par Patient is accompanied by a formal caregiver, presented to [facility] for blood count assessment and management of his steroid therapy. Patient's mother believed his ITP began after he received MMR vaccination around 18 months of age. His ITP was managed by Dr. Rachana Blanco (hematology) at [place], intermittently receiving IVIG treatments. Attempts were made with various treatments but he was believed to have experienced reactions (Rituxan, WinRho, etc). Prior to his January 2021 [facility] hospitalization, he did not receive any treatment for at least 10 years for ITP. Patient's mother also reported that he appeared withdrawn since birth.  [de-identified] : Patient accompanied by his mother at infusion for IVIG.  Platelet count 31,000 today which is the highest value we have seen this year.   Last treatment with IVIG was on 10/29/21.   Patient's mother Denies any severe bleeding, has been inflicting less  self harm lately.  THere is a single ecchymosis on the right cheek though this appears to be more of a popped pimple than an injury.  We again discussed alternatives to the continued IVIG.  he had  He continues to have some scrapes and bruises from self mutilation though appears mild at this point.  Otherwise doing well. Platelet counts 13,000 today.  We discussed the proposal to start Tavalise for long term maintenance as opposed to the IVIG.  Mother still expresses severe concern over starting a new medication.

## 2021-11-13 NOTE — ASSESSMENT
[FreeTextEntry1] : 21 year old autistic male with developmental disability who resides at [facility], seen by hematology for chronic ITP. Per mother, the patient's platelet counts remain stable for years between 25,000 - 35,000 prior to him being seen at [facility]. \par [facility] (1/2021) admission for rectal bleeding and acute COVID 19 infection. He had a transient rise in platelet count to IV IgG during his January 2021 hospitalization. \par Peripheral blood smear (2/9/21): Large platelet forms. Normal white cell morphology.\par [facility] (2/27-4/12) for refractory ITP following COVID infection in January.\par Suboptimal responses to IVIG x 3 doses, Decadron with a prolonged Prednisone taper, Rituxan (last 3/27/21), and Nplate x 4 doses\par \par Patient remains on the continues Q 2 week IVIG as outpatient since Spring 2021. Platelet counts range from the 10-20 range though most recent platelet count 31 today after last IVIG 2 weeks ago 10/29/21.  Discussed this would be a good time to discuss a long term solution rather than Continous using the IVIG with the Doplet 40mg daily.  Given the higher count we could theoretically discontinue the IVIG and monitor with PO medication.  Reccomended Doptlet 40mg as currently prescribed along with tavalisse 100mg BID.  Plan to keep platelet counts 15+ with oral medications alone for east of administration. The tavalise does is not altered much by the presence  or absence of food and should lori easy to administer. Discussed side effects of increased risk of infections and GI upset.  As we had discussed before, patient's mother still severely concerned about side effects, and does not wish to start now. We disused Rituxan as well, however patient has a history of Rituxan reactions, On last administration was desensitized at the ICU.  Can consider this but would put this as a distant 2nd option.  THe only other alternative is to continue with the current plan, though given good counts we can now space this out to Q 3 weeks.

## 2021-12-02 ENCOUNTER — OUTPATIENT (OUTPATIENT)
Dept: OUTPATIENT SERVICES | Facility: HOSPITAL | Age: 21
LOS: 1 days | Discharge: HOME | End: 2021-12-02

## 2021-12-02 DIAGNOSIS — D69.3 IMMUNE THROMBOCYTOPENIC PURPURA: ICD-10-CM

## 2021-12-03 ENCOUNTER — RESULT REVIEW (OUTPATIENT)
Age: 21
End: 2021-12-03

## 2021-12-03 ENCOUNTER — APPOINTMENT (OUTPATIENT)
Dept: INFUSION THERAPY | Facility: HOSPITAL | Age: 21
End: 2021-12-03

## 2021-12-03 LAB
BASOPHILS # BLD AUTO: 0.02 K/UL — SIGNIFICANT CHANGE UP (ref 0–0.2)
BASOPHILS NFR BLD AUTO: 0.2 % — SIGNIFICANT CHANGE UP (ref 0–2)
EOSINOPHIL # BLD AUTO: 0.02 K/UL — SIGNIFICANT CHANGE UP (ref 0–0.5)
EOSINOPHIL NFR BLD AUTO: 0.2 % — SIGNIFICANT CHANGE UP (ref 0–6)
HCT VFR BLD CALC: 43.4 % — SIGNIFICANT CHANGE UP (ref 39–50)
HGB BLD-MCNC: 13.4 G/DL — SIGNIFICANT CHANGE UP (ref 13–17)
IMM GRANULOCYTES NFR BLD AUTO: 0.5 % — SIGNIFICANT CHANGE UP (ref 0–1.5)
LYMPHOCYTES # BLD AUTO: 1.27 K/UL — SIGNIFICANT CHANGE UP (ref 1–3.3)
LYMPHOCYTES # BLD AUTO: 13.6 % — SIGNIFICANT CHANGE UP (ref 13–44)
MCHC RBC-ENTMCNC: 23.9 PG — LOW (ref 27–34)
MCHC RBC-ENTMCNC: 30.9 G/DL — LOW (ref 32–36)
MCV RBC AUTO: 77.4 FL — LOW (ref 80–100)
MONOCYTES # BLD AUTO: 0.65 K/UL — SIGNIFICANT CHANGE UP (ref 0–0.9)
MONOCYTES NFR BLD AUTO: 6.9 % — SIGNIFICANT CHANGE UP (ref 2–14)
NEUTROPHILS # BLD AUTO: 7.35 K/UL — SIGNIFICANT CHANGE UP (ref 1.8–7.4)
NEUTROPHILS NFR BLD AUTO: 78.6 % — HIGH (ref 43–77)
NRBC # BLD: 0 /100 WBCS — SIGNIFICANT CHANGE UP (ref 0–0)
PLATELET # BLD AUTO: 4 K/UL — CRITICAL LOW (ref 150–400)
RBC # BLD: 5.61 M/UL — SIGNIFICANT CHANGE UP (ref 4.2–5.8)
RBC # FLD: 16.9 % — HIGH (ref 10.3–14.5)
WBC # BLD: 9.36 K/UL — SIGNIFICANT CHANGE UP (ref 3.8–10.5)
WBC # FLD AUTO: 9.36 K/UL — SIGNIFICANT CHANGE UP (ref 3.8–10.5)

## 2021-12-06 DIAGNOSIS — R11.2 NAUSEA WITH VOMITING, UNSPECIFIED: ICD-10-CM

## 2021-12-06 DIAGNOSIS — Z51.11 ENCOUNTER FOR ANTINEOPLASTIC CHEMOTHERAPY: ICD-10-CM

## 2021-12-10 ENCOUNTER — APPOINTMENT (OUTPATIENT)
Dept: INFUSION THERAPY | Facility: HOSPITAL | Age: 21
End: 2021-12-10

## 2021-12-10 ENCOUNTER — RESULT REVIEW (OUTPATIENT)
Age: 21
End: 2021-12-10

## 2021-12-10 LAB
BASOPHILS # BLD AUTO: 0.04 K/UL — SIGNIFICANT CHANGE UP (ref 0–0.2)
BASOPHILS NFR BLD AUTO: 0.4 % — SIGNIFICANT CHANGE UP (ref 0–2)
EOSINOPHIL # BLD AUTO: 0.01 K/UL — SIGNIFICANT CHANGE UP (ref 0–0.5)
EOSINOPHIL NFR BLD AUTO: 0.1 % — SIGNIFICANT CHANGE UP (ref 0–6)
HCT VFR BLD CALC: 40.8 % — SIGNIFICANT CHANGE UP (ref 39–50)
HGB BLD-MCNC: 12.7 G/DL — LOW (ref 13–17)
IMM GRANULOCYTES NFR BLD AUTO: 0.4 % — SIGNIFICANT CHANGE UP (ref 0–1.5)
LYMPHOCYTES # BLD AUTO: 1.39 K/UL — SIGNIFICANT CHANGE UP (ref 1–3.3)
LYMPHOCYTES # BLD AUTO: 15.3 % — SIGNIFICANT CHANGE UP (ref 13–44)
MCHC RBC-ENTMCNC: 23.9 PG — LOW (ref 27–34)
MCHC RBC-ENTMCNC: 31.1 G/DL — LOW (ref 32–36)
MCV RBC AUTO: 76.7 FL — LOW (ref 80–100)
MONOCYTES # BLD AUTO: 0.6 K/UL — SIGNIFICANT CHANGE UP (ref 0–0.9)
MONOCYTES NFR BLD AUTO: 6.6 % — SIGNIFICANT CHANGE UP (ref 2–14)
NEUTROPHILS # BLD AUTO: 6.98 K/UL — SIGNIFICANT CHANGE UP (ref 1.8–7.4)
NEUTROPHILS NFR BLD AUTO: 77.2 % — HIGH (ref 43–77)
NRBC # BLD: 0 /100 WBCS — SIGNIFICANT CHANGE UP (ref 0–0)
PLATELET # BLD AUTO: 23 K/UL — LOW (ref 150–400)
RBC # BLD: 5.32 M/UL — SIGNIFICANT CHANGE UP (ref 4.2–5.8)
RBC # FLD: 17 % — HIGH (ref 10.3–14.5)
WBC # BLD: 9.06 K/UL — SIGNIFICANT CHANGE UP (ref 3.8–10.5)
WBC # FLD AUTO: 9.06 K/UL — SIGNIFICANT CHANGE UP (ref 3.8–10.5)

## 2021-12-23 ENCOUNTER — RESULT REVIEW (OUTPATIENT)
Age: 21
End: 2021-12-23

## 2021-12-23 ENCOUNTER — APPOINTMENT (OUTPATIENT)
Dept: INFUSION THERAPY | Facility: HOSPITAL | Age: 21
End: 2021-12-23

## 2021-12-23 ENCOUNTER — APPOINTMENT (OUTPATIENT)
Dept: HEMATOLOGY ONCOLOGY | Facility: CLINIC | Age: 21
End: 2021-12-23

## 2021-12-23 LAB
BASOPHILS # BLD AUTO: 0.03 K/UL — SIGNIFICANT CHANGE UP (ref 0–0.2)
BASOPHILS NFR BLD AUTO: 0.4 % — SIGNIFICANT CHANGE UP (ref 0–2)
EOSINOPHIL # BLD AUTO: 0.04 K/UL — SIGNIFICANT CHANGE UP (ref 0–0.5)
EOSINOPHIL NFR BLD AUTO: 0.5 % — SIGNIFICANT CHANGE UP (ref 0–6)
HCT VFR BLD CALC: 39.5 % — SIGNIFICANT CHANGE UP (ref 39–50)
HGB BLD-MCNC: 11.9 G/DL — LOW (ref 13–17)
IMM GRANULOCYTES NFR BLD AUTO: 0.5 % — SIGNIFICANT CHANGE UP (ref 0–1.5)
LYMPHOCYTES # BLD AUTO: 1.26 K/UL — SIGNIFICANT CHANGE UP (ref 1–3.3)
LYMPHOCYTES # BLD AUTO: 15.3 % — SIGNIFICANT CHANGE UP (ref 13–44)
MCHC RBC-ENTMCNC: 23.8 PG — LOW (ref 27–34)
MCHC RBC-ENTMCNC: 30.1 G/DL — LOW (ref 32–36)
MCV RBC AUTO: 79 FL — LOW (ref 80–100)
MONOCYTES # BLD AUTO: 0.63 K/UL — SIGNIFICANT CHANGE UP (ref 0–0.9)
MONOCYTES NFR BLD AUTO: 7.6 % — SIGNIFICANT CHANGE UP (ref 2–14)
NEUTROPHILS # BLD AUTO: 6.24 K/UL — SIGNIFICANT CHANGE UP (ref 1.8–7.4)
NEUTROPHILS NFR BLD AUTO: 75.7 % — SIGNIFICANT CHANGE UP (ref 43–77)
NRBC # BLD: 0 /100 WBCS — SIGNIFICANT CHANGE UP (ref 0–0)
PLATELET # BLD AUTO: 15 K/UL — CRITICAL LOW (ref 150–400)
RBC # BLD: 5 M/UL — SIGNIFICANT CHANGE UP (ref 4.2–5.8)
RBC # FLD: 16.5 % — HIGH (ref 10.3–14.5)
WBC # BLD: 8.24 K/UL — SIGNIFICANT CHANGE UP (ref 3.8–10.5)
WBC # FLD AUTO: 8.24 K/UL — SIGNIFICANT CHANGE UP (ref 3.8–10.5)

## 2022-01-11 ENCOUNTER — OUTPATIENT (OUTPATIENT)
Dept: OUTPATIENT SERVICES | Facility: HOSPITAL | Age: 22
LOS: 1 days | End: 2022-01-11
Payer: SELF-PAY

## 2022-01-11 VITALS
DIASTOLIC BLOOD PRESSURE: 68 MMHG | OXYGEN SATURATION: 98 % | HEART RATE: 83 BPM | RESPIRATION RATE: 14 BRPM | WEIGHT: 289.91 LBS | TEMPERATURE: 98 F | HEIGHT: 70 IN | SYSTOLIC BLOOD PRESSURE: 118 MMHG

## 2022-01-11 DIAGNOSIS — K02.62 DENTAL CARIES ON SMOOTH SURFACE PENETRATING INTO DENTIN: ICD-10-CM

## 2022-01-11 DIAGNOSIS — K05.6 PERIODONTAL DISEASE, UNSPECIFIED: ICD-10-CM

## 2022-01-11 DIAGNOSIS — Z01.818 ENCOUNTER FOR OTHER PREPROCEDURAL EXAMINATION: ICD-10-CM

## 2022-01-11 LAB
ANION GAP SERPL CALC-SCNC: 14 MMOL/L — SIGNIFICANT CHANGE UP (ref 5–17)
BLD GP AB SCN SERPL QL: NEGATIVE — SIGNIFICANT CHANGE UP
BUN SERPL-MCNC: 12 MG/DL — SIGNIFICANT CHANGE UP (ref 7–23)
CALCIUM SERPL-MCNC: 9.4 MG/DL — SIGNIFICANT CHANGE UP (ref 8.4–10.5)
CHLORIDE SERPL-SCNC: 104 MMOL/L — SIGNIFICANT CHANGE UP (ref 96–108)
CO2 SERPL-SCNC: 22 MMOL/L — SIGNIFICANT CHANGE UP (ref 22–31)
CREAT SERPL-MCNC: 1.05 MG/DL — SIGNIFICANT CHANGE UP (ref 0.5–1.3)
GLUCOSE SERPL-MCNC: 82 MG/DL — SIGNIFICANT CHANGE UP (ref 70–99)
HCT VFR BLD CALC: 43.1 % — SIGNIFICANT CHANGE UP (ref 39–50)
HGB BLD-MCNC: 13.2 G/DL — SIGNIFICANT CHANGE UP (ref 13–17)
MCHC RBC-ENTMCNC: 24.3 PG — LOW (ref 27–34)
MCHC RBC-ENTMCNC: 30.6 GM/DL — LOW (ref 32–36)
MCV RBC AUTO: 79.2 FL — LOW (ref 80–100)
NRBC # BLD: 0 /100 WBCS — SIGNIFICANT CHANGE UP (ref 0–0)
PLATELET # BLD AUTO: 18 K/UL — CRITICAL LOW (ref 150–400)
POTASSIUM SERPL-MCNC: 4.4 MMOL/L — SIGNIFICANT CHANGE UP (ref 3.5–5.3)
POTASSIUM SERPL-SCNC: 4.4 MMOL/L — SIGNIFICANT CHANGE UP (ref 3.5–5.3)
RBC # BLD: 5.44 M/UL — SIGNIFICANT CHANGE UP (ref 4.2–5.8)
RBC # FLD: 16 % — HIGH (ref 10.3–14.5)
RH IG SCN BLD-IMP: POSITIVE — SIGNIFICANT CHANGE UP
SODIUM SERPL-SCNC: 140 MMOL/L — SIGNIFICANT CHANGE UP (ref 135–145)
WBC # BLD: 9.2 K/UL — SIGNIFICANT CHANGE UP (ref 3.8–10.5)
WBC # FLD AUTO: 9.2 K/UL — SIGNIFICANT CHANGE UP (ref 3.8–10.5)

## 2022-01-11 PROCEDURE — 80048 BASIC METABOLIC PNL TOTAL CA: CPT

## 2022-01-11 PROCEDURE — 86850 RBC ANTIBODY SCREEN: CPT

## 2022-01-11 PROCEDURE — 86901 BLOOD TYPING SEROLOGIC RH(D): CPT

## 2022-01-11 PROCEDURE — G0463: CPT

## 2022-01-11 PROCEDURE — 86900 BLOOD TYPING SEROLOGIC ABO: CPT

## 2022-01-11 PROCEDURE — 85027 COMPLETE CBC AUTOMATED: CPT

## 2022-01-11 NOTE — H&P PST ADULT - ACTIVITY
2 flights of stairs multiple times a day at [facility] 5x per week.

## 2022-01-11 NOTE — ASSESSMENT
[FreeTextEntry1] : 21 year old autistic male with developmental disability who resides at [facility], seen by hematology for chronic ITP. Per mother, the patient's platelet counts remain stable for years between 25,000 - 35,000 prior to him being seen at [facility]. \par [facility] (1/2021) admission for rectal bleeding and acute COVID 19 infection. He had a transient rise in platelet count to IV IgG during his January 2021 hospitalization. \par Peripheral blood smear (2/9/21): Large platelet forms. Normal white cell morphology.\par [facility] (2/27-4/12) for refractory ITP following COVID infection in January.\par Suboptimal responses to IVIG x 3 doses, Decadron with a prolonged Prednisone taper, Rituxan (last 3/27/21), and Nplate x 4 doses.\par \par Patient remains on the continues Q 2 week IVIG + Doptlet 40mg Daily as outpatient since Spring 2021. Platelet counts range from the 10-20 range.  Patient going for dental cleaning procedure under general anesthesia.  Patient's platelet counts should be optimized with IVIG leading up to procedure. No severe bleeding or bruising right now.  Would administer IVIG today regardless of platelet count to optimize for upcoming dental cleaning procedure.  Patient is medically cleared by hematology for upcoming dental cleaning,under general anesthesia. He will be optimized using the IVIG scheduled for today.  Please note, platelet count will still be only in the 30's with the dose of IVIG today, so mildly increased bleeding is to be expected during this.

## 2022-01-11 NOTE — H&P PST ADULT - HISTORY OF PRESENT ILLNESS
20 y/o M with PMHx of intellectual disability, autism, nonverbal at baseline, CARLITOS (not on CPAP) and chronic ITP. Hx of prolonged hospitalization from 2/26/21-4/12/21 for thrombocytopenia noted on OP labs believed to be exacerbated from Covid Infx 1/22/21.    During hospitalization Plt count improved with Doptelet.  Pt received IVIG x 3 with no sustained response, he also received Decadron and completed a prolonged Prednisone taper.  Other agents included Rituxan and Nplate with minimal response in plt count.  Hematology reviewed peripheral smear, findings consistent with ITP.  Pt then was started on PO doptelet and by time of discharge, plt counts steadily gustavo and at time of discharge was at 173. Since then, patient has been receiving IVIg at [facility] q2 weeks. Last treatment 12/23/2022. Last platelet count 12/23/21 was 15.   Hematology clearance for Sx in chart for Comprehensive dental work under anesthesia on 1/19/22.   Patient accompanied by group home medical coordinator- Denies bleeding episodes, blood in the stool or urine. Denies bleeding gums.     Covid-19 Infx 1/22/21-no hospitalization, no supplemental O2 needed.  Covid swab 1/16/2022     20 y/o M with PMHx of intellectual disability, autism, nonverbal at baseline, CARLITOS (not on CPAP) and chronic ITP. Hx of prolonged hospitalization from 2/26/21-4/12/21 for thrombocytopenia noted on OP labs believed to be exacerbated from Covid Infx 1/22/21.    During hospitalization Plt count improved with Doptelet.  Pt received IVIG x 3 with no sustained response, he also received Decadron and completed a prolonged Prednisone taper.  Other agents included Rituxan and Nplate with minimal response in plt count.  Hematology reviewed peripheral smear, findings consistent with ITP.  Pt then was started on PO doptelet and by time of discharge, plt counts steadily gustavo and at time of discharge was at 173. Since then, patient has been receiving IVIg at [facility] q2 weeks. Last treatment 12/23/2022. Last platelet count 12/23/21 was 15.   Hematology clearance for Sx in chart for Comprehensive dental work under anesthesia on 1/19/22.   Patient accompanied by group home medical coordinator- Denies bleeding episodes, blood in the stool or urine. Denies bleeding gums.   ****1/11/22 @6:30PM- received phone call from lab with critical value of PLT 18, on this patient with Hx of ITP and know to have low platelets. No action taken*****     Covid-19 Infx 1/22/21-no hospitalization, no supplemental O2 needed.  Covid swab 1/16/2022

## 2022-01-11 NOTE — H&P PST ADULT - SKIN/BREAST
[facility] Dermatology Phototherapy Record  1. Tony Mcnair is a 58 year old male is here today for phototherapy (UVB) treatment for psoriasis.        Changes or new medications since last treatment:   NO    New medical conditions or problems since last treatment:   NO    Any problems with last phototherapy treatment?    NO    2. The patient tolerated phototherapy without complication    Patient will return for next UVB treatment, per protocol.     Patient to see provider every 4-12 weeks for follow-up during treatment.      All questions and concerns discussed with patient in clinic today.      Mary Aponte         negative

## 2022-01-11 NOTE — H&P PST ADULT - PROBLEM SELECTOR PLAN 1
Comprehensive dental work under anesthesia on 1/19/22  PST labs pending  AC: None  ITP: hematology clearance in chart  Discussed with Dr. Elder- will likely need platelet transfusion the day of procedure   T&S ordered and will order CBC for the day of procedure

## 2022-01-11 NOTE — HISTORY OF PRESENT ILLNESS
[de-identified] : 21 year old male presenting to [facility] for hematologic care. He is referred here from [facility]. Patient's past medical history is significant for intellectual disability, autism (non-verbal), ITP (diagnosed since he was 18 months of age), CARLITOS (not on CPAP). He was admitted on 1/23/2021 due to acute lower GI bleed in setting of thrombocytopenia; his platelet count was 1,000 and he was also found to be COVID +. During the course of his hospital stay, he received 1 unit platelet transfusion, 2 days of IVIG, 2 days of IV steroids, then started on oral steroid therapy. Patient was discharged on 2/5/2021, on prednisone 80 mg daily and advised to follow up at [facility] to taper his steroid dosage down in an outpatient setting. \par \par Patient is accompanied by a formal caregiver, presented to [facility] for blood count assessment and management of his steroid therapy. Patient's mother believed his ITP began after he received MMR vaccination around 18 months of age. His ITP was managed by Dr. Rachana Blanco (hematology) at [place], intermittently receiving IVIG treatments. Attempts were made with various treatments but he was believed to have experienced reactions (Rituxan, WinRho, etc). Prior to his January 2021 [facility] hospitalization, he did not receive any treatment for at least 10 years for ITP. Patient's mother also reported that he appeared withdrawn since birth.  [de-identified] : Patient has been on continuous IVIG Q 2 weeks for platelets < 15 along with Doptlet 40mg daily for 8 months since April. Average platelet counts in the 15-20 range.  Patient now scheduled for dental cleaning 1/5/22.  Given patient's developmental disability will need to be done under anesthesia.  \par \par Patient se

## 2022-01-11 NOTE — REVIEW OF SYSTEMS
[FreeTextEntry2] : Unable to obtain due to mental status, his aide does not report any unusual activity Patient calm today.   [de-identified] : No new bruises or ecchymoses today

## 2022-01-12 ENCOUNTER — OUTPATIENT (OUTPATIENT)
Dept: OUTPATIENT SERVICES | Facility: HOSPITAL | Age: 22
LOS: 1 days | Discharge: HOME | End: 2022-01-12

## 2022-01-12 DIAGNOSIS — D69.3 IMMUNE THROMBOCYTOPENIC PURPURA: ICD-10-CM

## 2022-01-14 ENCOUNTER — RESULT REVIEW (OUTPATIENT)
Age: 22
End: 2022-01-14

## 2022-01-14 ENCOUNTER — APPOINTMENT (OUTPATIENT)
Dept: INFUSION THERAPY | Facility: HOSPITAL | Age: 22
End: 2022-01-14

## 2022-01-14 ENCOUNTER — NON-APPOINTMENT (OUTPATIENT)
Age: 22
End: 2022-01-14

## 2022-01-14 LAB
BASOPHILS # BLD AUTO: 0.02 K/UL — SIGNIFICANT CHANGE UP (ref 0–0.2)
BASOPHILS NFR BLD AUTO: 0.3 % — SIGNIFICANT CHANGE UP (ref 0–2)
EOSINOPHIL # BLD AUTO: 0.1 K/UL — SIGNIFICANT CHANGE UP (ref 0–0.5)
EOSINOPHIL NFR BLD AUTO: 1.5 % — SIGNIFICANT CHANGE UP (ref 0–6)
HCT VFR BLD CALC: 37.2 % — LOW (ref 39–50)
HGB BLD-MCNC: 11.5 G/DL — LOW (ref 13–17)
IMM GRANULOCYTES NFR BLD AUTO: 0.3 % — SIGNIFICANT CHANGE UP (ref 0–1.5)
LYMPHOCYTES # BLD AUTO: 1.16 K/UL — SIGNIFICANT CHANGE UP (ref 1–3.3)
LYMPHOCYTES # BLD AUTO: 17.5 % — SIGNIFICANT CHANGE UP (ref 13–44)
MCHC RBC-ENTMCNC: 24.3 PG — LOW (ref 27–34)
MCHC RBC-ENTMCNC: 30.9 G/DL — LOW (ref 32–36)
MCV RBC AUTO: 78.6 FL — LOW (ref 80–100)
MONOCYTES # BLD AUTO: 0.49 K/UL — SIGNIFICANT CHANGE UP (ref 0–0.9)
MONOCYTES NFR BLD AUTO: 7.4 % — SIGNIFICANT CHANGE UP (ref 2–14)
NEUTROPHILS # BLD AUTO: 4.85 K/UL — SIGNIFICANT CHANGE UP (ref 1.8–7.4)
NEUTROPHILS NFR BLD AUTO: 73 % — SIGNIFICANT CHANGE UP (ref 43–77)
NRBC # BLD: 0 /100 WBCS — SIGNIFICANT CHANGE UP (ref 0–0)
PLATELET # BLD AUTO: 11 K/UL — CRITICAL LOW (ref 150–400)
RBC # BLD: 4.73 M/UL — SIGNIFICANT CHANGE UP (ref 4.2–5.8)
RBC # FLD: 15.8 % — HIGH (ref 10.3–14.5)
WBC # BLD: 6.64 K/UL — SIGNIFICANT CHANGE UP (ref 3.8–10.5)
WBC # FLD AUTO: 6.64 K/UL — SIGNIFICANT CHANGE UP (ref 3.8–10.5)

## 2022-01-16 ENCOUNTER — OUTPATIENT (OUTPATIENT)
Dept: OUTPATIENT SERVICES | Facility: HOSPITAL | Age: 22
LOS: 1 days | End: 2022-01-16
Payer: MEDICARE

## 2022-01-16 DIAGNOSIS — Z11.52 ENCOUNTER FOR SCREENING FOR COVID-19: ICD-10-CM

## 2022-01-16 LAB — SARS-COV-2 RNA SPEC QL NAA+PROBE: SIGNIFICANT CHANGE UP

## 2022-01-16 PROCEDURE — C9803: CPT

## 2022-01-16 PROCEDURE — U0003: CPT

## 2022-01-16 PROCEDURE — U0005: CPT

## 2022-01-17 DIAGNOSIS — R11.2 NAUSEA WITH VOMITING, UNSPECIFIED: ICD-10-CM

## 2022-01-19 ENCOUNTER — OUTPATIENT (OUTPATIENT)
Dept: OUTPATIENT SERVICES | Facility: HOSPITAL | Age: 22
LOS: 1 days | End: 2022-01-19
Payer: MEDICARE

## 2022-01-19 VITALS
RESPIRATION RATE: 18 BRPM | DIASTOLIC BLOOD PRESSURE: 70 MMHG | TEMPERATURE: 98 F | OXYGEN SATURATION: 96 % | WEIGHT: 289.91 LBS | SYSTOLIC BLOOD PRESSURE: 108 MMHG | HEIGHT: 70 IN | HEART RATE: 82 BPM

## 2022-01-19 VITALS
SYSTOLIC BLOOD PRESSURE: 104 MMHG | OXYGEN SATURATION: 98 % | TEMPERATURE: 97 F | HEART RATE: 98 BPM | DIASTOLIC BLOOD PRESSURE: 52 MMHG | RESPIRATION RATE: 18 BRPM

## 2022-01-19 DIAGNOSIS — K02.62 DENTAL CARIES ON SMOOTH SURFACE PENETRATING INTO DENTIN: ICD-10-CM

## 2022-01-19 DIAGNOSIS — K05.6 PERIODONTAL DISEASE, UNSPECIFIED: ICD-10-CM

## 2022-01-19 LAB
HCT VFR BLD CALC: 43.4 % — SIGNIFICANT CHANGE UP (ref 39–50)
HGB BLD-MCNC: 13.4 G/DL — SIGNIFICANT CHANGE UP (ref 13–17)
MCHC RBC-ENTMCNC: 24.1 PG — LOW (ref 27–34)
MCHC RBC-ENTMCNC: 30.9 GM/DL — LOW (ref 32–36)
MCV RBC AUTO: 78.1 FL — LOW (ref 80–100)
NRBC # BLD: 0 /100 WBCS — SIGNIFICANT CHANGE UP (ref 0–0)
PLATELET # BLD AUTO: 37 K/UL — LOW (ref 150–400)
RBC # BLD: 5.56 M/UL — SIGNIFICANT CHANGE UP (ref 4.2–5.8)
RBC # FLD: 15.8 % — HIGH (ref 10.3–14.5)
WBC # BLD: 7.7 K/UL — SIGNIFICANT CHANGE UP (ref 3.8–10.5)
WBC # FLD AUTO: 7.7 K/UL — SIGNIFICANT CHANGE UP (ref 3.8–10.5)

## 2022-01-19 PROCEDURE — 86900 BLOOD TYPING SEROLOGIC ABO: CPT

## 2022-01-19 PROCEDURE — C1889: CPT

## 2022-01-19 PROCEDURE — 85027 COMPLETE CBC AUTOMATED: CPT

## 2022-01-19 PROCEDURE — 86850 RBC ANTIBODY SCREEN: CPT

## 2022-01-19 PROCEDURE — D2391: CPT

## 2022-01-19 PROCEDURE — D2392: CPT

## 2022-01-19 PROCEDURE — 86901 BLOOD TYPING SEROLOGIC RH(D): CPT

## 2022-01-19 DEVICE — SURGICEL 2 X 14": Type: IMPLANTABLE DEVICE | Status: FUNCTIONAL

## 2022-01-19 RX ORDER — FENTANYL CITRATE 50 UG/ML
25 INJECTION INTRAVENOUS
Refills: 0 | Status: DISCONTINUED | OUTPATIENT
Start: 2022-01-19 | End: 2022-01-19

## 2022-01-19 RX ORDER — LIDOCAINE HCL 20 MG/ML
0.2 VIAL (ML) INJECTION ONCE
Refills: 0 | Status: DISCONTINUED | OUTPATIENT
Start: 2022-01-19 | End: 2022-01-19

## 2022-01-19 RX ORDER — SODIUM CHLORIDE 9 MG/ML
3 INJECTION INTRAMUSCULAR; INTRAVENOUS; SUBCUTANEOUS EVERY 8 HOURS
Refills: 0 | Status: DISCONTINUED | OUTPATIENT
Start: 2022-01-19 | End: 2022-01-19

## 2022-01-19 RX ORDER — ONDANSETRON 8 MG/1
4 TABLET, FILM COATED ORAL ONCE
Refills: 0 | Status: DISCONTINUED | OUTPATIENT
Start: 2022-01-19 | End: 2022-01-19

## 2022-01-19 NOTE — ASU DISCHARGE PLAN (ADULT/PEDIATRIC) - NS MD DC FALL RISK RISK
For information on Fall & Injury Prevention, visit: [email or web address] OR  [email or web address] OR  https://www.cdc.gov/steadi/patient.html

## 2022-01-19 NOTE — PRE-ANESTHESIA EVALUATION ADULT - NSANTHADDINFOFT_GEN_ALL_CORE
Discussion with Dr. Spencer and mother about possibility of bleeding or prolonged intubation due to bleeding secondary to thrombocytopenia. Dr. Spencer and mother willing to proceed with dental procedure despite increased risk

## 2022-01-19 NOTE — ASU PATIENT PROFILE, ADULT - FALL HARM RISK - UNIVERSAL INTERVENTIONS
Bed in lowest position, wheels locked, appropriate side rails in place/Call bell, personal items and telephone in reach/Instruct patient to call for assistance before getting out of bed or chair/Non-slip footwear when patient is out of bed/[place] to call system/Physically safe environment - no spills, clutter or unnecessary equipment/Purposeful Proactive Rounding/Room/bathroom lighting operational, light cord in reach

## 2022-01-19 NOTE — ASU DISCHARGE PLAN (ADULT/PEDIATRIC) - ASU DC SPECIAL INSTRUCTIONSFT
comprehensive dental treatment under [place] comprehensive dental treatment under [place]    tylenol prn pain    see DR Spencer in one week     resume all medications    return to routine activities on Friday comprehensive dental treatment under [place]    tylenol prn pain    see DR Spencer in one week at [facility] on thursday 1/27/22 at 10:15    resume all medications    return to routine activities on Friday

## 2022-02-07 ENCOUNTER — RESULT REVIEW (OUTPATIENT)
Age: 22
End: 2022-02-07

## 2022-02-07 ENCOUNTER — APPOINTMENT (OUTPATIENT)
Dept: INFUSION THERAPY | Facility: HOSPITAL | Age: 22
End: 2022-02-07

## 2022-02-07 LAB
BASOPHILS # BLD AUTO: 0.02 K/UL — SIGNIFICANT CHANGE UP (ref 0–0.2)
BASOPHILS NFR BLD AUTO: 0.2 % — SIGNIFICANT CHANGE UP (ref 0–2)
EOSINOPHIL # BLD AUTO: 0.02 K/UL — SIGNIFICANT CHANGE UP (ref 0–0.5)
EOSINOPHIL NFR BLD AUTO: 0.2 % — SIGNIFICANT CHANGE UP (ref 0–6)
HCT VFR BLD CALC: 44.1 % — SIGNIFICANT CHANGE UP (ref 39–50)
HGB BLD-MCNC: 13.5 G/DL — SIGNIFICANT CHANGE UP (ref 13–17)
IMM GRANULOCYTES NFR BLD AUTO: 0.3 % — SIGNIFICANT CHANGE UP (ref 0–1.5)
LYMPHOCYTES # BLD AUTO: 1.45 K/UL — SIGNIFICANT CHANGE UP (ref 1–3.3)
LYMPHOCYTES # BLD AUTO: 15.9 % — SIGNIFICANT CHANGE UP (ref 13–44)
MCHC RBC-ENTMCNC: 24.5 PG — LOW (ref 27–34)
MCHC RBC-ENTMCNC: 30.6 G/DL — LOW (ref 32–36)
MCV RBC AUTO: 80 FL — SIGNIFICANT CHANGE UP (ref 80–100)
MONOCYTES # BLD AUTO: 0.63 K/UL — SIGNIFICANT CHANGE UP (ref 0–0.9)
MONOCYTES NFR BLD AUTO: 6.9 % — SIGNIFICANT CHANGE UP (ref 2–14)
NEUTROPHILS # BLD AUTO: 6.99 K/UL — SIGNIFICANT CHANGE UP (ref 1.8–7.4)
NEUTROPHILS NFR BLD AUTO: 76.5 % — SIGNIFICANT CHANGE UP (ref 43–77)
NRBC # BLD: 0 /100 WBCS — SIGNIFICANT CHANGE UP (ref 0–0)
PLATELET # BLD AUTO: 13 K/UL — CRITICAL LOW (ref 150–400)
RBC # BLD: 5.51 M/UL — SIGNIFICANT CHANGE UP (ref 4.2–5.8)
RBC # FLD: 15.9 % — HIGH (ref 10.3–14.5)
WBC # BLD: 9.14 K/UL — SIGNIFICANT CHANGE UP (ref 3.8–10.5)
WBC # FLD AUTO: 9.14 K/UL — SIGNIFICANT CHANGE UP (ref 3.8–10.5)

## 2022-02-17 ENCOUNTER — OUTPATIENT (OUTPATIENT)
Dept: OUTPATIENT SERVICES | Facility: HOSPITAL | Age: 22
LOS: 1 days | Discharge: HOME | End: 2022-02-17

## 2022-02-17 DIAGNOSIS — D69.3 IMMUNE THROMBOCYTOPENIC PURPURA: ICD-10-CM

## 2022-02-21 ENCOUNTER — RESULT REVIEW (OUTPATIENT)
Age: 22
End: 2022-02-21

## 2022-02-21 ENCOUNTER — APPOINTMENT (OUTPATIENT)
Dept: INFUSION THERAPY | Facility: HOSPITAL | Age: 22
End: 2022-02-21

## 2022-02-21 LAB
BASOPHILS # BLD AUTO: 0.03 K/UL — SIGNIFICANT CHANGE UP (ref 0–0.2)
BASOPHILS NFR BLD AUTO: 0.3 % — SIGNIFICANT CHANGE UP (ref 0–2)
EOSINOPHIL # BLD AUTO: 0.02 K/UL — SIGNIFICANT CHANGE UP (ref 0–0.5)
EOSINOPHIL NFR BLD AUTO: 0.2 % — SIGNIFICANT CHANGE UP (ref 0–6)
HCT VFR BLD CALC: 42 % — SIGNIFICANT CHANGE UP (ref 39–50)
HGB BLD-MCNC: 13 G/DL — SIGNIFICANT CHANGE UP (ref 13–17)
IMM GRANULOCYTES NFR BLD AUTO: 0.3 % — SIGNIFICANT CHANGE UP (ref 0–1.5)
LYMPHOCYTES # BLD AUTO: 1.05 K/UL — SIGNIFICANT CHANGE UP (ref 1–3.3)
LYMPHOCYTES # BLD AUTO: 12 % — LOW (ref 13–44)
MCHC RBC-ENTMCNC: 24.7 PG — LOW (ref 27–34)
MCHC RBC-ENTMCNC: 31 G/DL — LOW (ref 32–36)
MCV RBC AUTO: 79.7 FL — LOW (ref 80–100)
MONOCYTES # BLD AUTO: 0.59 K/UL — SIGNIFICANT CHANGE UP (ref 0–0.9)
MONOCYTES NFR BLD AUTO: 6.7 % — SIGNIFICANT CHANGE UP (ref 2–14)
NEUTROPHILS # BLD AUTO: 7.06 K/UL — SIGNIFICANT CHANGE UP (ref 1.8–7.4)
NEUTROPHILS NFR BLD AUTO: 80.5 % — HIGH (ref 43–77)
NRBC # BLD: 0 /100 WBCS — SIGNIFICANT CHANGE UP (ref 0–0)
PLATELET # BLD AUTO: 24 K/UL — LOW (ref 150–400)
RBC # BLD: 5.27 M/UL — SIGNIFICANT CHANGE UP (ref 4.2–5.8)
RBC # FLD: 15.6 % — HIGH (ref 10.3–14.5)
WBC # BLD: 8.78 K/UL — SIGNIFICANT CHANGE UP (ref 3.8–10.5)
WBC # FLD AUTO: 8.78 K/UL — SIGNIFICANT CHANGE UP (ref 3.8–10.5)

## 2022-03-14 ENCOUNTER — NON-APPOINTMENT (OUTPATIENT)
Age: 22
End: 2022-03-14

## 2022-03-14 ENCOUNTER — RESULT REVIEW (OUTPATIENT)
Age: 22
End: 2022-03-14

## 2022-03-14 ENCOUNTER — APPOINTMENT (OUTPATIENT)
Dept: INFUSION THERAPY | Facility: HOSPITAL | Age: 22
End: 2022-03-14

## 2022-03-14 DIAGNOSIS — Z51.11 ENCOUNTER FOR ANTINEOPLASTIC CHEMOTHERAPY: ICD-10-CM

## 2022-03-14 DIAGNOSIS — R11.2 NAUSEA WITH VOMITING, UNSPECIFIED: ICD-10-CM

## 2022-03-14 LAB
BASOPHILS # BLD AUTO: 0.03 K/UL — SIGNIFICANT CHANGE UP (ref 0–0.2)
BASOPHILS NFR BLD AUTO: 0.3 % — SIGNIFICANT CHANGE UP (ref 0–2)
EOSINOPHIL # BLD AUTO: 0.02 K/UL — SIGNIFICANT CHANGE UP (ref 0–0.5)
EOSINOPHIL NFR BLD AUTO: 0.2 % — SIGNIFICANT CHANGE UP (ref 0–6)
HCT VFR BLD CALC: 44.8 % — SIGNIFICANT CHANGE UP (ref 39–50)
HGB BLD-MCNC: 13.6 G/DL — SIGNIFICANT CHANGE UP (ref 13–17)
IMM GRANULOCYTES NFR BLD AUTO: 0.3 % — SIGNIFICANT CHANGE UP (ref 0–1.5)
LYMPHOCYTES # BLD AUTO: 1.29 K/UL — SIGNIFICANT CHANGE UP (ref 1–3.3)
LYMPHOCYTES # BLD AUTO: 12.8 % — LOW (ref 13–44)
MCHC RBC-ENTMCNC: 24.2 PG — LOW (ref 27–34)
MCHC RBC-ENTMCNC: 30.4 G/DL — LOW (ref 32–36)
MCV RBC AUTO: 79.7 FL — LOW (ref 80–100)
MONOCYTES # BLD AUTO: 0.67 K/UL — SIGNIFICANT CHANGE UP (ref 0–0.9)
MONOCYTES NFR BLD AUTO: 6.6 % — SIGNIFICANT CHANGE UP (ref 2–14)
NEUTROPHILS # BLD AUTO: 8.05 K/UL — HIGH (ref 1.8–7.4)
NEUTROPHILS NFR BLD AUTO: 79.8 % — HIGH (ref 43–77)
NRBC # BLD: 0 /100 WBCS — SIGNIFICANT CHANGE UP (ref 0–0)
PLATELET # BLD AUTO: 8 K/UL — CRITICAL LOW (ref 150–400)
RBC # BLD: 5.62 M/UL — SIGNIFICANT CHANGE UP (ref 4.2–5.8)
RBC # FLD: 16 % — HIGH (ref 10.3–14.5)
WBC # BLD: 10.09 K/UL — SIGNIFICANT CHANGE UP (ref 3.8–10.5)
WBC # FLD AUTO: 10.09 K/UL — SIGNIFICANT CHANGE UP (ref 3.8–10.5)

## 2022-03-24 ENCOUNTER — OUTPATIENT (OUTPATIENT)
Dept: OUTPATIENT SERVICES | Facility: HOSPITAL | Age: 22
LOS: 1 days | Discharge: HOME | End: 2022-03-24

## 2022-03-24 DIAGNOSIS — D69.3 IMMUNE THROMBOCYTOPENIC PURPURA: ICD-10-CM

## 2022-03-28 ENCOUNTER — RESULT REVIEW (OUTPATIENT)
Age: 22
End: 2022-03-28

## 2022-03-28 ENCOUNTER — APPOINTMENT (OUTPATIENT)
Dept: INFUSION THERAPY | Facility: HOSPITAL | Age: 22
End: 2022-03-28

## 2022-03-28 DIAGNOSIS — R11.2 NAUSEA WITH VOMITING, UNSPECIFIED: ICD-10-CM

## 2022-03-28 LAB
BASOPHILS # BLD AUTO: 0.03 K/UL — SIGNIFICANT CHANGE UP (ref 0–0.2)
BASOPHILS NFR BLD AUTO: 0.3 % — SIGNIFICANT CHANGE UP (ref 0–2)
EOSINOPHIL # BLD AUTO: 0.01 K/UL — SIGNIFICANT CHANGE UP (ref 0–0.5)
EOSINOPHIL NFR BLD AUTO: 0.1 % — SIGNIFICANT CHANGE UP (ref 0–6)
HCT VFR BLD CALC: 42.4 % — SIGNIFICANT CHANGE UP (ref 39–50)
HGB BLD-MCNC: 13.3 G/DL — SIGNIFICANT CHANGE UP (ref 13–17)
IMM GRANULOCYTES NFR BLD AUTO: 0.3 % — SIGNIFICANT CHANGE UP (ref 0–1.5)
LYMPHOCYTES # BLD AUTO: 1.54 K/UL — SIGNIFICANT CHANGE UP (ref 1–3.3)
LYMPHOCYTES # BLD AUTO: 16.8 % — SIGNIFICANT CHANGE UP (ref 13–44)
MCHC RBC-ENTMCNC: 25 PG — LOW (ref 27–34)
MCHC RBC-ENTMCNC: 31.4 G/DL — LOW (ref 32–36)
MCV RBC AUTO: 79.7 FL — LOW (ref 80–100)
MONOCYTES # BLD AUTO: 0.57 K/UL — SIGNIFICANT CHANGE UP (ref 0–0.9)
MONOCYTES NFR BLD AUTO: 6.2 % — SIGNIFICANT CHANGE UP (ref 2–14)
NEUTROPHILS # BLD AUTO: 6.96 K/UL — SIGNIFICANT CHANGE UP (ref 1.8–7.4)
NEUTROPHILS NFR BLD AUTO: 76.3 % — SIGNIFICANT CHANGE UP (ref 43–77)
NRBC # BLD: 0 /100 WBCS — SIGNIFICANT CHANGE UP (ref 0–0)
PLATELET # BLD AUTO: 11 K/UL — CRITICAL LOW (ref 150–400)
RBC # BLD: 5.32 M/UL — SIGNIFICANT CHANGE UP (ref 4.2–5.8)
RBC # FLD: 16 % — HIGH (ref 10.3–14.5)
WBC # BLD: 9.14 K/UL — SIGNIFICANT CHANGE UP (ref 3.8–10.5)
WBC # FLD AUTO: 9.14 K/UL — SIGNIFICANT CHANGE UP (ref 3.8–10.5)

## 2022-04-11 ENCOUNTER — RESULT REVIEW (OUTPATIENT)
Age: 22
End: 2022-04-11

## 2022-04-11 ENCOUNTER — APPOINTMENT (OUTPATIENT)
Dept: INFUSION THERAPY | Facility: HOSPITAL | Age: 22
End: 2022-04-11

## 2022-04-11 LAB
BASOPHILS # BLD AUTO: 0.02 K/UL — SIGNIFICANT CHANGE UP (ref 0–0.2)
BASOPHILS NFR BLD AUTO: 0.2 % — SIGNIFICANT CHANGE UP (ref 0–2)
EOSINOPHIL # BLD AUTO: 0 K/UL — SIGNIFICANT CHANGE UP (ref 0–0.5)
EOSINOPHIL NFR BLD AUTO: 0 % — SIGNIFICANT CHANGE UP (ref 0–6)
HCT VFR BLD CALC: 43.8 % — SIGNIFICANT CHANGE UP (ref 39–50)
HGB BLD-MCNC: 13.4 G/DL — SIGNIFICANT CHANGE UP (ref 13–17)
IMM GRANULOCYTES NFR BLD AUTO: 0.4 % — SIGNIFICANT CHANGE UP (ref 0–1.5)
LYMPHOCYTES # BLD AUTO: 0.82 K/UL — LOW (ref 1–3.3)
LYMPHOCYTES # BLD AUTO: 8.8 % — LOW (ref 13–44)
MCHC RBC-ENTMCNC: 24.5 PG — LOW (ref 27–34)
MCHC RBC-ENTMCNC: 30.6 G/DL — LOW (ref 32–36)
MCV RBC AUTO: 80.2 FL — SIGNIFICANT CHANGE UP (ref 80–100)
MONOCYTES # BLD AUTO: 0.75 K/UL — SIGNIFICANT CHANGE UP (ref 0–0.9)
MONOCYTES NFR BLD AUTO: 8.1 % — SIGNIFICANT CHANGE UP (ref 2–14)
NEUTROPHILS # BLD AUTO: 7.66 K/UL — HIGH (ref 1.8–7.4)
NEUTROPHILS NFR BLD AUTO: 82.5 % — HIGH (ref 43–77)
NRBC # BLD: 0 /100 WBCS — SIGNIFICANT CHANGE UP (ref 0–0)
PLATELET # BLD AUTO: 17 K/UL — CRITICAL LOW (ref 150–400)
RBC # BLD: 5.46 M/UL — SIGNIFICANT CHANGE UP (ref 4.2–5.8)
RBC # FLD: 16.1 % — HIGH (ref 10.3–14.5)
WBC # BLD: 9.29 K/UL — SIGNIFICANT CHANGE UP (ref 3.8–10.5)
WBC # FLD AUTO: 9.29 K/UL — SIGNIFICANT CHANGE UP (ref 3.8–10.5)

## 2022-04-21 ENCOUNTER — APPOINTMENT (OUTPATIENT)
Dept: HEMATOLOGY ONCOLOGY | Facility: CLINIC | Age: 22
End: 2022-04-21
Payer: MEDICARE

## 2022-04-21 ENCOUNTER — RESULT REVIEW (OUTPATIENT)
Age: 22
End: 2022-04-21

## 2022-04-21 ENCOUNTER — APPOINTMENT (OUTPATIENT)
Dept: INFUSION THERAPY | Facility: HOSPITAL | Age: 22
End: 2022-04-21

## 2022-04-21 ENCOUNTER — NON-APPOINTMENT (OUTPATIENT)
Age: 22
End: 2022-04-21

## 2022-04-21 LAB
BASOPHILS # BLD AUTO: 0.02 K/UL — SIGNIFICANT CHANGE UP (ref 0–0.2)
BASOPHILS NFR BLD AUTO: 0.2 % — SIGNIFICANT CHANGE UP (ref 0–2)
EOSINOPHIL # BLD AUTO: 0.02 K/UL — SIGNIFICANT CHANGE UP (ref 0–0.5)
EOSINOPHIL NFR BLD AUTO: 0.2 % — SIGNIFICANT CHANGE UP (ref 0–6)
HCT VFR BLD CALC: 44.3 % — SIGNIFICANT CHANGE UP (ref 39–50)
HGB BLD-MCNC: 14 G/DL — SIGNIFICANT CHANGE UP (ref 13–17)
IMM GRANULOCYTES NFR BLD AUTO: 0.4 % — SIGNIFICANT CHANGE UP (ref 0–1.5)
LYMPHOCYTES # BLD AUTO: 1.45 K/UL — SIGNIFICANT CHANGE UP (ref 1–3.3)
LYMPHOCYTES # BLD AUTO: 17.2 % — SIGNIFICANT CHANGE UP (ref 13–44)
MCHC RBC-ENTMCNC: 25.4 PG — LOW (ref 27–34)
MCHC RBC-ENTMCNC: 31.6 G/DL — LOW (ref 32–36)
MCV RBC AUTO: 80.3 FL — SIGNIFICANT CHANGE UP (ref 80–100)
MONOCYTES # BLD AUTO: 0.51 K/UL — SIGNIFICANT CHANGE UP (ref 0–0.9)
MONOCYTES NFR BLD AUTO: 6.1 % — SIGNIFICANT CHANGE UP (ref 2–14)
NEUTROPHILS # BLD AUTO: 6.38 K/UL — SIGNIFICANT CHANGE UP (ref 1.8–7.4)
NEUTROPHILS NFR BLD AUTO: 75.9 % — SIGNIFICANT CHANGE UP (ref 43–77)
NRBC # BLD: 0 /100 WBCS — SIGNIFICANT CHANGE UP (ref 0–0)
PLATELET # BLD AUTO: 16 K/UL — CRITICAL LOW (ref 150–400)
RBC # BLD: 5.52 M/UL — SIGNIFICANT CHANGE UP (ref 4.2–5.8)
RBC # FLD: 15.8 % — HIGH (ref 10.3–14.5)
WBC # BLD: 8.41 K/UL — SIGNIFICANT CHANGE UP (ref 3.8–10.5)
WBC # FLD AUTO: 8.41 K/UL — SIGNIFICANT CHANGE UP (ref 3.8–10.5)

## 2022-04-21 PROCEDURE — 99214 OFFICE O/P EST MOD 30 MIN: CPT

## 2022-04-22 ENCOUNTER — OUTPATIENT (OUTPATIENT)
Dept: OUTPATIENT SERVICES | Facility: HOSPITAL | Age: 22
LOS: 1 days | Discharge: HOME | End: 2022-04-22

## 2022-04-22 DIAGNOSIS — D69.3 IMMUNE THROMBOCYTOPENIC PURPURA: ICD-10-CM

## 2022-04-24 NOTE — HISTORY OF PRESENT ILLNESS
[de-identified] : 21 year old male presenting to [facility] for hematologic care. He is referred here from [facility]. Patient's past medical history is significant for intellectual disability, autism (non-verbal), ITP (diagnosed since he was 18 months of age), CARLITOS (not on CPAP). He was admitted on 1/23/2021 due to acute lower GI bleed in setting of thrombocytopenia; his platelet count was 1,000 and he was also found to be COVID +. During the course of his hospital stay, he received 1 unit platelet transfusion, 2 days of IVIG, 2 days of IV steroids, then started on oral steroid therapy. Patient was discharged on 2/5/2021, on prednisone 80 mg daily and advised to follow up at [facility] to taper his steroid dosage down in an outpatient setting. \par \par Patient is accompanied by a formal caregiver, presented to [facility] for blood count assessment and management of his steroid therapy. Patient's mother believed his ITP began after he received MMR vaccination around 18 months of age. His ITP was managed by Dr. Rachana Blanco (hematology) at [place], intermittently receiving IVIG treatments. Attempts were made with various treatments but he was believed to have experienced reactions (Rituxan, WinRho, etc). Prior to his January 2021 [facility] hospitalization, he did not receive any treatment for at least 10 years for ITP. Patient's mother also reported that he appeared withdrawn since birth.  [de-identified] : Patient had not been treated with IVIG on last visit 2 weeks ago given platelet count 17, patient has mostly stopped self injuring, very limited older bruising noted today. No significant bleeding.

## 2022-04-24 NOTE — ASSESSMENT
[FreeTextEntry1] : Platelets have been stable at 17 for about a month without IVIG infusions since March.  Patient has been doing very well lately, minimal bruising, no excessive bleeding. GIven the stability lately we can decrease treatment to PRN. WIll have monthly home draws done at the group home.  If platelet counts < 10k/ul have patient return for IVIG followed by rituxan.  Would continue the Doptlet at current dose of 40mg BID.

## 2022-04-25 ENCOUNTER — APPOINTMENT (OUTPATIENT)
Dept: INFUSION THERAPY | Facility: HOSPITAL | Age: 22
End: 2022-04-25

## 2022-05-02 ENCOUNTER — LABORATORY RESULT (OUTPATIENT)
Age: 22
End: 2022-05-02

## 2022-05-13 ENCOUNTER — APPOINTMENT (OUTPATIENT)
Dept: INFUSION THERAPY | Facility: HOSPITAL | Age: 22
End: 2022-05-13

## 2022-05-13 ENCOUNTER — RESULT REVIEW (OUTPATIENT)
Age: 22
End: 2022-05-13

## 2022-05-13 LAB
BASOPHILS # BLD AUTO: 0.04 K/UL — SIGNIFICANT CHANGE UP (ref 0–0.2)
BASOPHILS NFR BLD AUTO: 0.5 % — SIGNIFICANT CHANGE UP (ref 0–2)
EOSINOPHIL # BLD AUTO: 0.02 K/UL — SIGNIFICANT CHANGE UP (ref 0–0.5)
EOSINOPHIL NFR BLD AUTO: 0.2 % — SIGNIFICANT CHANGE UP (ref 0–6)
HCT VFR BLD CALC: 43.6 % — SIGNIFICANT CHANGE UP (ref 39–50)
HGB BLD-MCNC: 13.3 G/DL — SIGNIFICANT CHANGE UP (ref 13–17)
IMM GRANULOCYTES NFR BLD AUTO: 0.5 % — SIGNIFICANT CHANGE UP (ref 0–1.5)
LYMPHOCYTES # BLD AUTO: 1.16 K/UL — SIGNIFICANT CHANGE UP (ref 1–3.3)
LYMPHOCYTES # BLD AUTO: 13.7 % — SIGNIFICANT CHANGE UP (ref 13–44)
MCHC RBC-ENTMCNC: 24.8 PG — LOW (ref 27–34)
MCHC RBC-ENTMCNC: 30.5 G/DL — LOW (ref 32–36)
MCV RBC AUTO: 81.2 FL — SIGNIFICANT CHANGE UP (ref 80–100)
MONOCYTES # BLD AUTO: 0.58 K/UL — SIGNIFICANT CHANGE UP (ref 0–0.9)
MONOCYTES NFR BLD AUTO: 6.9 % — SIGNIFICANT CHANGE UP (ref 2–14)
NEUTROPHILS # BLD AUTO: 6.61 K/UL — SIGNIFICANT CHANGE UP (ref 1.8–7.4)
NEUTROPHILS NFR BLD AUTO: 78.2 % — HIGH (ref 43–77)
NRBC # BLD: 0 /100 WBCS — SIGNIFICANT CHANGE UP (ref 0–0)
PLATELET # BLD AUTO: 23 K/UL — LOW (ref 150–400)
RBC # BLD: 5.37 M/UL — SIGNIFICANT CHANGE UP (ref 4.2–5.8)
RBC # FLD: 15.1 % — HIGH (ref 10.3–14.5)
WBC # BLD: 8.45 K/UL — SIGNIFICANT CHANGE UP (ref 3.8–10.5)
WBC # FLD AUTO: 8.45 K/UL — SIGNIFICANT CHANGE UP (ref 3.8–10.5)

## 2022-05-23 ENCOUNTER — OUTPATIENT (OUTPATIENT)
Dept: OUTPATIENT SERVICES | Facility: HOSPITAL | Age: 22
LOS: 1 days | Discharge: HOME | End: 2022-05-23

## 2022-05-23 DIAGNOSIS — D69.3 IMMUNE THROMBOCYTOPENIC PURPURA: ICD-10-CM

## 2022-05-27 ENCOUNTER — RESULT REVIEW (OUTPATIENT)
Age: 22
End: 2022-05-27

## 2022-05-27 ENCOUNTER — LABORATORY RESULT (OUTPATIENT)
Age: 22
End: 2022-05-27

## 2022-05-27 ENCOUNTER — APPOINTMENT (OUTPATIENT)
Dept: INFUSION THERAPY | Facility: HOSPITAL | Age: 22
End: 2022-05-27

## 2022-05-27 LAB
BASOPHILS # BLD AUTO: 0.02 K/UL — SIGNIFICANT CHANGE UP (ref 0–0.2)
BASOPHILS NFR BLD AUTO: 0.2 % — SIGNIFICANT CHANGE UP (ref 0–2)
EOSINOPHIL # BLD AUTO: 0.02 K/UL — SIGNIFICANT CHANGE UP (ref 0–0.5)
EOSINOPHIL NFR BLD AUTO: 0.2 % — SIGNIFICANT CHANGE UP (ref 0–6)
HCT VFR BLD CALC: 43.2 % — SIGNIFICANT CHANGE UP (ref 39–50)
HGB BLD-MCNC: 13.3 G/DL — SIGNIFICANT CHANGE UP (ref 13–17)
IMM GRANULOCYTES NFR BLD AUTO: 0.3 % — SIGNIFICANT CHANGE UP (ref 0–1.5)
LYMPHOCYTES # BLD AUTO: 1.21 K/UL — SIGNIFICANT CHANGE UP (ref 1–3.3)
LYMPHOCYTES # BLD AUTO: 14 % — SIGNIFICANT CHANGE UP (ref 13–44)
MCHC RBC-ENTMCNC: 24.7 PG — LOW (ref 27–34)
MCHC RBC-ENTMCNC: 30.8 G/DL — LOW (ref 32–36)
MCV RBC AUTO: 80.3 FL — SIGNIFICANT CHANGE UP (ref 80–100)
MONOCYTES # BLD AUTO: 0.53 K/UL — SIGNIFICANT CHANGE UP (ref 0–0.9)
MONOCYTES NFR BLD AUTO: 6.1 % — SIGNIFICANT CHANGE UP (ref 2–14)
NEUTROPHILS # BLD AUTO: 6.83 K/UL — SIGNIFICANT CHANGE UP (ref 1.8–7.4)
NEUTROPHILS NFR BLD AUTO: 79.2 % — HIGH (ref 43–77)
NRBC # BLD: 0 /100 WBCS — SIGNIFICANT CHANGE UP (ref 0–0)
PLATELET # BLD AUTO: 12 K/UL — CRITICAL LOW (ref 150–400)
RBC # BLD: 5.38 M/UL — SIGNIFICANT CHANGE UP (ref 4.2–5.8)
RBC # FLD: 15.2 % — HIGH (ref 10.3–14.5)
WBC # BLD: 8.64 K/UL — SIGNIFICANT CHANGE UP (ref 3.8–10.5)
WBC # FLD AUTO: 8.64 K/UL — SIGNIFICANT CHANGE UP (ref 3.8–10.5)

## 2022-05-31 DIAGNOSIS — Z51.11 ENCOUNTER FOR ANTINEOPLASTIC CHEMOTHERAPY: ICD-10-CM

## 2022-05-31 DIAGNOSIS — R11.2 NAUSEA WITH VOMITING, UNSPECIFIED: ICD-10-CM

## 2022-06-10 ENCOUNTER — APPOINTMENT (OUTPATIENT)
Dept: INFUSION THERAPY | Facility: HOSPITAL | Age: 22
End: 2022-06-10

## 2022-06-10 ENCOUNTER — APPOINTMENT (OUTPATIENT)
Dept: HEMATOLOGY ONCOLOGY | Facility: CLINIC | Age: 22
End: 2022-06-10

## 2022-06-13 ENCOUNTER — APPOINTMENT (OUTPATIENT)
Age: 22
End: 2022-06-13

## 2022-06-16 ENCOUNTER — LABORATORY RESULT (OUTPATIENT)
Age: 22
End: 2022-06-16

## 2022-06-17 ENCOUNTER — NON-APPOINTMENT (OUTPATIENT)
Age: 22
End: 2022-06-17

## 2022-06-20 ENCOUNTER — APPOINTMENT (OUTPATIENT)
Dept: INFUSION THERAPY | Facility: HOSPITAL | Age: 22
End: 2022-06-20

## 2022-06-20 ENCOUNTER — RESULT REVIEW (OUTPATIENT)
Age: 22
End: 2022-06-20

## 2022-06-20 LAB
BASOPHILS # BLD AUTO: 0.02 K/UL — SIGNIFICANT CHANGE UP (ref 0–0.2)
BASOPHILS NFR BLD AUTO: 0.2 % — SIGNIFICANT CHANGE UP (ref 0–2)
EOSINOPHIL # BLD AUTO: 0.03 K/UL — SIGNIFICANT CHANGE UP (ref 0–0.5)
EOSINOPHIL NFR BLD AUTO: 0.3 % — SIGNIFICANT CHANGE UP (ref 0–6)
HCT VFR BLD CALC: 43.1 % — SIGNIFICANT CHANGE UP (ref 39–50)
HGB BLD-MCNC: 13.4 G/DL — SIGNIFICANT CHANGE UP (ref 13–17)
IMM GRANULOCYTES NFR BLD AUTO: 0.3 % — SIGNIFICANT CHANGE UP (ref 0–1.5)
LYMPHOCYTES # BLD AUTO: 1.37 K/UL — SIGNIFICANT CHANGE UP (ref 1–3.3)
LYMPHOCYTES # BLD AUTO: 14.8 % — SIGNIFICANT CHANGE UP (ref 13–44)
MCHC RBC-ENTMCNC: 25 PG — LOW (ref 27–34)
MCHC RBC-ENTMCNC: 31.1 G/DL — LOW (ref 32–36)
MCV RBC AUTO: 80.4 FL — SIGNIFICANT CHANGE UP (ref 80–100)
MONOCYTES # BLD AUTO: 0.6 K/UL — SIGNIFICANT CHANGE UP (ref 0–0.9)
MONOCYTES NFR BLD AUTO: 6.5 % — SIGNIFICANT CHANGE UP (ref 2–14)
NEUTROPHILS # BLD AUTO: 7.18 K/UL — SIGNIFICANT CHANGE UP (ref 1.8–7.4)
NEUTROPHILS NFR BLD AUTO: 77.9 % — HIGH (ref 43–77)
NRBC # BLD: 0 /100 WBCS — SIGNIFICANT CHANGE UP (ref 0–0)
PLATELET # BLD AUTO: 12 K/UL — CRITICAL LOW (ref 150–400)
RBC # BLD: 5.36 M/UL — SIGNIFICANT CHANGE UP (ref 4.2–5.8)
RBC # FLD: 15.6 % — HIGH (ref 10.3–14.5)
WBC # BLD: 9.23 K/UL — SIGNIFICANT CHANGE UP (ref 3.8–10.5)
WBC # FLD AUTO: 9.23 K/UL — SIGNIFICANT CHANGE UP (ref 3.8–10.5)

## 2022-06-27 ENCOUNTER — APPOINTMENT (OUTPATIENT)
Dept: GASTROENTEROLOGY | Facility: CLINIC | Age: 22
End: 2022-06-27

## 2022-06-27 VITALS
DIASTOLIC BLOOD PRESSURE: 68 MMHG | SYSTOLIC BLOOD PRESSURE: 122 MMHG | HEART RATE: 81 BPM | BODY MASS INDEX: 37.24 KG/M2 | HEIGHT: 71 IN | WEIGHT: 266 LBS | OXYGEN SATURATION: 98 %

## 2022-06-27 DIAGNOSIS — K21.9 GASTRO-ESOPHAGEAL REFLUX DISEASE W/OUT ESOPHAGITIS: ICD-10-CM

## 2022-06-27 PROCEDURE — 99204 OFFICE O/P NEW MOD 45 MIN: CPT

## 2022-06-27 RX ORDER — BREXPIPRAZOLE 2 MG/1
2 TABLET ORAL DAILY
Refills: 0 | Status: DISCONTINUED | COMMUNITY
Start: 2021-02-09 | End: 2022-06-27

## 2022-06-29 LAB
BASOPHILS # BLD AUTO: 0.02 K/UL
BASOPHILS NFR BLD AUTO: 0.2 %
EOSINOPHIL # BLD AUTO: 0.01 K/UL
EOSINOPHIL NFR BLD AUTO: 0.1 %
HCT VFR BLD CALC: 42.4 %
HGB BLD-MCNC: 12.6 G/DL
IMM GRANULOCYTES NFR BLD AUTO: 0.2 %
LYMPHOCYTES # BLD AUTO: 1.14 K/UL
LYMPHOCYTES NFR BLD AUTO: 14.1 %
MAN DIFF?: NORMAL
MCHC RBC-ENTMCNC: 25.3 PG
MCHC RBC-ENTMCNC: 29.7 GM/DL
MCV RBC AUTO: 85 FL
MONOCYTES # BLD AUTO: 0.48 K/UL
MONOCYTES NFR BLD AUTO: 6 %
NEUTROPHILS # BLD AUTO: 6.39 K/UL
NEUTROPHILS NFR BLD AUTO: 79.4 %
PLATELET # BLD AUTO: 58 K/UL
RBC # BLD: 4.99 M/UL
RBC # FLD: 16.3 %
WBC # FLD AUTO: 8.06 K/UL

## 2022-07-22 ENCOUNTER — LABORATORY RESULT (OUTPATIENT)
Age: 22
End: 2022-07-22

## 2022-07-29 ENCOUNTER — NON-APPOINTMENT (OUTPATIENT)
Age: 22
End: 2022-07-29

## 2022-08-02 ENCOUNTER — LABORATORY RESULT (OUTPATIENT)
Age: 22
End: 2022-08-02

## 2022-08-07 NOTE — ASU PATIENT PROFILE, ADULT - NS PRO AD ANY ON CHART
Rest and elevate the affected painful area.      Apply cold compresses intermittently.      As pain recedes, begin normal activities slowly as tolerated.      Tylenol, Mobic, Lidocaine patches and Flexeril for pain control    Service to orthopedics and pain management placed for follow up         No

## 2022-08-19 ENCOUNTER — LABORATORY RESULT (OUTPATIENT)
Age: 22
End: 2022-08-19

## 2022-08-19 ENCOUNTER — NON-APPOINTMENT (OUTPATIENT)
Age: 22
End: 2022-08-19

## 2022-08-20 ENCOUNTER — OUTPATIENT (OUTPATIENT)
Dept: OUTPATIENT SERVICES | Facility: HOSPITAL | Age: 22
LOS: 1 days | Discharge: HOME | End: 2022-08-20

## 2022-08-20 DIAGNOSIS — D69.3 IMMUNE THROMBOCYTOPENIC PURPURA: ICD-10-CM

## 2022-08-22 ENCOUNTER — NON-APPOINTMENT (OUTPATIENT)
Age: 22
End: 2022-08-22

## 2022-08-23 ENCOUNTER — APPOINTMENT (OUTPATIENT)
Dept: INFUSION THERAPY | Facility: HOSPITAL | Age: 22
End: 2022-08-23

## 2022-08-25 NOTE — PATIENT PROFILE ADULT - ...
He is s/p third Botox and has had an excellent response. He has gone from daily headache to now one mild headache per week with rare migraine attack. When needed, Nurtec is effective. Plan to continue Botox.    23-Jan-2021 08:02:24

## 2022-09-06 ENCOUNTER — NON-APPOINTMENT (OUTPATIENT)
Age: 22
End: 2022-09-06

## 2022-09-07 ENCOUNTER — LABORATORY RESULT (OUTPATIENT)
Age: 22
End: 2022-09-07

## 2022-09-08 ENCOUNTER — LABORATORY RESULT (OUTPATIENT)
Age: 22
End: 2022-09-08

## 2022-09-08 ENCOUNTER — NON-APPOINTMENT (OUTPATIENT)
Age: 22
End: 2022-09-08

## 2022-09-23 ENCOUNTER — LABORATORY RESULT (OUTPATIENT)
Age: 22
End: 2022-09-23

## 2022-09-26 ENCOUNTER — LABORATORY RESULT (OUTPATIENT)
Age: 22
End: 2022-09-26

## 2022-10-02 NOTE — PROGRESS NOTE ADULT - PROBLEM SELECTOR PLAN 3
Hgb stable, no further episodes of bleeding.  No need for colonoscopy at this time, GI input appreciates no concerns

## 2022-10-28 ENCOUNTER — LABORATORY RESULT (OUTPATIENT)
Age: 22
End: 2022-10-28

## 2022-10-31 ENCOUNTER — LABORATORY RESULT (OUTPATIENT)
Age: 22
End: 2022-10-31

## 2022-11-02 ENCOUNTER — NON-APPOINTMENT (OUTPATIENT)
Age: 22
End: 2022-11-02

## 2022-11-24 ENCOUNTER — LABORATORY RESULT (OUTPATIENT)
Age: 22
End: 2022-11-24

## 2022-11-29 ENCOUNTER — OUTPATIENT (OUTPATIENT)
Dept: OUTPATIENT SERVICES | Facility: HOSPITAL | Age: 22
LOS: 1 days | Discharge: HOME | End: 2022-11-29

## 2022-11-29 DIAGNOSIS — D69.3 IMMUNE THROMBOCYTOPENIC PURPURA: ICD-10-CM

## 2022-12-05 ENCOUNTER — NON-APPOINTMENT (OUTPATIENT)
Age: 22
End: 2022-12-05

## 2022-12-06 ENCOUNTER — APPOINTMENT (OUTPATIENT)
Dept: INFUSION THERAPY | Facility: HOSPITAL | Age: 22
End: 2022-12-06

## 2022-12-06 ENCOUNTER — RESULT REVIEW (OUTPATIENT)
Age: 22
End: 2022-12-06

## 2022-12-06 ENCOUNTER — APPOINTMENT (OUTPATIENT)
Dept: HEMATOLOGY ONCOLOGY | Facility: CLINIC | Age: 22
End: 2022-12-06

## 2022-12-06 LAB
BASOPHILS # BLD AUTO: 0.01 K/UL — SIGNIFICANT CHANGE UP (ref 0–0.2)
BASOPHILS NFR BLD AUTO: 0.1 % — SIGNIFICANT CHANGE UP (ref 0–2)
EOSINOPHIL # BLD AUTO: 0.03 K/UL — SIGNIFICANT CHANGE UP (ref 0–0.5)
EOSINOPHIL NFR BLD AUTO: 0.4 % — SIGNIFICANT CHANGE UP (ref 0–6)
HCT VFR BLD CALC: 44.3 % — SIGNIFICANT CHANGE UP (ref 39–50)
HGB BLD-MCNC: 14.1 G/DL — SIGNIFICANT CHANGE UP (ref 13–17)
IMM GRANULOCYTES NFR BLD AUTO: 0.4 % — SIGNIFICANT CHANGE UP (ref 0–0.9)
LG PLATELETS BLD QL AUTO: SLIGHT — SIGNIFICANT CHANGE UP
LYMPHOCYTES # BLD AUTO: 1.11 K/UL — SIGNIFICANT CHANGE UP (ref 1–3.3)
LYMPHOCYTES # BLD AUTO: 14.9 % — SIGNIFICANT CHANGE UP (ref 13–44)
MCHC RBC-ENTMCNC: 26.2 PG — LOW (ref 27–34)
MCHC RBC-ENTMCNC: 31.8 G/DL — LOW (ref 32–36)
MCV RBC AUTO: 82.3 FL — SIGNIFICANT CHANGE UP (ref 80–100)
MONOCYTES # BLD AUTO: 0.47 K/UL — SIGNIFICANT CHANGE UP (ref 0–0.9)
MONOCYTES NFR BLD AUTO: 6.3 % — SIGNIFICANT CHANGE UP (ref 2–14)
NEUTROPHILS # BLD AUTO: 5.78 K/UL — SIGNIFICANT CHANGE UP (ref 1.8–7.4)
NEUTROPHILS NFR BLD AUTO: 77.9 % — HIGH (ref 43–77)
NRBC # BLD: 0 /100 WBCS — SIGNIFICANT CHANGE UP (ref 0–0)
PLAT MORPH BLD: NORMAL — SIGNIFICANT CHANGE UP
PLATELET # BLD AUTO: 36 K/UL — LOW (ref 150–400)
RBC # BLD: 5.38 M/UL — SIGNIFICANT CHANGE UP (ref 4.2–5.8)
RBC # FLD: 14.9 % — HIGH (ref 10.3–14.5)
RBC BLD AUTO: SIGNIFICANT CHANGE UP
WBC # BLD: 7.43 K/UL — SIGNIFICANT CHANGE UP (ref 3.8–10.5)
WBC # FLD AUTO: 7.43 K/UL — SIGNIFICANT CHANGE UP (ref 3.8–10.5)

## 2022-12-06 PROCEDURE — 99214 OFFICE O/P EST MOD 30 MIN: CPT

## 2022-12-06 NOTE — PHYSICAL EXAM
[Normal] : normal appearance, no rash, nodules, vesicles, ulcers, erythema [de-identified] : renaldo with autistic specturm, still  striking himself.

## 2022-12-06 NOTE — ASSESSMENT
[FreeTextEntry1] : THis is a 22 year old man, autistic spectrum, non verbal at baseline, patient with severe chronic ITP on Tavalise and Doptlet, receiving IVIG therapy today for recently platelet count 12k/ul at group home.  has not received IVIG in about 5 months since this past summer.  Contuse periodic Platelet checks with IVIG for platelets < 15,000.  Mother was seeking a trial for autism using stem celll transplant. Explained to her that our team here does not  have open trials for either the ITP or the autism, but she could look into the [facility] one she cites, just make sure to check if Meghan would qualify given that either the autism would be an exclusion criteria for ITP trials, or the ITP would be an exclusion criteria for Autuism trials.

## 2022-12-06 NOTE — HISTORY OF PRESENT ILLNESS
[de-identified] : 22 year old male presenting to [facility] for hematologic care. He is referred here from [facility]. Patient's past medical history is significant for intellectual disability, autism (non-verbal), ITP (diagnosed since he was 18 months of age), CARLITOS (not on CPAP). He was admitted on 1/23/2021 due to acute lower GI bleed in setting of thrombocytopenia; his platelet count was 1,000 and he was also found to be COVID +. During the course of his hospital stay, he received 1 unit platelet transfusion, 2 days of IVIG, 2 days of IV steroids, then started on oral steroid therapy. Patient was discharged on 2/5/2021, on prednisone 80 mg daily and advised to follow up at [facility] to taper his steroid dosage down in an outpatient setting. \par \par Patient is accompanied by a formal caregiver, presented to [facility] for blood count assessment and management of his steroid therapy. Patient's mother believed his ITP began after he received MMR vaccination around 18 months of age. His ITP was managed by Dr. Rachana Blanco (hematology) at [place], intermittently receiving IVIG treatments. Attempts were made with various treatments but he was believed to have experienced reactions (Rituxan, WinRho, etc). Prior to his January 2021 [facility] hospitalization, he did not receive any treatment for at least 10 years for ITP. Patient's mother also reported that he appeared withdrawn since birth.  [de-identified] : Patient returns today for a platelet count 12k/ul extremely low gain at the group home.  Paitnet for IVIG therapy today, this was his first in about 5 months. MOther inqures about stem cell transplantation from patient's chord blood from years ago for the indication of autism.  Was curious to see if this could also help with his ITP. Explained to patient that this form of stem cell transplantation remains investigational, and that the ITP itself may be an exclusion criteria. We can not perform this here as we do not have an active trial for this, and even at [place] there is  significant side effects which could lead to a poor outcome. Also he may not even qualify for a stem cell transplant for the ITP or the autism due to the likely busch that a trial for one would exclude the other.  Reccomended that she contact [place] to inquire about the exclusion criteria given that if he was already excluded there would be no purpose in proceeding to finding transportation.

## 2022-12-19 RX ORDER — AVATROMBOPAG MALEATE 20 MG/1
20 TABLET, FILM COATED ORAL DAILY
Qty: 60 | Refills: 3 | Status: ACTIVE | COMMUNITY
Start: 2021-03-25 | End: 1900-01-01

## 2022-12-23 ENCOUNTER — LABORATORY RESULT (OUTPATIENT)
Age: 22
End: 2022-12-23

## 2023-01-18 ENCOUNTER — LABORATORY RESULT (OUTPATIENT)
Age: 23
End: 2023-01-18

## 2023-01-27 ENCOUNTER — APPOINTMENT (OUTPATIENT)
Dept: INFUSION THERAPY | Facility: HOSPITAL | Age: 23
End: 2023-01-27

## 2023-01-27 ENCOUNTER — RESULT REVIEW (OUTPATIENT)
Age: 23
End: 2023-01-27

## 2023-01-27 ENCOUNTER — APPOINTMENT (OUTPATIENT)
Dept: HEMATOLOGY ONCOLOGY | Facility: CLINIC | Age: 23
End: 2023-01-27

## 2023-01-27 LAB
BASOPHILS # BLD AUTO: 0.03 K/UL — SIGNIFICANT CHANGE UP (ref 0–0.2)
BASOPHILS NFR BLD AUTO: 0.5 % — SIGNIFICANT CHANGE UP (ref 0–2)
EOSINOPHIL # BLD AUTO: 0.02 K/UL — SIGNIFICANT CHANGE UP (ref 0–0.5)
EOSINOPHIL NFR BLD AUTO: 0.3 % — SIGNIFICANT CHANGE UP (ref 0–6)
HCT VFR BLD CALC: 43 % — SIGNIFICANT CHANGE UP (ref 39–50)
HGB BLD-MCNC: 13.7 G/DL — SIGNIFICANT CHANGE UP (ref 13–17)
IMM GRANULOCYTES NFR BLD AUTO: 0.3 % — SIGNIFICANT CHANGE UP (ref 0–0.9)
LYMPHOCYTES # BLD AUTO: 1.18 K/UL — SIGNIFICANT CHANGE UP (ref 1–3.3)
LYMPHOCYTES # BLD AUTO: 18.1 % — SIGNIFICANT CHANGE UP (ref 13–44)
MCHC RBC-ENTMCNC: 26.3 PG — LOW (ref 27–34)
MCHC RBC-ENTMCNC: 31.9 G/DL — LOW (ref 32–36)
MCV RBC AUTO: 82.5 FL — SIGNIFICANT CHANGE UP (ref 80–100)
MONOCYTES # BLD AUTO: 0.37 K/UL — SIGNIFICANT CHANGE UP (ref 0–0.9)
MONOCYTES NFR BLD AUTO: 5.7 % — SIGNIFICANT CHANGE UP (ref 2–14)
NEUTROPHILS # BLD AUTO: 4.9 K/UL — SIGNIFICANT CHANGE UP (ref 1.8–7.4)
NEUTROPHILS NFR BLD AUTO: 75.1 % — SIGNIFICANT CHANGE UP (ref 43–77)
NRBC # BLD: 0 /100 WBCS — SIGNIFICANT CHANGE UP (ref 0–0)
PLATELET # BLD AUTO: 10 K/UL — CRITICAL LOW (ref 150–400)
RBC # BLD: 5.21 M/UL — SIGNIFICANT CHANGE UP (ref 4.2–5.8)
RBC # FLD: 14.5 % — SIGNIFICANT CHANGE UP (ref 10.3–14.5)
WBC # BLD: 6.52 K/UL — SIGNIFICANT CHANGE UP (ref 3.8–10.5)
WBC # FLD AUTO: 6.52 K/UL — SIGNIFICANT CHANGE UP (ref 3.8–10.5)

## 2023-01-30 DIAGNOSIS — R11.2 NAUSEA WITH VOMITING, UNSPECIFIED: ICD-10-CM

## 2023-02-15 ENCOUNTER — OUTPATIENT (OUTPATIENT)
Dept: OUTPATIENT SERVICES | Facility: HOSPITAL | Age: 23
LOS: 1 days | Discharge: HOME | End: 2023-02-15

## 2023-02-15 ENCOUNTER — LABORATORY RESULT (OUTPATIENT)
Age: 23
End: 2023-02-15

## 2023-02-15 DIAGNOSIS — D69.3 IMMUNE THROMBOCYTOPENIC PURPURA: ICD-10-CM

## 2023-02-16 ENCOUNTER — LABORATORY RESULT (OUTPATIENT)
Age: 23
End: 2023-02-16

## 2023-02-21 ENCOUNTER — NON-APPOINTMENT (OUTPATIENT)
Age: 23
End: 2023-02-21

## 2023-02-24 ENCOUNTER — RESULT REVIEW (OUTPATIENT)
Age: 23
End: 2023-02-24

## 2023-02-24 ENCOUNTER — APPOINTMENT (OUTPATIENT)
Dept: INFUSION THERAPY | Facility: HOSPITAL | Age: 23
End: 2023-02-24

## 2023-02-24 ENCOUNTER — APPOINTMENT (OUTPATIENT)
Dept: HEMATOLOGY ONCOLOGY | Facility: CLINIC | Age: 23
End: 2023-02-24
Payer: MEDICARE

## 2023-02-24 VITALS
WEIGHT: 245.57 LBS | OXYGEN SATURATION: 97 % | DIASTOLIC BLOOD PRESSURE: 73 MMHG | HEART RATE: 79 BPM | BODY MASS INDEX: 34.38 KG/M2 | SYSTOLIC BLOOD PRESSURE: 119 MMHG | TEMPERATURE: 97.1 F | HEIGHT: 71 IN | RESPIRATION RATE: 16 BRPM

## 2023-02-24 DIAGNOSIS — D69.3 IMMUNE THROMBOCYTOPENIC PURPURA: ICD-10-CM

## 2023-02-24 LAB
BASOPHILS # BLD AUTO: 0.03 K/UL — SIGNIFICANT CHANGE UP (ref 0–0.2)
BASOPHILS NFR BLD AUTO: 0.4 % — SIGNIFICANT CHANGE UP (ref 0–2)
EOSINOPHIL # BLD AUTO: 0.03 K/UL — SIGNIFICANT CHANGE UP (ref 0–0.5)
EOSINOPHIL NFR BLD AUTO: 0.4 % — SIGNIFICANT CHANGE UP (ref 0–6)
HCT VFR BLD CALC: 42.4 % — SIGNIFICANT CHANGE UP (ref 39–50)
HGB BLD-MCNC: 13.7 G/DL — SIGNIFICANT CHANGE UP (ref 13–17)
IMM GRANULOCYTES NFR BLD AUTO: 0.3 % — SIGNIFICANT CHANGE UP (ref 0–0.9)
LYMPHOCYTES # BLD AUTO: 1.15 K/UL — SIGNIFICANT CHANGE UP (ref 1–3.3)
LYMPHOCYTES # BLD AUTO: 14.4 % — SIGNIFICANT CHANGE UP (ref 13–44)
MCHC RBC-ENTMCNC: 26.7 PG — LOW (ref 27–34)
MCHC RBC-ENTMCNC: 32.3 G/DL — SIGNIFICANT CHANGE UP (ref 32–36)
MCV RBC AUTO: 82.7 FL — SIGNIFICANT CHANGE UP (ref 80–100)
MONOCYTES # BLD AUTO: 0.47 K/UL — SIGNIFICANT CHANGE UP (ref 0–0.9)
MONOCYTES NFR BLD AUTO: 5.9 % — SIGNIFICANT CHANGE UP (ref 2–14)
NEUTROPHILS # BLD AUTO: 6.29 K/UL — SIGNIFICANT CHANGE UP (ref 1.8–7.4)
NEUTROPHILS NFR BLD AUTO: 78.6 % — HIGH (ref 43–77)
NRBC # BLD: 0 /100 WBCS — SIGNIFICANT CHANGE UP (ref 0–0)
PLATELET # BLD AUTO: 15 K/UL — CRITICAL LOW (ref 150–400)
RBC # BLD: 5.13 M/UL — SIGNIFICANT CHANGE UP (ref 4.2–5.8)
RBC # FLD: 14.8 % — HIGH (ref 10.3–14.5)
WBC # BLD: 7.99 K/UL — SIGNIFICANT CHANGE UP (ref 3.8–10.5)
WBC # FLD AUTO: 7.99 K/UL — SIGNIFICANT CHANGE UP (ref 3.8–10.5)

## 2023-02-24 PROCEDURE — 99215 OFFICE O/P EST HI 40 MIN: CPT

## 2023-02-26 NOTE — HISTORY OF PRESENT ILLNESS
[de-identified] : Patient had a history of an MMR related ITP form years ago. Had not had the COVID Vaccines\par Prior to that had severe thrombopenia for many years and then went into a remission for a while before COVID had triggered a very severe bout of the ITP.  \par Patient's mother had researched the IVIG treatment and was concerned about the isolates of the IVIG from the production of the medication as possibly having the spike protein produced by the donors and causing problems with her son.  She had been siding on repeat rituxan therapy, which seems like a decent idea, however I explained to her that this is in fact also an antibody which caused some reservation.  \par \par By history with Dr. Bhagat, patient's platelets had been under 10 for quite some time even without the IVIG treatment and patient had not had an appreciable

## 2023-02-26 NOTE — PHYSICAL EXAM
[Normal] : normoactive bowel sounds, soft and nontender, no hepatosplenomegaly or masses appreciated [de-identified] : Morbidly obese, mostly non verbal.

## 2023-03-06 ENCOUNTER — OUTPATIENT (OUTPATIENT)
Dept: OUTPATIENT SERVICES | Facility: HOSPITAL | Age: 23
LOS: 1 days | End: 2023-03-06
Payer: MEDICARE

## 2023-03-06 VITALS
WEIGHT: 244.93 LBS | HEIGHT: 71 IN | TEMPERATURE: 98 F | HEART RATE: 72 BPM | OXYGEN SATURATION: 96 % | RESPIRATION RATE: 18 BRPM | SYSTOLIC BLOOD PRESSURE: 122 MMHG | DIASTOLIC BLOOD PRESSURE: 84 MMHG

## 2023-03-06 DIAGNOSIS — K02.62 DENTAL CARIES ON SMOOTH SURFACE PENETRATING INTO DENTIN: ICD-10-CM

## 2023-03-06 DIAGNOSIS — Z01.818 ENCOUNTER FOR OTHER PREPROCEDURAL EXAMINATION: ICD-10-CM

## 2023-03-06 DIAGNOSIS — K05.6 PERIODONTAL DISEASE, UNSPECIFIED: ICD-10-CM

## 2023-03-06 DIAGNOSIS — Z92.89 PERSONAL HISTORY OF OTHER MEDICAL TREATMENT: Chronic | ICD-10-CM

## 2023-03-06 DIAGNOSIS — F79 UNSPECIFIED INTELLECTUAL DISABILITIES: ICD-10-CM

## 2023-03-06 DIAGNOSIS — D69.3 IMMUNE THROMBOCYTOPENIC PURPURA: ICD-10-CM

## 2023-03-06 DIAGNOSIS — G47.33 OBSTRUCTIVE SLEEP APNEA (ADULT) (PEDIATRIC): ICD-10-CM

## 2023-03-06 LAB
ANION GAP SERPL CALC-SCNC: 11 MMOL/L — SIGNIFICANT CHANGE UP (ref 5–17)
BUN SERPL-MCNC: 20 MG/DL — SIGNIFICANT CHANGE UP (ref 7–23)
CALCIUM SERPL-MCNC: 9.4 MG/DL — SIGNIFICANT CHANGE UP (ref 8.4–10.5)
CHLORIDE SERPL-SCNC: 104 MMOL/L — SIGNIFICANT CHANGE UP (ref 96–108)
CO2 SERPL-SCNC: 24 MMOL/L — SIGNIFICANT CHANGE UP (ref 22–31)
CREAT SERPL-MCNC: 1.01 MG/DL — SIGNIFICANT CHANGE UP (ref 0.5–1.3)
EGFR: 107 ML/MIN/1.73M2 — SIGNIFICANT CHANGE UP
GLUCOSE SERPL-MCNC: 76 MG/DL — SIGNIFICANT CHANGE UP (ref 70–99)
HCT VFR BLD CALC: 44.5 % — SIGNIFICANT CHANGE UP (ref 39–50)
HGB BLD-MCNC: 13.7 G/DL — SIGNIFICANT CHANGE UP (ref 13–17)
MCHC RBC-ENTMCNC: 26.7 PG — LOW (ref 27–34)
MCHC RBC-ENTMCNC: 30.8 GM/DL — LOW (ref 32–36)
MCV RBC AUTO: 86.6 FL — SIGNIFICANT CHANGE UP (ref 80–100)
NRBC # BLD: 0 /100 WBCS — SIGNIFICANT CHANGE UP (ref 0–0)
PLATELET # BLD AUTO: 22 K/UL — LOW (ref 150–400)
POTASSIUM SERPL-MCNC: 4.3 MMOL/L — SIGNIFICANT CHANGE UP (ref 3.5–5.3)
POTASSIUM SERPL-SCNC: 4.3 MMOL/L — SIGNIFICANT CHANGE UP (ref 3.5–5.3)
RBC # BLD: 5.14 M/UL — SIGNIFICANT CHANGE UP (ref 4.2–5.8)
RBC # FLD: 14.8 % — HIGH (ref 10.3–14.5)
SODIUM SERPL-SCNC: 139 MMOL/L — SIGNIFICANT CHANGE UP (ref 135–145)
WBC # BLD: 7.78 K/UL — SIGNIFICANT CHANGE UP (ref 3.8–10.5)
WBC # FLD AUTO: 7.78 K/UL — SIGNIFICANT CHANGE UP (ref 3.8–10.5)

## 2023-03-06 PROCEDURE — 80048 BASIC METABOLIC PNL TOTAL CA: CPT

## 2023-03-06 PROCEDURE — G0463: CPT

## 2023-03-06 PROCEDURE — 85027 COMPLETE CBC AUTOMATED: CPT

## 2023-03-06 NOTE — H&P PST ADULT - NSICDXPASTMEDICALHX_GEN_ALL_CORE_FT
PAST MEDICAL HISTORY:  2019 novel coronavirus disease (COVID-19)     Autism     Chronic ITP (idiopathic thrombocytopenia)     Dental caries on smooth surface penetrating into dentin     Intellectual disability     CARLITOS (obstructive sleep apnea)

## 2023-03-06 NOTE — H&P PST ADULT - HISTORY OF PRESENT ILLNESS
22 y/o M with PMHx of intellectual disability, autism, nonverbal at baseline, CARLITOS (not on CPAP) and severe chronic ITP receiving IVIG therapy, (last dose 2/26/23 for recent platelet count of 8-16 at the group home as per note in [employer]). Now presents to [facility] accompanied by group home medical coordinator for a scheduled Comprehensive Dental Treatment Under Anesthesia on 3/23/23- Denies bleeding episodes, blood in the stool or urine. Denies bleeding gums. COVID testing scheduled at [facility] on 3/21/23.       Covid-19 Infx 1/22/21-no hospitalization, no supplemental O2 needed.

## 2023-03-06 NOTE — H&P PST ADULT - COMMENTS
Limited review of system 2/2 intellectual disability- information obtained from group home medical coordinator Pretty

## 2023-03-06 NOTE — H&P PST ADULT - PROBLEM SELECTOR PLAN 3
Scheduled for procedure on 3/23/23. Surgical instructions reviewed and written copy provided to Medical Coordinator Pretty [phone] from [facility].

## 2023-03-14 ENCOUNTER — LABORATORY RESULT (OUTPATIENT)
Age: 23
End: 2023-03-14

## 2023-03-16 ENCOUNTER — NON-APPOINTMENT (OUTPATIENT)
Age: 23
End: 2023-03-16

## 2023-03-23 ENCOUNTER — TRANSCRIPTION ENCOUNTER (OUTPATIENT)
Age: 23
End: 2023-03-23

## 2023-03-23 ENCOUNTER — OUTPATIENT (OUTPATIENT)
Dept: OUTPATIENT SERVICES | Facility: HOSPITAL | Age: 23
LOS: 1 days | End: 2023-03-23
Payer: MEDICARE

## 2023-03-23 VITALS
HEART RATE: 69 BPM | RESPIRATION RATE: 18 BRPM | SYSTOLIC BLOOD PRESSURE: 125 MMHG | DIASTOLIC BLOOD PRESSURE: 91 MMHG | TEMPERATURE: 98 F | HEIGHT: 71 IN | WEIGHT: 244.93 LBS | OXYGEN SATURATION: 96 %

## 2023-03-23 VITALS
OXYGEN SATURATION: 95 % | RESPIRATION RATE: 14 BRPM | DIASTOLIC BLOOD PRESSURE: 55 MMHG | SYSTOLIC BLOOD PRESSURE: 101 MMHG | HEART RATE: 63 BPM

## 2023-03-23 DIAGNOSIS — K02.62 DENTAL CARIES ON SMOOTH SURFACE PENETRATING INTO DENTIN: ICD-10-CM

## 2023-03-23 DIAGNOSIS — Z92.89 PERSONAL HISTORY OF OTHER MEDICAL TREATMENT: Chronic | ICD-10-CM

## 2023-03-23 DIAGNOSIS — K05.6 PERIODONTAL DISEASE, UNSPECIFIED: ICD-10-CM

## 2023-03-23 LAB
BLD GP AB SCN SERPL QL: NEGATIVE — SIGNIFICANT CHANGE UP
HCT VFR BLD CALC: 43.5 % — SIGNIFICANT CHANGE UP (ref 39–50)
HGB BLD-MCNC: 13.3 G/DL — SIGNIFICANT CHANGE UP (ref 13–17)
MCHC RBC-ENTMCNC: 25.9 PG — LOW (ref 27–34)
MCHC RBC-ENTMCNC: 30.6 GM/DL — LOW (ref 32–36)
MCV RBC AUTO: 84.6 FL — SIGNIFICANT CHANGE UP (ref 80–100)
NRBC # BLD: 0 /100 WBCS — SIGNIFICANT CHANGE UP (ref 0–0)
PLATELET # BLD AUTO: 16 K/UL — CRITICAL LOW (ref 150–400)
RBC # BLD: 5.14 M/UL — SIGNIFICANT CHANGE UP (ref 4.2–5.8)
RBC # FLD: 14.7 % — HIGH (ref 10.3–14.5)
RH IG SCN BLD-IMP: POSITIVE — SIGNIFICANT CHANGE UP
RH IG SCN BLD-IMP: POSITIVE — SIGNIFICANT CHANGE UP
WBC # BLD: 7.67 K/UL — SIGNIFICANT CHANGE UP (ref 3.8–10.5)
WBC # FLD AUTO: 7.67 K/UL — SIGNIFICANT CHANGE UP (ref 3.8–10.5)

## 2023-03-23 PROCEDURE — D0230: CPT

## 2023-03-23 PROCEDURE — C9399: CPT

## 2023-03-23 PROCEDURE — D4341: CPT

## 2023-03-23 PROCEDURE — D1206: CPT

## 2023-03-23 PROCEDURE — 86900 BLOOD TYPING SEROLOGIC ABO: CPT

## 2023-03-23 PROCEDURE — 86901 BLOOD TYPING SEROLOGIC RH(D): CPT

## 2023-03-23 PROCEDURE — D2391: CPT

## 2023-03-23 PROCEDURE — 85027 COMPLETE CBC AUTOMATED: CPT

## 2023-03-23 PROCEDURE — 86850 RBC ANTIBODY SCREEN: CPT

## 2023-03-23 PROCEDURE — D0220: CPT

## 2023-03-23 PROCEDURE — D2392: CPT

## 2023-03-23 PROCEDURE — D1110: CPT

## 2023-03-23 RX ORDER — KETOCONAZOLE 20 MG/G
1 AEROSOL, FOAM TOPICAL
Qty: 0 | Refills: 0 | DISCHARGE

## 2023-03-23 RX ORDER — LIDOCAINE HCL 20 MG/ML
0.2 VIAL (ML) INJECTION ONCE
Refills: 0 | Status: DISCONTINUED | OUTPATIENT
Start: 2023-03-23 | End: 2023-03-23

## 2023-03-23 RX ORDER — ONDANSETRON 8 MG/1
4 TABLET, FILM COATED ORAL ONCE
Refills: 0 | Status: DISCONTINUED | OUTPATIENT
Start: 2023-03-23 | End: 2023-03-23

## 2023-03-23 RX ORDER — SODIUM CHLORIDE 9 MG/ML
3 INJECTION INTRAMUSCULAR; INTRAVENOUS; SUBCUTANEOUS EVERY 8 HOURS
Refills: 0 | Status: DISCONTINUED | OUTPATIENT
Start: 2023-03-23 | End: 2023-03-23

## 2023-03-23 NOTE — ASU DISCHARGE PLAN (ADULT/PEDIATRIC) - NS MD DC FALL RISK RISK
For information on Fall & Injury Prevention, visit: [email or web address] OR  [email or web address] OR  https://www.cdc.gov/steadi/patient.html

## 2023-03-23 NOTE — ASU DISCHARGE PLAN (ADULT/PEDIATRIC) - ASU DC SPECIAL INSTRUCTIONSFT
exam, xrays, cleaning, restorations and flouride    tylenol as needed for pain    return to program tomorrow     resume all medications    see Dr Spencer for a follow up at [facility], appt is set for 3/28 at 2 pm     no extractions were performed

## 2023-03-23 NOTE — ASU PREOP CHECKLIST - BP NONINVASIVE SYSTOLIC (MM HG)
Left message to call back for: LM for patient to call back and reschedule INR appointment to a different location  Information to relay to patient:  Please see above note and schedule upon recall.    
125

## 2023-03-23 NOTE — ASU PATIENT PROFILE, ADULT - FALL HARM RISK - UNIVERSAL INTERVENTIONS
Bed in lowest position, wheels locked, appropriate side rails in place/Call bell, personal items and telephone in reach/Instruct patient to call for assistance before getting out of bed or chair/Non-slip footwear when patient is out of bed/[place] to call system/Physically safe environment - no spills, clutter or unnecessary equipment/Purposeful Proactive Rounding/Room/bathroom lighting operational, light cord in reach

## 2023-04-05 ENCOUNTER — NON-APPOINTMENT (OUTPATIENT)
Age: 23
End: 2023-04-05

## 2023-04-20 ENCOUNTER — LABORATORY RESULT (OUTPATIENT)
Age: 23
End: 2023-04-20

## 2023-04-21 ENCOUNTER — NON-APPOINTMENT (OUTPATIENT)
Age: 23
End: 2023-04-21

## 2023-04-21 ENCOUNTER — INPATIENT (INPATIENT)
Facility: HOSPITAL | Age: 23
LOS: 0 days | Discharge: HOME | End: 2023-04-22
Attending: INTERNAL MEDICINE | Admitting: INTERNAL MEDICINE
Payer: MEDICARE

## 2023-04-21 VITALS
HEIGHT: 71 IN | RESPIRATION RATE: 16 BRPM | HEART RATE: 93 BPM | SYSTOLIC BLOOD PRESSURE: 148 MMHG | DIASTOLIC BLOOD PRESSURE: 74 MMHG | OXYGEN SATURATION: 100 %

## 2023-04-21 DIAGNOSIS — F79 UNSPECIFIED INTELLECTUAL DISABILITIES: ICD-10-CM

## 2023-04-21 DIAGNOSIS — D69.3 IMMUNE THROMBOCYTOPENIC PURPURA: ICD-10-CM

## 2023-04-21 DIAGNOSIS — Z29.9 ENCOUNTER FOR PROPHYLACTIC MEASURES, UNSPECIFIED: ICD-10-CM

## 2023-04-21 DIAGNOSIS — Z92.89 PERSONAL HISTORY OF OTHER MEDICAL TREATMENT: Chronic | ICD-10-CM

## 2023-04-21 PROBLEM — F84.0 AUTISTIC DISORDER: Chronic | Status: ACTIVE | Noted: 2023-03-06

## 2023-04-21 PROBLEM — K02.62 DENTAL CARIES ON SMOOTH SURFACE PENETRATING INTO DENTIN: Chronic | Status: ACTIVE | Noted: 2023-03-06

## 2023-04-21 PROBLEM — U07.1 COVID-19: Chronic | Status: ACTIVE | Noted: 2023-03-06

## 2023-04-21 PROBLEM — G47.33 OBSTRUCTIVE SLEEP APNEA (ADULT) (PEDIATRIC): Chronic | Status: ACTIVE | Noted: 2023-03-06

## 2023-04-21 LAB
ALBUMIN SERPL ELPH-MCNC: 4.1 G/DL — SIGNIFICANT CHANGE UP (ref 3.3–5)
ALP SERPL-CCNC: 58 U/L — SIGNIFICANT CHANGE UP (ref 40–120)
ALT FLD-CCNC: 15 U/L — SIGNIFICANT CHANGE UP (ref 4–41)
ANION GAP SERPL CALC-SCNC: 10 MMOL/L — SIGNIFICANT CHANGE UP (ref 7–14)
ANISOCYTOSIS BLD QL: SLIGHT — SIGNIFICANT CHANGE UP
APPEARANCE UR: CLEAR — SIGNIFICANT CHANGE UP
APTT BLD: 37.2 SEC — HIGH (ref 27–36.3)
AST SERPL-CCNC: 16 U/L — SIGNIFICANT CHANGE UP (ref 4–40)
B PERT DNA SPEC QL NAA+PROBE: SIGNIFICANT CHANGE UP
B PERT+PARAPERT DNA PNL SPEC NAA+PROBE: SIGNIFICANT CHANGE UP
BASOPHILS # BLD AUTO: 0 K/UL — SIGNIFICANT CHANGE UP (ref 0–0.2)
BASOPHILS NFR BLD AUTO: 0 % — SIGNIFICANT CHANGE UP (ref 0–2)
BILIRUB SERPL-MCNC: 0.7 MG/DL — SIGNIFICANT CHANGE UP (ref 0.2–1.2)
BILIRUB UR-MCNC: NEGATIVE — SIGNIFICANT CHANGE UP
BLD GP AB SCN SERPL QL: NEGATIVE — SIGNIFICANT CHANGE UP
BORDETELLA PARAPERTUSSIS (RAPRVP): SIGNIFICANT CHANGE UP
BUN SERPL-MCNC: 14 MG/DL — SIGNIFICANT CHANGE UP (ref 7–23)
C PNEUM DNA SPEC QL NAA+PROBE: SIGNIFICANT CHANGE UP
CALCIUM SERPL-MCNC: 9 MG/DL — SIGNIFICANT CHANGE UP (ref 8.4–10.5)
CHLORIDE SERPL-SCNC: 105 MMOL/L — SIGNIFICANT CHANGE UP (ref 98–107)
CLOSURE TME COLL+EPINEP BLD: 20 K/UL — CRITICAL LOW (ref 150–400)
CO2 SERPL-SCNC: 25 MMOL/L — SIGNIFICANT CHANGE UP (ref 22–31)
COLOR SPEC: YELLOW — SIGNIFICANT CHANGE UP
CREAT SERPL-MCNC: 1.01 MG/DL — SIGNIFICANT CHANGE UP (ref 0.5–1.3)
DIFF PNL FLD: NEGATIVE — SIGNIFICANT CHANGE UP
EGFR: 106 ML/MIN/1.73M2 — SIGNIFICANT CHANGE UP
EOSINOPHIL # BLD AUTO: 0 K/UL — SIGNIFICANT CHANGE UP (ref 0–0.5)
EOSINOPHIL NFR BLD AUTO: 0 % — SIGNIFICANT CHANGE UP (ref 0–6)
FLUAV SUBTYP SPEC NAA+PROBE: SIGNIFICANT CHANGE UP
FLUBV RNA SPEC QL NAA+PROBE: SIGNIFICANT CHANGE UP
GIANT PLATELETS BLD QL SMEAR: PRESENT — SIGNIFICANT CHANGE UP
GLUCOSE SERPL-MCNC: 103 MG/DL — HIGH (ref 70–99)
GLUCOSE UR QL: NEGATIVE — SIGNIFICANT CHANGE UP
HADV DNA SPEC QL NAA+PROBE: SIGNIFICANT CHANGE UP
HCOV 229E RNA SPEC QL NAA+PROBE: SIGNIFICANT CHANGE UP
HCOV HKU1 RNA SPEC QL NAA+PROBE: SIGNIFICANT CHANGE UP
HCOV NL63 RNA SPEC QL NAA+PROBE: SIGNIFICANT CHANGE UP
HCOV OC43 RNA SPEC QL NAA+PROBE: SIGNIFICANT CHANGE UP
HCT VFR BLD CALC: 43 % — SIGNIFICANT CHANGE UP (ref 39–50)
HCT VFR BLD CALC: 43.1 % — SIGNIFICANT CHANGE UP (ref 39–50)
HGB BLD-MCNC: 13.2 G/DL — SIGNIFICANT CHANGE UP (ref 13–17)
HGB BLD-MCNC: 13.5 G/DL — SIGNIFICANT CHANGE UP (ref 13–17)
HMPV RNA SPEC QL NAA+PROBE: SIGNIFICANT CHANGE UP
HPIV1 RNA SPEC QL NAA+PROBE: SIGNIFICANT CHANGE UP
HPIV2 RNA SPEC QL NAA+PROBE: SIGNIFICANT CHANGE UP
HPIV3 RNA SPEC QL NAA+PROBE: SIGNIFICANT CHANGE UP
HPIV4 RNA SPEC QL NAA+PROBE: SIGNIFICANT CHANGE UP
IANC: 7.87 K/UL — HIGH (ref 1.8–7.4)
INR BLD: 1.14 RATIO — SIGNIFICANT CHANGE UP (ref 0.88–1.16)
KETONES UR-MCNC: NEGATIVE — SIGNIFICANT CHANGE UP
LEUKOCYTE ESTERASE UR-ACNC: NEGATIVE — SIGNIFICANT CHANGE UP
LYMPHOCYTES # BLD AUTO: 1.31 K/UL — SIGNIFICANT CHANGE UP (ref 1–3.3)
LYMPHOCYTES # BLD AUTO: 13.4 % — SIGNIFICANT CHANGE UP (ref 13–44)
M PNEUMO DNA SPEC QL NAA+PROBE: SIGNIFICANT CHANGE UP
MACROCYTES BLD QL: SLIGHT — SIGNIFICANT CHANGE UP
MANUAL SMEAR VERIFICATION: SIGNIFICANT CHANGE UP
MCHC RBC-ENTMCNC: 25.8 PG — LOW (ref 27–34)
MCHC RBC-ENTMCNC: 26.3 PG — LOW (ref 27–34)
MCHC RBC-ENTMCNC: 30.6 GM/DL — LOW (ref 32–36)
MCHC RBC-ENTMCNC: 31.4 GM/DL — LOW (ref 32–36)
MCV RBC AUTO: 83.7 FL — SIGNIFICANT CHANGE UP (ref 80–100)
MCV RBC AUTO: 84.2 FL — SIGNIFICANT CHANGE UP (ref 80–100)
MONOCYTES # BLD AUTO: 0.26 K/UL — SIGNIFICANT CHANGE UP (ref 0–0.9)
MONOCYTES NFR BLD AUTO: 2.7 % — SIGNIFICANT CHANGE UP (ref 2–14)
NEUTROPHILS # BLD AUTO: 8.14 K/UL — HIGH (ref 1.8–7.4)
NEUTROPHILS NFR BLD AUTO: 78.6 % — HIGH (ref 43–77)
NEUTS BAND # BLD: 4.4 % — SIGNIFICANT CHANGE UP (ref 0–6)
NITRITE UR-MCNC: NEGATIVE — SIGNIFICANT CHANGE UP
NRBC # BLD: 0 /100 WBCS — SIGNIFICANT CHANGE UP (ref 0–0)
NRBC # FLD: 0 K/UL — SIGNIFICANT CHANGE UP (ref 0–0)
OVALOCYTES BLD QL SMEAR: SLIGHT — SIGNIFICANT CHANGE UP
PH UR: 6 — SIGNIFICANT CHANGE UP (ref 5–8)
PLAT MORPH BLD: NORMAL — SIGNIFICANT CHANGE UP
PLATELET # BLD AUTO: 11 K/UL — CRITICAL LOW (ref 150–400)
PLATELET # BLD AUTO: 5 K/UL — CRITICAL LOW (ref 150–400)
PLATELET COUNT - ESTIMATE: ABNORMAL
POIKILOCYTOSIS BLD QL AUTO: SLIGHT — SIGNIFICANT CHANGE UP
POLYCHROMASIA BLD QL SMEAR: SLIGHT — SIGNIFICANT CHANGE UP
POTASSIUM SERPL-MCNC: 3.9 MMOL/L — SIGNIFICANT CHANGE UP (ref 3.5–5.3)
POTASSIUM SERPL-SCNC: 3.9 MMOL/L — SIGNIFICANT CHANGE UP (ref 3.5–5.3)
PROT SERPL-MCNC: 6.4 G/DL — SIGNIFICANT CHANGE UP (ref 6–8.3)
PROT UR-MCNC: ABNORMAL
PROTHROM AB SERPL-ACNC: 13.3 SEC — SIGNIFICANT CHANGE UP (ref 10.5–13.4)
RAPID RVP RESULT: DETECTED
RBC # BLD: 5.12 M/UL — SIGNIFICANT CHANGE UP (ref 4.2–5.8)
RBC # BLD: 5.14 M/UL — SIGNIFICANT CHANGE UP (ref 4.2–5.8)
RBC # FLD: 14.6 % — HIGH (ref 10.3–14.5)
RBC # FLD: 14.6 % — HIGH (ref 10.3–14.5)
RBC BLD AUTO: ABNORMAL
RBC CASTS # UR COMP ASSIST: 0 /HPF — SIGNIFICANT CHANGE UP (ref 0–4)
RH IG SCN BLD-IMP: POSITIVE — SIGNIFICANT CHANGE UP
RSV RNA SPEC QL NAA+PROBE: SIGNIFICANT CHANGE UP
RV+EV RNA SPEC QL NAA+PROBE: DETECTED
SARS-COV-2 RNA SPEC QL NAA+PROBE: SIGNIFICANT CHANGE UP
SMUDGE CELLS # BLD: PRESENT — SIGNIFICANT CHANGE UP
SODIUM SERPL-SCNC: 140 MMOL/L — SIGNIFICANT CHANGE UP (ref 135–145)
SP GR SPEC: 1.04 — SIGNIFICANT CHANGE UP (ref 1.01–1.05)
UROBILINOGEN FLD QL: ABNORMAL
VARIANT LYMPHS # BLD: 0.9 % — SIGNIFICANT CHANGE UP (ref 0–6)
WBC # BLD: 9.4 K/UL — SIGNIFICANT CHANGE UP (ref 3.8–10.5)
WBC # BLD: 9.81 K/UL — SIGNIFICANT CHANGE UP (ref 3.8–10.5)
WBC # FLD AUTO: 9.4 K/UL — SIGNIFICANT CHANGE UP (ref 3.8–10.5)
WBC # FLD AUTO: 9.81 K/UL — SIGNIFICANT CHANGE UP (ref 3.8–10.5)
WBC UR QL: 1 /HPF — SIGNIFICANT CHANGE UP (ref 0–5)

## 2023-04-21 PROCEDURE — 99285 EMERGENCY DEPT VISIT HI MDM: CPT | Mod: FS

## 2023-04-21 PROCEDURE — 99222 1ST HOSP IP/OBS MODERATE 55: CPT

## 2023-04-21 RX ORDER — TRAZODONE HCL 50 MG
250 TABLET ORAL AT BEDTIME
Refills: 0 | Status: DISCONTINUED | OUTPATIENT
Start: 2023-04-21 | End: 2023-04-22

## 2023-04-21 RX ORDER — ACETAMINOPHEN 500 MG
975 TABLET ORAL ONCE
Refills: 0 | Status: DISCONTINUED | OUTPATIENT
Start: 2023-04-21 | End: 2023-04-21

## 2023-04-21 RX ORDER — IMMUNE GLOBULIN (HUMAN) 10 G/100ML
75 INJECTION INTRAVENOUS; SUBCUTANEOUS ONCE
Refills: 0 | Status: DISCONTINUED | OUTPATIENT
Start: 2023-04-21 | End: 2023-04-21

## 2023-04-21 RX ORDER — DIPHENHYDRAMINE HCL 50 MG
50 CAPSULE ORAL ONCE
Refills: 0 | Status: DISCONTINUED | OUTPATIENT
Start: 2023-04-21 | End: 2023-04-21

## 2023-04-21 RX ADMIN — Medication 250 MILLIGRAM(S): at 22:31

## 2023-04-21 RX ADMIN — Medication 1 MILLIGRAM(S): at 21:25

## 2023-04-21 NOTE — H&P ADULT - NSHPSOCIALHISTORY_GEN_ALL_CORE
no past or present EtOH, tobacco, or illicit drug use.  Lives with family.  Cannot perform independent ADLs.

## 2023-04-21 NOTE — H&P ADULT - ASSESSMENT
Patient is a 22yo M with PMH of intellectual disability, autism, nonverbal at baseline, CARLITOS not on CPAP, and severe chronic ITP who presented with low platelet count on outpatient blood work

## 2023-04-21 NOTE — PATIENT PROFILE ADULT - FALL HARM RISK - HARM RISK INTERVENTIONS

## 2023-04-21 NOTE — ED ADULT NURSE NOTE - OBJECTIVE STATEMENT
Task RN- Patient received in stretcher. Awake. HX of autism. Calm and cooperative. Easily distracted. Respirations even and unlabored. Abd soft, non tender, non distended. Spontaneous movement of all extremities noted. Presents to ER c/o " he might need a platelet transfusion" As per patient mother at bedside patient had labs drawn and was called with results that PLT count was <5000. No active bleeding noted. Evaluated by ER MD. Comfort and safety maintained. All current care needs met. Care plan continued [employer] Task RN- Patient received in stretcher. Awake. HX of autism. Calm and cooperative. Easily distracted. Respirations even and unlabored. Abd soft, non tender, non distended. Spontaneous movement of all extremities noted. Presents to ER c/o " he might need a platelet transfusion" As per patient mother at bedside patient had labs drawn and was called with results that PLT count was <5000. No active bleeding noted. Evaluated by ER MD. Comfort and safety maintained. All current care needs met. Care plan continued Endorsed to primary RN [employer]

## 2023-04-21 NOTE — H&P ADULT - TIME BILLING
Time-based billing (NON-critical care).     55 minutes spent on total encounter; more than 50% of the visit was spent counseling and / or coordinating care by the attending physician.  The necessity of the time spent during the encounter on this date of service was due to:     review of laboratory data, radiology results, consultants' recommendations, documentation in [place], discussion with patient/family, ACP, hematologist, and interdisciplinary staff (such as , social workers, etc). Interventions were performed as documented above.

## 2023-04-21 NOTE — ED PROVIDER NOTE - OBJECTIVE STATEMENT
24 y/o male with pmhx of autism, ITP sent to ED for low platelets. Pt sent in by Dr. Rose at [facility]. Per mom, pt transferred care to Dr. Rosaura Bhagat. Pt asymptomatic. No active bleeding.

## 2023-04-21 NOTE — ED PROVIDER NOTE - PROGRESS NOTE DETAILS
BREANA Ceballos- paged house oncology, suggesting to speak with private oncologist Dr. Rosaura Bhagat, left message awaiting callback. BREANA Ceballos- I spoke with patient's oncologist Dr. Bhagat, suggesting IVIG 75mg. however pt mom does not feel comfortable starting medication, awaiting hematology formal consult and for mom to speak with Dr. Bhagat. I spoke with Dr. Bhagat' PA will page Dr. Bhagat. I spoke with Dr. Americo noyola for admission.

## 2023-04-21 NOTE — H&P ADULT - NSHPPHYSICALEXAM_GEN_ALL_CORE
PHYSICAL EXAM:  Vital Signs Last 24 Hrs  T(C): 35.8 (21 Apr 2023 17:05), Max: 35.8 (21 Apr 2023 17:05)  T(F): 96.5 (21 Apr 2023 17:05), Max: 96.5 (21 Apr 2023 17:05)  HR: 110 (21 Apr 2023 17:05) (93 - 110)  BP: 113/85 (21 Apr 2023 17:05) (113/85 - 148/74)  BP(mean): --  RR: 18 (21 Apr 2023 17:05) (16 - 18)  SpO2: 98% (21 Apr 2023 17:05) (98% - 100%)    Parameters below as of 21 Apr 2023 17:05  Patient On (Oxygen Delivery Method): room air      CONSTITUTIONAL: NAD, well-developed, well-groomed  EYES: PERRLA; conjunctiva and sclera clear  ENMT: Moist oral mucosa, no pharyngeal injection or exudates; normal dentition  NECK: Supple, no palpable masses; no thyromegaly  RESPIRATORY: Normal respiratory effort; lungs are clear to auscultation bilaterally  CARDIOVASCULAR: Regular rhythm, tachycardic, normal S1 and S2, no murmur/rub/gallop; No lower extremity edema; Peripheral pulses are 2+ bilaterally  ABDOMEN: Nontender to palpation, normoactive bowel sounds, no rebound/guarding; No hepatosplenomegaly  MUSCULOSKELETAL:  no clubbing or cyanosis of digits; no joint swelling or tenderness to palpation  PSYCH: awake, alert, watching movie on tablet. Does not verbalize apart from intermittent grunts and shouts.   NEUROLOGY: does not follow my commands, but does wave to his mother when asked to say hello.  SKIN: No rashes; no palpable lesions

## 2023-04-21 NOTE — H&P ADULT - HISTORY OF PRESENT ILLNESS
Patient is a 24yo M with PMH of intellectual disability, autism, nonverbal at baseline, CARLITOS not on CPAP, and severe chronic ITP who presented with low platelet count on outpatient blood work. Patient not able to offer complaints, though asymptomatic per family, without active bleeding reported.   Patient’s mother described his prior admission to the hospital 2 years ago, when he had COVID, and she stated this had   She currently is declining IVIG and requesting rituximab. She states that she does not wish for her son to receive IVIG if it has been potentially collected from donors who have been vaccinated against COVID    VS reviewed: T 35.8C, mildly hypothermic. . Otherwise unremarkable.   Labs reviewed: Hb 13.5, plt 5, aPTT 37.2, CMP unremarkable, UA negative.   In ED received 1 dose acetaminophen 975mg po

## 2023-04-21 NOTE — ED ADULT NURSE REASSESSMENT NOTE - NS ED NURSE REASSESS COMMENT FT1
BREAK RN: pt. remains awake and resting, walking around room with mother at bedside, pt. redirectable and able to sit still when directed with assistance from mother. no acute distress noted. respirations even and unlabored. VS as noted. BREAK RN: pt. remains awake and resting, walking around room with mother at bedside, pt. redirectable and able to sit still when directed with assistance from mother. no acute distress noted. respirations even and unlabored. VS as noted. pt. mother refusing covid swab due to platelet count, and risk for bleeding.

## 2023-04-21 NOTE — ED PROVIDER NOTE - ATTENDING APP SHARED VISIT CONTRIBUTION OF CARE
I have personally performed a face to face medical and diagnostic evaluation of the patient. I have discussed with and reviewed the ACP's note and agree with the History, ROS, Physical Exam and MDM unless otherwise indicated. A brief summary of my personal evaluation and impression can be found below.    53-year-old male with past medical history of autism, ITP presenting with chief complaint low platelets outpatient, recommended by patient's hematologist Dr. Rose to come in for further evaluation.   Patient's mother at bedside providing collateral, denies any recent illness, URI or UTI symptoms.  No nosebleeds or bloody stools or melena.  States that he has needed IVIG in the past because his ITP has been hard to manage in an outpatient setting.  No recent changes, states that his platelets have been slowly trending down outpatient.  No acute changes.  Exam unremarkable, patient is alert and oriented,  baseline mental status.   plan for labs to recheck platelets, will discuss plan with hematology to assess for necessary intervention.  Reassess to dispo. Eve Angela, ED Attending

## 2023-04-21 NOTE — H&P ADULT - PROBLEM SELECTOR PLAN 3
- hold DVT PPx in setting of ITP    Hypothermic to 35.8C on presentation, monitor for now. No gross evidence of infection at present.  Also tachycardic, mother attributes this to increased stimulation. Monitor for now.

## 2023-04-21 NOTE — H&P ADULT - PROBLEM SELECTOR PLAN 2
- Reorientation and redirection as possible  - Try to avoid pharmacologic means of restraint  - c/w home meds – may need to be brought in as many of them are not on formulary at the hospital

## 2023-04-21 NOTE — H&P ADULT - PROBLEM SELECTOR PLAN 1
- Plt 5 on presentation  - Known, chronic, following with hematology  - Appreciate hematology input – per Dr Bhagat, she had discussed with his prior hematologist and recommendation was IVIG  - Family currently declining IVIG but may consider it; mother is considering whether rituximab may be an option  - If not planning to pursue IVIG, may be initiated on rituximab tomorrow  - Trend plts

## 2023-04-21 NOTE — H&P ADULT - NSHPLABSRESULTS_GEN_ALL_CORE
LABS:                        13.5   9.81  )-----------( 5        ( 2023 12:41 )             43.0     04-    140  |  105  |  14  ----------------------------<  103<H>  3.9   |  25  |  1.01    Ca    9.0      2023 12:41    TPro  6.4  /  Alb  4.1  /  TBili  0.7  /  DBili  x   /  AST  16  /  ALT  15  /  AlkPhos  58  04-21    PT/INR - ( 2023 13:15 )   PT: 13.3 sec;   INR: 1.14 ratio         PTT - ( 2023 13:15 )  PTT:37.2 sec      Urinalysis Basic - ( 2023 14:28 )    Color: Yellow / Appearance: Clear / S.037 / pH: x  Gluc: x / Ketone: Negative  / Bili: Negative / Urobili: 3 mg/dL   Blood: x / Protein: 30 mg/dL / Nitrite: Negative   Leuk Esterase: Negative / RBC: 0 /HPF / WBC 1 /HPF   Sq Epi: x / Non Sq Epi: x / Bacteria: x      COMMUNICATION:  Care Discussed with Consultants/Other Providers and Details of Discussion: d/w medicine NP, d/w hematologist  Discussions with Patient/Family: d/w mother at bedside

## 2023-04-22 ENCOUNTER — TRANSCRIPTION ENCOUNTER (OUTPATIENT)
Age: 23
End: 2023-04-22

## 2023-04-22 VITALS
OXYGEN SATURATION: 99 % | HEART RATE: 78 BPM | TEMPERATURE: 98 F | SYSTOLIC BLOOD PRESSURE: 123 MMHG | DIASTOLIC BLOOD PRESSURE: 78 MMHG

## 2023-04-22 LAB
CLOSURE TME COLL+EPINEP BLD: 6 K/UL — CRITICAL LOW (ref 150–400)
CULTURE RESULTS: NO GROWTH — SIGNIFICANT CHANGE UP
HCT VFR BLD CALC: 44 % — SIGNIFICANT CHANGE UP (ref 39–50)
HGB BLD-MCNC: 13.9 G/DL — SIGNIFICANT CHANGE UP (ref 13–17)
MCHC RBC-ENTMCNC: 26.2 PG — LOW (ref 27–34)
MCHC RBC-ENTMCNC: 31.6 GM/DL — LOW (ref 32–36)
MCV RBC AUTO: 82.9 FL — SIGNIFICANT CHANGE UP (ref 80–100)
NRBC # BLD: 0 /100 WBCS — SIGNIFICANT CHANGE UP (ref 0–0)
NRBC # FLD: 0 K/UL — SIGNIFICANT CHANGE UP (ref 0–0)
PLATELET # BLD AUTO: 8 K/UL — CRITICAL LOW (ref 150–400)
RBC # BLD: 5.31 M/UL — SIGNIFICANT CHANGE UP (ref 4.2–5.8)
RBC # FLD: 14.6 % — HIGH (ref 10.3–14.5)
SPECIMEN SOURCE: SIGNIFICANT CHANGE UP
WBC # BLD: 6.4 K/UL — SIGNIFICANT CHANGE UP (ref 3.8–10.5)
WBC # FLD AUTO: 6.4 K/UL — SIGNIFICANT CHANGE UP (ref 3.8–10.5)

## 2023-04-22 RX ORDER — DIPHENHYDRAMINE HCL 50 MG
25 CAPSULE ORAL ONCE
Refills: 0 | Status: COMPLETED | OUTPATIENT
Start: 2023-04-22 | End: 2023-04-22

## 2023-04-22 RX ORDER — DEXAMETHASONE 0.5 MG/5ML
40 ELIXIR ORAL ONCE
Refills: 0 | Status: COMPLETED | OUTPATIENT
Start: 2023-04-22 | End: 2023-04-22

## 2023-04-22 RX ORDER — DEXAMETHASONE 0.5 MG/5ML
2 ELIXIR ORAL
Qty: 6 | Refills: 0
Start: 2023-04-22 | End: 2023-04-24

## 2023-04-22 RX ORDER — TRAZODONE HCL 50 MG
5 TABLET ORAL
Qty: 0 | Refills: 0 | DISCHARGE
Start: 2023-04-22

## 2023-04-22 RX ORDER — CHLORHEXIDINE GLUCONATE 213 G/1000ML
1 SOLUTION TOPICAL
Refills: 0 | Status: DISCONTINUED | OUTPATIENT
Start: 2023-04-22 | End: 2023-04-22

## 2023-04-22 RX ORDER — TRAZODONE HCL 50 MG
250 TABLET ORAL
Refills: 0 | DISCHARGE

## 2023-04-22 RX ADMIN — Medication 25 MILLIGRAM(S): at 02:09

## 2023-04-22 RX ADMIN — Medication 120 MILLIGRAM(S): at 13:58

## 2023-04-22 RX ADMIN — CHLORHEXIDINE GLUCONATE 1 APPLICATION(S): 213 SOLUTION TOPICAL at 11:22

## 2023-04-22 NOTE — DISCHARGE NOTE PROVIDER - HOSPITAL COURSE
Patient is a 22yo M with PMH of intellectual disability, autism, nonverbal at baseline, CARLITOS not on CPAP, and severe chronic ITP who presented with low platelet count on outpatient blood work    Chronic ITP (idiopathic thrombocytopenia).   - Plt 5 on presentation; 11--> 8 on repeat blood draws  - Known, chronic, following with hematology  - [facility] consulted, pt under the care of Dr Bhagat   - Platelets at baseline in the 10-30K range for the past five years   - treated with intermittent IVIG, steroids, rituxan in 2010 and 2021  - Appreciate hematology input –> per Dr Bhagat, she had discussed with his prior hematologist and recommendation was IVIG  - Family currently declining IVIG but may consider it; mother is considering whether rituximab may be an option  - ITP has worsened in 2021 due to COVID infection  - S/P Dexamethasone 40mg IVPB x 1, plan for discharge today on dexamethasone 40mg x 4days   - holding DVT PPx in setting of ITP    Intellectual disability.   - Reorientation and redirection as possible  - pharmacologic means of restraint avoided  - c/w home meds     Hypothermic to 35.8C on presentation, now resolved. No gross evidence of infection at present.    Also tachycardic, mother attributes this to increased stimulation. Now resolved.    On 4/22/23 this case was reviewed with Dr. Driscoll, the patient is medically stable and optimized for discharge. All medications were reviewed and prescriptions were sent to mutually agreed upon pharmacy. Patient is a 22yo M with PMH of intellectual disability, autism, nonverbal at baseline, CARLITOS not on CPAP, and severe chronic ITP who presented with low platelet count on outpatient blood work    Chronic ITP (idiopathic thrombocytopenia).   - Plt 5 on presentation; 11--> 8 on repeat blood draws  - Known, chronic, following with hematology  - [facility] consulted, pt under the care of Dr Bhagat   - Platelets at baseline in the 10-30K range for the past five years   - treated with intermittent IVIG, steroids, rituxan in 2010 and 2021  - Appreciate hematology input –> per Dr Bhagat, she had discussed with his prior hematologist and recommendation was IVIG  - Family currently declining IVIG but may consider it; mother is considering whether rituximab may be an option  - ITP has worsened in 2021 due to COVID infection  - S/P Dexamethasone 40mg IVPB x 1, plan for discharge today on dexamethasone 40mg x 3days   - holding DVT PPx in setting of ITP    Intellectual disability.   - Reorientation and redirection as possible  - pharmacologic means of restraint avoided  - c/w home meds     Hypothermic to 35.8C on presentation, now resolved. No gross evidence of infection at present.    Also tachycardic, mother attributes this to increased stimulation. Now resolved.    On 4/22/23 this case was reviewed with Dr. Driscoll, the patient is medically stable and optimized for discharge. All medications were reviewed and prescriptions were sent to mutually agreed upon pharmacy. Patient is a 22yo M with PMH of intellectual disability, autism, nonverbal at baseline, CARLITOS not on CPAP, and severe chronic ITP who presented with low platelet count on outpatient blood work    Chronic ITP (idiopathic thrombocytopenia).   - Plt 5 on presentation; 11--> 8 on repeat blood draws  - Known, chronic, following with hematology  - [facility] consulted, pt under the care of Dr Bhagat   - Platelets at baseline in the 10-30K range for the past five years   - treated with intermittent IVIG, steroids, rituxan in 2010 and 2021  - Appreciate hematology input –> per Dr Bhagat, she had discussed with his prior hematologist and recommendation was IVIG  - Family currently declining IVIG but may consider it; mother is considering whether rituximab may be an option  - ITP has worsened in 2021 due to COVID infection  - S/P Dexamethasone 40mg IVPB x 1, plan for discharge today on dexamethasone 40mg x 3days   - holding DVT PPx in setting of ITP    Intellectual disability.   - Reorientation and redirection as possible  - pharmacologic means of restraint avoided  - c/w home meds     Hypothermic to 35.8C on presentation, now resolved. No gross evidence of infection at present.    Also tachycardic, mother attributes this to increased stimulation. Now resolved.    On 4/22/23 this case was reviewed with Dr. Driscoll, the patient is medically stable and optimized for discharge. All medications were reviewed and prescriptions were sent to mutually agreed upon pharmacy.     4/23 Addendum: Notified by pt's [facility] prescription for oral [facility]. Resent to [facility] pharmacy in [place] ([facility]). Patient is a 22yo M with PMH of intellectual disability, autism, nonverbal at baseline, CARLITOS not on CPAP, and severe chronic ITP who presented with low platelet count on outpatient blood work    Chronic ITP (idiopathic thrombocytopenia).   - Plt 5 on presentation; 11--> 8 on repeat blood draws  - Known, chronic, following with hematology  - [facility] consulted, pt under the care of Dr Bhagat   - Platelets at baseline in the 10-30K range for the past five years   - treated with intermittent IVIG, steroids, rituxan in 2010 and 2021  - Appreciate hematology input –> per Dr Bhagat, she had discussed with his prior hematologist and recommendation was IVIG  - Family currently declining IVIG but may consider it; mother is considering whether rituximab may be an option  - ITP has worsened in 2021 due to COVID infection  - S/P Dexamethasone 40mg IVPB x 1, plan for discharge today on dexamethasone 40mg x 3days   - holding DVT PPx in setting of ITP    Intellectual disability.   - Reorientation and redirection as possible  - pharmacologic means of restraint avoided  - c/w home meds     Hypothermic to 35.8C on presentation, now resolved. No gross evidence of infection at present.    Also tachycardic, mother attributes this to increased stimulation. Now resolved.    On 4/22/23 this case was reviewed with Dr. Driscoll, the patient is medically stable and optimized for discharge. All medications were reviewed and prescriptions were sent to mutually agreed upon pharmacy.     4/23 Addendum: Notified by pt's [facility] prescription for oral dexamethasone was not received at [facility]. Resent to [facility] pharmacy in [place] ([facility]).  Additional addendum: spoke with Nikky from [facility] ([phone]), this formulation of steroid is not covered. Discussed with pharmacist at [facility] will resend using dexamethasone 4 mg tabs (pt to take 10 tabs daily for a total of 40 mg per day x3 days). This is covered by insurance per discussion with [facility] pharmacist.

## 2023-04-22 NOTE — CHART NOTE - NSCHARTNOTEFT_GEN_A_CORE
Platelet counts reviewed with Dr. Driscoll and Heme/Onc NP Nisa.  Per discussion with Dr. Driscoll: Dexamethasone 40 mg iv x1, followed by po dexamethasone 40 mg po x3 days. Pt medically cleared for discharge back to group home.   Case further discussed with Heme/Onc NP Nisa, in agreement for discharge; pt has follow up appt with Dr. Bhagat on May 10th.   [facility] made aware pt is medically cleared for discharge. Mom requesting to drive pt back to group home.

## 2023-04-22 NOTE — CONSULT NOTE ADULT - ASSESSMENT
22 y/o male with pmhx of autism, ITP presents for low platelets.     ITP  --under the care of Dr Bhagat of [facility]  --Platelets at baseline in the 10-30K range for the past five years   --treated with intermittent IVIG, steroids,  rituxan in 2010 and 2021  --ITP has worsened in 2021 due to COVID infection  --please give IVIG 75mg only if plt are <5, <10 if bleeding  --trend CBC, will monitor      this note is incomplete until signed by attending        will follow,  Nisa Moody NP  Hematology/ Oncology  [facility]  [phone] (office)  [phone] (alt office)  Evenings and weekends please call MD on call or office   24 y/o male with pmhx of autism, ITP presents for low platelets.     ITP  --under the care of Dr Bhagat of [facility]  --Platelets at baseline in the 10-30K range for the past five years   --treated with intermittent IVIG, steroids,  rituxan in 2010 and 2021  --ITP has worsened in 2021 due to COVID infection  --please give IVIG 75mg only if plt are <5, <10 if bleeding  --trend CBC, will monitor    --will recheck blood work, planned for discharge today 4/22      will follow,  Nisa Moody NP  Hematology/ Oncology  [facility]  [phone] (office)  [phone] (alt office)  Evenings and weekends please call MD on call or office   24 y/o male with pmhx of autism, ITP presents for low platelets.     ITP  --under the care of Dr Bhagat of [facility]  --Platelets at baseline in the 10-30K range for the past five years   --treated with intermittent IVIG, steroids,  rituxan in 2010 and 2021  --ITP has worsened in 2021 due to COVID infection  --please give IVIG 75mg only if plt are <5, <10 if bleeding  --trend CBC, will monitor    --planned for discharge today on dexamethasone 40mg x 4days       will follow,    Nisa Moody NP  Hematology/ Oncology  [facility]  [phone] (office)  [phone] (alt office)  Evenings and weekends please call MD on call or office

## 2023-04-22 NOTE — DISCHARGE NOTE NURSING/CASE MANAGEMENT/SOCIAL WORK - NSDCPEFALRISK_GEN_ALL_CORE
For information on Fall & Injury Prevention, visit: [email or web address] OR  [email or web address] OR  https://www.cdc.gov/steadi/patient.html

## 2023-04-22 NOTE — DISCHARGE NOTE PROVIDER - CARE PROVIDER_API CALL
Rosaura Bhagat (DO)  Hematology; Medical Oncology  [address] [place] [address]  Phone: [phone]  Fax: [phone]  Follow Up Time:

## 2023-04-22 NOTE — CONSULT NOTE ADULT - NS ATTEND AMEND GEN_ALL_CORE FT
Patient with ITP with platelet count typically between 10,000- 30,000.  Currently with no active bleeding.  Plan for discharge home today with 4 days of dexamethasone 40 mg daily.

## 2023-04-22 NOTE — CONSULT NOTE ADULT - SUBJECTIVE AND OBJECTIVE BOX
Reason for consult: ITP    HPI:  Patient is a 22yo M with PMH of intellectual disability, autism, nonverbal at baseline, CARLITOS not on CPAP, and severe chronic ITP who presented with low platelet count on outpatient blood work. Patient not able to offer complaints, though asymptomatic per family, without active bleeding reported.   Patient’s mother described his prior admission to the hospital 2 years ago, when he had COVID, and she stated this had   She currently is declining IVIG and requesting rituximab. She states that she does not wish for her son to receive IVIG if it has been potentially collected from donors who have been vaccinated against COVID    VS reviewed: T 35.8C, mildly hypothermic. . Otherwise unremarkable.   Labs reviewed: Hb 13.5, plt 5, aPTT 37.2, CMP unremarkable, UA negative.   In ED received 1 dose acetaminophen 975mg po    Hematology/Oncology called to see patient who follows with DR Mckay of [facility] for the treatment of ITP     (21 Apr 2023 18:00)      PAST MEDICAL & SURGICAL HISTORY:  Intellectual disability      Chronic ITP (idiopathic thrombocytopenia)      Dental caries on smooth surface penetrating into dentin      Autism      CARLITOS (obstructive sleep apnea)      2019 novel coronavirus disease (COVID-19)      History of dental examination          FAMILY HISTORY:  FH: hypertension        Alochol: Denied  Smoking: Nonsmoker  Drug Use: Denied  Marital Status:         Allergies    Rituxan (Hives)  Bactrim (Hives)  WinRho SDF (Hives)    Intolerances        MEDICATIONS  (STANDING):  chlorhexidine 2% Cloths 1 Application(s) Topical <User Schedule>  traZODone 250 milliGRAM(s) Oral at bedtime    MEDICATIONS  (PRN):      ROS  No fever, sweats, chills  No epistaxis, HA, sore throat  No CP, SOB, cough, sputum  No n/v/d, abd pain, melena, hematochezia  No edema  No rash  No anxiety  No back pain, joint pain  No bleeding, bruising  No dysuria, hematuria    T(C): 36.2 (04-22-23 @ 05:55), Max: 36.6 (04-21-23 @ 20:50)  HR: 97 (04-22-23 @ 05:55) (93 - 110)  BP: 114/82 (04-22-23 @ 05:55) (113/85 - 148/74)  RR: 18 (04-22-23 @ 05:55) (16 - 18)  SpO2: 99% (04-22-23 @ 05:55) (98% - 100%)  Wt(kg): --    PE  NAD  Awake, alert  Anicteric, MMM  RRR  CTAB  Abd soft, NT, ND  No c/c/e  No rash grossly  FROM                          13.2   9.40  )-----------( 11       ( 21 Apr 2023 22:49 )             43.1       04-21    140  |  105  |  14  ----------------------------<  103<H>  3.9   |  25  |  1.01    Ca    9.0      21 Apr 2023 12:41    TPro  6.4  /  Alb  4.1  /  TBili  0.7  /  DBili  x   /  AST  16  /  ALT  15  /  AlkPhos  58  04-21   Reason for consult: ITP    HPI:  Patient is a 22yo M with PMH of intellectual disability, autism, nonverbal at baseline, CARLITOS not on CPAP, and severe chronic ITP who presented with low platelet count on outpatient blood work. Patient not able to offer complaints, though asymptomatic per family, without active bleeding reported.   Patient’s mother described his prior admission to the hospital 2 years ago, when he had COVID, and she stated this had   She currently is declining IVIG and requesting rituximab. She states that she does not wish for her son to receive IVIG if it has been potentially collected from donors who have been vaccinated against COVID    VS reviewed: T 35.8C, mildly hypothermic. . Otherwise unremarkable.   Labs reviewed: Hb 13.5, plt 5, aPTT 37.2, CMP unremarkable, UA negative.   In ED received 1 dose acetaminophen 975mg po    Hematology/Oncology called to see patient who follows with DR Mckay of [facility] for the treatment of ITP     (21 Apr 2023 18:00)      PAST MEDICAL & SURGICAL HISTORY:  Intellectual disability      Chronic ITP (idiopathic thrombocytopenia)      Dental caries on smooth surface penetrating into dentin      Autism      CARLITOS (obstructive sleep apnea)      2019 novel coronavirus disease (COVID-19)      History of dental examination          FAMILY HISTORY:  FH: hypertension        Alochol: Denied  Smoking: Nonsmoker  Drug Use: Denied  Marital Status:         Allergies    Rituxan (Hives)  Bactrim (Hives)  WinRho SDF (Hives)    Intolerances        MEDICATIONS  (STANDING):  chlorhexidine 2% Cloths 1 Application(s) Topical <User Schedule>  traZODone 250 milliGRAM(s) Oral at bedtime    MEDICATIONS  (PRN):      ROS  No fever, sweats, chills  No epistaxis, HA, sore throat  No CP, SOB, cough, sputum  No n/v/d, abd pain, melena, hematochezia  No edema  No rash  No anxiety  No back pain, joint pain  No bleeding, bruising  No dysuria, hematuria    T(C): 36.2 (04-22-23 @ 05:55), Max: 36.6 (04-21-23 @ 20:50)  HR: 97 (04-22-23 @ 05:55) (93 - 110)  BP: 114/82 (04-22-23 @ 05:55) (113/85 - 148/74)  RR: 18 (04-22-23 @ 05:55) (16 - 18)  SpO2: 99% (04-22-23 @ 05:55) (98% - 100%)  Wt(kg): --    PE  NAD  Awake  Anicteric, MMM  RRR  CTAB  Abd soft, NT, ND  No c/c/e  No rash grossly  FROM                          13.2   9.40  )-----------( 11       ( 21 Apr 2023 22:49 )             43.1       04-21    140  |  105  |  14  ----------------------------<  103<H>  3.9   |  25  |  1.01    Ca    9.0      21 Apr 2023 12:41    TPro  6.4  /  Alb  4.1  /  TBili  0.7  /  DBili  x   /  AST  16  /  ALT  15  /  AlkPhos  58  04-21

## 2023-04-22 NOTE — DISCHARGE NOTE NURSING/CASE MANAGEMENT/SOCIAL WORK - PATIENT PORTAL LINK FT
You can access the FollowMyHealth Patient Portal offered by [facility] by registering at the following website: [email or web address]. By joining [employer]’s FollowMyHealth portal, you will also be able to view your health information using other applications (apps) compatible with our system.

## 2023-04-22 NOTE — DISCHARGE NOTE NURSING/CASE MANAGEMENT/SOCIAL WORK - NSDCVIVACCINE_GEN_ALL_CORE_FT
Tdap; 15-Aug-2020 20:47; Jumana Martinez (HENRRY); [employer]; [id number] (Exp. Date: 04-Apr-2022); IntraMuscular; Deltoid Right.; 0.5 milliLiter(s); VIS (VIS Published: 09-May-2013, VIS Presented: 15-Aug-2020);

## 2023-04-22 NOTE — DISCHARGE NOTE PROVIDER - NSDCMRMEDTOKEN_GEN_ALL_CORE_FT
cimetidine 400 mg oral tablet: 1 tab(s) orally 3 times a day  Doptelet 20 mg oral tablet: 2 tab(s) orally once a day   loratadine 10 mg oral tablet: 1 tab(s) orally once a day, As Needed  Multi Vitamin+: orally once a day  Rexulti 4 mg oral tablet: 1 tab(s) orally once a day  traZODone 150 mg oral tablet: 250 milligram(s) orally once a day (at bedtime)   cimetidine 400 mg oral tablet: 1 tab(s) orally 3 times a day  dexamethasone 20 mg oral tablet: 2 tab(s) orally once a day  Doptelet 20 mg oral tablet: 2 tab(s) orally once a day   loratadine 10 mg oral tablet: 1 tab(s) orally once a day, As Needed  Multi Vitamin+: orally once a day  Rexulti 4 mg oral tablet: 1 tab(s) orally once a day  traZODone 50 mg oral tablet: 5 tab(s) orally once a day (at bedtime)   cimetidine 400 mg oral tablet: 1 tab(s) orally 3 times a day  dexamethasone 20 mg oral tablet: 2 tab(s) orally once a day (total of 40 mg orally once a day for 3 days)  Doptelet 20 mg oral tablet: 2 tab(s) orally once a day   loratadine 10 mg oral tablet: 1 tab(s) orally once a day, As Needed  Multi Vitamin+: orally once a day  Rexulti 4 mg oral tablet: 1 tab(s) orally once a day  traZODone 50 mg oral tablet: 5 tab(s) orally once a day (at bedtime)   cimetidine 400 mg oral tablet: 1 tab(s) orally 3 times a day  dexamethasone 4 mg oral tablet: 10 tab(s) orally once a day (total of 40 mg per day for 3 days)  Doptelet 20 mg oral tablet: 2 tab(s) orally once a day   loratadine 10 mg oral tablet: 1 tab(s) orally once a day, As Needed  Multi Vitamin+: orally once a day  Rexulti 4 mg oral tablet: 1 tab(s) orally once a day  traZODone 50 mg oral tablet: 5 tab(s) orally once a day (at bedtime)

## 2023-04-22 NOTE — DISCHARGE NOTE PROVIDER - NSDCCPCAREPLAN_GEN_ALL_CORE_FT
PRINCIPAL DISCHARGE DIAGNOSIS  Diagnosis: Idiopathic thrombocytopenia  Assessment and Plan of Treatment: You have known idiopathic thrombocytopenia and were admitted to the hospital with a very low platelet count (5).  [facility] were consulted as you follow up with Dr. Bhagat as an outpatient. You are declining IVIG for now. You were given one dose of dexamethasone 40mg intravenously and are to continue by mouth for 4 more days. Follow up with your hematologist and oncologist as an outpatient, call to schedule follow up appointments.      SECONDARY DISCHARGE DIAGNOSES  Diagnosis: Intellectual disability  Assessment and Plan of Treatment: Stable; Discharge back to [facility]     PRINCIPAL DISCHARGE DIAGNOSIS  Diagnosis: Idiopathic thrombocytopenia  Assessment and Plan of Treatment: You have known idiopathic thrombocytopenia and were admitted to the hospital with a very low platelet count (5).  [facility] were consulted as you follow up with Dr. Bhagat as an outpatient. You are declining IVIG for now. You were given one dose of dexamethasone 40mg intravenously and are to continue by mouth for 3 more days. Follow up with your hematologist and oncologist as an outpatient; you have an appointment on May 10th with Dr. Bhagat.      SECONDARY DISCHARGE DIAGNOSES  Diagnosis: Intellectual disability  Assessment and Plan of Treatment: Stable; Discharge back to [facility]

## 2023-04-23 RX ORDER — DEXAMETHASONE 0.5 MG/5ML
2 ELIXIR ORAL
Qty: 6 | Refills: 0
Start: 2023-04-23 | End: 2023-04-25

## 2023-04-23 RX ORDER — DEXAMETHASONE 0.5 MG/5ML
10 ELIXIR ORAL
Qty: 30 | Refills: 0
Start: 2023-04-23 | End: 2023-04-25

## 2023-04-27 NOTE — PATIENT PROFILE ADULT - STATED REASON FOR ADMISSION
presents with outpatient labs showing low platelets
pt c/o acute exacerbation of chronic lbp, no concern for cord compression or cauda equina, ambulatory w/o dif, states that ran out of her ibuprofen 800mg which is what she typically takes, given lidoderm and ibuprofen, advised to f/u w/pmd and spine, no concern for fx, no emergent indication for any imaging, pt understands and agrees w/plan, strict return precautions given

## 2023-05-06 NOTE — H&P PST ADULT - MUSCULOSKELETAL
Spine appears normal, range of motion is not limited, no muscle or joint tenderness ROM intact/normal gait negative

## 2023-05-09 NOTE — PROGRESS NOTE ADULT - SUBJECTIVE AND OBJECTIVE BOX
Patient is a 20y old  Male who presents with a chief complaint of low platelets (28 Feb 2021 14:30)        SUBJECTIVE / OVERNIGHT EVENTS:  no acute events o/n  Sitting in chair, comfortable, watching Jimena Fu Panda on tablet  mother at bedside      MEDICATIONS  (STANDING):  brexpiprazole 6 milliGRAM(s) Oral daily  dexAMETHasone  IVPB 40 milliGRAM(s) IV Intermittent daily  influenza   Vaccine 0.5 milliLiter(s) IntraMuscular once  LORazepam     Tablet 1 milliGRAM(s) Oral at bedtime  multivitamin 1 Tablet(s) Oral daily  pantoprazole    Tablet 40 milliGRAM(s) Oral before breakfast  traZODone 300 milliGRAM(s) Oral at bedtime    MEDICATIONS  (PRN):      Vital Signs Last 24 Hrs  T(C): 36.7 (01 Mar 2021 05:55), Max: 36.9 (28 Feb 2021 15:00)  T(F): 98 (01 Mar 2021 05:55), Max: 98.4 (28 Feb 2021 15:00)  HR: 99 (01 Mar 2021 05:55) (83 - 112)  BP: 143/86 (01 Mar 2021 05:55) (123/63 - 160/114)  BP(mean): --  RR: 16 (01 Mar 2021 05:55) (16 - 16)  SpO2: 95% (01 Mar 2021 05:55) (95% - 100%)  CAPILLARY BLOOD GLUCOSE        I&O's Summary    28 Feb 2021 07:01  -  01 Mar 2021 07:00  --------------------------------------------------------  IN: 240 mL / OUT: 0 mL / NET: 240 mL          PHYSICAL EXAM    CONSTITUTIONAL: NAD, well-appearing, sitting in chair   RESPIRATORY: overall clear, does not participate in exam  CARDIOVASCULAR: Regular rate and rhythm, normal S1 and S2, no murmur/rub/gallops   ABDOMEN: Nontender to palpation, normoactive bowel sounds  MUSKULOSKELETAL:  no clubbing or cyanosis of digits; no joint swelling or tenderness to palpation  PSYCH: calm, affect appropriate  NEUROLOGY: CN 2-12 are intact and symmetric; nonfocal, nonverbal, does not follow simple commands.   SKIN: scattered UE ecchymosis      LABS:                        12.6   14.59 )-----------( 8        ( 01 Mar 2021 07:52 )             40.7     03-01    135  |  104  |  22  ----------------------------<  155<H>  4.0   |  22  |  0.73    Ca    8.8      01 Mar 2021 07:52  Phos  2.5     03-01  Mg     1.9     03-01    TPro  7.4  /  Alb  2.9<L>  /  TBili  0.3  /  DBili  x   /  AST  12  /  ALT  25  /  AlkPhos  44  02-28              RADIOLOGY & ADDITIONAL TESTS:    Imaging Personally Reviewed:  Consultant(s) Notes Reviewed:    Care Discussed with Consultants/Other Providers:   Strong peripheral pulses

## 2023-05-17 ENCOUNTER — LABORATORY RESULT (OUTPATIENT)
Age: 23
End: 2023-05-17

## 2023-05-26 ENCOUNTER — INPATIENT (INPATIENT)
Facility: HOSPITAL | Age: 23
LOS: 16 days | Discharge: HOME | End: 2023-06-12
Attending: INTERNAL MEDICINE | Admitting: INTERNAL MEDICINE
Payer: MEDICARE

## 2023-05-26 VITALS
RESPIRATION RATE: 16 BRPM | HEART RATE: 86 BPM | OXYGEN SATURATION: 100 % | DIASTOLIC BLOOD PRESSURE: 97 MMHG | SYSTOLIC BLOOD PRESSURE: 126 MMHG | HEIGHT: 71 IN

## 2023-05-26 DIAGNOSIS — D69.6 THROMBOCYTOPENIA, UNSPECIFIED: ICD-10-CM

## 2023-05-26 DIAGNOSIS — Z92.89 PERSONAL HISTORY OF OTHER MEDICAL TREATMENT: Chronic | ICD-10-CM

## 2023-05-26 LAB
ALBUMIN SERPL ELPH-MCNC: 3.6 G/DL — SIGNIFICANT CHANGE UP (ref 3.3–5)
ALP SERPL-CCNC: 41 U/L — SIGNIFICANT CHANGE UP (ref 40–120)
ALT FLD-CCNC: 26 U/L — SIGNIFICANT CHANGE UP (ref 4–41)
ANION GAP SERPL CALC-SCNC: 13 MMOL/L — SIGNIFICANT CHANGE UP (ref 7–14)
APTT BLD: 26.4 SEC — LOW (ref 27–36.3)
AST SERPL-CCNC: 18 U/L — SIGNIFICANT CHANGE UP (ref 4–40)
BASOPHILS # BLD AUTO: 0.12 K/UL — SIGNIFICANT CHANGE UP (ref 0–0.2)
BASOPHILS NFR BLD AUTO: 0.9 % — SIGNIFICANT CHANGE UP (ref 0–2)
BILIRUB SERPL-MCNC: 0.8 MG/DL — SIGNIFICANT CHANGE UP (ref 0.2–1.2)
BLD GP AB SCN SERPL QL: NEGATIVE — SIGNIFICANT CHANGE UP
BUN SERPL-MCNC: 12 MG/DL — SIGNIFICANT CHANGE UP (ref 7–23)
CALCIUM SERPL-MCNC: 8.8 MG/DL — SIGNIFICANT CHANGE UP (ref 8.4–10.5)
CHLORIDE SERPL-SCNC: 102 MMOL/L — SIGNIFICANT CHANGE UP (ref 98–107)
CLOSURE TME COLL+EPINEP BLD: 8 K/UL — CRITICAL LOW (ref 150–400)
CO2 SERPL-SCNC: 23 MMOL/L — SIGNIFICANT CHANGE UP (ref 22–31)
CREAT SERPL-MCNC: 0.9 MG/DL — SIGNIFICANT CHANGE UP (ref 0.5–1.3)
EGFR: 122 ML/MIN/1.73M2 — SIGNIFICANT CHANGE UP
EOSINOPHIL # BLD AUTO: 0.12 K/UL — SIGNIFICANT CHANGE UP (ref 0–0.5)
EOSINOPHIL NFR BLD AUTO: 0.9 % — SIGNIFICANT CHANGE UP (ref 0–6)
GLUCOSE SERPL-MCNC: 109 MG/DL — HIGH (ref 70–99)
HCT VFR BLD CALC: 41.1 % — SIGNIFICANT CHANGE UP (ref 39–50)
HGB BLD-MCNC: 13 G/DL — SIGNIFICANT CHANGE UP (ref 13–17)
IANC: 10.96 K/UL — HIGH (ref 1.8–7.4)
INR BLD: 1.04 RATIO — SIGNIFICANT CHANGE UP (ref 0.88–1.16)
LYMPHOCYTES # BLD AUTO: 1.16 K/UL — SIGNIFICANT CHANGE UP (ref 1–3.3)
LYMPHOCYTES # BLD AUTO: 8.7 % — LOW (ref 13–44)
MAGNESIUM SERPL-MCNC: 2 MG/DL — SIGNIFICANT CHANGE UP (ref 1.6–2.6)
MANUAL SMEAR VERIFICATION: SIGNIFICANT CHANGE UP
MCHC RBC-ENTMCNC: 26.9 PG — LOW (ref 27–34)
MCHC RBC-ENTMCNC: 31.6 GM/DL — LOW (ref 32–36)
MCV RBC AUTO: 84.9 FL — SIGNIFICANT CHANGE UP (ref 80–100)
MONOCYTES # BLD AUTO: 0.57 K/UL — SIGNIFICANT CHANGE UP (ref 0–0.9)
MONOCYTES NFR BLD AUTO: 4.3 % — SIGNIFICANT CHANGE UP (ref 2–14)
MYELOCYTES NFR BLD: 0.9 % — HIGH (ref 0–0)
NEUTROPHILS # BLD AUTO: 11.26 K/UL — HIGH (ref 1.8–7.4)
NEUTROPHILS NFR BLD AUTO: 84.3 % — HIGH (ref 43–77)
PHOSPHATE SERPL-MCNC: 3.2 MG/DL — SIGNIFICANT CHANGE UP (ref 2.5–4.5)
PLAT MORPH BLD: NORMAL — SIGNIFICANT CHANGE UP
PLATELET # BLD AUTO: 2 K/UL — CRITICAL LOW (ref 150–400)
PLATELET COUNT - ESTIMATE: ABNORMAL
POTASSIUM SERPL-MCNC: 3.7 MMOL/L — SIGNIFICANT CHANGE UP (ref 3.5–5.3)
POTASSIUM SERPL-SCNC: 3.7 MMOL/L — SIGNIFICANT CHANGE UP (ref 3.5–5.3)
PROT SERPL-MCNC: 5.8 G/DL — LOW (ref 6–8.3)
PROTHROM AB SERPL-ACNC: 12.1 SEC — SIGNIFICANT CHANGE UP (ref 10.5–13.4)
RBC # BLD: 4.84 M/UL — SIGNIFICANT CHANGE UP (ref 4.2–5.8)
RBC # FLD: 15.6 % — HIGH (ref 10.3–14.5)
RBC BLD AUTO: NORMAL — SIGNIFICANT CHANGE UP
RH IG SCN BLD-IMP: POSITIVE — SIGNIFICANT CHANGE UP
SODIUM SERPL-SCNC: 138 MMOL/L — SIGNIFICANT CHANGE UP (ref 135–145)
WBC # BLD: 13.36 K/UL — HIGH (ref 3.8–10.5)
WBC # FLD AUTO: 13.36 K/UL — HIGH (ref 3.8–10.5)

## 2023-05-26 PROCEDURE — 99285 EMERGENCY DEPT VISIT HI MDM: CPT

## 2023-05-26 PROCEDURE — 70450 CT HEAD/BRAIN W/O DYE: CPT | Mod: 26,MD

## 2023-05-26 RX ORDER — HALOPERIDOL DECANOATE 100 MG/ML
5 INJECTION INTRAMUSCULAR ONCE
Refills: 0 | Status: COMPLETED | OUTPATIENT
Start: 2023-05-26 | End: 2023-05-26

## 2023-05-26 RX ORDER — DIPHENHYDRAMINE HCL 50 MG
50 CAPSULE ORAL ONCE
Refills: 0 | Status: COMPLETED | OUTPATIENT
Start: 2023-05-26 | End: 2023-05-26

## 2023-05-26 RX ORDER — TRAZODONE HCL 50 MG
250 TABLET ORAL ONCE
Refills: 0 | Status: DISCONTINUED | OUTPATIENT
Start: 2023-05-26 | End: 2023-05-27

## 2023-05-26 RX ADMIN — Medication 50 MILLIGRAM(S): at 23:20

## 2023-05-26 RX ADMIN — HALOPERIDOL DECANOATE 5 MILLIGRAM(S): 100 INJECTION INTRAMUSCULAR at 18:06

## 2023-05-26 RX ADMIN — Medication 50 MILLIGRAM(S): at 20:05

## 2023-05-26 NOTE — ED PROVIDER NOTE - PROGRESS NOTE DETAILS
Mendy Zee PGY1: I received sign out on this patient. I have reassessed patient and agree with the current plan. pt mom says he received prednisone 20 last from dr. chung, and there was discussion regarding rituximab for thrombocytopenia, but pt had reaction to that as a kid so likely will need premedication. pt stable in no active distress. pending ct scan for new seizures at group home 2 wks ago, and tto r/o ich from thrombocytopenia. Ramiro Menchaca DO: case was discussed with on call hem/onc attg covering for Dr. Bhagat.

## 2023-05-26 NOTE — ED ADULT TRIAGE NOTE - ADDITIONAL SAFETY/BANDS...
Additional Safety/Bands: CONSTITUTIONAL: No fevers, no chills  Eyes: no visual changes  Ears: no ear drainage, no ear pain  Nose: no nasal congestion  Mouth/Throat: no sore throat  Cardiovascular: No Chest pain  Respiratory: No SOB  Gastrointestinal: No n/v/d, + abd pain, +flank pain.   Genitourinary: no dysuria, no hematuria  SKIN: no rashes.  NEURO: no headache  PSYCHIATRIC: no known mental health issues.

## 2023-05-26 NOTE — ED PROVIDER NOTE - PHYSICAL EXAMINATION
General: WN/WD NAD  Head: Atraumatic  Eyes: EOM grossly in tact, no scleral icterus, PERRL 3mm b/l  ENT: dry mucous membranes  Neurology: A&Ox3, nonfocal, NICHOLS x 4  Respiratory: normal respiratory effort, CTAB anteriorly   CV: Extremities warm and well perfused, S1/S2 no m/r/g  Abdominal: Soft, non-distended  Extremities: No edema  Skin: No rashes, +ecchymosis RUE

## 2023-05-26 NOTE — ED PROVIDER NOTE - CLINICAL SUMMARY MEDICAL DECISION MAKING FREE TEXT BOX
22 yo m pmh f intellectual disability, autism, nonverbal at baseline, CARLITOS not on CPAP, and severe chronic ITP , sent in by heme/onc for Thrombocytopenia. VS stable, phys exam grossly WNL. Concern for ICH given 3k platelets on outpt labs, neurologically pt grossly intact; will order labs incl. cbc, CT head, touch base w/heme/onc, dispo pending workup. This represents an initial assessment; workup and plan subject to change. - Yvonne Kuhn, PGY-2

## 2023-05-26 NOTE — ED ADULT NURSE NOTE - OBJECTIVE STATEMENT
Task RN- Patient received in stretcher. nonverbal at baseline. Respirations even and unlabored. Spontaneous movement of all extremities noted. Presents to ER c/o "his platelet count is low" As per patient mother who is at bedside patient had outpatient blood work done and was called today with abnormal results and to present to ER. Patient currently calm and cooperative. Mother reports aggressive behavior. Patient with multiple bruises of different healing phases noted.  No active bleeding noted. Verbal orders completed. Evaluated by ER MD. Endorsed to primary RN. Comfort and safety maintained. All current care needs met. Care plan continued [employer]

## 2023-05-26 NOTE — ED ADULT TRIAGE NOTE - CHIEF COMPLAINT QUOTE
pt with hx of ITP sever autism with self injury with seizure disorder. pt sent in by doc for low platelet count of 3000. unable to get vital signs pt is agitated with multiple self injury markings to arm. charge nurse notified.

## 2023-05-26 NOTE — ED PROVIDER NOTE - ATTENDING CONTRIBUTION TO CARE
Ramiro Menchaca DO:  patient seen and evaluated with the resident.  I was present for key portions of the History & Physical, and I agree with the Impression & Plan.  24 yo m pmh f intellectual disability, autism, nonverbal at baseline, CARLITOS not on CPAP, and severe chronic ITP , pw Thrombocytopenia.  PW mother who provides collateral history.  She is her caretaker.  Patient has severe autism, unable to provide history.  Unable to offer complaints.  Patient arrives H[facility], ambulatory.  Unable to obtain vitals in triage.  Patient to be medicated, to facilitate work-up.  Pending further examination.  However patient otherwise looks well.

## 2023-05-26 NOTE — ED ADULT NURSE REASSESSMENT NOTE - NS ED NURSE REASSESS COMMENT FT1
Pt resting comfortably with mother at bedside, no signs of acute distress, no complaints at this time, will continue to monitor and assess

## 2023-05-26 NOTE — ED PROVIDER NOTE - OBJECTIVE STATEMENT
23yoM w/Hx of PMH of intellectual disability, autism, nonverbal at baseline, CARLITOS not on CPAP, and severe chronic ITP p/w thrombocytopenia to 3K on outpatient labs this past Wednesday. Follows w/Dr. Rosaura Bhagat (heme/onc), advised to come to ED.  Pt asymptomatic per mom at bedside. No active bleeding.

## 2023-05-27 DIAGNOSIS — T14.8XXA OTHER INJURY OF UNSPECIFIED BODY REGION, INITIAL ENCOUNTER: ICD-10-CM

## 2023-05-27 DIAGNOSIS — R63.8 OTHER SYMPTOMS AND SIGNS CONCERNING FOOD AND FLUID INTAKE: ICD-10-CM

## 2023-05-27 DIAGNOSIS — Z29.9 ENCOUNTER FOR PROPHYLACTIC MEASURES, UNSPECIFIED: ICD-10-CM

## 2023-05-27 DIAGNOSIS — F79 UNSPECIFIED INTELLECTUAL DISABILITIES: ICD-10-CM

## 2023-05-27 DIAGNOSIS — D69.3 IMMUNE THROMBOCYTOPENIC PURPURA: ICD-10-CM

## 2023-05-27 DIAGNOSIS — D69.6 THROMBOCYTOPENIA, UNSPECIFIED: ICD-10-CM

## 2023-05-27 LAB
ALBUMIN SERPL ELPH-MCNC: 3.7 G/DL — SIGNIFICANT CHANGE UP (ref 3.3–5)
ALP SERPL-CCNC: 43 U/L — SIGNIFICANT CHANGE UP (ref 40–120)
ALT FLD-CCNC: 25 U/L — SIGNIFICANT CHANGE UP (ref 4–41)
ANION GAP SERPL CALC-SCNC: 12 MMOL/L — SIGNIFICANT CHANGE UP (ref 7–14)
APTT BLD: 28.1 SEC — SIGNIFICANT CHANGE UP (ref 27–36.3)
AST SERPL-CCNC: 13 U/L — SIGNIFICANT CHANGE UP (ref 4–40)
BASOPHILS # BLD AUTO: 0.03 K/UL — SIGNIFICANT CHANGE UP (ref 0–0.2)
BASOPHILS NFR BLD AUTO: 0.2 % — SIGNIFICANT CHANGE UP (ref 0–2)
BILIRUB SERPL-MCNC: 0.7 MG/DL — SIGNIFICANT CHANGE UP (ref 0.2–1.2)
BLD GP AB SCN SERPL QL: NEGATIVE — SIGNIFICANT CHANGE UP
BUN SERPL-MCNC: 18 MG/DL — SIGNIFICANT CHANGE UP (ref 7–23)
CALCIUM SERPL-MCNC: 8.8 MG/DL — SIGNIFICANT CHANGE UP (ref 8.4–10.5)
CHLORIDE SERPL-SCNC: 105 MMOL/L — SIGNIFICANT CHANGE UP (ref 98–107)
CO2 SERPL-SCNC: 23 MMOL/L — SIGNIFICANT CHANGE UP (ref 22–31)
CREAT SERPL-MCNC: 1 MG/DL — SIGNIFICANT CHANGE UP (ref 0.5–1.3)
EGFR: 108 ML/MIN/1.73M2 — SIGNIFICANT CHANGE UP
EOSINOPHIL # BLD AUTO: 0.01 K/UL — SIGNIFICANT CHANGE UP (ref 0–0.5)
EOSINOPHIL NFR BLD AUTO: 0.1 % — SIGNIFICANT CHANGE UP (ref 0–6)
FOLATE SERPL-MCNC: 9.1 NG/ML — SIGNIFICANT CHANGE UP (ref 3.1–17.5)
GLUCOSE SERPL-MCNC: 105 MG/DL — HIGH (ref 70–99)
HCT VFR BLD CALC: 43.3 % — SIGNIFICANT CHANGE UP (ref 39–50)
HCT VFR BLD CALC: 43.8 % — SIGNIFICANT CHANGE UP (ref 39–50)
HGB BLD-MCNC: 13.2 G/DL — SIGNIFICANT CHANGE UP (ref 13–17)
HGB BLD-MCNC: 13.4 G/DL — SIGNIFICANT CHANGE UP (ref 13–17)
IANC: 12.26 K/UL — HIGH (ref 1.8–7.4)
IMM GRANULOCYTES NFR BLD AUTO: 0.6 % — SIGNIFICANT CHANGE UP (ref 0–0.9)
INR BLD: 0.95 RATIO — SIGNIFICANT CHANGE UP (ref 0.88–1.16)
LYMPHOCYTES # BLD AUTO: 0.69 K/UL — LOW (ref 1–3.3)
LYMPHOCYTES # BLD AUTO: 5.2 % — LOW (ref 13–44)
MAGNESIUM SERPL-MCNC: 2.2 MG/DL — SIGNIFICANT CHANGE UP (ref 1.6–2.6)
MCHC RBC-ENTMCNC: 25.8 PG — LOW (ref 27–34)
MCHC RBC-ENTMCNC: 26.3 PG — LOW (ref 27–34)
MCHC RBC-ENTMCNC: 30.1 GM/DL — LOW (ref 32–36)
MCHC RBC-ENTMCNC: 30.9 GM/DL — LOW (ref 32–36)
MCV RBC AUTO: 85.1 FL — SIGNIFICANT CHANGE UP (ref 80–100)
MCV RBC AUTO: 85.7 FL — SIGNIFICANT CHANGE UP (ref 80–100)
MONOCYTES # BLD AUTO: 0.21 K/UL — SIGNIFICANT CHANGE UP (ref 0–0.9)
MONOCYTES NFR BLD AUTO: 1.6 % — LOW (ref 2–14)
NEUTROPHILS # BLD AUTO: 12.26 K/UL — HIGH (ref 1.8–7.4)
NEUTROPHILS NFR BLD AUTO: 92.3 % — HIGH (ref 43–77)
NRBC # BLD: 0 /100 WBCS — SIGNIFICANT CHANGE UP (ref 0–0)
NRBC # BLD: 0 /100 WBCS — SIGNIFICANT CHANGE UP (ref 0–0)
NRBC # FLD: 0 K/UL — SIGNIFICANT CHANGE UP (ref 0–0)
NRBC # FLD: 0 K/UL — SIGNIFICANT CHANGE UP (ref 0–0)
PHOSPHATE SERPL-MCNC: 3.3 MG/DL — SIGNIFICANT CHANGE UP (ref 2.5–4.5)
PLATELET # BLD AUTO: 2 K/UL — CRITICAL LOW (ref 150–400)
PLATELET # BLD AUTO: 4 K/UL — CRITICAL LOW (ref 150–400)
POTASSIUM SERPL-MCNC: 4.8 MMOL/L — SIGNIFICANT CHANGE UP (ref 3.5–5.3)
POTASSIUM SERPL-SCNC: 4.8 MMOL/L — SIGNIFICANT CHANGE UP (ref 3.5–5.3)
PROT SERPL-MCNC: 5.9 G/DL — LOW (ref 6–8.3)
PROTHROM AB SERPL-ACNC: 11 SEC — SIGNIFICANT CHANGE UP (ref 10.5–13.4)
RBC # BLD: 5.09 M/UL — SIGNIFICANT CHANGE UP (ref 4.2–5.8)
RBC # BLD: 5.11 M/UL — SIGNIFICANT CHANGE UP (ref 4.2–5.8)
RBC # FLD: 15.7 % — HIGH (ref 10.3–14.5)
RBC # FLD: 15.7 % — HIGH (ref 10.3–14.5)
RH IG SCN BLD-IMP: POSITIVE — SIGNIFICANT CHANGE UP
SODIUM SERPL-SCNC: 140 MMOL/L — SIGNIFICANT CHANGE UP (ref 135–145)
VIT B12 SERPL-MCNC: 326 PG/ML — SIGNIFICANT CHANGE UP (ref 200–900)
WBC # BLD: 13.28 K/UL — HIGH (ref 3.8–10.5)
WBC # BLD: 17.02 K/UL — HIGH (ref 3.8–10.5)
WBC # FLD AUTO: 13.28 K/UL — HIGH (ref 3.8–10.5)
WBC # FLD AUTO: 17.02 K/UL — HIGH (ref 3.8–10.5)

## 2023-05-27 PROCEDURE — 99223 1ST HOSP IP/OBS HIGH 75: CPT

## 2023-05-27 RX ORDER — DEXAMETHASONE 0.5 MG/5ML
40 ELIXIR ORAL DAILY
Refills: 0 | Status: COMPLETED | OUTPATIENT
Start: 2023-05-27 | End: 2023-05-30

## 2023-05-27 RX ORDER — ALPRAZOLAM 0.25 MG
0.5 TABLET ORAL EVERY 8 HOURS
Refills: 0 | Status: DISCONTINUED | OUTPATIENT
Start: 2023-05-27 | End: 2023-06-02

## 2023-05-27 RX ORDER — LORATADINE 10 MG/1
1 TABLET ORAL
Qty: 0 | Refills: 0 | DISCHARGE

## 2023-05-27 RX ORDER — TRAZODONE HCL 50 MG
250 TABLET ORAL AT BEDTIME
Refills: 0 | Status: DISCONTINUED | OUTPATIENT
Start: 2023-05-28 | End: 2023-06-12

## 2023-05-27 RX ORDER — BREXPIPRAZOLE 0.25 MG/1
4 TABLET ORAL DAILY
Refills: 0 | Status: DISCONTINUED | OUTPATIENT
Start: 2023-05-27 | End: 2023-06-12

## 2023-05-27 RX ORDER — TRAZODONE HCL 50 MG
150 TABLET ORAL ONCE
Refills: 0 | Status: COMPLETED | OUTPATIENT
Start: 2023-05-27 | End: 2023-05-27

## 2023-05-27 RX ORDER — TRAZODONE HCL 50 MG
250 TABLET ORAL EVERY 24 HOURS
Refills: 0 | Status: DISCONTINUED | OUTPATIENT
Start: 2023-05-27 | End: 2023-05-27

## 2023-05-27 RX ORDER — HALOPERIDOL DECANOATE 100 MG/ML
5 INJECTION INTRAMUSCULAR EVERY 8 HOURS
Refills: 0 | Status: DISCONTINUED | OUTPATIENT
Start: 2023-05-27 | End: 2023-05-27

## 2023-05-27 RX ORDER — TRAZODONE HCL 50 MG
100 TABLET ORAL AT BEDTIME
Refills: 0 | Status: COMPLETED | OUTPATIENT
Start: 2023-05-27 | End: 2023-05-27

## 2023-05-27 RX ORDER — PANTOPRAZOLE SODIUM 20 MG/1
40 TABLET, DELAYED RELEASE ORAL
Refills: 0 | Status: DISCONTINUED | OUTPATIENT
Start: 2023-05-27 | End: 2023-06-02

## 2023-05-27 RX ADMIN — HALOPERIDOL DECANOATE 5 MILLIGRAM(S): 100 INJECTION INTRAMUSCULAR at 05:26

## 2023-05-27 RX ADMIN — Medication 150 MILLIGRAM(S): at 09:04

## 2023-05-27 RX ADMIN — Medication 100 MILLIGRAM(S): at 22:42

## 2023-05-27 RX ADMIN — Medication 1 TABLET(S): at 18:16

## 2023-05-27 RX ADMIN — PANTOPRAZOLE SODIUM 40 MILLIGRAM(S): 20 TABLET, DELAYED RELEASE ORAL at 18:16

## 2023-05-27 RX ADMIN — Medication 40 MILLIGRAM(S): at 18:15

## 2023-05-27 RX ADMIN — HALOPERIDOL DECANOATE 5 MILLIGRAM(S): 100 INJECTION INTRAMUSCULAR at 14:16

## 2023-05-27 RX ADMIN — BREXPIPRAZOLE 4 MILLIGRAM(S): 0.25 TABLET ORAL at 11:09

## 2023-05-27 NOTE — H&P ADULT - PROBLEM SELECTOR PLAN 3
-c/w rexulti 4 mg in am  -c/w trazodone 250 mg at bedtime  -haldol 5 mg IM Q8 prn agitation  -check EKG to evaluate Qtc

## 2023-05-27 NOTE — H&P ADULT - HISTORY OF PRESENT ILLNESS
Pt is a 23 year old M hx of intellectual disability, autism, nonverbal at baseline, CARLITOS not on CPAP, severe chronic ITP who presents w/ thrombocytopenia to  outpatient labs on Wednesday. He was on prior doptelel but had not had improvement with this. Discussed w/ patient's mother, possible seizure 2/2 doptelel prior. Patient was also on IVIG/rituxamab in the past. IVIG stopped giving benefit for patient, and mother is interested in trying rituxamab. He had anaphylaxis/serum sickness to rituxamab, was desensitized April 2021. He was also on steroid taper to try and improve platelets, and getting injections of Nplate (temporary improvements in platelets with this). Denies fevers, chills, nausea, vomiting, diarrhea, sob, headaches, visual changes, LE swelling. Blue tube sent for platelet count that was 8k. CT head noncontrast negative for ICH or masses, no old strokes. Given prednisone 50 mg in ED.

## 2023-05-27 NOTE — PROGRESS NOTE ADULT - PROBLEM SELECTOR PLAN 1
Extensive history of chronic ITP s/p numerous treatments. Previously allergic to rituximab (anaphylaxis/serum sickness) however underwent successful desensitization in Apr 2021 and tolerated rituximab at that time. That was the last time he received rituximab.  - s/p prednisone 50 mg in ED  - CT head negative for ICH  - small 2 by 3 cm right lower abdomen bruising noted, monitor, if any flank bruising, sudden drop in Hg, or hypotension might need urgent CT angio a/p r/o RP bleeding  - f/u folate/B12  - per d/w mom at bedside, family has discussed w/ primary hematologist Dr. Bhagat initiating rituximab under inpt monitoring (given his hx of needing to be desensitized)  - consulted [facility] hematologist on call, f/u recs Extensive history of chronic ITP s/p numerous treatments. Previously allergic to rituximab (anaphylaxis/serum sickness) however underwent successful desensitization in Apr 2021 and tolerated rituximab at that time. That was the last time he received rituximab.  - most recently pt was discharged on a steroid taper; was on prednisone 20 mg daily prior to coming to ED  - s/p prednisone 50 mg in ED  - CT head negative for ICH  - small 2 by 3 cm right lower abdomen bruising noted, monitor, if any flank bruising, sudden drop in Hg, or hypotension might need urgent CT angio a/p r/o RP bleeding  - per d/w mom at bedside, family has discussed w/ primary hematologist Dr. Bhagat possibility of initiating rituximab under inpt monitoring (given his hx of needing to be desensitized)  - consulted [facility] heme, recs appreciated

## 2023-05-27 NOTE — H&P ADULT - NSHPLABSRESULTS_GEN_ALL_CORE
.  LABS:                         13.0   13.36 )-----------( 2        ( 26 May 2023 18:28 )             41.1     05-26    138  |  102  |  12  ----------------------------<  109<H>  3.7   |  23  |  0.90    Ca    8.8      26 May 2023 18:28  Phos  3.2     05-26  Mg     2.00     05-26    TPro  5.8<L>  /  Alb  3.6  /  TBili  0.8  /  DBili  x   /  AST  18  /  ALT  26  /  AlkPhos  41  05-26    PT/INR - ( 26 May 2023 18:28 )   PT: 12.1 sec;   INR: 1.04 ratio         PTT - ( 26 May 2023 18:28 )  PTT:26.4 sec        < from: CT Head No Cont (05.26.23 @ 20:43) >    IMPRESSION:    Motion limited at the cranialvertex. No evidence of acute intracranial   hemorrhage or mass effect.

## 2023-05-27 NOTE — PHARMACOTHERAPY INTERVENTION NOTE - COMMENTS
Medication history is complete. Medication list updated in Outpatient Medication Record (OMR). Please call spectra [id number] if you have any questions.   source: Pharmacy, patient's mother Medication history is complete. Medication list updated in Outpatient Medication Record (OMR). Please call spectra [id number] if you have any questions.   source: Pharmacy, patient's mother  Provider notified OMR updated.

## 2023-05-27 NOTE — PATIENT PROFILE ADULT - FUNCTIONAL ASSESSMENT - BASIC MOBILITY 6.
4-calculated by average/Not able to assess (calculate score using [facility] averaging method)

## 2023-05-27 NOTE — PROGRESS NOTE ADULT - PROBLEM SELECTOR PLAN 2
Pt is nonverbal at baseline. Lives in group home. Pt recently had a first-time seizure last week, briefly hospitalized, determined to be adverse effect of Doptelet (avatrombopag) which is since discontinued. CTH shows no acute processes  - c/w home [facility]  - will give 150 mg trazodone now; plan to give 100 mg trazodone at bedtime tonight       - normal home dose is 250 mg nightly       - pt missed evening dose last night & is now more agitated  - haldol 5 mg IM Q8 prn agitation  - check EKG to evaluate Qtc Pt is nonverbal at baseline. Lives in group home. Pt recently had a first-time seizure last week, briefly hospitalized, determined to be adverse effect of Doptelet (avatrombopag) which is since discontinued. CTH shows no acute processes  - c/w home [facility]  - will give 150 mg trazodone this am; plan to give 100 mg trazodone at bedtime tonight       - normal home dose is 250 mg nightly       - pt missed evening dose last night & is now more agitated  - haldol 5 mg IM Q8 prn agitation  - check EKG to evaluate Qtc

## 2023-05-27 NOTE — PATIENT PROFILE ADULT - FALL HARM RISK - HARM RISK INTERVENTIONS

## 2023-05-27 NOTE — CONSULT NOTE ADULT - ASSESSMENT
23 year old male with autism and chronic ITP followed by Dr. Bhagat at [facility], admitted due to ITP.     ITP  - Chronic, follows w/ Dr. Bhagat at [facility]  - He has received multiple therapies including IVIG, steroids, rituxan in 2010 and 2021, and Doptelet (LD 5/22)  - Patient has increased agitation on steroids  - Plan for pulse steroids 40mg x 3, as he received PO Prednisone 50mg in ED  - Will consider Rituxan if his counts do not improve, inpatient vs. clinic  - Continue to monitor CBC. Platelets down to 4k this morning,     INCOMPLETE NOTE    Discussed in detail with patient and his mother, and medicine team.    Will continue to follow.     Get Cota PA-C  Hematology/Oncology  [facility]  [phone] (office) 23 year old male with autism and chronic ITP followed by Dr. Bhagat at [facility], admitted due to ITP.     ITP  - Chronic, follows w/ Dr. Bhagat at [facility]  - He has received multiple therapies including IVIG, steroids, rituxan in 2010 and 2021, and Doptelet (LD 5/22)  - Patient has increased agitation on steroids  - Plan for pulse steroids 40mg x 3, as he received PO Prednisone 50mg in ED  - Will consider Rituxan if his counts do not improve, inpatient vs. clinic  - Continue to monitor CBC. Platelets down to 4k this morning, would transfuse if bleeding.    INCOMPLETE NOTE    Discussed in detail with patient and his mother, and medicine team.    Will continue to follow.     Get Cota PA-C  Hematology/Oncology  [facility]  [phone] (office) 23 year old male with autism and chronic ITP followed by Dr. Bhagat at [facility], admitted due to ITP.     ITP  - Chronic, follows w/ Dr. Bhagat at [facility]  - He has received multiple therapies including IVIG, steroids, rituxan in 2010 and 2021, and Doptelet (LD 5/22)  - Patient has increased agitation on steroids  - Plan for pulse steroids 40mg x 3, as he received PO Prednisone 50mg in ED  - Will plan for Rituxan, likely outpatient  - Continue to monitor CBC. Platelets down to 4k this morning, would transfuse if bleeding. Otherwise hold off on transfusion    Discussed in detail with patient and his mother, and medicine team.    Will continue to follow.     Get Cota PA-C  Hematology/Oncology  [facility]  [phone] (office) 23 year old male with autism and chronic ITP followed by Dr. Bhagat at [facility], admitted due to ITP.     ITP  - Chronic, follows w/ Dr. Bhagat at [facility]  - He has received multiple therapies including IVIG, steroids, rituxan in 2010 and 2021, and Doptelet (LD 5/22)  - Will give Nplate on Monday if still admitted  - Patient has increased agitation on steroids  - Plan for pulse steroids 40mg x 3, as he received PO Prednisone 50mg in ED  - Will plan for Rituxan, likely outpatient  - Continue to monitor CBC. Platelets down to 4k this morning, would transfuse if bleeding. Otherwise hold off on transfusion    Discussed in detail with patient and his mother, and medicine team.    Will continue to follow.     Get Cota PA-C  Hematology/Oncology  [facility]  [phone] (office)

## 2023-05-27 NOTE — H&P ADULT - ASSESSMENT
Pt is a 23 year old M hx of intellectual disability, autism, nonverbal at baseline, CARLITOS not on CPAP, severe chronic ITP who presents w/ thrombocytopenia to  outpatient labs on Wednesday.

## 2023-05-27 NOTE — PROGRESS NOTE ADULT - ASSESSMENT
Mr. Denilson Hernandes is a 23 year old M w/ PMH of intellectual disability, autism, nonverbal at baseline, CARLITOS not on CPAP, severe chronic ITP, referred by his hematologist for initiation of rituximab due to thrombocytopenia to 3k on outpt labs. Mr. Denilson Hernandes is a 23 year old M w/ PMH of intellectual disability, autism, nonverbal at baseline, CARLITOS not on CPAP, severe chronic ITP, referred by his hematologist due to thrombocytopenia to 3k on outpt labs.

## 2023-05-27 NOTE — H&P ADULT - PROBLEM SELECTOR PLAN 1
-possible plan for rituxamab, last desensitization April 2021  -d/w [facility] in am if we need immunology to assist  -s/p prednisone 50 mg in ED  -CT head negative for ICH  -small 2 by 3 cm right lower abdomen bruising noted, monitor, if any flank bruising, sudden drop in Hg, or hypotension might need urgent CT angio a/p r/o RP bleeding -possible plan for rituxamab, last desensitization April 2021  -d/w [facility] in am if we need immunology to assist  -s/p prednisone 50 mg in ED  -CT head negative for ICH  -small 2 by 3 cm right lower abdomen bruising noted, monitor, if any flank bruising, sudden drop in Hg, or hypotension might need urgent CT angio a/p r/o RP bleeding  -buttocks wound, wound care consulted  -check folate/B12 in am

## 2023-05-27 NOTE — PROGRESS NOTE ADULT - SUBJECTIVE AND OBJECTIVE BOX
DATE OF SERVICE: 05-27-23 @ 08:52    Patient is a 23y old  Male who presents with a chief complaint of thrombocytopenia (27 May 2023 01:58)      SUBJECTIVE / OVERNIGHT EVENTS:  No acute events overnight  Afebrile, hemodynamically stable  ***    MEDICATIONS  (STANDING):  brexpiprazole 4 milliGRAM(s) Oral daily  traZODone 150 milliGRAM(s) Oral once  traZODone 250 milliGRAM(s) Oral every 24 hours    MEDICATIONS  (PRN):  haloperidol    Injectable 5 milliGRAM(s) IntraMuscular every 8 hours PRN Agitation      Vital Signs Last 24 Hrs  T(C): 36.3 (27 May 2023 07:56), Max: 36.4 (27 May 2023 00:31)  T(F): 97.3 (27 May 2023 07:56), Max: 97.6 (27 May 2023 02:48)  HR: 87 (27 May 2023 07:56) (72 - 91)  BP: 105/78 (27 May 2023 07:56) (105/78 - 126/97)  BP(mean): --  RR: 17 (27 May 2023 07:56) (16 - 17)  SpO2: 99% (27 May 2023 07:56) (97% - 100%)    Parameters below as of 27 May 2023 07:56  Patient On (Oxygen Delivery Method): room air        PHYSICAL EXAM:  ***  GENERAL: No apparent distress  HEAD:  Atraumatic, normocephalic  EYES: EOMI, PERRLA, conjunctiva and sclera clear  NECK: Supple, no JVD  CHEST/LUNG: Clear to auscultation bilaterally; No wheeze, rales, rhonchi  HEART: Regular rate and rhythm; No murmurs, rubs, or gallops  ABDOMEN: Soft, nontender, nondistended; Bowel sounds present  EXTREMITIES:  2+ peripheral pulses; No clubbing, cyanosis, or edema  PSYCH: AAOx3  NEUROLOGY: No focal deficits  SKIN: No rashes or lesions    LABS:                        13.0   13.36 )-----------( 2        ( 26 May 2023 18:28 )             41.1     05-26    138  |  102  |  12  ----------------------------<  109<H>  3.7   |  23  |  0.90    Ca    8.8      26 May 2023 18:28  Phos  3.2     05-26  Mg     2.00     05-26    TPro  5.8<L>  /  Alb  3.6  /  TBili  0.8  /  DBili  x   /  AST  18  /  ALT  26  /  AlkPhos  41  05-26    PT/INR - ( 26 May 2023 18:28 )   PT: 12.1 sec;   INR: 1.04 ratio         PTT - ( 26 May 2023 18:28 )  PTT:26.4 sec          RADIOLOGY & ADDITIONAL TESTS:  Reviewed    ***   DATE OF SERVICE: 05-27-23 @ 08:52    Patient is a 23y old  Male who presents with a chief complaint of thrombocytopenia (27 May 2023 01:58)      SUBJECTIVE / OVERNIGHT EVENTS:  Admitted to medicine overnight.  Afebrile, hemodynamically stable. Receiving prn Haldol for anxiety/agitation  Pt is nonverbal at baseline, appears comfortable although somewhat restless & continually gesturing for additional food/snacks. Discussed with mother at bedside, pt is at his baseline but has been more anxious & restless due to missing his bedtime Trazodone dose last night      MEDICATIONS  (STANDING):  brexpiprazole 4 milliGRAM(s) Oral daily  traZODone 150 milliGRAM(s) Oral once  traZODone 250 milliGRAM(s) Oral every 24 hours    MEDICATIONS  (PRN):  haloperidol    Injectable 5 milliGRAM(s) IntraMuscular every 8 hours PRN Agitation      Vital Signs Last 24 Hrs  T(C): 36.3 (27 May 2023 07:56), Max: 36.4 (27 May 2023 00:31)  T(F): 97.3 (27 May 2023 07:56), Max: 97.6 (27 May 2023 02:48)  HR: 87 (27 May 2023 07:56) (72 - 91)  BP: 105/78 (27 May 2023 07:56) (105/78 - 126/97)  BP(mean): --  RR: 17 (27 May 2023 07:56) (16 - 17)  SpO2: 99% (27 May 2023 07:56) (97% - 100%)    Parameters below as of 27 May 2023 07:56  Patient On (Oxygen Delivery Method): room air        PHYSICAL EXAM:  GENERAL: No apparent distress, pt sitting at edge of bed eating snacks  HEAD:  Atraumatic, normocephalic  EYES: EOMI, sclera clear  NECK: Supple  CHEST/LUNG: Nonlabored respirations on room air  HEART: Regular rate & rhythm  ABDOMEN: Soft, nontender, nondistended  EXTREMITIES: No peripheral edema  PSYCH: Nonverbal, poor eye contact, somewhat restless appearing  NEUROLOGY: No focal deficits  SKIN: Areas of bruising evident on left upper extremity      LABS:                        13.0   13.36 )-----------( 2        ( 26 May 2023 18:28 )             41.1     05-26    138  |  102  |  12  ----------------------------<  109<H>  3.7   |  23  |  0.90    Ca    8.8      26 May 2023 18:28  Phos  3.2     05-26  Mg     2.00     05-26    TPro  5.8<L>  /  Alb  3.6  /  TBili  0.8  /  DBili  x   /  AST  18  /  ALT  26  /  AlkPhos  41  05-26    PT/INR - ( 26 May 2023 18:28 )   PT: 12.1 sec;   INR: 1.04 ratio      PTT - ( 26 May 2023 18:28 )  PTT:26.4 sec          RADIOLOGY & ADDITIONAL TESTS:  Reviewed    < from: CT Head No Cont (05.26.23 @ 20:43) >  FINDINGS:    Examination is motion limited at the cranial vertex.    There is no acute intracranial hemorrhage, mass effect, midline shift,   extra-axial collection,hydrocephalus, or evidence of acute vascular   territorial infarction.    The visualized paranasal sinuses and mastoid air cells are clear.   Intraorbital contents are unremarkable. Calvarium is intact.    IMPRESSION:    Motion limited at the cranialvertex. No evidence of acute intracranial   hemorrhage or mass effect.    < end of copied text >     DATE OF SERVICE: 05-27-23 @ 08:52    Patient is a 23y old  Male who presents with a chief complaint of thrombocytopenia (27 May 2023 01:58)      SUBJECTIVE / OVERNIGHT EVENTS:  Admitted to medicine overnight.  Afebrile, hemodynamically stable. Receiving prn Haldol for anxiety/agitation  Pt is nonverbal at baseline, appears comfortable although somewhat restless & continually gesturing for additional food/snacks. Discussed with mother at bedside, pt is at his baseline but has been more anxious & restless due to missing his bedtime Trazodone dose last night      MEDICATIONS  (STANDING):  brexpiprazole 4 milliGRAM(s) Oral daily  traZODone 150 milliGRAM(s) Oral once  traZODone 250 milliGRAM(s) Oral every 24 hours    MEDICATIONS  (PRN):  haloperidol    Injectable 5 milliGRAM(s) IntraMuscular every 8 hours PRN Agitation      Vital Signs Last 24 Hrs  T(C): 36.3 (27 May 2023 07:56), Max: 36.4 (27 May 2023 00:31)  T(F): 97.3 (27 May 2023 07:56), Max: 97.6 (27 May 2023 02:48)  HR: 87 (27 May 2023 07:56) (72 - 91)  BP: 105/78 (27 May 2023 07:56) (105/78 - 126/97)  BP(mean): --  RR: 17 (27 May 2023 07:56) (16 - 17)  SpO2: 99% (27 May 2023 07:56) (97% - 100%)    Parameters below as of 27 May 2023 07:56  Patient On (Oxygen Delivery Method): room air        PHYSICAL EXAM:  GENERAL: No apparent distress, pt sitting at edge of bed eating snacks  HEAD:  Atraumatic, normocephalic  EYES: EOMI, sclera clear  NECK: Supple  CHEST/LUNG: Nonlabored respirations on room air  HEART: Regular rate & rhythm  ABDOMEN: Soft, nontender, nondistended  EXTREMITIES: No peripheral edema  PSYCH: Nonverbal, not responding to examiner, somewhat restless appearing  NEUROLOGY: No focal deficits  SKIN: Areas of bruising evident on left upper extremity      LABS:                        13.0   13.36 )-----------( 2        ( 26 May 2023 18:28 )             41.1     05-26    138  |  102  |  12  ----------------------------<  109<H>  3.7   |  23  |  0.90    Ca    8.8      26 May 2023 18:28  Phos  3.2     05-26  Mg     2.00     05-26    TPro  5.8<L>  /  Alb  3.6  /  TBili  0.8  /  DBili  x   /  AST  18  /  ALT  26  /  AlkPhos  41  05-26    PT/INR - ( 26 May 2023 18:28 )   PT: 12.1 sec;   INR: 1.04 ratio      PTT - ( 26 May 2023 18:28 )  PTT:26.4 sec          RADIOLOGY & ADDITIONAL TESTS:  Reviewed    < from: CT Head No Cont (05.26.23 @ 20:43) >  FINDINGS:    Examination is motion limited at the cranial vertex.    There is no acute intracranial hemorrhage, mass effect, midline shift,   extra-axial collection,hydrocephalus, or evidence of acute vascular   territorial infarction.    The visualized paranasal sinuses and mastoid air cells are clear.   Intraorbital contents are unremarkable. Calvarium is intact.    IMPRESSION:    Motion limited at the cranialvertex. No evidence of acute intracranial   hemorrhage or mass effect.    < end of copied text >

## 2023-05-27 NOTE — H&P ADULT - NSHPPHYSICALEXAM_GEN_ALL_CORE
PHYSICAL EXAM:  GENERAL: NAD, well-developed  HEAD:  Atraumatic, Normocephalic  EYES: EOMI, PERRLA, conjunctiva and sclera clear  NECK: Supple, No JVD  CHEST/LUNG: Clear to auscultation bilaterally; No wheeze  HEART: Regular rate and rhythm; No murmurs, rubs, or gallops  ABDOMEN: Soft, Nontender, Nondistended; Bowel sounds present  EXTREMITIES:  2+ Peripheral Pulses, No clubbing, cyanosis, or edema  PSYCH: unable to assess as patient is nonverbal  NEUROLOGY: non-focal  SKIN: right lower rib 2 by 3 cm bruise noted, bruising on right forearm and hand, mild petechia and bruising on shins as well

## 2023-05-28 ENCOUNTER — TRANSCRIPTION ENCOUNTER (OUTPATIENT)
Age: 23
End: 2023-05-28

## 2023-05-28 LAB
ANION GAP SERPL CALC-SCNC: 12 MMOL/L — SIGNIFICANT CHANGE UP (ref 7–14)
BASOPHILS # BLD AUTO: 0.02 K/UL — SIGNIFICANT CHANGE UP (ref 0–0.2)
BASOPHILS NFR BLD AUTO: 0.1 % — SIGNIFICANT CHANGE UP (ref 0–2)
BUN SERPL-MCNC: 26 MG/DL — HIGH (ref 7–23)
CALCIUM SERPL-MCNC: 8.9 MG/DL — SIGNIFICANT CHANGE UP (ref 8.4–10.5)
CHLORIDE SERPL-SCNC: 103 MMOL/L — SIGNIFICANT CHANGE UP (ref 98–107)
CLOSURE TME COLL+EPINEP BLD: 3 K/UL — CRITICAL LOW (ref 150–400)
CLOSURE TME COLL+EPINEP BLD: 8 K/UL — SIGNIFICANT CHANGE UP (ref 150–400)
CO2 SERPL-SCNC: 21 MMOL/L — LOW (ref 22–31)
CREAT SERPL-MCNC: 0.72 MG/DL — SIGNIFICANT CHANGE UP (ref 0.5–1.3)
EGFR: 131 ML/MIN/1.73M2 — SIGNIFICANT CHANGE UP
EOSINOPHIL # BLD AUTO: 0 K/UL — SIGNIFICANT CHANGE UP (ref 0–0.5)
EOSINOPHIL NFR BLD AUTO: 0 % — SIGNIFICANT CHANGE UP (ref 0–6)
GLUCOSE SERPL-MCNC: 150 MG/DL — HIGH (ref 70–99)
HCT VFR BLD CALC: 44.2 % — SIGNIFICANT CHANGE UP (ref 39–50)
HGB BLD-MCNC: 13.3 G/DL — SIGNIFICANT CHANGE UP (ref 13–17)
IANC: 13.09 K/UL — HIGH (ref 1.8–7.4)
IMM GRANULOCYTES NFR BLD AUTO: 0.6 % — SIGNIFICANT CHANGE UP (ref 0–0.9)
LYMPHOCYTES # BLD AUTO: 0.55 K/UL — LOW (ref 1–3.3)
LYMPHOCYTES # BLD AUTO: 4 % — LOW (ref 13–44)
MAGNESIUM SERPL-MCNC: 2 MG/DL — SIGNIFICANT CHANGE UP (ref 1.6–2.6)
MCHC RBC-ENTMCNC: 26 PG — LOW (ref 27–34)
MCHC RBC-ENTMCNC: 30.1 GM/DL — LOW (ref 32–36)
MCV RBC AUTO: 86.5 FL — SIGNIFICANT CHANGE UP (ref 80–100)
MONOCYTES # BLD AUTO: 0.07 K/UL — SIGNIFICANT CHANGE UP (ref 0–0.9)
MONOCYTES NFR BLD AUTO: 0.5 % — LOW (ref 2–14)
NEUTROPHILS # BLD AUTO: 13.09 K/UL — HIGH (ref 1.8–7.4)
NEUTROPHILS NFR BLD AUTO: 94.8 % — HIGH (ref 43–77)
NRBC # BLD: 0 /100 WBCS — SIGNIFICANT CHANGE UP (ref 0–0)
NRBC # FLD: 0 K/UL — SIGNIFICANT CHANGE UP (ref 0–0)
PHOSPHATE SERPL-MCNC: 3.1 MG/DL — SIGNIFICANT CHANGE UP (ref 2.5–4.5)
PLATELET # BLD AUTO: 3 K/UL — CRITICAL LOW (ref 150–400)
POTASSIUM SERPL-MCNC: 4.4 MMOL/L — SIGNIFICANT CHANGE UP (ref 3.5–5.3)
POTASSIUM SERPL-SCNC: 4.4 MMOL/L — SIGNIFICANT CHANGE UP (ref 3.5–5.3)
RBC # BLD: 5.11 M/UL — SIGNIFICANT CHANGE UP (ref 4.2–5.8)
RBC # FLD: 15.9 % — HIGH (ref 10.3–14.5)
SODIUM SERPL-SCNC: 136 MMOL/L — SIGNIFICANT CHANGE UP (ref 135–145)
WBC # BLD: 13.81 K/UL — HIGH (ref 3.8–10.5)
WBC # FLD AUTO: 13.81 K/UL — HIGH (ref 3.8–10.5)

## 2023-05-28 RX ORDER — OLANZAPINE 15 MG/1
5 TABLET, FILM COATED ORAL EVERY 8 HOURS
Refills: 0 | Status: DISCONTINUED | OUTPATIENT
Start: 2023-05-28 | End: 2023-06-02

## 2023-05-28 RX ORDER — ROMIPLOSTIM 250 UG/.5ML
500 INJECTION, POWDER, LYOPHILIZED, FOR SOLUTION SUBCUTANEOUS ONCE
Refills: 0 | Status: COMPLETED | OUTPATIENT
Start: 2023-05-28 | End: 2023-05-28

## 2023-05-28 RX ADMIN — Medication 0.5 MILLIGRAM(S): at 07:11

## 2023-05-28 RX ADMIN — PANTOPRAZOLE SODIUM 40 MILLIGRAM(S): 20 TABLET, DELAYED RELEASE ORAL at 05:07

## 2023-05-28 RX ADMIN — Medication 40 MILLIGRAM(S): at 05:08

## 2023-05-28 RX ADMIN — BREXPIPRAZOLE 4 MILLIGRAM(S): 0.25 TABLET ORAL at 10:19

## 2023-05-28 RX ADMIN — Medication 1 TABLET(S): at 10:19

## 2023-05-28 RX ADMIN — ROMIPLOSTIM 500 MICROGRAM(S): 250 INJECTION, POWDER, LYOPHILIZED, FOR SOLUTION SUBCUTANEOUS at 22:04

## 2023-05-28 RX ADMIN — Medication 250 MILLIGRAM(S): at 22:09

## 2023-05-28 NOTE — PROGRESS NOTE ADULT - SUBJECTIVE AND OBJECTIVE BOX
Patient is a 23y old  Male who presents with a chief complaint of thrombocytopenia (28 May 2023 12:26)    Pt seen and examined at bedside. Spoke w/ mother at length.    MEDICATIONS  (STANDING):  brexpiprazole 4 milliGRAM(s) Oral daily  dexAMETHasone     Tablet 40 milliGRAM(s) Oral daily  multivitamin 1 Tablet(s) Oral daily  pantoprazole    Tablet 40 milliGRAM(s) Oral before breakfast  romiPLOStim Injectable 548 MICROGram(s) SubCutaneous once  traZODone 250 milliGRAM(s) Oral at bedtime    MEDICATIONS  (PRN):  ALPRAZolam 0.5 milliGRAM(s) Oral every 8 hours PRN Anxiety or agitation  OLANZapine Injectable 5 milliGRAM(s) IntraMuscular every 8 hours PRN Severe agitation & refusing PO meds    Vital Signs Last 24 Hrs  T(C): 36.7 (28 May 2023 04:27), Max: 36.8 (27 May 2023 22:18)  T(F): 98.1 (28 May 2023 04:27), Max: 98.2 (27 May 2023 22:18)  HR: 80 (28 May 2023 04:27) (78 - 80)  BP: 117/62 (28 May 2023 04:27) (117/62 - 118/60)  BP(mean): --  RR: 18 (28 May 2023 04:27) (18 - 18)  SpO2: 97% (28 May 2023 04:27) (97% - 99%)    Parameters below as of 28 May 2023 04:27  Patient On (Oxygen Delivery Method): room air        PE  NAD  Awake, alert  Anicteric, MMM  No c/c/e  No rash grossly  FROM                          13.3   13.81 )-----------( 3        ( 28 May 2023 04:45 )             44.2       05-28    136  |  103  |  26<H>  ----------------------------<  150<H>  4.4   |  21<L>  |  0.72    Ca    8.9      28 May 2023 04:45  Phos  3.1     05-28  Mg     2.00     05-28    TPro  5.9<L>  /  Alb  3.7  /  TBili  0.7  /  DBili  x   /  AST  13  /  ALT  25  /  AlkPhos  43  05-27

## 2023-05-28 NOTE — PROGRESS NOTE ADULT - ASSESSMENT
23 year old male with autism and chronic ITP followed by Dr. Bhagat at [facility], admitted due to ITP.     ITP  - Chronic, follows w/ Dr. Bhagat at [facility]  - He has received multiple therapies including IVIG, steroids, rituxan in 2010 and 2021, and Doptelet (LD 5/22)  - Will give Nplate today  - Continue pulse Dex 40mg x 3  - Will plan for Rituxan likely Tuesday if plt improve  - Continue to monitor CBC. Would transfuse if bleeding. Otherwise hold off on transfusion.    Discussed in detail with patient's mother.    Will continue to follow.     Get Cota PA-C  Hematology/Oncology  [facility]  [phone] (office)

## 2023-05-28 NOTE — DISCHARGE NOTE PROVIDER - NSDCCPTREATMENT_GEN_ALL_CORE_FT
PRINCIPAL PROCEDURE  Procedure: CT head  Findings and Treatment: FINDINGS:  Examination is motion limited at the cranial vertex.  There is no acute intracranial hemorrhage, mass effect, midline shift,   extra-axial collection,hydrocephalus, or evidence of acute vascular   territorial infarction.  The visualized paranasal sinuses and mastoid air cells are clear.   Intraorbital contents are unremarkable. Calvarium is intact.  IMPRESSION:  Motion limited at the cranialvertex. No evidence of acute intracranial   hemorrhage or mass effect.

## 2023-05-28 NOTE — PROGRESS NOTE ADULT - ASSESSMENT
Mr. Denilson Hernandes is a 23 year old M w/ PMH of intellectual disability, autism, nonverbal at baseline, CARLITOS not on CPAP, severe chronic ITP, referred by his hematologist due to thrombocytopenia to 3k on outpt labs.

## 2023-05-28 NOTE — DISCHARGE NOTE PROVIDER - PROVIDER TOKENS
PROVIDER:[TOKEN:[[id number]],FOLLOWUP:[2 weeks],ESTABLISHEDPATIENT:[T]],PROVIDER:[TOKEN:[[id number]],FOLLOWUP:[1 week],ESTABLISHEDPATIENT:[T]]

## 2023-05-28 NOTE — PROGRESS NOTE ADULT - SUBJECTIVE AND OBJECTIVE BOX
DATE OF SERVICE: 05-28-23 @ 07:14    Patient is a 23y old  Male who presents with a chief complaint of thrombocytopenia (27 May 2023 12:50)      SUBJECTIVE / OVERNIGHT EVENTS:  No acute events overnight  Afebrile, hemodynamically stable. Required 0.5 xanax prn this am  ***    MEDICATIONS  (STANDING):  brexpiprazole 4 milliGRAM(s) Oral daily  dexAMETHasone     Tablet 40 milliGRAM(s) Oral daily  multivitamin 1 Tablet(s) Oral daily  pantoprazole    Tablet 40 milliGRAM(s) Oral before breakfast  traZODone 250 milliGRAM(s) Oral at bedtime    MEDICATIONS  (PRN):  ALPRAZolam 0.5 milliGRAM(s) Oral every 8 hours PRN Anxiety or agitation      Vital Signs Last 24 Hrs  T(C): 36.7 (28 May 2023 04:27), Max: 36.8 (27 May 2023 22:18)  T(F): 98.1 (28 May 2023 04:27), Max: 98.2 (27 May 2023 22:18)  HR: 80 (28 May 2023 04:27) (78 - 87)  BP: 117/62 (28 May 2023 04:27) (105/78 - 118/60)  BP(mean): --  RR: 18 (28 May 2023 04:27) (17 - 18)  SpO2: 97% (28 May 2023 04:27) (97% - 99%)    Parameters below as of 28 May 2023 04:27  Patient On (Oxygen Delivery Method): room air      PHYSICAL EXAM:  GENERAL: No apparent distress, pt sitting at edge of bed eating snacks  HEAD:  Atraumatic, normocephalic  EYES: EOMI, sclera clear  NECK: Supple  CHEST/LUNG: Nonlabored respirations on room air  HEART: Regular rate & rhythm  ABDOMEN: Soft, nontender, nondistended  EXTREMITIES: No peripheral edema  PSYCH: Nonverbal, not responding to examiner, somewhat restless appearing  NEUROLOGY: No focal deficits  SKIN: Areas of bruising evident on left upper extremity      LABS:                        13.3   13.81 )-----------( 3        ( 28 May 2023 04:45 )             44.2     05-28    136  |  103  |  26<H>  ----------------------------<  150<H>  4.4   |  21<L>  |  0.72    Ca    8.9      28 May 2023 04:45  Phos  3.1     05-28  Mg     2.00     05-28    TPro  5.9<L>  /  Alb  3.7  /  TBili  0.7  /  DBili  x   /  AST  13  /  ALT  25  /  AlkPhos  43  05-27    PT/INR - ( 27 May 2023 05:47 )   PT: 11.0 sec;   INR: 0.95 ratio         PTT - ( 27 May 2023 05:47 )  PTT:28.1 sec      RADIOLOGY & ADDITIONAL TESTS:  Reviewed     DATE OF SERVICE: 05-28-23 @ 07:14    Patient is a 23y old  Male who presents with a chief complaint of thrombocytopenia (27 May 2023 12:50)      SUBJECTIVE / OVERNIGHT EVENTS:  No acute events overnight  Afebrile, hemodynamically stable. Required 0.5 xanax prn this am  Pt at his baseline this morning. Mom at bedside. He is relatively calm. He was noted to have brief episode of eyes rolling back, no convulsions. 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    MEDICATIONS  (STANDING):  brexpiprazole 4 milliGRAM(s) Oral daily  dexAMETHasone     Tablet 40 milliGRAM(s) Oral daily  multivitamin 1 Tablet(s) Oral daily  pantoprazole    Tablet 40 milliGRAM(s) Oral before breakfast  traZODone 250 milliGRAM(s) Oral at bedtime    MEDICATIONS  (PRN):  ALPRAZolam 0.5 milliGRAM(s) Oral every 8 hours PRN Anxiety or agitation      Vital Signs Last 24 Hrs  T(C): 36.7 (28 May 2023 04:27), Max: 36.8 (27 May 2023 22:18)  T(F): 98.1 (28 May 2023 04:27), Max: 98.2 (27 May 2023 22:18)  HR: 80 (28 May 2023 04:27) (78 - 87)  BP: 117/62 (28 May 2023 04:27) (105/78 - 118/60)  BP(mean): --  RR: 18 (28 May 2023 04:27) (17 - 18)  SpO2: 97% (28 May 2023 04:27) (97% - 99%)    Parameters below as of 28 May 2023 04:27  Patient On (Oxygen Delivery Method): room air      PHYSICAL EXAM:  GENERAL: No apparent distress, pt sitting in bed eating cereal  HEAD:  Atraumatic, normocephalic  EYES: EOMI, sclera clear  NECK: Supple  CHEST/LUNG: Nonlabored respirations on room air  HEART: Deferred auscultation  ABDOMEN: Soft, nontender, nondistended  EXTREMITIES: No peripheral edema  PSYCH: Nonverbal, not responding to examiner, somewhat restless appearing  NEUROLOGY: No focal deficits  SKIN: Areas of bruising evident on left upper extremity      LABS:                        13.3   13.81 )-----------( 3        ( 28 May 2023 04:45 )             44.2     05-28    136  |  103  |  26<H>  ----------------------------<  150<H>  4.4   |  21<L>  |  0.72    Ca    8.9      28 May 2023 04:45  Phos  3.1     05-28  Mg     2.00     05-28    TPro  5.9<L>  /  Alb  3.7  /  TBili  0.7  /  DBili  x   /  AST  13  /  ALT  25  /  AlkPhos  43  05-27    PT/INR - ( 27 May 2023 05:47 )   PT: 11.0 sec;   INR: 0.95 ratio         PTT - ( 27 May 2023 05:47 )  PTT:28.1 sec      RADIOLOGY & ADDITIONAL TESTS:  Reviewed

## 2023-05-28 NOTE — DISCHARGE NOTE PROVIDER - CARE PROVIDER_API CALL
Richie Sandoval N  [address], [address]  [place] [address]  Phone: [phone]  Fax: [phone]  Established Patient  Follow Up Time: 2 weeks    Rosaura Bhagat  Hematology  [address] [place] [address]  Phone: [phone]  Fax: [phone]  Established Patient  Follow Up Time: 1 week

## 2023-05-28 NOTE — DISCHARGE NOTE PROVIDER - HOSPITAL COURSE
DISCHARGE SUMMARY    Mr. Denilson Hernandes is a 23 year old male with PMH of intellectual disability, autism (nonverbal at baseline), chronic ITP, admitted on the recommendation of his hematologist due to severe thrombocytopenia on outpatient labs (Plts as low as 3k). On arrival, pt noted to have Plt 8 on blue top collection. Noted to have ecchymoses on the R lower abdomen and left upper extremity which remained stable. CTH negative for ICH. Received prednisone 50 mg in the ED, subsequently started on pulse steroids w/ dexamethasone 40 mg daily per hematology recs. Received romiplostim injection on 5/28. Platelets improved to *** prior to discharge. Pt & family instructed to follow up with his hematologist Dr. Rosaura Bhagat for further monitoring & management.    While admitted, pt was intermittently agitated & anxious requiring prn medications. He was continued on home Rexulti & trazodone. He initially received prn Haldol, however this was discontinued after pt's family member expressed concern that Haldol was contributing to platelet suppression given the timing of when that medication was started. Pt was switched to prn PO Xanax & prn IM Zyprexa.    ACTION ITEMS    1. Disposition: Return to group home  2. Follow up with PCP, hematology  3. Medication changes: *** Mr. Denilson Hernandes is a 23 year old male with PMH of intellectual disability, autism (nonverbal at baseline), chronic ITP, admitted on the recommendation of his hematologist due to severe thrombocytopenia on outpatient labs (Plts as low as 3k). On arrival, pt noted to have Plt 8 on blue top collection. Noted to have ecchymoses on the R lower abdomen and left upper extremity which remained stable. CTH negative for ICH. Received prednisone 50 mg in the ED, subsequently started on pulse steroids w/ dexamethasone 40 mg daily per hematology recs. Received romiplostim injection on 5/28. Per heme/onc recs, he was given rituxan. Platelets improved  prior to discharge. Pt & family instructed to follow up with his hematologist Dr. Rosaura Bhagat for further monitoring & management.    While admitted, pt was intermittently agitated & anxious requiring prn medications. He was continued on home Rexulti & trazodone. He initially received prn Haldol, however this was discontinued after pt's family member expressed concern that Haldol was contributing to platelet suppression given the timing of when that medication was started. Pt was switched to prn PO Xanax & prn IM Zyprexa.    ACTION ITEMS    1. Disposition: Return to group home  2. Follow up with PCP, hematology  3. Medication changes: *** Mr. Denilson Hernandes is a 23 year old male with PMH of intellectual disability, autism (nonverbal at baseline), chronic ITP, admitted on the recommendation of his hematologist due to severe thrombocytopenia on outpatient labs (Plts as low as 3k). On arrival, pt noted to have Plt 8 on blue top collection. Noted to have ecchymoses on the R lower abdomen and bilateral upper extremities which remained stable. CTH negative for ICH. Received prednisone 50 mg in the ED, subsequently started on pulse steroids w/ dexamethasone 40 mg daily per hematology recs. Received romiplostim injection on 5/28. Per heme/onc recs, he was given rituxan on 5/31. Platelets improved  prior to discharge. Pt & family instructed to follow up with his hematologist Dr. Rosaura Bhagat for further monitoring & management.    While admitted, pt was intermittently agitated & anxious requiring prn medications. He was continued on home Rexulti & trazodone. He initially received prn Haldol, however this was discontinued after pt's family member expressed concern that Haldol was contributing to platelet suppression given the timing of when that medication was started. Pt was switched to prn PO Xanax & prn IM Zyprexa.    On the day of discharge he is hemodynamically stable and medically optimized for discharge back to group home. He will follow up as outpatient with his PCP and hematologist.     ACTION ITEMS    1. Disposition: Return to group home  2. Follow up with PCP, hematology Mr. Denilson Hernandes is a 23 year old male with PMH of intellectual disability, autism (nonverbal at baseline), chronic ITP, admitted on the recommendation of his hematologist due to severe thrombocytopenia on outpatient labs (Plts as low as 3k). On arrival, pt noted to have Plt 8 on blue top collection. Noted to have ecchymoses on the R lower abdomen and bilateral upper extremities which remained stable. CTH negative for ICH. Received prednisone 50 mg in the ED, subsequently started on pulse steroids w/ dexamethasone 40 mg daily per hematology recs. Received romiplostim injection on 5/28. Per heme/onc recs, he was given rituxan on 5/31. Platelets improved  prior to discharge. Pt & family instructed to follow up with his hematologist Dr. Rosaura Bhagat for further monitoring & management.    While admitted, pt was intermittently agitated & anxious requiring prn medications. He was continued on home Rexulti & trazodone. He initially received prn Haldol, however this was discontinued after pt's family member expressed concern that Haldol was contributing to platelet suppression given the timing of when that medication was started. Pt was switched to prn PO Xanax & prn IM Zyprexa.    On the day of discharge he is hemodynamically stable and medically optimized for discharge back to group home with prednisone 50 daily. He will follow up as outpatient with his PCP and hematologist.     ACTION ITEMS    1. Disposition: Return to group home  2. Follow up with PCP, hematology Mr. Denilson Hernandes is a 23 year old male with PMH of intellectual disability, autism (nonverbal at baseline), chronic ITP, admitted on the recommendation of his hematologist due to severe thrombocytopenia on outpatient labs (Plts as low as 3k). On arrival, pt noted to have Plt 8 on blue top collection. Noted to have ecchymoses on the R lower abdomen and bilateral upper extremities which remained stable. CTH negative for ICH. Received prednisone 50 mg in the ED, subsequently started on pulse steroids w/ dexamethasone 40 mg daily per hematology recs. Received romiplostim injection on 5/28. Per heme/onc recs, he was given rituxan on 5/31. Platelets improved  prior to discharge. Pt & family instructed to follow up with his hematologist Dr. Rosaura Bhagat for further monitoring & management.    While admitted, pt was intermittently agitated & anxious requiring prn medications. He was continued on home Rexulti & trazodone. He initially received prn Haldol, however this was discontinued after pt's family member expressed concern that Haldol was contributing to platelet suppression given the timing of when that medication was started. Pt was switched to prn PO Xanax & prn IM Zyprexa.    On the day of discharge he is hemodynamically stable and medically optimized for discharge back to group home with prednisone 40 daily. He will follow up as outpatient with his PCP and hematologist.     ACTION ITEMS    1. Disposition: Return to group home  2. Follow up with PCP, hematology Mr. Denilson Hernandes is a 23 year old male with PMH of intellectual disability, autism (nonverbal at baseline), chronic ITP, admitted on the recommendation of his hematologist due to severe thrombocytopenia on outpatient labs (Plts as low as 3k). On arrival, pt noted to have Plt 8 on blue top collection. Noted to have ecchymoses on the R lower abdomen and bilateral upper extremities which remained stable. CTH negative for ICH. Received prednisone 50 mg in the ED, subsequently started on pulse steroids w/ dexamethasone 40 mg daily per hematology recs. Received romiplostim injection on 5/28 and 6/2. Per heme/onc recs, he was given rituxan on 5/31 and 6/7. He was also given 1 unit of platelets. Platelets improved prior to discharge. Pt & family instructed to follow up with his hematologist Dr. Rosaura Bhagat for further monitoring & management.    While admitted, pt was intermittently agitated & anxious requiring prn medications, attributed to steroid adverse effects. Psych evaluated patient and he was started on haldol, thorazine and benadryl. IV and IM ativan were avoided in setting of paradoxical agitation.     On the day of discharge he is hemodynamically stable and medically optimized for discharge back to group home. His steroids were tapered. He will follow up as outpatient with his PCP and hematologist.     ACTION ITEMS    1. Disposition: Return to group home  2. Follow up with PCP, hematology Mr. Denilson Hernandes is a 23 year old male with PMH of intellectual disability, autism (nonverbal at baseline), chronic ITP, admitted on the recommendation of his hematologist due to severe thrombocytopenia on outpatient labs (Plts as low as 3k). On arrival, pt noted to have Plt 8 on blue top collection. Noted to have ecchymoses on the R lower abdomen and bilateral upper extremities which remained stable. CTH negative for ICH. Received prednisone 50 mg in the ED, subsequently started on pulse steroids w/ dexamethasone 40 mg daily per hematology recs. Received romiplostim injection on 5/28 and 6/2. Per heme/onc recs, he was given rituxan on 5/31 and 6/7. He was also given 1 unit of platelets. Platelets improved prior to discharge. Pt & family instructed to follow up with his hematologist Dr. Rosaura Bhagat for further monitoring & management.    While admitted, pt was intermittently agitated & anxious requiring prn medications, attributed to steroid adverse effects. Psych evaluated patient and he was started on haldol, thorazine and benadryl. IV and IM ativan were avoided in setting of paradoxical agitation.     On the day of discharge he is hemodynamically stable and medically optimized for discharge back to group home. His steroids were tapered. He will follow up as outpatient with his PCP and hematologist.     ACTION ITEMS    1. Disposition: Return to group home  2. Follow up with PCP, hematology    On 06/12/2023, case was discussed with Dr. Driscoll, patient is medically cleared and optimized for discharge today. All medications were reviewed with attending, and sent to mutually agreed upon pharmacy.

## 2023-05-28 NOTE — PROGRESS NOTE ADULT - PROBLEM SELECTOR PLAN 1
Extensive history of chronic ITP s/p numerous treatments. Previously allergic to rituximab (anaphylaxis/serum sickness) however underwent successful desensitization in Apr 2021 and tolerated rituximab at that time. That was the last time he received rituximab.  - most recently pt was discharged on a steroid taper; was on prednisone 20 mg daily prior to coming to ED  - s/p prednisone 50 mg in ED  - CT head negative for ICH  - small 2 by 3 cm right lower abdomen bruising noted, monitor, if any flank bruising, sudden drop in Hg, or hypotension might need urgent CT angio a/p r/o RP bleeding  - c/w dexamethasone 40 po qd x3 days per heme (5/27 - 5/29 )  - start PPI for GI ppx  - will consider rituxan if counts fail to improve  - consulted [facility] heme, recs appreciated Extensive history of chronic ITP s/p numerous treatments. Previously allergic to rituximab (anaphylaxis/serum sickness) however underwent successful desensitization in Apr 2021 and tolerated rituximab at that time. That was the last time he received rituximab.  - most recently pt was discharged on a steroid taper; was on prednisone 20 mg daily prior to coming to ED  - s/p prednisone 50 mg in ED  - CT head negative for ICH  - small 2 by 3 cm right lower abdomen bruising noted, monitor, if any flank bruising, sudden drop in Hg, or hypotension might need urgent CT angio a/p r/o RP bleeding  - c/w dexamethasone 40 po qd x3 days per heme (5/27 - 5/29 )  - start PPI for GI ppx  - romiplostim shot on Monday 5/29  - will consider rituxan if counts fail to improve  - consulted [facility] heme, recs appreciated Extensive history of chronic ITP s/p numerous treatments. Previously allergic to rituximab (anaphylaxis/serum sickness) however underwent successful desensitization in Apr 2021 and tolerated rituximab at that time. That was the last time he received rituximab.  - most recently pt was discharged on a steroid taper; was on prednisone 20 mg daily prior to coming to ED  - s/p prednisone 50 mg in ED  - CT head negative for ICH  - small 2 by 3 cm right lower abdomen bruising noted, monitor, if any flank bruising, sudden drop in Hg, or hypotension might need urgent CT angio a/p r/o RP bleeding  - c/w dexamethasone 40 po qd x3 days per heme (5/27 - 5/29 )  - start PPI for GI ppx  - romiplostim shot today  - consulted [facility] heme, recs appreciated

## 2023-05-28 NOTE — PROGRESS NOTE ADULT - PROBLEM SELECTOR PLAN 2
Pt is nonverbal at baseline. Lives in group home. Pt recently had a first-time seizure last week, briefly hospitalized, determined to be adverse effect of Doptelet (avatrombopag) which is since discontinued. CTH shows no acute processes  - c/w home Rexulti  - resume trazodone 250 nightly  - mom concerned about Haldol contributing to Plt suppression, will d/c Haldol for now  - PO Xanax 0.5 mg q8h prn  - last QTc wnl Pt is nonverbal at baseline. Lives in group home. Pt recently had a first-time seizure last week, briefly hospitalized, determined to be adverse effect of Doptelet (avatrombopag) which is since discontinued. CTH shows no acute processes  - c/w home Rexulti  - resume trazodone 250 nightly  - mom concerned about Haldol contributing to Plt suppression, will d/c Haldol for now  - PO Xanax 0.5 mg q8h prn, tolerating well  - can try IM Zyprexa as needed if pt agitated/aggressive and not taking PO  - last QTc wnl

## 2023-05-28 NOTE — DISCHARGE NOTE PROVIDER - NSDCFUADDAPPT_GEN_ALL_CORE_FT
Recommend follow up care at [facility]: [phone]. Address: [address] [place] [address].

## 2023-05-28 NOTE — DISCHARGE NOTE PROVIDER - NSDCFUADDINST_GEN_ALL_CORE_FT
Right buttock: Cleanse with NS, pat dry. Apply Liquid barrier film to periwound skin (allow to dry). Loosely pack cavity with iodoform 1/4" packing strip as primary dressing to absorb and fill dead space. Cover with silicone foam with border. Change daily and PRN if soiled.     Abdominal pannus: Cleanse with skin cleanser, pat dry. Place Interdry textile sheeting, under intertriginous folds leaving 2 inches exposed at ends to wick, remove to wash & dry affected area, then replace. Individual sheeting may be used for up to 5 days unless soiled. Inspect skin daily.     Buttocks/perineum: Cleanse with skin cleanser, pat dry. Apply Shazia moisture barrier cream twice a day and PRN with incontinent episodes.     Continue low air loss bed therapy, continue heel elevation, continue to turn & reposition as per protocol, soft pillow between bony prominences, continue moisture management with barrier creams & single breathable pad, continue measures to decrease friction/shear/pressure. Continue with nutritional support as per dietary/orders.  Right buttock: Cleanse with NS, pat dry. Apply Liquid barrier film to periwound skin (allow to dry). Cover with silicone foam with border. Change daily and PRN if soiled.     Abdominal pannus: Cleanse with skin cleanser, pat dry. Place Interdry textile sheeting, under intertriginous folds leaving 2 inches exposed at ends to wick, remove to wash & dry affected area, then replace. Individual sheeting may be used for up to 5 days unless soiled. Inspect skin daily.     Buttocks/perineum: Cleanse with skin cleanser, pat dry. Apply Shazia moisture barrier cream twice a day and PRN with incontinent episodes.     Continue low air loss bed therapy, continue heel elevation, continue to turn & reposition as per protocol, soft pillow between bony prominences, continue moisture management with barrier creams & single breathable pad, continue measures to decrease friction/shear/pressure. Continue with nutritional support as per dietary/orders.

## 2023-05-28 NOTE — DISCHARGE NOTE PROVIDER - NSDCMRMEDTOKEN_GEN_ALL_CORE_FT
cimetidine 400 mg oral tablet: 1 tab(s) orally 3 times a day (with meals)  ketoconazole 2% topical cream: Apply topically to affected area once a day (between toes)  loratadine 10 mg oral tablet: 1 tab(s) orally once a day as needed for  allergy symptoms  Multiple Vitamins oral tablet: 1 tab(s) orally once a day  predniSONE 20 mg oral tablet: 1 tab(s) orally once a day (patient being tapered off; down to 20mg once daily for 3 days prior to admission)  Rexulti 4 mg oral tablet: 1 tab(s) orally once a day  traZODone 100 mg oral tablet: 1 tab(s) orally once a day (at bedtime) (total dose 250mg at bedtime)  traZODone 150 mg oral tablet: 1 tab(s) orally once a day (at bedtime) (total dose 250mg at bedtime)   cimetidine 400 mg oral tablet: 1 tab(s) orally 3 times a day (with meals)  ketoconazole 2% topical cream: Apply topically to affected area once a day (between toes)  loratadine 10 mg oral tablet: 1 tab(s) orally once a day as needed for  allergy symptoms  Multiple Vitamins oral tablet: 1 tab(s) orally once a day  predniSONE 50 mg oral tablet: 1 tab(s) orally once a day  Protonix 40 mg oral delayed release tablet: 1 tab(s) orally once a day (before a meal)  Rexulti 4 mg oral tablet: 1 tab(s) orally once a day  traZODone 100 mg oral tablet: 1 tab(s) orally once a day (at bedtime) (total dose 250mg at bedtime)  traZODone 150 mg oral tablet: 1 tab(s) orally once a day (at bedtime) (total dose 250mg at bedtime)   cimetidine 400 mg oral tablet: 1 tab(s) orally 3 times a day (with meals)  ketoconazole 2% topical cream: Apply topically to affected area once a day (between toes)  loratadine 10 mg oral tablet: 1 tab(s) orally once a day as needed for  allergy symptoms  Multiple Vitamins oral tablet: 1 tab(s) orally once a day  predniSONE 20 mg oral tablet: 2 tab(s) orally once  Protonix 40 mg oral delayed release tablet: 1 tab(s) orally once a day (before a meal)  Rexulti 4 mg oral tablet: 1 tab(s) orally once a day  traZODone 100 mg oral tablet: 1 tab(s) orally once a day (at bedtime) (total dose 250mg at bedtime)  traZODone 150 mg oral tablet: 1 tab(s) orally once a day (at bedtime) (total dose 250mg at bedtime)  Xanax 0.5 mg oral tablet: 1 tab(s) orally every 8 hours as needed for Anxiety or agitation MDD: 3 tablets (1.5 mg)   1 box Silicone foam wound dressing with border: Apply to wound as per care instructions  lisa topical lotion: apply to wounds as needed per wound care instructions  cimetidine 400 mg oral tablet: 1 tab(s) orally 3 times a day (with meals)  Interdry textile sheeting: Apply to wounds as per wound care instructions  ketoconazole 2% topical cream: Apply topically to affected area once a day (between toes)  liquid barrier film: apply to wounds as per wound care instructions  loratadine 10 mg oral tablet: 1 tab(s) orally once a day as needed for  allergy symptoms  Multiple Vitamins oral tablet: 1 tab(s) orally once a day  predniSONE 20 mg oral tablet: 2 tab(s) orally once  Protonix 40 mg oral delayed release tablet: 1 tab(s) orally once a day (before a meal)  Rexulti 4 mg oral tablet: 1 tab(s) orally once a day  traZODone 100 mg oral tablet: 1 tab(s) orally once a day (at bedtime) (total dose 250mg at bedtime)  traZODone 150 mg oral tablet: 1 tab(s) orally once a day (at bedtime) (total dose 250mg at bedtime)  Xanax 0.5 mg oral tablet: 1 tab(s) orally every 8 hours as needed for Anxiety or agitation MDD: 3 tablets (1.5 mg)   1 box Silicone foam wound dressing with border: Apply to wound as per care instructions  lisa topical lotion: apply to wounds as needed per wound care instructions  cimetidine 400 mg oral tablet: 1 tab(s) orally 3 times a day (with meals)  Interdry textile sheeting: Apply to wounds as per wound care instructions  ketoconazole 2% topical cream: Apply topically to affected area once a day (between toes)  lactobacillus acidophilus oral capsule: 1 cap(s) orally once a day  liquid barrier film: apply to wounds as per wound care instructions  loratadine 10 mg oral tablet: 1 tab(s) orally once a day as needed for  allergy symptoms  Multiple Vitamins oral tablet: 1 tab(s) orally once a day  predniSONE 20 mg oral tablet: 2 tab(s) orally once  Protonix 40 mg oral delayed release tablet: 1 tab(s) orally once a day (before a meal)  Rexulti 4 mg oral tablet: 1 tab(s) orally once a day  traZODone 100 mg oral tablet: 1 tab(s) orally once a day (at bedtime) (total dose 250mg at bedtime)  traZODone 150 mg oral tablet: 1 tab(s) orally once a day (at bedtime) (total dose 250mg at bedtime)  Xanax 0.5 mg oral tablet: 1 tab(s) orally every 8 hours as needed for Anxiety or agitation MDD: 3 tablets (1.5 mg)   1 box Silicone foam wound dressing with border: Apply to wound as per care instructions  Ativan 1 mg oral tablet: 1 tab(s) orally every 8 hours as needed for  agitation MDD: 3  lisa topical lotion: apply to wounds as needed per wound care instructions  cimetidine 400 mg oral tablet: 1 tab(s) orally 3 times a day (with meals)  Interdry textile sheeting: Apply to wounds as per wound care instructions  ketoconazole 2% topical cream: Apply topically to affected area once a day (between toes)  lactobacillus acidophilus oral capsule: 1 cap(s) orally once a day  liquid barrier film: apply to wounds as per wound care instructions  loratadine 10 mg oral tablet: 1 tab(s) orally once a day as needed for  allergy symptoms  Multiple Vitamins oral tablet: 1 tab(s) orally once a day  Rexulti 4 mg oral tablet: 1 tab(s) orally once a day  traZODone 100 mg oral tablet: 1 tab(s) orally once a day (at bedtime) (total dose 250mg at bedtime)  traZODone 150 mg oral tablet: 1 tab(s) orally once a day (at bedtime) (total dose 250mg at bedtime)

## 2023-05-28 NOTE — DISCHARGE NOTE PROVIDER - NSDCCPCAREPLAN_GEN_ALL_CORE_FT
PRINCIPAL DISCHARGE DIAGNOSIS  Diagnosis: Thrombocytopenia  Assessment and Plan of Treatment: You were admitted for management of low platelets, or thrombocytopenia. Hematology was consulted to assist in your treatment plan. You received prednisone 50 mg in the ED on arrival. You were then started on dexamethasone 40 mg. In addition, you received an Nplate injection to help increase your platelet count. You will need to take *** following discharge.  Please follow up with your PCP, as well as your hematologist, after leaving the hospital. It is very important that you attend these follow-up appointments and take all medications as prescribed on discharge.  If you develop new or worsening symptoms, such as abdominal pain, back pain, flank pain, severe headache, unresponsiveness, or significant bleeding, please call your doctor or go directly to the nearest emergency room.      SECONDARY DISCHARGE DIAGNOSES  Diagnosis: Anxiety  Assessment and Plan of Treatment: You were continued on trazodone while admitted. Haldol as needed was discontinued and replaced with Xanax as needed. It remains unclear whether Haldol was truly contributing to suppression of platelets, but it is reasonable to assess your response off Haldol.     PRINCIPAL DISCHARGE DIAGNOSIS  Diagnosis: Thrombocytopenia  Assessment and Plan of Treatment: You were admitted for management of low platelets, or thrombocytopenia. Hematology was consulted to assist in your treatment plan. You received prednisone 50 mg in the ED on arrival. You were then started on dexamethasone 40 mg. In addition, you received an Nplate injection and a dose of rituxan to help increase your platelet count. You will need to take prednisone 50 dayily following discharge.  Please follow up with your PCP, as well as your hematologist, after leaving the hospital. It is very important that you attend these follow-up appointments and take all medications as prescribed on discharge.  If you develop new or worsening symptoms, such as abdominal pain, back pain, flank pain, severe headache, unresponsiveness, or significant bleeding, please call your doctor or go directly to the nearest emergency room.      SECONDARY DISCHARGE DIAGNOSES  Diagnosis: Anxiety  Assessment and Plan of Treatment: You were continued on trazodone while admitted. Haldol as needed was discontinued and replaced with Xanax as needed. It remains unclear whether Haldol was truly contributing to suppression of platelets, but it is reasonable to assess your response off Haldol.     PRINCIPAL DISCHARGE DIAGNOSIS  Diagnosis: Thrombocytopenia  Assessment and Plan of Treatment: You were admitted for management of low platelets, or thrombocytopenia. Hematology was consulted to assist in your treatment plan. You received prednisone 50 mg in the ED on arrival. You were then started on dexamethasone 40 mg. In addition, you received an Nplate injection and a dose of rituxan to help increase your platelet count. You will need to take prednisone 40 daily following discharge.  Please follow up with your PCP, as well as your hematologist, after leaving the hospital. It is very important that you attend these follow-up appointments and take all medications as prescribed on discharge.  If you develop new or worsening symptoms, such as abdominal pain, back pain, flank pain, severe headache, unresponsiveness, or significant bleeding, please call your doctor or go directly to the nearest emergency room.      SECONDARY DISCHARGE DIAGNOSES  Diagnosis: Anxiety  Assessment and Plan of Treatment: You were continued on trazodone while admitted. Haldol as needed was discontinued and replaced with Xanax as needed. It remains unclear whether Haldol was truly contributing to suppression of platelets, but it is reasonable to assess your response off Haldol.     PRINCIPAL DISCHARGE DIAGNOSIS  Diagnosis: Thrombocytopenia  Assessment and Plan of Treatment: You were admitted for management of low platelets, or thrombocytopenia. Hematology was consulted to assist in your treatment plan. You receiveds steroids, NPlate injections and rituxan to help increase your platelet count.  Please follow up with your PCP, as well as your hematologist, after leaving the hospital. It is very important that you attend these follow-up appointments and take all medications as prescribed on discharge.  If you develop new or worsening symptoms, such as abdominal pain, back pain, flank pain, severe headache, unresponsiveness, or significant bleeding, please call your doctor or go directly to the nearest emergency room.     PRINCIPAL DISCHARGE DIAGNOSIS  Diagnosis: Thrombocytopenia  Assessment and Plan of Treatment: You were admitted for management of low platelets, or thrombocytopenia. Hematology was consulted to assist in your treatment plan. You receiveds steroids, NPlate injections and rituxan to help increase your platelet count.  Please follow up with your PCP, as well as your hematologist, after leaving the hospital. It is very important that you attend these follow-up appointments and take all medications as prescribed on discharge.  If you develop new or worsening symptoms, such as abdominal pain, back pain, flank pain, severe headache, unresponsiveness, or significant bleeding, please call your doctor or go directly to the nearest emergency room.  **Follow up with Dr. Bhagat at [address] [place] [address] and call [phone] for a appointment in 1 week.****     PRINCIPAL DISCHARGE DIAGNOSIS  Diagnosis: Thrombocytopenia  Assessment and Plan of Treatment: You were admitted for management of low platelets, or thrombocytopenia. Hematology was consulted to assist in your treatment plan. You receiveds steroids, NPlate injections and rituxan to help increase your platelet count.  Please follow up with your PCP, as well as your hematologist, after leaving the hospital. It is very important that you attend these follow-up appointments and take all medications as prescribed on discharge.  If you develop new or worsening symptoms, such as abdominal pain, back pain, flank pain, severe headache, unresponsiveness, or significant bleeding, please call your doctor or go directly to the nearest emergency room.  **Follow up with Dr. Bhagat at [address] [place] [address] and call [phone] for a appointment in 1 week.****      SECONDARY DISCHARGE DIAGNOSES  Diagnosis: Wound of skin  Assessment and Plan of Treatment: The following are wound care instructions on how to treat your wound.   Right buttock: Cleanse with NS, pat dry. Apply Liquid barrier film to periwound skin (allow to dry). Loosely pack cavity with iodoform 1/4" packing strip as primary dressing to absorb and fill dead space. Cover with silicone foam with border. Change daily and PRN if soiled.   Abdominal pannus: Cleanse with skin cleanser, pat dry. Place Interdry textile sheeting, under intertriginous folds leaving 2 inches exposed at ends to wick, remove to wash & dry affected area, then replace. Individual sheeting may be used for up to 5 days unless soiled. Inspect skin daily.   Buttocks/perineum: Cleanse with skin cleanser, pat dry. Apply Shazia moisture barrier cream twice a day and PRN with incontinent episodes.  **** follow up care at [facility]: [phone]. Address: [address] [place] [address]. ***

## 2023-05-29 LAB
ANION GAP SERPL CALC-SCNC: 14 MMOL/L — SIGNIFICANT CHANGE UP (ref 7–14)
ANISOCYTOSIS BLD QL: SLIGHT — SIGNIFICANT CHANGE UP
BASOPHILS # BLD AUTO: 0 K/UL — SIGNIFICANT CHANGE UP (ref 0–0.2)
BASOPHILS NFR BLD AUTO: 0 % — SIGNIFICANT CHANGE UP (ref 0–2)
BUN SERPL-MCNC: 26 MG/DL — HIGH (ref 7–23)
CALCIUM SERPL-MCNC: 9.1 MG/DL — SIGNIFICANT CHANGE UP (ref 8.4–10.5)
CHLORIDE SERPL-SCNC: 104 MMOL/L — SIGNIFICANT CHANGE UP (ref 98–107)
CLOSURE TME COLL+EPINEP BLD: 12 K/UL — CRITICAL LOW (ref 150–400)
CO2 SERPL-SCNC: 21 MMOL/L — LOW (ref 22–31)
CREAT SERPL-MCNC: 0.77 MG/DL — SIGNIFICANT CHANGE UP (ref 0.5–1.3)
EGFR: 128 ML/MIN/1.73M2 — SIGNIFICANT CHANGE UP
ELLIPTOCYTES BLD QL SMEAR: SLIGHT — SIGNIFICANT CHANGE UP
EOSINOPHIL # BLD AUTO: 0 K/UL — SIGNIFICANT CHANGE UP (ref 0–0.5)
EOSINOPHIL NFR BLD AUTO: 0 % — SIGNIFICANT CHANGE UP (ref 0–6)
GIANT PLATELETS BLD QL SMEAR: PRESENT — SIGNIFICANT CHANGE UP
GLUCOSE SERPL-MCNC: 122 MG/DL — HIGH (ref 70–99)
HCT VFR BLD CALC: 48.7 % — SIGNIFICANT CHANGE UP (ref 39–50)
HGB BLD-MCNC: 14.7 G/DL — SIGNIFICANT CHANGE UP (ref 13–17)
IANC: 26.89 K/UL — HIGH (ref 1.8–7.4)
LYMPHOCYTES # BLD AUTO: 0 % — LOW (ref 13–44)
LYMPHOCYTES # BLD AUTO: 0 K/UL — LOW (ref 1–3.3)
MAGNESIUM SERPL-MCNC: 1.8 MG/DL — SIGNIFICANT CHANGE UP (ref 1.6–2.6)
MCHC RBC-ENTMCNC: 26.5 PG — LOW (ref 27–34)
MCHC RBC-ENTMCNC: 30.2 GM/DL — LOW (ref 32–36)
MCV RBC AUTO: 87.7 FL — SIGNIFICANT CHANGE UP (ref 80–100)
MONOCYTES # BLD AUTO: 1.22 K/UL — HIGH (ref 0–0.9)
MONOCYTES NFR BLD AUTO: 4.3 % — SIGNIFICANT CHANGE UP (ref 2–14)
NEUTROPHILS # BLD AUTO: 27.26 K/UL — HIGH (ref 1.8–7.4)
NEUTROPHILS NFR BLD AUTO: 95.7 % — HIGH (ref 43–77)
PHOSPHATE SERPL-MCNC: 2.3 MG/DL — LOW (ref 2.5–4.5)
PLAT MORPH BLD: SIGNIFICANT CHANGE UP
PLATELET # BLD AUTO: 9 K/UL — CRITICAL LOW (ref 150–400)
PLATELET CLUMP BLD QL SMEAR: ABNORMAL
PLATELET COUNT - ESTIMATE: ABNORMAL
POIKILOCYTOSIS BLD QL AUTO: SLIGHT — SIGNIFICANT CHANGE UP
POTASSIUM SERPL-MCNC: 4 MMOL/L — SIGNIFICANT CHANGE UP (ref 3.5–5.3)
POTASSIUM SERPL-SCNC: 4 MMOL/L — SIGNIFICANT CHANGE UP (ref 3.5–5.3)
RBC # BLD: 5.55 M/UL — SIGNIFICANT CHANGE UP (ref 4.2–5.8)
RBC # FLD: 16.3 % — HIGH (ref 10.3–14.5)
RBC BLD AUTO: NORMAL — SIGNIFICANT CHANGE UP
SODIUM SERPL-SCNC: 139 MMOL/L — SIGNIFICANT CHANGE UP (ref 135–145)
WBC # BLD: 28.48 K/UL — HIGH (ref 3.8–10.5)
WBC # FLD AUTO: 28.48 K/UL — HIGH (ref 3.8–10.5)

## 2023-05-29 RX ORDER — MAGNESIUM SULFATE 500 MG/ML
2 VIAL (ML) INJECTION ONCE
Refills: 0 | Status: COMPLETED | OUTPATIENT
Start: 2023-05-29 | End: 2023-05-29

## 2023-05-29 RX ADMIN — Medication 0.5 MILLIGRAM(S): at 08:06

## 2023-05-29 RX ADMIN — Medication 1 TABLET(S): at 12:45

## 2023-05-29 RX ADMIN — Medication 250 MILLIGRAM(S): at 22:00

## 2023-05-29 RX ADMIN — PANTOPRAZOLE SODIUM 40 MILLIGRAM(S): 20 TABLET, DELAYED RELEASE ORAL at 06:28

## 2023-05-29 RX ADMIN — BREXPIPRAZOLE 4 MILLIGRAM(S): 0.25 TABLET ORAL at 12:45

## 2023-05-29 RX ADMIN — Medication 40 MILLIGRAM(S): at 06:28

## 2023-05-29 RX ADMIN — Medication 25 GRAM(S): at 10:07

## 2023-05-29 NOTE — PROGRESS NOTE ADULT - SUBJECTIVE AND OBJECTIVE BOX
Patient is a 23y old  Male who presents with a chief complaint of thrombocytopenia (29 May 2023 07:08)  \  Patient seen in follow up today. No new issues overnight. Mother at bedside     MEDICATIONS  (STANDING):  brexpiprazole 4 milliGRAM(s) Oral daily  dexAMETHasone     Tablet 40 milliGRAM(s) Oral daily  multivitamin 1 Tablet(s) Oral daily  pantoprazole    Tablet 40 milliGRAM(s) Oral before breakfast  traZODone 250 milliGRAM(s) Oral at bedtime    MEDICATIONS  (PRN):  ALPRAZolam 0.5 milliGRAM(s) Oral every 8 hours PRN Anxiety or agitation  OLANZapine Injectable 5 milliGRAM(s) IntraMuscular every 8 hours PRN Severe agitation & refusing PO meds      ROS  Limited with mental status.     Vital Signs Last 24 Hrs  T(C): 37.1 (29 May 2023 06:20), Max: 37.1 (29 May 2023 06:20)  T(F): 98.8 (29 May 2023 06:20), Max: 98.8 (29 May 2023 06:20)  HR: 56 (29 May 2023 06:20) (56 - 85)  BP: 116/78 (29 May 2023 06:20) (116/78 - 126/60)  BP(mean): --  RR: 18 (29 May 2023 06:20) (18 - 18)  SpO2: 100% (29 May 2023 06:20) (100% - 100%)    Parameters below as of 29 May 2023 06:20  Patient On (Oxygen Delivery Method): room air        PE  NAD  Awake, alert  Anicteric, MMM  RRR  CTAB  Abd soft, NT, ND  No c/c/e  No rash grossly  FROM                          14.7   28.48 )-----------( 9        ( 29 May 2023 08:40 )             48.7       05-29    139  |  104  |  26<H>  ----------------------------<  122<H>  4.0   |  21<L>  |  0.77    Ca    9.1      29 May 2023 08:40  Phos  2.3     05-29  Mg     1.80     05-29         Patient is a 23y old  Male who presents with a chief complaint of thrombocytopenia (29 May 2023 07:08)    Patient seen in follow up today. No new issues overnight. Mother at bedside     MEDICATIONS  (STANDING):  brexpiprazole 4 milliGRAM(s) Oral daily  dexAMETHasone     Tablet 40 milliGRAM(s) Oral daily  multivitamin 1 Tablet(s) Oral daily  pantoprazole    Tablet 40 milliGRAM(s) Oral before breakfast  traZODone 250 milliGRAM(s) Oral at bedtime    MEDICATIONS  (PRN):  ALPRAZolam 0.5 milliGRAM(s) Oral every 8 hours PRN Anxiety or agitation  OLANZapine Injectable 5 milliGRAM(s) IntraMuscular every 8 hours PRN Severe agitation & refusing PO meds      ROS  Limited with mental status.     Vital Signs Last 24 Hrs  T(C): 37.1 (29 May 2023 06:20), Max: 37.1 (29 May 2023 06:20)  T(F): 98.8 (29 May 2023 06:20), Max: 98.8 (29 May 2023 06:20)  HR: 56 (29 May 2023 06:20) (56 - 85)  BP: 116/78 (29 May 2023 06:20) (116/78 - 126/60)  BP(mean): --  RR: 18 (29 May 2023 06:20) (18 - 18)  SpO2: 100% (29 May 2023 06:20) (100% - 100%)    Parameters below as of 29 May 2023 06:20  Patient On (Oxygen Delivery Method): room air        PE  NAD  Awake, alert  Anicteric, MMM  RRR  CTAB  Abd soft, NT, ND  No c/c/e  No rash grossly  FROM                          14.7   28.48 )-----------( 9        ( 29 May 2023 08:40 )             48.7       05-29    139  |  104  |  26<H>  ----------------------------<  122<H>  4.0   |  21<L>  |  0.77    Ca    9.1      29 May 2023 08:40  Phos  2.3     05-29  Mg     1.80     05-29

## 2023-05-29 NOTE — PROGRESS NOTE ADULT - PROBLEM SELECTOR PLAN 2
Pt is nonverbal at baseline. Lives in group home. Pt recently had a first-time seizure last week, briefly hospitalized, determined to be adverse effect of Doptelet (avatrombopag) which is since discontinued. CTH shows no acute processes  - c/w home Rexulti  - resume trazodone 250 nightly  - mom concerned about Haldol contributing to Plt suppression, will d/c Haldol for now  - PO Xanax 0.5 mg q8h prn, tolerating well  - can try IM Zyprexa as needed if pt agitated/aggressive and not taking PO  - last QTc wnl

## 2023-05-29 NOTE — PROGRESS NOTE ADULT - ASSESSMENT
23 year old male with autism and chronic ITP followed by Dr. Bhagat at [facility], admitted due to ITP.     ITP  - Chronic, follows w/ Dr. Bhagat at [facility]  - He has received multiple therapies including IVIG, steroids, rituxan in 2010 and 2021, and Doptelet (LD 5/22)  - S/P Nplate yesterday.   - Continue pulse Dex 40mg today and tomorrow   - Will plan for Rituxan impatient as mother is concerned about him getting it outpatient. Will await improvement in platelets and re-evaluate administration based on labs tomorrow   - Continue to monitor CBC. Would transfuse if bleeding. Otherwise hold off on transfusion for now     Will continue to follow.       Matt Arroyo D.O  Hematology Oncology   [facility]  [phone] ( Office)   Evenings and weekends please call MD on call or office  23 year old male with autism and chronic ITP followed by Dr. Bhagat at [facility], admitted due to ITP.     ITP  - Chronic, follows w/ Dr. Bhagat at [facility]  - He has received multiple therapies including IVIG, steroids, rituxan in 2010 and 2021, and Doptelet (LD 5/22)  - S/P Nplate yesterday.   - Continue pulse Dex 40mg today and tomorrow   - Will plan for Rituxan impatient as mother is concerned about him getting it outpatient. Will await improvement in platelets and re-evaluate administration based on labs tomorrow   - Continue to monitor CBC. Would transfuse if bleeding. Otherwise hold off on transfusion for now     Leukocytosis secondary to steroids. Will follow     Will continue to follow.       Matt Arroyo D.O  Hematology Oncology   [facility]  [phone] ( Office)   Evenings and weekends please call MD on call or office

## 2023-05-29 NOTE — PROGRESS NOTE ADULT - PROBLEM SELECTOR PLAN 1
Extensive history of chronic ITP s/p numerous treatments. Previously allergic to rituximab (anaphylaxis/serum sickness) however underwent successful desensitization in Apr 2021 and tolerated rituximab at that time. That was the last time he received rituximab.  - most recently pt was discharged on a steroid taper; was on prednisone 20 mg daily prior to coming to ED  - s/p prednisone 50 mg in ED  - CT head negative for ICH  - small 2 by 3 cm right lower abdomen bruising noted, monitor, if any flank bruising, sudden drop in Hg, or hypotension might need urgent CT angio a/p r/o RP bleeding  - c/w dexamethasone 40 po qd x3 days per heme (5/27 - 5/29 )  - start PPI for GI ppx  - romiplostim shot today  - consulted [facility], recs appreciated Extensive history of chronic ITP s/p numerous treatments. Previously allergic to rituximab (anaphylaxis/serum sickness) however underwent successful desensitization in Apr 2021 and tolerated rituximab at that time. That was the last time he received rituximab.  - most recently pt was discharged on a steroid taper; was on prednisone 20 mg daily prior to coming to ED  - s/p prednisone 50 mg in ED  - CT head negative for ICH  - small 2 by 3 cm right lower abdomen bruising noted, monitor, if any flank bruising, sudden drop in Hg, or hypotension might need urgent CT angio a/p r/o RP bleeding  - c/w dexamethasone 40 po qd x3 days per heme (5/27 - 5/29 )  - start PPI for GI ppx  - romiplostim 5/28  - consulted [facility], recs appreciated

## 2023-05-29 NOTE — PROGRESS NOTE ADULT - PROBLEM SELECTOR PLAN 3
Pt has buttock wound  - wound care consult balance training/transfer training/bed mobility training/gait training

## 2023-05-29 NOTE — PROGRESS NOTE ADULT - ASSESSMENT
Mr. Denilson Hernandes is a 23 year old M w/ PMH of intellectual disability, autism, nonverbal at baseline, CARLITOS not on CPAP, severe chronic ITP, referred by his hematologist due to thrombocytopenia to 3k on outpt labs. Mr. Denilson Hernandes is a 23 year old M w/ PMH of intellectual disability, autism, nonverbal at baseline, CARLITOS not on CPAP, severe chronic ITP, referred by his hematologist due to thrombocytopenia to  on outpt labs, s/p NPlate 5/28

## 2023-05-29 NOTE — PROGRESS NOTE ADULT - SUBJECTIVE AND OBJECTIVE BOX
Patient is a 23y old  Male who presents with a chief complaint of thrombocytopenia (28 May 2023 13:02)      SUBJECTIVE / OVERNIGHT EVENTS:    MEDICATIONS  (STANDING):  brexpiprazole 4 milliGRAM(s) Oral daily  dexAMETHasone     Tablet 40 milliGRAM(s) Oral daily  multivitamin 1 Tablet(s) Oral daily  pantoprazole    Tablet 40 milliGRAM(s) Oral before breakfast  traZODone 250 milliGRAM(s) Oral at bedtime    MEDICATIONS  (PRN):  ALPRAZolam 0.5 milliGRAM(s) Oral every 8 hours PRN Anxiety or agitation  OLANZapine Injectable 5 milliGRAM(s) IntraMuscular every 8 hours PRN Severe agitation & refusing PO meds      CAPILLARY BLOOD GLUCOSE        I&O's Summary      Vital Signs Last 24 Hrs  T(C): 37.1 (29 May 2023 06:20), Max: 37.1 (29 May 2023 06:20)  T(F): 98.8 (29 May 2023 06:20), Max: 98.8 (29 May 2023 06:20)  HR: 56 (29 May 2023 06:20) (56 - 85)  BP: 116/78 (29 May 2023 06:20) (116/78 - 126/60)  BP(mean): --  RR: 18 (29 May 2023 06:20) (18 - 18)  SpO2: 100% (29 May 2023 06:20) (100% - 100%)    Parameters below as of 29 May 2023 06:20  Patient On (Oxygen Delivery Method): room air        PHYSICAL EXAM:  GENERAL: No apparent distress, pt sitting in bed eating cereal  HEAD:  Atraumatic, normocephalic  EYES: EOMI, sclera clear  NECK: Supple  CHEST/LUNG: Nonlabored respirations on room air  HEART: Deferred auscultation  ABDOMEN: Soft, nontender, nondistended  EXTREMITIES: No peripheral edema  PSYCH: Nonverbal, not responding to examiner, somewhat restless appearing  NEUROLOGY: No focal deficits  SKIN: Areas of bruising evident on left upper extremity    LABS:                        13.3   13.81 )-----------( 3        ( 28 May 2023 04:45 )             44.2      05-28    136  |  103  |  26<H>  ----------------------------<  150<H>  4.4   |  21<L>  |  0.72    Ca    8.9      28 May 2023 04:45  Phos  3.1     05-28  Mg     2.00     05-28                RADIOLOGY & ADDITIONAL TESTS:    Imaging Personally Reviewed:    Consultant(s) Notes Reviewed:      Care Discussed with Consultants/Other Providers:   Patient is a 23y old  Male who presents with a chief complaint of thrombocytopenia (28 May 2023 13:02)      SUBJECTIVE / OVERNIGHT EVENTS: No acute events overnight. Patient seen and examined at bedside, nonverbal per PCA at bedside, tracks.     MEDICATIONS  (STANDING):  brexpiprazole 4 milliGRAM(s) Oral daily  dexAMETHasone     Tablet 40 milliGRAM(s) Oral daily  multivitamin 1 Tablet(s) Oral daily  pantoprazole    Tablet 40 milliGRAM(s) Oral before breakfast  traZODone 250 milliGRAM(s) Oral at bedtime    MEDICATIONS  (PRN):  ALPRAZolam 0.5 milliGRAM(s) Oral every 8 hours PRN Anxiety or agitation  OLANZapine Injectable 5 milliGRAM(s) IntraMuscular every 8 hours PRN Severe agitation & refusing PO meds      CAPILLARY BLOOD GLUCOSE        I&O's Summary      Vital Signs Last 24 Hrs  T(C): 37.1 (29 May 2023 06:20), Max: 37.1 (29 May 2023 06:20)  T(F): 98.8 (29 May 2023 06:20), Max: 98.8 (29 May 2023 06:20)  HR: 56 (29 May 2023 06:20) (56 - 85)  BP: 116/78 (29 May 2023 06:20) (116/78 - 126/60)  BP(mean): --  RR: 18 (29 May 2023 06:20) (18 - 18)  SpO2: 100% (29 May 2023 06:20) (100% - 100%)    Parameters below as of 29 May 2023 06:20  Patient On (Oxygen Delivery Method): room air        PHYSICAL EXAM:  GENERAL: No apparent distress, pt sitting in bed watching TV  HEAD:  Atraumatic, normocephalic  EYES: EOMI, sclera clear  NECK: Supple  CHEST/LUNG: Nonlabored respirations on room air  HEART: Regular rate and rhythm  ABDOMEN: Soft, nontender, nondistended  EXTREMITIES: No peripheral edema  PSYCH: Nonverbal, not responding to examiner, unable to assess  NEUROLOGY: No focal deficits  SKIN: Areas of bruising evident on b/l upper extremities    LABS:                        13.3   13.81 )-----------( 3        ( 28 May 2023 04:45 )             44.2      05-28    136  |  103  |  26<H>  ----------------------------<  150<H>  4.4   |  21<L>  |  0.72    Ca    8.9      28 May 2023 04:45  Phos  3.1     05-28  Mg     2.00     05-28                RADIOLOGY & ADDITIONAL TESTS:    Imaging Personally Reviewed:    Consultant(s) Notes Reviewed:      Care Discussed with Consultants/Other Providers:   Patient has no objection to blood transfusions.

## 2023-05-30 LAB
ANION GAP SERPL CALC-SCNC: 10 MMOL/L — SIGNIFICANT CHANGE UP (ref 7–14)
BASOPHILS # BLD AUTO: 0.03 K/UL — SIGNIFICANT CHANGE UP (ref 0–0.2)
BASOPHILS NFR BLD AUTO: 0.1 % — SIGNIFICANT CHANGE UP (ref 0–2)
BUN SERPL-MCNC: 26 MG/DL — HIGH (ref 7–23)
CALCIUM SERPL-MCNC: 8.3 MG/DL — LOW (ref 8.4–10.5)
CHLORIDE SERPL-SCNC: 105 MMOL/L — SIGNIFICANT CHANGE UP (ref 98–107)
CLOSURE TME COLL+EPINEP BLD: 10 K/UL — CRITICAL LOW (ref 150–400)
CO2 SERPL-SCNC: 22 MMOL/L — SIGNIFICANT CHANGE UP (ref 22–31)
CREAT SERPL-MCNC: 0.68 MG/DL — SIGNIFICANT CHANGE UP (ref 0.5–1.3)
EGFR: 133 ML/MIN/1.73M2 — SIGNIFICANT CHANGE UP
EOSINOPHIL # BLD AUTO: 0 K/UL — SIGNIFICANT CHANGE UP (ref 0–0.5)
EOSINOPHIL NFR BLD AUTO: 0 % — SIGNIFICANT CHANGE UP (ref 0–6)
GLUCOSE SERPL-MCNC: 111 MG/DL — HIGH (ref 70–99)
HCT VFR BLD CALC: 41.6 % — SIGNIFICANT CHANGE UP (ref 39–50)
HGB BLD-MCNC: 13 G/DL — SIGNIFICANT CHANGE UP (ref 13–17)
IANC: 23.02 K/UL — HIGH (ref 1.8–7.4)
IMM GRANULOCYTES NFR BLD AUTO: 1 % — HIGH (ref 0–0.9)
LYMPHOCYTES # BLD AUTO: 0.73 K/UL — LOW (ref 1–3.3)
LYMPHOCYTES # BLD AUTO: 2.9 % — LOW (ref 13–44)
MAGNESIUM SERPL-MCNC: 1.9 MG/DL — SIGNIFICANT CHANGE UP (ref 1.6–2.6)
MCHC RBC-ENTMCNC: 26.4 PG — LOW (ref 27–34)
MCHC RBC-ENTMCNC: 31.3 GM/DL — LOW (ref 32–36)
MCV RBC AUTO: 84.4 FL — SIGNIFICANT CHANGE UP (ref 80–100)
MONOCYTES # BLD AUTO: 0.75 K/UL — SIGNIFICANT CHANGE UP (ref 0–0.9)
MONOCYTES NFR BLD AUTO: 3 % — SIGNIFICANT CHANGE UP (ref 2–14)
NEUTROPHILS # BLD AUTO: 23.02 K/UL — HIGH (ref 1.8–7.4)
NEUTROPHILS NFR BLD AUTO: 93 % — HIGH (ref 43–77)
NRBC # BLD: 0 /100 WBCS — SIGNIFICANT CHANGE UP (ref 0–0)
NRBC # FLD: 0 K/UL — SIGNIFICANT CHANGE UP (ref 0–0)
PHOSPHATE SERPL-MCNC: 2.8 MG/DL — SIGNIFICANT CHANGE UP (ref 2.5–4.5)
PLATELET # BLD AUTO: 12 K/UL — CRITICAL LOW (ref 150–400)
POTASSIUM SERPL-MCNC: 4.5 MMOL/L — SIGNIFICANT CHANGE UP (ref 3.5–5.3)
POTASSIUM SERPL-SCNC: 4.5 MMOL/L — SIGNIFICANT CHANGE UP (ref 3.5–5.3)
RBC # BLD: 4.93 M/UL — SIGNIFICANT CHANGE UP (ref 4.2–5.8)
RBC # FLD: 16 % — HIGH (ref 10.3–14.5)
SODIUM SERPL-SCNC: 137 MMOL/L — SIGNIFICANT CHANGE UP (ref 135–145)
WBC # BLD: 24.82 K/UL — HIGH (ref 3.8–10.5)
WBC # FLD AUTO: 24.82 K/UL — HIGH (ref 3.8–10.5)

## 2023-05-30 RX ADMIN — BREXPIPRAZOLE 4 MILLIGRAM(S): 0.25 TABLET ORAL at 14:28

## 2023-05-30 RX ADMIN — PANTOPRAZOLE SODIUM 40 MILLIGRAM(S): 20 TABLET, DELAYED RELEASE ORAL at 05:14

## 2023-05-30 RX ADMIN — Medication 250 MILLIGRAM(S): at 22:11

## 2023-05-30 RX ADMIN — Medication 0.5 MILLIGRAM(S): at 15:00

## 2023-05-30 RX ADMIN — Medication 1 TABLET(S): at 14:27

## 2023-05-30 RX ADMIN — Medication 40 MILLIGRAM(S): at 05:14

## 2023-05-30 RX ADMIN — Medication 0.5 MILLIGRAM(S): at 05:55

## 2023-05-30 NOTE — PROGRESS NOTE ADULT - SUBJECTIVE AND OBJECTIVE BOX
Patient is a 23y old  Male who presents with a chief complaint of thrombocytopenia (30 May 2023 07:10)    Patient seen this afternoon with his mom at bedside. s/p 4 days dex, minimal response- per mom patient previously responded very well to steroids. Discussed plan to administer Rituxan inpatient as pt previously reported ?allergic reaction, though more likely side effect of infusion, no anaphylactic response.     MEDICATIONS  (STANDING):  brexpiprazole 4 milliGRAM(s) Oral daily  multivitamin 1 Tablet(s) Oral daily  pantoprazole    Tablet 40 milliGRAM(s) Oral before breakfast  traZODone 250 milliGRAM(s) Oral at bedtime    MEDICATIONS  (PRN):  ALPRAZolam 0.5 milliGRAM(s) Oral every 8 hours PRN Anxiety or agitation  OLANZapine Injectable 5 milliGRAM(s) IntraMuscular every 8 hours PRN Severe agitation & refusing PO meds        Vital Signs Last 24 Hrs  T(C): 36.7 (30 May 2023 14:25), Max: 36.7 (30 May 2023 06:00)  T(F): 98 (30 May 2023 14:25), Max: 98.1 (30 May 2023 06:00)  HR: 96 (30 May 2023 14:25) (88 - 96)  BP: 121/62 (30 May 2023 14:25) (119/69 - 121/62)  BP(mean): --  RR: 18 (30 May 2023 14:25) (18 - 18)  SpO2: 98% (30 May 2023 14:25) (98% - 98%)    Parameters below as of 30 May 2023 14:25  Patient On (Oxygen Delivery Method): room air        PE  obese   calm, NAD  Anicteric, MMM  ecchymoses noted on trunk and extremities   FROM                          13.0   24.82 )-----------( 12       ( 30 May 2023 05:22 )             41.6       05-30    137  |  105  |  26<H>  ----------------------------<  111<H>  4.5   |  22  |  0.68    Ca    8.3<L>      30 May 2023 05:22  Phos  2.8     05-30  Mg     1.90     05-30

## 2023-05-30 NOTE — PROGRESS NOTE ADULT - ASSESSMENT
Mr. Denilson Hernandes is a 23 year old M w/ PMH of intellectual disability, autism, nonverbal at baseline, CARLITOS not on CPAP, severe chronic ITP, referred by his hematologist due to thrombocytopenia to  on outpt labs, s/p NPlate 5/28

## 2023-05-30 NOTE — PROGRESS NOTE ADULT - SUBJECTIVE AND OBJECTIVE BOX
Patient is a 23y old  Male who presents with a chief complaint of thrombocytopenia (29 May 2023 12:44)      SUBJECTIVE / OVERNIGHT EVENTS:    MEDICATIONS  (STANDING):  brexpiprazole 4 milliGRAM(s) Oral daily  multivitamin 1 Tablet(s) Oral daily  pantoprazole    Tablet 40 milliGRAM(s) Oral before breakfast  traZODone 250 milliGRAM(s) Oral at bedtime    MEDICATIONS  (PRN):  ALPRAZolam 0.5 milliGRAM(s) Oral every 8 hours PRN Anxiety or agitation  OLANZapine Injectable 5 milliGRAM(s) IntraMuscular every 8 hours PRN Severe agitation & refusing PO meds      CAPILLARY BLOOD GLUCOSE        I&O's Summary      Vital Signs Last 24 Hrs  T(C): 36.7 (30 May 2023 06:00), Max: 36.7 (29 May 2023 13:55)  T(F): 98.1 (30 May 2023 06:00), Max: 98.1 (29 May 2023 13:55)  HR: 88 (30 May 2023 06:00) (88 - 99)  BP: 119/69 (30 May 2023 06:00) (119/69 - 124/69)  BP(mean): --  RR: 18 (30 May 2023 06:00) (18 - 18)  SpO2: 98% (30 May 2023 06:00) (97% - 98%)    Parameters below as of 30 May 2023 06:00  Patient On (Oxygen Delivery Method): room air        PHYSICAL EXAM:  GENERAL: No apparent distress, pt sitting in bed watching TV  HEAD:  Atraumatic, normocephalic  EYES: EOMI, sclera clear  NECK: Supple  CHEST/LUNG: Nonlabored respirations on room air  HEART: Regular rate and rhythm  ABDOMEN: Soft, nontender, nondistended  EXTREMITIES: No peripheral edema  PSYCH: Nonverbal, not responding to examiner, unable to assess  NEUROLOGY: No focal deficits  SKIN: Areas of bruising evident on b/l upper extremities    LABS:                        13.0   24.82 )-----------( 12       ( 30 May 2023 05:22 )             41.6      05-30    137  |  105  |  26<H>  ----------------------------<  111<H>  4.5   |  22  |  0.68    Ca    8.3<L>      30 May 2023 05:22  Phos  2.8     05-30  Mg     1.90     05-30                RADIOLOGY & ADDITIONAL TESTS:    Imaging Personally Reviewed:    Consultant(s) Notes Reviewed:      Care Discussed with Consultants/Other Providers:   Patient is a 23y old  Male who presents with a chief complaint of thrombocytopenia (29 May 2023 12:44)      SUBJECTIVE / OVERNIGHT EVENTS: No acute events overnight. Patient seen and examined at bedside, no bleeding episodes per mother at bedside.     MEDICATIONS  (STANDING):  brexpiprazole 4 milliGRAM(s) Oral daily  multivitamin 1 Tablet(s) Oral daily  pantoprazole    Tablet 40 milliGRAM(s) Oral before breakfast  traZODone 250 milliGRAM(s) Oral at bedtime    MEDICATIONS  (PRN):  ALPRAZolam 0.5 milliGRAM(s) Oral every 8 hours PRN Anxiety or agitation  OLANZapine Injectable 5 milliGRAM(s) IntraMuscular every 8 hours PRN Severe agitation & refusing PO meds      CAPILLARY BLOOD GLUCOSE        I&O's Summary      Vital Signs Last 24 Hrs  T(C): 36.7 (30 May 2023 06:00), Max: 36.7 (29 May 2023 13:55)  T(F): 98.1 (30 May 2023 06:00), Max: 98.1 (29 May 2023 13:55)  HR: 88 (30 May 2023 06:00) (88 - 99)  BP: 119/69 (30 May 2023 06:00) (119/69 - 124/69)  BP(mean): --  RR: 18 (30 May 2023 06:00) (18 - 18)  SpO2: 98% (30 May 2023 06:00) (97% - 98%)    Parameters below as of 30 May 2023 06:00  Patient On (Oxygen Delivery Method): room air        PHYSICAL EXAM:  GENERAL: No apparent distress, pt sitting in bed watching TV  HEAD:  Atraumatic, normocephalic  EYES: EOMI, sclera clear  NECK: Supple  CHEST/LUNG: Nonlabored respirations on room air  HEART: Regular rate and rhythm  ABDOMEN: Soft, nontender, nondistended  EXTREMITIES: No peripheral edema  PSYCH: Nonverbal, not responding to examiner, unable to assess  NEUROLOGY: No focal deficits  SKIN: Areas of bruising evident on b/l upper extremities    LABS:                        13.0   24.82 )-----------( 12       ( 30 May 2023 05:22 )             41.6      05-30    137  |  105  |  26<H>  ----------------------------<  111<H>  4.5   |  22  |  0.68    Ca    8.3<L>      30 May 2023 05:22  Phos  2.8     05-30  Mg     1.90     05-30                RADIOLOGY & ADDITIONAL TESTS:    Imaging Personally Reviewed:    Consultant(s) Notes Reviewed:      Care Discussed with Consultants/Other Providers:

## 2023-05-30 NOTE — ADVANCED PRACTICE NURSE CONSULT - REASON FOR CONSULT
Patient seen on skin care rounds after wound care referral received for assessment of skin impairment and recommendations of topical management. As per H&P, patient is a 23 year old M w/ PMH of intellectual disability, autism, nonverbal at baseline, CARLITOS not on CPAP, severe chronic ITP, referred by his hematologist due to thrombocytopenia to 3k on outpt labs. Chart reviewed: WBC 24.82, H/H 13.0/41.6, Platelets 12, INR 0.95, Serum albumin 3.7, BMI 35.9, Rusty 20. Patient's mother, Nicole, at bedside during encounter. Patient's mother reports that prior to hospitalization, patient has a right buttock abscess that was I&D last Wednesday at previous facility.

## 2023-05-30 NOTE — ADVANCED PRACTICE NURSE CONSULT - RECOMMEDATIONS
Topical recommendations:     Right buttock: Cleanse with NS, pat dry. Apply Liquid barrier film to periwound skin (allow to dry). Loosely pack cavity with iodoform 1/4" packing strip as primary dressing to absorb and fill dead space. Cover with silicone foam with border. Change daily and PRN if soiled.     Abdominal pannus: Cleanse with skin cleanser, pat dry. Place Interdry textile sheeting, under intertriginous folds leaving 2 inches exposed at ends to wick, remove to wash & dry affected area, then replace. Individual sheeting may be used for up to 5 days unless soiled. Inspect skin daily.     Buttocks/perineum: Cleanse with skin cleanser, pat dry. Apply Shazia moisture barrier cream twice a day and PRN with incontinent episodes.     Continue low air loss bed therapy, continue heel elevation, continue to turn & reposition as per protocol, soft pillow between bony prominences, continue moisture management with barrier creams & single breathable pad, continue measures to decrease friction/shear/pressure. Continue with nutritional support as per dietary/orders.     Plan of care discussed with Primary RN MD Dionne Clark, and patient's mother at the bedside. All questions and concerns answered.     Please contact Wound/Ostomy Care Service Line if we can be of further assistance (ext [phone]).

## 2023-05-30 NOTE — PROGRESS NOTE ADULT - ASSESSMENT
23 year old male with autism and chronic ITP followed by Dr. Bhagat at [facility], admitted due to ITP.     ITP  - Chronic, follows w/ Dr. Bhagat at [facility]  - He has received multiple therapies including IVIG, steroids, rituxan in 2010 and 2021, and Doptelet (LD 5/22)  - s/p romiplostim 05/28, pulse dex completed 05/30  - Plan for Rituxan inpatient to monitor for side effects/reactions as reported during a past administration of this medication. Will pre-medicate with Tylenol, Benadryl, dexamethasone + additional dexamethasone residential through. Consent obtained, orders written.   - Continue to monitor CBC. Would transfuse if bleeding. Otherwise hold off on transfusion for now     Leukocytosis secondary to steroids. Will follow     Discussed with primary team. Will continue to follow.     Zuly Silva PA-C  Hematology/Oncology  [facility]  [phone] (office)  [phone] (alt office)  Evenings and weekends please call MD on call or office

## 2023-05-30 NOTE — PROGRESS NOTE ADULT - PROBLEM SELECTOR PLAN 1
Extensive history of chronic ITP s/p numerous treatments. Previously allergic to rituximab (anaphylaxis/serum sickness) however underwent successful desensitization in Apr 2021 and tolerated rituximab at that time. That was the last time he received rituximab.  - most recently pt was discharged on a steroid taper; was on prednisone 20 mg daily prior to coming to ED  - s/p prednisone 50 mg in ED  - CT head negative for ICH  - small 2 by 3 cm right lower abdomen bruising noted, monitor, if any flank bruising, sudden drop in Hg, or hypotension might need urgent CT angio a/p r/o RP bleeding  - c/w dexamethasone 40 po qd x3 days per heme (5/27 - 5/29 )  - start PPI for GI ppx  - romiplostim 5/28  - consulted [place] [facility], recs appreciated Extensive history of chronic ITP s/p numerous treatments. Previously allergic to rituximab (anaphylaxis/serum sickness) however underwent successful desensitization in Apr 2021 and tolerated rituximab at that time. That was the last time he received rituximab.  - most recently pt was discharged on a steroid taper; was on prednisone 20 mg daily prior to coming to ED  - s/p prednisone 50 mg in ED  - CT head negative for ICH  - small 2 by 3 cm right lower abdomen bruising noted, monitor, if any flank bruising, sudden drop in Hg, or hypotension might need urgent CT angio a/p r/o RP bleeding  - c/w dexamethasone 40 po qd x3 days per heme (5/27 - 5/29 )  - start PPI for GI ppx  - romiplostim 5/28  - consulted [facility], recs appreciated - will follow up whether patient should receive rituxan while inpatient

## 2023-05-31 LAB
ANION GAP SERPL CALC-SCNC: 9 MMOL/L — SIGNIFICANT CHANGE UP (ref 7–14)
BUN SERPL-MCNC: 32 MG/DL — HIGH (ref 7–23)
CALCIUM SERPL-MCNC: 8.5 MG/DL — SIGNIFICANT CHANGE UP (ref 8.4–10.5)
CHLORIDE SERPL-SCNC: 103 MMOL/L — SIGNIFICANT CHANGE UP (ref 98–107)
CLOSURE TME COLL+EPINEP BLD: 12 K/UL — CRITICAL LOW (ref 150–400)
CO2 SERPL-SCNC: 23 MMOL/L — SIGNIFICANT CHANGE UP (ref 22–31)
CREAT SERPL-MCNC: 0.69 MG/DL — SIGNIFICANT CHANGE UP (ref 0.5–1.3)
EGFR: 133 ML/MIN/1.73M2 — SIGNIFICANT CHANGE UP
GLUCOSE SERPL-MCNC: 109 MG/DL — HIGH (ref 70–99)
HCT VFR BLD CALC: 42.6 % — SIGNIFICANT CHANGE UP (ref 39–50)
HGB BLD-MCNC: 13.2 G/DL — SIGNIFICANT CHANGE UP (ref 13–17)
MAGNESIUM SERPL-MCNC: 1.9 MG/DL — SIGNIFICANT CHANGE UP (ref 1.6–2.6)
MCHC RBC-ENTMCNC: 25.9 PG — LOW (ref 27–34)
MCHC RBC-ENTMCNC: 31 GM/DL — LOW (ref 32–36)
MCV RBC AUTO: 83.5 FL — SIGNIFICANT CHANGE UP (ref 80–100)
NRBC # BLD: 0 /100 WBCS — SIGNIFICANT CHANGE UP (ref 0–0)
NRBC # FLD: 0 K/UL — SIGNIFICANT CHANGE UP (ref 0–0)
PHOSPHATE SERPL-MCNC: 3.1 MG/DL — SIGNIFICANT CHANGE UP (ref 2.5–4.5)
PLATELET # BLD AUTO: 15 K/UL — CRITICAL LOW (ref 150–400)
POTASSIUM SERPL-MCNC: 4.5 MMOL/L — SIGNIFICANT CHANGE UP (ref 3.5–5.3)
POTASSIUM SERPL-SCNC: 4.5 MMOL/L — SIGNIFICANT CHANGE UP (ref 3.5–5.3)
RBC # BLD: 5.1 M/UL — SIGNIFICANT CHANGE UP (ref 4.2–5.8)
RBC # FLD: 16.2 % — HIGH (ref 10.3–14.5)
SODIUM SERPL-SCNC: 135 MMOL/L — SIGNIFICANT CHANGE UP (ref 135–145)
WBC # BLD: 21.18 K/UL — HIGH (ref 3.8–10.5)
WBC # FLD AUTO: 21.18 K/UL — HIGH (ref 3.8–10.5)

## 2023-05-31 RX ORDER — DEXAMETHASONE 0.5 MG/5ML
4 ELIXIR ORAL ONCE
Refills: 0 | Status: COMPLETED | OUTPATIENT
Start: 2023-05-31 | End: 2023-05-31

## 2023-05-31 RX ORDER — DIPHENHYDRAMINE HCL 50 MG
25 CAPSULE ORAL ONCE
Refills: 0 | Status: DISCONTINUED | OUTPATIENT
Start: 2023-05-31 | End: 2023-06-07

## 2023-05-31 RX ORDER — MEPERIDINE HYDROCHLORIDE 50 MG/ML
25 INJECTION INTRAMUSCULAR; INTRAVENOUS; SUBCUTANEOUS ONCE
Refills: 0 | Status: DISCONTINUED | OUTPATIENT
Start: 2023-05-31 | End: 2023-06-06

## 2023-05-31 RX ORDER — DIPHENHYDRAMINE HCL 50 MG
25 CAPSULE ORAL ONCE
Refills: 0 | Status: COMPLETED | OUTPATIENT
Start: 2023-05-31 | End: 2023-05-31

## 2023-05-31 RX ORDER — DEXAMETHASONE 0.5 MG/5ML
10 ELIXIR ORAL ONCE
Refills: 0 | Status: COMPLETED | OUTPATIENT
Start: 2023-05-31 | End: 2023-05-31

## 2023-05-31 RX ORDER — RITUXIMAB 10 MG/ML
880 INJECTION, SOLUTION INTRAVENOUS ONCE
Refills: 0 | Status: COMPLETED | OUTPATIENT
Start: 2023-05-31 | End: 2023-05-31

## 2023-05-31 RX ORDER — HYDROCORTISONE 20 MG
50 TABLET ORAL ONCE
Refills: 0 | Status: DISCONTINUED | OUTPATIENT
Start: 2023-05-31 | End: 2023-06-12

## 2023-05-31 RX ORDER — ACETAMINOPHEN 500 MG
650 TABLET ORAL ONCE
Refills: 0 | Status: COMPLETED | OUTPATIENT
Start: 2023-05-31 | End: 2023-05-31

## 2023-05-31 RX ADMIN — Medication 4 MILLIGRAM(S): at 15:54

## 2023-05-31 RX ADMIN — Medication 650 MILLIGRAM(S): at 12:29

## 2023-05-31 RX ADMIN — RITUXIMAB 880 MILLIGRAM(S): 10 INJECTION, SOLUTION INTRAVENOUS at 13:37

## 2023-05-31 RX ADMIN — Medication 650 MILLIGRAM(S): at 13:00

## 2023-05-31 RX ADMIN — PANTOPRAZOLE SODIUM 40 MILLIGRAM(S): 20 TABLET, DELAYED RELEASE ORAL at 06:41

## 2023-05-31 RX ADMIN — Medication 250 MILLIGRAM(S): at 21:14

## 2023-05-31 RX ADMIN — BREXPIPRAZOLE 4 MILLIGRAM(S): 0.25 TABLET ORAL at 12:29

## 2023-05-31 RX ADMIN — Medication 102 MILLIGRAM(S): at 12:38

## 2023-05-31 RX ADMIN — Medication 0.5 MILLIGRAM(S): at 12:29

## 2023-05-31 RX ADMIN — Medication 1 TABLET(S): at 12:32

## 2023-05-31 RX ADMIN — Medication 25 MILLIGRAM(S): at 12:38

## 2023-05-31 NOTE — PROGRESS NOTE ADULT - PROBLEM SELECTOR PLAN 1
Extensive history of chronic ITP s/p numerous treatments. Previously allergic to rituximab (anaphylaxis/serum sickness) however underwent successful desensitization in Apr 2021 and tolerated rituximab at that time. That was the last time he received rituximab.  - most recently pt was discharged on a steroid taper; was on prednisone 20 mg daily prior to coming to ED  - s/p prednisone 50 mg in ED  - CT head negative for ICH  - small 2 by 3 cm right lower abdomen bruising noted, monitor, if any flank bruising, sudden drop in Hg, or hypotension might need urgent CT angio a/p r/o RP bleeding  - c/w dexamethasone 40 po qd x3 days per heme (5/27 - 5/29 )  - start PPI for GI ppx  - romiplostim 5/28  - consulted [facility], recs appreciated - will follow up whether patient should receive rituxan while inpatient Extensive history of chronic ITP s/p numerous treatments. Previously allergic to rituximab (anaphylaxis/serum sickness) however underwent successful desensitization in Apr 2021 and tolerated rituximab at that time. That was the last time he received rituximab.  - most recently pt was discharged on a steroid taper; was on prednisone 20 mg daily prior to coming to ED  - s/p prednisone 50 mg in ED  - CT head negative for ICH  - small 2 by 3 cm right lower abdomen bruising noted, monitor, if any flank bruising, sudden drop in Hg, or hypotension might need urgent CT angio a/p r/o RP bleeding  - Completed dexamethasone 4 day course; per heme/onc will get rituxan 5/31 and then be monitored  - start PPI for GI ppx  - romiplostim 5/28  - consulted [facility], recs appreciated - will follow up whether patient should receive rituxan while inpatient

## 2023-05-31 NOTE — PROGRESS NOTE ADULT - SUBJECTIVE AND OBJECTIVE BOX
Patient is a 23y old  Male who presents with a chief complaint of thrombocytopenia (31 May 2023 07:10)    Patient seen this afternoon, mom at bedside. Chemo RN at bedside preparing for rituximab.    MEDICATIONS  (STANDING):  brexpiprazole 4 milliGRAM(s) Oral daily  dexAMETHasone  Injectable 4 milliGRAM(s) IV Push once  multivitamin 1 Tablet(s) Oral daily  pantoprazole    Tablet 40 milliGRAM(s) Oral before breakfast  traZODone 250 milliGRAM(s) Oral at bedtime    MEDICATIONS  (PRN):  ALPRAZolam 0.5 milliGRAM(s) Oral every 8 hours PRN Anxiety or agitation  diphenhydrAMINE Injectable 25 milliGRAM(s) IV Push once PRN PRN REACTION  hydrocortisone sodium succinate Injectable 50 milliGRAM(s) IV Push once PRN REACTION MED  meperidine     Injectable 25 milliGRAM(s) IV Push once PRN PRN REACTION  OLANZapine Injectable 5 milliGRAM(s) IntraMuscular every 8 hours PRN Severe agitation & refusing PO meds        Vital Signs Last 24 Hrs  T(C): 37.2 (31 May 2023 14:48), Max: 37.2 (31 May 2023 14:48)  T(F): 98.9 (31 May 2023 14:48), Max: 98.9 (31 May 2023 14:48)  HR: 95 (31 May 2023 14:48) (83 - 106)  BP: 118/69 (31 May 2023 14:48) (103/53 - 139/80)  BP(mean): 88 (30 May 2023 22:16) (88 - 88)  RR: 17 (31 May 2023 14:48) (17 - 18)  SpO2: 95% (31 May 2023 14:48) (94% - 98%)    Parameters below as of 31 May 2023 14:48  Patient On (Oxygen Delivery Method): room air        PE  calm, NAD  Anicteric, MMM  No c/c/e  No rash grossly  FROM                          13.2   21.18 )-----------( 15       ( 31 May 2023 06:28 )             42.6       05-31    135  |  103  |  32<H>  ----------------------------<  109<H>  4.5   |  23  |  0.69    Ca    8.5      31 May 2023 06:28  Phos  3.1     05-31  Mg     1.90     05-31

## 2023-05-31 NOTE — PROGRESS NOTE ADULT - SUBJECTIVE AND OBJECTIVE BOX
Patient is a 23y old  Male who presents with a chief complaint of thrombocytopenia (30 May 2023 14:40)      SUBJECTIVE / OVERNIGHT EVENTS:    MEDICATIONS  (STANDING):  acetaminophen     Tablet .. 650 milliGRAM(s) Oral once  brexpiprazole 4 milliGRAM(s) Oral daily  dexAMETHasone  Injectable 4 milliGRAM(s) IV Push once  dexAMETHasone  IVPB 10 milliGRAM(s) IV Intermittent once  diphenhydrAMINE Injectable 25 milliGRAM(s) IV Push once  multivitamin 1 Tablet(s) Oral daily  pantoprazole    Tablet 40 milliGRAM(s) Oral before breakfast  riTUXimab-pvvr (RUXIENCE) IVPB (Non - oncologic) 880 milliGRAM(s) IV Intermittent once  traZODone 250 milliGRAM(s) Oral at bedtime    MEDICATIONS  (PRN):  ALPRAZolam 0.5 milliGRAM(s) Oral every 8 hours PRN Anxiety or agitation  diphenhydrAMINE Injectable 25 milliGRAM(s) IV Push once PRN PRN REACTION  hydrocortisone sodium succinate Injectable 50 milliGRAM(s) IV Push once PRN REACTION MED  meperidine     Injectable 25 milliGRAM(s) IV Push once PRN PRN REACTION  OLANZapine Injectable 5 milliGRAM(s) IntraMuscular every 8 hours PRN Severe agitation & refusing PO meds      CAPILLARY BLOOD GLUCOSE        I&O's Summary      Vital Signs Last 24 Hrs  T(C): 37.1 (31 May 2023 06:33), Max: 37.1 (31 May 2023 06:33)  T(F): 98.7 (31 May 2023 06:33), Max: 98.7 (31 May 2023 06:33)  HR: 83 (31 May 2023 06:33) (83 - 96)  BP: 119/72 (31 May 2023 06:33) (119/72 - 121/62)  BP(mean): 88 (30 May 2023 22:16) (88 - 88)  RR: 18 (31 May 2023 06:33) (17 - 18)  SpO2: 97% (31 May 2023 06:33) (97% - 98%)    Parameters below as of 31 May 2023 06:33  Patient On (Oxygen Delivery Method): room air        PHYSICAL EXAM:  GENERAL: No apparent distress, pt sitting in bed watching TV  HEAD:  Atraumatic, normocephalic  EYES: EOMI, sclera clear  NECK: Supple  CHEST/LUNG: Nonlabored respirations on room air  HEART: Regular rate and rhythm  ABDOMEN: Soft, nontender, nondistended  EXTREMITIES: No peripheral edema  PSYCH: Nonverbal, not responding to examiner, unable to assess  NEUROLOGY: No focal deficits  SKIN: Areas of bruising evident on b/l upper extremities    LABS:                        13.0   24.82 )-----------( 12       ( 30 May 2023 05:22 )             41.6      05-30    137  |  105  |  26<H>  ----------------------------<  111<H>  4.5   |  22  |  0.68    Ca    8.3<L>      30 May 2023 05:22  Phos  2.8     05-30  Mg     1.90     05-30                RADIOLOGY & ADDITIONAL TESTS:    Imaging Personally Reviewed:    Consultant(s) Notes Reviewed:      Care Discussed with Consultants/Other Providers:   Patient is a 23y old  Male who presents with a chief complaint of thrombocytopenia (30 May 2023 14:40)      SUBJECTIVE / OVERNIGHT EVENTS: No acute events overnight. Patient seen and examined at bedside, nonverbal, sitting in chair, tracking.     MEDICATIONS  (STANDING):  acetaminophen     Tablet .. 650 milliGRAM(s) Oral once  brexpiprazole 4 milliGRAM(s) Oral daily  dexAMETHasone  Injectable 4 milliGRAM(s) IV Push once  dexAMETHasone  IVPB 10 milliGRAM(s) IV Intermittent once  diphenhydrAMINE Injectable 25 milliGRAM(s) IV Push once  multivitamin 1 Tablet(s) Oral daily  pantoprazole    Tablet 40 milliGRAM(s) Oral before breakfast  riTUXimab-pvvr (RUXIENCE) IVPB (Non - oncologic) 880 milliGRAM(s) IV Intermittent once  traZODone 250 milliGRAM(s) Oral at bedtime    MEDICATIONS  (PRN):  ALPRAZolam 0.5 milliGRAM(s) Oral every 8 hours PRN Anxiety or agitation  diphenhydrAMINE Injectable 25 milliGRAM(s) IV Push once PRN PRN REACTION  hydrocortisone sodium succinate Injectable 50 milliGRAM(s) IV Push once PRN REACTION MED  meperidine     Injectable 25 milliGRAM(s) IV Push once PRN PRN REACTION  OLANZapine Injectable 5 milliGRAM(s) IntraMuscular every 8 hours PRN Severe agitation & refusing PO meds      CAPILLARY BLOOD GLUCOSE        I&O's Summary      Vital Signs Last 24 Hrs  T(C): 37.1 (31 May 2023 06:33), Max: 37.1 (31 May 2023 06:33)  T(F): 98.7 (31 May 2023 06:33), Max: 98.7 (31 May 2023 06:33)  HR: 83 (31 May 2023 06:33) (83 - 96)  BP: 119/72 (31 May 2023 06:33) (119/72 - 121/62)  BP(mean): 88 (30 May 2023 22:16) (88 - 88)  RR: 18 (31 May 2023 06:33) (17 - 18)  SpO2: 97% (31 May 2023 06:33) (97% - 98%)    Parameters below as of 31 May 2023 06:33  Patient On (Oxygen Delivery Method): room air        PHYSICAL EXAM:  GENERAL: No apparent distress, pt sitting in chair  HEAD:  Atraumatic, normocephalic  EYES: EOMI, sclera clear  NECK: Supple  CHEST/LUNG: Nonlabored respirations on room air  HEART: Regular rate and rhythm  ABDOMEN: Soft, nontender, nondistended  EXTREMITIES: No peripheral edema  PSYCH: Nonverbal, not responding to examiner, unable to assess  NEUROLOGY: No focal deficits  SKIN: Areas of bruising evident on b/l upper extremities and abdomen    LABS:                        13.0   24.82 )-----------( 12       ( 30 May 2023 05:22 )             41.6      05-30    137  |  105  |  26<H>  ----------------------------<  111<H>  4.5   |  22  |  0.68    Ca    8.3<L>      30 May 2023 05:22  Phos  2.8     05-30  Mg     1.90     05-30                RADIOLOGY & ADDITIONAL TESTS:    Imaging Personally Reviewed:    Consultant(s) Notes Reviewed:      Care Discussed with Consultants/Other Providers:

## 2023-05-31 NOTE — PROGRESS NOTE ADULT - ASSESSMENT
23 year old male with autism and chronic ITP followed by Dr. Bhagat at [facility], admitted due to ITP.     ITP  - Chronic, follows w/ Dr. Bhagat at [facility]  - He has received multiple therapies including IVIG, steroids, rituxan in 2010 and 2021, and Doptelet (LD 5/22)  - s/p romiplostim 05/28, pulse dex completed 05/30  - Plan for Rituxan today, slow infusion w/ pre-medications. Acute hepatitis panel done outpatient 05/22/2023 negative. If he tolerates well, will continue maintenance doses outpatient.   - Continue to monitor CBC. Would transfuse if bleeding. Otherwise hold off on transfusion for now     Leukocytosis secondary to steroids. Will follow     Discussed with primary team. Will continue to follow.     Zuly Silva PA-C  Hematology/Oncology  [facility]  [phone] (office)  [phone] (alt office)  Evenings and weekends please call MD on call or office

## 2023-06-01 ENCOUNTER — TRANSCRIPTION ENCOUNTER (OUTPATIENT)
Age: 23
End: 2023-06-01

## 2023-06-01 LAB
ANION GAP SERPL CALC-SCNC: 13 MMOL/L — SIGNIFICANT CHANGE UP (ref 7–14)
ANION GAP SERPL CALC-SCNC: 14 MMOL/L — SIGNIFICANT CHANGE UP (ref 7–14)
BASE EXCESS BLDV CALC-SCNC: 0.7 MMOL/L — SIGNIFICANT CHANGE UP (ref -2–3)
BUN SERPL-MCNC: 27 MG/DL — HIGH (ref 7–23)
BUN SERPL-MCNC: 27 MG/DL — HIGH (ref 7–23)
CA-I SERPL-SCNC: 1.17 MMOL/L — SIGNIFICANT CHANGE UP (ref 1.15–1.33)
CALCIUM SERPL-MCNC: 8.1 MG/DL — LOW (ref 8.4–10.5)
CALCIUM SERPL-MCNC: 8.2 MG/DL — LOW (ref 8.4–10.5)
CHLORIDE BLDV-SCNC: 99 MMOL/L — SIGNIFICANT CHANGE UP (ref 96–108)
CHLORIDE SERPL-SCNC: 100 MMOL/L — SIGNIFICANT CHANGE UP (ref 98–107)
CHLORIDE SERPL-SCNC: 99 MMOL/L — SIGNIFICANT CHANGE UP (ref 98–107)
CLOSURE TME COLL+EPINEP BLD: 12 K/UL — CRITICAL LOW (ref 150–400)
CLOSURE TME COLL+EPINEP BLD: 9 K/UL — CRITICAL LOW (ref 150–400)
CO2 BLDV-SCNC: 26.6 MMOL/L — HIGH (ref 22–26)
CO2 SERPL-SCNC: 22 MMOL/L — SIGNIFICANT CHANGE UP (ref 22–31)
CO2 SERPL-SCNC: 22 MMOL/L — SIGNIFICANT CHANGE UP (ref 22–31)
CREAT SERPL-MCNC: 0.72 MG/DL — SIGNIFICANT CHANGE UP (ref 0.5–1.3)
CREAT SERPL-MCNC: 0.88 MG/DL — SIGNIFICANT CHANGE UP (ref 0.5–1.3)
EGFR: 123 ML/MIN/1.73M2 — SIGNIFICANT CHANGE UP
EGFR: 131 ML/MIN/1.73M2 — SIGNIFICANT CHANGE UP
GAS PNL BLDV: 131 MMOL/L — LOW (ref 136–145)
GAS PNL BLDV: SIGNIFICANT CHANGE UP
GLUCOSE BLDV-MCNC: 105 MG/DL — HIGH (ref 70–99)
GLUCOSE SERPL-MCNC: 102 MG/DL — HIGH (ref 70–99)
GLUCOSE SERPL-MCNC: 111 MG/DL — HIGH (ref 70–99)
HCO3 BLDV-SCNC: 25 MMOL/L — SIGNIFICANT CHANGE UP (ref 22–29)
HCT VFR BLD CALC: 43.2 % — SIGNIFICANT CHANGE UP (ref 39–50)
HCT VFR BLDA CALC: 42 % — SIGNIFICANT CHANGE UP (ref 39–51)
HGB BLD CALC-MCNC: 14 G/DL — SIGNIFICANT CHANGE UP (ref 12.6–17.4)
HGB BLD-MCNC: 13.5 G/DL — SIGNIFICANT CHANGE UP (ref 13–17)
LACTATE BLDV-MCNC: 3.3 MMOL/L — HIGH (ref 0.5–2)
MAGNESIUM SERPL-MCNC: 1.8 MG/DL — SIGNIFICANT CHANGE UP (ref 1.6–2.6)
MAGNESIUM SERPL-MCNC: 1.9 MG/DL — SIGNIFICANT CHANGE UP (ref 1.6–2.6)
MCHC RBC-ENTMCNC: 26.4 PG — LOW (ref 27–34)
MCHC RBC-ENTMCNC: 31.3 GM/DL — LOW (ref 32–36)
MCV RBC AUTO: 84.4 FL — SIGNIFICANT CHANGE UP (ref 80–100)
NRBC # BLD: 0 /100 WBCS — SIGNIFICANT CHANGE UP (ref 0–0)
NRBC # FLD: 0 K/UL — SIGNIFICANT CHANGE UP (ref 0–0)
PCO2 BLDV: 40 MMHG — LOW (ref 42–55)
PH BLDV: 7.41 — SIGNIFICANT CHANGE UP (ref 7.32–7.43)
PHOSPHATE SERPL-MCNC: 2.8 MG/DL — SIGNIFICANT CHANGE UP (ref 2.5–4.5)
PHOSPHATE SERPL-MCNC: 3 MG/DL — SIGNIFICANT CHANGE UP (ref 2.5–4.5)
PLATELET # BLD AUTO: 12 K/UL — CRITICAL LOW (ref 150–400)
PO2 BLDV: 84 MMHG — HIGH (ref 25–45)
POTASSIUM BLDV-SCNC: 4.5 MMOL/L — SIGNIFICANT CHANGE UP (ref 3.5–5.1)
POTASSIUM SERPL-MCNC: 4.6 MMOL/L — SIGNIFICANT CHANGE UP (ref 3.5–5.3)
POTASSIUM SERPL-MCNC: 4.7 MMOL/L — SIGNIFICANT CHANGE UP (ref 3.5–5.3)
POTASSIUM SERPL-SCNC: 4.6 MMOL/L — SIGNIFICANT CHANGE UP (ref 3.5–5.3)
POTASSIUM SERPL-SCNC: 4.7 MMOL/L — SIGNIFICANT CHANGE UP (ref 3.5–5.3)
RBC # BLD: 5.12 M/UL — SIGNIFICANT CHANGE UP (ref 4.2–5.8)
RBC # FLD: 16.4 % — HIGH (ref 10.3–14.5)
SAO2 % BLDV: 95.5 % — HIGH (ref 67–88)
SARS-COV-2 RNA SPEC QL NAA+PROBE: SIGNIFICANT CHANGE UP
SODIUM SERPL-SCNC: 135 MMOL/L — SIGNIFICANT CHANGE UP (ref 135–145)
SODIUM SERPL-SCNC: 135 MMOL/L — SIGNIFICANT CHANGE UP (ref 135–145)
WBC # BLD: 19.59 K/UL — HIGH (ref 3.8–10.5)
WBC # FLD AUTO: 19.59 K/UL — HIGH (ref 3.8–10.5)

## 2023-06-01 PROCEDURE — 71045 X-RAY EXAM CHEST 1 VIEW: CPT | Mod: 26

## 2023-06-01 PROCEDURE — 93010 ELECTROCARDIOGRAM REPORT: CPT

## 2023-06-01 RX ORDER — ALPRAZOLAM 0.25 MG
1 TABLET ORAL
Qty: 15 | Refills: 0
Start: 2023-06-01 | End: 2023-06-05

## 2023-06-01 RX ORDER — OLANZAPINE 15 MG/1
5 TABLET, FILM COATED ORAL ONCE
Refills: 0 | Status: COMPLETED | OUTPATIENT
Start: 2023-06-01 | End: 2023-06-01

## 2023-06-01 RX ORDER — PANTOPRAZOLE SODIUM 20 MG/1
1 TABLET, DELAYED RELEASE ORAL
Qty: 10 | Refills: 0
Start: 2023-06-01 | End: 2023-06-10

## 2023-06-01 RX ORDER — SODIUM CHLORIDE 9 MG/ML
500 INJECTION, SOLUTION INTRAVENOUS
Refills: 0 | Status: DISCONTINUED | OUTPATIENT
Start: 2023-06-01 | End: 2023-06-02

## 2023-06-01 RX ADMIN — SODIUM CHLORIDE 50 MILLILITER(S): 9 INJECTION, SOLUTION INTRAVENOUS at 17:29

## 2023-06-01 RX ADMIN — PANTOPRAZOLE SODIUM 40 MILLIGRAM(S): 20 TABLET, DELAYED RELEASE ORAL at 06:46

## 2023-06-01 RX ADMIN — BREXPIPRAZOLE 4 MILLIGRAM(S): 0.25 TABLET ORAL at 12:38

## 2023-06-01 RX ADMIN — Medication 250 MILLIGRAM(S): at 21:57

## 2023-06-01 RX ADMIN — OLANZAPINE 5 MILLIGRAM(S): 15 TABLET, FILM COATED ORAL at 15:41

## 2023-06-01 RX ADMIN — Medication 0.5 MILLIGRAM(S): at 10:38

## 2023-06-01 RX ADMIN — Medication 40 MILLIGRAM(S): at 15:40

## 2023-06-01 RX ADMIN — Medication 1 TABLET(S): at 12:39

## 2023-06-01 NOTE — PROGRESS NOTE ADULT - ASSESSMENT
23 year old male with autism and chronic ITP followed by Dr. Bhagat at [facility], admitted due to ITP.     ITP  - Chronic, follows w/ Dr. Bhagat at [facility]  - He has received multiple therapies including IVIG, steroids, rituxan in 2010 and 2021, and Doptelet  - s/p romiplostim 05/28, pulse dex completed 05/30  - s/p Rituxan 05/31, tolerated well. Will continue weekly as an outpatient- next dose due 06/07.  - Continue prednisone 40mg qd on discharge until follow-up with Dr. Bhagat. Dose lower as mom is concerned about steroid-induced psychosis  - Continue to monitor CBC. Would transfuse if bleeding.     Leukocytosis secondary to steroids, improving. Will follow     Discussed with primary team. Will continue to follow.     Zuly Silva PA-C  Hematology/Oncology  [facility]  [phone] (office)  [phone] (alt office)  Evenings and weekends please call MD on call or office

## 2023-06-01 NOTE — PROGRESS NOTE ADULT - ASSESSMENT
Mr. Denilson Hernandes is a 23 year old M w/ PMH of intellectual disability, autism, nonverbal at baseline, CARLITOS not on CPAP, severe chronic ITP, referred by his hematologist due to thrombocytopenia to  on outpt labs, s/p NPlate 5/28 Mr. Denilson Hernandes is a 23 year old M w/ PMH of intellectual disability, autism, nonverbal at baseline, CARLITOS not on CPAP, severe chronic ITP, referred by his hematologist due to thrombocytopenia to  on outpt labs, s/p NPlate 5/28, rituxan 5/31.

## 2023-06-01 NOTE — PROGRESS NOTE ADULT - PROBLEM SELECTOR PLAN 4
DVT ppx: hold chemoppx given thrombocytopenia  Diet: Regular  Dispo: Return to group home DVT ppx: hold chemoppx given thrombocytopenia  Diet: Regular  Dispo: Return to group home today no

## 2023-06-01 NOTE — ADVANCED PRACTICE NURSE CONSULT - REASON FOR CONSULT
Patient known to [facility] service line last seen on 5/30/23, seen today on skin care rounds per request of mother at bedside to ensure that wound care orders are appropriate for discharge.

## 2023-06-01 NOTE — PROGRESS NOTE ADULT - SUBJECTIVE AND OBJECTIVE BOX
Patient is a 23y old  Male who presents with a chief complaint of thrombocytopenia (31 May 2023 15:09)      SUBJECTIVE / OVERNIGHT EVENTS:    MEDICATIONS  (STANDING):  brexpiprazole 4 milliGRAM(s) Oral daily  multivitamin 1 Tablet(s) Oral daily  pantoprazole    Tablet 40 milliGRAM(s) Oral before breakfast  traZODone 250 milliGRAM(s) Oral at bedtime    MEDICATIONS  (PRN):  ALPRAZolam 0.5 milliGRAM(s) Oral every 8 hours PRN Anxiety or agitation  diphenhydrAMINE Injectable 25 milliGRAM(s) IV Push once PRN PRN REACTION  hydrocortisone sodium succinate Injectable 50 milliGRAM(s) IV Push once PRN REACTION MED  meperidine     Injectable 25 milliGRAM(s) IV Push once PRN PRN REACTION  OLANZapine Injectable 5 milliGRAM(s) IntraMuscular every 8 hours PRN Severe agitation & refusing PO meds      CAPILLARY BLOOD GLUCOSE        I&O's Summary      Vital Signs Last 24 Hrs  T(C): 36.4 (01 Jun 2023 06:35), Max: 37.2 (31 May 2023 14:48)  T(F): 97.6 (01 Jun 2023 06:35), Max: 98.9 (31 May 2023 14:48)  HR: 91 (01 Jun 2023 06:35) (85 - 108)  BP: 113/62 (01 Jun 2023 06:35) (103/53 - 146/68)  BP(mean): --  RR: 17 (01 Jun 2023 06:35) (17 - 18)  SpO2: 100% (01 Jun 2023 06:35) (94% - 100%)    Parameters below as of 01 Jun 2023 06:35  Patient On (Oxygen Delivery Method): room air        PHYSICAL EXAM:  GENERAL: No apparent distress, pt sitting in chair  HEAD:  Atraumatic, normocephalic  EYES: EOMI, sclera clear  NECK: Supple  CHEST/LUNG: Nonlabored respirations on room air  HEART: Regular rate and rhythm  ABDOMEN: Soft, nontender, nondistended  EXTREMITIES: No peripheral edema  PSYCH: Nonverbal, not responding to examiner, unable to assess  NEUROLOGY: No focal deficits  SKIN: Areas of bruising evident on b/l upper extremities and abdomen    LABS:                        13.2   21.18 )-----------( 15       ( 31 May 2023 06:28 )             42.6      05-31    135  |  103  |  32<H>  ----------------------------<  109<H>  4.5   |  23  |  0.69    Ca    8.5      31 May 2023 06:28  Phos  3.1     05-31  Mg     1.90     05-31                RADIOLOGY & ADDITIONAL TESTS:    Imaging Personally Reviewed:    Consultant(s) Notes Reviewed:      Care Discussed with Consultants/Other Providers:   Patient is a 23y old  Male who presents with a chief complaint of thrombocytopenia (31 May 2023 15:09)      SUBJECTIVE / OVERNIGHT EVENTS: No acute events overnight. Patient seen and examined at bedside, nonverbal. Mother at bedside concerned that patient needs to get back to group home as requiring sedation here and very difficult to manage.     MEDICATIONS  (STANDING):  brexpiprazole 4 milliGRAM(s) Oral daily  multivitamin 1 Tablet(s) Oral daily  pantoprazole    Tablet 40 milliGRAM(s) Oral before breakfast  traZODone 250 milliGRAM(s) Oral at bedtime    MEDICATIONS  (PRN):  ALPRAZolam 0.5 milliGRAM(s) Oral every 8 hours PRN Anxiety or agitation  diphenhydrAMINE Injectable 25 milliGRAM(s) IV Push once PRN PRN REACTION  hydrocortisone sodium succinate Injectable 50 milliGRAM(s) IV Push once PRN REACTION MED  meperidine     Injectable 25 milliGRAM(s) IV Push once PRN PRN REACTION  OLANZapine Injectable 5 milliGRAM(s) IntraMuscular every 8 hours PRN Severe agitation & refusing PO meds      CAPILLARY BLOOD GLUCOSE        I&O's Summary      Vital Signs Last 24 Hrs  T(C): 36.4 (01 Jun 2023 06:35), Max: 37.2 (31 May 2023 14:48)  T(F): 97.6 (01 Jun 2023 06:35), Max: 98.9 (31 May 2023 14:48)  HR: 91 (01 Jun 2023 06:35) (85 - 108)  BP: 113/62 (01 Jun 2023 06:35) (103/53 - 146/68)  BP(mean): --  RR: 17 (01 Jun 2023 06:35) (17 - 18)  SpO2: 100% (01 Jun 2023 06:35) (94% - 100%)    Parameters below as of 01 Jun 2023 06:35  Patient On (Oxygen Delivery Method): room air        PHYSICAL EXAM:  GENERAL: No apparent distress, pt sitting in chair  HEAD:  Atraumatic, normocephalic  EYES: EOMI, sclera clear  NECK: Supple  CHEST/LUNG: Nonlabored respirations on room air  HEART: Regular rate and rhythm  ABDOMEN: Soft, nontender, nondistended  EXTREMITIES: No peripheral edema  PSYCH: Nonverbal, not responding to examiner, unable to assess  NEUROLOGY: No focal deficits  SKIN: Areas of bruising evident on b/l upper extremities and abdomen, stable    LABS:                        13.2   21.18 )-----------( 15       ( 31 May 2023 06:28 )             42.6      05-31    135  |  103  |  32<H>  ----------------------------<  109<H>  4.5   |  23  |  0.69    Ca    8.5      31 May 2023 06:28  Phos  3.1     05-31  Mg     1.90     05-31                RADIOLOGY & ADDITIONAL TESTS:    Imaging Personally Reviewed:    Consultant(s) Notes Reviewed:      Care Discussed with Consultants/Other Providers:

## 2023-06-01 NOTE — PROGRESS NOTE ADULT - PROBLEM SELECTOR PLAN 1
Extensive history of chronic ITP s/p numerous treatments. Previously allergic to rituximab (anaphylaxis/serum sickness) however underwent successful desensitization in Apr 2021 and tolerated rituximab at that time. That was the last time he received rituximab.  - most recently pt was discharged on a steroid taper; was on prednisone 20 mg daily prior to coming to ED  - s/p prednisone 50 mg in ED  - CT head negative for ICH  - small 2 by 3 cm right lower abdomen bruising noted, monitor, if any flank bruising, sudden drop in Hg, or hypotension might need urgent CT angio a/p r/o RP bleeding  - Completed dexamethasone 4 day course; per heme/onc will get rituxan 5/31 and then be monitored  - start PPI for GI ppx  - romiplostim 5/28  - consulted [facility] heme, recs appreciated - will follow up whether patient should receive rituxan while inpatient Extensive history of chronic ITP s/p numerous treatments. Previously allergic to rituximab (anaphylaxis/serum sickness) however underwent successful desensitization in Apr 2021 and tolerated rituximab at that time. That was the last time he received rituximab.  - most recently pt was discharged on a steroid taper; was on prednisone 20 mg daily prior to coming to ED  - s/p prednisone 50 mg in ED  - CT head negative for ICH  - small 2 by 3 cm right lower abdomen bruising noted, monitor, if any flank bruising, sudden drop in Hg, or hypotension might need urgent CT angio a/p r/o RP bleeding  - Completed dexamethasone 4 day course; NPlate 5/28, rituxan 5/31   - Continue prednisone 50 qD as outpatient until heme follow up

## 2023-06-01 NOTE — PROGRESS NOTE ADULT - SUBJECTIVE AND OBJECTIVE BOX
Patient is a 23y old  Male who presents with a chief complaint of thrombocytopenia (01 Jun 2023 07:25)    Patient seen this afternoon, mom at bedside. He tolerated Rituxan well yesterday, no issues during infusion.    MEDICATIONS  (STANDING):  brexpiprazole 4 milliGRAM(s) Oral daily  multivitamin 1 Tablet(s) Oral daily  pantoprazole    Tablet 40 milliGRAM(s) Oral before breakfast  traZODone 250 milliGRAM(s) Oral at bedtime    MEDICATIONS  (PRN):  ALPRAZolam 0.5 milliGRAM(s) Oral every 8 hours PRN Anxiety or agitation  diphenhydrAMINE Injectable 25 milliGRAM(s) IV Push once PRN PRN REACTION  hydrocortisone sodium succinate Injectable 50 milliGRAM(s) IV Push once PRN REACTION MED  meperidine     Injectable 25 milliGRAM(s) IV Push once PRN PRN REACTION  OLANZapine Injectable 5 milliGRAM(s) IntraMuscular every 8 hours PRN Severe agitation & refusing PO meds        Vital Signs Last 24 Hrs  T(C): 36.9 (01 Jun 2023 13:40), Max: 36.9 (31 May 2023 21:24)  T(F): 98.4 (01 Jun 2023 13:40), Max: 98.4 (31 May 2023 21:24)  HR: 107 (01 Jun 2023 13:40) (87 - 107)  BP: 125/70 (01 Jun 2023 13:40) (113/62 - 132/69)  BP(mean): --  RR: 21 (01 Jun 2023 13:40) (17 - 21)  SpO2: 99% (01 Jun 2023 13:40) (95% - 100%)    Parameters below as of 01 Jun 2023 13:40  Patient On (Oxygen Delivery Method): room air        PE  awake, NAD   Anicteric, MMM  RRR  CTAB  ecchymoses   FROM                          13.5   19.59 )-----------( 12       ( 01 Jun 2023 06:25 )             43.2       06-01    135  |  100  |  27<H>  ----------------------------<  102<H>  4.6   |  22  |  0.88    Ca    8.1<L>      01 Jun 2023 15:08  Phos  3.0     06-01  Mg     1.80     06-01

## 2023-06-01 NOTE — DISCHARGE NOTE NURSING/CASE MANAGEMENT/SOCIAL WORK - PATIENT PORTAL LINK FT
You can access the FollowMyHealth Patient Portal offered by [facility] by registering at the following website: [email or web address]. By joining [employer]’s FollowMyHealth portal, you will also be able to view your health information using other applications (apps) compatible with our system.

## 2023-06-01 NOTE — DISCHARGE NOTE NURSING/CASE MANAGEMENT/SOCIAL WORK - NSDCVIVACCINE_GEN_ALL_CORE_FT
Tdap; 15-Aug-2020 20:47; Jumana Martinez (HENRRY); [employer]; [id number] (Exp. Date: 04-Apr-2022); IntraMuscular; Deltoid Right.; 0.5 milliLiter(s); VIS (VIS Published: 09-May-2013, VIS Presented: 15-Aug-2020);

## 2023-06-01 NOTE — ADVANCED PRACTICE NURSE CONSULT - RECOMMEDATIONS
Recommend follow up care at [facility]: [phone]. Address: [address] [place] [address].     Topical recommendations    Right buttock: Cleanse with NS, pat dry. Apply Liquid barrier film to periwound skin (allow to dry). Loosely pack cavity with iodoform 1/4" packing strip as primary dressing to absorb and fill dead space. Cover with silicone foam with border. Change daily and PRN if soiled.     Abdominal pannus: Cleanse with skin cleanser, pat dry. Place Interdry textile sheeting, under intertriginous folds leaving 2 inches exposed at ends to wick, remove to wash & dry affected area, then replace. Individual sheeting may be used for up to 5 days unless soiled. Inspect skin daily.     Buttocks/perineum: Cleanse with skin cleanser, pat dry. Apply Shazia moisture barrier cream twice a day and PRN with incontinent episodes.     Continue low air loss bed therapy, continue heel elevation, continue to turn & reposition as per protocol, soft pillow between bony prominences, continue moisture management with barrier creams & single breathable pad, continue measures to decrease friction/shear/pressure. Continue with nutritional support as per dietary/orders.     Plan of care discussed with Primary RN Loren, MD Dionne Jeong, and patient's mother at the bedside. All questions and concerns answered.     Please contact Wound/Ostomy Care Service Line if we can be of further assistance (ext [phone]).

## 2023-06-01 NOTE — DISCHARGE NOTE NURSING/CASE MANAGEMENT/SOCIAL WORK - NSDCFUADDAPPT_GEN_ALL_CORE_FT
Recommend follow up care at [facility]: [phone]. Address: [address] [place] [address].

## 2023-06-01 NOTE — ADVANCED PRACTICE NURSE CONSULT - ASSESSMENT
General: A&Ox1, nonverbal at baseline, able to ambulate independently, incontinent of urine and stool at times. Skin warm, dry with increased moisture in intertriginous folds, scattered areas of hyperpigmentation and hypopigmentation, scattered areas of ecchymosis without hematoma on abdomen and bilateral upper extremities. Risk for moisture associated dermatitis to abdominal pannus. Risk for incontinence associated dermatitis to buttocks and perineum.     Right buttock: Wound from previous abscess s/p I&D prior to hospitalization. Measuring 1cmx0.5cmx0.5cm. Presenting as 80% moist pink agranular and 20% adipose tissue. Scant serosanguinous drainage, no odor. Periwound hyperpigmentation. No increased warmth, no fluctuance, no erythema, no induration, no edema, no overt signs of infection noted at this time. Goals of care: monitor for tissue type changes, antimicrobial support, continue measures to decrease friction/shear/pressure. 
General: A&Ox1, nonverbal at baseline, mother at bedside, able to ambulate independently, incontinent of urine and stool at times. Skin warm, dry with increased moisture in intertriginous folds, scattered areas of hyperpigmentation and hypopigmentation, scattered areas of ecchymosis without hematoma on abdomen and bilateral upper extremities. Risk for moisture associated dermatitis to abdominal pannus. Risk for incontinence associated dermatitis to buttocks and perineum.     Right buttock: Wound from previous abscess s/p I&D prior to hospitalization. Previously measuring 1cmx0.5cmx0.5cm, now measuring 0.8cmx0.4cmx0.4cm. Unable to see wound bed due to close approximation of wound edges, no drainage expressed on assessment but small serosanguinous drainage noted on previous dressing. Scant serosanguinous drainage, no odor. Periwound hyperpigmentation circumferentially. No increased warmth, no fluctuance, no erythema, no induration, no edema, no overt signs of infection noted at this time. Goals of care: monitor for tissue type changes, antimicrobial support, continue measures to decrease friction/shear/pressure.

## 2023-06-02 LAB
ANION GAP SERPL CALC-SCNC: 10 MMOL/L — SIGNIFICANT CHANGE UP (ref 7–14)
APPEARANCE UR: CLEAR — SIGNIFICANT CHANGE UP
BACTERIA # UR AUTO: NEGATIVE — SIGNIFICANT CHANGE UP
BASE EXCESS BLDV CALC-SCNC: 2.2 MMOL/L — SIGNIFICANT CHANGE UP (ref -2–3)
BILIRUB UR-MCNC: NEGATIVE — SIGNIFICANT CHANGE UP
BUN SERPL-MCNC: 32 MG/DL — HIGH (ref 7–23)
CA-I SERPL-SCNC: 1.15 MMOL/L — SIGNIFICANT CHANGE UP (ref 1.15–1.33)
CALCIUM SERPL-MCNC: 8.1 MG/DL — LOW (ref 8.4–10.5)
CHLORIDE BLDV-SCNC: 99 MMOL/L — SIGNIFICANT CHANGE UP (ref 96–108)
CHLORIDE SERPL-SCNC: 99 MMOL/L — SIGNIFICANT CHANGE UP (ref 98–107)
CLOSURE TME COLL+EPINEP BLD: 3 K/UL — CRITICAL LOW (ref 150–400)
CO2 BLDV-SCNC: 29.8 MMOL/L — HIGH (ref 22–26)
CO2 SERPL-SCNC: 24 MMOL/L — SIGNIFICANT CHANGE UP (ref 22–31)
COLOR SPEC: YELLOW — SIGNIFICANT CHANGE UP
CREAT SERPL-MCNC: 0.73 MG/DL — SIGNIFICANT CHANGE UP (ref 0.5–1.3)
DIFF PNL FLD: NEGATIVE — SIGNIFICANT CHANGE UP
EGFR: 130 ML/MIN/1.73M2 — SIGNIFICANT CHANGE UP
EPI CELLS # UR: 1 /HPF — SIGNIFICANT CHANGE UP (ref 0–5)
GAS PNL BLDV: 132 MMOL/L — LOW (ref 136–145)
GAS PNL BLDV: SIGNIFICANT CHANGE UP
GLUCOSE BLDV-MCNC: 84 MG/DL — SIGNIFICANT CHANGE UP (ref 70–99)
GLUCOSE SERPL-MCNC: 92 MG/DL — SIGNIFICANT CHANGE UP (ref 70–99)
GLUCOSE UR QL: NEGATIVE — SIGNIFICANT CHANGE UP
HCO3 BLDV-SCNC: 28 MMOL/L — SIGNIFICANT CHANGE UP (ref 22–29)
HCT VFR BLD CALC: 42.8 % — SIGNIFICANT CHANGE UP (ref 39–50)
HCT VFR BLDA CALC: 40 % — SIGNIFICANT CHANGE UP (ref 39–51)
HGB BLD CALC-MCNC: 13.3 G/DL — SIGNIFICANT CHANGE UP (ref 12.6–17.4)
HGB BLD-MCNC: 13.4 G/DL — SIGNIFICANT CHANGE UP (ref 13–17)
HYALINE CASTS # UR AUTO: 0 /LPF — SIGNIFICANT CHANGE UP (ref 0–7)
KETONES UR-MCNC: ABNORMAL
LACTATE BLDV-MCNC: 2.4 MMOL/L — HIGH (ref 0.5–2)
LEUKOCYTE ESTERASE UR-ACNC: NEGATIVE — SIGNIFICANT CHANGE UP
MAGNESIUM SERPL-MCNC: 2 MG/DL — SIGNIFICANT CHANGE UP (ref 1.6–2.6)
MCHC RBC-ENTMCNC: 26 PG — LOW (ref 27–34)
MCHC RBC-ENTMCNC: 31.3 GM/DL — LOW (ref 32–36)
MCV RBC AUTO: 83.1 FL — SIGNIFICANT CHANGE UP (ref 80–100)
NITRITE UR-MCNC: NEGATIVE — SIGNIFICANT CHANGE UP
NRBC # BLD: 0 /100 WBCS — SIGNIFICANT CHANGE UP (ref 0–0)
NRBC # FLD: 0.02 K/UL — HIGH (ref 0–0)
PCO2 BLDV: 49 MMHG — SIGNIFICANT CHANGE UP (ref 42–55)
PH BLDV: 7.37 — SIGNIFICANT CHANGE UP (ref 7.32–7.43)
PH UR: 6 — SIGNIFICANT CHANGE UP (ref 5–8)
PHOSPHATE SERPL-MCNC: 3.2 MG/DL — SIGNIFICANT CHANGE UP (ref 2.5–4.5)
PLATELET # BLD AUTO: 7 K/UL — CRITICAL LOW (ref 150–400)
PO2 BLDV: 46 MMHG — HIGH (ref 25–45)
POTASSIUM BLDV-SCNC: 4.5 MMOL/L — SIGNIFICANT CHANGE UP (ref 3.5–5.1)
POTASSIUM SERPL-MCNC: 4.5 MMOL/L — SIGNIFICANT CHANGE UP (ref 3.5–5.3)
POTASSIUM SERPL-SCNC: 4.5 MMOL/L — SIGNIFICANT CHANGE UP (ref 3.5–5.3)
PROT UR-MCNC: ABNORMAL
RBC # BLD: 5.15 M/UL — SIGNIFICANT CHANGE UP (ref 4.2–5.8)
RBC # FLD: 16.7 % — HIGH (ref 10.3–14.5)
RBC CASTS # UR COMP ASSIST: 2 /HPF — SIGNIFICANT CHANGE UP (ref 0–4)
SAO2 % BLDV: 76.1 % — SIGNIFICANT CHANGE UP (ref 67–88)
SODIUM SERPL-SCNC: 133 MMOL/L — LOW (ref 135–145)
SP GR SPEC: 1.03 — SIGNIFICANT CHANGE UP (ref 1.01–1.05)
UROBILINOGEN FLD QL: SIGNIFICANT CHANGE UP
WBC # BLD: 19.58 K/UL — HIGH (ref 3.8–10.5)
WBC # FLD AUTO: 19.58 K/UL — HIGH (ref 3.8–10.5)
WBC UR QL: 1 /HPF — SIGNIFICANT CHANGE UP (ref 0–5)

## 2023-06-02 PROCEDURE — 90792 PSYCH DIAG EVAL W/MED SRVCS: CPT

## 2023-06-02 RX ORDER — CIMETIDINE 200 MG
400 TABLET ORAL THREE TIMES A DAY
Refills: 0 | Status: DISCONTINUED | OUTPATIENT
Start: 2023-06-02 | End: 2023-06-12

## 2023-06-02 RX ORDER — OLANZAPINE 15 MG/1
5 TABLET, FILM COATED ORAL EVERY 8 HOURS
Refills: 0 | Status: DISCONTINUED | OUTPATIENT
Start: 2023-06-02 | End: 2023-06-02

## 2023-06-02 RX ADMIN — PANTOPRAZOLE SODIUM 40 MILLIGRAM(S): 20 TABLET, DELAYED RELEASE ORAL at 07:23

## 2023-06-02 RX ADMIN — Medication 0.5 MILLIGRAM(S): at 22:45

## 2023-06-02 RX ADMIN — OLANZAPINE 5 MILLIGRAM(S): 15 TABLET, FILM COATED ORAL at 09:04

## 2023-06-02 RX ADMIN — Medication 40 MILLIGRAM(S): at 11:47

## 2023-06-02 RX ADMIN — Medication 250 MILLIGRAM(S): at 22:45

## 2023-06-02 RX ADMIN — Medication 1 TABLET(S): at 11:47

## 2023-06-02 RX ADMIN — Medication 400 MILLIGRAM(S): at 22:46

## 2023-06-02 RX ADMIN — Medication 0.5 MILLIGRAM(S): at 07:23

## 2023-06-02 RX ADMIN — BREXPIPRAZOLE 4 MILLIGRAM(S): 0.25 TABLET ORAL at 11:47

## 2023-06-02 NOTE — BH CONSULTATION LIAISON ASSESSMENT NOTE - SUMMARY
23 year old male with PPH significant for severe autism and PMH significant for ITP presents to [facility] for severe thrombocytopenia.  Patient is currently on steroid. Mother is concern for steroid induced irritability as well as increased aggression.  Psych CL consulted to assist in medication management in this setting. 23 year old male with PPH significant for severe autism and PMH significant for ITP presents to [facility] for severe thrombocytopenia.  Patient is currently on steroid. Mother is concern for steroid induced irritability as well as increased aggression.  Psych CL consulted to assist in medication management in this setting.    While patient is not showing signs of overt psychosis, it is possible that the steroids is causing more irritability.    Plan:  [] 1:1 for agitation  [] stop PRN zyprexa  [] start ativan 0.5mg TID (hold for lethargy, resp depression, hypotension, bradycardia)    Agitation:  [] first- ativan 1mg PO/IM/IV q6h prn for agitation  [] second- haldol 2mg PO/IM/IV for agitation  	- of note, if patient shows paradoxical reaction to ativan IV/IM- stop using this medication    Closely monitor platelets- may need to make further adjustments  23 year old male with PPH significant for severe autism and PMH significant for ITP presents to [facility] for severe thrombocytopenia.  Patient is currently on steroid. Mother is concern for steroid induced irritability as well as increased aggression.  Psych CL consulted to assist in medication management in this setting.    While patient is not showing signs of overt psychosis, it is possible that the steroids is causing more irritability.    Plan:  [] 1:1 for agitation  [] stop PRN zyprexa  [] stop xanax prn  [] start ativan 0.5mg TID (hold for lethargy, resp depression, hypotension, bradycardia)    Agitation:  [] first- ativan 1mg PO/IM/IV q6h prn for agitation  [] second- haldol 2mg PO/IM/IV for agitation  	- of note, if patient shows paradoxical reaction to ativan IV/IM- stop using this medication    Closely monitor platelets- may need to make further adjustments

## 2023-06-02 NOTE — BH CONSULTATION LIAISON ASSESSMENT NOTE - NSBHCHARTREVIEWVS_PSY_A_CORE FT
Vital Signs Last 24 Hrs  T(C): 36.9 (02 Jun 2023 05:20), Max: 36.9 (02 Jun 2023 05:20)  T(F): 98.4 (02 Jun 2023 05:20), Max: 98.4 (02 Jun 2023 05:20)  HR: 86 (02 Jun 2023 05:20) (86 - 93)  BP: 116/63 (02 Jun 2023 05:20) (116/63 - 121/67)  BP(mean): --  RR: 18 (02 Jun 2023 05:20) (18 - 18)  SpO2: 98% (02 Jun 2023 05:20) (97% - 98%)    Parameters below as of 02 Jun 2023 05:20  Patient On (Oxygen Delivery Method): room air

## 2023-06-02 NOTE — PROGRESS NOTE ADULT - PROBLEM SELECTOR PLAN 1
Extensive history of chronic ITP s/p numerous treatments. Previously allergic to rituximab (anaphylaxis/serum sickness) however underwent successful desensitization in Apr 2021 and tolerated rituximab at that time. That was the last time he received rituximab.  - most recently pt was discharged on a steroid taper; was on prednisone 20 mg daily prior to coming to ED  - s/p prednisone 50 mg in ED  - CT head negative for ICH  - small 2 by 3 cm right lower abdomen bruising noted, monitor, if any flank bruising, sudden drop in Hg, or hypotension might need urgent CT angio a/p r/o RP bleeding  - Completed dexamethasone 4 day course; NPlate 5/28, rituxan 5/31   - Continue prednisone 50 qD as outpatient until heme follow up Extensive history of chronic ITP s/p numerous treatments. Previously allergic to rituximab (anaphylaxis/serum sickness) however underwent successful desensitization in Apr 2021 and tolerated rituximab at that time. That was the last time he received rituximab.  - most recently pt was discharged on a steroid taper; was on prednisone 20 mg daily prior to coming to ED  - s/p prednisone 50 mg in ED  - CT head negative for ICH  - small 2 by 3 cm right lower abdomen bruising noted, monitor, if any flank bruising, sudden drop in Hg, or hypotension might need urgent CT angio a/p r/o RP bleeding  - Completed dexamethasone 4 day course; NPlate 5/28, rituxan 5/31   - Continue prednisone 40 qD until outpatient follow up with heme  - 6/2 platelets drop - blue top 3, CBC platelets of 7, will follow up with sera matos

## 2023-06-02 NOTE — PROGRESS NOTE ADULT - PROBLEM SELECTOR PLAN 4
DVT ppx: hold chemoppx given thrombocytopenia  Diet: Regular  Dispo: Return to group home today DVT ppx: hold chemoppx given thrombocytopenia  Diet: Regular  Dispo: Return to group home when platelets sustained >10

## 2023-06-02 NOTE — PROGRESS NOTE ADULT - SUBJECTIVE AND OBJECTIVE BOX
Patient is a 23y old  Male who presents with a chief complaint of thrombocytopenia (01 Jun 2023 16:37)      SUBJECTIVE / OVERNIGHT EVENTS:    MEDICATIONS  (STANDING):  brexpiprazole 4 milliGRAM(s) Oral daily  lactated ringers. 500 milliLiter(s) (50 mL/Hr) IV Continuous <Continuous>  multivitamin 1 Tablet(s) Oral daily  pantoprazole    Tablet 40 milliGRAM(s) Oral before breakfast  traZODone 250 milliGRAM(s) Oral at bedtime    MEDICATIONS  (PRN):  ALPRAZolam 0.5 milliGRAM(s) Oral every 8 hours PRN Anxiety or agitation  diphenhydrAMINE Injectable 25 milliGRAM(s) IV Push once PRN PRN REACTION  hydrocortisone sodium succinate Injectable 50 milliGRAM(s) IV Push once PRN REACTION MED  meperidine     Injectable 25 milliGRAM(s) IV Push once PRN PRN REACTION  OLANZapine Injectable 5 milliGRAM(s) IntraMuscular every 8 hours PRN Severe agitation & refusing PO meds      CAPILLARY BLOOD GLUCOSE        I&O's Summary    01 Jun 2023 07:01  -  02 Jun 2023 07:00  --------------------------------------------------------  IN: 590 mL / OUT: 0 mL / NET: 590 mL        Vital Signs Last 24 Hrs  T(C): 36.9 (02 Jun 2023 05:20), Max: 36.9 (01 Jun 2023 13:40)  T(F): 98.4 (02 Jun 2023 05:20), Max: 98.4 (01 Jun 2023 13:40)  HR: 86 (02 Jun 2023 05:20) (86 - 107)  BP: 116/63 (02 Jun 2023 05:20) (116/63 - 132/69)  BP(mean): --  RR: 18 (02 Jun 2023 05:20) (18 - 21)  SpO2: 98% (02 Jun 2023 05:20) (97% - 99%)    Parameters below as of 02 Jun 2023 05:20  Patient On (Oxygen Delivery Method): room air        PHYSICAL EXAM:  GENERAL: No apparent distress, pt sitting in chair  HEAD:  Atraumatic, normocephalic  EYES: EOMI, sclera clear  NECK: Supple  CHEST/LUNG: Nonlabored respirations on room air  HEART: Regular rate and rhythm  ABDOMEN: Soft, nontender, nondistended  EXTREMITIES: No peripheral edema  PSYCH: Nonverbal, not responding to examiner, unable to assess  NEUROLOGY: No focal deficits  SKIN: Areas of bruising evident on b/l upper extremities and abdomen, stable    LABS:                        13.5   19.59 )-----------( 12       ( 01 Jun 2023 06:25 )             43.2      06-01    135  |  100  |  27<H>  ----------------------------<  102<H>  4.6   |  22  |  0.88    Ca    8.1<L>      01 Jun 2023 15:08  Phos  3.0     06-01  Mg     1.80     06-01                RADIOLOGY & ADDITIONAL TESTS:    Imaging Personally Reviewed:    Consultant(s) Notes Reviewed:      Care Discussed with Consultants/Other Providers:   Patient is a 23y old  Male who presents with a chief complaint of thrombocytopenia (01 Jun 2023 16:37)      SUBJECTIVE / OVERNIGHT EVENTS: Yesterday evening had an episode of rapid shallow breathing, workup positive for elevated lactate. Patient seen and examined at bedside, mother notes he is agitated, concerned about oversedation, patient had recently received Xanax with limited response.     MEDICATIONS  (STANDING):  brexpiprazole 4 milliGRAM(s) Oral daily  lactated ringers. 500 milliLiter(s) (50 mL/Hr) IV Continuous <Continuous>  multivitamin 1 Tablet(s) Oral daily  pantoprazole    Tablet 40 milliGRAM(s) Oral before breakfast  traZODone 250 milliGRAM(s) Oral at bedtime    MEDICATIONS  (PRN):  ALPRAZolam 0.5 milliGRAM(s) Oral every 8 hours PRN Anxiety or agitation  diphenhydrAMINE Injectable 25 milliGRAM(s) IV Push once PRN PRN REACTION  hydrocortisone sodium succinate Injectable 50 milliGRAM(s) IV Push once PRN REACTION MED  meperidine     Injectable 25 milliGRAM(s) IV Push once PRN PRN REACTION  OLANZapine Injectable 5 milliGRAM(s) IntraMuscular every 8 hours PRN Severe agitation & refusing PO meds      CAPILLARY BLOOD GLUCOSE        I&O's Summary    01 Jun 2023 07:01  -  02 Jun 2023 07:00  --------------------------------------------------------  IN: 590 mL / OUT: 0 mL / NET: 590 mL        Vital Signs Last 24 Hrs  T(C): 36.9 (02 Jun 2023 05:20), Max: 36.9 (01 Jun 2023 13:40)  T(F): 98.4 (02 Jun 2023 05:20), Max: 98.4 (01 Jun 2023 13:40)  HR: 86 (02 Jun 2023 05:20) (86 - 107)  BP: 116/63 (02 Jun 2023 05:20) (116/63 - 132/69)  BP(mean): --  RR: 18 (02 Jun 2023 05:20) (18 - 21)  SpO2: 98% (02 Jun 2023 05:20) (97% - 99%)    Parameters below as of 02 Jun 2023 05:20  Patient On (Oxygen Delivery Method): room air        PHYSICAL EXAM:  GENERAL: No apparent distress, pt sitting in chair  HEAD:  Atraumatic, normocephalic  EYES: EOMI, sclera clear  NECK: Supple  CHEST/LUNG: Nonlabored respirations on room air  HEART: Regular rate and rhythm  ABDOMEN: Soft, nontender, nondistended  EXTREMITIES: No peripheral edema  PSYCH: Nonverbal, not responding to examiner, unable to assess  NEUROLOGY: No focal deficits  SKIN: Areas of bruising evident on b/l upper extremities and abdomen, stable    LABS:                        13.5   19.59 )-----------( 12       ( 01 Jun 2023 06:25 )             43.2      06-01    135  |  100  |  27<H>  ----------------------------<  102<H>  4.6   |  22  |  0.88    Ca    8.1<L>      01 Jun 2023 15:08  Phos  3.0     06-01  Mg     1.80     06-01                RADIOLOGY & ADDITIONAL TESTS:    Imaging Personally Reviewed:    Consultant(s) Notes Reviewed:      Care Discussed with Consultants/Other Providers:

## 2023-06-02 NOTE — BH CONSULTATION LIAISON ASSESSMENT NOTE - NSBHADMITIPOBS_PSY_A_CORE
Detail Level: Zone Continue Regimen: Clobetasol 0.05% and Betamethasone before shower, leave on for 5 min then rinse off 2-3 times per week Constant observation

## 2023-06-02 NOTE — PROGRESS NOTE ADULT - ASSESSMENT
23 year old male with autism and chronic ITP followed by Dr. Bhagat at [facility], admitted due to ITP.     ITP  - Chronic, follows w/ Dr. Bhagat at [facility]  - He has received multiple therapies including IVIG, steroids, rituxan in 2010 and 2021, and Doptelet  - s/p romiplostim 05/28, pulse dex completed 05/30, rituximab 05/31  - Continue prednisone 40mg qd  - Recommend IVIG however mom is hesitant, will think about it.   - For now, check blood cultures to ensure no underlying infection causing worsening platelet count   - Continue to monitor CBC. Would transfuse if bleeding.     Leukocytosis secondary to steroids, improving.     Discussed with primary team. Will continue to follow.     Zuly Silva PA-C  Hematology/Oncology  [facility]  [phone] (office)  [phone] (alt office)  Evenings and weekends please call MD on call or office

## 2023-06-02 NOTE — BH CONSULTATION LIAISON ASSESSMENT NOTE - CURRENT MEDICATION
MEDICATIONS  (STANDING):  brexpiprazole 4 milliGRAM(s) Oral daily  cimetidine 400 milliGRAM(s) Oral three times a day  lactated ringers. 500 milliLiter(s) (50 mL/Hr) IV Continuous <Continuous>  multivitamin 1 Tablet(s) Oral daily  predniSONE   Tablet 40 milliGRAM(s) Oral daily  traZODone 250 milliGRAM(s) Oral at bedtime    MEDICATIONS  (PRN):  ALPRAZolam 0.5 milliGRAM(s) Oral every 8 hours PRN Anxiety or agitation  diphenhydrAMINE Injectable 25 milliGRAM(s) IV Push once PRN PRN REACTION  hydrocortisone sodium succinate Injectable 50 milliGRAM(s) IV Push once PRN REACTION MED  meperidine     Injectable 25 milliGRAM(s) IV Push once PRN PRN REACTION  OLANZapine Injectable 5 milliGRAM(s) IntraMuscular every 8 hours PRN Agitation not responding to Xanax

## 2023-06-02 NOTE — PROGRESS NOTE ADULT - SUBJECTIVE AND OBJECTIVE BOX
Patient is a 23y old  Male who presents with a chief complaint of thrombocytopenia (02 Jun 2023 07:13)    Patient seen this afternoon, mom at bedside. Plt count decreased to 7K. Pt has already been receiving steroids, romiplostim, rituximab. Recommended IVIG however mom is hesitant.     MEDICATIONS  (STANDING):  brexpiprazole 4 milliGRAM(s) Oral daily  cimetidine 400 milliGRAM(s) Oral three times a day  lactated ringers. 500 milliLiter(s) (50 mL/Hr) IV Continuous <Continuous>  multivitamin 1 Tablet(s) Oral daily  predniSONE   Tablet 40 milliGRAM(s) Oral daily  traZODone 250 milliGRAM(s) Oral at bedtime    MEDICATIONS  (PRN):  ALPRAZolam 0.5 milliGRAM(s) Oral every 8 hours PRN Anxiety or agitation  diphenhydrAMINE Injectable 25 milliGRAM(s) IV Push once PRN PRN REACTION  hydrocortisone sodium succinate Injectable 50 milliGRAM(s) IV Push once PRN REACTION MED  meperidine     Injectable 25 milliGRAM(s) IV Push once PRN PRN REACTION  OLANZapine Injectable 5 milliGRAM(s) IntraMuscular every 8 hours PRN Agitation not responding to Xanax        Vital Signs Last 24 Hrs  T(C): 36.8 (02 Jun 2023 16:18), Max: 36.9 (02 Jun 2023 05:20)  T(F): 98.2 (02 Jun 2023 16:18), Max: 98.4 (02 Jun 2023 05:20)  HR: 102 (02 Jun 2023 16:18) (86 - 102)  BP: 116/71 (02 Jun 2023 16:18) (116/63 - 121/67)  BP(mean): --  RR: 18 (02 Jun 2023 16:18) (18 - 18)  SpO2: 96% (02 Jun 2023 16:18) (96% - 98%)    Parameters below as of 02 Jun 2023 16:18  Patient On (Oxygen Delivery Method): room air        PE  NAD  awake, restless   Anicteric, MMM  ecchymoses   FROM                          13.4   19.58 )-----------( 7        ( 02 Jun 2023 06:00 )             42.8       06-02    133<L>  |  99  |  32<H>  ----------------------------<  92  4.5   |  24  |  0.73    Ca    8.1<L>      02 Jun 2023 06:00  Phos  3.2     06-02  Mg     2.00     06-02

## 2023-06-02 NOTE — PROGRESS NOTE ADULT - ASSESSMENT
Mr. Denilson Hernandes is a 23 year old M w/ PMH of intellectual disability, autism, nonverbal at baseline, CARLITOS not on CPAP, severe chronic ITP, referred by his hematologist due to thrombocytopenia to  on outpt labs, s/p NPlate 5/28, rituxan 5/31.

## 2023-06-02 NOTE — BH CONSULTATION LIAISON ASSESSMENT NOTE - NSBHCHARTREVIEWLAB_PSY_A_CORE FT
13.4   19.58 )-----------( 7        ( 02 Jun 2023 06:00 )             42.8   06-02    133<L>  |  99  |  32<H>  ----------------------------<  92  4.5   |  24  |  0.73    Ca    8.1<L>      02 Jun 2023 06:00  Phos  3.2     06-02  Mg     2.00     06-02

## 2023-06-02 NOTE — BH CONSULTATION LIAISON ASSESSMENT NOTE - MSE UNSTRUCTURED FT
Limited MSE:  Eyes track to voice  Obese male- calm, no psychomotor agitation at time of exam  No speech  No rigidity in UE

## 2023-06-02 NOTE — BH CONSULTATION LIAISON ASSESSMENT NOTE - HPI (INCLUDE ILLNESS QUALITY, SEVERITY, DURATION, TIMING, CONTEXT, MODIFYING FACTORS, ASSOCIATED SIGNS AND SYMPTOMS)
23 year old male with PPH significant for severe autism and PMH significant for ITP presents to [facility] for severe thrombocytopenia.  Patient is currently on steroid. Mother is concern for steroid induced irritability as well as increased aggression.  Psych CL consulted to assist in medication management in this setting.    Of note, patient's most recent platelets are 3.    Patient seen this afternoon. He is sitting calmly in his chair. He is alert, tracks to writer's voice. He is mute. Shakes my hand, otherwise interview/interaction severely limited. Patient does not have catalepsy.    Spoke with mother about concern for increased appetite, recent weight gain, and how patient becomes agitated when food is taken away. Discussed medication regimen- patient at home is on brexulti and trazodone. She was initially concerned that haldol may have caused thrombocytopenia, but now feels like this is less likely. She states that in the past he had been on scheduled ativan, but was eventually taken off of it as he was doing behaviorally well. 23 year old male with PPH significant for severe autism and PMH significant for ITP presents to [facility] for severe thrombocytopenia.  Patient is currently on steroid. Mother is concern for steroid induced irritability as well as increased aggression.  Psych CL consulted to assist in medication management in this setting.    Of note, patient's most recent platelets are 3.    Patient seen this afternoon. He is sitting calmly in his chair. He is alert, tracks to writer's voice. He is mute. Shakes my hand, otherwise interview/interaction severely limited. Patient does not have catalepsy.    Spoke with mother about concern for increased appetite, recent weight gain, and how patient becomes agitated when food is taken away. Discussed medication regimen- patient at home is on brexulti and trazodone. She was initially concerned that haldol may have caused thrombocytopenia, but now feels like this is less likely. She states that in the past he had been on scheduled ativan, but was eventually taken off of it as he was doing behaviorally well.   Recently, he can get so agitated that he has tried to take a bite out of his mother.    Spoke to mother about plan of starting scheduled ativan around the clock to try and help with overall agitation. Came up with plan of starting ativan 0.5mg TID, and using ativan PRN first and then haldol if need be. Later in afternoon spoke with mother about concern that there is documentation that ativan may cause thrombocytopenia is less than 1% of patients. Mother states that he had been on ativan in the past without causing this, so she is not particularly concerned. Went over plan with mother again who was in agreement. 23 year old male with PPH significant for severe autism and PMH significant for ITP presents to [facility] for severe thrombocytopenia.  Patient is currently on steroid. Mother is concern for steroid induced irritability as well as increased aggression.  Psych CL consulted to assist in medication management in this setting.    Of note, patient's most recent platelets are 3.    Patient seen this afternoon. He is sitting calmly in his chair. He is alert, tracks to writer's voice. He is mute. Shakes my hand, otherwise interview/interaction severely limited. Patient does not have catalepsy.    Spoke with mother about concern for increased appetite, recent weight gain, and how patient becomes agitated when food is taken away. Discussed medication regimen- patient at home is on brexulti and trazodone. She was initially concerned that haldol may have caused thrombocytopenia, but now feels like this is less likely. She states that in the past he had been on scheduled ativan, but was eventually taken off of it as he was doing behaviorally well.   Recently, he can get so agitated that he has tried to take a bite out of his mother.    Spoke to mother about plan of starting scheduled ativan around the clock to try and help with overall agitation. Came up with plan of starting ativan 0.5mg TID, and using ativan PRN first and then haldol if need be. Later in afternoon spoke with mother about concern that there is documentation that ativan may cause thrombocytopenia in less than 1% of patients. Mother states that he had been on ativan in the past without causing this, so she is not particularly concerned. Went over plan with mother again who was in agreement.

## 2023-06-03 LAB
ANION GAP SERPL CALC-SCNC: 2 MMOL/L — LOW (ref 7–14)
BUN SERPL-MCNC: 34 MG/DL — HIGH (ref 7–23)
CALCIUM SERPL-MCNC: 8.1 MG/DL — LOW (ref 8.4–10.5)
CHLORIDE SERPL-SCNC: 105 MMOL/L — SIGNIFICANT CHANGE UP (ref 98–107)
CLOSURE TME COLL+EPINEP BLD: 6 K/UL — CRITICAL LOW (ref 150–400)
CO2 SERPL-SCNC: 24 MMOL/L — SIGNIFICANT CHANGE UP (ref 22–31)
CREAT SERPL-MCNC: 0.77 MG/DL — SIGNIFICANT CHANGE UP (ref 0.5–1.3)
EGFR: 128 ML/MIN/1.73M2 — SIGNIFICANT CHANGE UP
GAS PNL BLDV: SIGNIFICANT CHANGE UP
GLUCOSE SERPL-MCNC: 88 MG/DL — SIGNIFICANT CHANGE UP (ref 70–99)
HCT VFR BLD CALC: 43.3 % — SIGNIFICANT CHANGE UP (ref 39–50)
HGB BLD-MCNC: 13.4 G/DL — SIGNIFICANT CHANGE UP (ref 13–17)
MAGNESIUM SERPL-MCNC: 1.8 MG/DL — SIGNIFICANT CHANGE UP (ref 1.6–2.6)
MCHC RBC-ENTMCNC: 26.2 PG — LOW (ref 27–34)
MCHC RBC-ENTMCNC: 30.9 GM/DL — LOW (ref 32–36)
MCV RBC AUTO: 84.6 FL — SIGNIFICANT CHANGE UP (ref 80–100)
NRBC # BLD: 0 /100 WBCS — SIGNIFICANT CHANGE UP (ref 0–0)
NRBC # FLD: 0.02 K/UL — HIGH (ref 0–0)
PHOSPHATE SERPL-MCNC: 3 MG/DL — SIGNIFICANT CHANGE UP (ref 2.5–4.5)
PLATELET # BLD AUTO: 7 K/UL — CRITICAL LOW (ref 150–400)
POTASSIUM SERPL-MCNC: 4.2 MMOL/L — SIGNIFICANT CHANGE UP (ref 3.5–5.3)
POTASSIUM SERPL-SCNC: 4.2 MMOL/L — SIGNIFICANT CHANGE UP (ref 3.5–5.3)
RBC # BLD: 5.12 M/UL — SIGNIFICANT CHANGE UP (ref 4.2–5.8)
RBC # FLD: 17 % — HIGH (ref 10.3–14.5)
SODIUM SERPL-SCNC: 131 MMOL/L — LOW (ref 135–145)
WBC # BLD: 19.23 K/UL — HIGH (ref 3.8–10.5)
WBC # FLD AUTO: 19.23 K/UL — HIGH (ref 3.8–10.5)

## 2023-06-03 RX ADMIN — Medication 0.5 MILLIGRAM(S): at 06:52

## 2023-06-03 RX ADMIN — Medication 250 MILLIGRAM(S): at 21:20

## 2023-06-03 RX ADMIN — BREXPIPRAZOLE 4 MILLIGRAM(S): 0.25 TABLET ORAL at 13:16

## 2023-06-03 RX ADMIN — Medication 0.5 MILLIGRAM(S): at 21:21

## 2023-06-03 RX ADMIN — Medication 400 MILLIGRAM(S): at 13:15

## 2023-06-03 RX ADMIN — Medication 0.5 MILLIGRAM(S): at 14:43

## 2023-06-03 RX ADMIN — Medication 400 MILLIGRAM(S): at 21:20

## 2023-06-03 RX ADMIN — Medication 1 TABLET(S): at 13:15

## 2023-06-03 RX ADMIN — Medication 40 MILLIGRAM(S): at 06:52

## 2023-06-03 RX ADMIN — Medication 400 MILLIGRAM(S): at 06:52

## 2023-06-03 NOTE — PROGRESS NOTE ADULT - PROBLEM SELECTOR PLAN 2
Pt is nonverbal at baseline. Lives in group home. Pt recently had a first-time seizure last week, briefly hospitalized, determined to be adverse effect of Doptelet (avatrombopag) which is since discontinued. CTH shows no acute processes  - c/w home Rexulti  - resume trazodone 250 nightly  - mom concerned about Haldol contributing to Plt suppression, will d/c Haldol for now  - PO Xanax 0.5 mg q8h prn, tolerating well  - can try IM Zyprexa as needed if pt agitated/aggressive and not taking PO  - last QTc wnl Pt is nonverbal at baseline. Lives in group home. Pt recently had a first-time seizure last week, briefly hospitalized, determined to be adverse effect of Doptelet (avatrombopag) which is since discontinued. CTH shows no acute processes  - c/w home Rexulti  - resume trazodone 250 nightly  - mom concerned about Haldol contributing to Plt suppression, will d/c Haldol for now  - PO Xanax 0.5 mg q8h prn, tolerating well  - BH recs appreciated; ativan first and Haldol if needed for severe agitation  - can try IM Zyprexa as needed if pt agitated/aggressive and not taking PO  - last QTc wnl, cont to monitor

## 2023-06-03 NOTE — PROGRESS NOTE ADULT - SUBJECTIVE AND OBJECTIVE BOX
Patient is a 23y old  Male who presents with a chief complaint of thrombocytopenia (03 Jun 2023 07:51)    Patient more calm with ATC ativan.   No bleeding, no fevers.  Mother at his side.    MEDICATIONS  (STANDING):  brexpiprazole 4 milliGRAM(s) Oral daily  cimetidine 400 milliGRAM(s) Oral three times a day  LORazepam     Tablet 0.5 milliGRAM(s) Oral three times a day  multivitamin 1 Tablet(s) Oral daily  predniSONE   Tablet 40 milliGRAM(s) Oral daily  traZODone 250 milliGRAM(s) Oral at bedtime    MEDICATIONS  (PRN):  diphenhydrAMINE Injectable 25 milliGRAM(s) IV Push once PRN PRN REACTION  hydrocortisone sodium succinate Injectable 50 milliGRAM(s) IV Push once PRN REACTION MED  LORazepam   Injectable 1 milliGRAM(s) IV Push every 6 hours PRN Agitation  meperidine     Injectable 25 milliGRAM(s) IV Push once PRN PRN REACTION    Vital Signs Last 24 Hrs  T(C): 36.6 (03 Jun 2023 16:00), Max: 36.7 (03 Jun 2023 14:14)  T(F): 97.9 (03 Jun 2023 16:00), Max: 98 (03 Jun 2023 14:14)  HR: 96 (03 Jun 2023 16:00) (90 - 112)  BP: 123/65 (03 Jun 2023 16:00) (123/65 - 133/70)  BP(mean): --  RR: 18 (03 Jun 2023 16:00) (16 - 18)  SpO2: 96% (03 Jun 2023 16:00) (94% - 98%)    Parameters below as of 03 Jun 2023 16:00  Patient On (Oxygen Delivery Method): room air        PE  NAD  Awake, alert  Anicteric, MMM  RRR  grossly clear  Abd soft, NT, ND  No edema  small buttock ulcer- dressed                          13.4   19.23 )-----------( 7        ( 03 Jun 2023 07:55 )             43.3       06-03    131<L>  |  105  |  34<H>  ----------------------------<  88  4.2   |  24  |  0.77    Ca    8.1<L>      03 Jun 2023 07:55  Phos  3.0     06-03  Mg     1.80     06-03

## 2023-06-03 NOTE — PROGRESS NOTE ADULT - PROBLEM SELECTOR PLAN 4
DVT ppx: hold chemoppx given thrombocytopenia  Diet: Regular  Dispo: Return to group home when platelets sustained >10

## 2023-06-03 NOTE — PROGRESS NOTE ADULT - ASSESSMENT
23 year old male with autism and chronic ITP followed by Dr. Bhagat at [facility], admitted due to ITP.     ITP  - Chronic  - He has received multiple therapies including IVIG, steroids, rituxan in 2010 and 2021, and Doptelet  - now off doptelet, no seizures since stopped.  - s/p romiplostim 05/28, pulse dex completed 05/30, rituximab 05/31  - Continue prednisone 40mg qd, taper once his plt begin to respond.   - plan for a dose of nplate tomorrow- would give 6mcg/kg.   - If his plt are worsened and signs of bleeding, his mother is more amenable to IVIG.   - next dose of rituxan on Wednesday  - For now, check blood/urine cultures to ensure no underlying infection causing worsening platelet count.  - Continue to monitor CBC. Would transfuse if bleeding.     Leukocytosis secondary to steroids, improving.     Agitation much improved on ATC ativan.       Rosaura Bhagat D.O.   Hematology/Oncology  [facility]  [phone] (office)  [phone] (alt office)  Evenings and weekends please call MD on call or office

## 2023-06-03 NOTE — BH CONSULTATION LIAISON PROGRESS NOTE - NSBHASSESSMENTFT_PSY_ALL_CORE
23 year old male with PPH significant for severe autism and PMH significant for ITP presents to [facility] for severe thrombocytopenia.  Patient is currently on steroid. Mother is concern for steroid induced irritability as well as increased aggression.  Psych CL consulted to assist in medication management in this setting.    While patient is not showing signs of overt psychosis, it is possible that the steroids is causing more irritability.    6/3: Pt calm and cooperative, eating breakfast. Per mother, pt has been doing better since Ativan started. No other concerns at this time.    Plan:  [] Continue 1:1 for agitation  [] Continue ativan 0.5mg TID (hold for lethargy, resp depression, hypotension, bradycardia)    Agitation:  [] first- ativan 1mg PO/IM/IV q6h prn for agitation  [] second- haldol 2mg PO/IM/IV for agitation  	- of note, if patient shows paradoxical reaction to ativan IV/IM- stop using this

## 2023-06-03 NOTE — PROGRESS NOTE ADULT - SUBJECTIVE AND OBJECTIVE BOX
--------------------------------------------------------------  Erick Clarke MD  PGY-3, Internal Medicine  Pager #: [phone], [phone]  After 7 PM: Night Float Pager  --------------------------------------------------------------    Patient is a 23y old  Male who presents with a chief complaint of thrombocytopenia (2023 16:35)    OVERNIGHT / INTERVAL EVENTS:    SUBJECTIVE: Patient seen and examined bedside.     Denies fevers/chills, headaches/dizziness, nausea/vomiting, chest pain, SOB, abdominal pain.     MEDICATIONS  (STANDING):  brexpiprazole 4 milliGRAM(s) Oral daily  cimetidine 400 milliGRAM(s) Oral three times a day  LORazepam     Tablet 0.5 milliGRAM(s) Oral three times a day  multivitamin 1 Tablet(s) Oral daily  predniSONE   Tablet 40 milliGRAM(s) Oral daily  traZODone 250 milliGRAM(s) Oral at bedtime    MEDICATIONS  (PRN):  diphenhydrAMINE Injectable 25 milliGRAM(s) IV Push once PRN PRN REACTION  hydrocortisone sodium succinate Injectable 50 milliGRAM(s) IV Push once PRN REACTION MED  LORazepam   Injectable 1 milliGRAM(s) IV Push every 6 hours PRN Agitation  meperidine     Injectable 25 milliGRAM(s) IV Push once PRN PRN REACTION      CAPILLARY BLOOD GLUCOSE        I&O's Summary    2023 07:01  -  2023 07:00  --------------------------------------------------------  IN: 1400 mL / OUT: 0 mL / NET: 1400 mL        PHYSICAL EXAM:  Vital Signs Last 24 Hrs  T(C): 36.5 (2023 06:26), Max: 36.8 (2023 16:18)  T(F): 97.7 (2023 06:26), Max: 98.2 (2023 16:18)  HR: 90 (2023 06:26) (90 - 102)  BP: 132/68 (2023 06:26) (116/71 - 132/68)  BP(mean): --  RR: 17 (2023 06:26) (17 - 18)  SpO2: 98% (2023 06:26) (96% - 98%)    Parameters below as of 2023 06:26  Patient On (Oxygen Delivery Method): room air        GENERAL: No acute distress  EYES: EOMI, PERRLA; conjunctiva and sclera clear  ENMT: Moist oral mucosa  NECK: Supple, no palpable masses  RESPIRATORY: Lungs clear to ascultation b/l; unlabored respirations  CARDIOVASCULAR: Regular rate and rhythm, normal S1 and S2, no murmurs/rubs/gallops  ABDOMEN: Soft, normal bowel sounds, nondistended, nontender  MUSCULOSKELETAL: No joint swelling or tenderness to palpation  PSYCH: Affect appropriate  NEUROLOGY: AAOx3, CNs grossly intact  SKIN: No rashes; no palpable lesions    LABS:                        13.4   19.58 )-----------( 7        ( 2023 06:00 )             42.8     06-02    133<L>  |  99  |  32<H>  ----------------------------<  92  4.5   |  24  |  0.73    Ca    8.1<L>      2023 06:00  Phos  3.2     06-02  Mg     2.00     06-02            Urinalysis Basic - ( 2023 17:07 )    Color: Yellow / Appearance: Clear / S.035 / pH: x  Gluc: x / Ketone: Small  / Bili: Negative / Urobili: <2 mg/dL   Blood: x / Protein: Trace / Nitrite: Negative   Leuk Esterase: Negative / RBC: 2 /HPF / WBC 1 /HPF   Sq Epi: x / Non Sq Epi: x / Bacteria: Negative          INTERVAL RADIOLOGY/IMAGING STUDIES:     --------------------------------------------------------------  Erick Clarke MD  PGY-3, Internal Medicine  Pager #: [phone], [phone]  After 7 PM: Night Float Pager  --------------------------------------------------------------    Patient is a 23y old  Male who presents with a chief complaint of thrombocytopenia (2023 16:35)    OVERNIGHT / INTERVAL EVENTS: No acute overnight events.     SUBJECTIVE: Patient seen and examined bedside with mother at bedside. Appears calm this morning. Patient's mother states patient appears calmer today after medication adjustments with the psychiatry team. She does not notice any new acute changes or symptoms with patient.     Unable to obtain full ROS 2/2 patient's nonverbal baseline status.      MEDICATIONS  (STANDING):  brexpiprazole 4 milliGRAM(s) Oral daily  cimetidine 400 milliGRAM(s) Oral three times a day  LORazepam     Tablet 0.5 milliGRAM(s) Oral three times a day  multivitamin 1 Tablet(s) Oral daily  predniSONE   Tablet 40 milliGRAM(s) Oral daily  traZODone 250 milliGRAM(s) Oral at bedtime    MEDICATIONS  (PRN):  diphenhydrAMINE Injectable 25 milliGRAM(s) IV Push once PRN PRN REACTION  hydrocortisone sodium succinate Injectable 50 milliGRAM(s) IV Push once PRN REACTION MED  LORazepam   Injectable 1 milliGRAM(s) IV Push every 6 hours PRN Agitation  meperidine     Injectable 25 milliGRAM(s) IV Push once PRN PRN REACTION      CAPILLARY BLOOD GLUCOSE        I&O's Summary    2023 07:01  -  2023 07:00  --------------------------------------------------------  IN: 1400 mL / OUT: 0 mL / NET: 1400 mL        PHYSICAL EXAM:  Vital Signs Last 24 Hrs  T(C): 36.5 (2023 06:26), Max: 36.8 (2023 16:18)  T(F): 97.7 (2023 06:26), Max: 98.2 (2023 16:18)  HR: 90 (2023 06:26) (90 - 102)  BP: 132/68 (2023 06:26) (116/71 - 132/68)  RR: 17 (2023 06:26) (17 - 18)  SpO2: 98% (2023 06:26) (96% - 98%)    Parameters below as of 2023 06:26  Patient On (Oxygen Delivery Method): room air        GENERAL: No acute distress on visual inspection, patient sitting in chair  EYES: EOMI, PERRLA; conjunctiva and sclera clear  ENMT: Moist oral mucosa  NECK: Supple, no palpable masses  RESPIRATORY: Lungs clear to ascultation b/l; unlabored respirations  CARDIOVASCULAR: Regular rate and rhythm, normal S1 and S2, no murmurs/rubs/gallops  ABDOMEN: Soft, normal bowel sounds, nondistended, nontender  MUSCULOSKELETAL: No joint swelling or tenderness to palpation  PSYCH: Affect appropriate  NEUROLOGY: CNs grossly intact, nonverbal at baseline and does not respond to examiner  SKIN: No rashes; no palpable lesions    LABS:                        13.4   19.58 )-----------( 7        ( 2023 06:00 )             42.8     06-02    133<L>  |  99  |  32<H>  ----------------------------<  92  4.5   |  24  |  0.73    Ca    8.1<L>      2023 06:00  Phos  3.2     06-02  Mg     2.00     06-02            Urinalysis Basic - ( 2023 17:07 )    Color: Yellow / Appearance: Clear / S.035 / pH: x  Gluc: x / Ketone: Small  / Bili: Negative / Urobili: <2 mg/dL   Blood: x / Protein: Trace / Nitrite: Negative   Leuk Esterase: Negative / RBC: 2 /HPF / WBC 1 /HPF   Sq Epi: x / Non Sq Epi: x / Bacteria: Negative          INTERVAL RADIOLOGY/IMAGING STUDIES:

## 2023-06-04 LAB
ANION GAP SERPL CALC-SCNC: 10 MMOL/L — SIGNIFICANT CHANGE UP (ref 7–14)
BASE EXCESS BLDV CALC-SCNC: 3.3 MMOL/L — HIGH (ref -2–3)
BUN SERPL-MCNC: 36 MG/DL — HIGH (ref 7–23)
CA-I SERPL-SCNC: 1.18 MMOL/L — SIGNIFICANT CHANGE UP (ref 1.15–1.33)
CALCIUM SERPL-MCNC: 8 MG/DL — LOW (ref 8.4–10.5)
CHLORIDE BLDV-SCNC: 99 MMOL/L — SIGNIFICANT CHANGE UP (ref 96–108)
CHLORIDE SERPL-SCNC: 101 MMOL/L — SIGNIFICANT CHANGE UP (ref 98–107)
CLOSURE TME COLL+EPINEP BLD: 6 K/UL — CRITICAL LOW (ref 150–400)
CO2 BLDV-SCNC: 32.7 MMOL/L — HIGH (ref 22–26)
CO2 SERPL-SCNC: 26 MMOL/L — SIGNIFICANT CHANGE UP (ref 22–31)
CREAT SERPL-MCNC: 0.81 MG/DL — SIGNIFICANT CHANGE UP (ref 0.5–1.3)
CULTURE RESULTS: SIGNIFICANT CHANGE UP
EGFR: 126 ML/MIN/1.73M2 — SIGNIFICANT CHANGE UP
GAS PNL BLDV: 132 MMOL/L — LOW (ref 136–145)
GAS PNL BLDV: SIGNIFICANT CHANGE UP
GLUCOSE BLDV-MCNC: 75 MG/DL — SIGNIFICANT CHANGE UP (ref 70–99)
GLUCOSE SERPL-MCNC: 79 MG/DL — SIGNIFICANT CHANGE UP (ref 70–99)
HCO3 BLDV-SCNC: 31 MMOL/L — HIGH (ref 22–29)
HCT VFR BLD CALC: 40.7 % — SIGNIFICANT CHANGE UP (ref 39–50)
HCT VFR BLDA CALC: 38 % — LOW (ref 39–51)
HGB BLD CALC-MCNC: 12.7 G/DL — SIGNIFICANT CHANGE UP (ref 12.6–17.4)
HGB BLD-MCNC: 12.6 G/DL — LOW (ref 13–17)
LACTATE BLDV-MCNC: 1.9 MMOL/L — SIGNIFICANT CHANGE UP (ref 0.5–2)
MAGNESIUM SERPL-MCNC: 1.9 MG/DL — SIGNIFICANT CHANGE UP (ref 1.6–2.6)
MCHC RBC-ENTMCNC: 26.9 PG — LOW (ref 27–34)
MCHC RBC-ENTMCNC: 31 GM/DL — LOW (ref 32–36)
MCV RBC AUTO: 87 FL — SIGNIFICANT CHANGE UP (ref 80–100)
NRBC # BLD: 0 /100 WBCS — SIGNIFICANT CHANGE UP (ref 0–0)
NRBC # FLD: 0.08 K/UL — HIGH (ref 0–0)
PCO2 BLDV: 60 MMHG — HIGH (ref 42–55)
PH BLDV: 7.32 — SIGNIFICANT CHANGE UP (ref 7.32–7.43)
PHOSPHATE SERPL-MCNC: 3.5 MG/DL — SIGNIFICANT CHANGE UP (ref 2.5–4.5)
PLATELET # BLD AUTO: 8 K/UL — CRITICAL LOW (ref 150–400)
PO2 BLDV: 41 MMHG — SIGNIFICANT CHANGE UP (ref 25–45)
POTASSIUM BLDV-SCNC: 4.2 MMOL/L — SIGNIFICANT CHANGE UP (ref 3.5–5.1)
POTASSIUM SERPL-MCNC: 4.4 MMOL/L — SIGNIFICANT CHANGE UP (ref 3.5–5.3)
POTASSIUM SERPL-SCNC: 4.4 MMOL/L — SIGNIFICANT CHANGE UP (ref 3.5–5.3)
RBC # BLD: 4.68 M/UL — SIGNIFICANT CHANGE UP (ref 4.2–5.8)
RBC # FLD: 17.5 % — HIGH (ref 10.3–14.5)
SAO2 % BLDV: 69.2 % — SIGNIFICANT CHANGE UP (ref 67–88)
SODIUM SERPL-SCNC: 137 MMOL/L — SIGNIFICANT CHANGE UP (ref 135–145)
SPECIMEN SOURCE: SIGNIFICANT CHANGE UP
WBC # BLD: 20.61 K/UL — HIGH (ref 3.8–10.5)
WBC # FLD AUTO: 20.61 K/UL — HIGH (ref 3.8–10.5)

## 2023-06-04 RX ORDER — HALOPERIDOL DECANOATE 100 MG/ML
2 INJECTION INTRAMUSCULAR EVERY 6 HOURS
Refills: 0 | Status: DISCONTINUED | OUTPATIENT
Start: 2023-06-04 | End: 2023-06-05

## 2023-06-04 RX ORDER — ROMIPLOSTIM 250 UG/.5ML
720 INJECTION, POWDER, LYOPHILIZED, FOR SOLUTION SUBCUTANEOUS ONCE
Refills: 0 | Status: COMPLETED | OUTPATIENT
Start: 2023-06-04 | End: 2023-06-05

## 2023-06-04 RX ORDER — LANOLIN ALCOHOL/MO/W.PET/CERES
3 CREAM (GRAM) TOPICAL AT BEDTIME
Refills: 0 | Status: DISCONTINUED | OUTPATIENT
Start: 2023-06-04 | End: 2023-06-12

## 2023-06-04 RX ORDER — HALOPERIDOL DECANOATE 100 MG/ML
2 INJECTION INTRAMUSCULAR ONCE
Refills: 0 | Status: COMPLETED | OUTPATIENT
Start: 2023-06-04 | End: 2023-06-04

## 2023-06-04 RX ORDER — LANOLIN ALCOHOL/MO/W.PET/CERES
3 CREAM (GRAM) TOPICAL ONCE
Refills: 0 | Status: COMPLETED | OUTPATIENT
Start: 2023-06-04 | End: 2023-06-04

## 2023-06-04 RX ADMIN — Medication 1 TABLET(S): at 12:23

## 2023-06-04 RX ADMIN — HALOPERIDOL DECANOATE 2 MILLIGRAM(S): 100 INJECTION INTRAMUSCULAR at 15:31

## 2023-06-04 RX ADMIN — HALOPERIDOL DECANOATE 2 MILLIGRAM(S): 100 INJECTION INTRAMUSCULAR at 09:16

## 2023-06-04 RX ADMIN — Medication 1 MILLIGRAM(S): at 16:42

## 2023-06-04 RX ADMIN — Medication 400 MILLIGRAM(S): at 22:48

## 2023-06-04 RX ADMIN — Medication 400 MILLIGRAM(S): at 14:15

## 2023-06-04 RX ADMIN — BREXPIPRAZOLE 4 MILLIGRAM(S): 0.25 TABLET ORAL at 12:27

## 2023-06-04 RX ADMIN — Medication 3 MILLIGRAM(S): at 02:50

## 2023-06-04 RX ADMIN — Medication 400 MILLIGRAM(S): at 06:46

## 2023-06-04 RX ADMIN — Medication 0.5 MILLIGRAM(S): at 14:14

## 2023-06-04 RX ADMIN — Medication 250 MILLIGRAM(S): at 22:47

## 2023-06-04 RX ADMIN — Medication 0.5 MILLIGRAM(S): at 06:46

## 2023-06-04 RX ADMIN — Medication 40 MILLIGRAM(S): at 06:46

## 2023-06-04 RX ADMIN — Medication 1 MILLIGRAM(S): at 03:41

## 2023-06-04 RX ADMIN — Medication 0.5 MILLIGRAM(S): at 22:47

## 2023-06-04 NOTE — PROGRESS NOTE ADULT - SUBJECTIVE AND OBJECTIVE BOX
Patient is a 23y old  Male who presents with a chief complaint of thrombocytopenia (04 Jun 2023 07:09)    Patient seen and examined at bedside this morning. Patient appears comfortable in bed, family at bedside.    MEDICATIONS  (STANDING):  brexpiprazole 4 milliGRAM(s) Oral daily  cimetidine 400 milliGRAM(s) Oral three times a day  LORazepam     Tablet 0.5 milliGRAM(s) Oral three times a day  melatonin 3 milliGRAM(s) Oral at bedtime  multivitamin 1 Tablet(s) Oral daily  predniSONE   Tablet 40 milliGRAM(s) Oral daily  traZODone 250 milliGRAM(s) Oral at bedtime    MEDICATIONS  (PRN):  diphenhydrAMINE Injectable 25 milliGRAM(s) IV Push once PRN PRN REACTION  haloperidol    Injectable 2 milliGRAM(s) IntraMuscular every 6 hours PRN Agitation  hydrocortisone sodium succinate Injectable 50 milliGRAM(s) IV Push once PRN REACTION MED  meperidine     Injectable 25 milliGRAM(s) IV Push once PRN PRN REACTION      Vital Signs Last 24 Hrs  T(C): 36.7 (04 Jun 2023 13:03), Max: 36.8 (04 Jun 2023 09:15)  T(F): 98 (04 Jun 2023 13:03), Max: 98.3 (04 Jun 2023 09:15)  HR: 107 (04 Jun 2023 13:03) (96 - 112)  BP: 113/61 (04 Jun 2023 13:03) (101/51 - 133/70)  BP(mean): --  RR: 18 (04 Jun 2023 13:03) (16 - 19)  SpO2: 98% (04 Jun 2023 13:03) (94% - 100%)    Parameters below as of 04 Jun 2023 13:03  Patient On (Oxygen Delivery Method): room air        PE  Awake, alert  Anicteric, MMM  RRR  CTAB  Abd soft, NT, ND  No c/c/e  +bruising                           12.6   20.61 )-----------( 8        ( 04 Jun 2023 06:03 )             40.7       06-04    137  |  101  |  36<H>  ----------------------------<  79  4.4   |  26  |  0.81    Ca    8.0<L>      04 Jun 2023 06:03  Phos  3.5     06-04  Mg     1.90     06-04

## 2023-06-04 NOTE — PROGRESS NOTE ADULT - ASSESSMENT
23 year old male with autism and chronic ITP followed by Dr. Bhagat at [facility], admitted due to ITP.     ITP  - Chronic  - He has received multiple therapies including IVIG, steroids, rituxan in 2010 and 2021, and Doptelet  - now off doptelet, no seizures since stopped.  - s/p romiplostim 05/28, pulse dex completed 05/30, rituximab 05/31  - Continue prednisone 40mg qd, taper once his plt begin to respond.   - plan for a dose of nplate tomorrow- would give 6mcg/kg.   - If his plt are worsened and signs of bleeding, his mother is more amenable to IVIG.   - next dose of rituxan on Wednesday  - For now, check blood/urine cultures to ensure no underlying infection causing worsening platelet count.  - Continue to monitor CBC. Would transfuse if bleeding.     Leukocytosis secondary to steroids, improving.     Agitation much improved on ATC ativan.       Nisa Moody NP  Hematology/ Oncology  [facility]  [phone] (office)  [phone] (alt office)  Evenings and weekends please call MD on call or office

## 2023-06-04 NOTE — BH CONSULTATION LIAISON PROGRESS NOTE - NSBHASSESSMENTFT_PSY_ALL_CORE
23 year old male with PPH significant for severe autism and PMH significant for ITP presents to [facility] for severe thrombocytopenia. Patient is currently on steroid. Mother is concern for steroid induced irritability as well as increased aggression. Psych CL consulted to assist in medication management in this setting. While patient is not showing signs of overt psychosis, it is possible that the steroids is causing more irritability.    6/3: Pt calm and cooperative, eating breakfast. Per mother, pt has been doing better since Ativan started. No other concerns at this time.  6/4: Pt remains calm and cooperative. Per mother, pt with paradoxical worsening of agitation with IV administration of Ativan. Pt received PRN IM Haldol 2mg this morning with good effect.     There is low concern that Haldol is contributing to thrombocytopenia and therefore may be used as a PRN medication for this patient.     Plan:  [] Continue 1:1 for agitation  [] Continue ativan 0.5mg PO TID standing (hold for lethargy, resp depression, hypotension, bradycardia)  [ ] PRN haldol 2mg PO/IM/IV for agitation  	- of note, if patient shows paradoxical reaction to ativan IV/IM so recommend to discontinue

## 2023-06-04 NOTE — PROGRESS NOTE ADULT - PROBLEM SELECTOR PLAN 2
Pt is nonverbal at baseline. Lives in group home. Pt recently had a first-time seizure last week, briefly hospitalized, determined to be adverse effect of Doptelet (avatrombopag) which is since discontinued. CTH shows no acute processes  - c/w home Rexulti  - resume trazodone 250 nightly  - mom concerned about Haldol contributing to Plt suppression, will d/c Haldol for now  - PO Xanax 0.5 mg q8h prn, tolerating well  - BH recs appreciated; ativan first and Haldol if needed for severe agitation  - can try IM Zyprexa as needed if pt agitated/aggressive and not taking PO  - last QTc wnl, cont to monitor Pt is nonverbal at baseline. Lives in group home. Pt recently had a first-time seizure last week, briefly hospitalized, determined to be adverse effect of Doptelet (avatrombopag) which is since discontinued. CTH shows no acute processes  - c/w home Rexulti  - resume trazodone 250 nightly  - mom concerned about Haldol contributing to Plt suppression, however has previously tolerated in the past with no decrease in plt, per psych low risk  - PO Xanax 0.5 mg q8h prn, tolerating well  - BH recs appreciated; ativan first and Haldol if needed for severe agitation  - last QTc wnl, cont to monitor

## 2023-06-04 NOTE — PROGRESS NOTE ADULT - SUBJECTIVE AND OBJECTIVE BOX
Patient is a 23y old  Male who presents with a chief complaint of thrombocytopenia (2023 20:58)      SUBJECTIVE / OVERNIGHT EVENTS:    MEDICATIONS  (STANDING):  brexpiprazole 4 milliGRAM(s) Oral daily  cimetidine 400 milliGRAM(s) Oral three times a day  LORazepam     Tablet 0.5 milliGRAM(s) Oral three times a day  melatonin 3 milliGRAM(s) Oral at bedtime  multivitamin 1 Tablet(s) Oral daily  predniSONE   Tablet 40 milliGRAM(s) Oral daily  traZODone 250 milliGRAM(s) Oral at bedtime    MEDICATIONS  (PRN):  diphenhydrAMINE Injectable 25 milliGRAM(s) IV Push once PRN PRN REACTION  hydrocortisone sodium succinate Injectable 50 milliGRAM(s) IV Push once PRN REACTION MED  LORazepam   Injectable 1 milliGRAM(s) IV Push every 6 hours PRN Agitation  meperidine     Injectable 25 milliGRAM(s) IV Push once PRN PRN REACTION      CAPILLARY BLOOD GLUCOSE        I&O's Summary    2023 07:01  -  2023 07:00  --------------------------------------------------------  IN: 700 mL / OUT: 0 mL / NET: 700 mL        Vital Signs Last 24 Hrs  T(C): 36.3 (2023 04:29), Max: 36.7 (2023 14:14)  T(F): 97.3 (2023 04:29), Max: 98 (2023 14:14)  HR: 101 (2023 04:29) (96 - 112)  BP: 107/58 (2023 04:29) (101/51 - 133/70)  BP(mean): --  RR: 17 (2023 04:29) (16 - 19)  SpO2: 95% (2023 04:29) (94% - 100%)    Parameters below as of 2023 04:29  Patient On (Oxygen Delivery Method): room air        PHYSICAL EXAM:  GENERAL: No acute distress on visual inspection, patient sitting in chair  EYES: EOMI, PERRLA; conjunctiva and sclera clear  ENMT: Moist oral mucosa  NECK: Supple, no palpable masses  RESPIRATORY: Lungs clear to ascultation b/l; unlabored respirations  CARDIOVASCULAR: Regular rate and rhythm, normal S1 and S2, no murmurs/rubs/gallops  ABDOMEN: Soft, normal bowel sounds, nondistended, nontender  MUSCULOSKELETAL: No joint swelling or tenderness to palpation  PSYCH: Affect appropriate  NEUROLOGY: CNs grossly intact, nonverbal at baseline and does not respond to examiner  SKIN: No rashes; no palpable lesions    LABS:                        12.6   20.61 )-----------( 8        ( 2023 06:03 )             40.7      06-04    137  |  101  |  36<H>  ----------------------------<  79  4.4   |  26  |  0.81    Ca    8.0<L>      2023 06:03  Phos  3.5     06-04  Mg     1.90     06-04            Urinalysis Basic - ( 2023 17:07 )    Color: Yellow / Appearance: Clear / S.035 / pH: x  Gluc: x / Ketone: Small  / Bili: Negative / Urobili: <2 mg/dL   Blood: x / Protein: Trace / Nitrite: Negative   Leuk Esterase: Negative / RBC: 2 /HPF / WBC 1 /HPF   Sq Epi: x / Non Sq Epi: x / Bacteria: Negative        RADIOLOGY & ADDITIONAL TESTS:    Imaging Personally Reviewed:    Consultant(s) Notes Reviewed:      Care Discussed with Consultants/Other Providers:   Patient is a 23y old  Male who presents with a chief complaint of thrombocytopenia (2023 20:58)      SUBJECTIVE / OVERNIGHT EVENTS: Patient with mild tachycardia overnight. This AM, noted to be agitated despite ativan, mother noting he is biting his finger, trying to get out of bed. His breathing has normalized, pitting edema improved with elevation.     MEDICATIONS  (STANDING):  brexpiprazole 4 milliGRAM(s) Oral daily  cimetidine 400 milliGRAM(s) Oral three times a day  LORazepam     Tablet 0.5 milliGRAM(s) Oral three times a day  melatonin 3 milliGRAM(s) Oral at bedtime  multivitamin 1 Tablet(s) Oral daily  predniSONE   Tablet 40 milliGRAM(s) Oral daily  traZODone 250 milliGRAM(s) Oral at bedtime    MEDICATIONS  (PRN):  diphenhydrAMINE Injectable 25 milliGRAM(s) IV Push once PRN PRN REACTION  hydrocortisone sodium succinate Injectable 50 milliGRAM(s) IV Push once PRN REACTION MED  LORazepam   Injectable 1 milliGRAM(s) IV Push every 6 hours PRN Agitation  meperidine     Injectable 25 milliGRAM(s) IV Push once PRN PRN REACTION      CAPILLARY BLOOD GLUCOSE        I&O's Summary    2023 07:01  -  2023 07:00  --------------------------------------------------------  IN: 700 mL / OUT: 0 mL / NET: 700 mL        Vital Signs Last 24 Hrs  T(C): 36.3 (2023 04:29), Max: 36.7 (2023 14:14)  T(F): 97.3 (2023 04:29), Max: 98 (2023 14:14)  HR: 101 (2023 04:29) (96 - 112)  BP: 107/58 (2023 04:29) (101/51 - 133/70)  BP(mean): --  RR: 17 (2023 04:29) (16 - 19)  SpO2: 95% (2023 04:29) (94% - 100%)    Parameters below as of 2023 04:29  Patient On (Oxygen Delivery Method): room air        PHYSICAL EXAM:  GENERAL: Trying to climb out of bed, moaning  EYES: EOMI, PERRLA; conjunctiva and sclera clear  ENMT: Moist oral mucosa  NECK: Supple, no palpable masses  RESPIRATORY: No increased work of breathing  CARDIOVASCULAR: Tachycardic normal S1 and S2, no murmurs/rubs/gallops  ABDOMEN: Soft, normal bowel sounds, nondistended, nontender  MUSCULOSKELETAL: Multiple areas of ecchymosis  PSYCH: Affect appropriate  NEUROLOGY: CNs grossly intact, nonverbal at baseline and does not respond to examiner but tracking  SKIN: No rashes; no palpable lesions    LABS:                        12.6   20.61 )-----------( 8        ( 2023 06:03 )             40.7      06-04    137  |  101  |  36<H>  ----------------------------<  79  4.4   |  26  |  0.81    Ca    8.0<L>      2023 06:03  Phos  3.5     06-04  Mg     1.90     06-04            Urinalysis Basic - ( 2023 17:07 )    Color: Yellow / Appearance: Clear / S.035 / pH: x  Gluc: x / Ketone: Small  / Bili: Negative / Urobili: <2 mg/dL   Blood: x / Protein: Trace / Nitrite: Negative   Leuk Esterase: Negative / RBC: 2 /HPF / WBC 1 /HPF   Sq Epi: x / Non Sq Epi: x / Bacteria: Negative        RADIOLOGY & ADDITIONAL TESTS:    Imaging Personally Reviewed:    Consultant(s) Notes Reviewed:      Care Discussed with Consultants/Other Providers:

## 2023-06-04 NOTE — PROGRESS NOTE ADULT - PROBLEM SELECTOR PLAN 1
Extensive history of chronic ITP s/p numerous treatments. Previously allergic to rituximab (anaphylaxis/serum sickness) however underwent successful desensitization in Apr 2021 and tolerated rituximab at that time. That was the last time he received rituximab.  - most recently pt was discharged on a steroid taper; was on prednisone 20 mg daily prior to coming to ED  - s/p prednisone 50 mg in ED  - CT head negative for ICH  - small 2 by 3 cm right lower abdomen bruising noted, monitor, if any flank bruising, sudden drop in Hg, or hypotension might need urgent CT angio a/p r/o RP bleeding  - Completed dexamethasone 4 day course; NPlate 5/28, rituxan 5/31   - Continue prednisone 40 qD until outpatient follow up with heme  - 6/2 platelets drop - blue top 3, CBC platelets of 7, will follow up with heme recs  - rec for IVIG but patient's mother hesitant on starting, cont discussions  - had episode of lactate elevation with unclear etiology, w-up for possible infection causing continued bone marrow suppression. BCx and UCx obtained and pending results Extensive history of chronic ITP s/p numerous treatments. Previously allergic to rituximab (anaphylaxis/serum sickness) however underwent successful desensitization in Apr 2021 and tolerated rituximab at that time. That was the last time he received rituximab.  - most recently pt was discharged on a steroid taper; was on prednisone 20 mg daily prior to coming to ED  - s/p prednisone 50 mg in ED  - CT head negative for ICH  - small 2 by 3 cm right lower abdomen bruising noted, monitor, if any flank bruising, sudden drop in Hg, or hypotension might need urgent CT angio a/p r/o RP bleeding  - Completed dexamethasone 4 day course; NPlate 5/28, rituxan 5/31   - Continue prednisone 40 qD until outpatient follow up with heme  - 6/2 platelets drop - blue top 3, CBC platelets of 7, will follow up with heme recs  - rec for IVIG but patient's mother hesitant on starting, cont discussions  - had episode of lactate elevation with unclear etiology - now normalized, blood cultures NGTD  - Hemoglobin drop noted, if continuing to trend down will get CT abdomen/pelvis to rule out RP bleed

## 2023-06-05 LAB
ALBUMIN SERPL ELPH-MCNC: 3 G/DL — LOW (ref 3.3–5)
ALP SERPL-CCNC: 33 U/L — LOW (ref 40–120)
ALT FLD-CCNC: 41 U/L — SIGNIFICANT CHANGE UP (ref 4–41)
ANION GAP SERPL CALC-SCNC: 14 MMOL/L — SIGNIFICANT CHANGE UP (ref 7–14)
AST SERPL-CCNC: 22 U/L — SIGNIFICANT CHANGE UP (ref 4–40)
BILIRUB SERPL-MCNC: 0.6 MG/DL — SIGNIFICANT CHANGE UP (ref 0.2–1.2)
BUN SERPL-MCNC: 34 MG/DL — HIGH (ref 7–23)
CALCIUM SERPL-MCNC: 8 MG/DL — LOW (ref 8.4–10.5)
CHLORIDE SERPL-SCNC: 101 MMOL/L — SIGNIFICANT CHANGE UP (ref 98–107)
CLOSURE TME COLL+EPINEP BLD: 4 K/UL — CRITICAL LOW (ref 150–400)
CO2 SERPL-SCNC: 22 MMOL/L — SIGNIFICANT CHANGE UP (ref 22–31)
CREAT SERPL-MCNC: 0.84 MG/DL — SIGNIFICANT CHANGE UP (ref 0.5–1.3)
EGFR: 125 ML/MIN/1.73M2 — SIGNIFICANT CHANGE UP
GLUCOSE SERPL-MCNC: 88 MG/DL — SIGNIFICANT CHANGE UP (ref 70–99)
HCT VFR BLD CALC: 39.5 % — SIGNIFICANT CHANGE UP (ref 39–50)
HGB BLD-MCNC: 12.4 G/DL — LOW (ref 13–17)
MAGNESIUM SERPL-MCNC: 1.8 MG/DL — SIGNIFICANT CHANGE UP (ref 1.6–2.6)
MCHC RBC-ENTMCNC: 26.3 PG — LOW (ref 27–34)
MCHC RBC-ENTMCNC: 31.4 GM/DL — LOW (ref 32–36)
MCV RBC AUTO: 83.9 FL — SIGNIFICANT CHANGE UP (ref 80–100)
NRBC # BLD: 0 /100 WBCS — SIGNIFICANT CHANGE UP (ref 0–0)
NRBC # FLD: 0.05 K/UL — HIGH (ref 0–0)
PHOSPHATE SERPL-MCNC: 4 MG/DL — SIGNIFICANT CHANGE UP (ref 2.5–4.5)
PLATELET # BLD AUTO: 11 K/UL — CRITICAL LOW (ref 150–400)
POTASSIUM SERPL-MCNC: 4.5 MMOL/L — SIGNIFICANT CHANGE UP (ref 3.5–5.3)
POTASSIUM SERPL-SCNC: 4.5 MMOL/L — SIGNIFICANT CHANGE UP (ref 3.5–5.3)
PROT SERPL-MCNC: 4.8 G/DL — LOW (ref 6–8.3)
RBC # BLD: 4.71 M/UL — SIGNIFICANT CHANGE UP (ref 4.2–5.8)
RBC # FLD: 17.2 % — HIGH (ref 10.3–14.5)
SARS-COV-2 RNA SPEC QL NAA+PROBE: SIGNIFICANT CHANGE UP
SODIUM SERPL-SCNC: 137 MMOL/L — SIGNIFICANT CHANGE UP (ref 135–145)
WBC # BLD: 20.42 K/UL — HIGH (ref 3.8–10.5)
WBC # FLD AUTO: 20.42 K/UL — HIGH (ref 3.8–10.5)

## 2023-06-05 PROCEDURE — 99233 SBSQ HOSP IP/OBS HIGH 50: CPT

## 2023-06-05 RX ORDER — LACTOBACILLUS ACIDOPHILUS 100MM CELL
1 CAPSULE ORAL DAILY
Refills: 0 | Status: DISCONTINUED | OUTPATIENT
Start: 2023-06-05 | End: 2023-06-12

## 2023-06-05 RX ORDER — HALOPERIDOL DECANOATE 100 MG/ML
3 INJECTION INTRAMUSCULAR EVERY 6 HOURS
Refills: 0 | Status: DISCONTINUED | OUTPATIENT
Start: 2023-06-05 | End: 2023-06-07

## 2023-06-05 RX ORDER — HALOPERIDOL DECANOATE 100 MG/ML
2 INJECTION INTRAMUSCULAR ONCE
Refills: 0 | Status: COMPLETED | OUTPATIENT
Start: 2023-06-05 | End: 2023-06-05

## 2023-06-05 RX ADMIN — HALOPERIDOL DECANOATE 3 MILLIGRAM(S): 100 INJECTION INTRAMUSCULAR at 15:11

## 2023-06-05 RX ADMIN — Medication 40 MILLIGRAM(S): at 05:57

## 2023-06-05 RX ADMIN — Medication 0.5 MILLIGRAM(S): at 05:57

## 2023-06-05 RX ADMIN — HALOPERIDOL DECANOATE 2 MILLIGRAM(S): 100 INJECTION INTRAMUSCULAR at 08:58

## 2023-06-05 RX ADMIN — HALOPERIDOL DECANOATE 2 MILLIGRAM(S): 100 INJECTION INTRAMUSCULAR at 11:32

## 2023-06-05 RX ADMIN — Medication 1 MILLIGRAM(S): at 21:45

## 2023-06-05 RX ADMIN — Medication 0.5 MILLIGRAM(S): at 14:06

## 2023-06-05 RX ADMIN — Medication 1 TABLET(S): at 11:48

## 2023-06-05 RX ADMIN — Medication 250 MILLIGRAM(S): at 21:44

## 2023-06-05 RX ADMIN — Medication 400 MILLIGRAM(S): at 21:44

## 2023-06-05 RX ADMIN — ROMIPLOSTIM 720 MICROGRAM(S): 250 INJECTION, POWDER, LYOPHILIZED, FOR SOLUTION SUBCUTANEOUS at 11:49

## 2023-06-05 RX ADMIN — Medication 400 MILLIGRAM(S): at 05:58

## 2023-06-05 RX ADMIN — Medication 400 MILLIGRAM(S): at 14:07

## 2023-06-05 RX ADMIN — BREXPIPRAZOLE 4 MILLIGRAM(S): 0.25 TABLET ORAL at 11:49

## 2023-06-05 NOTE — PROGRESS NOTE ADULT - SUBJECTIVE AND OBJECTIVE BOX
Patient is a 23y old  Male who presents with a chief complaint of thrombocytopenia (04 Jun 2023 13:54)      SUBJECTIVE / OVERNIGHT EVENTS:    MEDICATIONS  (STANDING):  brexpiprazole 4 milliGRAM(s) Oral daily  cimetidine 400 milliGRAM(s) Oral three times a day  LORazepam     Tablet 0.5 milliGRAM(s) Oral three times a day  melatonin 3 milliGRAM(s) Oral at bedtime  multivitamin 1 Tablet(s) Oral daily  predniSONE   Tablet 40 milliGRAM(s) Oral daily  romiPLOStim Injectable 720 MICROGram(s) SubCutaneous once  traZODone 250 milliGRAM(s) Oral at bedtime    MEDICATIONS  (PRN):  diphenhydrAMINE Injectable 25 milliGRAM(s) IV Push once PRN PRN REACTION  haloperidol    Injectable 2 milliGRAM(s) IntraMuscular every 6 hours PRN Agitation  hydrocortisone sodium succinate Injectable 50 milliGRAM(s) IV Push once PRN REACTION MED  meperidine     Injectable 25 milliGRAM(s) IV Push once PRN PRN REACTION      CAPILLARY BLOOD GLUCOSE        I&O's Summary    04 Jun 2023 07:01  -  05 Jun 2023 07:00  --------------------------------------------------------  IN: 800 mL / OUT: 0 mL / NET: 800 mL        Vital Signs Last 24 Hrs  T(C): 36.4 (05 Jun 2023 05:59), Max: 36.8 (04 Jun 2023 09:15)  T(F): 97.6 (05 Jun 2023 05:59), Max: 98.3 (04 Jun 2023 09:15)  HR: 103 (05 Jun 2023 05:59) (95 - 107)  BP: 112/50 (05 Jun 2023 05:59) (112/50 - 129/61)  BP(mean): --  RR: 18 (05 Jun 2023 05:59) (18 - 18)  SpO2: 99% (05 Jun 2023 05:59) (96% - 100%)    Parameters below as of 05 Jun 2023 05:59  Patient On (Oxygen Delivery Method): room air        PHYSICAL EXAM:  GENERAL: Trying to climb out of bed, moaning  EYES: EOMI, PERRLA; conjunctiva and sclera clear  ENMT: Moist oral mucosa  NECK: Supple, no palpable masses  RESPIRATORY: No increased work of breathing  CARDIOVASCULAR: Tachycardic normal S1 and S2, no murmurs/rubs/gallops  ABDOMEN: Soft, normal bowel sounds, nondistended, nontender  MUSCULOSKELETAL: Multiple areas of ecchymosis  PSYCH: Affect appropriate  NEUROLOGY: CNs grossly intact, nonverbal at baseline and does not respond to examiner but tracking  SKIN: No rashes; no palpable lesions    LABS:                        12.6   20.61 )-----------( 8        ( 04 Jun 2023 06:03 )             40.7      06-04    137  |  101  |  36<H>  ----------------------------<  79  4.4   |  26  |  0.81    Ca    8.0<L>      04 Jun 2023 06:03  Phos  3.5     06-04  Mg     1.90     06-04                RADIOLOGY & ADDITIONAL TESTS:    Imaging Personally Reviewed:    Consultant(s) Notes Reviewed:      Care Discussed with Consultants/Other Providers:   Patient is a 23y old  Male who presents with a chief complaint of thrombocytopenia (04 Jun 2023 13:54)      SUBJECTIVE / OVERNIGHT EVENTS: No acute events overnight. Patient seen and examined at bedside, agitated despite haldol 2 mg. Mother is trying to avoid IV ativan to get patient back to regular sleep schedule. Mother also concerned for leaky gut causing neural inflammation leading to autism, would like patient started on IV antibiotics. No bleeding noted.     MEDICATIONS  (STANDING):  brexpiprazole 4 milliGRAM(s) Oral daily  cimetidine 400 milliGRAM(s) Oral three times a day  LORazepam     Tablet 0.5 milliGRAM(s) Oral three times a day  melatonin 3 milliGRAM(s) Oral at bedtime  multivitamin 1 Tablet(s) Oral daily  predniSONE   Tablet 40 milliGRAM(s) Oral daily  romiPLOStim Injectable 720 MICROGram(s) SubCutaneous once  traZODone 250 milliGRAM(s) Oral at bedtime    MEDICATIONS  (PRN):  diphenhydrAMINE Injectable 25 milliGRAM(s) IV Push once PRN PRN REACTION  haloperidol    Injectable 2 milliGRAM(s) IntraMuscular every 6 hours PRN Agitation  hydrocortisone sodium succinate Injectable 50 milliGRAM(s) IV Push once PRN REACTION MED  meperidine     Injectable 25 milliGRAM(s) IV Push once PRN PRN REACTION      CAPILLARY BLOOD GLUCOSE        I&O's Summary    04 Jun 2023 07:01  -  05 Jun 2023 07:00  --------------------------------------------------------  IN: 800 mL / OUT: 0 mL / NET: 800 mL        Vital Signs Last 24 Hrs  T(C): 36.4 (05 Jun 2023 05:59), Max: 36.8 (04 Jun 2023 09:15)  T(F): 97.6 (05 Jun 2023 05:59), Max: 98.3 (04 Jun 2023 09:15)  HR: 103 (05 Jun 2023 05:59) (95 - 107)  BP: 112/50 (05 Jun 2023 05:59) (112/50 - 129/61)  BP(mean): --  RR: 18 (05 Jun 2023 05:59) (18 - 18)  SpO2: 99% (05 Jun 2023 05:59) (96% - 100%)    Parameters below as of 05 Jun 2023 05:59  Patient On (Oxygen Delivery Method): room air        PHYSICAL EXAM:  GENERAL: Trying to climb out of bed, moaning  EYES: EOMI, PERRLA; conjunctiva and sclera clear  ENMT: Moist oral mucosa  NECK: Supple, no palpable masses  RESPIRATORY: No increased work of breathing  CARDIOVASCULAR: Tachycardic normal S1 and S2, no murmurs/rubs/gallops  ABDOMEN: Soft, normal bowel sounds, nondistended, nontender  MUSCULOSKELETAL: Multiple areas of ecchymosis  PSYCH: Affect appropriate  NEUROLOGY: CNs grossly intact, nonverbal at baseline and does not respond to examiner but tracking  SKIN: No rashes; no palpable lesions    LABS:                        12.6   20.61 )-----------( 8        ( 04 Jun 2023 06:03 )             40.7      06-04    137  |  101  |  36<H>  ----------------------------<  79  4.4   |  26  |  0.81    Ca    8.0<L>      04 Jun 2023 06:03  Phos  3.5     06-04  Mg     1.90     06-04                RADIOLOGY & ADDITIONAL TESTS:    Imaging Personally Reviewed:    Consultant(s) Notes Reviewed:      Care Discussed with Consultants/Other Providers:

## 2023-06-05 NOTE — DIETITIAN INITIAL EVALUATION ADULT - OTHER INFO
23 year old male with a PMH of intellectual disability, autism, nonverbal at baseline, CARLITOS not on CPAP, severe chronic ITP, referred by his hematologist due to thrombocytopenia per chart.    Patient w/ good appetite and finishing all his meals. Food preferences reviewed. No GI distress or chewing/swallowing difficulties reported. Has no food allergies. Mother states patient has gained 25 lbs. >1 month 2/2 steroid medication, but did lose 120 lbs. prior to starting medication for overall health. ABW is 267.8 or 264.5 lbs. (5/30) and 250.2 lbs. (5/27) per chart, weight changes likely from scale error, will monitor. Noted w/ +3 L/R leg and +4 L/R ankle + foot edema per RN flow sheet. No pressure injuries per advance practice nurse (5/30).

## 2023-06-05 NOTE — PROGRESS NOTE ADULT - PROBLEM SELECTOR PLAN 2
Pt is nonverbal at baseline. Lives in group home. Pt recently had a first-time seizure last week, briefly hospitalized, determined to be adverse effect of Doptelet (avatrombopag) which is since discontinued. CTH shows no acute processes  - c/w home Rexulti  - resume trazodone 250 nightly  - mom concerned about Haldol contributing to Plt suppression, however has previously tolerated in the past with no decrease in plt, per psych low risk  - PO Xanax 0.5 mg q8h prn, tolerating well  - BH recs appreciated; ativan first and Haldol if needed for severe agitation  - last QTc wnl, cont to monitor

## 2023-06-05 NOTE — PROGRESS NOTE ADULT - SUBJECTIVE AND OBJECTIVE BOX
CHIEF COMPLAINT  Thrombocytopenia    HISTORY OF PRESENT ILLNESS  VINNY MOON is a 23y Male who presents with a chief complaint of thrombocytopenia    No acute events. No complaints.    REVIEW OF SYSTEMS  A complete review of systems was performed; negative except per HPI    PHYSICAL EXAM  T(C): 37 (06-05-23 @ 15:09), Max: 37.2 (06-05-23 @ 10:30)  HR: 99 (06-05-23 @ 15:09) (99 - 110)  BP: 123/71 (06-05-23 @ 15:09) (112/50 - 129/61)  RR: 18 (06-05-23 @ 15:09) (18 - 18)  SpO2: 100% (06-05-23 @ 15:09) (99% - 100%)  Constitutional: awake, in no acute distress  Eyes: PERRL, EOMI  HEENT: normocephalic, atraumatic  Neck: supple, non-tender  Cardiovascular: normal perfusion, tachycardia  Respiratory: normal respiratory efforts; no increased use of accessory muscles  Gastrointestinal: soft, non-tender  Musculoskeletal: normal range of motion, no deformities noted  Skin: warm, dry    LABORATORY DATA                        12.4   20.42 )-----------( 11       ( 05 Jun 2023 07:09 )             39.5     06-05    137  |  101  |  34<H>  ----------------------------<  88  4.5   |  22  |  0.84    Ca    8.0<L>      05 Jun 2023 07:09  Phos  4.0     06-05  Mg     1.80     06-05    TPro  4.8<L>  /  Alb  3.0<L>  /  TBili  0.6  /  DBili  x   /  AST  22  /  ALT  41  /  AlkPhos  33<L>  06-05

## 2023-06-05 NOTE — DIETITIAN INITIAL EVALUATION ADULT - ORAL INTAKE PTA/DIET HISTORY
Patient seen for assessment. Mother at bedside for collateral. Reports patient was eating well at group home PTA.

## 2023-06-05 NOTE — DIETITIAN INITIAL EVALUATION ADULT - PERTINENT MEDS FT
MEDICATIONS  (STANDING):  brexpiprazole 4 milliGRAM(s) Oral daily  cimetidine 400 milliGRAM(s) Oral three times a day  lactobacillus acidophilus 1 Tablet(s) Oral daily  LORazepam     Tablet 0.5 milliGRAM(s) Oral <User Schedule>  LORazepam     Tablet 1 milliGRAM(s) Oral <User Schedule>  melatonin 3 milliGRAM(s) Oral at bedtime  multivitamin 1 Tablet(s) Oral daily  traZODone 250 milliGRAM(s) Oral at bedtime    MEDICATIONS  (PRN):  diphenhydrAMINE Injectable 25 milliGRAM(s) IV Push once PRN PRN REACTION  haloperidol    Injectable 3 milliGRAM(s) IntraMuscular every 6 hours PRN Agitation  hydrocortisone sodium succinate Injectable 50 milliGRAM(s) IV Push once PRN REACTION MED  meperidine     Injectable 25 milliGRAM(s) IV Push once PRN PRN REACTION

## 2023-06-05 NOTE — DIETITIAN INITIAL EVALUATION ADULT - PERTINENT LABORATORY DATA
06-05    137  |  101  |  34<H>  ----------------------------<  88  4.5   |  22  |  0.84    Ca    8.0<L>      05 Jun 2023 07:09  Phos  4.0     06-05  Mg     1.80     06-05    TPro  4.8<L>  /  Alb  3.0<L>  /  TBili  0.6  /  DBili  x   /  AST  22  /  ALT  41  /  AlkPhos  33<L>  06-05

## 2023-06-05 NOTE — PROGRESS NOTE ADULT - PROBLEM SELECTOR PLAN 1
Extensive history of chronic ITP s/p numerous treatments. Previously allergic to rituximab (anaphylaxis/serum sickness) however underwent successful desensitization in Apr 2021 and tolerated rituximab at that time. That was the last time he received rituximab.  - most recently pt was discharged on a steroid taper; was on prednisone 20 mg daily prior to coming to ED  - s/p prednisone 50 mg in ED  - CT head negative for ICH  - small 2 by 3 cm right lower abdomen bruising noted, monitor, if any flank bruising, sudden drop in Hg, or hypotension might need urgent CT angio a/p r/o RP bleeding  - Completed dexamethasone 4 day course; NPlate 5/28, rituxan 5/31   - Continue prednisone 40 qD until outpatient follow up with heme  - 6/2 platelets drop - blue top 3, CBC platelets of 7, will follow up with heme recs  - rec for IVIG but patient's mother hesitant on starting, cont discussions  - had episode of lactate elevation with unclear etiology - now normalized, blood cultures NGTD  - Hemoglobin drop noted, if continuing to trend down will get CT abdomen/pelvis to rule out RP bleed Extensive history of chronic ITP s/p numerous treatments. Previously allergic to rituximab (anaphylaxis/serum sickness) however underwent successful desensitization in Apr 2021 and tolerated rituximab at that time. That was the last time he received rituximab.  - most recently pt was discharged on a steroid taper; was on prednisone 20 mg daily prior to coming to ED  - s/p prednisone 50 mg in ED  - CT head negative for ICH  - small 2 by 3 cm right lower abdomen bruising noted, monitor, if any flank bruising, sudden drop in Hg, or hypotension might need urgent CT angio a/p r/o RP bleeding  - Completed dexamethasone 4 day course; NPlate 5/28, rituxan 5/31 - repeat NPlate 6/5  - Continue prednisone 40 qD until outpatient follow up with heme  - 6/2 platelets drop - blue top 3, CBC platelets of 7, will follow up with heme recs  - rec for IVIG but patient's mother hesitant on starting, cont discussions  - had episode of lactate elevation with unclear etiology - now normalized, blood cultures NGTD  - Hemoglobin stable

## 2023-06-05 NOTE — DIETITIAN INITIAL EVALUATION ADULT - PROBLEM SELECTOR PLAN 1
-possible plan for rituxamab, last desensitization April 2021  -d/w [facility] in am if we need immunology to assist  -s/p prednisone 50 mg in ED  -CT head negative for ICH  -small 2 by 3 cm right lower abdomen bruising noted, monitor, if any flank bruising, sudden drop in Hg, or hypotension might need urgent CT angio a/p r/o RP bleeding  -buttocks wound, wound care consulted  -check folate/B12 in am

## 2023-06-05 NOTE — BH CONSULTATION LIAISON PROGRESS NOTE - NSBHASSESSMENTFT_PSY_ALL_CORE
23 year old male with PPH significant for severe autism and PMH significant for ITP presents to [facility] for severe thrombocytopenia. Patient is currently on steroid. Mother is concern for steroid induced irritability as well as increased aggression. Psych CL consulted to assist in medication management in this setting. While patient is not showing signs of overt psychosis, it is possible that the steroids is causing more irritability.    6/3: Pt calm and cooperative, eating breakfast. Per mother, pt has been doing better since Ativan started. No other concerns at this time.  6/4: Pt remains calm and cooperative. Per mother, pt with paradoxical worsening of agitation with IV administration of Ativan. Pt received PRN IM Haldol 2mg this morning with good effect.   6/5: mostly calm, no psych c/i to discharge, change meds as below, discussed with mother    There is low concern that Haldol is contributing to thrombocytopenia and therefore may be used as a PRN medication for this patient.     Plan:  [] Continue 1:1 for agitation  [] Increase ativan 0.5mg PO at 8am, 0.5mg at 2pm, and 1mg qHS at 8pm (hold for lethargy, resp depression, hypotension, bradycardia)  [] change PRN haldol to 3mg PO/IM/IV for agitation, OK to DC prn ativan for now  05-Nov-2022 18:37

## 2023-06-05 NOTE — PROGRESS NOTE ADULT - ASSESSMENT
VINNY MOON is a 23y Male who presents with a chief complaint of thrombocytopenia    Immune Thrombocytopenia  ·	Patient follows with Dr. Rosaura Bhagat, [facility].  ·	Previously treated with glucocorticoids, IVIG, rituximab, avatrombopag.   ·	Patient has been on glucocorticoids with no significant response for a month; will taper off given lack of response.  ·	Rituximab last administered May 31st; plan on next dose June 6th.  ·	Romiplostim weekly, currently at 6 mcg/kcg.  ·	Continue to monitor platelets closely.  ·	May need to consider alternative therapies including fostamatinib or other immunosuppressive agents.    Will continue to follow.    Kristian Taylor MD  Hematology/Oncology  O: [phone]

## 2023-06-06 LAB
ANION GAP SERPL CALC-SCNC: 11 MMOL/L — SIGNIFICANT CHANGE UP (ref 7–14)
BUN SERPL-MCNC: 30 MG/DL — HIGH (ref 7–23)
CALCIUM SERPL-MCNC: 8.9 MG/DL — SIGNIFICANT CHANGE UP (ref 8.4–10.5)
CHLORIDE SERPL-SCNC: 100 MMOL/L — SIGNIFICANT CHANGE UP (ref 98–107)
CLOSURE TME COLL+EPINEP BLD: 3 K/UL — CRITICAL LOW (ref 150–400)
CO2 SERPL-SCNC: 28 MMOL/L — SIGNIFICANT CHANGE UP (ref 22–31)
CREAT SERPL-MCNC: 0.9 MG/DL — SIGNIFICANT CHANGE UP (ref 0.5–1.3)
EGFR: 122 ML/MIN/1.73M2 — SIGNIFICANT CHANGE UP
GLUCOSE SERPL-MCNC: 80 MG/DL — SIGNIFICANT CHANGE UP (ref 70–99)
HCT VFR BLD CALC: 44.2 % — SIGNIFICANT CHANGE UP (ref 39–50)
HGB BLD-MCNC: 13.7 G/DL — SIGNIFICANT CHANGE UP (ref 13–17)
MAGNESIUM SERPL-MCNC: 2.2 MG/DL — SIGNIFICANT CHANGE UP (ref 1.6–2.6)
MCHC RBC-ENTMCNC: 26.6 PG — LOW (ref 27–34)
MCHC RBC-ENTMCNC: 31 GM/DL — LOW (ref 32–36)
MCV RBC AUTO: 85.7 FL — SIGNIFICANT CHANGE UP (ref 80–100)
NRBC # BLD: 0 /100 WBCS — SIGNIFICANT CHANGE UP (ref 0–0)
NRBC # FLD: 0.05 K/UL — HIGH (ref 0–0)
PHOSPHATE SERPL-MCNC: 5.1 MG/DL — HIGH (ref 2.5–4.5)
PLATELET # BLD AUTO: 5 K/UL — CRITICAL LOW (ref 150–400)
POTASSIUM SERPL-MCNC: 4.2 MMOL/L — SIGNIFICANT CHANGE UP (ref 3.5–5.3)
POTASSIUM SERPL-SCNC: 4.2 MMOL/L — SIGNIFICANT CHANGE UP (ref 3.5–5.3)
RBC # BLD: 5.16 M/UL — SIGNIFICANT CHANGE UP (ref 4.2–5.8)
RBC # FLD: 18 % — HIGH (ref 10.3–14.5)
SODIUM SERPL-SCNC: 139 MMOL/L — SIGNIFICANT CHANGE UP (ref 135–145)
WBC # BLD: 16.83 K/UL — HIGH (ref 3.8–10.5)
WBC # FLD AUTO: 16.83 K/UL — HIGH (ref 3.8–10.5)

## 2023-06-06 PROCEDURE — 99233 SBSQ HOSP IP/OBS HIGH 50: CPT

## 2023-06-06 PROCEDURE — 93010 ELECTROCARDIOGRAM REPORT: CPT

## 2023-06-06 RX ORDER — HALOPERIDOL DECANOATE 100 MG/ML
2 INJECTION INTRAMUSCULAR ONCE
Refills: 0 | Status: COMPLETED | OUTPATIENT
Start: 2023-06-06 | End: 2023-06-06

## 2023-06-06 RX ORDER — QUETIAPINE FUMARATE 200 MG/1
50 TABLET, FILM COATED ORAL ONCE
Refills: 0 | Status: COMPLETED | OUTPATIENT
Start: 2023-06-06 | End: 2023-06-06

## 2023-06-06 RX ORDER — MEPERIDINE HYDROCHLORIDE 50 MG/ML
25 INJECTION INTRAMUSCULAR; INTRAVENOUS; SUBCUTANEOUS ONCE
Refills: 0 | Status: DISCONTINUED | OUTPATIENT
Start: 2023-06-06 | End: 2023-06-12

## 2023-06-06 RX ORDER — DIPHENHYDRAMINE HCL 50 MG
50 CAPSULE ORAL ONCE
Refills: 0 | Status: COMPLETED | OUTPATIENT
Start: 2023-06-06 | End: 2023-06-07

## 2023-06-06 RX ORDER — HALOPERIDOL DECANOATE 100 MG/ML
3 INJECTION INTRAMUSCULAR ONCE
Refills: 0 | Status: COMPLETED | OUTPATIENT
Start: 2023-06-06 | End: 2023-06-06

## 2023-06-06 RX ORDER — HALOPERIDOL DECANOATE 100 MG/ML
3 INJECTION INTRAMUSCULAR ONCE
Refills: 0 | Status: DISCONTINUED | OUTPATIENT
Start: 2023-06-06 | End: 2023-06-06

## 2023-06-06 RX ORDER — DIPHENHYDRAMINE HCL 50 MG
50 CAPSULE ORAL ONCE
Refills: 0 | Status: COMPLETED | OUTPATIENT
Start: 2023-06-06 | End: 2023-06-06

## 2023-06-06 RX ORDER — HALOPERIDOL DECANOATE 100 MG/ML
5 INJECTION INTRAMUSCULAR ONCE
Refills: 0 | Status: COMPLETED | OUTPATIENT
Start: 2023-06-06 | End: 2023-06-06

## 2023-06-06 RX ORDER — QUETIAPINE FUMARATE 200 MG/1
50 TABLET, FILM COATED ORAL ONCE
Refills: 0 | Status: COMPLETED | OUTPATIENT
Start: 2023-06-06 | End: 2023-06-07

## 2023-06-06 RX ORDER — HALOPERIDOL DECANOATE 100 MG/ML
2 INJECTION INTRAMUSCULAR ONCE
Refills: 0 | Status: COMPLETED | OUTPATIENT
Start: 2023-06-06 | End: 2023-06-07

## 2023-06-06 RX ADMIN — Medication 1 MILLIGRAM(S): at 22:30

## 2023-06-06 RX ADMIN — HALOPERIDOL DECANOATE 5 MILLIGRAM(S): 100 INJECTION INTRAMUSCULAR at 22:57

## 2023-06-06 RX ADMIN — Medication 20 MILLIGRAM(S): at 09:25

## 2023-06-06 RX ADMIN — Medication 1 TABLET(S): at 13:00

## 2023-06-06 RX ADMIN — QUETIAPINE FUMARATE 50 MILLIGRAM(S): 200 TABLET, FILM COATED ORAL at 22:26

## 2023-06-06 RX ADMIN — Medication 1 MILLIGRAM(S): at 14:02

## 2023-06-06 RX ADMIN — Medication 50 MILLIGRAM(S): at 19:39

## 2023-06-06 RX ADMIN — BREXPIPRAZOLE 4 MILLIGRAM(S): 0.25 TABLET ORAL at 13:00

## 2023-06-06 RX ADMIN — Medication 400 MILLIGRAM(S): at 06:38

## 2023-06-06 RX ADMIN — HALOPERIDOL DECANOATE 3 MILLIGRAM(S): 100 INJECTION INTRAMUSCULAR at 14:33

## 2023-06-06 RX ADMIN — Medication 0.5 MILLIGRAM(S): at 06:38

## 2023-06-06 RX ADMIN — HALOPERIDOL DECANOATE 2 MILLIGRAM(S): 100 INJECTION INTRAMUSCULAR at 08:51

## 2023-06-06 RX ADMIN — Medication 1 MILLIGRAM(S): at 19:39

## 2023-06-06 RX ADMIN — HALOPERIDOL DECANOATE 2 MILLIGRAM(S): 100 INJECTION INTRAMUSCULAR at 16:16

## 2023-06-06 RX ADMIN — Medication 400 MILLIGRAM(S): at 14:02

## 2023-06-06 RX ADMIN — Medication 1 MILLIGRAM(S): at 20:23

## 2023-06-06 RX ADMIN — Medication 250 MILLIGRAM(S): at 20:22

## 2023-06-06 RX ADMIN — HALOPERIDOL DECANOATE 3 MILLIGRAM(S): 100 INJECTION INTRAMUSCULAR at 08:04

## 2023-06-06 RX ADMIN — Medication 1 MILLIGRAM(S): at 14:33

## 2023-06-06 RX ADMIN — Medication 400 MILLIGRAM(S): at 21:00

## 2023-06-06 RX ADMIN — HALOPERIDOL DECANOATE 2 MILLIGRAM(S): 100 INJECTION INTRAMUSCULAR at 19:26

## 2023-06-06 NOTE — CONSULT NOTE ADULT - ATTENDING COMMENTS
pt is a 22 yo male with hx autism, ITP who presents  with agitation,   noted pt throwing objects and hitting side rail and staff.  Asked to evalute for pt management.  bp 122/65  rr 18 heent no jvd pos ecchymoses  upper ext .      labs  wbc 17 hgb 14 hct 44 plts 5     bicarb 28 cr 0.9      pt on trazadone, haldol, s/p haldol    -22 yo autistic male with itp and agitation  -monitor pt on haldol iv  -psych evaluation  -hematology f/u for itp  -monitor lytes, k, mg, phos  -monitor creatinine, urine output  -pt does not require icu level care please reconsult if   condition changes

## 2023-06-06 NOTE — CONSULT NOTE ADULT - SUBJECTIVE AND OBJECTIVE BOX
Reason for consult: ITP    HPI: Pt is a 23 year old M hx of intellectual disability, autism, nonverbal at baseline, CARLITOS not on CPAP, severe chronic ITP who presents w/ thrombocytopenia to  outpatient labs on Wednesday. He was on prior doptelel but had not had improvement with this. Discussed w/ patient's mother, possible seizure 2/2 doptelel prior. Patient was also on IVIG/rituxamab in the past. IVIG stopped giving benefit for patient, and mother is interested in trying rituxamab. He had anaphylaxis/serum sickness to rituxamab, was desensitized April 2021. He was also on steroid taper to try and improve platelets, and getting injections of Nplate (temporary improvements in platelets with this). Denies fevers, chills, nausea, vomiting, diarrhea, sob, headaches, visual changes, LE swelling. Blue tube sent for platelet count that was 8k. CT head noncontrast negative for ICH or masses, no old strokes. Given prednisone 50 mg in ED.     Hematology/Oncology consulted on this 23 year old male with autism and chronic ITP followed by Dr. Bhagat at [facility]. He has been treated with intermittent IVIG, steroids, winrho (allergic reaction), rituxan in 2010 and 2021, and has been on Doptelet since 4/2021. He was found to have a low plt count <5 in early May, was admitted to [facility] for which he received IVIG/steroids. Started pulse dex which he was unable to complete then was tapered to prednisone. His platelet count normalized. Then, admitted to [facility] in mid May with seizure, bacteremia. Found to have a normal platelet count, treated with zosyn but told the cultures were an error. He was seen in clinic on 5/22, planned for another pulse dexamethasone 40mg daily for 4 days then will taper back to prednisone 40mg. He has difficulty/increased agitation on steroids. Received Nplate on 5/22, planned for weekly dosing.       PAST MEDICAL & SURGICAL HISTORY:  Intellectual disability      Chronic ITP (idiopathic thrombocytopenia)      Dental caries on smooth surface penetrating into dentin      Autism      CARLITOS (obstructive sleep apnea)      2019 novel coronavirus disease (COVID-19)      History of dental examination          FAMILY HISTORY:  FH: hypertension        Alochol: Denied  Smoking: Nonsmoker  Drug Use: Denied  Marital Status:         Allergies    WinRho SDF (Hives)  Bactrim (Hives)  Rituxan (Hives)    Intolerances        MEDICATIONS  (STANDING):  brexpiprazole 4 milliGRAM(s) Oral daily  predniSONE   Tablet 40 milliGRAM(s) Oral daily  traZODone 250 milliGRAM(s) Oral every 24 hours    MEDICATIONS  (PRN):  haloperidol    Injectable 5 milliGRAM(s) IntraMuscular every 8 hours PRN Agitation      ROS  Unable to obtain    T(C): 36.3 (05-27-23 @ 07:56), Max: 36.4 (05-27-23 @ 00:31)  HR: 87 (05-27-23 @ 07:56) (72 - 91)  BP: 105/78 (05-27-23 @ 07:56) (105/78 - 126/97)  RR: 17 (05-27-23 @ 07:56) (16 - 17)  SpO2: 99% (05-27-23 @ 07:56) (97% - 100%)  Wt(kg): --    PE  NAD  Awake, alert  Anicteric, MMM  RRR  CTAB  Abd soft, NT, ND  No c/c/e  No rash grossly  FROM                          13.2   13.28 )-----------( 4        ( 27 May 2023 05:47 )             43.8       05-27    140  |  105  |  18  ----------------------------<  105<H>  4.8   |  23  |  1.00    Ca    8.8      27 May 2023 05:47  Phos  3.3     05-27  Mg     2.20     05-27    TPro  5.9<L>  /  Alb  3.7  /  TBili  0.7  /  DBili  x   /  AST  13  /  ALT  25  /  AlkPhos  43  05-27  
HPI:  Pt is a 23 year old M hx of intellectual disability, autism, nonverbal at baseline, CARLITOS not on CPAP, severe chronic ITP who presents w/ thrombocytopenia to  outpatient labs on Wednesday. He was on prior doptelel but had not had improvement with this. Discussed w/ patient's mother, possible seizure 2/2 doptelel prior. Patient was also on IVIG/rituxamab in the past. IVIG stopped giving benefit for patient, and mother is interested in trying rituxamab. He had anaphylaxis/serum sickness to rituxamab, was desensitized April 2021. He was also on steroid taper to try and improve platelets, and getting injections of Nplate (temporary improvements in platelets with this). Denies fevers, chills, nausea, vomiting, diarrhea, sob, headaches, visual changes, LE swelling. Blue tube sent for platelet count that was 8k. CT head noncontrast negative for ICH or masses, no old strokes. Given prednisone 50 mg in ED.      (27 May 2023 01:58)    Pt admitted for management of ITP for which he is on steroids and rituxan with course c/b severe agitation. Pt on standing ativan 1mg TID and trazodone with PRN ativan/haldol. Multiple BERTs called; most recently, received seroquel and haldol. MICU consulted for severe agitation.    PAST MEDICAL & SURGICAL HISTORY:  Intellectual disability      Chronic ITP (idiopathic thrombocytopenia)      Dental caries on smooth surface penetrating into dentin      Autism      CARLITOS (obstructive sleep apnea)      2019 novel coronavirus disease (COVID-19)      History of dental examination    FAMILY HISTORY:  FH: hypertension    Allergies    WinRho SDF (Hives)  Bactrim (Hives)    Intolerances    OBJECTIVE:  ICU Vital Signs Last 24 Hrs  T(C): 37 (06 Jun 2023 15:00), Max: 37 (06 Jun 2023 15:00)  T(F): 98.6 (06 Jun 2023 15:00), Max: 98.6 (06 Jun 2023 15:00)  HR: 82 (06 Jun 2023 15:00) (82 - 92)  BP: 115/61 (06 Jun 2023 15:00) (115/61 - 131/72)  BP(mean): --  ABP: --  ABP(mean): --  RR: 19 (06 Jun 2023 15:00) (17 - 19)  SpO2: 100% (06 Jun 2023 15:00) (100% - 100%)    O2 Parameters below as of 06 Jun 2023 15:00  Patient On (Oxygen Delivery Method): room air          06-05 @ 07:01  -  06-06 @ 07:00  --------------------------------------------------------  IN: 600 mL / OUT: 0 mL / NET: 600 mL      CAPILLARY BLOOD GLUCOSE    PHYSICAL EXAM:  GENERAL: resting comfortably, using iPAD  EYES: EOMI, conjunctiva and sclera clear  CHEST/LUNG: Clear to auscultation bilaterally; No rales, rhonchi, wheezing, or rubs  HEART: Regular rate and rhythm; No murmurs, rubs, or gallops  ABDOMEN: Soft, Nontender, Nondistended; Bowel sounds present  NERVOUS SYSTEM:  awake, alert; moving all extremities    HOSPITAL MEDICATIONS:  MEDICATIONS  (STANDING):  brexpiprazole 4 milliGRAM(s) Oral daily  cimetidine 400 milliGRAM(s) Oral three times a day  lactobacillus acidophilus 1 Tablet(s) Oral daily  LORazepam     Tablet 1 milliGRAM(s) Oral <User Schedule>  melatonin 3 milliGRAM(s) Oral at bedtime  multivitamin 1 Tablet(s) Oral daily  predniSONE   Tablet 20 milliGRAM(s) Oral daily  QUEtiapine 50 milliGRAM(s) Oral once  traZODone 250 milliGRAM(s) Oral at bedtime    MEDICATIONS  (PRN):  diphenhydrAMINE Injectable 25 milliGRAM(s) IV Push once PRN PRN REACTION  haloperidol    Injectable 3 milliGRAM(s) IntraMuscular every 6 hours PRN Agitation  hydrocortisone sodium succinate Injectable 50 milliGRAM(s) IV Push once PRN REACTION MED  LORazepam   Injectable 1 milliGRAM(s) IntraMuscular every 6 hours PRN Severe agitation  meperidine     Injectable 25 milliGRAM(s) IV Push once PRN PRN REACTION      LABS:                        13.7   16.83 )-----------( 5        ( 06 Jun 2023 06:13 )             44.2     06-06    139  |  100  |  30<H>  ----------------------------<  80  4.2   |  28  |  0.90    Ca    8.9      06 Jun 2023 06:13  Phos  5.1     06-06  Mg     2.20     06-06    TPro  4.8<L>  /  Alb  3.0<L>  /  TBili  0.6  /  DBili  x   /  AST  22  /  ALT  41  /  AlkPhos  33<L>  06-05              MICROBIOLOGY:     RADIOLOGY:  [ ] Reviewed and interpreted by me    EKG:

## 2023-06-06 NOTE — PROVIDER CONTACT NOTE (MEDICATION) - ASSESSMENT
Pt mother is refusing prednisone. As per mother, she states she is concerned that the current dose of prednisone is titrated too low/quickly for pt. She would like to follow up with hematology before pt receives new dose of prednisone. 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 Pt in no sxs of acute distress.

## 2023-06-06 NOTE — RAPID RESPONSE TEAM SUMMARY - NSADDTLFINDINGSRRT_GEN_ALL_CORE
Behavioral RRT was called for agitation. Patient was crying uncontrollably. Gave ativan 1 mg IM and benadryl 50 mg IV. Added seroquel 50 mg PO PRN if pt is still agitated overnight. Recommended to primary team to increase frequency of ativan 1 mg standing, and discuss adding seroquel to bedtime.

## 2023-06-06 NOTE — PROGRESS NOTE ADULT - SUBJECTIVE AND OBJECTIVE BOX
Patient is a 23y old  Male who presents with a chief complaint of thrombocytopenia (06 Jun 2023 07:17)    Patient seen this afternoon, with mom at bedside. Plt count decreased again today, at 5K. s/p Nplate yesterday, plan for Rituxan tomorrow. Mom is still adamantly against IVIG infusions. Denies bleeding.     MEDICATIONS  (STANDING):  brexpiprazole 4 milliGRAM(s) Oral daily  cimetidine 400 milliGRAM(s) Oral three times a day  haloperidol    Injectable 2 milliGRAM(s) IntraMuscular once  lactobacillus acidophilus 1 Tablet(s) Oral daily  LORazepam     Tablet 1 milliGRAM(s) Oral <User Schedule>  melatonin 3 milliGRAM(s) Oral at bedtime  multivitamin 1 Tablet(s) Oral daily  predniSONE   Tablet 20 milliGRAM(s) Oral daily  traZODone 250 milliGRAM(s) Oral at bedtime    MEDICATIONS  (PRN):  diphenhydrAMINE Injectable 25 milliGRAM(s) IV Push once PRN PRN REACTION  haloperidol    Injectable 3 milliGRAM(s) IntraMuscular every 6 hours PRN Agitation  hydrocortisone sodium succinate Injectable 50 milliGRAM(s) IV Push once PRN REACTION MED  LORazepam   Injectable 1 milliGRAM(s) IntraMuscular every 6 hours PRN Severe agitation  meperidine     Injectable 25 milliGRAM(s) IV Push once PRN PRN REACTION        Vital Signs Last 24 Hrs  T(C): 37 (06 Jun 2023 15:00), Max: 37 (06 Jun 2023 15:00)  T(F): 98.6 (06 Jun 2023 15:00), Max: 98.6 (06 Jun 2023 15:00)  HR: 82 (06 Jun 2023 15:00) (82 - 100)  BP: 115/61 (06 Jun 2023 15:00) (110/74 - 131/72)  BP(mean): --  RR: 19 (06 Jun 2023 15:00) (17 - 19)  SpO2: 100% (06 Jun 2023 15:00) (100% - 100%)    Parameters below as of 06 Jun 2023 15:00  Patient On (Oxygen Delivery Method): room air        PE  obese   calm, NAD  Anicteric, MMM  ecchymoses   FROM                          13.7   16.83 )-----------( 5        ( 06 Jun 2023 06:13 )             44.2       06-06    139  |  100  |  30<H>  ----------------------------<  80  4.2   |  28  |  0.90    Ca    8.9      06 Jun 2023 06:13  Phos  5.1     06-06  Mg     2.20     06-06    TPro  4.8<L>  /  Alb  3.0<L>  /  TBili  0.6  /  DBili  x   /  AST  22  /  ALT  41  /  AlkPhos  33<L>  06-05

## 2023-06-06 NOTE — PROGRESS NOTE ADULT - SUBJECTIVE AND OBJECTIVE BOX
Patient is a 23y old  Male who presents with a chief complaint of thrombocytopenia (05 Jun 2023 18:31)      SUBJECTIVE / OVERNIGHT EVENTS:    MEDICATIONS  (STANDING):  brexpiprazole 4 milliGRAM(s) Oral daily  cimetidine 400 milliGRAM(s) Oral three times a day  lactobacillus acidophilus 1 Tablet(s) Oral daily  LORazepam     Tablet 0.5 milliGRAM(s) Oral <User Schedule>  LORazepam     Tablet 1 milliGRAM(s) Oral <User Schedule>  melatonin 3 milliGRAM(s) Oral at bedtime  multivitamin 1 Tablet(s) Oral daily  predniSONE   Tablet 20 milliGRAM(s) Oral daily  traZODone 250 milliGRAM(s) Oral at bedtime    MEDICATIONS  (PRN):  diphenhydrAMINE Injectable 25 milliGRAM(s) IV Push once PRN PRN REACTION  haloperidol    Injectable 3 milliGRAM(s) IntraMuscular every 6 hours PRN Agitation  hydrocortisone sodium succinate Injectable 50 milliGRAM(s) IV Push once PRN REACTION MED  meperidine     Injectable 25 milliGRAM(s) IV Push once PRN PRN REACTION      CAPILLARY BLOOD GLUCOSE        I&O's Summary    05 Jun 2023 07:01  -  06 Jun 2023 07:00  --------------------------------------------------------  IN: 600 mL / OUT: 0 mL / NET: 600 mL        Vital Signs Last 24 Hrs  T(C): 36.2 (05 Jun 2023 21:53), Max: 37.2 (05 Jun 2023 10:30)  T(F): 97.2 (05 Jun 2023 21:53), Max: 98.9 (05 Jun 2023 10:30)  HR: 100 (05 Jun 2023 21:53) (99 - 110)  BP: 110/74 (05 Jun 2023 21:53) (110/74 - 123/71)  BP(mean): --  RR: 18 (05 Jun 2023 21:53) (18 - 18)  SpO2: 100% (05 Jun 2023 21:53) (100% - 100%)    Parameters below as of 05 Jun 2023 21:53  Patient On (Oxygen Delivery Method): room air        PHYSICAL EXAM:  GENERAL: Trying to climb out of bed, moaning  EYES: EOMI, PERRLA; conjunctiva and sclera clear  ENMT: Moist oral mucosa  NECK: Supple, no palpable masses  RESPIRATORY: No increased work of breathing  CARDIOVASCULAR: Tachycardic normal S1 and S2, no murmurs/rubs/gallops  ABDOMEN: Soft, normal bowel sounds, nondistended, nontender  MUSCULOSKELETAL: Multiple areas of ecchymosis  PSYCH: Affect appropriate  NEUROLOGY: CNs grossly intact, nonverbal at baseline and does not respond to examiner but tracking  SKIN: No rashes; no palpable lesions    LABS:                        12.4   20.42 )-----------( 11       ( 05 Jun 2023 07:09 )             39.5      06-05    137  |  101  |  34<H>  ----------------------------<  88  4.5   |  22  |  0.84    Ca    8.0<L>      05 Jun 2023 07:09  Phos  4.0     06-05  Mg     1.80     06-05    TPro  4.8<L>  /  Alb  3.0<L>  /  TBili  0.6  /  DBili  x   /  AST  22  /  ALT  41  /  AlkPhos  33<L>  06-05              RADIOLOGY & ADDITIONAL TESTS:    Imaging Personally Reviewed:    Consultant(s) Notes Reviewed:      Care Discussed with Consultants/Other Providers:   Patient is a 23y old  Male who presents with a chief complaint of thrombocytopenia (05 Jun 2023 18:31)      SUBJECTIVE / OVERNIGHT EVENTS: No acute events overnight. Patient seen and examined at bedside, agitated, hitting himself, kicking at mother. Per mother, had been groggy after receiving morning ativan/haldol but then was disturbed by noises in the hospital and started getting agitated. No bleeding noted.     MEDICATIONS  (STANDING):  brexpiprazole 4 milliGRAM(s) Oral daily  cimetidine 400 milliGRAM(s) Oral three times a day  lactobacillus acidophilus 1 Tablet(s) Oral daily  LORazepam     Tablet 0.5 milliGRAM(s) Oral <User Schedule>  LORazepam     Tablet 1 milliGRAM(s) Oral <User Schedule>  melatonin 3 milliGRAM(s) Oral at bedtime  multivitamin 1 Tablet(s) Oral daily  predniSONE   Tablet 20 milliGRAM(s) Oral daily  traZODone 250 milliGRAM(s) Oral at bedtime    MEDICATIONS  (PRN):  diphenhydrAMINE Injectable 25 milliGRAM(s) IV Push once PRN PRN REACTION  haloperidol    Injectable 3 milliGRAM(s) IntraMuscular every 6 hours PRN Agitation  hydrocortisone sodium succinate Injectable 50 milliGRAM(s) IV Push once PRN REACTION MED  meperidine     Injectable 25 milliGRAM(s) IV Push once PRN PRN REACTION      CAPILLARY BLOOD GLUCOSE        I&O's Summary    05 Jun 2023 07:01  -  06 Jun 2023 07:00  --------------------------------------------------------  IN: 600 mL / OUT: 0 mL / NET: 600 mL        Vital Signs Last 24 Hrs  T(C): 36.2 (05 Jun 2023 21:53), Max: 37.2 (05 Jun 2023 10:30)  T(F): 97.2 (05 Jun 2023 21:53), Max: 98.9 (05 Jun 2023 10:30)  HR: 100 (05 Jun 2023 21:53) (99 - 110)  BP: 110/74 (05 Jun 2023 21:53) (110/74 - 123/71)  BP(mean): --  RR: 18 (05 Jun 2023 21:53) (18 - 18)  SpO2: 100% (05 Jun 2023 21:53) (100% - 100%)    Parameters below as of 05 Jun 2023 21:53  Patient On (Oxygen Delivery Method): room air        PHYSICAL EXAM:  GENERAL: Trying to climb out of bed, moaning  EYES: EOMI, PERRLA; conjunctiva and sclera clear  ENMT: Moist oral mucosa  NECK: Supple, no palpable masses  RESPIRATORY: No increased work of breathing  CARDIOVASCULAR: Tachycardic normal S1 and S2, no murmurs/rubs/gallops  ABDOMEN: Soft, normal bowel sounds, nondistended, nontender  MUSCULOSKELETAL: Multiple areas of ecchymosis  PSYCH: Affect restricted  NEUROLOGY: CNs grossly intact, nonverbal at baseline and does not respond to examiner but tracking  SKIN: No rashes; no palpable lesions    LABS:                        12.4   20.42 )-----------( 11       ( 05 Jun 2023 07:09 )             39.5      06-05    137  |  101  |  34<H>  ----------------------------<  88  4.5   |  22  |  0.84    Ca    8.0<L>      05 Jun 2023 07:09  Phos  4.0     06-05  Mg     1.80     06-05    TPro  4.8<L>  /  Alb  3.0<L>  /  TBili  0.6  /  DBili  x   /  AST  22  /  ALT  41  /  AlkPhos  33<L>  06-05              RADIOLOGY & ADDITIONAL TESTS:    Imaging Personally Reviewed:    Consultant(s) Notes Reviewed:      Care Discussed with Consultants/Other Providers:

## 2023-06-06 NOTE — CHART NOTE - NSCHARTNOTEFT_GEN_A_CORE
Multiple (3) BERTs called for agitation between 19:30 and 23:50, patient noted to be kicking, biting himself and attempting to bite others, slapping his own head, and screaming intermittently.   VSS, appearing in no acute distress between episodes, no obvious signs of bleeding. Given imminent danger to self and others, chemically sedated with the followin:30: Haloperidol 2 mg IM, Lorazepam 1 mg IVP, Diphenhydramine 50 mg IVP  20:30: Lorazepam 1 mg PO, scheduled trazodone 250 mg PO given early  22:30: PRN Lorazepam 1 mg IM, Quetiapine 50 mg PO, Haloperidol 5 mg IM, and after minimal response case d/w MICU. Mother at bedside declined further use of lorazepam.  Additional Haloperidol 2 mg IVP, Diphenhydramine 50 mg IVP with adequate response.   Assessment likely steroid-induced irritability, low concern for occult bleed at this time, will schedule Haldol q6h for now. If further agitation, would consider repeat head imaging. Multiple (3) BERTs called for agitation between 19:30 and 23:50, patient noted to be kicking, biting himself and attempting to bite others, slapping his own head, and screaming intermittently.   VSS, appearing in no acute distress between episodes, no obvious signs of bleeding. Given imminent danger to self and others, chemically sedated with the followin:30: Haloperidol 2 mg IM, Lorazepam 1 mg IVP, Diphenhydramine 50 mg IVP  20:30: Lorazepam 1 mg PO, scheduled trazodone 250 mg PO given early  22:30: PRN Lorazepam 1 mg IM, Quetiapine 50 mg PO, Haloperidol 5 mg IM, and after minimal response case d/w MICU. Mother at bedside declined further use of lorazepam.  Additional Haloperidol 2 mg IVP, Diphenhydramine 50 mg IVP with adequate response.   Assessment likely steroid-induced irritability, low concern for occult bleed at this time, will schedule Haldol q6h for now. If further agitation, would consider repeat head imaging and telemetry monitoring.

## 2023-06-06 NOTE — PROGRESS NOTE ADULT - ASSESSMENT
Mr. Denilson Hernandes is a 23 year old M w/ PMH of intellectual disability, autism, nonverbal at baseline, CARLITOS not on CPAP, severe chronic ITP, referred by his hematologist due to thrombocytopenia to  on outpt labs, s/p NPlate 5/28, rituxan 5/31. Mr. Denilson Hernandes is a 23 year old M w/ PMH of intellectual disability, autism, nonverbal at baseline, CARLITOS not on CPAP, severe chronic ITP, referred by his hematologist due to thrombocytopenia to  on outpt labs, s/p NPlate 5/28 and 6/5, rituxan 5/31.

## 2023-06-06 NOTE — PROGRESS NOTE ADULT - ASSESSMENT
VINNY MOON is a 23y Male who presents with a chief complaint of thrombocytopenia    Immune Thrombocytopenia  ·	Patient follows with Dr. Rosaura Bhagat, [facility].  ·	Previously treated with glucocorticoids, IVIG, rituximab, avatrombopag.   ·	Patient has been on glucocorticoids with no significant response for a month; will taper off given lack of response. Continue prednisone 20mg x 3 days followed by 10mg x 3 days then discontinue. Discussed with mother that since pt not responding to steroids and experiencing side effects (increased appetite w weight gain, agitation) may be worth while to discontinue- she is in agreement   ·	Rituximab last administered May 31st; plan on next dose tomorrow. Order written, in chart.   ·	Romiplostim weekly, currently at 6 mcg/kcg. 3rd dose administered 06/05.  ·	Continue to monitor platelets closely.  ·	May need to consider alternative therapies including fostamatinib or other immunosuppressive agents as an outpatient.   ·	Discussed with mom to consider IVIG as this may work more rapidly than the above treatments however she continues to decline.     Will continue to follow.    Zuly Silva PA-C  Hematology/Oncology  [facility]  [phone] (office)  [phone] (alt office)  Evenings and weekends please call MD on call or office

## 2023-06-06 NOTE — CONSULT NOTE ADULT - ASSESSMENT
23M w/ PMH of intellectual disability, autism, nonverbal at baseline, CARLITOS not on CPAP, severe chronic ITP, referred by his hematologist due to thrombocytopenia to 3k on outpt labs, s/p NPlate 5/28 and 6/5, rituxan 5/31 and steroids with course c/b agitation requiring multiple pushes of ativan and antipsychotics for which MICU is consulted    #agitation, exacerbated by ongoing steroid use  - on ativan 1mg TID and trazodone QHS; also on ativan/haldol PRN  - received haldol 5mg and seroquel 50mg prior to MICU evaluation; pt now calm, resting comfortably in bed and using his iPAD  - no signs of respiratory depression  - appears that pt has better response to haldol than ativan  #ITP on steroids, s/p rituxan    Recommendations:  - telemetry monitoring  - switch from standing ativan to haldol 5mg IVP Q6H standing  - continue trazodone  -  follow up regarding further titration of medications    Discussed with attending Dr. Mejia and primary team provider.  Pt does not require MICU level of intervention or monitoring at this time. Please re-consult as needed.

## 2023-06-06 NOTE — RAPID RESPONSE TEAM SUMMARY - NSSITUATIONBACKGROUNDRRT_GEN_ALL_CORE
Mr. Denilson Hernandes is a 23 year old M w/ PMH of intellectual disability, autism, nonverbal at baseline, CARLITOS not on CPAP, severe chronic ITP, referred by his hematologist due to thrombocytopenia to  on outpt labs, s/p NPlate 5/28 and 6/5, rituxan 5/31. 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 RRT called for agitation.

## 2023-06-06 NOTE — BH CONSULTATION LIAISON PROGRESS NOTE - NSBHASSESSMENTFT_PSY_ALL_CORE
23 year old male with PPH significant for severe autism and PMH significant for ITP presents to [facility] for severe thrombocytopenia. Patient is currently on steroid. Mother is concern for steroid induced irritability as well as increased aggression. Psych CL consulted to assist in medication management in this setting. While patient is not showing signs of overt psychosis, it is possible that the steroids is causing more irritability.    6/3: Pt calm and cooperative, eating breakfast. Per mother, pt has been doing better since Ativan started. No other concerns at this time.  6/4: Pt remains calm and cooperative. Per mother, pt with paradoxical worsening of agitation with IV administration of Ativan. Pt received PRN IM Haldol 2mg this morning with good effect.   6/5: mostly calm, no psych c/i to discharge, change meds as below, discussed with mother    6/6: intermittently agitated, received haldol 3mg IM this AM for agitation, which is driven by hyperphagia. would recommend increasing am dose of ativan to 1mg and given closer to time he wakes up -- at 6am.     There is low concern that Haldol is contributing to thrombocytopenia and therefore may be used as a PRN medication for this patient.     Plan:  [] Continue 1:1 for agitation  [] INCREASE ATIVAN 1mg PO AND GIVE AT 6am, 0.5mg at 2pm, and 1mg qHS at 8pm (hold for lethargy, resp depression, hypotension, bradycardia)  [] c/w PRN haldol to 3mg PO/IM/IV for agitation, OK to DC prn ativan for now  23 year old male with PPH significant for severe autism and PMH significant for ITP presents to [facility] for severe thrombocytopenia. Patient is currently on steroid. Mother is concern for steroid induced irritability as well as increased aggression. Psych CL consulted to assist in medication management in this setting. While patient is not showing signs of overt psychosis, it is possible that the steroids is causing more irritability.    6/3: Pt calm and cooperative, eating breakfast. Per mother, pt has been doing better since Ativan started. No other concerns at this time.  6/4: Pt remains calm and cooperative. Per mother, pt with paradoxical worsening of agitation with IV administration of Ativan. Pt received PRN IM Haldol 2mg this morning with good effect.   6/5: mostly calm, no psych c/i to discharge, change meds as below, discussed with mother    6/6: intermittently agitated, received haldol 3mg IM this AM for agitation, which is driven by hyperphagia. would recommend increasing am dose of ativan to 1mg and given closer to time he wakes up -- at 6am.     There is low concern that Haldol is contributing to thrombocytopenia and therefore may be used as a PRN medication for this patient.     Plan:  [] Continue 1:1 for agitation  [] INCREASE ATIVAN 1mg PO AND GIVE AT 6am, 0.5mg at 2pm, and 1mg qHS at 8pm (hold for lethargy, resp depression, hypotension, bradycardia)  [] c/w PRN haldol to 3mg PO/IM/IV for agitation, PLEASE ADMINISTER HALDOL IM W/ ATIVAN 1MG IM (ENSURE IN SAME VIAL TO AVOID MULTIPLE INJECTIONS) 23 year old male with PPH significant for severe autism and PMH significant for ITP presents to [facility] for severe thrombocytopenia. Patient is currently on steroid. Mother is concern for steroid induced irritability as well as increased aggression. Psych CL consulted to assist in medication management in this setting. While patient is not showing signs of overt psychosis, it is possible that the steroids is causing more irritability.    6/3: Pt calm and cooperative, eating breakfast. Per mother, pt has been doing better since Ativan started. No other concerns at this time.  6/4: Pt remains calm and cooperative. Per mother, pt with paradoxical worsening of agitation with IV administration of Ativan. Pt received PRN IM Haldol 2mg this morning with good effect.   6/5: mostly calm, no psych c/i to discharge, change meds as below, discussed with mother    6/6: intermittently agitated, received haldol 3mg IM this AM for agitation, which is driven by hyperphagia. would recommend increasing am dose of ativan to 1mg and given closer to time he wakes up -- at 6am.     There is low concern that Haldol is contributing to thrombocytopenia and therefore may be used as a PRN medication for this patient.     Plan:  [] Continue 1:1 for agitation  [] INCREASE ATIVAN 1mg PO AND GIVE AT 6am, 1mg at 2pm, and 1mg qHS at 8pm (hold for lethargy, resp depression, hypotension, bradycardia)  [] c/w PRN haldol to 3mg PO/IM/IV for agitation, PLEASE ADMINISTER HALDOL IM W/ ATIVAN 1MG IM (ENSURE IN SAME VIAL TO AVOID MULTIPLE INJECTIONS)

## 2023-06-06 NOTE — PROGRESS NOTE ADULT - PROBLEM SELECTOR PLAN 2
Pt is nonverbal at baseline. Lives in group home. Pt recently had a first-time seizure last week, briefly hospitalized, determined to be adverse effect of Doptelet (avatrombopag) which is since discontinued. CTH shows no acute processes  - c/w home Rexulti  - resume trazodone 250 nightly  - mom concerned about Haldol contributing to Plt suppression, however has previously tolerated in the past with no decrease in plt, per psych low risk  - PO Xanax 0.5 mg q8h prn, tolerating well  - BH recs appreciated; ativan first and Haldol if needed for severe agitation  - last QTc wnl, cont to monitor Pt is nonverbal at baseline. Lives in group home. Pt recently had a first-time seizure last week, briefly hospitalized, determined to be adverse effect of Doptelet (avatrombopag) which is since discontinued. CTH shows no acute processes  - c/w home Rexulti  - resume trazodone 250 nightly  - mom concerned about Haldol contributing to Plt suppression, however has previously tolerated in the past with no decrease in plt, per psych low risk  -  recs appreciated; ativan first and Haldol if needed for severe agitation  - last QTc wnl, cont to monitor

## 2023-06-06 NOTE — PROGRESS NOTE ADULT - PROBLEM SELECTOR PLAN 1
Extensive history of chronic ITP s/p numerous treatments. Previously allergic to rituximab (anaphylaxis/serum sickness) however underwent successful desensitization in Apr 2021 and tolerated rituximab at that time. That was the last time he received rituximab.  - most recently pt was discharged on a steroid taper; was on prednisone 20 mg daily prior to coming to ED  - s/p prednisone 50 mg in ED  - CT head negative for ICH  - small 2 by 3 cm right lower abdomen bruising noted, monitor, if any flank bruising, sudden drop in Hg, or hypotension might need urgent CT angio a/p r/o RP bleeding  - Completed dexamethasone 4 day course; NPlate 5/28, rituxan 5/31 - repeat NPlate 6/5  - Continue prednisone 40 qD until outpatient follow up with heme  - 6/2 platelets drop - blue top 3, CBC platelets of 7, will follow up with heme recs  - rec for IVIG but patient's mother hesitant on starting, cont discussions  - had episode of lactate elevation with unclear etiology - now normalized, blood cultures NGTD  - Hemoglobin stable Extensive history of chronic ITP s/p numerous treatments. Previously allergic to rituximab (anaphylaxis/serum sickness) however underwent successful desensitization in Apr 2021 and tolerated rituximab at that time. That was the last time he received rituximab.  - most recently pt was discharged on a steroid taper; was on prednisone 20 mg daily prior to coming to ED  - s/p prednisone 50 mg in ED  - CT head negative for ICH  - small 2 by 3 cm right lower abdomen bruising noted, monitor, if any flank bruising, sudden drop in Hg, or hypotension might need urgent CT angio a/p r/o RP bleeding  - Completed dexamethasone 4 day course; NPlate 5/28 and 6/5, rituxan 5/31 - repeat rituxan planned for 6/6  - As not responding to steroids, hematology recommending steroid taper of 20 mg for 3 days followed by 10 mg for 3 days - may help with patient's agitation to remove steroids  - 6/2 platelets drop - blue top 3, CBC platelets of 7, will follow up with heme recs  - rec for IVIG but patient's mother hesitant on starting, cont discussions  - had episode of lactate elevation with unclear etiology - now normalized, blood cultures NGTD  - Hemoglobin stable

## 2023-06-07 LAB
ANION GAP SERPL CALC-SCNC: 12 MMOL/L — SIGNIFICANT CHANGE UP (ref 7–14)
BUN SERPL-MCNC: 33 MG/DL — HIGH (ref 7–23)
CALCIUM SERPL-MCNC: 8.3 MG/DL — LOW (ref 8.4–10.5)
CHLORIDE SERPL-SCNC: 103 MMOL/L — SIGNIFICANT CHANGE UP (ref 98–107)
CLOSURE TME COLL+EPINEP BLD: 3 K/UL — CRITICAL LOW (ref 150–400)
CO2 SERPL-SCNC: 25 MMOL/L — SIGNIFICANT CHANGE UP (ref 22–31)
CREAT SERPL-MCNC: 0.82 MG/DL — SIGNIFICANT CHANGE UP (ref 0.5–1.3)
CULTURE RESULTS: SIGNIFICANT CHANGE UP
CULTURE RESULTS: SIGNIFICANT CHANGE UP
EGFR: 126 ML/MIN/1.73M2 — SIGNIFICANT CHANGE UP
GLUCOSE SERPL-MCNC: 122 MG/DL — HIGH (ref 70–99)
HCT VFR BLD CALC: 39.6 % — SIGNIFICANT CHANGE UP (ref 39–50)
HGB BLD-MCNC: 12.2 G/DL — LOW (ref 13–17)
MAGNESIUM SERPL-MCNC: 1.8 MG/DL — SIGNIFICANT CHANGE UP (ref 1.6–2.6)
MCHC RBC-ENTMCNC: 26.4 PG — LOW (ref 27–34)
MCHC RBC-ENTMCNC: 30.8 GM/DL — LOW (ref 32–36)
MCV RBC AUTO: 85.7 FL — SIGNIFICANT CHANGE UP (ref 80–100)
NRBC # BLD: 0 /100 WBCS — SIGNIFICANT CHANGE UP (ref 0–0)
NRBC # FLD: 0.05 K/UL — HIGH (ref 0–0)
PHOSPHATE SERPL-MCNC: 4.7 MG/DL — HIGH (ref 2.5–4.5)
PLATELET # BLD AUTO: 2 K/UL — CRITICAL LOW (ref 150–400)
POTASSIUM SERPL-MCNC: 4.3 MMOL/L — SIGNIFICANT CHANGE UP (ref 3.5–5.3)
POTASSIUM SERPL-SCNC: 4.3 MMOL/L — SIGNIFICANT CHANGE UP (ref 3.5–5.3)
RBC # BLD: 4.62 M/UL — SIGNIFICANT CHANGE UP (ref 4.2–5.8)
RBC # FLD: 17.7 % — HIGH (ref 10.3–14.5)
SODIUM SERPL-SCNC: 140 MMOL/L — SIGNIFICANT CHANGE UP (ref 135–145)
SPECIMEN SOURCE: SIGNIFICANT CHANGE UP
SPECIMEN SOURCE: SIGNIFICANT CHANGE UP
WBC # BLD: 17.71 K/UL — HIGH (ref 3.8–10.5)
WBC # FLD AUTO: 17.71 K/UL — HIGH (ref 3.8–10.5)

## 2023-06-07 PROCEDURE — 99232 SBSQ HOSP IP/OBS MODERATE 35: CPT

## 2023-06-07 PROCEDURE — 99233 SBSQ HOSP IP/OBS HIGH 50: CPT | Mod: GC

## 2023-06-07 PROCEDURE — 93010 ELECTROCARDIOGRAM REPORT: CPT

## 2023-06-07 RX ORDER — HALOPERIDOL DECANOATE 100 MG/ML
5 INJECTION INTRAMUSCULAR ONCE
Refills: 0 | Status: DISCONTINUED | OUTPATIENT
Start: 2023-06-07 | End: 2023-06-07

## 2023-06-07 RX ORDER — DIPHENHYDRAMINE HCL 50 MG
25 CAPSULE ORAL ONCE
Refills: 0 | Status: COMPLETED | OUTPATIENT
Start: 2023-06-07 | End: 2023-06-07

## 2023-06-07 RX ORDER — HALOPERIDOL DECANOATE 100 MG/ML
5 INJECTION INTRAMUSCULAR EVERY 6 HOURS
Refills: 0 | Status: DISCONTINUED | OUTPATIENT
Start: 2023-06-07 | End: 2023-06-08

## 2023-06-07 RX ORDER — HALOPERIDOL DECANOATE 100 MG/ML
2 INJECTION INTRAMUSCULAR ONCE
Refills: 0 | Status: COMPLETED | OUTPATIENT
Start: 2023-06-07 | End: 2023-06-07

## 2023-06-07 RX ORDER — MEPERIDINE HYDROCHLORIDE 50 MG/ML
25 INJECTION INTRAMUSCULAR; INTRAVENOUS; SUBCUTANEOUS ONCE
Refills: 0 | Status: DISCONTINUED | OUTPATIENT
Start: 2023-06-07 | End: 2023-06-12

## 2023-06-07 RX ORDER — DIPHENHYDRAMINE HCL 50 MG
25 CAPSULE ORAL ONCE
Refills: 0 | Status: DISCONTINUED | OUTPATIENT
Start: 2023-06-07 | End: 2023-06-07

## 2023-06-07 RX ORDER — RITUXIMAB 10 MG/ML
880 INJECTION, SOLUTION INTRAVENOUS ONCE
Refills: 0 | Status: COMPLETED | OUTPATIENT
Start: 2023-06-07 | End: 2023-06-07

## 2023-06-07 RX ORDER — CHLORPROMAZINE HCL 10 MG
50 TABLET ORAL EVERY 6 HOURS
Refills: 0 | Status: DISCONTINUED | OUTPATIENT
Start: 2023-06-07 | End: 2023-06-07

## 2023-06-07 RX ORDER — HALOPERIDOL DECANOATE 100 MG/ML
5 INJECTION INTRAMUSCULAR ONCE
Refills: 0 | Status: COMPLETED | OUTPATIENT
Start: 2023-06-07 | End: 2023-06-07

## 2023-06-07 RX ORDER — HYDROCORTISONE 20 MG
50 TABLET ORAL ONCE
Refills: 0 | Status: DISCONTINUED | OUTPATIENT
Start: 2023-06-07 | End: 2023-06-12

## 2023-06-07 RX ORDER — DIPHENHYDRAMINE HCL 50 MG
25 CAPSULE ORAL EVERY 6 HOURS
Refills: 0 | Status: DISCONTINUED | OUTPATIENT
Start: 2023-06-07 | End: 2023-06-12

## 2023-06-07 RX ORDER — HALOPERIDOL DECANOATE 100 MG/ML
5 INJECTION INTRAMUSCULAR EVERY 6 HOURS
Refills: 0 | Status: DISCONTINUED | OUTPATIENT
Start: 2023-06-07 | End: 2023-06-07

## 2023-06-07 RX ORDER — CHLORPROMAZINE HCL 10 MG
50 TABLET ORAL EVERY 6 HOURS
Refills: 0 | Status: DISCONTINUED | OUTPATIENT
Start: 2023-06-07 | End: 2023-06-09

## 2023-06-07 RX ORDER — DIPHENHYDRAMINE HCL 50 MG
50 CAPSULE ORAL ONCE
Refills: 0 | Status: COMPLETED | OUTPATIENT
Start: 2023-06-07 | End: 2023-06-07

## 2023-06-07 RX ORDER — ACETAMINOPHEN 500 MG
650 TABLET ORAL ONCE
Refills: 0 | Status: COMPLETED | OUTPATIENT
Start: 2023-06-07 | End: 2023-06-07

## 2023-06-07 RX ORDER — CHLORPROMAZINE HCL 10 MG
50 TABLET ORAL ONCE
Refills: 0 | Status: COMPLETED | OUTPATIENT
Start: 2023-06-07 | End: 2023-06-07

## 2023-06-07 RX ORDER — DEXAMETHASONE 0.5 MG/5ML
10 ELIXIR ORAL ONCE
Refills: 0 | Status: COMPLETED | OUTPATIENT
Start: 2023-06-07 | End: 2023-06-07

## 2023-06-07 RX ORDER — QUETIAPINE FUMARATE 200 MG/1
50 TABLET, FILM COATED ORAL ONCE
Refills: 0 | Status: DISCONTINUED | OUTPATIENT
Start: 2023-06-07 | End: 2023-06-12

## 2023-06-07 RX ADMIN — Medication 400 MILLIGRAM(S): at 07:58

## 2023-06-07 RX ADMIN — RITUXIMAB 880 MILLIGRAM(S): 10 INJECTION, SOLUTION INTRAVENOUS at 12:23

## 2023-06-07 RX ADMIN — Medication 102 MILLIGRAM(S): at 10:54

## 2023-06-07 RX ADMIN — QUETIAPINE FUMARATE 50 MILLIGRAM(S): 200 TABLET, FILM COATED ORAL at 16:35

## 2023-06-07 RX ADMIN — Medication 25 MILLIGRAM(S): at 16:07

## 2023-06-07 RX ADMIN — HALOPERIDOL DECANOATE 2 MILLIGRAM(S): 100 INJECTION INTRAMUSCULAR at 10:01

## 2023-06-07 RX ADMIN — Medication 650 MILLIGRAM(S): at 10:54

## 2023-06-07 RX ADMIN — Medication 1 TABLET(S): at 11:29

## 2023-06-07 RX ADMIN — Medication 50 MILLIGRAM(S): at 10:22

## 2023-06-07 RX ADMIN — HALOPERIDOL DECANOATE 5 MILLIGRAM(S): 100 INJECTION INTRAMUSCULAR at 10:22

## 2023-06-07 RX ADMIN — HALOPERIDOL DECANOATE 2 MILLIGRAM(S): 100 INJECTION INTRAMUSCULAR at 00:07

## 2023-06-07 RX ADMIN — HALOPERIDOL DECANOATE 5 MILLIGRAM(S): 100 INJECTION INTRAMUSCULAR at 07:58

## 2023-06-07 RX ADMIN — BREXPIPRAZOLE 4 MILLIGRAM(S): 0.25 TABLET ORAL at 11:29

## 2023-06-07 RX ADMIN — Medication 50 MILLIGRAM(S): at 11:31

## 2023-06-07 RX ADMIN — Medication 400 MILLIGRAM(S): at 20:58

## 2023-06-07 RX ADMIN — Medication 250 MILLIGRAM(S): at 20:58

## 2023-06-07 RX ADMIN — Medication 25 MILLIGRAM(S): at 14:32

## 2023-06-07 RX ADMIN — HALOPERIDOL DECANOATE 5 MILLIGRAM(S): 100 INJECTION INTRAMUSCULAR at 16:07

## 2023-06-07 RX ADMIN — Medication 50 MILLIGRAM(S): at 00:04

## 2023-06-07 RX ADMIN — Medication 50 MILLIGRAM(S): at 20:58

## 2023-06-07 RX ADMIN — HALOPERIDOL DECANOATE 5 MILLIGRAM(S): 100 INJECTION INTRAMUSCULAR at 14:33

## 2023-06-07 RX ADMIN — Medication 20 MILLIGRAM(S): at 07:58

## 2023-06-07 NOTE — PROGRESS NOTE ADULT - PROBLEM SELECTOR PLAN 1
Extensive history of chronic ITP s/p numerous treatments. Previously allergic to rituximab (anaphylaxis/serum sickness) however underwent successful desensitization in Apr 2021 and tolerated rituximab at that time. That was the last time he received rituximab.  - most recently pt was discharged on a steroid taper; was on prednisone 20 mg daily prior to coming to ED  - s/p prednisone 50 mg in ED  - CT head negative for ICH  - small 2 by 3 cm right lower abdomen bruising noted, monitor, if any flank bruising, sudden drop in Hg, or hypotension might need urgent CT angio a/p r/o RP bleeding  - Completed dexamethasone 4 day course; NPlate 5/28 and 6/5, rituxan 5/31 - repeat rituxan planned for 6/6  - As not responding to steroids, hematology recommending steroid taper of 20 mg for 3 days followed by 10 mg for 3 days - may help with patient's agitation to remove steroids  - 6/2 platelets drop - blue top 3, CBC platelets of 7, will follow up with heme recs  - rec for IVIG but patient's mother hesitant on starting, cont discussions  - had episode of lactate elevation with unclear etiology - now normalized, blood cultures NGTD  - Hemoglobin stable Extensive history of chronic ITP s/p numerous treatments. Previously allergic to rituximab (anaphylaxis/serum sickness) however underwent successful desensitization in Apr 2021 and tolerated rituximab at that time. That was the last time he received rituximab.  - most recently pt was discharged on a steroid taper; was on prednisone 20 mg daily prior to coming to ED  - s/p prednisone 50 mg in ED  - CT head negative for ICH  - small 2 by 3 cm right lower abdomen bruising noted, monitor, if any flank bruising, sudden drop in Hg, or hypotension might need urgent CT angio a/p r/o RP bleeding  - Completed dexamethasone 4 day course; NPlate 5/28 and 6/5, rituxan 5/31 - repeat rituxan being given 6/7  - As not responding to steroids, hematology recommending steroid taper of 20 mg for 3 days followed by 10 mg for 3 days - may help with patient's agitation to remove steroids  - rec for IVIG but patient's mother hesitant on starting, cont discussions  - had episode of lactate elevation with unclear etiology - now normalized, blood cultures NGTD  - Hemoglobin stable

## 2023-06-07 NOTE — PROGRESS NOTE ADULT - SUBJECTIVE AND OBJECTIVE BOX
Patient is a 23y old  Male who presents with a chief complaint of thrombocytopenia (06 Jun 2023 23:32)      SUBJECTIVE / OVERNIGHT EVENTS:    MEDICATIONS  (STANDING):  brexpiprazole 4 milliGRAM(s) Oral daily  cimetidine 400 milliGRAM(s) Oral three times a day  haloperidol    Injectable 5 milliGRAM(s) IV Push every 6 hours  lactobacillus acidophilus 1 Tablet(s) Oral daily  melatonin 3 milliGRAM(s) Oral at bedtime  multivitamin 1 Tablet(s) Oral daily  predniSONE   Tablet 20 milliGRAM(s) Oral daily  QUEtiapine 50 milliGRAM(s) Oral once  traZODone 250 milliGRAM(s) Oral at bedtime    MEDICATIONS  (PRN):  diphenhydrAMINE Injectable 25 milliGRAM(s) IV Push once PRN PRN REACTION  hydrocortisone sodium succinate Injectable 50 milliGRAM(s) IV Push once PRN REACTION MED  LORazepam   Injectable 1 milliGRAM(s) IntraMuscular every 6 hours PRN Severe agitation  meperidine     Injectable 25 milliGRAM(s) IV Push once PRN PRN REACTION      CAPILLARY BLOOD GLUCOSE        I&O's Summary      Vital Signs Last 24 Hrs  T(C): 37 (06 Jun 2023 15:00), Max: 37 (06 Jun 2023 15:00)  T(F): 98.6 (06 Jun 2023 15:00), Max: 98.6 (06 Jun 2023 15:00)  HR: 82 (06 Jun 2023 15:00) (82 - 92)  BP: 115/61 (06 Jun 2023 15:00) (115/61 - 131/72)  BP(mean): --  RR: 19 (06 Jun 2023 15:00) (17 - 19)  SpO2: 100% (06 Jun 2023 15:00) (100% - 100%)    Parameters below as of 06 Jun 2023 15:00  Patient On (Oxygen Delivery Method): room air        PHYSICAL EXAM:  GENERAL: Trying to climb out of bed, moaning  EYES: EOMI, PERRLA; conjunctiva and sclera clear  ENMT: Moist oral mucosa  NECK: Supple, no palpable masses  RESPIRATORY: No increased work of breathing  CARDIOVASCULAR: Tachycardic normal S1 and S2, no murmurs/rubs/gallops  ABDOMEN: Soft, normal bowel sounds, nondistended, nontender  MUSCULOSKELETAL: Multiple areas of ecchymosis  PSYCH: Affect restricted  NEUROLOGY: CNs grossly intact, nonverbal at baseline and does not respond to examiner but tracking  SKIN: No rashes; no palpable lesions    LABS:                        13.7   16.83 )-----------( 5        ( 06 Jun 2023 06:13 )             44.2      06-06    139  |  100  |  30<H>  ----------------------------<  80  4.2   |  28  |  0.90    Ca    8.9      06 Jun 2023 06:13  Phos  5.1     06-06  Mg     2.20     06-06                RADIOLOGY & ADDITIONAL TESTS:    Imaging Personally Reviewed:    Consultant(s) Notes Reviewed:      Care Discussed with Consultants/Other Providers:   Patient is a 23y old  Male who presents with a chief complaint of thrombocytopenia (06 Jun 2023 23:32)      SUBJECTIVE / OVERNIGHT EVENTS: MAGI called x3 for agitation, received multiple doses of Haldol, Ativan, Benadryl, Seroquel and Trazodone overnight. This AM still appearing agitated, additional 7 of Haldol given before MAGI called, psychiatry came to assess patient as well, recommended Haldol 5 and benadryl 50. Patient otherwise extremely agitated, attacking mother when not given food, requiring 4 people to keep in bed.     MEDICATIONS  (STANDING):  brexpiprazole 4 milliGRAM(s) Oral daily  cimetidine 400 milliGRAM(s) Oral three times a day  haloperidol    Injectable 5 milliGRAM(s) IV Push every 6 hours  lactobacillus acidophilus 1 Tablet(s) Oral daily  melatonin 3 milliGRAM(s) Oral at bedtime  multivitamin 1 Tablet(s) Oral daily  predniSONE   Tablet 20 milliGRAM(s) Oral daily  QUEtiapine 50 milliGRAM(s) Oral once  traZODone 250 milliGRAM(s) Oral at bedtime    MEDICATIONS  (PRN):  diphenhydrAMINE Injectable 25 milliGRAM(s) IV Push once PRN PRN REACTION  hydrocortisone sodium succinate Injectable 50 milliGRAM(s) IV Push once PRN REACTION MED  LORazepam   Injectable 1 milliGRAM(s) IntraMuscular every 6 hours PRN Severe agitation  meperidine     Injectable 25 milliGRAM(s) IV Push once PRN PRN REACTION      CAPILLARY BLOOD GLUCOSE        I&O's Summary      Vital Signs Last 24 Hrs  T(C): 37 (06 Jun 2023 15:00), Max: 37 (06 Jun 2023 15:00)  T(F): 98.6 (06 Jun 2023 15:00), Max: 98.6 (06 Jun 2023 15:00)  HR: 82 (06 Jun 2023 15:00) (82 - 92)  BP: 115/61 (06 Jun 2023 15:00) (115/61 - 131/72)  BP(mean): --  RR: 19 (06 Jun 2023 15:00) (17 - 19)  SpO2: 100% (06 Jun 2023 15:00) (100% - 100%)    Parameters below as of 06 Jun 2023 15:00  Patient On (Oxygen Delivery Method): room air        PHYSICAL EXAM:  GENERAL: Trying to climb out of bed, moaning  EYES: EOMI, PERRLA; conjunctiva and sclera clear  ENMT: Moist oral mucosa  NECK: Supple, no palpable masses  RESPIRATORY: No increased work of breathing  CARDIOVASCULAR: Tachycardic normal S1 and S2, no murmurs/rubs/gallops  ABDOMEN: Soft, normal bowel sounds, nondistended, nontender  MUSCULOSKELETAL: Multiple areas of ecchymosis  PSYCH: Affect restricted  NEUROLOGY: CNs grossly intact, nonverbal at baseline and does not respond to examiner but tracking  SKIN: No rashes; no palpable lesions    LABS:                        13.7   16.83 )-----------( 5        ( 06 Jun 2023 06:13 )             44.2      06-06    139  |  100  |  30<H>  ----------------------------<  80  4.2   |  28  |  0.90    Ca    8.9      06 Jun 2023 06:13  Phos  5.1     06-06  Mg     2.20     06-06                RADIOLOGY & ADDITIONAL TESTS:    Imaging Personally Reviewed:    Consultant(s) Notes Reviewed:      Care Discussed with Consultants/Other Providers:

## 2023-06-07 NOTE — CHART NOTE - NSCHARTNOTEFT_GEN_A_CORE
Hematology recommending transfuse 1 unit platelets today, however as per discussion with Dr. Driscoll pt's mother is adamantly refusing platelet transfusion at this time, r/b were discussed by team.

## 2023-06-07 NOTE — PROGRESS NOTE ADULT - ASSESSMENT
Mr. Denilson Hernandes is a 23 year old M w/ PMH of intellectual disability, autism, nonverbal at baseline, CARLITOS not on CPAP, severe chronic ITP, referred by his hematologist due to thrombocytopenia to  on outpt labs, s/p NPlate 5/28 and 6/5, rituxan 5/31.

## 2023-06-07 NOTE — PROGRESS NOTE ADULT - PROBLEM SELECTOR PLAN 2
Pt is nonverbal at baseline. Lives in group home. Pt recently had a first-time seizure last week, briefly hospitalized, determined to be adverse effect of Doptelet (avatrombopag) which is since discontinued. CTH shows no acute processes  - c/w home Rexulti  - resume trazodone 250 nightly  - mom concerned about Haldol contributing to Plt suppression, however has previously tolerated in the past with no decrease in plt, per psych low risk  -  recs appreciated; ativan first and Haldol if needed for severe agitation  - last QTc wnl, cont to monitor Pt is nonverbal at baseline. Lives in group home. Pt recently had a first-time seizure last week, briefly hospitalized, determined to be adverse effect of Doptelet (avatrombopag) which is since discontinued. CTH shows no acute processes  Psych recs appreciated  - Constant observation  - 6/7 ECG with sinus tachycardia  - Thorazine 50 mg IM/IV q6  - Haldol 3 mg PO/IM/IV q4 + benadryl 50 mg IM/IV q4 (together)  - Avoid IM/IV ativan for concern for paradoxical agitation  - Continuous pulse ox ordered to monitor for oversedation as no telemetry bed available - patient removing pulse oximetry, mother prefers to keep off to avoid agitating patient

## 2023-06-07 NOTE — PROVIDER CONTACT NOTE (OTHER) - REASON
Patient's mom refused cimetidine. Regular rate & rhythm, normal S1, S2; no murmurs, gallops or rubs; no S3, S4

## 2023-06-07 NOTE — PROGRESS NOTE ADULT - PROBLEM SELECTOR PLAN 4
DVT ppx: hold chemoppx given thrombocytopenia  Diet: Regular  Dispo: Return to group home when platelets sustained >10 DVT ppx: hold chemoppx given thrombocytopenia  Diet: Regular  Dispo: Return to group home when cleared by hematology

## 2023-06-07 NOTE — CHART NOTE - NSCHARTNOTEFT_GEN_A_CORE
[facility] NIGHT MEDICINE COVERAGE    Spoke to pt's mother at bedside, she is in agreement w/ plan to try Thorazine 50mg IM q6h standing per  recommendation.  Pt is noted to be calmer after receiving Thorazine.  VS q4h ordered after discussion w/ ED psychiatrist, Dr. Orr.    Discussed pt's platelet count w/ his mother, she is aware that his platelet count is 2.  Platelet transfusion discussed, pt's mother declined for pt to receive platelet transfusion - risks and benefits were discussed including risk of severe, uncontrolled bleeding w/ low platelet count.  Pt's mother acknowledges this risk and understands.  F/u w/ hematology in AM.  Will continue to monitor.    Erick Manuel PA-C  Department of Medicine - [facility]  In-House Pager: #[phone] [facility] NIGHT MEDICINE COVERAGE    Spoke to pt's mother at bedside, she is in agreement w/ plan to try Thorazine 50mg IM q6h standing per  recommendation.  Pt is noted to be calmer after receiving Thorazine.  VS q4h ordered after discussion w/ ED psychiatrist, Dr. Orr.    Discussed pt's platelet count w/ his mother, she is aware that his platelet count is 2.  Platelet transfusion discussed, pt's mother declined for pt to receive platelet transfusion - risks and benefits of platelet transfusion discussed.  Explained that refusal of platelet transfusion carries risk of severe, uncontrolled bleeding w/ low platelet count.  Pt's mother acknowledges this risk and understands.  F/u w/ hematology in AM.  Will continue to monitor.    Erick Manuel PA-C  Department of Medicine - [facility]  In-House Pager: #[phone]

## 2023-06-07 NOTE — PROGRESS NOTE ADULT - ATTENDING COMMENTS
Pt care and plan discussed and reviewed with house staff. Plan as outlined above edited by me to reflect our discussion.     discussed with patient mother at the bedside   Nplate today   PSycha dn heme follow up for discharge planing
Monitor plt level  infectious W/U  psych eval  Heme follow up     Discussed with patient;s mother
Pt care and plan discussed and reviewed with house staff. Plan as outlined above edited by me to reflect our discussion.
Pt care and plan discussed and reviewed with house staff. Plan as outlined above edited by me to reflect our discussion.       Discusssed with mother at the bedside
discussed with hematology   cont current care  monitor plt level   infectious W.U
psych follow up   adjust meds  tele monitor placement
Pt care and plan discussed and reviewed with house staff. Plan as outlined above edited by me to reflect our discussion.
Pt care and plan discussed and reviewed with house staff. Plan as outlined above edited by me to reflect our discussion.
Pt care and plan discussed and reviewed with house staff. Plan as outlined above edited by me to reflect our discussion.     Discussed with patient's mother at the bedside
Pt care and plan discussed and reviewed with house staff. Plan as outlined above edited by me to reflect our discussion.     Discussed with patient's mother at the bedside
Pt care and plan discussed and reviewed with house staff. Plan as outlined above edited by me to reflect our discussion.    Discussed with patient's mother and hematology team

## 2023-06-07 NOTE — PROGRESS NOTE ADULT - SUBJECTIVE AND OBJECTIVE BOX
Patient is a 23y old  Male who presents with a chief complaint of thrombocytopenia (07 Jun 2023 07:13)    Patient seen this morning, with mom at bedside. Chemo nurse in room, starting Rituxan infusion soon. MAGI called overnight for agitation    MEDICATIONS  (STANDING):  brexpiprazole 4 milliGRAM(s) Oral daily  cimetidine 400 milliGRAM(s) Oral three times a day  diphenhydrAMINE Injectable 25 milliGRAM(s) IV Push once  haloperidol    Injectable 5 milliGRAM(s) IV Push once  lactobacillus acidophilus 1 Tablet(s) Oral daily  melatonin 3 milliGRAM(s) Oral at bedtime  multivitamin 1 Tablet(s) Oral daily  predniSONE   Tablet 20 milliGRAM(s) Oral daily  QUEtiapine 50 milliGRAM(s) Oral once  traZODone 250 milliGRAM(s) Oral at bedtime    MEDICATIONS  (PRN):  diphenhydrAMINE Injectable 25 milliGRAM(s) IV Push every 6 hours PRN Agitation  haloperidol    Injectable 5 milliGRAM(s) IV Push every 6 hours PRN Agitation  hydrocortisone sodium succinate Injectable 50 milliGRAM(s) IV Push once PRN REACTION  hydrocortisone sodium succinate Injectable 50 milliGRAM(s) IV Push once PRN REACTION MED  meperidine     Injectable 25 milliGRAM(s) IV Push once PRN PRN REACTION  meperidine     Injectable 25 milliGRAM(s) IV Push once PRN RIGORS        Vital Signs Last 24 Hrs  T(C): 36.6 (07 Jun 2023 14:00), Max: 37 (06 Jun 2023 15:00)  T(F): 97.9 (07 Jun 2023 14:00), Max: 98.6 (06 Jun 2023 15:00)  HR: 113 (07 Jun 2023 14:00) (82 - 115)  BP: 138/62 (07 Jun 2023 14:00) (115/61 - 139/70)  BP(mean): --  RR: 18 (07 Jun 2023 14:00) (18 - 19)  SpO2: 97% (07 Jun 2023 14:00) (96% - 100%)    Parameters below as of 07 Jun 2023 14:00  Patient On (Oxygen Delivery Method): room air        PE  obese  calm, NAD  Anicteric, MMM  ecchymoses  FROM                          12.2   17.71 )-----------( 2        ( 07 Jun 2023 12:26 )             39.6       06-07    140  |  103  |  33<H>  ----------------------------<  122<H>  4.3   |  25  |  0.82    Ca    8.3<L>      07 Jun 2023 12:26  Phos  4.7     06-07  Mg     1.80     06-07

## 2023-06-07 NOTE — BH CONSULTATION LIAISON PROGRESS NOTE - NSBHASSESSMENTFT_PSY_ALL_CORE
23 year old male with PPH significant for severe autism and PMH significant for ITP presents to [facility] for severe thrombocytopenia. Patient is currently on steroid. Mother is concern for steroid induced irritability as well as increased aggression. Psych CL consulted to assist in medication management in this setting. While patient is not showing signs of overt psychosis, it is possible that the steroids is causing more irritability.    6/3: Pt calm and cooperative, eating breakfast. Per mother, pt has been doing better since Ativan started. No other concerns at this time.  6/4: Pt remains calm and cooperative. Per mother, pt with paradoxical worsening of agitation with IV administration of Ativan. Pt received PRN IM Haldol 2mg this morning with good effect.   6/5: mostly calm, no psych c/i to discharge, change meds as below, discussed with mother    6/6: intermittently agitated, received haldol 3mg IM this AM for agitation, which is driven by hyperphagia. would recommend increasing am dose of ativan to 1mg and given closer to time he wakes up -- at 6am.     There is low concern that Haldol is contributing to thrombocytopenia and therefore may be used as a PRN medication for this patient.     6/7: multiple code BERTs overnight. remains impulsive, agitated. hyperphagic. recommend discontinuing haldol and starting thorazine 50mg IM/IV q6hr for agitation. offer haldol/benadryl IM/IV PRN, if no response, can offer thorazine.    Plan:  [] Continue 1:1 for agitation  [] Obtain EKG  [] START THORAZINE 50MG IM/IV Q6HR STANDING   [] OFFER PRN haldol to 3mg PO/IM/IV q4hr for agitation, PLEASE GIVE WITH BENADRYL 50MG IM/IV (ENSURE IN SAME VIAL TO AVOID MULTIPLE INJECTIONS)  [] If minimal response to haldol/benadryl, can offer Thorazine 50mg IM/IV (MAX BID)  [] HOLD PRNS FOR RESPIRATORY DEPRESSION, HYPOTENSION, BRADYCARDIA  [] Obtain vitals q1hr after getting PRNs 23 year old male with PPH significant for severe autism and PMH significant for ITP presents to [facility] for severe thrombocytopenia. Patient is currently on steroid. Mother is concern for steroid induced irritability as well as increased aggression. Psych CL consulted to assist in medication management in this setting. While patient is not showing signs of overt psychosis, it is possible that the steroids is causing more irritability.    6/3: Pt calm and cooperative, eating breakfast. Per mother, pt has been doing better since Ativan started. No other concerns at this time.  6/4: Pt remains calm and cooperative. Per mother, pt with paradoxical worsening of agitation with IV administration of Ativan. Pt received PRN IM Haldol 2mg this morning with good effect.   6/5: mostly calm, no psych c/i to discharge, change meds as below, discussed with mother  6/6: intermittently agitated, received haldol 3mg IM this AM for agitation, which is driven by hyperphagia. would recommend increasing am dose of ativan to 1mg and given closer to time he wakes up -- at 6am.   6/7: multiple code BERTs overnight. remains impulsive, agitated. hyperphagic. recommend discontinuing haldol and starting thorazine 50mg IM/IV q6hr for agitation. offer haldol/benadryl IM/IV PRN, if no response, can offer thorazine. Risks/benefits including resp depression, cardiac monitoring need, etc discussed with mother     Plan:  [] Continue 1:1 for agitation  [] Obtain EKG for QTc  [] START THORAZINE 50MG IM/IV Q6HR STANDING (stop standing Haldol as this is not working)  [] OFFER PRN haldol to 3mg PO/IM/IV q4hr for agitation, PLEASE GIVE WITH BENADRYL 50MG IM/IV (ENSURE IN SAME VIAL TO AVOID MULTIPLE INJECTIONS)  [] If minimal response to haldol/benadryl, can offer Thorazine 50mg IM/IV (MAX BID)  [] HOLD PRNS FOR RESPIRATORY DEPRESSION, HYPOTENSION, BRADYCARDIA  [] Obtain vitals q1hr after getting PRNs

## 2023-06-07 NOTE — PROGRESS NOTE ADULT - ASSESSMENT
VINNY MOON is a 23y Male who presents with a chief complaint of thrombocytopenia    Immune Thrombocytopenia  ·	Patient follows with Dr. Rosaura Bhagat, [facility].  ·	Previously treated with glucocorticoids, IVIG, rituximab, avatrombopag.   ·	Patient has been on glucocorticoids with no significant response for a month; will taper off given lack of response and behavioral side effects. Continue prednisone 20mg x 3 days followed by 10mg x 3 days then discontinue.   ·	Rituximab administered May 31st; plan on second infusion today 06/07  ·	Romiplostim weekly, currently at 6 mcg/kcg. 3rd dose administered 06/05.  ·	Continue to monitor platelets closely.  ·	May need to consider alternative therapies including fostamatinib or other immunosuppressive agents as an outpatient.   ·	Discussed with mom to consider IVIG as this may work more rapidly than the above treatments however she continues to decline.     Will continue to follow.    Zuly Silva PA-C  Hematology/Oncology  [facility]  [phone] (office)  [phone] (alt office)  Evenings and weekends please call MD on call or office

## 2023-06-08 LAB
ANION GAP SERPL CALC-SCNC: 13 MMOL/L — SIGNIFICANT CHANGE UP (ref 7–14)
BUN SERPL-MCNC: 28 MG/DL — HIGH (ref 7–23)
CALCIUM SERPL-MCNC: 8.6 MG/DL — SIGNIFICANT CHANGE UP (ref 8.4–10.5)
CHLORIDE SERPL-SCNC: 103 MMOL/L — SIGNIFICANT CHANGE UP (ref 98–107)
CLOSURE TME COLL+EPINEP BLD: 6 K/UL — CRITICAL LOW (ref 150–400)
CO2 SERPL-SCNC: 23 MMOL/L — SIGNIFICANT CHANGE UP (ref 22–31)
CREAT SERPL-MCNC: 0.7 MG/DL — SIGNIFICANT CHANGE UP (ref 0.5–1.3)
EGFR: 132 ML/MIN/1.73M2 — SIGNIFICANT CHANGE UP
GLUCOSE SERPL-MCNC: 129 MG/DL — HIGH (ref 70–99)
HCT VFR BLD CALC: 38.8 % — LOW (ref 39–50)
HGB BLD-MCNC: 12.3 G/DL — LOW (ref 13–17)
MAGNESIUM SERPL-MCNC: 2.1 MG/DL — SIGNIFICANT CHANGE UP (ref 1.6–2.6)
MCHC RBC-ENTMCNC: 26.3 PG — LOW (ref 27–34)
MCHC RBC-ENTMCNC: 31.7 GM/DL — LOW (ref 32–36)
MCV RBC AUTO: 82.9 FL — SIGNIFICANT CHANGE UP (ref 80–100)
NRBC # BLD: 0 /100 WBCS — SIGNIFICANT CHANGE UP (ref 0–0)
NRBC # FLD: 0.02 K/UL — HIGH (ref 0–0)
PHOSPHATE SERPL-MCNC: 3.8 MG/DL — SIGNIFICANT CHANGE UP (ref 2.5–4.5)
PLATELET # BLD AUTO: 6 K/UL — CRITICAL LOW (ref 150–400)
POTASSIUM SERPL-MCNC: 4.1 MMOL/L — SIGNIFICANT CHANGE UP (ref 3.5–5.3)
POTASSIUM SERPL-SCNC: 4.1 MMOL/L — SIGNIFICANT CHANGE UP (ref 3.5–5.3)
RBC # BLD: 4.68 M/UL — SIGNIFICANT CHANGE UP (ref 4.2–5.8)
RBC # FLD: 17.2 % — HIGH (ref 10.3–14.5)
SODIUM SERPL-SCNC: 139 MMOL/L — SIGNIFICANT CHANGE UP (ref 135–145)
WBC # BLD: 24.47 K/UL — HIGH (ref 3.8–10.5)
WBC # FLD AUTO: 24.47 K/UL — HIGH (ref 3.8–10.5)

## 2023-06-08 PROCEDURE — 93010 ELECTROCARDIOGRAM REPORT: CPT

## 2023-06-08 RX ORDER — HALOPERIDOL DECANOATE 100 MG/ML
3 INJECTION INTRAMUSCULAR EVERY 4 HOURS
Refills: 0 | Status: DISCONTINUED | OUTPATIENT
Start: 2023-06-08 | End: 2023-06-12

## 2023-06-08 RX ORDER — DIPHENHYDRAMINE HCL 50 MG
25 CAPSULE ORAL ONCE
Refills: 0 | Status: COMPLETED | OUTPATIENT
Start: 2023-06-08 | End: 2023-06-08

## 2023-06-08 RX ORDER — DIPHENHYDRAMINE HCL 50 MG
25 CAPSULE ORAL ONCE
Refills: 0 | Status: DISCONTINUED | OUTPATIENT
Start: 2023-06-08 | End: 2023-06-08

## 2023-06-08 RX ADMIN — Medication 400 MILLIGRAM(S): at 22:57

## 2023-06-08 RX ADMIN — Medication 25 MILLIGRAM(S): at 13:14

## 2023-06-08 RX ADMIN — HALOPERIDOL DECANOATE 3 MILLIGRAM(S): 100 INJECTION INTRAMUSCULAR at 10:45

## 2023-06-08 RX ADMIN — Medication 400 MILLIGRAM(S): at 06:31

## 2023-06-08 RX ADMIN — Medication 25 MILLIGRAM(S): at 10:45

## 2023-06-08 RX ADMIN — Medication 25 MILLIGRAM(S): at 06:45

## 2023-06-08 RX ADMIN — Medication 10 MILLIGRAM(S): at 13:07

## 2023-06-08 RX ADMIN — Medication 1 TABLET(S): at 11:55

## 2023-06-08 RX ADMIN — HALOPERIDOL DECANOATE 5 MILLIGRAM(S): 100 INJECTION INTRAMUSCULAR at 09:00

## 2023-06-08 RX ADMIN — Medication 2 MILLIGRAM(S): at 13:14

## 2023-06-08 RX ADMIN — Medication 3 MILLIGRAM(S): at 22:51

## 2023-06-08 RX ADMIN — Medication 400 MILLIGRAM(S): at 13:07

## 2023-06-08 RX ADMIN — Medication 50 MILLIGRAM(S): at 06:26

## 2023-06-08 RX ADMIN — Medication 50 MILLIGRAM(S): at 11:56

## 2023-06-08 RX ADMIN — BREXPIPRAZOLE 4 MILLIGRAM(S): 0.25 TABLET ORAL at 11:56

## 2023-06-08 RX ADMIN — Medication 250 MILLIGRAM(S): at 22:52

## 2023-06-08 NOTE — PROGRESS NOTE ADULT - SUBJECTIVE AND OBJECTIVE BOX
Patient is a 23y old  Male who presents with a chief complaint of thrombocytopenia (08 Jun 2023 13:14)    Patient seen this afternoon with mom at bedside. Currently he is calm but frequently has become agitated. No bleeding. Per mom, pt has been sneezing, states prior admission he had viral infection with similar symptoms.    MEDICATIONS  (STANDING):  brexpiprazole 4 milliGRAM(s) Oral daily  chlorproMAZINE    Injectable 50 milliGRAM(s) IntraMuscular every 6 hours  cimetidine 400 milliGRAM(s) Oral three times a day  lactobacillus acidophilus 1 Tablet(s) Oral daily  melatonin 3 milliGRAM(s) Oral at bedtime  multivitamin 1 Tablet(s) Oral daily  QUEtiapine 50 milliGRAM(s) Oral once  traZODone 250 milliGRAM(s) Oral at bedtime    MEDICATIONS  (PRN):  diphenhydrAMINE Injectable 25 milliGRAM(s) IV Push every 6 hours PRN Agitation  haloperidol    Injectable 3 milliGRAM(s) IV Push every 4 hours PRN Agitation  hydrocortisone sodium succinate Injectable 50 milliGRAM(s) IV Push once PRN REACTION  hydrocortisone sodium succinate Injectable 50 milliGRAM(s) IV Push once PRN REACTION MED  meperidine     Injectable 25 milliGRAM(s) IV Push once PRN PRN REACTION  meperidine     Injectable 25 milliGRAM(s) IV Push once PRN RIGORS        Vital Signs Last 24 Hrs  T(C): 36.7 (08 Jun 2023 11:45), Max: 36.7 (07 Jun 2023 16:00)  T(F): 98 (08 Jun 2023 11:45), Max: 98.1 (07 Jun 2023 16:00)  HR: 99 (08 Jun 2023 11:45) (96 - 102)  BP: 122/75 (08 Jun 2023 11:45) (105/63 - 142/74)  BP(mean): --  RR: 17 (08 Jun 2023 11:45) (17 - 18)  SpO2: 95% (08 Jun 2023 11:45) (95% - 97%)    Parameters below as of 08 Jun 2023 11:45  Patient On (Oxygen Delivery Method): room air        PE  obese  calm, NAD  Anicteric, MMM  ecchymoses   FROM                          12.3   24.47 )-----------( 6        ( 08 Jun 2023 07:50 )             38.8       06-08    139  |  103  |  28<H>  ----------------------------<  129<H>  4.1   |  23  |  0.70    Ca    8.6      08 Jun 2023 07:50  Phos  3.8     06-08  Mg     2.10     06-08

## 2023-06-08 NOTE — BH CONSULTATION LIAISON PROGRESS NOTE - NSBHASSESSMENTFT_PSY_ALL_CORE
23 year old male with PPH significant for severe autism and PMH significant for ITP presents to [facility] for severe thrombocytopenia. Patient is currently on steroid. Mother is concern for steroid induced irritability as well as increased aggression. Psych CL consulted to assist in medication management in this setting. While patient is not showing signs of overt psychosis, it is possible that the steroids is causing more irritability.    6/3: Pt calm and cooperative, eating breakfast. Per mother, pt has been doing better since Ativan started. No other concerns at this time.  6/4: Pt remains calm and cooperative. Per mother, pt with paradoxical worsening of agitation with IV administration of Ativan. Pt received PRN IM Haldol 2mg this morning with good effect.   6/5: mostly calm, no psych c/i to discharge, change meds as below, discussed with mother  6/6: intermittently agitated, received haldol 3mg IM this AM for agitation, which is driven by hyperphagia. would recommend increasing am dose of ativan to 1mg and given closer to time he wakes up -- at 6am.   6/7: multiple code BERTs overnight. remains impulsive, agitated. hyperphagic. recommend discontinuing haldol and starting thorazine 50mg IM/IV q6hr for agitation. offer haldol/benadryl IM/IV PRN, if no response, can offer thorazine. Risks/benefits including resp depression, cardiac monitoring need, etc discussed with mother   6/8: no code BERTs overnight but remains intermittently agitated. overall, behavioral control is improved but remains poor. mother continues to refuse IVIG -- however this would help optimize patient behaviorally (if used in lieu of steroids). mother also refusing unit of platelets, which was recommended by heme onc.   Plan:  [] Continue 1:1 for agitation  [] c/w THORAZINE 50MG IM/IV Q6HR STANDING   [] OFFER PRN haldol to 3mg PO/IM/IV q4hr for agitation, PLEASE GIVE WITH BENADRYL 50MG IM/IV (ENSURE IN SAME VIAL TO AVOID MULTIPLE INJECTIONS)  [] If minimal response to haldol/benadryl, can offer Thorazine 50mg IM/IV (MAX BID)  [] HOLD PRNS FOR RESPIRATORY DEPRESSION, HYPOTENSION, BRADYCARDIA  [] Obtain vitals q1hr after getting PRNs 23 year old male with PPH significant for severe autism and PMH significant for ITP presents to [facility] for severe thrombocytopenia. Patient is currently on steroid. Mother is concern for steroid induced irritability as well as increased aggression. Psych CL consulted to assist in medication management in this setting. While patient is not showing signs of overt psychosis, it is possible that the steroids is causing more irritability.    6/3: Pt calm and cooperative, eating breakfast. Per mother, pt has been doing better since Ativan started. No other concerns at this time.  6/4: Pt remains calm and cooperative. Per mother, pt with paradoxical worsening of agitation with IV administration of Ativan. Pt received PRN IM Haldol 2mg this morning with good effect.   6/5: mostly calm, no psych c/i to discharge, change meds as below, discussed with mother  6/6: intermittently agitated, received haldol 3mg IM this AM for agitation, which is driven by hyperphagia. would recommend increasing am dose of ativan to 1mg and given closer to time he wakes up -- at 6am.   6/7: multiple code BERTs overnight. remains impulsive, agitated. hyperphagic. recommend discontinuing haldol and starting thorazine 50mg IM/IV q6hr for agitation. offer haldol/benadryl IM/IV PRN, if no response, can offer thorazine. Risks/benefits including resp depression, cardiac monitoring need, etc discussed with mother   6/8: no code BERTs overnight but remains intermittently agitated. overall, behavioral control is improved but remains poor. mother continues to refuse IVIG -- however this would help optimize patient behaviorally (if used in lieu of steroids). mother also refusing unit of platelets, which was recommended by heme onc.     Plan:  [] Continue 1:1 for agitation  [] c/w THORAZINE 50MG IM/IV Q6HR STANDING (monitor for resp. depression, hypotension, QTc prolongation)  [] OFFER PRN haldol to 3mg PO/IM/IV q4hr for agitation, PLEASE GIVE WITH BENADRYL 50MG IM/IV (ENSURE IN SAME VIAL TO AVOID MULTIPLE INJECTIONS)  [] If minimal response to haldol/benadryl, can offer Thorazine 50mg IM/IV (MAX BID)  [] HOLD PRNS FOR RESPIRATORY DEPRESSION, HYPOTENSION, BRADYCARDIA  [] Obtain vitals q1hr after getting PRNs  [] Would benefit from continuous pulseox, tele as patient getting many PRNs

## 2023-06-08 NOTE — PROGRESS NOTE ADULT - PROBLEM SELECTOR PLAN 1
Extensive history of chronic ITP s/p numerous treatments. Previously allergic to rituximab (anaphylaxis/serum sickness) however underwent successful desensitization in Apr 2021 and tolerated rituximab at that time. That was the last time he received rituximab.  - most recently pt was discharged on a steroid taper; was on prednisone 20 mg daily prior to coming to ED  - s/p prednisone 50 mg in ED  - CT head negative for ICH  - small 2 by 3 cm right lower abdomen bruising noted, monitor, if any flank bruising, sudden drop in Hg, or hypotension might need urgent CT angio a/p r/o RP bleeding  - Completed dexamethasone 4 day course; NPlate 5/28 and 6/5, rituxan 5/31 - repeat rituxan being given 6/7  - As not responding to steroids, hematology recommending steroid taper of 20 mg for 3 days followed by 10 mg for 3 days - may help with patient's agitation to remove steroids  - rec for IVIG but patient's mother hesitant on starting, cont discussions  - had episode of lactate elevation with unclear etiology - now normalized, blood cultures NGTD  - Hemoglobin stable  - Mother refusing plt transfusion

## 2023-06-08 NOTE — PROGRESS NOTE ADULT - SUBJECTIVE AND OBJECTIVE BOX
Name of Patient : VINNY MOON  MRN: [id number]  Date of visit: 06-08-23       Subjective: Patient seen and examined. No new events except as noted.   episodes of agitation  no bleeding  mother at the bedside     REVIEW OF SYSTEMS:  limited     MEDICATIONS:  MEDICATIONS  (STANDING):  brexpiprazole 4 milliGRAM(s) Oral daily  chlorproMAZINE    Injectable 50 milliGRAM(s) IntraMuscular every 6 hours  cimetidine 400 milliGRAM(s) Oral three times a day  diphenhydrAMINE Injectable 25 milliGRAM(s) IV Push once  diphenhydrAMINE Injectable 25 milliGRAM(s) IV Push once  lactobacillus acidophilus 1 Tablet(s) Oral daily  LORazepam     Tablet 2 milliGRAM(s) Oral once  melatonin 3 milliGRAM(s) Oral at bedtime  multivitamin 1 Tablet(s) Oral daily  QUEtiapine 50 milliGRAM(s) Oral once  traZODone 250 milliGRAM(s) Oral at bedtime      PHYSICAL EXAM:  T(C): 36.7 (06-08-23 @ 11:45), Max: 36.8 (06-07-23 @ 13:30)  HR: 99 (06-08-23 @ 11:45) (96 - 115)  BP: 122/75 (06-08-23 @ 11:45) (105/63 - 142/74)  RR: 17 (06-08-23 @ 11:45) (17 - 18)  SpO2: 95% (06-08-23 @ 11:45) (95% - 97%)  Wt(kg): --  I&O's Summary        Appearance: awake, nonverbal   HEENT:  PERRLA   Lymphatic: No lymphadenopathy   Cardiovascular: Normal S1 S2, no JVD  Respiratory: normal effort , clear  Gastrointestinal:  Soft, Non-tender  Skin: UE skin achymosis   Psychiatry:  Mood & affect appropriate  Musculuskeletal: Obese     recent labs, Imaging and EKGs personally reviewed                           12.3   24.47 )-----------( 6        ( 08 Jun 2023 07:50 )             38.8               06-08    139  |  103  |  28<H>  ----------------------------<  129<H>  4.1   |  23  |  0.70    Ca    8.6      08 Jun 2023 07:50  Phos  3.8     06-08  Mg     2.10     06-08

## 2023-06-08 NOTE — PROGRESS NOTE ADULT - ASSESSMENT
VINNY MOON is a 23y Male who presents with a chief complaint of thrombocytopenia    Immune Thrombocytopenia  ·	Patient follows with Dr. Rosaura Bhagat, [facility].  ·	Previously treated with glucocorticoids, IVIG, rituximab, avatrombopag.   ·	Patient has been on glucocorticoids with no significant response for a month; will taper off given lack of response and behavioral side effects. Continue prednisone 10mg x 3 days then discontinue.   ·	Rituximab administered 05/31 and 06/07. Next dose due 06/14.  ·	Romiplostim weekly, currently at 6 mcg/kcg. 3rd dose administered 06/05.  ·	Continue to monitor platelets closely.  ·	May need to consider alternative therapies including fostamatinib or other immunosuppressive agents as an outpatient.   ·	Discussed with mom to consider IVIG as this may work more rapidly than the above treatments however she continues to decline.     Platelet count continues to be refractory. Blood and urine cultures negative. Check RVP.     Will continue to follow.    Zuly Silva PA-C  Hematology/Oncology  [facility]  [phone] (office)  [phone] (alt office)  Evenings and weekends please call MD on call or office

## 2023-06-08 NOTE — PROGRESS NOTE ADULT - PROBLEM SELECTOR PLAN 4
DVT ppx: hold chemoppx given thrombocytopenia  Diet: Regular  Dispo: Return to group home when cleared by hematology

## 2023-06-08 NOTE — PROGRESS NOTE ADULT - PROBLEM SELECTOR PLAN 2
Pt is nonverbal at baseline. Lives in group home. Pt recently had a first-time seizure last week, briefly hospitalized, determined to be adverse effect of Doptelet (avatrombopag) which is since discontinued. CTH shows no acute processes  Psych recs appreciated  - Constant observation  - 6/7 ECG with sinus tachycardia  - Thorazine 50 mg IM/IV q6  - Haldol 3 mg PO/IM/IV q4 + benadryl 50 mg IM/IV q4 (together)  - Avoid IM/IV ativan for concern for paradoxical agitation  - Continuous pulse ox ordered to monitor for oversedation as no telemetry bed available - patient removing pulse oximetry, mother prefers to keep off to avoid agitating patient  - Psych follow up to adjust meds - restrains ? might cause mor agitation

## 2023-06-09 LAB
B PERT DNA SPEC QL NAA+PROBE: SIGNIFICANT CHANGE UP
B PERT+PARAPERT DNA PNL SPEC NAA+PROBE: SIGNIFICANT CHANGE UP
BORDETELLA PARAPERTUSSIS (RAPRVP): SIGNIFICANT CHANGE UP
C PNEUM DNA SPEC QL NAA+PROBE: SIGNIFICANT CHANGE UP
CLOSURE TME COLL+EPINEP BLD: 7 K/UL — CRITICAL LOW (ref 150–400)
FLUAV SUBTYP SPEC NAA+PROBE: SIGNIFICANT CHANGE UP
FLUBV RNA SPEC QL NAA+PROBE: SIGNIFICANT CHANGE UP
HADV DNA SPEC QL NAA+PROBE: SIGNIFICANT CHANGE UP
HCOV 229E RNA SPEC QL NAA+PROBE: SIGNIFICANT CHANGE UP
HCOV HKU1 RNA SPEC QL NAA+PROBE: SIGNIFICANT CHANGE UP
HCOV NL63 RNA SPEC QL NAA+PROBE: SIGNIFICANT CHANGE UP
HCOV OC43 RNA SPEC QL NAA+PROBE: SIGNIFICANT CHANGE UP
HCT VFR BLD CALC: 39.2 % — SIGNIFICANT CHANGE UP (ref 39–50)
HGB BLD-MCNC: 12.1 G/DL — LOW (ref 13–17)
HMPV RNA SPEC QL NAA+PROBE: SIGNIFICANT CHANGE UP
HPIV1 RNA SPEC QL NAA+PROBE: SIGNIFICANT CHANGE UP
HPIV2 RNA SPEC QL NAA+PROBE: SIGNIFICANT CHANGE UP
HPIV3 RNA SPEC QL NAA+PROBE: SIGNIFICANT CHANGE UP
HPIV4 RNA SPEC QL NAA+PROBE: SIGNIFICANT CHANGE UP
M PNEUMO DNA SPEC QL NAA+PROBE: SIGNIFICANT CHANGE UP
MCHC RBC-ENTMCNC: 26.3 PG — LOW (ref 27–34)
MCHC RBC-ENTMCNC: 30.9 GM/DL — LOW (ref 32–36)
MCV RBC AUTO: 85.2 FL — SIGNIFICANT CHANGE UP (ref 80–100)
NRBC # BLD: 0 /100 WBCS — SIGNIFICANT CHANGE UP (ref 0–0)
NRBC # FLD: 0 K/UL — SIGNIFICANT CHANGE UP (ref 0–0)
PLATELET # BLD AUTO: 10 K/UL — CRITICAL LOW (ref 150–400)
RAPID RVP RESULT: SIGNIFICANT CHANGE UP
RBC # BLD: 4.6 M/UL — SIGNIFICANT CHANGE UP (ref 4.2–5.8)
RBC # FLD: 17.7 % — HIGH (ref 10.3–14.5)
RSV RNA SPEC QL NAA+PROBE: SIGNIFICANT CHANGE UP
RV+EV RNA SPEC QL NAA+PROBE: SIGNIFICANT CHANGE UP
SARS-COV-2 RNA SPEC QL NAA+PROBE: SIGNIFICANT CHANGE UP
WBC # BLD: 19.56 K/UL — HIGH (ref 3.8–10.5)
WBC # FLD AUTO: 19.56 K/UL — HIGH (ref 3.8–10.5)

## 2023-06-09 PROCEDURE — 99232 SBSQ HOSP IP/OBS MODERATE 35: CPT

## 2023-06-09 RX ORDER — DIPHENHYDRAMINE HCL 50 MG
25 CAPSULE ORAL ONCE
Refills: 0 | Status: COMPLETED | OUTPATIENT
Start: 2023-06-09 | End: 2023-06-09

## 2023-06-09 RX ORDER — HALOPERIDOL DECANOATE 100 MG/ML
3 INJECTION INTRAMUSCULAR ONCE
Refills: 0 | Status: COMPLETED | OUTPATIENT
Start: 2023-06-09 | End: 2023-06-09

## 2023-06-09 RX ADMIN — BREXPIPRAZOLE 4 MILLIGRAM(S): 0.25 TABLET ORAL at 11:38

## 2023-06-09 RX ADMIN — Medication 25 MILLIGRAM(S): at 05:27

## 2023-06-09 RX ADMIN — HALOPERIDOL DECANOATE 3 MILLIGRAM(S): 100 INJECTION INTRAMUSCULAR at 05:27

## 2023-06-09 RX ADMIN — HALOPERIDOL DECANOATE 3 MILLIGRAM(S): 100 INJECTION INTRAMUSCULAR at 22:36

## 2023-06-09 RX ADMIN — Medication 10 MILLIGRAM(S): at 05:31

## 2023-06-09 RX ADMIN — Medication 25 MILLIGRAM(S): at 17:36

## 2023-06-09 RX ADMIN — Medication 400 MILLIGRAM(S): at 21:13

## 2023-06-09 RX ADMIN — Medication 400 MILLIGRAM(S): at 14:07

## 2023-06-09 RX ADMIN — Medication 3 MILLIGRAM(S): at 21:15

## 2023-06-09 RX ADMIN — Medication 30 MILLILITER(S): at 22:43

## 2023-06-09 RX ADMIN — Medication 1 MILLIGRAM(S): at 11:37

## 2023-06-09 RX ADMIN — HALOPERIDOL DECANOATE 3 MILLIGRAM(S): 100 INJECTION INTRAMUSCULAR at 15:04

## 2023-06-09 RX ADMIN — Medication 1 TABLET(S): at 11:38

## 2023-06-09 RX ADMIN — Medication 1 MILLIGRAM(S): at 19:27

## 2023-06-09 RX ADMIN — Medication 25 MILLIGRAM(S): at 22:36

## 2023-06-09 RX ADMIN — Medication 400 MILLIGRAM(S): at 05:30

## 2023-06-09 RX ADMIN — Medication 25 MILLIGRAM(S): at 12:09

## 2023-06-09 RX ADMIN — Medication 250 MILLIGRAM(S): at 21:13

## 2023-06-09 RX ADMIN — HALOPERIDOL DECANOATE 3 MILLIGRAM(S): 100 INJECTION INTRAMUSCULAR at 17:35

## 2023-06-09 NOTE — BH CONSULTATION LIAISON PROGRESS NOTE - NSBHASSESSMENTFT_PSY_ALL_CORE
23 year old male with PPH significant for severe autism and PMH significant for ITP presents to [facility] for severe thrombocytopenia. Patient is currently on steroid. Mother is concern for steroid induced irritability as well as increased aggression. Psych CL consulted to assist in medication management in this setting. While patient is not showing signs of overt psychosis, it is possible that the steroids is causing more irritability.    6/3: Pt calm and cooperative, eating breakfast. Per mother, pt has been doing better since Ativan started. No other concerns at this time.  6/4: Pt remains calm and cooperative. Per mother, pt with paradoxical worsening of agitation with IV administration of Ativan. Pt received PRN IM Haldol 2mg this morning with good effect.   6/5: mostly calm, no psych c/i to discharge, change meds as below, discussed with mother  6/6: intermittently agitated, received haldol 3mg IM this AM for agitation, which is driven by hyperphagia. would recommend increasing am dose of ativan to 1mg and given closer to time he wakes up -- at 6am.   6/7: multiple code BERTs overnight. remains impulsive, agitated. hyperphagic. recommend discontinuing haldol and starting thorazine 50mg IM/IV q6hr for agitation. offer haldol/benadryl IM/IV PRN, if no response, can offer thorazine. Risks/benefits including resp depression, cardiac monitoring need, etc discussed with mother   6/8: no code BERTs overnight but remains intermittently agitated. overall, behavioral control is improved but remains poor. mother continues to refuse IVIG -- however this would help optimize patient behaviorally (if used in lieu of steroids). mother also refusing unit of platelets, which was recommended by heme onc.  6/9: no code BERTs overnight. intermittently agitated. received fewer IM meds over last 24 hours. overall, somewhat improved. mother adamantly refusing thorazine; believes it worsens agitation. r/b/a discussed. agree to d/c thorazine and start ativan 1mg TID. patient likely to remain agitated on this regimen; mother made aware.      Plan:  [] Continue 1:1 for agitation  [] D/C THORAZINE 50MG IM/IV Q6HR STANDING   [ ] START ATIVAN 1MG TID STANDING FOR AGITATION (monitor for resp. depression, hypotension, QTc prolongation)  [] OFFER PRN haldol to 3mg PO/IM/IV q4hr for agitation, PLEASE GIVE WITH BENADRYL 50MG IM/IV (ENSURE IN SAME VIAL TO AVOID MULTIPLE INJECTIONS)  [] If minimal response to haldol/benadryl, can offer Thorazine 50mg IM/IV (MAX BID)  [] HOLD PRNS FOR RESPIRATORY DEPRESSION, HYPOTENSION, BRADYCARDIA  [] Would benefit from continuous pulseox, tele as patient getting many PRNs 23 year old male with PPH significant for severe autism and PMH significant for ITP presents to [facility] for severe thrombocytopenia. Patient is currently on steroid. Mother is concern for steroid induced irritability as well as increased aggression. Psych CL consulted to assist in medication management in this setting. While patient is not showing signs of overt psychosis, it is possible that the steroids is causing more irritability.    6/3: Pt calm and cooperative, eating breakfast. Per mother, pt has been doing better since Ativan started. No other concerns at this time.  6/4: Pt remains calm and cooperative. Per mother, pt with paradoxical worsening of agitation with IV administration of Ativan. Pt received PRN IM Haldol 2mg this morning with good effect.   6/5: mostly calm, no psych c/i to discharge, change meds as below, discussed with mother  6/6: intermittently agitated, received haldol 3mg IM this AM for agitation, which is driven by hyperphagia. would recommend increasing am dose of ativan to 1mg and given closer to time he wakes up -- at 6am.   6/7: multiple code BERTs overnight. remains impulsive, agitated. hyperphagic. recommend discontinuing haldol and starting thorazine 50mg IM/IV q6hr for agitation. offer haldol/benadryl IM/IV PRN, if no response, can offer thorazine. Risks/benefits including resp depression, cardiac monitoring need, etc discussed with mother   6/8: no code BERTs overnight but remains intermittently agitated. overall, behavioral control is improved but remains poor. mother continues to refuse IVIG -- however this would help optimize patient behaviorally (if used in lieu of steroids). mother also refusing unit of platelets, which was recommended by heme onc.  6/9: no code BERTs overnight. intermittently agitated. received fewer IM meds over last 24 hours. overall, somewhat improved. mother adamantly refusing thorazine; believes it worsens agitation despite ample evidence to the contrary. r/b/a discussed. agree to d/c thorazine and start ativan 1mg TID. patient likely to remain agitated on this regimen; mother made aware.      Plan:  [] Continue 1:1 for agitation  [] D/C THORAZINE 50MG IM/IV Q6HR STANDING   [ ] START ATIVAN 1MG TID STANDING FOR AGITATION (monitor for resp. depression, hypotension, QTc prolongation)  [] OFFER PRN haldol to 3mg PO/IM/IV q4hr for agitation, PLEASE GIVE WITH BENADRYL 50MG IM/IV (ENSURE IN SAME VIAL TO AVOID MULTIPLE INJECTIONS)  [] If minimal response to haldol/benadryl, can offer Thorazine 50mg IM/IV (MAX BID)  [] HOLD PRNS FOR RESPIRATORY DEPRESSION, HYPOTENSION, BRADYCARDIA  [] Would benefit from continuous pulseox, tele as patient getting many PRNs

## 2023-06-09 NOTE — PROGRESS NOTE ADULT - PROBLEM SELECTOR PLAN 1
Extensive history of chronic ITP s/p numerous treatments. Previously allergic to rituximab (anaphylaxis/serum sickness) however underwent successful desensitization in Apr 2021 and tolerated rituximab at that time. That was the last time he received rituximab.  - most recently pt was discharged on a steroid taper; was on prednisone 20 mg daily prior to coming to ED  - s/p prednisone 50 mg in ED  - CT head negative for ICH  - small 2 by 3 cm right lower abdomen bruising noted, monitor, if any flank bruising, sudden drop in Hg, or hypotension might need urgent CT angio a/p r/o RP bleeding  - Completed dexamethasone 4 day course; NPlate 5/28 and 6/5, rituxan 5/31 - repeat rituxan being given 6/7  - As not responding to steroids, hematology recommending steroid taper of 20 mg for 3 days followed by 10 mg for 3 days - may help with patient's agitation to remove steroids  - rec for IVIG but patient's mother hesitant on starting, cont discussions  - had episode of lactate elevation with unclear etiology - now normalized, blood cultures NGTD  - Hemoglobin stable

## 2023-06-09 NOTE — BH CONSULTATION LIAISON PROGRESS NOTE - NSBHATTESTCOMMENTATTENDFT_PSY_A_CORE
Patient extremely agitated despite continuous Haldol IM/IV since yesterday, discussed r/b/i of switch to thorazine standing as this is more sedating and has IM or IV forms, low risk of thrombocytopenia on thorazine as well, only a few case reports of this and patient has a known alternate etiology of his thrombocytopenia, would continue to monitor here as per hemeonc. Mother still against usage of IVIG which would be more devoid of behavioral consequences, likely still has residual agitation/hyperphagia on steroids. Given IV/IM use of antipsychotics would recheck EKG when able, try to check after getting PRNs, monitor closely for respiratory depression and EKG changes, would benefit from tele monitoring and continuous pulseox, hold parameters as above. 
patient calm when seen, per nursing patient has been more calm overall though mother has been more erratic, anxious, believes thorazine is worsening agitation despite lower # of BERTs, lower aggression overall, and ample evidence to the contrary. Counseled that resumption of ativan PO as above may worsen agitation, mother aware, also refusing IVIG and platelets despite counseling. Would prioritize expeditious discharge as patient likely to continue with behavioral dysregulation in hospital setting given baseline limited cognitive status. 
agree with above, discussed with primary team and mother, patient's agitation likely steroid induced which mother is aware of

## 2023-06-09 NOTE — PROGRESS NOTE ADULT - PROBLEM SELECTOR PLAN 2
Pt is nonverbal at baseline. Lives in group home. Pt recently had a first-time seizure last week, briefly hospitalized, determined to be adverse effect of Doptelet (avatrombopag) which is since discontinued. CTH shows no acute processes  Psych recs appreciated  - Constant observation  - 6/7 ECG with sinus tachycardia  - Thorazine 50 mg IM/IV q6  - Haldol 3 mg PO/IM/IV q4 + benadryl 50 mg IM/IV q4 (together)  - Avoid IM/IV ativan for concern for paradoxical agitation  - Continuous pulse ox ordered to monitor for oversedation as no telemetry bed available - patient removing pulse oximetry, mother prefers to keep off to avoid agitating patient    Discussed with psych , meds adjusted, ativam and benadryl, mpother refusing thorazin

## 2023-06-09 NOTE — PROVIDER CONTACT NOTE (MEDICATION) - BACKGROUND
Pt is nonverbal at baseline and admitted with thrombocytopenia, has pmhx of CARLITOS and autism.
23 male admitted on 5/26/23 for thrombocytopenia. PMHx of autism, intellectual disability, nonverbal at baseline.

## 2023-06-09 NOTE — PROVIDER CONTACT NOTE (OTHER) - RECOMMENDATIONS
MD notified. Currently attempting to get AM labs, IM Thorazine given. Unable to get EKG at this time due to patient not cooperative enough for EKG.
MD notified. Patient's mom refused at this time. There's no blood transfusion consent in the chart.
MD notified. STAT Haldol was given as order. EKG need to wait until patient become calmer.
ACP made aware, will continue to monitor.
Notify MD.
MD notified.
check back in 1 hour , and attempt to take vitals later
push medication 2hours, and see if needed at 4am
MD notification
Notify MD, come to assess patient.
as per MD, patient does note need tele at this time
awaiting recommendations
Continue to complete parts of neuro assessment that pt can complete
MD evaluation.
MD made aware that mom will sometimes not allow continuous pulse ox to be adjusted in order to get a good reading out of fear of patient waking up or being agitated
MD notification
ACP made aware, will continue to monitor.
Workup pt due to respirations and HR
ACP made aware, will continue to monitor.
ACP made aware, will continue to monitor.
ACP made aware. Will continue to monitor.
Continue to monitor pt
MD evaluation.
MD evaluation.
Notify MD.
Notify MD.
MD notified. EKG sent to MD via TEAMS.

## 2023-06-09 NOTE — CHART NOTE - NSCHARTNOTEFT_GEN_A_CORE
Pt seen for Nutrition consult (Assessment)    Medical Course: Per chart 23 year old Male w/ PMH of intellectual disability, autism, nonverbal at baseline, CARLITOS not on CPAP, severe chronic ITP, referred by his hematologist due to thrombocytopenia to 3k on outpt labs, s/p NPlate 5/28 and 6/5, rituxan 5/31.    Nutrition Course: Pt unable to participate in nutrition interview 2/2 cognitive status. Mother at bedside to provide information. Mother states that appetite has been excessive 2/2 steroid use and associated with weight gain per initial RD note 6/5. Mother states he gets agitated when he's not eating and feels like he is constantly eating. Mother has been trying to choose healthier options and low carb diet to avoid further weight gain. Multiple low calorie food preferences taken and  updated.  No GI distress noted. Had one episode of diarrhea mother thinks it was from transfusion. Noted to be receiving probiotic. No reported chewing/swallowing issues.      Diet Prescription: Diet, Regular (05-27-23 @ 03:31)    Pertinent Medications: MEDICATIONS  (STANDING):  brexpiprazole 4 milliGRAM(s) Oral daily  chlorproMAZINE    Injectable 50 milliGRAM(s) IntraMuscular every 6 hours  cimetidine 400 milliGRAM(s) Oral three times a day  lactobacillus acidophilus 1 Tablet(s) Oral daily  melatonin 3 milliGRAM(s) Oral at bedtime  multivitamin 1 Tablet(s) Oral daily  predniSONE   Tablet 10 milliGRAM(s) Oral daily  QUEtiapine 50 milliGRAM(s) Oral once  traZODone 250 milliGRAM(s) Oral at bedtime    MEDICATIONS  (PRN):  diphenhydrAMINE Injectable 25 milliGRAM(s) IV Push every 6 hours PRN Agitation  haloperidol    Injectable 3 milliGRAM(s) IV Push every 4 hours PRN Agitation  hydrocortisone sodium succinate Injectable 50 milliGRAM(s) IV Push once PRN REACTION  hydrocortisone sodium succinate Injectable 50 milliGRAM(s) IV Push once PRN REACTION MED  meperidine     Injectable 25 milliGRAM(s) IV Push once PRN PRN REACTION  meperidine     Injectable 25 milliGRAM(s) IV Push once PRN RIGORS    Pertinent Labs: 06-08 Na139 mmol/L Glu 129 mg/dL<H> K+ 4.1 mmol/L Cr  0.70 mg/dL BUN 28 mg/dL<H> 06-08 Phos 3.8 mg/dL 06-05 Alb 3.0 g/dL<L>    Weight: Height (cm): 177.8 (05-30 @ 22:16), 180.3 (04-21 @ 16:29), 180.3 (03-23 @ 08:10), 180.3 (03-06 @ 10:22)  Weight (kg): 120 (05-30 @ 14:49), 113 (04-21 @ 16:29), 111.1 (03-23 @ 08:10), 111.1 (03-06 @ 10:22)  BMI (kg/m2): 38 (05-30 @ 22:16), 36.9 (05-30 @ 14:49), 34.8 (04-21 @ 16:29), 34.2 (03-23 @ 08:10), 34.2 (03-06 @ 10:22)  Weight Assessment: No new weight to assess       Physical Assessment, per flowsheets:  Edema: +2 edema left and right leg  Skin: Wound from abscess during previous admission per wound note 6/1.      Estimated Needs:   [X] No change since previous assessment    Previous Nutrition Diagnosis: Unintended weight gain  Nutrition Diagnosis is [x ] ongoing     Education:  [ x ] Given today             Type of Education Provided: Discussed available low calorie/healthy options to provide during hospital stay.    Interventions:   1) Recommend continue regular diet to maximize menu item availability.  2) Obtain weekly weights   3) Encourage PO intake and honor food preferences as able.   4) RD available prn    Monitor & Evaluate:  PO intake, tolerance to diet/supplement, nutrition related lab values, weight trends, BMs/GI distress, hydration status, skin integrity.    [place]| [id number] (Also available on TEAMS)

## 2023-06-09 NOTE — PROVIDER CONTACT NOTE (OTHER) - ASSESSMENT
On assessment, pt. appears at baseline alert/nonverbal- pt. observed getting increasingly more agitated, swinging hands and growling, trying to bite. HR sustaining in the 130's at this time, STAT haldol/benadryl given and HR now sustaining 115's.
Patient , blood pressure 119/62, oxygen 97 on room air, temp 98.9 oral. Patient agitated, restless.
Patient blood pressure 113/61 , temp 98  oral, oxygen level 98 on room air.
Pt is asleep and appears calm, no acute distress noted at this time.
Pt is otherwise stable and resting
On assessment, pt. appears at baseline alert/nonverbal- no distress noted or voiced at this time.
Patient currently beginning to fall asleep with mom, Pca and ANM at bedside. Patient currently showing no signs and symptoms of acute distress.
Pt is asleep after getting IVP Benadryl and Haldol.
Pt is asleep and appears calm, no acute distress noted at this time.
Pt is asleep and appears calm. Pt is non-verbal. Temperature and O2 sat, and RR within normal range. No acute distress noted.
Pt is asleep.
Pt is calm, sleeping no further sedation medication given at this time
Pt is otherwise stable.
At this time pt is calm and sleeping. Mother states that she believes the thorazine IM is causing increased agitation and wants it to be d/c'ed. Mom is requesting to continue with PRNS and d/c Thorazine all together- requesting ativan PO be ordered standing.
Pt agitated and uncooperative.
Pt is asleep and appears calm. Pt is non-verbal. Temperature and O2 sat, and RR within normal range. No acute distress noted.
Pt is otherwise stable
Pt is otherwise vitally stable at this time but his breathing is shallow
-126 on .
Patient blood pressure 128/67, , temp 98.3 oral, oxygen level 96 on room air. Patient agitated.
Pt a&ox4, refusing biPAP . Aware of repercussions and aware she needs to wear it. Had it on from [id number]
On assessment, pt. appears at baseline alert/nonverbal- no distress noted or voiced at this time.
Patient sleeping, but arousable
Pt is eating and appears calm. Pt is non-verbal. Temperature and O2 sat, and RR within normal range. No acute distress noted.
-120.
Patient sleeping, but arousable- Thorazine order given 6hrs ago, and patient still sleeping.
On assessment, pt. appears at baseline- alert/nonverbal, no distress noted/voiced at this time.

## 2023-06-09 NOTE — CHART NOTE - NSCHARTNOTEFT_GEN_A_CORE
Notified by bedside RN that patient was agitated with tachycardia on telemetry.  Patient received PRN medication with some resolution of tachycardia, however still having sinus tachycardia on telemetry.  Will continue to monitor and medicate patient for agitation as discussed with behavioral medicine and attending.

## 2023-06-09 NOTE — PROVIDER CONTACT NOTE (OTHER) - ACTION/TREATMENT ORDERED:
ACP made aware, will continue to monitor.
MD states will keep order, understands complex situation with mom at bedside, will continue to have RN at bedside while awaiting transfer to unit with  monitoring
Pt safety maintained, will continue to monitor.
monitoring continue
push medication 2hours, and see if needed at 4am
ACP made aware, will continue to monitor.
ACP made aware, will continue to monitor.
MD made aware. Continue to monitor.
MD made aware. Patient need Type and screen before PLT transfusion. Can try again prior to next dose of Haldol. Will come up for the blood transfusion consent.
MD notified. MD came to assess patient. As per MD no concerns at this time.
MD notified. No concert at this time.
MD made aware. Continue to monitor. Get EKG until patient becomes calmer.
MD made aware. No new interventions at this time.
ACP made aware, will continue to monitor.
MD made aware. No new interventions ordered at this time. Continue to monitor.
Pt safety maintained, will continue to monitor.
awaiting recommendations
ACP made aware. Will continue to monitor.
MD came and assess patient. Haldol IM  will be order.
MD made aware. No concert at this time.
MD said this is fine and to continue to monitor pt at this time
MD said to continue doing the neuro assessments as much as the pt can do, will continue to monitor.
Pt safety maintained, will continue to monitor.
Pt safety maintained, will continue to monitor.
check back in 1 hour , and attempt to take vitals later
MD came to see the pt, ordered EKG, labs and chest xray, will continue to monitor pt at this time.
Pt safety maintained, will continue to monitor.

## 2023-06-09 NOTE — PROVIDER CONTACT NOTE (OTHER) - SITUATION
Pt refusing BiPAP
HR: 106, pt. asymptomatic.
Pt has BP of 101/51 and HR of 101.
Unable to get EKG d/t pt being uncooperative and agitated when attempting to get EKG.
Patient's mom refused cimetidine.
Pt has BP of 112/50 and HR of 103.
Pt's mother refusing neuro check q6hrs due at 2:00am on 6/7/2023 as pt was very agitated and fell asleep not too long ago, and mother doesn't want to wake pt up.
Pts HR is 107 and respirations are 21 at this time
mother is refusing Thorazine IM at this time- wants to continue with PRNS and d/c Thorazine all toghter- requesting ativan PO be ordered standing.
HR sustaining 130's when agitated prior to benadryl/haldol STAT administration- HR now 115, pt. appears asymptomatic.
Heartrate running between 155-120 on continuous pulse ox monitoring.
Pts HR is 112
S/P 1 hr PRN administration of STAT haldol/benadryl: HR: 120, BP: 134/57
Patient 
Pt's mother refusing V/S for bedtime as pt was very agitated and just now fell asleep, and mother doesn't want to wake pt up.
Pts HR is 102
Patient 
Patient , blood pressure 119/62, oxygen 97 on room air, temp 98.9 oral.
Pt had multiple code MAGI called for agitation, where multiple doses of haldol and ativan given. MICU consult performed. and it was suggested that if patient continue to need sedation he might have to
Pt has BP of 107/58 and HR of 101.
-126 on . Patient was calm when HR was in the 120s.
HR sustaining 120's sinus tachy, pt. appears asymptomatic
Patient is asleep after IVP Benadryl and IVP Haldol were given. Patient's mom refused us to wake up the patient for lab draw.
Pt is nonverbal and unable to complete full neuro assessment
Pt mother refusing Thorazine at this time.
Pt sleeping, Mother refused vital signs as patient arousable, and resting in trino of awaking him and possibility of agitation
patient ordered to be on continuous pulse ox, however patient mom at bedside and periodically refusing  to have continous pulse ox probe adjusted

## 2023-06-09 NOTE — PROVIDER CONTACT NOTE (OTHER) - BACKGROUND
Pt admitted for thrombocytopenia, and has Hx of autism.
Pt admitted for thrombocytopenia, has Hx of autism and CARLITOS.
Pt admitted for thrombocytopenia, has Hx of autism and CARLITOS.
Pt was admitted for thrombocytopenia
Admitting dx: thrombocytopenia
Admitting dx: thrombocytopenia
Pt Autistic, Non verbal at baseline. admitted with Thrombocytopenia Hx of aggression towards self and others. Multiple BERTS in the past
Pt admitted for thrombocytopenia.
Pt has hx of multiple BERTS, easily triggered and becomes aggressive towards self and others. Autistic, Non verbal at baseline. admitted with Thrombocytopenia
Pt was admitted for thrombocytopenia
Admitting dx: thrombocytopenia
Pt admitted for thrombocytopenia, and has Hx of autism.
patient is a 23 year year old male, hx of intellectual ability, and nonverbal at baseline, admitted for thrombocytopenia
Admitting dx: thrombocytopenia
Admitting dx: thrombocytopenia
Pt admitted for thrombocytopenia.
Pt was admitted for thrombocytopenia
Pt was admitted for thrombocytopenia, with a hx of autism
Pt admitted for thrombocytopenia
Pt admitted for thrombocytopenia w/ Hx Autism and intellectual disability.
Pt admitted for thrombocytopenia, has Hx of autism and CARLITOS.
Pt admitted for thrombocytopenia.
Pt admitted for thrombocytopenia with Hx of Autism and Intellectual disability.
Patient admitted with acute respiratory failure
Pt admitted for thrombocytopenia, has Hx of autism and CARLITOS.

## 2023-06-09 NOTE — PROGRESS NOTE ADULT - SUBJECTIVE AND OBJECTIVE BOX
Name of Patient : VINNY MOON  MRN: [id number]  Date of visit: 06-09-23       Subjective: Patient seen and examined. No new events except as noted.   mother at the bedside   agitated overnight     REVIEW OF SYSTEMS:  limited     MEDICATIONS:  MEDICATIONS  (STANDING):  brexpiprazole 4 milliGRAM(s) Oral daily  cimetidine 400 milliGRAM(s) Oral three times a day  lactobacillus acidophilus 1 Tablet(s) Oral daily  LORazepam     Tablet 1 milliGRAM(s) Oral every 8 hours  melatonin 3 milliGRAM(s) Oral at bedtime  multivitamin 1 Tablet(s) Oral daily  predniSONE   Tablet 10 milliGRAM(s) Oral daily  QUEtiapine 50 milliGRAM(s) Oral once  traZODone 250 milliGRAM(s) Oral at bedtime      PHYSICAL EXAM:  T(C): 37.3 (06-09-23 @ 18:35), Max: 37.3 (06-09-23 @ 18:35)  HR: 120 (06-09-23 @ 18:35) (75 - 132)  BP: 134/57 (06-09-23 @ 18:35) (107/56 - 138/78)  RR: 17 (06-09-23 @ 18:35) (17 - 18)  SpO2: 97% (06-09-23 @ 18:35) (95% - 97%)  Wt(kg): --  I&O's Summary        Appearance: awake, nonverbal   HEENT:  PERRLA   Lymphatic: No lymphadenopathy   Cardiovascular: Normal S1 S2, no JVD  Respiratory: normal effort , clear  Gastrointestinal:  Soft, Non-tender  Skin: No rashes,  warm to touch  Psychiatry:  Mood & affect appropriate  Musculuskeletal: No edema    recent labs, Imaging and EKGs personally reviewed                           12.1   19.56 )-----------( 10       ( 09 Jun 2023 05:55 )             39.2               06-08    139  |  103  |  28<H>  ----------------------------<  129<H>  4.1   |  23  |  0.70    Ca    8.6      08 Jun 2023 07:50  Phos  3.8     06-08  Mg     2.10     06-08

## 2023-06-10 LAB
ANION GAP SERPL CALC-SCNC: 12 MMOL/L — SIGNIFICANT CHANGE UP (ref 7–14)
BUN SERPL-MCNC: 27 MG/DL — HIGH (ref 7–23)
CALCIUM SERPL-MCNC: 8.6 MG/DL — SIGNIFICANT CHANGE UP (ref 8.4–10.5)
CHLORIDE SERPL-SCNC: 103 MMOL/L — SIGNIFICANT CHANGE UP (ref 98–107)
CLOSURE TME COLL+EPINEP BLD: 6 K/UL — CRITICAL LOW (ref 150–400)
CO2 SERPL-SCNC: 24 MMOL/L — SIGNIFICANT CHANGE UP (ref 22–31)
CREAT SERPL-MCNC: 0.87 MG/DL — SIGNIFICANT CHANGE UP (ref 0.5–1.3)
EGFR: 124 ML/MIN/1.73M2 — SIGNIFICANT CHANGE UP
GLUCOSE SERPL-MCNC: 79 MG/DL — SIGNIFICANT CHANGE UP (ref 70–99)
HCT VFR BLD CALC: 37.9 % — LOW (ref 39–50)
HGB BLD-MCNC: 11.8 G/DL — LOW (ref 13–17)
MAGNESIUM SERPL-MCNC: 1.8 MG/DL — SIGNIFICANT CHANGE UP (ref 1.6–2.6)
MCHC RBC-ENTMCNC: 26.4 PG — LOW (ref 27–34)
MCHC RBC-ENTMCNC: 31.1 GM/DL — LOW (ref 32–36)
MCV RBC AUTO: 84.8 FL — SIGNIFICANT CHANGE UP (ref 80–100)
NRBC # BLD: 0 /100 WBCS — SIGNIFICANT CHANGE UP (ref 0–0)
NRBC # FLD: 0.04 K/UL — HIGH (ref 0–0)
PHOSPHATE SERPL-MCNC: 4.4 MG/DL — SIGNIFICANT CHANGE UP (ref 2.5–4.5)
PLATELET # BLD AUTO: 4 K/UL — CRITICAL LOW (ref 150–400)
POTASSIUM SERPL-MCNC: 4 MMOL/L — SIGNIFICANT CHANGE UP (ref 3.5–5.3)
POTASSIUM SERPL-SCNC: 4 MMOL/L — SIGNIFICANT CHANGE UP (ref 3.5–5.3)
RBC # BLD: 4.47 M/UL — SIGNIFICANT CHANGE UP (ref 4.2–5.8)
RBC # FLD: 17.4 % — HIGH (ref 10.3–14.5)
SODIUM SERPL-SCNC: 139 MMOL/L — SIGNIFICANT CHANGE UP (ref 135–145)
WBC # BLD: 16.73 K/UL — HIGH (ref 3.8–10.5)
WBC # FLD AUTO: 16.73 K/UL — HIGH (ref 3.8–10.5)

## 2023-06-10 RX ORDER — DIPHENHYDRAMINE HCL 50 MG
25 CAPSULE ORAL EVERY 6 HOURS
Refills: 0 | Status: DISCONTINUED | OUTPATIENT
Start: 2023-06-10 | End: 2023-06-12

## 2023-06-10 RX ORDER — HALOPERIDOL DECANOATE 100 MG/ML
3 INJECTION INTRAMUSCULAR EVERY 4 HOURS
Refills: 0 | Status: DISCONTINUED | OUTPATIENT
Start: 2023-06-10 | End: 2023-06-12

## 2023-06-10 RX ADMIN — Medication 1 MILLIGRAM(S): at 03:58

## 2023-06-10 RX ADMIN — Medication 1 TABLET(S): at 14:15

## 2023-06-10 RX ADMIN — Medication 1.5 MILLIGRAM(S): at 14:13

## 2023-06-10 RX ADMIN — Medication 400 MILLIGRAM(S): at 14:34

## 2023-06-10 RX ADMIN — Medication 1.5 MILLIGRAM(S): at 21:07

## 2023-06-10 RX ADMIN — BREXPIPRAZOLE 4 MILLIGRAM(S): 0.25 TABLET ORAL at 14:13

## 2023-06-10 RX ADMIN — HALOPERIDOL DECANOATE 3 MILLIGRAM(S): 100 INJECTION INTRAMUSCULAR at 05:14

## 2023-06-10 RX ADMIN — Medication 400 MILLIGRAM(S): at 21:07

## 2023-06-10 RX ADMIN — Medication 25 MILLIGRAM(S): at 04:38

## 2023-06-10 NOTE — BH CONSULTATION LIAISON PROGRESS NOTE - NSICDXBHPRIMARYDX_PSY_ALL_CORE
Intellectual disability   F79  

## 2023-06-10 NOTE — BH CONSULTATION LIAISON PROGRESS NOTE - NSBHCHARTREVIEWVS_PSY_A_CORE FT
Vital Signs Last 24 Hrs  T(C): 36.8 (09 Jun 2023 06:27), Max: 37 (08 Jun 2023 17:30)  T(F): 98.2 (09 Jun 2023 06:27), Max: 98.6 (08 Jun 2023 17:30)  HR: 98 (09 Jun 2023 06:27) (75 - 106)  BP: 138/77 (09 Jun 2023 06:27) (108/59 - 138/78)  BP(mean): --  RR: 18 (09 Jun 2023 06:27) (17 - 18)  SpO2: 96% (09 Jun 2023 06:27) (95% - 97%)    Parameters below as of 09 Jun 2023 06:27  Patient On (Oxygen Delivery Method): room air    
Vital Signs Last 24 Hrs  T(C): 36.5 (03 Jun 2023 06:26), Max: 36.8 (02 Jun 2023 16:18)  T(F): 97.7 (03 Jun 2023 06:26), Max: 98.2 (02 Jun 2023 16:18)  HR: 90 (03 Jun 2023 06:26) (90 - 102)  BP: 132/68 (03 Jun 2023 06:26) (116/71 - 132/68)  BP(mean): --  RR: 17 (03 Jun 2023 06:26) (17 - 18)  SpO2: 98% (03 Jun 2023 06:26) (96% - 98%)    Parameters below as of 03 Jun 2023 06:26  Patient On (Oxygen Delivery Method): room air    
Vital Signs Last 24 Hrs  T(C): 37 (10 Bhaskar 2023 05:10), Max: 37.3 (09 Jun 2023 18:35)  T(F): 98.6 (10 Bhaskar 2023 05:10), Max: 99.2 (09 Jun 2023 18:35)  HR: 110 (10 Bhaskar 2023 05:10) (92 - 132)  BP: 117/71 (09 Jun 2023 21:08) (107/56 - 134/57)  BP(mean): --  RR: 19 (10 Bhaskar 2023 05:10) (17 - 19)  SpO2: 97% (10 Bhaskar 2023 05:10) (94% - 97%)    Parameters below as of 10 Bhaskar 2023 05:10  Patient On (Oxygen Delivery Method): room air    
Vital Signs Last 24 Hrs  T(C): 36.4 (06 Jun 2023 09:00), Max: 37 (05 Jun 2023 15:09)  T(F): 97.5 (06 Jun 2023 09:00), Max: 98.6 (05 Jun 2023 15:09)  HR: 92 (06 Jun 2023 09:00) (92 - 100)  BP: 131/72 (06 Jun 2023 09:00) (110/74 - 131/72)  BP(mean): --  RR: 17 (06 Jun 2023 09:00) (17 - 18)  SpO2: 100% (06 Jun 2023 09:00) (100% - 100%)    Parameters below as of 06 Jun 2023 09:00  Patient On (Oxygen Delivery Method): room air    
Vital Signs Last 24 Hrs  T(C): 36.5 (08 Jun 2023 10:00), Max: 36.9 (07 Jun 2023 12:30)  T(F): 97.7 (08 Jun 2023 10:00), Max: 98.5 (07 Jun 2023 12:30)  HR: 96 (08 Jun 2023 10:00) (96 - 115)  BP: 121/67 (08 Jun 2023 10:00) (105/63 - 142/74)  BP(mean): --  RR: 17 (08 Jun 2023 10:00) (17 - 18)  SpO2: 96% (08 Jun 2023 10:00) (95% - 100%)    Parameters below as of 08 Jun 2023 10:00  Patient On (Oxygen Delivery Method): room air    
Vital Signs Last 24 Hrs  T(C): 36.8 (04 Jun 2023 09:15), Max: 36.8 (04 Jun 2023 09:15)  T(F): 98.3 (04 Jun 2023 09:15), Max: 98.3 (04 Jun 2023 09:15)  HR: 107 (04 Jun 2023 09:15) (96 - 112)  BP: 128/67 (04 Jun 2023 09:15) (101/51 - 133/70)  BP(mean): --  RR: 18 (04 Jun 2023 09:15) (16 - 19)  SpO2: 96% (04 Jun 2023 09:15) (94% - 100%)    Parameters below as of 04 Jun 2023 09:15  Patient On (Oxygen Delivery Method): room air    
Vital Signs Last 24 Hrs  T(C): 36.6 (05 Jun 2023 11:30), Max: 37.2 (05 Jun 2023 10:30)  T(F): 97.9 (05 Jun 2023 11:30), Max: 98.9 (05 Jun 2023 10:30)  HR: 100 (05 Jun 2023 11:30) (95 - 110)  BP: 117/65 (05 Jun 2023 11:30) (112/50 - 129/61)  BP(mean): --  RR: 18 (05 Jun 2023 11:30) (18 - 18)  SpO2: 100% (05 Jun 2023 11:30) (98% - 100%)    Parameters below as of 05 Jun 2023 11:30  Patient On (Oxygen Delivery Method): room air    
Vital Signs Last 24 Hrs  T(C): 36.9 (07 Jun 2023 08:00), Max: 37 (06 Jun 2023 15:00)  T(F): 98.5 (07 Jun 2023 08:00), Max: 98.6 (06 Jun 2023 15:00)  HR: 89 (07 Jun 2023 08:00) (82 - 89)  BP: 118/72 (07 Jun 2023 08:00) (115/61 - 118/72)  BP(mean): --  RR: 18 (07 Jun 2023 08:00) (18 - 19)  SpO2: 100% (07 Jun 2023 08:00) (100% - 100%)    Parameters below as of 07 Jun 2023 08:00  Patient On (Oxygen Delivery Method): room air

## 2023-06-10 NOTE — BH CONSULTATION LIAISON PROGRESS NOTE - CURRENT MEDICATION
MEDICATIONS  (STANDING):  brexpiprazole 4 milliGRAM(s) Oral daily  cimetidine 400 milliGRAM(s) Oral three times a day  LORazepam     Tablet 0.5 milliGRAM(s) Oral three times a day  melatonin 3 milliGRAM(s) Oral at bedtime  multivitamin 1 Tablet(s) Oral daily  predniSONE   Tablet 40 milliGRAM(s) Oral daily  traZODone 250 milliGRAM(s) Oral at bedtime    MEDICATIONS  (PRN):  diphenhydrAMINE Injectable 25 milliGRAM(s) IV Push once PRN PRN REACTION  haloperidol    Injectable 2 milliGRAM(s) IntraMuscular every 6 hours PRN Agitation  hydrocortisone sodium succinate Injectable 50 milliGRAM(s) IV Push once PRN REACTION MED  meperidine     Injectable 25 milliGRAM(s) IV Push once PRN PRN REACTION  
MEDICATIONS  (STANDING):  brexpiprazole 4 milliGRAM(s) Oral daily  cimetidine 400 milliGRAM(s) Oral three times a day  lactobacillus acidophilus 1 Tablet(s) Oral daily  LORazepam     Tablet 0.5 milliGRAM(s) Oral <User Schedule>  LORazepam     Tablet 1 milliGRAM(s) Oral <User Schedule>  melatonin 3 milliGRAM(s) Oral at bedtime  multivitamin 1 Tablet(s) Oral daily  predniSONE   Tablet 20 milliGRAM(s) Oral daily  traZODone 250 milliGRAM(s) Oral at bedtime    MEDICATIONS  (PRN):  diphenhydrAMINE Injectable 25 milliGRAM(s) IV Push once PRN PRN REACTION  haloperidol    Injectable 3 milliGRAM(s) IntraMuscular every 6 hours PRN Agitation  hydrocortisone sodium succinate Injectable 50 milliGRAM(s) IV Push once PRN REACTION MED  meperidine     Injectable 25 milliGRAM(s) IV Push once PRN PRN REACTION  
MEDICATIONS  (STANDING):  brexpiprazole 4 milliGRAM(s) Oral daily  cimetidine 400 milliGRAM(s) Oral three times a day  LORazepam     Tablet 0.5 milliGRAM(s) Oral three times a day  multivitamin 1 Tablet(s) Oral daily  predniSONE   Tablet 40 milliGRAM(s) Oral daily  traZODone 250 milliGRAM(s) Oral at bedtime    MEDICATIONS  (PRN):  diphenhydrAMINE Injectable 25 milliGRAM(s) IV Push once PRN PRN REACTION  hydrocortisone sodium succinate Injectable 50 milliGRAM(s) IV Push once PRN REACTION MED  LORazepam   Injectable 1 milliGRAM(s) IV Push every 6 hours PRN Agitation  meperidine     Injectable 25 milliGRAM(s) IV Push once PRN PRN REACTION  
MEDICATIONS  (STANDING):  brexpiprazole 4 milliGRAM(s) Oral daily  cimetidine 400 milliGRAM(s) Oral three times a day  lactobacillus acidophilus 1 Tablet(s) Oral daily  LORazepam     Tablet 0.5 milliGRAM(s) Oral <User Schedule>  LORazepam     Tablet 1 milliGRAM(s) Oral <User Schedule>  melatonin 3 milliGRAM(s) Oral at bedtime  multivitamin 1 Tablet(s) Oral daily  predniSONE   Tablet 40 milliGRAM(s) Oral daily  traZODone 250 milliGRAM(s) Oral at bedtime    MEDICATIONS  (PRN):  diphenhydrAMINE Injectable 25 milliGRAM(s) IV Push once PRN PRN REACTION  haloperidol    Injectable 3 milliGRAM(s) IntraMuscular every 6 hours PRN Agitation  hydrocortisone sodium succinate Injectable 50 milliGRAM(s) IV Push once PRN REACTION MED  meperidine     Injectable 25 milliGRAM(s) IV Push once PRN PRN REACTION  
MEDICATIONS  (STANDING):  brexpiprazole 4 milliGRAM(s) Oral daily  cimetidine 400 milliGRAM(s) Oral three times a day  lactobacillus acidophilus 1 Tablet(s) Oral daily  LORazepam     Tablet 1 milliGRAM(s) Oral every 8 hours  melatonin 3 milliGRAM(s) Oral at bedtime  multivitamin 1 Tablet(s) Oral daily  predniSONE   Tablet 10 milliGRAM(s) Oral daily  QUEtiapine 50 milliGRAM(s) Oral once  traZODone 250 milliGRAM(s) Oral at bedtime    MEDICATIONS  (PRN):  diphenhydrAMINE Injectable 25 milliGRAM(s) IV Push every 6 hours PRN Agitation  haloperidol    Injectable 3 milliGRAM(s) IV Push every 4 hours PRN Agitation  hydrocortisone sodium succinate Injectable 50 milliGRAM(s) IV Push once PRN REACTION  hydrocortisone sodium succinate Injectable 50 milliGRAM(s) IV Push once PRN REACTION MED  meperidine     Injectable 25 milliGRAM(s) IV Push once PRN PRN REACTION  meperidine     Injectable 25 milliGRAM(s) IV Push once PRN RIGORS  
MEDICATIONS  (STANDING):  brexpiprazole 4 milliGRAM(s) Oral daily  chlorproMAZINE    Injectable 50 milliGRAM(s) IntraMuscular every 6 hours  cimetidine 400 milliGRAM(s) Oral three times a day  lactobacillus acidophilus 1 Tablet(s) Oral daily  melatonin 3 milliGRAM(s) Oral at bedtime  multivitamin 1 Tablet(s) Oral daily  predniSONE   Tablet 20 milliGRAM(s) Oral daily  QUEtiapine 50 milliGRAM(s) Oral once  traZODone 250 milliGRAM(s) Oral at bedtime    MEDICATIONS  (PRN):  diphenhydrAMINE Injectable 25 milliGRAM(s) IV Push every 6 hours PRN Agitation  haloperidol    Injectable 3 milliGRAM(s) IV Push every 4 hours PRN Agitation  hydrocortisone sodium succinate Injectable 50 milliGRAM(s) IV Push once PRN REACTION  hydrocortisone sodium succinate Injectable 50 milliGRAM(s) IV Push once PRN REACTION MED  meperidine     Injectable 25 milliGRAM(s) IV Push once PRN PRN REACTION  meperidine     Injectable 25 milliGRAM(s) IV Push once PRN RIGORS  
MEDICATIONS  (STANDING):  brexpiprazole 4 milliGRAM(s) Oral daily  cimetidine 400 milliGRAM(s) Oral three times a day  lactobacillus acidophilus 1 Tablet(s) Oral daily  LORazepam     Tablet 1 milliGRAM(s) Oral every 8 hours  melatonin 3 milliGRAM(s) Oral at bedtime  multivitamin 1 Tablet(s) Oral daily  predniSONE   Tablet 10 milliGRAM(s) Oral daily  QUEtiapine 50 milliGRAM(s) Oral once  traZODone 250 milliGRAM(s) Oral at bedtime    MEDICATIONS  (PRN):  aluminum hydroxide/magnesium hydroxide/simethicone Suspension 30 milliLiter(s) Oral every 6 hours PRN Dyspepsia  diphenhydrAMINE Injectable 25 milliGRAM(s) IV Push every 6 hours PRN Agitation  haloperidol    Injectable 3 milliGRAM(s) IV Push every 4 hours PRN Agitation  hydrocortisone sodium succinate Injectable 50 milliGRAM(s) IV Push once PRN REACTION  hydrocortisone sodium succinate Injectable 50 milliGRAM(s) IV Push once PRN REACTION MED  meperidine     Injectable 25 milliGRAM(s) IV Push once PRN PRN REACTION  meperidine     Injectable 25 milliGRAM(s) IV Push once PRN RIGORS  
MEDICATIONS  (STANDING):  brexpiprazole 4 milliGRAM(s) Oral daily  cimetidine 400 milliGRAM(s) Oral three times a day  lactobacillus acidophilus 1 Tablet(s) Oral daily  melatonin 3 milliGRAM(s) Oral at bedtime  multivitamin 1 Tablet(s) Oral daily  predniSONE   Tablet 20 milliGRAM(s) Oral daily  QUEtiapine 50 milliGRAM(s) Oral once  [id number] (RUXIENCE) IVPB (Non - oncologic) 880 milliGRAM(s) IV Intermittent once  traZODone 250 milliGRAM(s) Oral at bedtime    MEDICATIONS  (PRN):  diphenhydrAMINE Injectable 25 milliGRAM(s) IV Push every 6 hours PRN Agitation  haloperidol    Injectable 5 milliGRAM(s) IV Push every 6 hours PRN Agitation  hydrocortisone sodium succinate Injectable 50 milliGRAM(s) IV Push once PRN REACTION  hydrocortisone sodium succinate Injectable 50 milliGRAM(s) IV Push once PRN REACTION MED  meperidine     Injectable 25 milliGRAM(s) IV Push once PRN PRN REACTION  meperidine     Injectable 25 milliGRAM(s) IV Push once PRN RIGORS

## 2023-06-10 NOTE — BH CONSULTATION LIAISON PROGRESS NOTE - MSE UNSTRUCTURED FT
Limited MSE:  Calm and cooperative  Eyes track to voice  Obese male- calm, no psychomotor agitation at time of exam  No speech  
Limited MSE:  Calm and cooperative  Eyes track to voice  Obese male- calm, no psychomotor agitation at time of exam  No speech  
Limited MSE:  agitated   Eyes track to voice  Obese male  No speech  
Limited MSE:  Calm and cooperative  Eyes track to voice  Obese male- calm, no psychomotor agitation at time of exam  No speech  
Limited MSE:  agitated, intermittent yelling, SIB  Eyes track to voice  Obese male  No speech  
Limited MSE:  agitated, intermittent yelling  Eyes track to voice  Obese male- calm, no psychomotor agitation at time of exam  No speech  
Limited MSE:  Calm and cooperative  Eyes track to voice  Obese male- calm, no psychomotor agitation at time of exam  No speech  
Limited MSE:  agitated   Eyes track to voice  Obese male  No speech

## 2023-06-10 NOTE — BH CONSULTATION LIAISON PROGRESS NOTE - NSBHFUPINTERVALHXFT_PSY_A_CORE
Chart reviewed. Adherent w/ medications. Multiple code BERTs overnight due to agitation. Patient received multiple agitation IMs, including Haldol, Ativan, and Benadryl. MICU consulted overnight. Started Haldol 5mg IV q6hr. Ativan discontinued. Patient seen at the bedside. Remains agitated, yelling, tugging at mother. Per mother, patient remains hyperphagic, constantly demanding food, becoming violent when he does not receive food. Mother believes Ativan was not helpful for son; thinks Haldol may have helped. Discussed discontinuing Haldol and starting Thorazine due to this medication's sedative effects. Mother and primary team in agreement.       
Chart reviewed. Adherent w/ medications. Received Haldol 3mg IM 6/5 3pm and 6/6 8am. Patient tracking writer, per mother patient hyperphagic still, irritable which she attributes to steroids (was in usual state of health prior ). Says agitation, including this morning, driven by increased appetite. When doesn't receive food, becomes angry and combative. Medication changes d/w mother -- she is requesting ativan be given earlier for behavioral control.   
Chart reviewed. Adherent w/ medications. No code BERTs overnight but patient received Haldol 3mg IM/Benadryl 25mg IM at 530am for agitation. Overall, patient in improved behavioral control, however, remains frequently agitated. Mother refused Thorazine overnight; she believes it is making him more agitated. Despite documented evidence that behavioral control has improved, she believes he is more combative. She requests discontinuation of Thorazine and would like patient to receive Ativan PO standing throughout the day. After extensive discussionsof risks and benefits, psychiatry, primary team, and mother in agreement to d/c Thorazine and start Ativan.       
Chart reviewed. Adherent with medications. Haldol/Benadryl PRNs given for agitation ON. Patient seen, mother present. This am, mother reports patient with episodes of agitation overnight but that somewhat improved (to her assessment) with current mgmt. Pt appearing alert and interacting with mother (feeding him). Mother reports patient has had inc appetite but that Ativan has helped him rest more during the day (hence eating less). Feels he would benefit from a further increase. Also reports that the IV patient has may be coming out today, states that patient tolerates IM medication in the event that this occurs also feeling higher dosage of Haldol is manageable. Continues to await updates regarding PLT status, but expresses desire to remain with current treatment plan overall.      
NAEO. Pt took all standing medications, no additional PRNs required. Psychiatry consulted for agitation.    Pt seen eating breakfast this am, looks at writer when addressed, though nonverbal, so hx limited. Mother at bedside who provided hx. Per mother, pt has been much calmer since standing Ativan has been ordered. He had no issues overnight, no episodes of agitation. No other  concerns at this time. 
DONNAO. Pt took all standing medications. Pt received PRN Ativan 1mg IV at 3:40AM, received PRN Haldol 2mg IM at 9:16AM.     Pt seen eating breakfast this am, looks at writer when addressed, though nonverbal, so hx limited. Mother at bedside who provided hx. Per mother, pt has been much calmer since standing Ativan has been ordered, though mother has concerns that IV administration of Ativan causes paradoxical worsening of agitation for patient. Mother states PRN haldol has worked very well for the patient in the past, but was held due to concern for contribution to thrombocytopenia (per mother). Pt received PRN Haldol this morning with good effect. 
Chart reviewed. Adherent w/ medications. No code BERTs overnight but patient received multiple rounds of agitation IMs. Patient seen at the bedside. Initially calm, occupied watching iPad. Becomes increasingly agitated, starts to punch self in the head. Security called at request of mother. Received Haldol 3mg/Benadryl 25mg IM. Per mother, patient remains hyperphagic. Benefit of IVIG rather than steroids d/w mother. Continues to refuse IVIG, as well as unit of platelets.       
patient tracking writer, able to shake hand, per mother patient hyperphagic still, irritable which she attributes to steroids (was in usual state of health prior )    discussed med changes with mother at length, risks/benefits of increase in ativan and haldol PRN discussed, low risk of thrombocytopenia which is due to different process, amenable to changes

## 2023-06-10 NOTE — BH CONSULTATION LIAISON PROGRESS NOTE - NSBHADMITIPOBS_PSY_A_CORE
Constant observation

## 2023-06-10 NOTE — PROGRESS NOTE ADULT - SUBJECTIVE AND OBJECTIVE BOX
Patient is a 23y old  Male who presents with a chief complaint of thrombocytopenia (09 Jun 2023 11:21)    Pt seen and examined at bedside.     MEDICATIONS  (STANDING):  brexpiprazole 4 milliGRAM(s) Oral daily  cimetidine 400 milliGRAM(s) Oral three times a day  lactobacillus acidophilus 1 Tablet(s) Oral daily  LORazepam     Tablet 1 milliGRAM(s) Oral every 8 hours  melatonin 3 milliGRAM(s) Oral at bedtime  multivitamin 1 Tablet(s) Oral daily  predniSONE   Tablet 10 milliGRAM(s) Oral daily  QUEtiapine 50 milliGRAM(s) Oral once  traZODone 250 milliGRAM(s) Oral at bedtime    MEDICATIONS  (PRN):  aluminum hydroxide/magnesium hydroxide/simethicone Suspension 30 milliLiter(s) Oral every 6 hours PRN Dyspepsia  diphenhydrAMINE Injectable 25 milliGRAM(s) IV Push every 6 hours PRN Agitation  haloperidol    Injectable 3 milliGRAM(s) IV Push every 4 hours PRN Agitation  hydrocortisone sodium succinate Injectable 50 milliGRAM(s) IV Push once PRN REACTION  hydrocortisone sodium succinate Injectable 50 milliGRAM(s) IV Push once PRN REACTION MED  meperidine     Injectable 25 milliGRAM(s) IV Push once PRN PRN REACTION  meperidine     Injectable 25 milliGRAM(s) IV Push once PRN RIGORS    Vital Signs Last 24 Hrs  T(C): 37 (10 Bhaskar 2023 05:10), Max: 37.3 (09 Jun 2023 18:35)  T(F): 98.6 (10 Bhaskar 2023 05:10), Max: 99.2 (09 Jun 2023 18:35)  HR: 110 (10 Bhaskar 2023 05:10) (92 - 132)  BP: 117/71 (09 Jun 2023 21:08) (107/56 - 134/57)  BP(mean): --  RR: 19 (10 Bhaskar 2023 05:10) (17 - 19)  SpO2: 97% (10 Bhaskar 2023 05:10) (94% - 97%)    Parameters below as of 10 Bhaskar 2023 05:10  Patient On (Oxygen Delivery Method): room air        PE  NAD  Awake, alert  Anicteric, MMM  No c/c/e  No rash grossly  FROM                          12.1   19.56 )-----------( 10       ( 09 Jun 2023 05:55 )             39.2                Patient is a 23y old  Male who presents with a chief complaint of thrombocytopenia (09 Jun 2023 11:21)    Pt seen and examined at bedside. Appears calm, mother at bedside.    MEDICATIONS  (STANDING):  brexpiprazole 4 milliGRAM(s) Oral daily  cimetidine 400 milliGRAM(s) Oral three times a day  lactobacillus acidophilus 1 Tablet(s) Oral daily  LORazepam     Tablet 1 milliGRAM(s) Oral every 8 hours  melatonin 3 milliGRAM(s) Oral at bedtime  multivitamin 1 Tablet(s) Oral daily  predniSONE   Tablet 10 milliGRAM(s) Oral daily  QUEtiapine 50 milliGRAM(s) Oral once  traZODone 250 milliGRAM(s) Oral at bedtime    MEDICATIONS  (PRN):  aluminum hydroxide/magnesium hydroxide/simethicone Suspension 30 milliLiter(s) Oral every 6 hours PRN Dyspepsia  diphenhydrAMINE Injectable 25 milliGRAM(s) IV Push every 6 hours PRN Agitation  haloperidol    Injectable 3 milliGRAM(s) IV Push every 4 hours PRN Agitation  hydrocortisone sodium succinate Injectable 50 milliGRAM(s) IV Push once PRN REACTION  hydrocortisone sodium succinate Injectable 50 milliGRAM(s) IV Push once PRN REACTION MED  meperidine     Injectable 25 milliGRAM(s) IV Push once PRN PRN REACTION  meperidine     Injectable 25 milliGRAM(s) IV Push once PRN RIGORS    Vital Signs Last 24 Hrs  T(C): 37 (10 Bhaskar 2023 05:10), Max: 37.3 (09 Jun 2023 18:35)  T(F): 98.6 (10 Bhaskar 2023 05:10), Max: 99.2 (09 Jun 2023 18:35)  HR: 110 (10 Bhaskar 2023 05:10) (92 - 132)  BP: 117/71 (09 Jun 2023 21:08) (107/56 - 134/57)  BP(mean): --  RR: 19 (10 Bhaskar 2023 05:10) (17 - 19)  SpO2: 97% (10 Bhaskar 2023 05:10) (94% - 97%)    Parameters below as of 10 Bhaskar 2023 05:10  Patient On (Oxygen Delivery Method): room air        PE  NAD  Awake, alert  Anicteric, MMM  No c/c/e  No rash grossly  FROM                          12.1   19.56 )-----------( 10       ( 09 Jun 2023 05:55 )             39.2

## 2023-06-10 NOTE — BH CONSULTATION LIAISON PROGRESS NOTE - NSBHATTESTTYPEVISIT_PSY_A_CORE
Attending Only
Resident/Fellow with telephonic supervision
Attending with Resident/Fellow/Student
Resident/Fellow with telephonic supervision
Attending with Resident/Fellow/Student
Attending with Resident/Fellow/Student

## 2023-06-10 NOTE — BH CONSULTATION LIAISON PROGRESS NOTE - ATTENDING COMMENTS
Chart reviewed. Discussed with resident. Agree w/ above assessment/recs. Will continue to follow. 
Chart reviewed. Discussed case with resident. Agree with above assessment/recs. Will continue to follow.
agree with plan as above, hold ativan for resp. depression or hypotension 
case reviewed, less PRNs and no code BERTs, would continue plan as is, would continue to encourage mother to allow IVIG as this would be more behavioral neutral than steroids, psych will follow

## 2023-06-10 NOTE — BH CONSULTATION LIAISON PROGRESS NOTE - NSBHINDICATION_PSY_ALL_CORE
for impulsivity 

## 2023-06-10 NOTE — BH CONSULTATION LIAISON PROGRESS NOTE - NSBHCHARTREVIEWLAB_PSY_A_CORE FT
12.6   20.61 )-----------( 8        ( 04 Jun 2023 06:03 )             40.7   06-04    137  |  101  |  36<H>  ----------------------------<  79  4.4   |  26  |  0.81    Ca    8.0<L>      04 Jun 2023 06:03  Phos  3.5     06-04  Mg     1.90     06-04  
06-03    131<L>  |  105  |  34<H>  ----------------------------<  88  4.2   |  24  |  0.77    Ca    8.1<L>      03 Jun 2023 07:55  Phos  3.0     06-03  Mg     1.80     06-03                          13.4   19.23 )-----------( 7        ( 03 Jun 2023 07:55 )             43.3   
                      12.6   20.61 )-----------( 8        ( 04 Jun 2023 06:03 )             40.7   06-04    137  |  101  |  36<H>  ----------------------------<  79  4.4   |  26  |  0.81    Ca    8.0<L>      04 Jun 2023 06:03  Phos  3.5     06-04  Mg     1.90     06-04  
                      12.6   20.61 )-----------( 8        ( 04 Jun 2023 06:03 )             40.7   06-04    137  |  101  |  36<H>  ----------------------------<  79  4.4   |  26  |  0.81    Ca    8.0<L>      04 Jun 2023 06:03  Phos  3.5     06-04  Mg     1.90     06-04

## 2023-06-10 NOTE — BH CONSULTATION LIAISON PROGRESS NOTE - NSBHFUPINTERVALCCFT_PSY_A_CORE
pt nonverbal

## 2023-06-10 NOTE — BH CONSULTATION LIAISON PROGRESS NOTE - NSBHCONSULTPRIMARYDISCUSSYES_PSY_A_CORE FT
discussed changing PRN from Ativan to Haldol 

## 2023-06-10 NOTE — BH CONSULTATION LIAISON PROGRESS NOTE - NSBHPTASSESSDT_PSY_A_CORE
08-Jun-2023 11:09
09-Jun-2023 11:48
04-Jun-2023 12:52
05-Jun-2023 14:36
03-Jun-2023 11:54
10-Bhaskar-2023 11:44
06-Jun-2023 11:32
07-Jun-2023 11:53

## 2023-06-10 NOTE — PROGRESS NOTE ADULT - SUBJECTIVE AND OBJECTIVE BOX
Name of Patient : VINNY MOON  MRN: [id number]  Date of visit: 06-10-23       Subjective: Patient seen and examined. No new events except as noted.     REVIEW OF SYSTEMS:  limited     MEDICATIONS:  MEDICATIONS  (STANDING):  brexpiprazole 4 milliGRAM(s) Oral daily  cimetidine 400 milliGRAM(s) Oral three times a day  lactobacillus acidophilus 1 Tablet(s) Oral daily  LORazepam     Tablet 1.5 milliGRAM(s) Oral every 8 hours  melatonin 3 milliGRAM(s) Oral at bedtime  multivitamin 1 Tablet(s) Oral daily  predniSONE   Tablet 10 milliGRAM(s) Oral daily  QUEtiapine 50 milliGRAM(s) Oral once  traZODone 250 milliGRAM(s) Oral at bedtime      PHYSICAL EXAM:  T(C): 36.4 (06-10-23 @ 15:45), Max: 37 (06-10-23 @ 05:10)  HR: 109 (06-10-23 @ 15:45) (108 - 110)  BP: 104/51 (06-10-23 @ 15:45) (104/51 - 117/71)  RR: 18 (06-10-23 @ 15:45) (18 - 19)  SpO2: 97% (06-10-23 @ 15:45) (94% - 97%)  Wt(kg): --  I&O's Summary        Appearance: Normal	  HEENT:  PERRLA   Lymphatic: No lymphadenopathy   Cardiovascular: Normal S1 S2, no JVD  Respiratory: normal effort , clear  Gastrointestinal:  Soft, Non-tender  Skin: No rashes,  warm to touch  Psychiatry:  Mood & affect appropriate  Musculuskeletal: No edema    recent labs, Imaging and EKGs personally reviewed                             11.8   16.73 )-----------( 4        ( 10 Bhaskar 2023 12:00 )             37.9               06-10    139  |  103  |  27<H>  ----------------------------<  79  4.0   |  24  |  0.87    Ca    8.6      10 Bhaskar 2023 09:18  Phos  4.4     06-10  Mg     1.80     06-10

## 2023-06-10 NOTE — PROGRESS NOTE ADULT - ASSESSMENT
VINNY MOON is a 23y Male who presents with a chief complaint of thrombocytopenia.    Immune Thrombocytopenia  ·	Patient follows with Dr. Rosaura Bhagat, [facility].  ·	Previously treated with glucocorticoids, IVIG, rituximab, avatrombopag.   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 Continue prednisone 10mg x 3 days then discontinue tomorrow.  ·	Rituximab administered 05/31 and 06/07. Next dose due 06/14.  ·	Romiplostim weekly, currently at 6 mcg/kcg. 3rd dose administered 06/05.  ·	Continue to monitor platelets closely.  ·	May need to consider alternative therapies including fostamatinib or other immunosuppressive agents as an outpatient. Dr. Bhagat discussed starting tavalisse as well, his mother is agreeable.  ·	Patient's mother continues to decline IVIG - this may work more rapidly than the above treatments   ·	Awaiting repeat CBC today.    Will continue to follow.    Get Cota PA-C  Hematology/Oncology  [facility]  [phone] (office) VINNY MOON is a 23y Male who presents with a chief complaint of thrombocytopenia.    Immune Thrombocytopenia  ·	Patient follows with Dr. Rosaura Bhagat, [facility].  ·	Previously treated with glucocorticoids, IVIG, rituximab, avatrombopag.   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 Continue prednisone 10mg x 3 days then discontinue tomorrow.  ·	Rituximab administered 05/31 and 06/07. Next dose due 06/14.  ·	Romiplostim weekly, currently at 6 mcg/kcg. 3rd dose administered 06/05.  ·	Continue to monitor platelets closely.  ·	May need to consider alternative therapies including fostamatinib or other immunosuppressive agents as an outpatient. Dr. Bhagat discussed starting tavalisse as well, his mother is agreeable.  ·	Patient's mother continues to decline IVIG - this may work more rapidly than the above treatments   ·	Awaiting repeat CBC today, as initial labs clotted.    Will continue to follow.    Get Cota PA-C  Hematology/Oncology  [facility]  [phone] (office)

## 2023-06-10 NOTE — BH CONSULTATION LIAISON PROGRESS NOTE - NSBHCONSULTFOLLOWAFTERCARE_PSY_A_CORE FT
Return to group home once medically stable

## 2023-06-10 NOTE — BH CONSULTATION LIAISON PROGRESS NOTE - NSBHCONSFOLLOWNEEDS_PSY_ALL_CORE
No psychiatric contraindications to discharge

## 2023-06-11 LAB
ANION GAP SERPL CALC-SCNC: 16 MMOL/L — HIGH (ref 7–14)
BUN SERPL-MCNC: 22 MG/DL — SIGNIFICANT CHANGE UP (ref 7–23)
CALCIUM SERPL-MCNC: 8.5 MG/DL — SIGNIFICANT CHANGE UP (ref 8.4–10.5)
CHLORIDE SERPL-SCNC: 98 MMOL/L — SIGNIFICANT CHANGE UP (ref 98–107)
CO2 SERPL-SCNC: 23 MMOL/L — SIGNIFICANT CHANGE UP (ref 22–31)
CREAT SERPL-MCNC: 0.78 MG/DL — SIGNIFICANT CHANGE UP (ref 0.5–1.3)
EGFR: 128 ML/MIN/1.73M2 — SIGNIFICANT CHANGE UP
GLUCOSE SERPL-MCNC: 105 MG/DL — HIGH (ref 70–99)
HCT VFR BLD CALC: 38.3 % — LOW (ref 39–50)
HGB BLD-MCNC: 12.2 G/DL — LOW (ref 13–17)
MAGNESIUM SERPL-MCNC: 1.7 MG/DL — SIGNIFICANT CHANGE UP (ref 1.6–2.6)
MCHC RBC-ENTMCNC: 26.9 PG — LOW (ref 27–34)
MCHC RBC-ENTMCNC: 31.9 GM/DL — LOW (ref 32–36)
MCV RBC AUTO: 84.5 FL — SIGNIFICANT CHANGE UP (ref 80–100)
NRBC # BLD: 0 /100 WBCS — SIGNIFICANT CHANGE UP (ref 0–0)
NRBC # FLD: 0 K/UL — SIGNIFICANT CHANGE UP (ref 0–0)
PHOSPHATE SERPL-MCNC: 3.4 MG/DL — SIGNIFICANT CHANGE UP (ref 2.5–4.5)
PLATELET # BLD AUTO: 3 K/UL — CRITICAL LOW (ref 150–400)
POTASSIUM SERPL-MCNC: 3.9 MMOL/L — SIGNIFICANT CHANGE UP (ref 3.5–5.3)
POTASSIUM SERPL-SCNC: 3.9 MMOL/L — SIGNIFICANT CHANGE UP (ref 3.5–5.3)
RBC # BLD: 4.53 M/UL — SIGNIFICANT CHANGE UP (ref 4.2–5.8)
RBC # FLD: 17.5 % — HIGH (ref 10.3–14.5)
SODIUM SERPL-SCNC: 137 MMOL/L — SIGNIFICANT CHANGE UP (ref 135–145)
WBC # BLD: 16.69 K/UL — HIGH (ref 3.8–10.5)
WBC # FLD AUTO: 16.69 K/UL — HIGH (ref 3.8–10.5)

## 2023-06-11 RX ORDER — ROMIPLOSTIM 250 UG/.5ML
720 INJECTION, POWDER, LYOPHILIZED, FOR SOLUTION SUBCUTANEOUS ONCE
Refills: 0 | Status: COMPLETED | OUTPATIENT
Start: 2023-06-12 | End: 2023-06-12

## 2023-06-11 RX ADMIN — BREXPIPRAZOLE 4 MILLIGRAM(S): 0.25 TABLET ORAL at 13:33

## 2023-06-11 RX ADMIN — Medication 400 MILLIGRAM(S): at 21:08

## 2023-06-11 RX ADMIN — Medication 1.5 MILLIGRAM(S): at 21:08

## 2023-06-11 RX ADMIN — Medication 400 MILLIGRAM(S): at 06:29

## 2023-06-11 RX ADMIN — Medication 10 MILLIGRAM(S): at 06:30

## 2023-06-11 RX ADMIN — Medication 1.5 MILLIGRAM(S): at 06:30

## 2023-06-11 RX ADMIN — Medication 1 TABLET(S): at 13:32

## 2023-06-11 RX ADMIN — Medication 400 MILLIGRAM(S): at 13:31

## 2023-06-11 RX ADMIN — Medication 250 MILLIGRAM(S): at 22:35

## 2023-06-11 RX ADMIN — Medication 1.5 MILLIGRAM(S): at 13:32

## 2023-06-11 NOTE — PROGRESS NOTE ADULT - PROBLEM SELECTOR PLAN 2
Pt is nonverbal at baseline. Lives in group home. Pt recently had a first-time seizure last week, briefly hospitalized, determined to be adverse effect of Doptelet (avatrombopag) which is since discontinued. CTH shows no acute processes  Psych recs appreciated  - Constant observation  - 6/7 ECG with sinus tachycardia  - Thorazine 50 mg IM/IV q6, on hold  - Avoid IM/IV ativan for concern for paradoxical agitation  - Continuous pulse ox ordered to monitor for oversedation as no telemetry bed available - patient removing pulse oximetry, mother prefers to keep off to avoid agitating patient    Discussed with psych , meds adjusted, ativan and benadryl, mother refusing thorazin

## 2023-06-11 NOTE — PROGRESS NOTE ADULT - SUBJECTIVE AND OBJECTIVE BOX
Name of Patient : VINNY MOON  MRN: [id number]  Date of visit: 06-11-23 @ 14:49      Subjective: Patient seen and examined. No new events except as noted.   Calm  mother at the bedside     REVIEW OF SYSTEMS:  limited     MEDICATIONS:  MEDICATIONS  (STANDING):  brexpiprazole 4 milliGRAM(s) Oral daily  cimetidine 400 milliGRAM(s) Oral three times a day  lactobacillus acidophilus 1 Tablet(s) Oral daily  LORazepam     Tablet 1.5 milliGRAM(s) Oral every 8 hours  melatonin 3 milliGRAM(s) Oral at bedtime  multivitamin 1 Tablet(s) Oral daily  QUEtiapine 50 milliGRAM(s) Oral once  traZODone 250 milliGRAM(s) Oral at bedtime      PHYSICAL EXAM:  T(C): 36.8 (06-11-23 @ 13:00), Max: 36.9 (06-10-23 @ 21:00)  HR: 110 (06-11-23 @ 13:00) (92 - 110)  BP: 129/66 (06-11-23 @ 13:00) (104/51 - 129/66)  RR: 18 (06-11-23 @ 13:00) (17 - 18)  SpO2: 97% (06-11-23 @ 13:00) (95% - 97%)  Wt(kg): --  I&O's Summary        Appearance: calm, nonverbal   HEENT:  PERRLA   Lymphatic: No lymphadenopathy   Cardiovascular: Normal S1 S2, no JVD  Respiratory: normal effort , clear  Gastrointestinal:  Soft, Non-tender  Skin: No rashes,  warm to touch  Psychiatry:  Mood & affect appropriate  Musculuskeletal: No edema    recent labs, Imaging and EKGs personally reviewed                           12.2   16.69 )-----------( 3        ( 11 Jun 2023 10:00 )             38.3               06-11    137  |  98  |  22  ----------------------------<  105<H>  3.9   |  23  |  0.78    Ca    8.5      11 Jun 2023 10:00  Phos  3.4     06-11  Mg     1.70     06-11

## 2023-06-11 NOTE — PROGRESS NOTE ADULT - PROBLEM SELECTOR PLAN 1
Extensive history of chronic ITP s/p numerous treatments. Previously allergic to rituximab (anaphylaxis/serum sickness) however underwent successful desensitization in Apr 2021 and tolerated rituximab at that time. That was the last time he received rituximab.  - most recently pt was discharged on a steroid taper; was on prednisone 20 mg daily prior to coming to ED  - s/p prednisone 50 mg in ED  - CT head negative for ICH  - small 2 by 3 cm right lower abdomen bruising noted, monitor, if any flank bruising, sudden drop in Hg, or hypotension might need urgent CT angio a/p r/o RP bleeding  - Completed dexamethasone 4 day course; NPlate 5/28 and 6/5, rituxan 5/31 - repeat rituxan being given 6/7  - Complete steroid taper   - rec for IVIG but patient's mother hesitant on starting, cont discussions  - had episode of lactate elevation with unclear etiology - now normalized, blood cultures NGTD  - Hemoglobin stable

## 2023-06-11 NOTE — PROGRESS NOTE ADULT - SUBJECTIVE AND OBJECTIVE BOX
Patient is a 23y old  Male who presents with a chief complaint of thrombocytopenia (10 Bhaskar 2023 12:29)    Pt seen and examined at bedside.     MEDICATIONS  (STANDING):  brexpiprazole 4 milliGRAM(s) Oral daily  cimetidine 400 milliGRAM(s) Oral three times a day  lactobacillus acidophilus 1 Tablet(s) Oral daily  LORazepam     Tablet 1.5 milliGRAM(s) Oral every 8 hours  melatonin 3 milliGRAM(s) Oral at bedtime  multivitamin 1 Tablet(s) Oral daily  QUEtiapine 50 milliGRAM(s) Oral once  traZODone 250 milliGRAM(s) Oral at bedtime    MEDICATIONS  (PRN):  aluminum hydroxide/magnesium hydroxide/simethicone Suspension 30 milliLiter(s) Oral every 6 hours PRN Dyspepsia  diphenhydrAMINE Injectable 25 milliGRAM(s) IV Push every 6 hours PRN Agitation  diphenhydrAMINE Injectable 25 milliGRAM(s) IntraMuscular every 6 hours PRN agitation  haloperidol    Injectable 3 milliGRAM(s) IV Push every 4 hours PRN Agitation  haloperidol    Injectable 3 milliGRAM(s) IntraMuscular every 4 hours PRN Agitation  hydrocortisone sodium succinate Injectable 50 milliGRAM(s) IV Push once PRN REACTION  hydrocortisone sodium succinate Injectable 50 milliGRAM(s) IV Push once PRN REACTION MED  meperidine     Injectable 25 milliGRAM(s) IV Push once PRN PRN REACTION  meperidine     Injectable 25 milliGRAM(s) IV Push once PRN RIGORS      Vital Signs Last 24 Hrs  T(C): 36.5 (11 Jun 2023 06:26), Max: 36.9 (10 Bhaskar 2023 21:00)  T(F): 97.7 (11 Jun 2023 06:26), Max: 98.5 (10 Bhaskar 2023 21:00)  HR: 92 (11 Jun 2023 06:26) (92 - 109)  BP: 106/64 (11 Jun 2023 06:26) (104/51 - 107/60)  BP(mean): --  RR: 18 (11 Jun 2023 06:26) (17 - 18)  SpO2: 95% (11 Jun 2023 06:26) (95% - 97%)    Parameters below as of 11 Jun 2023 06:26  Patient On (Oxygen Delivery Method): room air        PE  NAD  Awake, alert  Anicteric, MMM  No c/c/e  No rash grossly  FROM                          11.8   16.73 )-----------( 4        ( 10 Bhaskar 2023 12:00 )             37.9       06-10    139  |  103  |  27<H>  ----------------------------<  79  4.0   |  24  |  0.87    Ca    8.6      10 Bhaskar 2023 09:18  Phos  4.4     06-10  Mg     1.80     06-10

## 2023-06-11 NOTE — PROGRESS NOTE ADULT - ASSESSMENT
VINNY MOON is a 23y Male who presents with a chief complaint of thrombocytopenia.    Immune Thrombocytopenia  ·	Patient follows with Dr. Rosaura Bhagat, [facility].  ·	Previously treated with glucocorticoids, IVIG, rituximab, avatrombopag.   ·	Refractory thrombocytopenia - has been on glucocorticoids with no significant response for a month; completed prednisone taper.  ·	Rituximab administered 05/31 and 06/07. Next dose due 06/14.  ·	Romiplostim weekly, currently at 6 mcg/kcg. 3rd dose administered 06/05. One dose ordered for tomorrow, 6/12.  ·	Continue to monitor platelets closely.  ·	May need to consider alternative therapies including fostamatinib or other immunosuppressive agents as an outpatient. Dr. Bhagat discussed starting tavalisse as well, his mother is agreeable.  ·	Patient's mother continues to decline IVIG - this may work more rapidly than the above treatments   ·	Platelets 4k on 6/10. Awaiting repeat CBC today.    Will continue to follow.    Get Cota PA-C  Hematology/Oncology  [facility]  [phone] (office) VINNY MOON is a 23y Male who presents with a chief complaint of thrombocytopenia.    Immune Thrombocytopenia  ·	Patient follows with Dr. Rosaura Bhagat, [facility].  ·	Previously treated with glucocorticoids, IVIG, rituximab, avatrombopag.   ·	Refractory thrombocytopenia - has been on glucocorticoids with no significant response for a month; completed prednisone taper.  ·	Rituximab administered 05/31 and 06/07. Next dose due 06/14.  ·	Romiplostim weekly, currently at 6 mcg/kcg. 3rd dose administered 06/05. One dose ordered for tomorrow, 6/12.  ·	Continue to monitor platelets closely.  ·	May need to consider alternative therapies including fostamatinib or other immunosuppressive agents as an outpatient. Dr. Bhagat discussed starting tavalisse as well, his mother is agreeable.  ·	Patient's mother continues to decline IVIG - this may work more rapidly than the above treatments   ·	Platelets 3k today.    Will continue to follow.    Get Cota PA-C  Hematology/Oncology  [facility]  [phone] (office)

## 2023-06-12 VITALS
HEART RATE: 115 BPM | OXYGEN SATURATION: 98 % | TEMPERATURE: 98 F | DIASTOLIC BLOOD PRESSURE: 81 MMHG | RESPIRATION RATE: 19 BRPM | SYSTOLIC BLOOD PRESSURE: 150 MMHG

## 2023-06-12 LAB
ANION GAP SERPL CALC-SCNC: 11 MMOL/L — SIGNIFICANT CHANGE UP (ref 7–14)
BUN SERPL-MCNC: 26 MG/DL — HIGH (ref 7–23)
CALCIUM SERPL-MCNC: 8.5 MG/DL — SIGNIFICANT CHANGE UP (ref 8.4–10.5)
CHLORIDE SERPL-SCNC: 104 MMOL/L — SIGNIFICANT CHANGE UP (ref 98–107)
CLOSURE TME COLL+EPINEP BLD: 3 K/UL — CRITICAL LOW (ref 150–400)
CO2 SERPL-SCNC: 24 MMOL/L — SIGNIFICANT CHANGE UP (ref 22–31)
CREAT SERPL-MCNC: 0.87 MG/DL — SIGNIFICANT CHANGE UP (ref 0.5–1.3)
EGFR: 124 ML/MIN/1.73M2 — SIGNIFICANT CHANGE UP
GLUCOSE SERPL-MCNC: 86 MG/DL — SIGNIFICANT CHANGE UP (ref 70–99)
HCT VFR BLD CALC: 37.8 % — LOW (ref 39–50)
HGB BLD-MCNC: 11.7 G/DL — LOW (ref 13–17)
MAGNESIUM SERPL-MCNC: 2 MG/DL — SIGNIFICANT CHANGE UP (ref 1.6–2.6)
MCHC RBC-ENTMCNC: 26.5 PG — LOW (ref 27–34)
MCHC RBC-ENTMCNC: 31 GM/DL — LOW (ref 32–36)
MCV RBC AUTO: 85.7 FL — SIGNIFICANT CHANGE UP (ref 80–100)
NRBC # BLD: 0 /100 WBCS — SIGNIFICANT CHANGE UP (ref 0–0)
NRBC # FLD: 0 K/UL — SIGNIFICANT CHANGE UP (ref 0–0)
PHOSPHATE SERPL-MCNC: 4.5 MG/DL — SIGNIFICANT CHANGE UP (ref 2.5–4.5)
PLATELET # BLD AUTO: 4 K/UL — CRITICAL LOW (ref 150–400)
POTASSIUM SERPL-MCNC: 4 MMOL/L — SIGNIFICANT CHANGE UP (ref 3.5–5.3)
POTASSIUM SERPL-SCNC: 4 MMOL/L — SIGNIFICANT CHANGE UP (ref 3.5–5.3)
RBC # BLD: 4.41 M/UL — SIGNIFICANT CHANGE UP (ref 4.2–5.8)
RBC # FLD: 17.5 % — HIGH (ref 10.3–14.5)
SARS-COV-2 RNA SPEC QL NAA+PROBE: SIGNIFICANT CHANGE UP
SODIUM SERPL-SCNC: 139 MMOL/L — SIGNIFICANT CHANGE UP (ref 135–145)
WBC # BLD: 12.05 K/UL — HIGH (ref 3.8–10.5)
WBC # FLD AUTO: 12.05 K/UL — HIGH (ref 3.8–10.5)

## 2023-06-12 RX ORDER — ROMIPLOSTIM 250 UG/.5ML
250 INJECTION, POWDER, LYOPHILIZED, FOR SOLUTION SUBCUTANEOUS ONCE
Refills: 0 | Status: COMPLETED | OUTPATIENT
Start: 2023-06-12 | End: 2023-06-12

## 2023-06-12 RX ORDER — CIMETIDINE 200 MG
1 TABLET ORAL
Refills: 0 | DISCHARGE

## 2023-06-12 RX ORDER — ROMIPLOSTIM 250 UG/.5ML
240 INJECTION, POWDER, LYOPHILIZED, FOR SOLUTION SUBCUTANEOUS ONCE
Refills: 0 | Status: DISCONTINUED | OUTPATIENT
Start: 2023-06-12 | End: 2023-06-12

## 2023-06-12 RX ORDER — LACTOBACILLUS ACIDOPHILUS 100MM CELL
1 CAPSULE ORAL
Qty: 20 | Refills: 0
Start: 2023-06-12 | End: 2023-07-01

## 2023-06-12 RX ORDER — CIMETIDINE 200 MG
1 TABLET ORAL
Qty: 60 | Refills: 0
Start: 2023-06-12 | End: 2023-07-01

## 2023-06-12 RX ORDER — ALPRAZOLAM 0.25 MG
1 TABLET ORAL
Qty: 15 | Refills: 0
Start: 2023-06-12 | End: 2023-06-16

## 2023-06-12 RX ADMIN — ROMIPLOSTIM 250 MICROGRAM(S): 250 INJECTION, POWDER, LYOPHILIZED, FOR SOLUTION SUBCUTANEOUS at 12:25

## 2023-06-12 RX ADMIN — Medication 1 TABLET(S): at 13:08

## 2023-06-12 RX ADMIN — Medication 1.5 MILLIGRAM(S): at 05:51

## 2023-06-12 RX ADMIN — Medication 400 MILLIGRAM(S): at 05:51

## 2023-06-12 RX ADMIN — Medication 400 MILLIGRAM(S): at 13:11

## 2023-06-12 RX ADMIN — ROMIPLOSTIM 720 MICROGRAM(S): 250 INJECTION, POWDER, LYOPHILIZED, FOR SOLUTION SUBCUTANEOUS at 09:32

## 2023-06-12 RX ADMIN — BREXPIPRAZOLE 4 MILLIGRAM(S): 0.25 TABLET ORAL at 13:09

## 2023-06-12 NOTE — PROVIDER CONTACT NOTE (CRITICAL VALUE NOTIFICATION) - SITUATION
Pt PLT 7
RN received critical result call from hematology about platelet level of 8.
Platelet: 7
Platelet automated- 9,  Platelet clotted
Platelet, blue 12
RN received critical result call from hematology about platelet level of 11.
provider made aware lab called with critical : platelet 6
Platelet: 6
Platelet 7
Platelet of 6 in the blue top
PLT count is 5
Platelet 4
Platelets on blue top: 3
Pts Platelets on blue top is 9 and platelet on CBC is 12
RN received critical result call from hematology about platelet level of 11.
provider made aware lab called with critical : Blue platelet 6
provider made aware lab called with critical : CBC platelet 4
provider made aware lab called with critical : platelet 4
Platelet 2
Pts Platelets on blue top is 12
platelets 3

## 2023-06-12 NOTE — PROVIDER CONTACT NOTE (CRITICAL VALUE NOTIFICATION) - ASSESSMENT
Platelet, blue 12
Pt appears calm, no acute distress noted at this time.
On assessment, pt. appears at baseline- no s/s of distress noted or voiced. No s/s of bleeding noted.
Platelet of 6 in the blue top
On assessment, pt. appears at baseline- no s/s of distress noted or voiced. No s/s of bleeding noted.
PLT 7
Platelet 7
no s&s of active visible bleeding.
pt alert, no acute distress noted
On assessment, pt. appears at baseline- no s/s of distress noted or voiced. No s/s of bleeding noted.
Patient nonverbal
Pt is resting and otherwise stable at this time
Pt appears calm, no acute distress noted at this time.
Pt is no sxs of acute distress. No active bleeding at this time.
Pt is resting and otherwise stable at this time
On assessment, pt. appears at baseline- no s/s of distress noted or voiced. No s/s of bleeding noted.
Patient  with no active visible bleeding noted.
Platelets on blue top: 3
Pt appears calm, no acute distress noted at this time.

## 2023-06-12 NOTE — PROGRESS NOTE ADULT - SUBJECTIVE AND OBJECTIVE BOX
Patient is a 23y old  Male who presents with a chief complaint of thrombocytopenia (11 Jun 2023 14:49)    Patient seen this morning, with mom at bedside. He is calm, no new issues. Platelet count remains low.    MEDICATIONS  (STANDING):  brexpiprazole 4 milliGRAM(s) Oral daily  cimetidine 400 milliGRAM(s) Oral three times a day  lactobacillus acidophilus 1 Tablet(s) Oral daily  LORazepam     Tablet 1.5 milliGRAM(s) Oral every 8 hours  melatonin 3 milliGRAM(s) Oral at bedtime  multivitamin 1 Tablet(s) Oral daily  QUEtiapine 50 milliGRAM(s) Oral once  traZODone 250 milliGRAM(s) Oral at bedtime    MEDICATIONS  (PRN):  aluminum hydroxide/magnesium hydroxide/simethicone Suspension 30 milliLiter(s) Oral every 6 hours PRN Dyspepsia  diphenhydrAMINE Injectable 25 milliGRAM(s) IntraMuscular every 6 hours PRN agitation  diphenhydrAMINE Injectable 25 milliGRAM(s) IV Push every 6 hours PRN Agitation  haloperidol    Injectable 3 milliGRAM(s) IntraMuscular every 4 hours PRN Agitation  haloperidol    Injectable 3 milliGRAM(s) IV Push every 4 hours PRN Agitation  hydrocortisone sodium succinate Injectable 50 milliGRAM(s) IV Push once PRN REACTION  hydrocortisone sodium succinate Injectable 50 milliGRAM(s) IV Push once PRN REACTION MED  meperidine     Injectable 25 milliGRAM(s) IV Push once PRN PRN REACTION  meperidine     Injectable 25 milliGRAM(s) IV Push once PRN RIGORS        Vital Signs Last 24 Hrs  T(C): 36.7 (12 Jun 2023 05:48), Max: 37.1 (11 Jun 2023 16:37)  T(F): 98 (12 Jun 2023 05:48), Max: 98.7 (11 Jun 2023 16:37)  HR: 89 (12 Jun 2023 05:48) (89 - 115)  BP: 133/78 (12 Jun 2023 05:48) (123/74 - 133/78)  BP(mean): --  RR: 17 (12 Jun 2023 05:48) (17 - 18)  SpO2: 95% (12 Jun 2023 05:48) (95% - 97%)    Parameters below as of 12 Jun 2023 05:48  Patient On (Oxygen Delivery Method): room air        PE  calm ,NAD   Anicteric, MMM  No c/c/e  No rash grossly  FROM                          11.7   12.05 )-----------( 4        ( 12 Jun 2023 06:19 )             37.8       06-12    139  |  104  |  26<H>  ----------------------------<  86  4.0   |  24  |  0.87    Ca    8.5      12 Jun 2023 06:19  Phos  4.5     06-12  Mg     2.00     06-12

## 2023-06-12 NOTE — PROVIDER CONTACT NOTE (CRITICAL VALUE NOTIFICATION) - NAME OF MD/NP/PA/DO NOTIFIED:
Dionne Jeong MD
Dr Marcel Silveira
Dr. Dionne Jeong
MD Dionne Jeong
MD. Dionne Ponce
Amadeo Cárdenas
Amilcar Serrano
Dionne Jeong MD
TREVON Savage
Dr Marcel Silveira
Erick Granger
Irving Durham
Jean-Paul Loaiza
Kranthi Canales
Kranthi Canales
MD. Dionne Ponce
Erick Granger
TREVON Duque [id number]
TREVON [place]
ACP Jinny Bourne
Dionne Jeong MD

## 2023-06-12 NOTE — PROVIDER CONTACT NOTE (CRITICAL VALUE NOTIFICATION) - RECOMMENDATIONS
Continue to monitor pt
MD notified of critical value.
MD notify.
Notify MD
Notify MD
Continue to monitor pt
MD evaluation.
Notify MD
ACP made aware, will continue to monitor at this time.
As per ACP, continue to monitor at this time.
No new order.
ACP made aware, will continue to monitor at this time.
ACP made aware, will continue to monitor at this time.
NNO
MD notify.
Notify MD
Notify MD
ACP made aware, will continue to monitor at this time.
ACP made aware, will continue to monitor at this time.
MD to evaluate pt. PLT transfusion. Continue to monitor pt.
No new recommendation at this time.

## 2023-06-12 NOTE — PROGRESS NOTE ADULT - PROVIDER SPECIALTY LIST ADULT
Heme/Onc
Internal Medicine
Internal Medicine
Heme/Onc
Internal Medicine
Heme/Onc
Internal Medicine

## 2023-06-12 NOTE — PROGRESS NOTE ADULT - PROBLEM SELECTOR PROBLEM 1
Chronic ITP (idiopathic thrombocytopenia)

## 2023-06-12 NOTE — PROGRESS NOTE ADULT - SUBJECTIVE AND OBJECTIVE BOX
Name of Patient : VINNY MOON  MRN: [id number]  Date of visit: 06-12-23       Subjective: Patient seen and examined. No new events except as noted.   Doing okay     REVIEW OF SYSTEMS:    CONSTITUTIONAL: No weakness, fevers or chills  EYES/ENT: No visual changes;  No vertigo or throat pain   NECK: No pain or stiffness  RESPIRATORY: No cough, wheezing, hemoptysis; No shortness of breath  CARDIOVASCULAR: No chest pain or palpitations  GASTROINTESTINAL: No abdominal or epigastric pain. No nausea, vomiting, or hematemesis; No diarrhea or constipation. No melena or hematochezia.  GENITOURINARY: No dysuria, frequency or hematuria  NEUROLOGICAL: No numbness or weakness  SKIN: No itching, burning, rashes, or lesions   All other review of systems is negative unless indicated above.    MEDICATIONS:  MEDICATIONS  (STANDING):  brexpiprazole 4 milliGRAM(s) Oral daily  cimetidine 400 milliGRAM(s) Oral three times a day  lactobacillus acidophilus 1 Tablet(s) Oral daily  LORazepam     Tablet 1.5 milliGRAM(s) Oral every 8 hours  melatonin 3 milliGRAM(s) Oral at bedtime  multivitamin 1 Tablet(s) Oral daily  QUEtiapine 50 milliGRAM(s) Oral once  traZODone 250 milliGRAM(s) Oral at bedtime      PHYSICAL EXAM:  T(C): 36.7 (06-12-23 @ 13:33), Max: 36.8 (06-11-23 @ 21:00)  HR: 115 (06-12-23 @ 13:33) (89 - 115)  BP: 150/81 (06-12-23 @ 13:33) (125/82 - 150/81)  RR: 19 (06-12-23 @ 13:33) (17 - 19)  SpO2: 98% (06-12-23 @ 13:33) (95% - 98%)  Wt(kg): --  I&O's Summary      Appearance: Normal	  HEENT:  PERRLA   Lymphatic: No lymphadenopathy   Cardiovascular: Normal S1 S2, no JVD  Respiratory: normal effort , clear  Gastrointestinal:  Soft, Non-tender  Skin: No rashes,  warm to touch  Psychiatry:  Mood & affect appropriate  Musculuskeletal: No edema    recent labs, Imaging and EKGs personally reviewed                           11.7 12.05 )-----------( 4        ( 12 Jun 2023 06:19 )             37.8               06-12    139  |  104  |  26<H>  ----------------------------<  86  4.0   |  24  |  0.87    Ca    8.5      12 Jun 2023 06:19  Phos  4.5     06-12  Mg     2.00     06-12

## 2023-06-12 NOTE — PROGRESS NOTE ADULT - PROBLEM SELECTOR PROBLEM 2
Intellectual disability

## 2023-06-12 NOTE — PROVIDER CONTACT NOTE (CRITICAL VALUE NOTIFICATION) - TEST AND RESULT REPORTED:
Platelet
Platelet on Blue top is 12
Platelet level of 8
Blue platelet 6
CBC platelet 4
Platelet
Platelet 3
Platelet level of 4 on blue top
Platelet: 6
Platelets on blue top: 3
Platelet: 7
PLT count is 5
Platelet 4
Platelet level of 11
PLT 7
Platelet 7
Platelet, blue 12
platelets 3
Platelet automated- 9
Platelet of 6 in the blue top
Platelet on Blue top is 9 and platelet on CBC is 12

## 2023-06-12 NOTE — PROVIDER CONTACT NOTE (CRITICAL VALUE NOTIFICATION) - PERSON GIVING RESULT:
Ann-Marie White
[place], Daina
[facility], Ada. Bea
Azucena RHODES
[facility]- Steve BAUTISTA
[facility]- Steve BAUTISTA
NICK Zeng
TERESA Wilson/Hazel
JANUSZ Fierro
NICK Zeng
Arnie BIGGS
NICK Zeng
Hematology
[facility]- NICK Zeng
Hematology S, Anali
Maxim, N
NICK Zeng
Hematology Lab RM
CARMELO Shah
[facility], [place] [facility]
Hematology/ Jordin, L

## 2023-06-12 NOTE — PROVIDER CONTACT NOTE (CRITICAL VALUE NOTIFICATION) - NS PROVIDER READ BACK TO LAB
yes
Platelet level of 8/yes
yes
Platelet 4/yes
yes
Platelet level of 11/yes

## 2023-06-12 NOTE — PROGRESS NOTE ADULT - PROBLEM SELECTOR PROBLEM 3
Wound of skin

## 2023-06-12 NOTE — PROVIDER CONTACT NOTE (CRITICAL VALUE NOTIFICATION) - BACKGROUND
Pt admitted for thrombocytopenia
Admitting dx: thrombocytopenia
Pt admitted for thrombocytopenia, and has Hx of autism and CARLITOS.
Admitting dx: thrombocytopenia
Pt admitted with thrombocytopenia and is nonverbal at baseline, pmhx of CARLITOS and autism.
Patient admitted for thrombocytopenia
Pt was admitted for thrombocytopenia
Patient with DX of thrombocytopenia.
Pt admitted for Thrombocytopenia.
Pt admitted for Thrombocytopenia.
Admitting dx: thrombocytopenia
Patient admitted for ITP
Admitting dx: thrombocytopenia
Pt admitted for Thrombocytopenia.
Admitting dx: thrombocytopenia
Admitting dx: thrombocytopenia
Patient with DX of thrombocytopenia, hx of CARLITOS.
Pt admitted for thrombocytopenia, and has Hx of autism and CARLITOS.
Pt admitted for thrombocytopenia, and has Hx of autism and CARLITOS.
Pt was admitted for thrombocytopenia
Pt admitted for thrombocytopenia.

## 2023-06-12 NOTE — PROVIDER CONTACT NOTE (CRITICAL VALUE NOTIFICATION) - ACTION/TREATMENT ORDERED:
As per ACP, continue to monitor at this time.
MD notified. No orders at this time.
MD said that she is aware and to continue to monitor pt at this time.
MD awaiting hematology recommendations.
Pt safety maintained, will continue to monitor.
MD notified. No orders at this time.
No new order.
ACP made aware, will continue to monitor at this time.
NNO
ACP made aware, will continue to monitor at this time. pending orders
MD said that she will let hematology know and to continue to monitor pt
MD made aware
MD made aware
MD made aware. No concert at this time. No new orders at this time.
MD made aware. No concert at this time. No new orders at this time.
MD notified. No orders at this time.
ACP made aware, will continue to monitor at this time. pending orders
MD made aware. Platelet remained stable from yesterday. No new interventions at this time.
ACP made aware, will continue to monitor at this time. pending orders
ACP made aware, will continue to monitor at this time. pending orders
No new order.

## 2023-06-12 NOTE — PROGRESS NOTE ADULT - REASON FOR ADMISSION
thrombocytopenia

## 2023-06-12 NOTE — PROGRESS NOTE ADULT - PROBLEM SELECTOR PLAN 1
Extensive history of chronic ITP s/p numerous treatments. Previously allergic to rituximab (anaphylaxis/serum sickness) however underwent successful desensitization in Apr 2021 and tolerated rituximab at that time. That was the last time he received rituximab.  - most recently pt was discharged on a steroid taper; was on prednisone 20 mg daily prior to coming to ED  - s/p prednisone 50 mg in ED  - CT head negative for ICH  - small 2 by 3 cm right lower abdomen bruising noted, monitor, if any flank bruising, sudden drop in Hg, or hypotension might need urgent CT angio a/p r/o RP bleeding  - Completed dexamethasone 4 day course; NPlate 5/28 and 6/5, rituxan 5/31 - repeat rituxan being given 6/7  - Complete steroid taper   - rec for IVIG but patient's mother hesitant on starting, cont discussions  - had episode of lactate elevation with unclear etiology - now normalized, blood cultures NGTD  - Hemoglobin stable    discussed with patient's mother andhematology. no further inpatient W/U, plan for DC planing with outpatient follow up with heme for third ritux dose. motherin agreement   advised to return if any gross bleeding or any mental stat changes

## 2023-06-12 NOTE — PROGRESS NOTE ADULT - NS ATTEND OPT1 GEN_ALL_CORE

## 2023-06-12 NOTE — PROGRESS NOTE ADULT - ASSESSMENT
VINNY MOON is a 23y Male who presents with a chief complaint of thrombocytopenia.    Immune Thrombocytopenia  ·	Patient follows with Dr. Rosaura Bhagat, [facility].  ·	Previously treated with glucocorticoids, IVIG, rituximab, avatrombopag.   ·	Refractory thrombocytopenia - has been on glucocorticoids with no significant response for a month; completed prednisone taper.  ·	Rituximab administered 05/31 and 06/07. Next dose due 06/14.  ·	Romiplostim weekly, currently at 6 mcg/kcg. 3rd dose administered 06/05. Received same dose today, but would titrate up- additional 2mcg/kg ordered for today before discharge   ·	Continue to monitor platelets closely.  ·	May need to consider alternative therapies including fostamatinib or other immunosuppressive agents as an outpatient. Dr. Bhagat discussed starting tavalisse as well, his mother is agreeable.  ·	Patient's mother continues to decline IVIG - this may work more rapidly than the above treatments   ·	Platelets 4K today. Per mom pt has been stable ~5K without bleeding.    Plan for discharge today with close outpatient follow-up with Dr. Bhagat.    Zuly Silva PA-C  Hematology/Oncology  [facility]  [phone] (office)  [phone] (alt office)  Evenings and weekends please call MD on call or office

## 2023-06-12 NOTE — PROGRESS NOTE ADULT - NS ATTEND AMEND GEN_ALL_CORE FT
Refractory thrombocytopenia, s/p 2 doses of weekly rituxan (wed), last nplate 6mcg/kg given on Sunday.  Agree with taper of steroids.   Discussed starting tavalisse as well, his mother is agreeable.  Check RVP.
getting rituxan today  had nplate on monday   plt count today is 2k.   would give one unit of plt
Patient with ITP, noted today with drop in plt count,  Steroids is being tapered as it was noted that it is causing psychosis, mother concerned about that.  Mother refusing IVIG at this point.  Continue with Nplate weekly, ordered rituxan dose to be given tomorrow,  Will monitor closely for bleeding  rest per PA note
Refractory ITP, previously responded recently to pulse dexamethasone.  Now s/p rituxan yesterday 5/31, nplate on 5/28.  To begin prednisone 40mg.   Plan for discharge as long as plt >10, plan for next dose of nplate/rituxan on Wednesday.
Refractory ITP- plt count is 7 today.  No bleeding.  Check blood/urine cultures to ensure no underlying sepsis.   Continue prednisone 40mg for now, next dose of nplate on Sunday, Rituxan on Wednesday.
will get Rituxan today. cont to monitor cbc. consider putting on prednisone tomorrow
As above.    ITP: Romiplostim 8 mcg/kg today. Platelets remain severely low. Plan on rituximab later this week; can be done outpatient. Plan on fostamatinib outpatient; prescription was sent by Dr. Bhagat. No opposition to discharge if patient's family remains vigilant on signs/symptoms of bleeding.
went over possible allergy to rituxan with patient mother. she states when he had rituxan if felt chilly but never had anaphylaxis. likely side effect to rituxan. will go ahead with rituxan in pt to be followed with maintenance outpt with Dr Bhagat. Discussed with Dr Bhagat as well
Pt care and plan discussed and reviewed with house staff. Plan as outlined above edited by me to reflect our discussion.     Discussed with patient's mother at the bedside

## 2023-06-24 NOTE — ED PROVIDER NOTE - PROGRESS NOTE DETAILS
I was present during the key portion of the procedure. Dr Dumont: Mom is refusing platelets unless pt has active bleeding. pending guaiac Dr Dumont: heme recommends platelet due to GI bleed, mom agreeable to 1U of platelets being given in ED. IVIG and steroids started. MICU consulted. Protonix held due to questions about PPI induced thrombocytopenia. GI emailed as consult.

## 2023-07-14 NOTE — ED ADULT NURSE NOTE - DOES PATIENT HAVE ADVANCE DIRECTIVE
Yes Written and oral preoperative instructions given to patient with understanding verbalized. Instructions given  to include using 4% chlorxexidine wash and direzcted starting 3 days before day of surgery ( inclusive day of surgery). Maintaining NPO status post midnightday before surgery. Stopping aspirin, NSAIDs, herbs, vitamins 7 days before surgery. Patient is to expect a phone call day before surgery between the hours 4 : 30 pm and 6: 30 pm giving arrival time for surgery day.  Patient today with STOP bang score of 6 ,severe JAVIER risk if calculated , pt awaiting for sleep studies Written and oral preoperative instructions given to patient with understanding verbalized. Instructions given  to include using 4% chlorhexidine wash and directed starting 3 days before day of surgery ( inclusive day of surgery). Maintaining NPO status post midnight before surgery. Stopping aspirin, NSAIDs, herbs, vitamins 7 days before surgery. Patient is to expect a phone call day before surgery between the hours 4 : 30 pm and 6: 30 pm giving arrival time for surgery day.  Patient today with STOP bang score of 6 ,severe JAVIER risk if calculated , pt awaiting for sleep studies to be scheduled and done.  Labs done including type screen, chest x[facility] done at [facility]- clear lungs, labs results - no abnormalities. Patient does not have cardiac clearance before 7/18 planned EGD since in order to have it, pt needs cardiac CTA and previously insurance authorization- still pending. Patient and DR. Villanueva , Surgeon are aware that surgery is going be postponed ( tentative 2 weeks ), until this time patient shoud have coronary CTA dine and have cardiac evaluation before surgery , to notify OR booking,

## 2023-08-08 NOTE — BH CONSULTATION LIAISON PROGRESS NOTE - NSBHASSESSMENTFT_PSY_ALL_CORE
23 year old male with PPH significant for severe autism and PMH significant for ITP presents to [facility] for severe thrombocytopenia. Patient is currently on steroid. Mother is concern for steroid induced irritability as well as increased aggression. Psych CL consulted to assist in medication management in this setting. While patient is not showing signs of overt psychosis, it is possible that the steroids is causing more irritability.    6/3: Pt calm and cooperative, eating breakfast. Per mother, pt has been doing better since Ativan started. No other concerns at this time.  6/4: Pt remains calm and cooperative. Per mother, pt with paradoxical worsening of agitation with IV administration of Ativan. Pt received PRN IM Haldol 2mg this morning with good effect.   6/5: mostly calm, no psych c/i to discharge, change meds as below, discussed with mother  6/6: intermittently agitated, received haldol 3mg IM this AM for agitation, which is driven by hyperphagia. would recommend increasing am dose of ativan to 1mg and given closer to time he wakes up -- at 6am.   6/7: multiple code BERTs overnight. remains impulsive, agitated. hyperphagic. recommend discontinuing haldol and starting thorazine 50mg IM/IV q6hr for agitation. offer haldol/benadryl IM/IV PRN, if no response, can offer thorazine. Risks/benefits including resp depression, cardiac monitoring need, etc discussed with mother   6/8: no code BERTs overnight but remains intermittently agitated. overall, behavioral control is improved but remains poor. mother continues to refuse IVIG -- however this would help optimize patient behaviorally (if used in lieu of steroids). mother also refusing unit of platelets, which was recommended by heme onc.  6/9: no code BERTs overnight. intermittently agitated. received fewer IM meds over last 24 hours. overall, somewhat improved. mother adamantly refusing thorazine; believes it worsens agitation despite ample evidence to the contrary. r/b/a discussed. agree to d/c thorazine and start ativan 1mg TID. patient likely to remain agitated on this regimen; mother made aware.   6/10: no code BERTs noted ON, pt with intermittent agitation, receiving Haldol/Benadryl PRN ON. Pt adherent to medications.     Plan:  [] Continue 1:1 for agitation  [ ] CONTINUE ATIVAN 1MG TID STANDING FOR AGITATION (monitor for resp. depression, hypotension, QTc prolongation)  -- can consider increasing to 1.5 mg TID if feeling benefit toward agitation mgmt  [] OFFER PRN haldol to 3mg PO/IM/IV q4hr for agitation, PLEASE GIVE WITH BENADRYL 50MG IM/IV (ENSURE IN SAME VIAL TO AVOID MULTIPLE INJECTIONS)  -- patient can receive IM if IV if no longer useable, can consider higher dosage of Haldol of 5 mg if receiving multiple PRNs, ensure QTc <500.  [] If minimal response to haldol/benadryl, can offer Thorazine 50mg IM/IV (MAX BID)  [] HOLD PRNS FOR RESPIRATORY DEPRESSION, HYPOTENSION, BRADYCARDIA  [] Would benefit from continuous pulseox, tele as patient getting many PRNs 67F hx seizures on keppra, CVA on plavix with unknown residual deficit, HLD, HTN p/w worsening and more prolonged episodes of aphasia/blank staring/unresponsiveness over two weeks. Brought to ED, where she was found to have R facial droop, RUE weakness, dysarthria, and global aphasia. NIHSS 12. CTA showing c/f L MCA occlusion. Now s/p L STA-MCA bypass.    -exam stable overnight; possible txfr to floor in AM  --160; off pressors  -NS at 100/hr  -Monitor H/H   -Continue to monitor exam

## 2023-09-06 NOTE — CHART NOTE - NSCHARTNOTEFT_GEN_A_CORE
Chart Note-MICU Transfer Resident [Charted Location: [place] [address]] [Authored: 06-Mar-2021 13:40]- for Visit: [id number], Complete, Entered, Signed in Full, General      · Note Type	MICU Transfer      MICU Transfer Note    Transfer from: MICU  Transfer to:  ( X ) Medicine    (  ) Telemetry    (  ) RCU    (  ) Palliative    (  ) Stroke Unit    (  ) _______________    Accepting physician: Dr. Roberson    HPI:  19yo M h/o intellectual disability, autism, nonverbal at baseline, chronic ITP on 80mg of daily prednisone, presents w/ outpatient labs showing low plt. Patient was sent in from [facility] with platelet of 7 and heme referral for admission w/ IVIG. Patient is at baseline mental status and has had no bleeding recently. Patient had recent admission for plt transfusion and ivig for a rectal bleed.    Of note, patient was diagnosed with ITP since 18 months. Plt usually runs in 10K range. Follows with hematology at [place]. Over the years, they have become more conservative in treatments given that patient would have very low level of platelets without major bleeding events. Patient was treated with IVIG and steroids end of Jan. 2021. Prior to that, last episode of major flare was about 5 years ago when he had diffuse petechiae without bleeding, and plt was about 5K at that time. (27 Feb 2021 01:52)        MICU COURSE:  Transferred to MICU for desensitization. Pt received desensitization overnight without any issues. Pt did not develop any rash, shortness of breath or other symptoms. Platelet counts are still low and patient is continuing with steroids at this time.        For Follow-Up:  [] Continue supportive care on floor  [] F/U Heme recs regarding further care.          Electronic Signatures:  Evens Soriano)  (Signed 06-Mar-2021 13:43)  	Authored: Note Type, Note      Last Updated: 06-Mar-2021 13:43 by Evens Soriano) alert

## 2023-09-20 NOTE — DISCHARGE NOTE PROVIDER - NSDCCPGOAL_GEN_ALL_CORE_FT
To get better and follow your care plan as instructed.
Body Location Override (Optional - Billing Will Still Be Based On Selected Body Map Location If Applicable): left mid back
Detail Level: Detailed
Add 52542 Cpt? (Important Note: In 2017 The Use Of 75696 Is Being Tracked By Cms To Determine Future Global Period Reimbursement For Global Periods): yes

## 2023-11-14 ENCOUNTER — INPATIENT (INPATIENT)
Facility: HOSPITAL | Age: 23
LOS: 5 days | Discharge: NURSING, REHAB OR LONG-TERM CARE | DRG: 813 | End: 2023-11-20
Attending: INTERNAL MEDICINE | Admitting: INTERNAL MEDICINE
Payer: MEDICARE

## 2023-11-14 VITALS
WEIGHT: 235.01 LBS | HEIGHT: 70 IN | OXYGEN SATURATION: 96 % | RESPIRATION RATE: 16 BRPM | DIASTOLIC BLOOD PRESSURE: 91 MMHG | SYSTOLIC BLOOD PRESSURE: 123 MMHG | TEMPERATURE: 98 F | HEART RATE: 94 BPM

## 2023-11-14 DIAGNOSIS — D69.6 THROMBOCYTOPENIA, UNSPECIFIED: ICD-10-CM

## 2023-11-14 DIAGNOSIS — Z92.89 PERSONAL HISTORY OF OTHER MEDICAL TREATMENT: Chronic | ICD-10-CM

## 2023-11-14 LAB
ALBUMIN SERPL ELPH-MCNC: 4 G/DL — SIGNIFICANT CHANGE UP (ref 3.3–5)
ALBUMIN SERPL ELPH-MCNC: 4 G/DL — SIGNIFICANT CHANGE UP (ref 3.3–5)
ALP SERPL-CCNC: 55 U/L — SIGNIFICANT CHANGE UP (ref 40–120)
ALP SERPL-CCNC: 55 U/L — SIGNIFICANT CHANGE UP (ref 40–120)
ALT FLD-CCNC: 23 U/L — SIGNIFICANT CHANGE UP (ref 10–45)
ALT FLD-CCNC: 23 U/L — SIGNIFICANT CHANGE UP (ref 10–45)
ANION GAP SERPL CALC-SCNC: 11 MMOL/L — SIGNIFICANT CHANGE UP (ref 5–17)
ANION GAP SERPL CALC-SCNC: 11 MMOL/L — SIGNIFICANT CHANGE UP (ref 5–17)
ANISOCYTOSIS BLD QL: SLIGHT — SIGNIFICANT CHANGE UP
ANISOCYTOSIS BLD QL: SLIGHT — SIGNIFICANT CHANGE UP
APTT BLD: 33.8 SEC — SIGNIFICANT CHANGE UP (ref 24.5–35.6)
APTT BLD: 33.8 SEC — SIGNIFICANT CHANGE UP (ref 24.5–35.6)
AST SERPL-CCNC: 13 U/L — SIGNIFICANT CHANGE UP (ref 10–40)
AST SERPL-CCNC: 13 U/L — SIGNIFICANT CHANGE UP (ref 10–40)
BASOPHILS # BLD AUTO: 0 K/UL — SIGNIFICANT CHANGE UP (ref 0–0.2)
BASOPHILS # BLD AUTO: 0 K/UL — SIGNIFICANT CHANGE UP (ref 0–0.2)
BASOPHILS NFR BLD AUTO: 0 % — SIGNIFICANT CHANGE UP (ref 0–2)
BASOPHILS NFR BLD AUTO: 0 % — SIGNIFICANT CHANGE UP (ref 0–2)
BILIRUB SERPL-MCNC: 0.8 MG/DL — SIGNIFICANT CHANGE UP (ref 0.2–1.2)
BILIRUB SERPL-MCNC: 0.8 MG/DL — SIGNIFICANT CHANGE UP (ref 0.2–1.2)
BLD GP AB SCN SERPL QL: NEGATIVE — SIGNIFICANT CHANGE UP
BLD GP AB SCN SERPL QL: NEGATIVE — SIGNIFICANT CHANGE UP
BUN SERPL-MCNC: 15 MG/DL — SIGNIFICANT CHANGE UP (ref 7–23)
BUN SERPL-MCNC: 15 MG/DL — SIGNIFICANT CHANGE UP (ref 7–23)
CALCIUM SERPL-MCNC: 9 MG/DL — SIGNIFICANT CHANGE UP (ref 8.4–10.5)
CALCIUM SERPL-MCNC: 9 MG/DL — SIGNIFICANT CHANGE UP (ref 8.4–10.5)
CHLORIDE SERPL-SCNC: 104 MMOL/L — SIGNIFICANT CHANGE UP (ref 96–108)
CHLORIDE SERPL-SCNC: 104 MMOL/L — SIGNIFICANT CHANGE UP (ref 96–108)
CO2 SERPL-SCNC: 24 MMOL/L — SIGNIFICANT CHANGE UP (ref 22–31)
CO2 SERPL-SCNC: 24 MMOL/L — SIGNIFICANT CHANGE UP (ref 22–31)
CREAT SERPL-MCNC: 0.94 MG/DL — SIGNIFICANT CHANGE UP (ref 0.5–1.3)
CREAT SERPL-MCNC: 0.94 MG/DL — SIGNIFICANT CHANGE UP (ref 0.5–1.3)
DACRYOCYTES BLD QL SMEAR: SLIGHT — SIGNIFICANT CHANGE UP
DACRYOCYTES BLD QL SMEAR: SLIGHT — SIGNIFICANT CHANGE UP
EGFR: 116 ML/MIN/1.73M2 — SIGNIFICANT CHANGE UP
EGFR: 116 ML/MIN/1.73M2 — SIGNIFICANT CHANGE UP
ELLIPTOCYTES BLD QL SMEAR: SLIGHT — SIGNIFICANT CHANGE UP
ELLIPTOCYTES BLD QL SMEAR: SLIGHT — SIGNIFICANT CHANGE UP
EOSINOPHIL # BLD AUTO: 0.12 K/UL — SIGNIFICANT CHANGE UP (ref 0–0.5)
EOSINOPHIL # BLD AUTO: 0.12 K/UL — SIGNIFICANT CHANGE UP (ref 0–0.5)
EOSINOPHIL NFR BLD AUTO: 0.9 % — SIGNIFICANT CHANGE UP (ref 0–6)
EOSINOPHIL NFR BLD AUTO: 0.9 % — SIGNIFICANT CHANGE UP (ref 0–6)
FLUAV AG NPH QL: SIGNIFICANT CHANGE UP
FLUAV AG NPH QL: SIGNIFICANT CHANGE UP
FLUBV AG NPH QL: SIGNIFICANT CHANGE UP
FLUBV AG NPH QL: SIGNIFICANT CHANGE UP
GLUCOSE SERPL-MCNC: 123 MG/DL — HIGH (ref 70–99)
GLUCOSE SERPL-MCNC: 123 MG/DL — HIGH (ref 70–99)
HCT VFR BLD CALC: 43.1 % — SIGNIFICANT CHANGE UP (ref 39–50)
HCT VFR BLD CALC: 43.1 % — SIGNIFICANT CHANGE UP (ref 39–50)
HGB BLD-MCNC: 13.5 G/DL — SIGNIFICANT CHANGE UP (ref 13–17)
HGB BLD-MCNC: 13.5 G/DL — SIGNIFICANT CHANGE UP (ref 13–17)
INR BLD: 1.12 RATIO — SIGNIFICANT CHANGE UP (ref 0.85–1.18)
INR BLD: 1.12 RATIO — SIGNIFICANT CHANGE UP (ref 0.85–1.18)
LYMPHOCYTES # BLD AUTO: 15.5 % — SIGNIFICANT CHANGE UP (ref 13–44)
LYMPHOCYTES # BLD AUTO: 15.5 % — SIGNIFICANT CHANGE UP (ref 13–44)
LYMPHOCYTES # BLD AUTO: 2.05 K/UL — SIGNIFICANT CHANGE UP (ref 1–3.3)
LYMPHOCYTES # BLD AUTO: 2.05 K/UL — SIGNIFICANT CHANGE UP (ref 1–3.3)
MANUAL SMEAR VERIFICATION: SIGNIFICANT CHANGE UP
MANUAL SMEAR VERIFICATION: SIGNIFICANT CHANGE UP
MCHC RBC-ENTMCNC: 25.2 PG — LOW (ref 27–34)
MCHC RBC-ENTMCNC: 25.2 PG — LOW (ref 27–34)
MCHC RBC-ENTMCNC: 31.3 GM/DL — LOW (ref 32–36)
MCHC RBC-ENTMCNC: 31.3 GM/DL — LOW (ref 32–36)
MCV RBC AUTO: 80.6 FL — SIGNIFICANT CHANGE UP (ref 80–100)
MCV RBC AUTO: 80.6 FL — SIGNIFICANT CHANGE UP (ref 80–100)
MICROCYTES BLD QL: SLIGHT — SIGNIFICANT CHANGE UP
MICROCYTES BLD QL: SLIGHT — SIGNIFICANT CHANGE UP
MONOCYTES # BLD AUTO: 0.34 K/UL — SIGNIFICANT CHANGE UP (ref 0–0.9)
MONOCYTES # BLD AUTO: 0.34 K/UL — SIGNIFICANT CHANGE UP (ref 0–0.9)
MONOCYTES NFR BLD AUTO: 2.6 % — SIGNIFICANT CHANGE UP (ref 2–14)
MONOCYTES NFR BLD AUTO: 2.6 % — SIGNIFICANT CHANGE UP (ref 2–14)
NEUTROPHILS # BLD AUTO: 10.72 K/UL — HIGH (ref 1.8–7.4)
NEUTROPHILS # BLD AUTO: 10.72 K/UL — HIGH (ref 1.8–7.4)
NEUTROPHILS NFR BLD AUTO: 81 % — HIGH (ref 43–77)
NEUTROPHILS NFR BLD AUTO: 81 % — HIGH (ref 43–77)
OVALOCYTES BLD QL SMEAR: SLIGHT — SIGNIFICANT CHANGE UP
OVALOCYTES BLD QL SMEAR: SLIGHT — SIGNIFICANT CHANGE UP
PLAT MORPH BLD: NORMAL — SIGNIFICANT CHANGE UP
PLAT MORPH BLD: NORMAL — SIGNIFICANT CHANGE UP
PLATELET # BLD AUTO: 1 K/UL — CRITICAL LOW (ref 150–400)
PLATELET # BLD AUTO: 1 K/UL — CRITICAL LOW (ref 150–400)
POIKILOCYTOSIS BLD QL AUTO: SLIGHT — SIGNIFICANT CHANGE UP
POIKILOCYTOSIS BLD QL AUTO: SLIGHT — SIGNIFICANT CHANGE UP
POTASSIUM SERPL-MCNC: 3.5 MMOL/L — SIGNIFICANT CHANGE UP (ref 3.5–5.3)
POTASSIUM SERPL-MCNC: 3.5 MMOL/L — SIGNIFICANT CHANGE UP (ref 3.5–5.3)
POTASSIUM SERPL-SCNC: 3.5 MMOL/L — SIGNIFICANT CHANGE UP (ref 3.5–5.3)
POTASSIUM SERPL-SCNC: 3.5 MMOL/L — SIGNIFICANT CHANGE UP (ref 3.5–5.3)
PROT SERPL-MCNC: 6 G/DL — SIGNIFICANT CHANGE UP (ref 6–8.3)
PROT SERPL-MCNC: 6 G/DL — SIGNIFICANT CHANGE UP (ref 6–8.3)
PROTHROM AB SERPL-ACNC: 11.7 SEC — SIGNIFICANT CHANGE UP (ref 9.5–13)
PROTHROM AB SERPL-ACNC: 11.7 SEC — SIGNIFICANT CHANGE UP (ref 9.5–13)
RBC # BLD: 5.35 M/UL — SIGNIFICANT CHANGE UP (ref 4.2–5.8)
RBC # BLD: 5.35 M/UL — SIGNIFICANT CHANGE UP (ref 4.2–5.8)
RBC # FLD: 15.9 % — HIGH (ref 10.3–14.5)
RBC # FLD: 15.9 % — HIGH (ref 10.3–14.5)
RBC BLD AUTO: ABNORMAL
RBC BLD AUTO: ABNORMAL
RH IG SCN BLD-IMP: POSITIVE — SIGNIFICANT CHANGE UP
RH IG SCN BLD-IMP: POSITIVE — SIGNIFICANT CHANGE UP
RSV RNA NPH QL NAA+NON-PROBE: SIGNIFICANT CHANGE UP
RSV RNA NPH QL NAA+NON-PROBE: SIGNIFICANT CHANGE UP
SARS-COV-2 RNA SPEC QL NAA+PROBE: SIGNIFICANT CHANGE UP
SARS-COV-2 RNA SPEC QL NAA+PROBE: SIGNIFICANT CHANGE UP
SODIUM SERPL-SCNC: 139 MMOL/L — SIGNIFICANT CHANGE UP (ref 135–145)
SODIUM SERPL-SCNC: 139 MMOL/L — SIGNIFICANT CHANGE UP (ref 135–145)
WBC # BLD: 13.23 K/UL — HIGH (ref 3.8–10.5)
WBC # BLD: 13.23 K/UL — HIGH (ref 3.8–10.5)
WBC # FLD AUTO: 13.23 K/UL — HIGH (ref 3.8–10.5)
WBC # FLD AUTO: 13.23 K/UL — HIGH (ref 3.8–10.5)

## 2023-11-14 PROCEDURE — 99285 EMERGENCY DEPT VISIT HI MDM: CPT | Mod: FS

## 2023-11-14 PROCEDURE — 99223 1ST HOSP IP/OBS HIGH 75: CPT

## 2023-11-14 RX ORDER — TRAZODONE HCL 50 MG
200 TABLET ORAL ONCE
Refills: 0 | Status: COMPLETED | OUTPATIENT
Start: 2023-11-14 | End: 2023-11-14

## 2023-11-14 RX ADMIN — Medication 200 MILLIGRAM(S): at 21:04

## 2023-11-14 RX ADMIN — Medication 2 MILLIGRAM(S): at 21:04

## 2023-11-14 RX ADMIN — Medication 100 MILLIGRAM(S): at 22:26

## 2023-11-14 NOTE — H&P ADULT - NSHPSOURCEINFOTX_GEN_ALL_CORE
Unable to obtain history from patient--patient nonverbal, noncommunicative at baseline.  History from mother in attendance and review from prior hospitalization records. Unable to obtain history from patient--patient nonverbal, noncommunicative at baseline.  History from mother in attendance and review from prior hospitalization records.  [facility] # [phone]

## 2023-11-14 NOTE — ED PROVIDER NOTE - ATTENDING APP SHARED VISIT CONTRIBUTION OF CARE
Attending note (Neal): 23-year-old male with history of autism (nonverbal at baseline), H. pylori infection (previously treated but per mother not eradicated), severe chronic ITP (follows with [facility] hematologist Dr. Olayinka Kaplan), who presents to the ED for evaluation of thrombocytopenia (found on outpatient labs to have platelet count of 1000 and was sent in by his hematologist).  Has some bruising around the left eye which mother reports is old and chronic denies any new injuries or fall.  Mother requesting trazodone and/or Ativan to assist with anxiolysis states that he normally gets these for anxiety especially during admission and to help him sleep otherwise concerned that he may engage in self-injurious behavior.  No fevers no cough or congestion no vomiting or diarrhea and does not appear to be in pain.  On exam well-appearing no acute distress.  Further discussion with mother she is greatly concerned that the thrombocytopenia and other chronic issues are related to prior vaccinations and does not want any vaccinations for her child additionally does not want any blood or blood products that have been obtained from people that were vaccinated (any vaccines).  Additionally and including IVIG.  Additionally mother is concerned about additional steroid use but will consider it if her hematologist or other hematologist recommends it.  Will obtain screening labs including CBC CMP PT/INR and type and screen, while normal recommendation would be for considering platelet transfusion and/or IVIG and that especially in a patient was unable to understand risks of bleeding with behavioral issues very concern for potential for injury intracranial bleeding or other bleeding; mother seems very cognizant of risks.  Consider admission for ongoing evaluation and management will consult with heme-onc from [facility].

## 2023-11-14 NOTE — ED ADULT NURSE NOTE - BIRTH SEX
Problem: Patient Care Overview  Goal: Plan of Care/Patient Progress Review  Outcome: No Change  Time  6061-0500    Pt disoriented, saying a few words in Libyan. Continues on comfort cares. No indications of pain except agitation with turns. Turns q2. Incontinent of loose, watery stool x1. Virk removed at 1400 after 525 mL of output this shift per MD. Patient education set up for 1330 tomorrow for daughter to learn how to do home TFs. TFs running at 45 mL/hr. All meds through [place]. Cr stable. Potential discharge home mid week.       Male

## 2023-11-14 NOTE — H&P ADULT - TIME BILLING
Review of prior medical records, evaluation, management and coordination of care with provider team.

## 2023-11-14 NOTE — ED PROVIDER NOTE - OBJECTIVE STATEMENT
Pt is a 23 year old M hx of intellectual disability, autism, nonverbal at baseline, CARLITOS not on CPAP, severe chronic ITP who presents to the ER with mother due to abnormal labs. mother states platelets were found to be 1K. states patient was advised to come in by hematologist Dr. Olayinka Kaplan. denies known f/n/v/d, CP, SOB, abdominal pain, cough, dizziness, urinary symptoms, falls.

## 2023-11-14 NOTE — ED PROVIDER NOTE - PHYSICAL EXAMINATION
patient non-verbal. no signs of distress  skin: multiple ecchymosis noted to patient skin including periorbital ecchymosis noted to left eye, areas of ecchymosis to abdomen as well as upper extremities.

## 2023-11-14 NOTE — ED ADULT NURSE NOTE - BREATHING, MLM
Spontaneous, unlabored and symmetrical Mastoid Interpolation Flap Text: A decision was made to reconstruct the defect utilizing an interpolation axial flap and a staged reconstruction.  A telfa template was made of the defect.  This telfa template was then used to outline the mastoid interpolation flap.  The donor area for the pedicle flap was then injected with anesthesia.  The flap was excised through the skin and subcutaneous tissue down to the layer of the underlying musculature.  The pedicle flap was carefully excised within this deep plane to maintain its blood supply.  The edges of the donor site were undermined.   The donor site was closed in a primary fashion.  The pedicle was then rotated into position and sutured.  Once the tube was sutured into place, adequate blood supply was confirmed with blanching and refill.  The pedicle was then wrapped with xeroform gauze and dressed appropriately with a telfa and gauze bandage to ensure continued blood supply and protect the attached pedicle.

## 2023-11-14 NOTE — H&P ADULT - PROBLEM SELECTOR PLAN 2
See above.   History of severe developmental delay, autism, with patient nonverbal, noncommunicative with episodes of agitation>>now S/P Ativan and Trazodone in the ER.    Will assume aspiration risk for now.   Will defer resumption of patient's medications for behavioral management for the Daytime Provider in the AM.   Mother declines chest radiograph.

## 2023-11-14 NOTE — ED PROVIDER NOTE - PROGRESS NOTE DETAILS
Burak baca PA-C: call placed for patient hematologist. pending call back at this time. Burak Weir PA-C: patient given 200mg of Trazadone as that is his nightly dose. patient is agitated at this time. mother states patient normally receives 2mg of ativan to help him relax. will give 2mg of PO ativan at this time. discussed with Dr. De Jesus. Burak Weir PA-C: spoke with Dr. Hilton, covering for Dr. Bhagat), aware of patient. recommend 100mg of solumedrol. discussed with Dr. De Jesus. Burak baca PA-C: platelet count of 1 on cbc. mother refusing platelets at this time. will give 100mg of solumedrol and admit patient to medicine for further eval and management. discussed with Dr. De Jesus

## 2023-11-14 NOTE — H&P ADULT - PROBLEM SELECTOR PLAN 4
See above.   US spleen ordered.   Will defer CTT abdomen /pelvis IV contrast to Dr. Driscoll to assess splenic hemangioma.   Mother declines plain CTT abdomen to r/o occult hematoma.

## 2023-11-14 NOTE — ED ADULT NURSE NOTE - OBJECTIVE STATEMENT
23 year old M hx of intellectual disability, autism, nonverbal at baseline, CARLITOS not on CPAP, severe chronic ITP who presents to the ER with mother due to abnormal labs. mother states platelets were found to be 1K. states patient was advised to come in by hematologist Dr. Olayinka Kaplan. denies known f/n/v/d, CP, SOB, abdominal pain, cough, dizziness, urinary symptoms, falls.

## 2023-11-14 NOTE — H&P ADULT - NSHPADDITIONALINFOADULT_GEN_ALL_CORE
NIGHT HOSPITALIST:   Mother in attendance aware of course and agrees with above--mother aware of the attendant risks as outlined above.   Given patient's comorbidities, patient's long term prognosis is guarded.  Emotional support provided to patient/mother in attendance.   Care reviewed with covering NP/PA for endorsement to Dr. Driscoll.    Noel Fierro MD  Available on [employer] Teams. NIGHT HOSPITALIST:   Mother in attendance aware of course and agrees with above--mother aware of the attendant risks as outlined above.   Given patient's comorbidities, patient's long term prognosis is guarded.  Emotional support provided to patient/mother in attendance.   Care reviewed with covering NP/BREANA Joya for endorsement to Dr. Driscoll.    Noel Fierro MD  Available on [employer] Teams.

## 2023-11-14 NOTE — H&P ADULT - NSHPLABSRESULTS_GEN_ALL_CORE
WBC 13.2  81N    Hgb 13.5    Platelets of 1K>>blue top ordered>> 2K    K+ 3.5    Random glucose of 123    Cr 0.9    RVP and COVID-19 PCR>>negative.    EKG tracing interpreted by me with NSR at 86.    Mother refuses CTT head no contrast.

## 2023-11-14 NOTE — ED ADULT NURSE NOTE - NSFALLRISKINTERV_ED_ALL_ED

## 2023-11-14 NOTE — ED ADULT NURSE REASSESSMENT NOTE - NS ED NURSE REASSESS COMMENT FT1
pt wandering around room and does not want to sit in bed. mother at bedside states pt usually prefers recliner during hospital stays. recliner provided for pt comfort and safety so pt isn't wandering around room.

## 2023-11-14 NOTE — H&P ADULT - ASSESSMENT
NIGHT HOSPITALIST:   NIGHT HOSPITALIST:    Referral of patient to the ER with platelet count of 1K>>2K( with blue top) in the setting of patient with refractory severe chronic ITP with past courses of Rituxan, IVIG and past platelet transfusion, with mother in attendance refusing IVIG and platelet transfusion, and refusing CTT head.   Mother is aware and verbalizes as such the risk, including risk of bleeding (intracranial and GI,  hemorrhage and spontaneous hematoma, even with apparent trivial minor trauma).   Patient had received the oral Ativan and the Trazodone dose in the ER and it is difficult for me to ascertain change from patient's baseline neurologic function, which per mother in attendance, is nonverbal and noncommunicative but patient with episodes of agitation.    Will defer resumption of patient's home medications fo Rexulti, Trazodone, Ativan (patient apparently weaned off Ativan but had taken PO previously), given patient's risk for aspiration.   Mother declines CTT head as above and chest radiograph.    I have also contacted Dr. Bhagat's service on patient's admission.   Patient had received one pulse dose of methylprednisolone (mother had agreed with the ER).  Will defer further considerations with patient's severe thrombocytopenia to Hematology.    NPO for now until patient becomes less sedated.   Follow FS to monitor for hypoglycemia.    VERONIQUE for now the severe thrombocytopenia precludes pharmacologic DVT prophylaxis.

## 2023-11-14 NOTE — ED ADULT NURSE NOTE - CHIEF COMPLAINT QUOTE
sent for low platelets, hx ITP Glycopyrrolate Pregnancy And Lactation Text: This medication is Pregnancy Category B and is considered safe during pregnancy. It is unknown if it is excreted breast milk.

## 2023-11-14 NOTE — H&P ADULT - PROBLEM SELECTOR PLAN 1
See above.  S/P dose of pulse methylprednisolone.   Hematology aware of admission and will make further recommendations as above.    Patient's mother refusing IVIG, platelet transfusion, CTT head.

## 2023-11-14 NOTE — H&P ADULT - HISTORY OF PRESENT ILLNESS
NIGHT HOSPITALIST:   Patient UNKNOWN to me previously, assigned to me at this point via the ER and by Dr. Driscoll to admit this 22 y/o M--unable to obtain history from patient--mother in attendance--patient with a history of intellectual impairment, autism, nonverbal and noncommunicative at baseline, obstructive sleep apnoea not on positive pressure ventilation, severe chronic ITP followed by and referred by Dr. Bhagat above to the ER following CBC draw with platelet count of 1K.  Patient with multiple hospitalizations, last at [facility] with discharge on June 12 2023, with past treatment with pulse steroids (past complications of increased agitation due to steroids), intolerance with paradoxical increase in agitation with IV Haldol, Thorazine, Benadryl, past Rituxan (following desensitization), and past platelet transfusions, IVIG, past COVID-19 infection in 2021 (patient remains UNVACCINATED with mother declining such), with the mother reports that she has researched other studies with gut erik with infection with H. pylori and has been having his son see a GI on this issue, including a reported spleen hemangioma (mother was planning to have an outpatient CTT abdomen/pelvis with IV contrast on this issue), with mother reports her concern of patient's development for autism from the patient's original MMR vaccine at age [age] (unclear if this was referenced by the mother from the original [employer] in 1998?) with mother notes a LEFT infraorbital ecchymoses for the past week and scattered bruises on the LEFT chest/mammary area and back.  Unable to obtain history from patient--patient now sedated past Rx of oral Ativan and Trazodone 200 mg given in the ER--mother reports that patient's prior Trazodone dose of 150 mg at night with patient's Medex did not prevent agitation in patient.   Mother was offered option for IVIG and platelet transfusion by the ER following conversation by the ER attending with Dr. Bhagat.  I spoke to the mother who confirms such.   I also reviewed with the patient's mother for a noncontrast CTT head to exclude intracranial process given the low platelets--the patient's mother refused. NIGHT HOSPITALIST:   Patient UNKNOWN to me previously, assigned to me at this point via the ER and by Dr. Driscoll to admit this 24 y/o M--unable to obtain history from patient--mother in attendance--patient with a history of intellectual impairment, autism, nonverbal and noncommunicative at baseline, obstructive sleep apnoea not on positive pressure ventilation, severe chronic ITP followed by and referred by Dr. Bhagat above to the ER following CBC draw with platelet count of 1K.  Patient with multiple hospitalizations, last at [facility] with discharge on June 12 2023, with past treatment with pulse steroids (past complications of increased agitation due to steroids), intolerance with paradoxical increase in agitation with IV Haldol, Thorazine, Benadryl, past Rituxan (following desensitization), and past platelet transfusions, IVIG, past COVID-19 infection in 2021 with apparent worsening of patient's baseline platelet counts  (patient remains UNVACCINATED with mother declining such), with the mother reports that she has researched other studies with gut erik with infection with H. pylori and has been having his son see a GI on this issue, including a reported spleen hemangioma (mother was planning to have an outpatient CTT abdomen/pelvis with IV contrast on this issue), with mother reports her concern of patient's development for autism from the patient's original MMR vaccine at age [age] (unclear if this was referenced by the mother from the original [employer] [place] association in 1998?) with mother notes a LEFT infraorbital ecchymoses for the past week and scattered bruises on the LEFT chest/mammary area and back.  Unable to obtain history from patient--patient now sedated past Rx of oral Ativan and Trazodone 200 mg given in the ER--mother reports that patient's prior Trazodone dose of 150 mg at night with patient's Medex did not prevent agitation in patient.   Mother was offered option for IVIG and platelet transfusion by the ER following conversation by the ER attending with Dr. Bhagat.  I spoke to the mother who confirms such.   I also reviewed with the patient's mother for a noncontrast CTT head to exclude intracranial process given the low platelets--the patient's mother refused. NIGHT HOSPITALIST:   Patient UNKNOWN to me previously, assigned to me at this point via the ER and by Dr. Driscoll to admit this 22 y/o M--unable to obtain history from patient--mother in attendance--patient with a history of intellectual impairment, autism, nonverbal and noncommunicative at baseline, obstructive sleep apnoea not on positive pressure ventilation, severe chronic ITP followed by and referred by Dr. Bhagat above to the ER following CBC draw with platelet count of 1K.  Patient with multiple hospitalizations, last at [facility] with discharge on June 12 2023, with past treatment with pulse steroids (past complications of increased agitation due to steroids), intolerance with paradoxical increase in agitation with IV Haldol, Thorazine, Benadryl, past Rituxan (following desensitization), and past platelet transfusions, IVIG, past COVID-19 infection in 2021 with apparent worsening of patient's baseline platelet counts  (patient remains UNVACCINATED with mother declining such), with the mother reports that she has researched other studies with gut erik with infection with H. pylori and has been having his son see a GI on this issue, including a reported spleen hemangioma (mother was planning to have an outpatient CTT abdomen/pelvis with IV contrast on this issue), with mother reports her concern of patient's development for autism from the patient's original MMR vaccine at age [age] (unclear if this was referenced by the mother from the original [employer] [place] association in 1998?) with mother notes a LEFT infraorbital ecchymoses for the past week and scattered bruises on the LEFT chest/mammary area and back.  Unable to obtain history from patient--patient now sedated past Rx of oral Ativan and Trazodone 200 mg given in the ER--mother reports that patient's prior Trazodone dose of 150 mg at night with patient's Medex did not prevent agitation in patient.   Mother was offered option for IVIG and platelet transfusion by the ER following conversation by the ER attending with Dr. Bhagat and mother refused.  I spoke to the mother who confirms such.   I also reviewed with the patient's mother for a noncontrast CTT head to exclude intracranial process given the low platelets--the patient's mother refused.

## 2023-11-14 NOTE — H&P ADULT - NSICDXPASTMEDICALHX_GEN_ALL_CORE_FT
PAST MEDICAL HISTORY:  2019 novel coronavirus disease (COVID-19)     Autism     Chronic ITP (idiopathic thrombocytopenia)     COVID-19 vaccine series declined     Dental caries on smooth surface penetrating into dentin     Intellectual disability     CARLITOS (obstructive sleep apnea)

## 2023-11-15 DIAGNOSIS — D69.6 THROMBOCYTOPENIA, UNSPECIFIED: ICD-10-CM

## 2023-11-15 DIAGNOSIS — Z29.9 ENCOUNTER FOR PROPHYLACTIC MEASURES, UNSPECIFIED: ICD-10-CM

## 2023-11-15 DIAGNOSIS — Z87.898 PERSONAL HISTORY OF OTHER SPECIFIED CONDITIONS: ICD-10-CM

## 2023-11-15 DIAGNOSIS — D18.03 HEMANGIOMA OF INTRA-ABDOMINAL STRUCTURES: ICD-10-CM

## 2023-11-15 LAB
A1C WITH ESTIMATED AVERAGE GLUCOSE RESULT: 5.1 % — SIGNIFICANT CHANGE UP (ref 4–5.6)
A1C WITH ESTIMATED AVERAGE GLUCOSE RESULT: 5.1 % — SIGNIFICANT CHANGE UP (ref 4–5.6)
ANION GAP SERPL CALC-SCNC: 10 MMOL/L — SIGNIFICANT CHANGE UP (ref 5–17)
ANION GAP SERPL CALC-SCNC: 10 MMOL/L — SIGNIFICANT CHANGE UP (ref 5–17)
BASOPHILS # BLD AUTO: 0.03 K/UL — SIGNIFICANT CHANGE UP (ref 0–0.2)
BASOPHILS # BLD AUTO: 0.03 K/UL — SIGNIFICANT CHANGE UP (ref 0–0.2)
BASOPHILS NFR BLD AUTO: 0.2 % — SIGNIFICANT CHANGE UP (ref 0–2)
BASOPHILS NFR BLD AUTO: 0.2 % — SIGNIFICANT CHANGE UP (ref 0–2)
BUN SERPL-MCNC: 11 MG/DL — SIGNIFICANT CHANGE UP (ref 7–23)
BUN SERPL-MCNC: 11 MG/DL — SIGNIFICANT CHANGE UP (ref 7–23)
CALCIUM SERPL-MCNC: 8.8 MG/DL — SIGNIFICANT CHANGE UP (ref 8.4–10.5)
CALCIUM SERPL-MCNC: 8.8 MG/DL — SIGNIFICANT CHANGE UP (ref 8.4–10.5)
CHLORIDE SERPL-SCNC: 106 MMOL/L — SIGNIFICANT CHANGE UP (ref 96–108)
CHLORIDE SERPL-SCNC: 106 MMOL/L — SIGNIFICANT CHANGE UP (ref 96–108)
CLOSURE TME COLL+EPINEP BLD: 2 K/UL — CRITICAL LOW (ref 150–400)
CLOSURE TME COLL+EPINEP BLD: 2 K/UL — CRITICAL LOW (ref 150–400)
CO2 SERPL-SCNC: 27 MMOL/L — SIGNIFICANT CHANGE UP (ref 22–31)
CO2 SERPL-SCNC: 27 MMOL/L — SIGNIFICANT CHANGE UP (ref 22–31)
CREAT SERPL-MCNC: 0.86 MG/DL — SIGNIFICANT CHANGE UP (ref 0.5–1.3)
CREAT SERPL-MCNC: 0.86 MG/DL — SIGNIFICANT CHANGE UP (ref 0.5–1.3)
EGFR: 124 ML/MIN/1.73M2 — SIGNIFICANT CHANGE UP
EGFR: 124 ML/MIN/1.73M2 — SIGNIFICANT CHANGE UP
EOSINOPHIL # BLD AUTO: 0.05 K/UL — SIGNIFICANT CHANGE UP (ref 0–0.5)
EOSINOPHIL # BLD AUTO: 0.05 K/UL — SIGNIFICANT CHANGE UP (ref 0–0.5)
EOSINOPHIL NFR BLD AUTO: 0.4 % — SIGNIFICANT CHANGE UP (ref 0–6)
EOSINOPHIL NFR BLD AUTO: 0.4 % — SIGNIFICANT CHANGE UP (ref 0–6)
ESTIMATED AVERAGE GLUCOSE: 100 MG/DL — SIGNIFICANT CHANGE UP (ref 68–114)
ESTIMATED AVERAGE GLUCOSE: 100 MG/DL — SIGNIFICANT CHANGE UP (ref 68–114)
GLUCOSE SERPL-MCNC: 74 MG/DL — SIGNIFICANT CHANGE UP (ref 70–99)
GLUCOSE SERPL-MCNC: 74 MG/DL — SIGNIFICANT CHANGE UP (ref 70–99)
HCT VFR BLD CALC: 45.5 % — SIGNIFICANT CHANGE UP (ref 39–50)
HCT VFR BLD CALC: 45.5 % — SIGNIFICANT CHANGE UP (ref 39–50)
HGB BLD-MCNC: 14 G/DL — SIGNIFICANT CHANGE UP (ref 13–17)
HGB BLD-MCNC: 14 G/DL — SIGNIFICANT CHANGE UP (ref 13–17)
IMM GRANULOCYTES NFR BLD AUTO: 0.5 % — SIGNIFICANT CHANGE UP (ref 0–0.9)
IMM GRANULOCYTES NFR BLD AUTO: 0.5 % — SIGNIFICANT CHANGE UP (ref 0–0.9)
LYMPHOCYTES # BLD AUTO: 1.62 K/UL — SIGNIFICANT CHANGE UP (ref 1–3.3)
LYMPHOCYTES # BLD AUTO: 1.62 K/UL — SIGNIFICANT CHANGE UP (ref 1–3.3)
LYMPHOCYTES # BLD AUTO: 12.4 % — LOW (ref 13–44)
LYMPHOCYTES # BLD AUTO: 12.4 % — LOW (ref 13–44)
MCHC RBC-ENTMCNC: 25.4 PG — LOW (ref 27–34)
MCHC RBC-ENTMCNC: 25.4 PG — LOW (ref 27–34)
MCHC RBC-ENTMCNC: 30.8 GM/DL — LOW (ref 32–36)
MCHC RBC-ENTMCNC: 30.8 GM/DL — LOW (ref 32–36)
MCV RBC AUTO: 82.4 FL — SIGNIFICANT CHANGE UP (ref 80–100)
MCV RBC AUTO: 82.4 FL — SIGNIFICANT CHANGE UP (ref 80–100)
MONOCYTES # BLD AUTO: 0.91 K/UL — HIGH (ref 0–0.9)
MONOCYTES # BLD AUTO: 0.91 K/UL — HIGH (ref 0–0.9)
MONOCYTES NFR BLD AUTO: 6.9 % — SIGNIFICANT CHANGE UP (ref 2–14)
MONOCYTES NFR BLD AUTO: 6.9 % — SIGNIFICANT CHANGE UP (ref 2–14)
NEUTROPHILS # BLD AUTO: 10.44 K/UL — HIGH (ref 1.8–7.4)
NEUTROPHILS # BLD AUTO: 10.44 K/UL — HIGH (ref 1.8–7.4)
NEUTROPHILS NFR BLD AUTO: 79.6 % — HIGH (ref 43–77)
NEUTROPHILS NFR BLD AUTO: 79.6 % — HIGH (ref 43–77)
NRBC # BLD: 0 /100 WBCS — SIGNIFICANT CHANGE UP (ref 0–0)
NRBC # BLD: 0 /100 WBCS — SIGNIFICANT CHANGE UP (ref 0–0)
PLATELET # BLD AUTO: 12 K/UL — CRITICAL LOW (ref 150–400)
PLATELET # BLD AUTO: 12 K/UL — CRITICAL LOW (ref 150–400)
POTASSIUM SERPL-MCNC: 3.7 MMOL/L — SIGNIFICANT CHANGE UP (ref 3.5–5.3)
POTASSIUM SERPL-MCNC: 3.7 MMOL/L — SIGNIFICANT CHANGE UP (ref 3.5–5.3)
POTASSIUM SERPL-SCNC: 3.7 MMOL/L — SIGNIFICANT CHANGE UP (ref 3.5–5.3)
POTASSIUM SERPL-SCNC: 3.7 MMOL/L — SIGNIFICANT CHANGE UP (ref 3.5–5.3)
RBC # BLD: 5.52 M/UL — SIGNIFICANT CHANGE UP (ref 4.2–5.8)
RBC # BLD: 5.52 M/UL — SIGNIFICANT CHANGE UP (ref 4.2–5.8)
RBC # FLD: 15.9 % — HIGH (ref 10.3–14.5)
RBC # FLD: 15.9 % — HIGH (ref 10.3–14.5)
SODIUM SERPL-SCNC: 143 MMOL/L — SIGNIFICANT CHANGE UP (ref 135–145)
SODIUM SERPL-SCNC: 143 MMOL/L — SIGNIFICANT CHANGE UP (ref 135–145)
WBC # BLD: 13.11 K/UL — HIGH (ref 3.8–10.5)
WBC # BLD: 13.11 K/UL — HIGH (ref 3.8–10.5)
WBC # FLD AUTO: 13.11 K/UL — HIGH (ref 3.8–10.5)
WBC # FLD AUTO: 13.11 K/UL — HIGH (ref 3.8–10.5)

## 2023-11-15 PROCEDURE — 99221 1ST HOSP IP/OBS SF/LOW 40: CPT

## 2023-11-15 PROCEDURE — 74177 CT ABD & PELVIS W/CONTRAST: CPT | Mod: 26

## 2023-11-15 RX ORDER — DEXTROSE 50 % IN WATER 50 %
12.5 SYRINGE (ML) INTRAVENOUS ONCE
Refills: 0 | Status: DISCONTINUED | OUTPATIENT
Start: 2023-11-15 | End: 2023-11-20

## 2023-11-15 RX ORDER — SODIUM CHLORIDE 9 MG/ML
1000 INJECTION, SOLUTION INTRAVENOUS
Refills: 0 | Status: DISCONTINUED | OUTPATIENT
Start: 2023-11-15 | End: 2023-11-20

## 2023-11-15 RX ORDER — HALOPERIDOL DECANOATE 100 MG/ML
2 INJECTION INTRAMUSCULAR ONCE
Refills: 0 | Status: COMPLETED | OUTPATIENT
Start: 2023-11-15 | End: 2023-11-15

## 2023-11-15 RX ORDER — TRAZODONE HCL 50 MG
1 TABLET ORAL
Refills: 0 | DISCHARGE

## 2023-11-15 RX ORDER — PANTOPRAZOLE SODIUM 20 MG/1
40 TABLET, DELAYED RELEASE ORAL
Refills: 0 | Status: DISCONTINUED | OUTPATIENT
Start: 2023-11-15 | End: 2023-11-18

## 2023-11-15 RX ORDER — DEXTROSE 50 % IN WATER 50 %
25 SYRINGE (ML) INTRAVENOUS ONCE
Refills: 0 | Status: DISCONTINUED | OUTPATIENT
Start: 2023-11-15 | End: 2023-11-20

## 2023-11-15 RX ORDER — DIPHENHYDRAMINE HCL 50 MG
25 CAPSULE ORAL EVERY 4 HOURS
Refills: 0 | Status: DISCONTINUED | OUTPATIENT
Start: 2023-11-15 | End: 2023-11-20

## 2023-11-15 RX ORDER — CIMETIDINE 200 MG
400 TABLET ORAL THREE TIMES A DAY
Refills: 0 | Status: DISCONTINUED | OUTPATIENT
Start: 2023-11-15 | End: 2023-11-20

## 2023-11-15 RX ORDER — GLUCAGON INJECTION, SOLUTION 0.5 MG/.1ML
1 INJECTION, SOLUTION SUBCUTANEOUS ONCE
Refills: 0 | Status: DISCONTINUED | OUTPATIENT
Start: 2023-11-15 | End: 2023-11-20

## 2023-11-15 RX ORDER — BREXPIPRAZOLE 0.25 MG/1
4 TABLET ORAL DAILY
Refills: 0 | Status: DISCONTINUED | OUTPATIENT
Start: 2023-11-16 | End: 2023-11-20

## 2023-11-15 RX ORDER — INSULIN LISPRO 100/ML
VIAL (ML) SUBCUTANEOUS AT BEDTIME
Refills: 0 | Status: DISCONTINUED | OUTPATIENT
Start: 2023-11-15 | End: 2023-11-20

## 2023-11-15 RX ORDER — KETOCONAZOLE 20 MG/G
1 AEROSOL, FOAM TOPICAL
Refills: 0 | DISCHARGE

## 2023-11-15 RX ORDER — HALOPERIDOL DECANOATE 100 MG/ML
2.5 INJECTION INTRAMUSCULAR EVERY 4 HOURS
Refills: 0 | Status: DISCONTINUED | OUTPATIENT
Start: 2023-11-15 | End: 2023-11-20

## 2023-11-15 RX ORDER — INSULIN LISPRO 100/ML
VIAL (ML) SUBCUTANEOUS
Refills: 0 | Status: DISCONTINUED | OUTPATIENT
Start: 2023-11-15 | End: 2023-11-20

## 2023-11-15 RX ORDER — DEXTROSE 50 % IN WATER 50 %
15 SYRINGE (ML) INTRAVENOUS ONCE
Refills: 0 | Status: DISCONTINUED | OUTPATIENT
Start: 2023-11-15 | End: 2023-11-20

## 2023-11-15 RX ORDER — INSULIN LISPRO 100/ML
VIAL (ML) SUBCUTANEOUS EVERY 6 HOURS
Refills: 0 | Status: DISCONTINUED | OUTPATIENT
Start: 2023-11-15 | End: 2023-11-15

## 2023-11-15 RX ORDER — TRAZODONE HCL 50 MG
200 TABLET ORAL AT BEDTIME
Refills: 0 | Status: DISCONTINUED | OUTPATIENT
Start: 2023-11-15 | End: 2023-11-20

## 2023-11-15 RX ORDER — DEXAMETHASONE 0.5 MG/5ML
40 ELIXIR ORAL EVERY 24 HOURS
Refills: 0 | Status: DISCONTINUED | OUTPATIENT
Start: 2023-11-15 | End: 2023-11-19

## 2023-11-15 RX ADMIN — Medication 2 MILLIGRAM(S): at 05:50

## 2023-11-15 RX ADMIN — HALOPERIDOL DECANOATE 2 MILLIGRAM(S): 100 INJECTION INTRAMUSCULAR at 06:05

## 2023-11-15 RX ADMIN — Medication 200 MILLIGRAM(S): at 21:41

## 2023-11-15 RX ADMIN — Medication 25 MILLIGRAM(S): at 22:10

## 2023-11-15 RX ADMIN — Medication 2 MILLIGRAM(S): at 10:29

## 2023-11-15 RX ADMIN — HALOPERIDOL DECANOATE 2 MILLIGRAM(S): 100 INJECTION INTRAMUSCULAR at 16:04

## 2023-11-15 RX ADMIN — Medication 108 MILLIGRAM(S): at 21:09

## 2023-11-15 RX ADMIN — HALOPERIDOL DECANOATE 2 MILLIGRAM(S): 100 INJECTION INTRAMUSCULAR at 10:53

## 2023-11-15 RX ADMIN — HALOPERIDOL DECANOATE 2.5 MILLIGRAM(S): 100 INJECTION INTRAMUSCULAR at 21:48

## 2023-11-15 NOTE — PROVIDER CONTACT NOTE (OTHER) - ASSESSMENT
Pt nonverbal. Patient walking and pacing. Patient screaming and hitting himself. Mother at bedside. States, "He is only going to get worse."

## 2023-11-15 NOTE — BH CONSULTATION LIAISON ASSESSMENT NOTE - SUMMARY
23-year-old male w/ PPH significant for severe autism w/ intellectual disability, nonverbal and noncommunicative at baseline, PMHx significant for chronic ITP, sent to [facility] by his hematologist for severe thrombocytopenia (platelet count of 1k). Psychiatry consulted here due to severe agitation during current hospital course. Per chart review and discussion with primary team, patient pacing around and severely agitated. RRT called for this agitation -- patient had suddenly become combative and nonredirectable. Per primary team, patient was given lorazepam 2 mg PO. Per patient's mother, during RRT, patient usually receives haloperidol, which seems to be the medication that works in situations similar to his current agitation. Patient subsequently given haloperidol 2 mg IV during the RRT, ultimately becoming calm and placed back in bed.

## 2023-11-15 NOTE — CONSULT NOTE ADULT - ASSESSMENT
24 y/o M with a history of intellectual impairment, autism, nonverbal and noncommunicative at baseline, obstructive sleep apnea not on positive pressure ventilation, severe chronic ITP followed by and referred by Dr. Bhagat to the ER following CBC draw with platelet count of 1K.      Chronic ITP  - since the age of 18 months of age (post MMR vaccine)  - Follows with Dr. Bhagat at [facility]  - His platelets were 1k on 11/14, and Dr. Bhagat recommended patient to come into the ED  - Has extensive treatment history including Rituxan weekly x 4 completed 6/26/23, Nplate (LD 10/16/23, poor response). Intermittent IVIG, steroids, winrho (allergic reaction), rituxan in 2010 and 2021, and has been on Doptelet since 4/2021. ITP worsened in 2021 due to COVID. No splenectomy (poor operative candidate, mother refused vaccinations that would be required prior to the splenectomy).   - Mother is refusing IVIG.  - Would consider Tavalisse, mother wants to wait for GI evaluation to complete  - Please start pulse dexamethasone 40mg IV daily x 4 days with PPI  - Recommend second opinion from [facility] heme/onc    History of H pylori  - Patient's mother is adamant that she believes ITP is related to H pylori   - Recommend GI evaluation  - Recommend PPI    Get Cota PA-C  Hematology/Oncology  [facility]  [phone] (office)

## 2023-11-15 NOTE — PATIENT PROFILE ADULT - FUNCTIONAL ASSESSMENT - BASIC MOBILITY 6.
2-calculated by average/Not able to assess (calculate score using [facility] averaging method)

## 2023-11-15 NOTE — BH CONSULTATION LIAISON ASSESSMENT NOTE - NSBHATTESTCOMMENTATTENDFT_PSY_A_CORE
pt is a 24 y/o WM with hx of intellectual disability, non-verbal since birth, resides in group home, presents with ITP, and psych called for agitation. agree with neuro resident's A/P above. hx obtained from pt's mother who is at bedside and she reports pt has had good results with haldol and benadryl in the past. due to pt's worsening agitation, she would prefer standing haldol/benadryl for now.  Agree with the above, team to hold haldol/benadryl for sedation. check QTc prior to giving. no indication for inpt psychiatric admission.

## 2023-11-15 NOTE — BH CONSULTATION LIAISON ASSESSMENT NOTE - CURRENT MEDICATION
MEDICATIONS  (STANDING):  dexAMETHasone  IVPB 40 milliGRAM(s) IV Intermittent every 24 hours  dextrose 5%. 1000 milliLiter(s) (50 mL/Hr) IV Continuous <Continuous>  dextrose 5%. 1000 milliLiter(s) (100 mL/Hr) IV Continuous <Continuous>  dextrose 50% Injectable 12.5 Gram(s) IV Push once  dextrose 50% Injectable 25 Gram(s) IV Push once  dextrose 50% Injectable 25 Gram(s) IV Push once  glucagon  Injectable 1 milliGRAM(s) IntraMuscular once  insulin lispro (ADMELOG) corrective regimen sliding scale   SubCutaneous every 6 hours  pantoprazole    Tablet 40 milliGRAM(s) Oral before breakfast    MEDICATIONS  (PRN):  dextrose Oral Gel 15 Gram(s) Oral once PRN Blood Glucose LESS THAN 70 milliGRAM(s)/deciliter

## 2023-11-15 NOTE — RAPID RESPONSE TEAM SUMMARY - NSSITUATIONBACKGROUNDRRT_GEN_ALL_CORE
Patient is a 23 year old M with intellectual impairment, autism, chronic ITP, nonverbal and noncommuniciative at baseline who was sent in by his hematologist for PLT count of 1k. RRT called for agitation. Upon arrival, the patient was seen pacing around the room, unable to be redirected by his mother. Patient was not harming himself or others. However, per the mother, patient can suddenly become combative and nonredirectable. Per primary team, patient was already ordered for 2mg po ativan which was given during the rapid. Per the patient's mother, haldol is usually the only medication that works in situations like this, pt given 2 mg IV haldol. Positive reinforcement and reassurance provided by RN and patient's mother at bedside. Patient ultimately calmed and placed back in bed.

## 2023-11-15 NOTE — BH CONSULTATION LIAISON ASSESSMENT NOTE - NSBHCHARTREVIEWLAB_PSY_A_CORE FT
11-15    143  |  106  |  11  ----------------------------<  74  3.7   |  27  |  0.86    Ca    8.8      15 Nov 2023 11:28    TPro  6.0  /  Alb  4.0  /  TBili  0.8  /  DBili  x   /  AST  13  /  ALT  23  /  AlkPhos  55  11-14                            14.0   13.11 )-----------( 12       ( 15 Nov 2023 11:28 )             45.5       PT/INR - ( 14 Nov 2023 20:57 )   PT: 11.7 sec;   INR: 1.12 ratio         PTT - ( 14 Nov 2023 20:57 )  PTT:33.8 sec    LIVER FUNCTIONS - ( 14 Nov 2023 20:57 )  Alb: 4.0 g/dL / Pro: 6.0 g/dL / ALK PHOS: 55 U/L / ALT: 23 U/L / AST: 13 U/L / GGT: x             Urinalysis Basic - ( 15 Nov 2023 11:28 )    Color: x / Appearance: x / SG: x / pH: x  Gluc: 74 mg/dL / Ketone: x  / Bili: x / Urobili: x   Blood: x / Protein: x / Nitrite: x   Leuk Esterase: x / RBC: x / WBC x   Sq Epi: x / Non Sq Epi: x / Bacteria: x

## 2023-11-15 NOTE — PROGRESS NOTE ADULT - ASSESSMENT
Pt is a 23 year old M hx of intellectual disability, autism, nonverbal at baseline, CARLITOS not on CPAP, severe chronic ITP who presents to the ER with mother due to abnormal labs. mother states platelets were found to be 1K. states patient was advised to come in by hematologist       Chronic ITP   - Diagnosed at the age of 18 months    - Has extensive treatment history including Rituxan weekly x 4 completed 6/26/23, Nplate (LD 10/16/23, poor response). Intermittent IVIG, steroids, winrho (allergic reaction), rituxan in 2010 and 2021, and has been on Doptelet since    - Pt with refractory severe chronic ITP with past courses of Rituxan, IVIG and past platelet transfusion  - Pt is accompanied by his mother, mother would like to hold off on IVIG and platelet transfusion, and refusing CTT head  - S/P methylprednisolone in ER, Started on Dexamethsone 40 IV Q24 w/ PPI as per heme/onc   - Heme/Onc [facility] consulted  - 2nd opinion Heme/Onc eval      History of developmental disorder.   - History of severe developmental delay, autism, with patient nonverbal, noncommunicative with episodes of agitation   - Pt agitated this AM. S/P code gray and RRt due to agitation   - On 1:1 for safety   - Psych eval consulted; F/u recs now S/P Ativan and Trazodone in the ER.    H/O H. Pylori   - Mother reports patient was treated by an outpatient GI 8 week ago for H pylori, unsure of regimen patient was on.   - H. pylori was diagnosed with stool study. Mother reports repeat stool study was positive  - Mother believes ITP is due to H. pylori   - Start PPI   - Check H. Pylori antigen here   - GI eval     Hemangioma of spleen.   - mother reports patient with concern for splenic hemangioma outpatient  - F/u US Spleen and CT A/p       Discussed with Attending, ACP, [facility] and mother at the bedside.

## 2023-11-15 NOTE — BH CONSULTATION LIAISON ASSESSMENT NOTE - NSBHCONSULTRECOMMENDOTHER_PSY_A_CORE FT
Continue home trazodone 200 mg qHS  Continue home Rexulti 4 mg QD -- will likely need to be brought from group home and cleared by pharmacy  Start haloperidol 2.5 mg IV Q4H   Start diphenhydramine 25 mg IV Q4H    Please monitor QTc while on standing haloperidol.  Please monitor for respiratory depression while on medications above.    Would avoid standing lorazepam for now. Continue home trazodone 200 mg qHS  Continue home Rexulti 4 mg QD -- will likely need to be brought from group home and cleared by pharmacy  Consider haloperidol 2.5 mg IV Q4H and diphenhydramine 25 mg IV Q4H standing for now, as mother prefers this to help control pt's agitation better. HOLD for sedation.    Please monitor QTc while on standing haloperidol.  Please monitor for respiratory depression while on medications above.    Would avoid standing lorazepam for now.

## 2023-11-15 NOTE — CONSULT NOTE ADULT - NS ATTEND AMEND GEN_ALL_CORE FT
recommend pulse dex for 4 days. also splenectomy is option however pt mother doesn't think its a good idea. has been offered IVIG and tavalesse in past but has refused. recommend house heme onc for second opinion as recommended by Dr Bhagat

## 2023-11-15 NOTE — BH CONSULTATION LIAISON ASSESSMENT NOTE - HPI (INCLUDE ILLNESS QUALITY, SEVERITY, DURATION, TIMING, CONTEXT, MODIFYING FACTORS, ASSOCIATED SIGNS AND SYMPTOMS)
23-year-old male w/ PPH significant for severe autism w/ intellectual disability, nonverbal and noncommunicative at baseline, PMHx significant for chronic ITP, sent to [facility] by his hematologist for severe thrombocytopenia (platelet count of 1k). Psychiatry consulted here due to severe agitation during current hospital course. Per chart review and discussion with primary team, patient pacing around and severely agitated. RRT called for this agitation -- patient had suddenly become combative and nonredirectable. Per primary team, patient was given lorazepam 2 mg PO. Per patient's mother, during RRT, patient usually receives haloperidol, which seems to be the medication that works in situations similar to his current agitation. Patient subsequently given haloperidol 2 mg IV during the RRT, ultimately becoming calm and placed back in bed.     Of note, patient had been previously admitted to [facility] 6/2023 for chronic ITP w/ severe thrombocytopenia w/ episodes of acute agitation. Psychiatry saw patient while at [facility], where he was receiving haloperidol 3 mg IV/IM q4h w/ diphenhydramine 50 mg.       During today's encounter with patient, patient's mother states that he does not do well with Ativan IV, describing a paradoxical type of agitation during IV administration. Patient's mother recommending standing Haldol, given that patient will be receiving steroids and will inevitably become severely agitated, similar to his past hospital courses of agitation with steroid use.   23-year-old male w/ PPH significant for severe autism w/ intellectual disability, nonverbal and noncommunicative at baseline, PMHx significant for chronic ITP, sent to [facility] by his hematologist for severe thrombocytopenia (platelet count of 1k). Psychiatry consulted here due to severe agitation during current hospital course. Per chart review and discussion with primary team, patient pacing around and severely agitated. RRT called for this agitation -- patient had suddenly become combative and non-redirectable. Per primary team, patient was given lorazepam 2 mg PO. Per patient's mother, during RRT, patient usually receives haloperidol, which seems to be the medication that works in situations similar to his current agitation. Patient subsequently given haloperidol 2 mg IV during the RRT, ultimately becoming calm and placed back in bed.     Of note, patient had been previously admitted to [facility] 6/2023 for chronic ITP w/ severe thrombocytopenia w/ episodes of acute agitation. Psychiatry saw patient while at [facility], where he was receiving haloperidol 3 mg IV/IM q4h w/ diphenhydramine 50 mg.     Per chart review, regarding prior psychiatric consultation at [facility] 6/2023, patient's hospital course was notable for multiple code BERTs. On 6/6, noted to be intermittently agitated, receiving haloperidol 3 mg IM for agitation, noted to have been driven by hyperphagia secondary to steroids. On 6/7, multiple code BERTs overnight. At that time, [facility] psychiatry recommending discontinuation of haloperidol and starting chlorpromazine 50 mg IM/IV q6h for agitation. Of note, during that hospital course, patient was on lorazepam 1 mg TID standing for agitation. Per chart review, by about 6/9/23, patient's mother "adamantly refusing chlorpromazine; believes it worsens agitation despite ample evidence to the contrary."       During today's encounter with patient, patient's mother states that he does not do well with Ativan IV, describing a paradoxical type of agitation during IV administration. Patient's mother recommending standing Haldol, given that patient will be receiving steroids and will inevitably become severely agitated, similar to his past hospital courses of agitation with steroid use.

## 2023-11-15 NOTE — BH CONSULTATION LIAISON ASSESSMENT NOTE - NSBHCHARTREVIEWVS_PSY_A_CORE FT
Vital Signs Last 24 Hrs  T(C): 36.7 (15 Nov 2023 05:01), Max: 36.7 (15 Nov 2023 05:01)  T(F): 98 (15 Nov 2023 05:01), Max: 98 (15 Nov 2023 05:01)  HR: 90 (15 Nov 2023 05:01) (84 - 94)  BP: 94/57 (15 Nov 2023 05:01) (94/57 - 123/91)  BP(mean): --  RR: 19 (15 Nov 2023 05:01) (16 - 20)  SpO2: 99% (15 Nov 2023 05:01) (96% - 99%)    Parameters below as of 15 Nov 2023 05:01  Patient On (Oxygen Delivery Method): room air

## 2023-11-15 NOTE — CHART NOTE - NSCHARTNOTEFT_GEN_A_CORE
Notified by RN pt severely agitated. RRT called by mother at bedside for acute agitation. Ativan 2 mg PO x1 (pt cannot get IV as worsens agitation per mother) and haldol 2 mg IVP x1 given which pt has received in the past. Pt previously admitted to [facility] for chronic ITP w/ severe thrombocytopenia where they also had episodes of acute agitation. Psych was following during that admission where pt was receiving haldol 3 mg IV/IM q4h w/ benadryl 50 mg for the same issue. Following administration of medications during RRT, pt began to calm down though again started getting out of bed and code rachel was called. Once hospital bed was delivered and pt's mother helped the pt get in bed, pt was calm and code ended. Pt has been calm since though will place pt on constant observation as pt remains a threat to self and others. Would recommend consulting psych for further management. Advised team to monitor for oversedation. Pt placed on continuous pulse ox; will c/w routine vital signs. Will endorse to day team, attending to follow.     Toan Toure PA-C  Department of Medicine

## 2023-11-16 LAB
ALBUMIN SERPL ELPH-MCNC: 4.5 G/DL — SIGNIFICANT CHANGE UP (ref 3.3–5)
ALBUMIN SERPL ELPH-MCNC: 4.5 G/DL — SIGNIFICANT CHANGE UP (ref 3.3–5)
ALP SERPL-CCNC: 65 U/L — SIGNIFICANT CHANGE UP (ref 40–120)
ALP SERPL-CCNC: 65 U/L — SIGNIFICANT CHANGE UP (ref 40–120)
ALT FLD-CCNC: 24 U/L — SIGNIFICANT CHANGE UP (ref 10–45)
ALT FLD-CCNC: 24 U/L — SIGNIFICANT CHANGE UP (ref 10–45)
ANION GAP SERPL CALC-SCNC: 12 MMOL/L — SIGNIFICANT CHANGE UP (ref 5–17)
ANION GAP SERPL CALC-SCNC: 12 MMOL/L — SIGNIFICANT CHANGE UP (ref 5–17)
AST SERPL-CCNC: 15 U/L — SIGNIFICANT CHANGE UP (ref 10–40)
AST SERPL-CCNC: 15 U/L — SIGNIFICANT CHANGE UP (ref 10–40)
BILIRUB SERPL-MCNC: 0.7 MG/DL — SIGNIFICANT CHANGE UP (ref 0.2–1.2)
BILIRUB SERPL-MCNC: 0.7 MG/DL — SIGNIFICANT CHANGE UP (ref 0.2–1.2)
BUN SERPL-MCNC: 21 MG/DL — SIGNIFICANT CHANGE UP (ref 7–23)
BUN SERPL-MCNC: 21 MG/DL — SIGNIFICANT CHANGE UP (ref 7–23)
CALCIUM SERPL-MCNC: 9.5 MG/DL — SIGNIFICANT CHANGE UP (ref 8.4–10.5)
CALCIUM SERPL-MCNC: 9.5 MG/DL — SIGNIFICANT CHANGE UP (ref 8.4–10.5)
CHLORIDE SERPL-SCNC: 104 MMOL/L — SIGNIFICANT CHANGE UP (ref 96–108)
CHLORIDE SERPL-SCNC: 104 MMOL/L — SIGNIFICANT CHANGE UP (ref 96–108)
CO2 SERPL-SCNC: 24 MMOL/L — SIGNIFICANT CHANGE UP (ref 22–31)
CO2 SERPL-SCNC: 24 MMOL/L — SIGNIFICANT CHANGE UP (ref 22–31)
CREAT SERPL-MCNC: 0.83 MG/DL — SIGNIFICANT CHANGE UP (ref 0.5–1.3)
CREAT SERPL-MCNC: 0.83 MG/DL — SIGNIFICANT CHANGE UP (ref 0.5–1.3)
EGFR: 125 ML/MIN/1.73M2 — SIGNIFICANT CHANGE UP
EGFR: 125 ML/MIN/1.73M2 — SIGNIFICANT CHANGE UP
GLUCOSE SERPL-MCNC: 179 MG/DL — HIGH (ref 70–99)
GLUCOSE SERPL-MCNC: 179 MG/DL — HIGH (ref 70–99)
HCT VFR BLD CALC: 48.3 % — SIGNIFICANT CHANGE UP (ref 39–50)
HCT VFR BLD CALC: 48.3 % — SIGNIFICANT CHANGE UP (ref 39–50)
HGB BLD-MCNC: 14.9 G/DL — SIGNIFICANT CHANGE UP (ref 13–17)
HGB BLD-MCNC: 14.9 G/DL — SIGNIFICANT CHANGE UP (ref 13–17)
MCHC RBC-ENTMCNC: 25.5 PG — LOW (ref 27–34)
MCHC RBC-ENTMCNC: 25.5 PG — LOW (ref 27–34)
MCHC RBC-ENTMCNC: 30.8 GM/DL — LOW (ref 32–36)
MCHC RBC-ENTMCNC: 30.8 GM/DL — LOW (ref 32–36)
MCV RBC AUTO: 82.7 FL — SIGNIFICANT CHANGE UP (ref 80–100)
MCV RBC AUTO: 82.7 FL — SIGNIFICANT CHANGE UP (ref 80–100)
NRBC # BLD: 0 /100 WBCS — SIGNIFICANT CHANGE UP (ref 0–0)
NRBC # BLD: 0 /100 WBCS — SIGNIFICANT CHANGE UP (ref 0–0)
PLATELET # BLD AUTO: 2 K/UL — CRITICAL LOW (ref 150–400)
PLATELET # BLD AUTO: 2 K/UL — CRITICAL LOW (ref 150–400)
POTASSIUM SERPL-MCNC: 4 MMOL/L — SIGNIFICANT CHANGE UP (ref 3.5–5.3)
POTASSIUM SERPL-MCNC: 4 MMOL/L — SIGNIFICANT CHANGE UP (ref 3.5–5.3)
POTASSIUM SERPL-SCNC: 4 MMOL/L — SIGNIFICANT CHANGE UP (ref 3.5–5.3)
POTASSIUM SERPL-SCNC: 4 MMOL/L — SIGNIFICANT CHANGE UP (ref 3.5–5.3)
PROT SERPL-MCNC: 6.9 G/DL — SIGNIFICANT CHANGE UP (ref 6–8.3)
PROT SERPL-MCNC: 6.9 G/DL — SIGNIFICANT CHANGE UP (ref 6–8.3)
RBC # BLD: 5.84 M/UL — HIGH (ref 4.2–5.8)
RBC # BLD: 5.84 M/UL — HIGH (ref 4.2–5.8)
RBC # FLD: 16.1 % — HIGH (ref 10.3–14.5)
RBC # FLD: 16.1 % — HIGH (ref 10.3–14.5)
SODIUM SERPL-SCNC: 140 MMOL/L — SIGNIFICANT CHANGE UP (ref 135–145)
SODIUM SERPL-SCNC: 140 MMOL/L — SIGNIFICANT CHANGE UP (ref 135–145)
WBC # BLD: 11.73 K/UL — HIGH (ref 3.8–10.5)
WBC # BLD: 11.73 K/UL — HIGH (ref 3.8–10.5)
WBC # FLD AUTO: 11.73 K/UL — HIGH (ref 3.8–10.5)
WBC # FLD AUTO: 11.73 K/UL — HIGH (ref 3.8–10.5)

## 2023-11-16 RX ORDER — LACTOBACILLUS ACIDOPHILUS 100MM CELL
1 CAPSULE ORAL
Refills: 0 | Status: DISCONTINUED | OUTPATIENT
Start: 2023-11-16 | End: 2023-11-20

## 2023-11-16 RX ADMIN — HALOPERIDOL DECANOATE 2.5 MILLIGRAM(S): 100 INJECTION INTRAMUSCULAR at 01:23

## 2023-11-16 RX ADMIN — HALOPERIDOL DECANOATE 2.5 MILLIGRAM(S): 100 INJECTION INTRAMUSCULAR at 23:09

## 2023-11-16 RX ADMIN — Medication 200 MILLIGRAM(S): at 23:08

## 2023-11-16 RX ADMIN — Medication 400 MILLIGRAM(S): at 12:14

## 2023-11-16 RX ADMIN — Medication 108 MILLIGRAM(S): at 23:11

## 2023-11-16 RX ADMIN — HALOPERIDOL DECANOATE 2.5 MILLIGRAM(S): 100 INJECTION INTRAMUSCULAR at 16:14

## 2023-11-16 RX ADMIN — Medication 25 MILLIGRAM(S): at 10:02

## 2023-11-16 RX ADMIN — Medication 25 MILLIGRAM(S): at 16:15

## 2023-11-16 RX ADMIN — Medication 25 MILLIGRAM(S): at 06:03

## 2023-11-16 RX ADMIN — Medication 400 MILLIGRAM(S): at 05:57

## 2023-11-16 RX ADMIN — BREXPIPRAZOLE 4 MILLIGRAM(S): 0.25 TABLET ORAL at 12:14

## 2023-11-16 RX ADMIN — Medication 2 MILLIGRAM(S): at 11:01

## 2023-11-16 RX ADMIN — Medication 400 MILLIGRAM(S): at 22:55

## 2023-11-16 RX ADMIN — Medication 0: at 12:44

## 2023-11-16 RX ADMIN — Medication 25 MILLIGRAM(S): at 23:09

## 2023-11-16 RX ADMIN — HALOPERIDOL DECANOATE 2.5 MILLIGRAM(S): 100 INJECTION INTRAMUSCULAR at 10:01

## 2023-11-16 RX ADMIN — Medication 1 TABLET(S): at 20:30

## 2023-11-16 RX ADMIN — HALOPERIDOL DECANOATE 2.5 MILLIGRAM(S): 100 INJECTION INTRAMUSCULAR at 06:02

## 2023-11-16 RX ADMIN — Medication 25 MILLIGRAM(S): at 01:23

## 2023-11-16 NOTE — PROVIDER CONTACT NOTE (CRITICAL VALUE NOTIFICATION) - ASSESSMENT
Alert and responsive, nonverbal. Skin color good warm and dry to touch. Respirations regular and unlabored. No signs or symptoms of pain or discomfort. Mother at bedside.

## 2023-11-16 NOTE — PROGRESS NOTE ADULT - ASSESSMENT
Pt is a 23 year old M hx of intellectual disability, autism, nonverbal at baseline, CARLITOS not on CPAP, severe chronic ITP who presents to the ER with mother due to abnormal labs. mother states platelets were found to be 1K. states patient was advised to come in by hematologist       Chronic ITP   - Diagnosed at the age of 18 months    - Has extensive treatment history including Rituxan weekly x 4 completed 6/26/23, Nplate (LD 10/16/23, poor response). Intermittent IVIG, steroids, winrho (allergic reaction), rituxan in 2010 and 2021, and has been on Doptelet since    - Pt with refractory severe chronic ITP with past courses of Rituxan, IVIG and past platelet transfusion  - Pt is accompanied by his mother, mother would like to hold off on IVIG and platelet transfusion, and refusing CTT head  - S/P methylprednisolone in ER, C/w Dexamethsone 40 IV Q24 w/ PPI as per heme/onc   - Heme/Onc [facility] consult appreciated; F/u recs   - 2nd opinion Heme/Onc eval      History of developmental disorder.   - History of severe developmental delay, autism, with patient nonverbal, noncommunicative with episodes of agitation   - S/P code gray and RRT due to agitation 11/15  - On 1:1 for safety   - Psych eval consulted; F/u recs      H/O H. Pylori   - Mother reports patient was treated by an outpatient GI 8 week ago for H pylori, unsure of regimen patient was on.   - H. pylori was diagnosed with stool study. Mother reports repeat stool study was positive  - Mother believes ITP is due to H. pylori   - C/w PPI   - Check H. Pylori antigen --> In lab   - Mother is attempting to obtain previous Rx patient received from [facility]   - GI eval in AM, consulted     Hemangioma of spleen.   - Mother reports patient with concern for splenic hemangioma outpatient  - CT A/P Negative for intraabdominal findings. No splenic mass     Discussed with Attending, ACP, Mother and [facility] RN at the bedside.  Pt is a 23 year old M hx of intellectual disability, autism, nonverbal at baseline, CARLITOS not on CPAP, severe chronic ITP who presents to the ER with mother due to abnormal labs. mother states platelets were found to be 1K. states patient was advised to come in by hematologist       Chronic ITP   - Diagnosed at the age of 18 months    - Has extensive treatment history including Rituxan weekly x 4 completed 6/26/23, Nplate (LD 10/16/23, poor response). Intermittent IVIG, steroids, winrho (allergic reaction), rituxan in 2010 and 2021, and has been on Doptelet since    - Pt with refractory severe chronic ITP with past courses of Rituxan, IVIG and past platelet transfusion  - Pt is accompanied by his mother, mother would like to hold off on IVIG and platelet transfusion, and refusing CTT head  - S/P methylprednisolone in ER, C/w Dexamethsone 40 IV Q24 w/ PPI as per heme/onc   - Heme/Onc [facility] consult appreciated; F/u recs   - 2nd opinion Heme/Onc eval      History of developmental disorder.   - History of severe developmental delay, autism, with patient nonverbal, noncommunicative with episodes of agitation   - S/P code gray and RRT due to agitation 11/15  - On 1:1 for safety   - Psych eval consulted; F/u recs      H/O H. Pylori   - Mother reports patient was treated by an outpatient GI 8 week ago for H pylori, unsure of regimen patient was on.   - H. pylori was diagnosed with stool study. Mother reports repeat stool study was positive  - Mother believes ITP is due to H. pylori   - C/w PPI   - Check H. Pylori antigen --> In lab   - Mother is attempting to obtain previous Rx patient received from [facility]   - GI eval in AM, consulted     Hemangioma of spleen.   - Mother reports patient with concern for splenic hemangioma outpatient  - CT A/P Negative for intraabdominal findings. No splenic mass     Leukocytosis  - Likely 2/2 steroids. Continue to monitor and trend. If febrile, check pan cultures    Discussed with Attending, ACP, Mother and [facility] RN at the bedside.

## 2023-11-16 NOTE — PROGRESS NOTE ADULT - ASSESSMENT
22 y/o M with a history of intellectual impairment, autism, nonverbal and noncommunicative at baseline, obstructive sleep apnea not on positive pressure ventilation, severe chronic ITP followed by and referred by Dr. Bhagat to the ER following CBC draw with platelet count of 1K.      Chronic ITP  - since the age of 18 months of age (post MMR vaccine)  - Follows with Dr. Bhagat at [facility]  - His platelets were 1k on 11/14, and Dr. Bhagat recommended patient to come into the ED  - steroid refractory, no response to high/low dose steroids, rituxan, or TPO agonist.  - Has extensive treatment history including Rituxan weekly x 4 completed 6/26/23, Nplate (LD 10/16/23, poor response). Intermittent IVIG, steroids, winrho (allergic reaction), rituxan in 2010 and 2021, doptelet since 4/2021- 2023, then npalte- now off.  ITP worsened in 2021 due to COVID. No splenectomy (poor operative candidate, mother refused vaccinations that would be required prior to the splenectomy).   - Mother is refusing IVIG- concerned about COVID spike proteins.  She is only agreeable to IVIG if he bleeds  - Would consider Tavalisse, mother wants to wait for GI evaluation to complete  - can finish pulse dexamethasone 40mg IV daily x 4 days with PPI, likely no response  - plan for treatment of hpylori- told it was positive at Dr. Monge's office  - Recommend second opinion from [facility] heme/onc    History of H pylori  - Recommend GI evaluation  - Recommend PPI    Rosaura Bhagat D.O.   Hematology/Oncology  [facility]  [phone] (office)

## 2023-11-17 LAB
H PYLORI AG STL QL: POSITIVE
H PYLORI AG STL QL: POSITIVE
HCT VFR BLD CALC: 44.5 % — SIGNIFICANT CHANGE UP (ref 39–50)
HCT VFR BLD CALC: 44.5 % — SIGNIFICANT CHANGE UP (ref 39–50)
HGB BLD-MCNC: 14 G/DL — SIGNIFICANT CHANGE UP (ref 13–17)
HGB BLD-MCNC: 14 G/DL — SIGNIFICANT CHANGE UP (ref 13–17)
MCHC RBC-ENTMCNC: 25.8 PG — LOW (ref 27–34)
MCHC RBC-ENTMCNC: 25.8 PG — LOW (ref 27–34)
MCHC RBC-ENTMCNC: 31.5 GM/DL — LOW (ref 32–36)
MCHC RBC-ENTMCNC: 31.5 GM/DL — LOW (ref 32–36)
MCV RBC AUTO: 82 FL — SIGNIFICANT CHANGE UP (ref 80–100)
MCV RBC AUTO: 82 FL — SIGNIFICANT CHANGE UP (ref 80–100)
NRBC # BLD: 0 /100 WBCS — SIGNIFICANT CHANGE UP (ref 0–0)
NRBC # BLD: 0 /100 WBCS — SIGNIFICANT CHANGE UP (ref 0–0)
PLATELET # BLD AUTO: 3 K/UL — CRITICAL LOW (ref 150–400)
PLATELET # BLD AUTO: 3 K/UL — CRITICAL LOW (ref 150–400)
RBC # BLD: 5.43 M/UL — SIGNIFICANT CHANGE UP (ref 4.2–5.8)
RBC # BLD: 5.43 M/UL — SIGNIFICANT CHANGE UP (ref 4.2–5.8)
RBC # FLD: 16.1 % — HIGH (ref 10.3–14.5)
RBC # FLD: 16.1 % — HIGH (ref 10.3–14.5)
WBC # BLD: 20.53 K/UL — HIGH (ref 3.8–10.5)
WBC # BLD: 20.53 K/UL — HIGH (ref 3.8–10.5)
WBC # FLD AUTO: 20.53 K/UL — HIGH (ref 3.8–10.5)
WBC # FLD AUTO: 20.53 K/UL — HIGH (ref 3.8–10.5)

## 2023-11-17 PROCEDURE — 99223 1ST HOSP IP/OBS HIGH 75: CPT

## 2023-11-17 RX ADMIN — Medication 200 MILLIGRAM(S): at 21:57

## 2023-11-17 RX ADMIN — Medication 108 MILLIGRAM(S): at 21:57

## 2023-11-17 RX ADMIN — HALOPERIDOL DECANOATE 2.5 MILLIGRAM(S): 100 INJECTION INTRAMUSCULAR at 14:17

## 2023-11-17 RX ADMIN — HALOPERIDOL DECANOATE 2.5 MILLIGRAM(S): 100 INJECTION INTRAMUSCULAR at 08:08

## 2023-11-17 RX ADMIN — Medication 400 MILLIGRAM(S): at 21:55

## 2023-11-17 RX ADMIN — Medication 25 MILLIGRAM(S): at 14:17

## 2023-11-17 RX ADMIN — Medication 25 MILLIGRAM(S): at 08:09

## 2023-11-17 RX ADMIN — Medication 1: at 08:43

## 2023-11-17 NOTE — DIETITIAN INITIAL EVALUATION ADULT - ADD RECOMMEND
-Recommend liberalizing Consistent Carbohydrate diet to Regular to promote eating habits similar to PTA in setting of patient with severe develpomental delay and agitation noted during current admission. Pt's mother ordering food for patient, verbalized awareness of monitoring carbohydrate content in food ordered for patient. RD to allow finger foods in setting of patient with severe developmental delay. RD to allow double portions at meals.  -Recommend adding Multivitamin pending no medical contraindications, to optimize nutrient intake.  -Continue with lactobacillus acidophilus pending no medical contraindications. Pt's mother inquiring about prebiotic supplement - Consider adding if available, pending no medical contraindications.  -Monitor PO intake, diet, weight, labs, skin, GI symptoms, and BM regularity.  -RD remains available upon request and will follow up per protocol.

## 2023-11-17 NOTE — DIETITIAN INITIAL EVALUATION ADULT - PERTINENT MEDS FT
MEDICATIONS  (STANDING):  brexpiprazole 4 milliGRAM(s) Oral daily  cimetidine 400 milliGRAM(s) Oral three times a day  dexAMETHasone  IVPB 40 milliGRAM(s) IV Intermittent every 24 hours  insulin lispro (ADMELOG) corrective regimen sliding scale   SubCutaneous three times a day before meals  insulin lispro (ADMELOG) corrective regimen sliding scale   SubCutaneous at bedtime  lactobacillus acidophilus 1 Tablet(s) Oral three times a day with meals  pantoprazole    Tablet 40 milliGRAM(s) Oral before breakfast  traZODone 200 milliGRAM(s) Oral at bedtime    MEDICATIONS  (PRN):  diphenhydrAMINE Injectable 25 milliGRAM(s) IV Push every 4 hours PRN agitation  haloperidol    Injectable 2.5 milliGRAM(s) IV Push every 4 hours PRN Agitation

## 2023-11-17 NOTE — CONSULT NOTE ADULT - REASON FOR ADMISSION
Sent by Hematologist to the ER for low platelets, easy bruisability

## 2023-11-17 NOTE — DIETITIAN INITIAL EVALUATION ADULT - PERTINENT LABORATORY DATA
11-16    140  |  104  |  21  ----------------------------<  179<H>  4.0   |  24  |  0.83    Ca    9.5      16 Nov 2023 07:39    TPro  6.9  /  Alb  4.5  /  TBili  0.7  /  DBili  x   /  AST  15  /  ALT  24  /  AlkPhos  65  11-16    POCT Blood Glucose.: 152 mg/dL (17 Nov 2023 08:26)  POCT Blood Glucose.: 121 mg/dL (16 Nov 2023 20:18)  POCT Blood Glucose.: 132 mg/dL (16 Nov 2023 12:22)    A1C with Estimated Average Glucose Result: 5.1 % (11-15-23 @ 11:28)

## 2023-11-17 NOTE — DIETITIAN INITIAL EVALUATION ADULT - REASON INDICATOR FOR ASSESSMENT
RD assessment warranted for: Consult for Nutrition Support Team - "HX OF DEVELOPMENTAL DELAY, POOR PO." Chart reviewed, events noted.  Source: medical record, mother at bedside, PCA at bedside, discussion with dietetic technician.  Patient nonverbal at baseline per chart and patient asleep during visit.

## 2023-11-17 NOTE — DIETITIAN INITIAL EVALUATION ADULT - ENERGY INTAKE
% intake per flowsheets. Mother at bedside reporting patient with high appetite in setting of steroids. Per discussion with dietetic technician who spoke to pt's mother via phone, pt's mother reported that patient can become violent and act out at times if incorrect food is served or overwhelmed. Mother at visit reporting that Consistent Carbohydrate diet restriction has been affecting patient's PO intake.  feeding assistance per flowsheets/Fair (50-75%)

## 2023-11-17 NOTE — CONSULT NOTE ADULT - SUBJECTIVE AND OBJECTIVE BOX
Chief Complaint:  Patient is a 23y old  Male who presents with a chief complaint of Sent by Hematologist to the ER for low platelets, easy bruisability (17 Nov 2023 11:38)    Intellectual disability    Chronic ITP (idiopathic thrombocytopenia)    Dental caries on smooth surface penetrating into dentin    Autism    CARLITOS (obstructive sleep apnea)    2019 novel coronavirus disease (COVID-19)    COVID-19 vaccine series declined    No significant past surgical history    History of dental examination       HPI:  24 y/o M--unable to obtain history from patient--mother in attendance--patient with a history of intellectual impairment, autism, nonverbal and noncommunicative at baseline, obstructive sleep apnoea not on positive pressure ventilation, severe chronic ITP followed by and referred by Dr. Bhagat above to the ER following CBC draw with platelet count of 1K.  Patient with multiple hospitalizations, last at [facility] with discharge on June 12 2023, with past treatment with pulse steroids (past complications of increased agitation due to steroids), intolerance with paradoxical increase in agitation with IV Haldol, Thorazine, Benadryl, past Rituxan (following desensitization), and past platelet transfusions, IVIG, past COVID-19 infection in 2021 with apparent worsening of patient's baseline platelet counts  (patient remains UNVACCINATED with mother declining such), with the mother reports that she has researched other studies with gut erik with infection with H. pylori and has been having his son see a GI on this issue, including a reported spleen hemangioma (mother was planning to have an outpatient CT abdomen/pelvis with IV contrast on this issue), with mother reports her concern of patient's development for autism from the patient's original MMR vaccine at age [age] with mother notes a LEFT infraorbital ecchymoses for the past week and scattered bruises on the LEFT chest/mammary area and back.  Unable to obtain history from patient  Mother was offered option for IVIG and platelet transfusion by the ER following conversation by the ER attending with Dr. Bhagat and mother refused. GI called to help with management of H. Pylori. Mother feels his ITP is associated to his H. Pylori which was treated a few years back with amox, clarithro, and ppi, however, he was not tested post treatment for eradication.       WinRho SDF (Hives)  Bactrim (Hives)      brexpiprazole 4 milliGRAM(s) Oral daily  cimetidine 400 milliGRAM(s) Oral three times a day  dexAMETHasone  IVPB 40 milliGRAM(s) IV Intermittent every 24 hours  dextrose 5%. 1000 milliLiter(s) IV Continuous <Continuous>  dextrose 5%. 1000 milliLiter(s) IV Continuous <Continuous>  dextrose 50% Injectable 25 Gram(s) IV Push once  dextrose 50% Injectable 12.5 Gram(s) IV Push once  dextrose 50% Injectable 25 Gram(s) IV Push once  dextrose Oral Gel 15 Gram(s) Oral once PRN  diphenhydrAMINE Injectable 25 milliGRAM(s) IV Push every 4 hours PRN  glucagon  Injectable 1 milliGRAM(s) IntraMuscular once  haloperidol    Injectable 2.5 milliGRAM(s) IV Push every 4 hours PRN  insulin lispro (ADMELOG) corrective regimen sliding scale   SubCutaneous three times a day before meals  insulin lispro (ADMELOG) corrective regimen sliding scale   SubCutaneous at bedtime  lactobacillus acidophilus 1 Tablet(s) Oral three times a day with meals  pantoprazole    Tablet 40 milliGRAM(s) Oral before breakfast  traZODone 200 milliGRAM(s) Oral at bedtime        FAMILY HISTORY:  FH: hypertension          Review of Systems:    General:  No wt loss, fevers, chills, night sweats, fatigue  Eyes:  Good vision, no reported pain  ENT:  No sore throat, pain, runny nose, dysphagia  CV:  No pain, palpitations, no lightheadedness  Resp:  No dyspnea, cough, tachypnea, wheezing  GI: per HPI  :  No pain, bleeding, incontinence, nocturia  Muscle:  No pain, weakness  Neuro:  No weakness, tingling, memory problems  Psych:  No fatigue, insomnia, mood problems, depression  Endocrine:  No polyuria, polydypsia, cold/heat intolerance  Heme:  No petechiae, ecchymosis, easy bruisability  Skin:  No rash, tattoos, scars, edema    Relevant Family History:   n/c    Relevant Social History: n/c      Physical Exam:    Vital Signs:  Vital Signs Last 24 Hrs  T(C): 36.6 (17 Nov 2023 12:34), Max: 37.1 (16 Nov 2023 14:45)  T(F): 97.8 (17 Nov 2023 12:34), Max: 98.7 (16 Nov 2023 14:45)  HR: 79 (17 Nov 2023 12:34) (79 - 118)  BP: 126/76 (17 Nov 2023 12:34) (124/75 - 141/86)  BP(mean): --  RR: 18 (17 Nov 2023 12:34) (16 - 18)  SpO2: 94% (17 Nov 2023 12:34) (94% - 97%)    Parameters below as of 17 Nov 2023 12:34  Patient On (Oxygen Delivery Method): room air      Daily     Daily     General:  Appears stated age, well-groomed, nad  HEENT:  NC/AT,  conjunctivae clear and pink, no thyromegaly, nodules, adenopathy, no JVD  Chest:  Full & symmetric excursion, no increased effort, breath sounds clear  Cardiovascular:  Regular rhythm, S1, S2, no murmur/rub/S3/S4, no abdominal bruit, no edema  Abdomen:  Soft, non-tender, non-distended, normoactive bowel sounds,  no masses ,no hepatosplenomeagaly, no signs of chronic liver disease  Extremities:  no cyanosis,clubbing or edema  Skin:  No rash/erythema/ecchymoses/petechiae/wounds/abscess/warm/dry  Neuro/Psych:  A&Ox1, no asterixis, no tremor, no encephalopathy    Laboratory:                            14.0   20.53 )-----------( 3        ( 17 Nov 2023 07:16 )             44.5     11-16    140  |  104  |  21  ----------------------------<  179<H>  4.0   |  24  |  0.83    Ca    9.5      16 Nov 2023 07:39    TPro  6.9  /  Alb  4.5  /  TBili  0.7  /  DBili  x   /  AST  15  /  ALT  24  /  AlkPhos  65  11-16    LIVER FUNCTIONS - ( 16 Nov 2023 07:39 )  Alb: 4.5 g/dL / Pro: 6.9 g/dL / ALK PHOS: 65 U/L / ALT: 24 U/L / AST: 15 U/L / GGT: x             Urinalysis Basic - ( 16 Nov 2023 07:39 )    Color: x / Appearance: x / SG: x / pH: x  Gluc: 179 mg/dL / Ketone: x  / Bili: x / Urobili: x   Blood: x / Protein: x / Nitrite: x   Leuk Esterase: x / RBC: x / WBC x   Sq Epi: x / Non Sq Epi: x / Bacteria: x        Imaging:          
HEMATOLOGY ONCOLOGY CONSULT     Patient is a 23y old  Male who presents with a chief complaint of Thrombocytopenia     (17 Nov 2023 10:43)      HPI:  NIGHT HOSPITALIST:   Patient UNKNOWN to me previously, assigned to me at this point via the ER and by Dr. Driscoll to admit this 24 y/o M--unable to obtain history from patient--mother in attendance--patient with a history of intellectual impairment, autism, nonverbal and noncommunicative at baseline, obstructive sleep apnoea not on positive pressure ventilation, severe chronic ITP followed by and referred by Dr. Bhagat above to the ER following CBC draw with platelet count of 1K.  Patient with multiple hospitalizations, last at [facility] with discharge on June 12 2023, with past treatment with pulse steroids (past complications of increased agitation due to steroids), intolerance with paradoxical increase in agitation with IV Haldol, Thorazine, Benadryl, past Rituxan (following desensitization), and past platelet transfusions, IVIG, past COVID-19 infection in 2021 with apparent worsening of patient's baseline platelet counts  (patient remains UNVACCINATED with mother declining such), with the mother reports that she has researched other studies with gut erik with infection with H. pylori and has been having his son see a GI on this issue, including a reported spleen hemangioma (mother was planning to have an outpatient CTT abdomen/pelvis with IV contrast on this issue), with mother reports her concern of patient's development for autism from the patient's original MMR vaccine at age [age] (unclear if this was referenced by the mother from the original [facility] association in 1998?) with mother notes a LEFT infraorbital ecchymoses for the past week and scattered bruises on the LEFT chest/mammary area and back.  Unable to obtain history from patient--patient now sedated past Rx of oral Ativan and Trazodone 200 mg given in the ER--mother reports that patient's prior Trazodone dose of 150 mg at night with patient's Medex did not prevent agitation in patient.   Mother was offered option for IVIG and platelet transfusion by the ER following conversation by the ER attending with Dr. Bhagat and mother refused.  I spoke to the mother who confirms such.   I also reviewed with the patient's mother for a noncontrast CTT head to exclude intracranial process given the low platelets--the patient's mother refused. (14 Nov 2023 23:20)       ROS:  Negative except for:    PAST MEDICAL & SURGICAL HISTORY:  Intellectual disability      Chronic ITP (idiopathic thrombocytopenia)      Dental caries on smooth surface penetrating into dentin      Autism      CARLITOS (obstructive sleep apnea)      2019 novel coronavirus disease (COVID-19)      COVID-19 vaccine series declined      History of dental examination          SOCIAL HISTORY:    FAMILY HISTORY:  FH: hypertension        MEDICATIONS  (STANDING):  brexpiprazole 4 milliGRAM(s) Oral daily  cimetidine 400 milliGRAM(s) Oral three times a day  dexAMETHasone  IVPB 40 milliGRAM(s) IV Intermittent every 24 hours  dextrose 5%. 1000 milliLiter(s) (100 mL/Hr) IV Continuous <Continuous>  dextrose 5%. 1000 milliLiter(s) (50 mL/Hr) IV Continuous <Continuous>  dextrose 50% Injectable 25 Gram(s) IV Push once  dextrose 50% Injectable 12.5 Gram(s) IV Push once  dextrose 50% Injectable 25 Gram(s) IV Push once  glucagon  Injectable 1 milliGRAM(s) IntraMuscular once  insulin lispro (ADMELOG) corrective regimen sliding scale   SubCutaneous three times a day before meals  insulin lispro (ADMELOG) corrective regimen sliding scale   SubCutaneous at bedtime  lactobacillus acidophilus 1 Tablet(s) Oral three times a day with meals  pantoprazole    Tablet 40 milliGRAM(s) Oral before breakfast  traZODone 200 milliGRAM(s) Oral at bedtime    MEDICATIONS  (PRN):  dextrose Oral Gel 15 Gram(s) Oral once PRN Blood Glucose LESS THAN 70 milliGRAM(s)/deciliter  diphenhydrAMINE Injectable 25 milliGRAM(s) IV Push every 4 hours PRN agitation  haloperidol    Injectable 2.5 milliGRAM(s) IV Push every 4 hours PRN Agitation      Allergies    WinRho SDF (Hives)  Bactrim (Hives)    Intolerances        Vital Signs Last 24 Hrs  T(C): 36.3 (17 Nov 2023 06:30), Max: 37.1 (16 Nov 2023 14:45)  T(F): 97.3 (17 Nov 2023 06:30), Max: 98.7 (16 Nov 2023 14:45)  HR: 79 (17 Nov 2023 06:30) (79 - 118)  BP: 131/83 (17 Nov 2023 06:30) (124/75 - 141/86)  BP(mean): --  RR: 16 (17 Nov 2023 06:30) (16 - 18)  SpO2: 95% (17 Nov 2023 06:30) (95% - 97%)    Parameters below as of 17 Nov 2023 06:30  Patient On (Oxygen Delivery Method): room air        PHYSICAL EXAM  General: adult in NAD  HEENT: clear oropharynx, anicteric sclera, pink conjunctiva  Neck: supple  CV: normal S1/S2 with no murmur rubs or gallops  Lungs: positive air movement b/l ant lungs,clear to auscultation, no wheezes, no rales  Abdomen: soft non-tender non-distended, no hepatosplenomegaly  Ext: no clubbing cyanosis or edema  Skin: no rashes and no petechiae  Neuro: alert and oriented X 4, no focal deficits      11-17-23 @ 07:01  -  11-17-23 @ 11:39  --------------------------------------------------------  IN: 120 mL / OUT: 0 mL / NET: 120 mL      LABS:                          14.0   20.53 )-----------( 3        ( 17 Nov 2023 07:16 )             44.5         Mean Cell Volume : 82.0 fl  Mean Cell Hemoglobin : 25.8 pg  Mean Cell Hemoglobin Concentration : 31.5 gm/dL  Auto Neutrophil # : x  Auto Lymphocyte # : x  Auto Monocyte # : x  Auto Eosinophil # : x  Auto Basophil # : x  Auto Neutrophil % : x  Auto Lymphocyte % : x  Auto Monocyte % : x  Auto Eosinophil % : x  Auto Basophil % : x      11-16    140  |  104  |  21  ----------------------------<  179<H>  4.0   |  24  |  0.83    Ca    9.5      16 Nov 2023 07:39    TPro  6.9  /  Alb  4.5  /  TBili  0.7  /  DBili  x   /  AST  15  /  ALT  24  /  AlkPhos  65  11-16                      BLOOD SMEAR INTERPRETATION:       RADIOLOGY & ADDITIONAL STUDIES:      
Reason for consult: ITP    HPI:  NIGHT HOSPITALIST:   Patient UNKNOWN to me previously, assigned to me at this point via the ER and by Dr. Driscoll to admit this 24 y/o M--unable to obtain history from patient--mother in attendance--patient with a history of intellectual impairment, autism, nonverbal and noncommunicative at baseline, obstructive sleep apnoea not on positive pressure ventilation, severe chronic ITP followed by and referred by Dr. Bhagat above to the ER following CBC draw with platelet count of 1K.  Patient with multiple hospitalizations, last at [facility] with discharge on June 12 2023, with past treatment with pulse steroids (past complications of increased agitation due to steroids), intolerance with paradoxical increase in agitation with IV Haldol, Thorazine, Benadryl, past Rituxan (following desensitization), and past platelet transfusions, IVIG, past COVID-19 infection in 2021 with apparent worsening of patient's baseline platelet counts  (patient remains UNVACCINATED with mother declining such), with the mother reports that she has researched other studies with gut erik with infection with H. pylori and has been having his son see a GI on this issue, including a reported spleen hemangioma (mother was planning to have an outpatient CTT abdomen/pelvis with IV contrast on this issue), with mother reports her concern of patient's development for autism from the patient's original MMR vaccine at age [age] (unclear if this was referenced by the mother from the original [employer] association in 1998?) with mother notes a LEFT infraorbital ecchymoses for the past week and scattered bruises on the LEFT chest/mammary area and back.  Unable to obtain history from patient--patient now sedated past Rx of oral Ativan and Trazodone 200 mg given in the ER--mother reports that patient's prior Trazodone dose of 150 mg at night with patient's Medex did not prevent agitation in patient.   Mother was offered option for IVIG and platelet transfusion by the ER following conversation by the ER attending with Dr. Bhagat and mother refused.  I spoke to the mother who confirms such.   I also reviewed with the patient's mother for a noncontrast CTT head to exclude intracranial process given the low platelets--the patient's mother refused. (14 Nov 2023 23:20)    Hematology/oncology consulted on this 23 year old male with chronic ITP since the age of 18 months of age (post MMR vaccine), autism, nonverbal and noncommunicative at baseline follows with Dr. Bhagat at [facility]. His platelets were 1k on 11/14, and Dr. Bhagat recommended patient to come into the ED. Patient's mother is strongly adamant that she believes it is related to H pylori and his gut and desires that the treatment for that first over ITP treatment.  She is actively refusing IVIG. Rituxan weekly x 4 completed on 6/26/23. Nplate­ increased to 10mcg/kg­ max 1000mcg­ off since 9/26/23 due to poor response, dose given on 10/16 per mother request. Would consider Tavalisse if no improvement in his thrombocytopenia, risks/benefits discussed. Again attempted to convince his mother, she desires to wait for the GI evaluation to complete.     He has been treated with intermittent IVIG, steroids, winrho (allergic reaction), rituxan in 2010 and 2021, and has been on Doptelet since 4/2021. His platelets have ranged between 10­30,000 for the last 5 years, only required intermittent IVIG when his platelets were <10. His ITP was worsened in 2021 due to COVID infection. He is a member of a group home and the only concern is that he does bang his head often and bites as well. She is concerned that he has been on IVIG for a number of years and due to his significant thrombocytopenia with COVID, she is worried about his exposure to donor COVID antibodies. No splenectomy as he would be a poor operative candidate, especially post operative due to pain. His mother also is refusing any vaccinations that would be required prior the splenectomy. He was found to have a low plt count <5 in early May, was admitted to [facility] for which he received IVIG/steroids. Started pulse dex which he was unable to complete then was tapered to prednisone. His platelet count had initially normalized.   Now admitted to [facility] in mid May with seizure, bacteremia. Found to have a normal platelet count, treated with zosyn but told the blood cultures were an error.  He was on prednisone 30mg with plt of 5,000­ steroid refractory.  Readmitted to [facility] for refractory thrombocytopenia­ 5/26/23­6/12/23. His plt count was 3, initially treated with pulse dex, then low dose steroids without much response. He was given nplate on 5/28, 6/2, then rituxan on 5/31 and 6/7/23. Evaluated by psychiatry for his agitation, tapered the steroids off. He was treated with haldol, thorazine, benadryl, then ativan with better effects.       PAST MEDICAL & SURGICAL HISTORY:  Intellectual disability      Chronic ITP (idiopathic thrombocytopenia)      Dental caries on smooth surface penetrating into dentin      Autism      CARLITOS (obstructive sleep apnea)      2019 novel coronavirus disease (COVID-19)      COVID-19 vaccine series declined      History of dental examination          FAMILY HISTORY:  FH: hypertension        Alochol: Denied  Smoking: Nonsmoker  Drug Use: Denied  Marital Status:         Allergies    WinRho SDF (Hives)  Bactrim (Hives)    Intolerances        MEDICATIONS  (STANDING):  dextrose 5%. 1000 milliLiter(s) (100 mL/Hr) IV Continuous <Continuous>  dextrose 5%. 1000 milliLiter(s) (50 mL/Hr) IV Continuous <Continuous>  dextrose 50% Injectable 12.5 Gram(s) IV Push once  dextrose 50% Injectable 25 Gram(s) IV Push once  dextrose 50% Injectable 25 Gram(s) IV Push once  glucagon  Injectable 1 milliGRAM(s) IntraMuscular once  insulin lispro (ADMELOG) corrective regimen sliding scale   SubCutaneous every 6 hours    MEDICATIONS  (PRN):  dextrose Oral Gel 15 Gram(s) Oral once PRN Blood Glucose LESS THAN 70 milliGRAM(s)/deciliter      ROS  Unable to obtain.    T(C): 36.7 (11-15-23 @ 05:01), Max: 36.7 (11-15-23 @ 05:01)  HR: 90 (11-15-23 @ 05:01) (84 - 94)  BP: 94/57 (11-15-23 @ 05:01) (94/57 - 123/91)  RR: 19 (11-15-23 @ 05:01) (16 - 20)  SpO2: 99% (11-15-23 @ 05:01) (96% - 99%)  Wt(kg): --    PE  NAD  Awake, slightly agitated  Anicteric, MMM  CTAB  Abd soft, NT, ND  No c/c/e  No rash grossly  FROM                          13.5   13.23 )-----------( 1        ( 14 Nov 2023 20:57 )             43.1       11-14    139  |  104  |  15  ----------------------------<  123<H>  3.5   |  24  |  0.94    Ca    9.0      14 Nov 2023 20:57    TPro  6.0  /  Alb  4.0  /  TBili  0.8  /  DBili  x   /  AST  13  /  ALT  23  /  AlkPhos  55  11-14

## 2023-11-17 NOTE — CONSULT NOTE ADULT - ASSESSMENT
***Note Incomplete**** 23 year old M hx of intellectual disability, autism, nonverbal at baseline, CARLITOS not on CPAP, severe chronic ITP who presents to the ER with mother due to abnormal labs. [facility] consulted for a second opinion regarding ITP.    Cronic ITP  Pt has seen Dr. Rosaura Bhagat at [facility] and also Dr. Pancho Rose at [facility] in the past. He was initially diagnosed with ITP since the age of 18 months after an MMR vaccine. His baseline platelets range 4-6K. He has been steroid refractory, previously treated with rituximab, and N-plate. Since COVID-19 pandemic, patient's mother has been apprehensive to use IVIG as she is worried about giving her son COVID antibodies.  - Would consider Promacta. As suggested by Dr. Bhagat, Tavalisse is another alternative optino This was discussed at length with patient's mother. She is apprehensive about any medication. She would prefer treatment for H pylori and resolution of the bacterial infection prior to any treatment for platelets.  - We discussed the risk of bleeding. However, mother has been adamant about saying that if he hasn't had major bleeds before, why would he suddenly develop them now.  She also stated that she never wanted a second opinion in the first place and wants to follow with [facility].    [facility]ology will sign off at this time. If patient and his mother would want to set up follow up at [facility], they can call the center to arrange an outpatient appointment. Case d/w Dr. Moore.    Jeanne Davis M.D.  Hematology and Medical Oncology Fellow  Pager: [phone]  For weekends and evenings (5 pm - 8 am), please page Heme/Onc fellow on call.

## 2023-11-17 NOTE — PROGRESS NOTE ADULT - ASSESSMENT
Pt is a 23 year old M hx of intellectual disability, autism, nonverbal at baseline, CARLITOS not on CPAP, severe chronic ITP who presents to the ER with mother due to abnormal labs. mother states platelets were found to be 1K. states patient was advised to come in by hematologist       Chronic ITP   - Diagnosed at the age of 18 months    - Has extensive treatment history including Rituxan weekly x 4 completed 6/26/23, Nplate (LD 10/16/23, poor response). Intermittent IVIG, steroids, winrho (allergic reaction), rituxan in 2010 and 2021, and has been on Doptelet since    - Pt with refractory severe chronic ITP with past courses of Rituxan, IVIG and past platelet transfusion  - Pt is accompanied by his mother, mother would like to hold off on IVIG and platelet transfusion, and refusing CTT head  - S/P methylprednisolone in ER, C/w Dexamethsone 40 IV Q24 w/ PPI as per heme/onc   - Heme/Onc [facility] consult appreciated; F/u recs   - 2nd opinion Heme/Onc eval consulted; F/u recs      History of developmental disorder.   - History of severe developmental delay, autism, with patient nonverbal, noncommunicative with episodes of agitation   - S/P code gray and RRT due to agitation 11/15  - On 1:1 for safety   - Psych eval appreciated; F/u recs      H/O H. Pylori   - Mother reports patient was treated by an outpatient GI 8 week ago for H pylori, unsure of regimen patient was on.   - H. pylori was diagnosed with stool study. Mother reports repeat stool study was positive  - Mother believes ITP is due to H. pylori   - C/w PPI   - H. Pylori antigen --> +   - Previously Tx w/ Clarithromycin and Amoxicillin   - GI eval consulted; F/u recs to Tx     Hemangioma of spleen.   - Mother reports patient with concern for splenic hemangioma outpatient  - CT A/P Negative for intraabdominal findings. No splenic mass     Leukocytosis  - Likely 2/2 steroids. Continue to monitor and trend. If febrile, check pan cultures    Discussed with Attending, ACP, GI and Mother at the bedside.

## 2023-11-17 NOTE — DIETITIAN INITIAL EVALUATION ADULT - ORAL INTAKE PTA/DIET HISTORY
Patient's mother at bedside reports patient with good, normal appetite and PO intake PTA, reports he developed increased appetite when he began taking steroids PTA. Reports he used to be picky but is not anymore, reports he eats healthy meals at the group home where he resides. Reports no dietary restrictions followed PTA. Confirms no known food allergies.

## 2023-11-17 NOTE — CONSULT NOTE ADULT - ATTENDING COMMENTS
23-yr-old man (mentally handicapped) with long h/o ITP seen in consult as a second opinion. His platelet count is 2-4K. As detailed in fellow's note, the patient has developed ITP since MMR vaccination at age 18 months and has since received multiple lines of treatment and apparently has not responded to most of them. He would respond to IVIG to some extent but his mother did not allow IVIG since the IG would also have COVID antibody which she believes will worsens the TCP, although virtually there is no room for further deterioration. She would consider Eltrombopag or Tavalisse, does not agree with splenectomy.  She is more interested in the treatment of H. pylori before anything else. She will continue to f/u with [facility].

## 2023-11-17 NOTE — DIETITIAN INITIAL EVALUATION ADULT - OTHER INFO
Patient's mother reports -240lb, reports patient has been gradually losing weight ~100lb x 2 years.  Dosing weight: 235lb (11/14, stated).  IBW: 166lb, %IBW: 142% based on dosing weight.  Weight history per [facility]: 264.6lb (5/30/23), 240lb (5/17/23), 244.9lb (3/23/23), 245.6lb (2/24/23), 249.1lb (1/27/23), 249.1lb (12/7/22), 269.6lb (6/20/22), 304.3lb (11/11/21).  Weight appears downtrending. RD to continue to monitor weight trends as available/able.     -Ordered for probiotic lactobacillus acidophilus.  -Elevated Finger Sticks noted, of note patient ordered for decadron. Patient also ordered for Correctional sliding scale insulin. A1C from 11/15/23 5.1%.  -S/p RRT and Code Du for agitation on 11/15 - medications ordered for management.

## 2023-11-17 NOTE — CONSULT NOTE ADULT - ASSESSMENT
22yo M with severe autism, ITP and H.Pylori    H. Pylori   previously tx'd, not eradicated; high resistance rate to Clarithromycin   recommend Amoxicillin 1g PO BID, Levoquin 500mg QD and Protonix 40mg BID   if family not agreeable to above, can offer Talicia (would likely need to order separately)    ITP  per heme  22yo M with severe autism, ITP and H.Pylori    H. Pylori   previously tx'd, not eradicated; high resistance rate to Clarithromycin   recommend Amoxicillin 1g PO BID, Levoquin 500mg QD and Protonix 40mg BID (although mom only wants Tagamet not protonix)  if family not agreeable to above, can offer Talicia (would likely need to order separately)    ITP  per heme

## 2023-11-17 NOTE — DIETITIAN INITIAL EVALUATION ADULT - PERSON TAUGHT/METHOD
Provided menu and reviewed menu ordering procedures. Mother made aware RD to allow finger foods (per discussion with dietetic technician) and allow double portions at meals.  Discussed monitoring carbohydrate intake when ordering meals for patient in setting of elevated Finger Sticks./verbal instruction/mother instructed

## 2023-11-17 NOTE — DIETITIAN INITIAL EVALUATION ADULT - NSFNSGIIOFT_GEN_A_CORE
Patient's mother reports no nausea/vomiting or constipation, endorses last BM yesterday, notes loose stools; bowel regimen: none.

## 2023-11-18 LAB
ANION GAP SERPL CALC-SCNC: 13 MMOL/L — SIGNIFICANT CHANGE UP (ref 5–17)
ANION GAP SERPL CALC-SCNC: 13 MMOL/L — SIGNIFICANT CHANGE UP (ref 5–17)
BUN SERPL-MCNC: 23 MG/DL — SIGNIFICANT CHANGE UP (ref 7–23)
BUN SERPL-MCNC: 23 MG/DL — SIGNIFICANT CHANGE UP (ref 7–23)
CALCIUM SERPL-MCNC: 9.2 MG/DL — SIGNIFICANT CHANGE UP (ref 8.4–10.5)
CALCIUM SERPL-MCNC: 9.2 MG/DL — SIGNIFICANT CHANGE UP (ref 8.4–10.5)
CHLORIDE SERPL-SCNC: 101 MMOL/L — SIGNIFICANT CHANGE UP (ref 96–108)
CHLORIDE SERPL-SCNC: 101 MMOL/L — SIGNIFICANT CHANGE UP (ref 96–108)
CO2 SERPL-SCNC: 22 MMOL/L — SIGNIFICANT CHANGE UP (ref 22–31)
CO2 SERPL-SCNC: 22 MMOL/L — SIGNIFICANT CHANGE UP (ref 22–31)
CREAT SERPL-MCNC: 0.79 MG/DL — SIGNIFICANT CHANGE UP (ref 0.5–1.3)
CREAT SERPL-MCNC: 0.79 MG/DL — SIGNIFICANT CHANGE UP (ref 0.5–1.3)
EGFR: 127 ML/MIN/1.73M2 — SIGNIFICANT CHANGE UP
EGFR: 127 ML/MIN/1.73M2 — SIGNIFICANT CHANGE UP
GLUCOSE SERPL-MCNC: 145 MG/DL — HIGH (ref 70–99)
GLUCOSE SERPL-MCNC: 145 MG/DL — HIGH (ref 70–99)
HCT VFR BLD CALC: 42.9 % — SIGNIFICANT CHANGE UP (ref 39–50)
HCT VFR BLD CALC: 42.9 % — SIGNIFICANT CHANGE UP (ref 39–50)
HGB BLD-MCNC: 13.4 G/DL — SIGNIFICANT CHANGE UP (ref 13–17)
HGB BLD-MCNC: 13.4 G/DL — SIGNIFICANT CHANGE UP (ref 13–17)
MCHC RBC-ENTMCNC: 25.6 PG — LOW (ref 27–34)
MCHC RBC-ENTMCNC: 25.6 PG — LOW (ref 27–34)
MCHC RBC-ENTMCNC: 31.2 GM/DL — LOW (ref 32–36)
MCHC RBC-ENTMCNC: 31.2 GM/DL — LOW (ref 32–36)
MCV RBC AUTO: 81.9 FL — SIGNIFICANT CHANGE UP (ref 80–100)
MCV RBC AUTO: 81.9 FL — SIGNIFICANT CHANGE UP (ref 80–100)
NRBC # BLD: 0 /100 WBCS — SIGNIFICANT CHANGE UP (ref 0–0)
NRBC # BLD: 0 /100 WBCS — SIGNIFICANT CHANGE UP (ref 0–0)
PLATELET # BLD AUTO: 7 K/UL — CRITICAL LOW (ref 150–400)
PLATELET # BLD AUTO: 7 K/UL — CRITICAL LOW (ref 150–400)
POTASSIUM SERPL-MCNC: 4.5 MMOL/L — SIGNIFICANT CHANGE UP (ref 3.5–5.3)
POTASSIUM SERPL-MCNC: 4.5 MMOL/L — SIGNIFICANT CHANGE UP (ref 3.5–5.3)
POTASSIUM SERPL-SCNC: 4.5 MMOL/L — SIGNIFICANT CHANGE UP (ref 3.5–5.3)
POTASSIUM SERPL-SCNC: 4.5 MMOL/L — SIGNIFICANT CHANGE UP (ref 3.5–5.3)
RBC # BLD: 5.24 M/UL — SIGNIFICANT CHANGE UP (ref 4.2–5.8)
RBC # BLD: 5.24 M/UL — SIGNIFICANT CHANGE UP (ref 4.2–5.8)
RBC # FLD: 16.4 % — HIGH (ref 10.3–14.5)
RBC # FLD: 16.4 % — HIGH (ref 10.3–14.5)
SODIUM SERPL-SCNC: 136 MMOL/L — SIGNIFICANT CHANGE UP (ref 135–145)
SODIUM SERPL-SCNC: 136 MMOL/L — SIGNIFICANT CHANGE UP (ref 135–145)
WBC # BLD: 23.8 K/UL — HIGH (ref 3.8–10.5)
WBC # BLD: 23.8 K/UL — HIGH (ref 3.8–10.5)
WBC # FLD AUTO: 23.8 K/UL — HIGH (ref 3.8–10.5)
WBC # FLD AUTO: 23.8 K/UL — HIGH (ref 3.8–10.5)

## 2023-11-18 RX ORDER — PANTOPRAZOLE SODIUM 20 MG/1
40 TABLET, DELAYED RELEASE ORAL
Refills: 0 | Status: DISCONTINUED | OUTPATIENT
Start: 2023-11-18 | End: 2023-11-20

## 2023-11-18 RX ORDER — AMOXICILLIN 250 MG/5ML
1000 SUSPENSION, RECONSTITUTED, ORAL (ML) ORAL
Refills: 0 | Status: DISCONTINUED | OUTPATIENT
Start: 2023-11-18 | End: 2023-11-20

## 2023-11-18 RX ADMIN — Medication 1 TABLET(S): at 12:38

## 2023-11-18 RX ADMIN — Medication 25 MILLIGRAM(S): at 22:44

## 2023-11-18 RX ADMIN — Medication 2 MILLIGRAM(S): at 17:35

## 2023-11-18 RX ADMIN — HALOPERIDOL DECANOATE 2.5 MILLIGRAM(S): 100 INJECTION INTRAMUSCULAR at 22:44

## 2023-11-18 RX ADMIN — Medication 108 MILLIGRAM(S): at 21:57

## 2023-11-18 RX ADMIN — Medication 400 MILLIGRAM(S): at 21:51

## 2023-11-18 RX ADMIN — Medication 1 TABLET(S): at 17:36

## 2023-11-18 RX ADMIN — Medication 1000 MILLIGRAM(S): at 17:35

## 2023-11-18 RX ADMIN — Medication 400 MILLIGRAM(S): at 06:27

## 2023-11-18 RX ADMIN — Medication 200 MILLIGRAM(S): at 21:47

## 2023-11-18 RX ADMIN — PANTOPRAZOLE SODIUM 40 MILLIGRAM(S): 20 TABLET, DELAYED RELEASE ORAL at 17:36

## 2023-11-18 RX ADMIN — HALOPERIDOL DECANOATE 2.5 MILLIGRAM(S): 100 INJECTION INTRAMUSCULAR at 14:30

## 2023-11-18 RX ADMIN — Medication 25 MILLIGRAM(S): at 09:58

## 2023-11-18 RX ADMIN — Medication 25 MILLIGRAM(S): at 14:30

## 2023-11-18 RX ADMIN — HALOPERIDOL DECANOATE 2.5 MILLIGRAM(S): 100 INJECTION INTRAMUSCULAR at 09:58

## 2023-11-18 NOTE — PROGRESS NOTE ADULT - ASSESSMENT
24yo M with severe autism, ITP and H.Pylori    H. Pylori   previously tx'd, not eradicated; high resistance rate to Clarithromycin   recommend Amoxicillin 1g PO BID, Levoquin 500mg QD and Protonix 40mg BID (although mom only wants Tagamet not protonix)  if family not agreeable to above, can offer Talicia (would likely need to order separately)    ITP  per heme

## 2023-11-18 NOTE — CHART NOTE - NSCHARTNOTEFT_GEN_A_CORE
Vital Signs Last 24 Hrs  T(C): 36.3 (18 Nov 2023 06:24), Max: 36.6 (17 Nov 2023 12:34)  T(F): 97.4 (18 Nov 2023 06:24), Max: 97.9 (18 Nov 2023 00:43)  HR: 77 (18 Nov 2023 06:24) (67 - 79)  BP: 118/71 (18 Nov 2023 06:24) (107/59 - 126/76)  BP(mean): --  RR: 17 (18 Nov 2023 06:24) (17 - 18)  SpO2: 93% (18 Nov 2023 06:24) (93% - 96%)    Parameters below as of 18 Nov 2023 06:24  Patient On (Oxygen Delivery Method): room air Notified by RN, patient with platelet count 7000 on AM CBC. Patient with h/o thrombocytopenia, chronic ITP, heme/onc following. Platelet count improved from 3000 to 7000 today. Patient seen and examined, Mother at bedside. Patient sitting up in bed, eating, in no apparent distress. Mother denies any signs of bleeding or any complaints at this time. VSS.     Vital Signs Last 24 Hrs  T(C): 36.3 (18 Nov 2023 06:24), Max: 36.6 (17 Nov 2023 12:34)  T(F): 97.4 (18 Nov 2023 06:24), Max: 97.9 (18 Nov 2023 00:43)  HR: 77 (18 Nov 2023 06:24) (67 - 79)  BP: 118/71 (18 Nov 2023 06:24) (107/59 - 126/76)  BP(mean): --  RR: 17 (18 Nov 2023 06:24) (17 - 18)  SpO2: 93% (18 Nov 2023 06:24) (93% - 96%)    Parameters below as of 18 Nov 2023 06:24  Patient On (Oxygen Delivery Method): room air    A/P: 23 year old M hx of intellectual disability, autism, nonverbal at baseline, CARLITOS not on CPAP, severe chronic ITP who presents to the ER with mother due to abnormal labs. [place] [place] H[facility]ology consulted for a second opinion regarding ITP. Patient with continued severe thrombocytopenia, no signs of bleeding at this time. Discussed with Dr. Driscoll, plan as below.     > Heme/onc consulted for recommendations. Mother refusing blood product transfusion, IVIG, or any other medications recommended by heme/onc at this time. Continue pulse steroid treatment.   > H. Pylori treatment to be ordered per GI recommendations, ?contributing factor to ITP  > Continue to monitor closely and for any signs of bleeding    Eduarda Jensen PA-C

## 2023-11-18 NOTE — PROGRESS NOTE ADULT - ASSESSMENT
Pt is a 23 year old M hx of intellectual disability, autism, nonverbal at baseline, CARLITOS not on CPAP, severe chronic ITP who presents to the ER with mother due to abnormal labs. mother states platelets were found to be 1K. states patient was advised to come in by hematologist       Chronic ITP   - Diagnosed at the age of 18 months    - Has extensive treatment history including Rituxan weekly x 4 completed 6/26/23, Nplate (LD 10/16/23, poor response). Intermittent IVIG, steroids, winrho (allergic reaction), rituxan in 2010 and 2021, and has been on Doptelet since    - Pt with refractory severe chronic ITP with past courses of Rituxan, IVIG and past platelet transfusion  - Pt is accompanied by his mother, mother would like to hold off on IVIG and platelet transfusion, and refusing CTT head  - S/P methylprednisolone in ER, C/w Dexamethsone 40 IV Q24 w/ PPI as per heme/onc   - Heme/Onc [facility] consult appreciated; F/u recs   - 2nd opinion Heme/Onc eval consulted; F/u recs      History of developmental disorder.   - History of severe developmental delay, autism, with patient nonverbal, noncommunicative with episodes of agitation   - S/P code gray and RRT due to agitation 11/15  - On 1:1 for safety   - Psych eval appreciated; F/u recs      H/O H. Pylori   - Mother reports patient was treated by an outpatient GI 8 week ago for H pylori, unsure of regimen patient was on.   - H. pylori was diagnosed with stool study. Mother reports repeat stool study was positive  - Mother believes ITP is due to H. pylori   - C/w PPI   - H. Pylori antigen --> +   - Previously Tx w/ Clarithromycin and Amoxicillin   - GI eval consulted; F/u recs to Tx   - plan for repeat triple therapy amoxi, levaquina dn protonix, discussed with mother     Hemangioma of spleen.   - Mother reports patient with concern for splenic hemangioma outpatient  - CT A/P Negative for intraabdominal findings. No splenic mass     Leukocytosis  - Likely 2/2 steroids. Continue to monitor and trend. If febrile, check pan cultures    Discussed with Mother at the bedside.

## 2023-11-19 LAB
ANION GAP SERPL CALC-SCNC: 10 MMOL/L — SIGNIFICANT CHANGE UP (ref 5–17)
ANION GAP SERPL CALC-SCNC: 10 MMOL/L — SIGNIFICANT CHANGE UP (ref 5–17)
BASOPHILS # BLD AUTO: 0.02 K/UL — SIGNIFICANT CHANGE UP (ref 0–0.2)
BASOPHILS # BLD AUTO: 0.02 K/UL — SIGNIFICANT CHANGE UP (ref 0–0.2)
BASOPHILS NFR BLD AUTO: 0.1 % — SIGNIFICANT CHANGE UP (ref 0–2)
BASOPHILS NFR BLD AUTO: 0.1 % — SIGNIFICANT CHANGE UP (ref 0–2)
BUN SERPL-MCNC: 23 MG/DL — SIGNIFICANT CHANGE UP (ref 7–23)
BUN SERPL-MCNC: 23 MG/DL — SIGNIFICANT CHANGE UP (ref 7–23)
CALCIUM SERPL-MCNC: 8.6 MG/DL — SIGNIFICANT CHANGE UP (ref 8.4–10.5)
CALCIUM SERPL-MCNC: 8.6 MG/DL — SIGNIFICANT CHANGE UP (ref 8.4–10.5)
CHLORIDE SERPL-SCNC: 103 MMOL/L — SIGNIFICANT CHANGE UP (ref 96–108)
CHLORIDE SERPL-SCNC: 103 MMOL/L — SIGNIFICANT CHANGE UP (ref 96–108)
CO2 SERPL-SCNC: 23 MMOL/L — SIGNIFICANT CHANGE UP (ref 22–31)
CO2 SERPL-SCNC: 23 MMOL/L — SIGNIFICANT CHANGE UP (ref 22–31)
CREAT SERPL-MCNC: 0.73 MG/DL — SIGNIFICANT CHANGE UP (ref 0.5–1.3)
CREAT SERPL-MCNC: 0.73 MG/DL — SIGNIFICANT CHANGE UP (ref 0.5–1.3)
EGFR: 130 ML/MIN/1.73M2 — SIGNIFICANT CHANGE UP
EGFR: 130 ML/MIN/1.73M2 — SIGNIFICANT CHANGE UP
EOSINOPHIL # BLD AUTO: 0 K/UL — SIGNIFICANT CHANGE UP (ref 0–0.5)
EOSINOPHIL # BLD AUTO: 0 K/UL — SIGNIFICANT CHANGE UP (ref 0–0.5)
EOSINOPHIL NFR BLD AUTO: 0 % — SIGNIFICANT CHANGE UP (ref 0–6)
EOSINOPHIL NFR BLD AUTO: 0 % — SIGNIFICANT CHANGE UP (ref 0–6)
GLUCOSE SERPL-MCNC: 129 MG/DL — HIGH (ref 70–99)
GLUCOSE SERPL-MCNC: 129 MG/DL — HIGH (ref 70–99)
HCT VFR BLD CALC: 45.3 % — SIGNIFICANT CHANGE UP (ref 39–50)
HCT VFR BLD CALC: 45.3 % — SIGNIFICANT CHANGE UP (ref 39–50)
HGB BLD-MCNC: 13.7 G/DL — SIGNIFICANT CHANGE UP (ref 13–17)
HGB BLD-MCNC: 13.7 G/DL — SIGNIFICANT CHANGE UP (ref 13–17)
IMM GRANULOCYTES NFR BLD AUTO: 0.9 % — SIGNIFICANT CHANGE UP (ref 0–0.9)
IMM GRANULOCYTES NFR BLD AUTO: 0.9 % — SIGNIFICANT CHANGE UP (ref 0–0.9)
LYMPHOCYTES # BLD AUTO: 0.47 K/UL — LOW (ref 1–3.3)
LYMPHOCYTES # BLD AUTO: 0.47 K/UL — LOW (ref 1–3.3)
LYMPHOCYTES # BLD AUTO: 2.4 % — LOW (ref 13–44)
LYMPHOCYTES # BLD AUTO: 2.4 % — LOW (ref 13–44)
MAGNESIUM SERPL-MCNC: 1.9 MG/DL — SIGNIFICANT CHANGE UP (ref 1.6–2.6)
MAGNESIUM SERPL-MCNC: 1.9 MG/DL — SIGNIFICANT CHANGE UP (ref 1.6–2.6)
MCHC RBC-ENTMCNC: 25.6 PG — LOW (ref 27–34)
MCHC RBC-ENTMCNC: 25.6 PG — LOW (ref 27–34)
MCHC RBC-ENTMCNC: 30.2 GM/DL — LOW (ref 32–36)
MCHC RBC-ENTMCNC: 30.2 GM/DL — LOW (ref 32–36)
MCV RBC AUTO: 84.5 FL — SIGNIFICANT CHANGE UP (ref 80–100)
MCV RBC AUTO: 84.5 FL — SIGNIFICANT CHANGE UP (ref 80–100)
MONOCYTES # BLD AUTO: 0.3 K/UL — SIGNIFICANT CHANGE UP (ref 0–0.9)
MONOCYTES # BLD AUTO: 0.3 K/UL — SIGNIFICANT CHANGE UP (ref 0–0.9)
MONOCYTES NFR BLD AUTO: 1.6 % — LOW (ref 2–14)
MONOCYTES NFR BLD AUTO: 1.6 % — LOW (ref 2–14)
NEUTROPHILS # BLD AUTO: 18.37 K/UL — HIGH (ref 1.8–7.4)
NEUTROPHILS # BLD AUTO: 18.37 K/UL — HIGH (ref 1.8–7.4)
NEUTROPHILS NFR BLD AUTO: 95 % — HIGH (ref 43–77)
NEUTROPHILS NFR BLD AUTO: 95 % — HIGH (ref 43–77)
NRBC # BLD: 0 /100 WBCS — SIGNIFICANT CHANGE UP (ref 0–0)
NRBC # BLD: 0 /100 WBCS — SIGNIFICANT CHANGE UP (ref 0–0)
PLATELET # BLD AUTO: 16 K/UL — CRITICAL LOW (ref 150–400)
PLATELET # BLD AUTO: 16 K/UL — CRITICAL LOW (ref 150–400)
POTASSIUM SERPL-MCNC: 4.8 MMOL/L — SIGNIFICANT CHANGE UP (ref 3.5–5.3)
POTASSIUM SERPL-MCNC: 4.8 MMOL/L — SIGNIFICANT CHANGE UP (ref 3.5–5.3)
POTASSIUM SERPL-SCNC: 4.8 MMOL/L — SIGNIFICANT CHANGE UP (ref 3.5–5.3)
POTASSIUM SERPL-SCNC: 4.8 MMOL/L — SIGNIFICANT CHANGE UP (ref 3.5–5.3)
RBC # BLD: 5.36 M/UL — SIGNIFICANT CHANGE UP (ref 4.2–5.8)
RBC # BLD: 5.36 M/UL — SIGNIFICANT CHANGE UP (ref 4.2–5.8)
RBC # FLD: 16.3 % — HIGH (ref 10.3–14.5)
RBC # FLD: 16.3 % — HIGH (ref 10.3–14.5)
SODIUM SERPL-SCNC: 136 MMOL/L — SIGNIFICANT CHANGE UP (ref 135–145)
SODIUM SERPL-SCNC: 136 MMOL/L — SIGNIFICANT CHANGE UP (ref 135–145)
WBC # BLD: 19.34 K/UL — HIGH (ref 3.8–10.5)
WBC # BLD: 19.34 K/UL — HIGH (ref 3.8–10.5)
WBC # FLD AUTO: 19.34 K/UL — HIGH (ref 3.8–10.5)
WBC # FLD AUTO: 19.34 K/UL — HIGH (ref 3.8–10.5)

## 2023-11-19 RX ADMIN — Medication 25 MILLIGRAM(S): at 09:54

## 2023-11-19 RX ADMIN — HALOPERIDOL DECANOATE 2.5 MILLIGRAM(S): 100 INJECTION INTRAMUSCULAR at 09:54

## 2023-11-19 RX ADMIN — Medication 400 MILLIGRAM(S): at 21:30

## 2023-11-19 RX ADMIN — Medication 200 MILLIGRAM(S): at 21:17

## 2023-11-19 RX ADMIN — Medication 1 TABLET(S): at 08:54

## 2023-11-19 RX ADMIN — Medication 1 TABLET(S): at 12:10

## 2023-11-19 RX ADMIN — HALOPERIDOL DECANOATE 2.5 MILLIGRAM(S): 100 INJECTION INTRAMUSCULAR at 21:17

## 2023-11-19 RX ADMIN — Medication 1000 MILLIGRAM(S): at 06:04

## 2023-11-19 RX ADMIN — Medication 25 MILLIGRAM(S): at 14:45

## 2023-11-19 RX ADMIN — Medication 1 TABLET(S): at 18:13

## 2023-11-19 RX ADMIN — Medication 1000 MILLIGRAM(S): at 18:13

## 2023-11-19 RX ADMIN — Medication 400 MILLIGRAM(S): at 13:16

## 2023-11-19 RX ADMIN — PANTOPRAZOLE SODIUM 40 MILLIGRAM(S): 20 TABLET, DELAYED RELEASE ORAL at 06:04

## 2023-11-19 RX ADMIN — BREXPIPRAZOLE 4 MILLIGRAM(S): 0.25 TABLET ORAL at 12:11

## 2023-11-19 RX ADMIN — HALOPERIDOL DECANOATE 2.5 MILLIGRAM(S): 100 INJECTION INTRAMUSCULAR at 14:44

## 2023-11-19 RX ADMIN — PANTOPRAZOLE SODIUM 40 MILLIGRAM(S): 20 TABLET, DELAYED RELEASE ORAL at 18:13

## 2023-11-19 RX ADMIN — Medication 25 MILLIGRAM(S): at 21:18

## 2023-11-19 RX ADMIN — Medication 400 MILLIGRAM(S): at 06:08

## 2023-11-19 NOTE — PROGRESS NOTE ADULT - ASSESSMENT
22yo M with severe autism, ITP and H.Pylori    H. Pylori   complete total 14 days of levaquin based triple therapy    ITP  per heme

## 2023-11-19 NOTE — CHART NOTE - NSCHARTNOTEFT_GEN_A_CORE
Late Documentation:     Vital Signs Last 24 Hrs  T(C): 36.4 (19 Nov 2023 05:54), Max: 36.7 (18 Nov 2023 20:02)  T(F): 97.6 (19 Nov 2023 05:54), Max: 98.1 (18 Nov 2023 20:02)  HR: 73 (19 Nov 2023 05:54) (73 - 87)  BP: 110/74 (19 Nov 2023 05:54) (110/74 - 118/68)  BP(mean): --  RR: 18 (19 Nov 2023 05:54) (18 - 18)  SpO2: 96% (19 Nov 2023 05:54) (95% - 96%)    Parameters below as of 19 Nov 2023 05:54  Patient On (Oxygen Delivery Method): room air Late Documentation:     Notified by RN, platelet count 10602 on AM CBC. Patient with h/o thrombocytopenia, chronic ITP, s/p pulse steroids per heme/onc. Platelet count uptrending 3000 -> 7000 -> 80209. Mother at bedside, patient without complaints or signs of bleeding. VSS.     Vital Signs Last 24 Hrs  T(C): 36.4 (19 Nov 2023 05:54), Max: 36.7 (18 Nov 2023 20:02)  T(F): 97.6 (19 Nov 2023 05:54), Max: 98.1 (18 Nov 2023 20:02)  HR: 73 (19 Nov 2023 05:54) (73 - 87)  BP: 110/74 (19 Nov 2023 05:54) (110/74 - 118/68)  BP(mean): --  RR: 18 (19 Nov 2023 05:54) (18 - 18)  SpO2: 96% (19 Nov 2023 05:54) (95% - 96%)    Parameters below as of 19 Nov 2023 05:54  Patient On (Oxygen Delivery Method): room air    A/P: 23 year old M hx of intellectual disability, autism, nonverbal at baseline, CARLITOS not on CPAP, severe chronic ITP who presents to the ER with mother due to abnormal labs. [place] Hematology consulted for a second opinion regarding ITP. Patient with continued severe thrombocytopenia, no signs of bleeding at this time.    > Platelets uptrending, continue current management   > Continue to monitor closely     Eduarda Jensen PA-C

## 2023-11-20 ENCOUNTER — TRANSCRIPTION ENCOUNTER (OUTPATIENT)
Age: 23
End: 2023-11-20

## 2023-11-20 VITALS
OXYGEN SATURATION: 97 % | RESPIRATION RATE: 18 BRPM | TEMPERATURE: 97 F | SYSTOLIC BLOOD PRESSURE: 144 MMHG | HEART RATE: 99 BPM | DIASTOLIC BLOOD PRESSURE: 80 MMHG

## 2023-11-20 LAB
BASOPHILS # BLD AUTO: 0.02 K/UL — SIGNIFICANT CHANGE UP (ref 0–0.2)
BASOPHILS # BLD AUTO: 0.02 K/UL — SIGNIFICANT CHANGE UP (ref 0–0.2)
BASOPHILS NFR BLD AUTO: 0.1 % — SIGNIFICANT CHANGE UP (ref 0–2)
BASOPHILS NFR BLD AUTO: 0.1 % — SIGNIFICANT CHANGE UP (ref 0–2)
EOSINOPHIL # BLD AUTO: 0 K/UL — SIGNIFICANT CHANGE UP (ref 0–0.5)
EOSINOPHIL # BLD AUTO: 0 K/UL — SIGNIFICANT CHANGE UP (ref 0–0.5)
EOSINOPHIL NFR BLD AUTO: 0 % — SIGNIFICANT CHANGE UP (ref 0–6)
EOSINOPHIL NFR BLD AUTO: 0 % — SIGNIFICANT CHANGE UP (ref 0–6)
H PYLORI AG STL QL: POSITIVE
H PYLORI AG STL QL: POSITIVE
HCT VFR BLD CALC: 50.6 % — HIGH (ref 39–50)
HCT VFR BLD CALC: 50.6 % — HIGH (ref 39–50)
HGB BLD-MCNC: 15.5 G/DL — SIGNIFICANT CHANGE UP (ref 13–17)
HGB BLD-MCNC: 15.5 G/DL — SIGNIFICANT CHANGE UP (ref 13–17)
IMM GRANULOCYTES NFR BLD AUTO: 0.9 % — SIGNIFICANT CHANGE UP (ref 0–0.9)
IMM GRANULOCYTES NFR BLD AUTO: 0.9 % — SIGNIFICANT CHANGE UP (ref 0–0.9)
LYMPHOCYTES # BLD AUTO: 0.71 K/UL — LOW (ref 1–3.3)
LYMPHOCYTES # BLD AUTO: 0.71 K/UL — LOW (ref 1–3.3)
LYMPHOCYTES # BLD AUTO: 4.2 % — LOW (ref 13–44)
LYMPHOCYTES # BLD AUTO: 4.2 % — LOW (ref 13–44)
MCHC RBC-ENTMCNC: 25.3 PG — LOW (ref 27–34)
MCHC RBC-ENTMCNC: 25.3 PG — LOW (ref 27–34)
MCHC RBC-ENTMCNC: 30.6 GM/DL — LOW (ref 32–36)
MCHC RBC-ENTMCNC: 30.6 GM/DL — LOW (ref 32–36)
MCV RBC AUTO: 82.7 FL — SIGNIFICANT CHANGE UP (ref 80–100)
MCV RBC AUTO: 82.7 FL — SIGNIFICANT CHANGE UP (ref 80–100)
MONOCYTES # BLD AUTO: 0.73 K/UL — SIGNIFICANT CHANGE UP (ref 0–0.9)
MONOCYTES # BLD AUTO: 0.73 K/UL — SIGNIFICANT CHANGE UP (ref 0–0.9)
MONOCYTES NFR BLD AUTO: 4.3 % — SIGNIFICANT CHANGE UP (ref 2–14)
MONOCYTES NFR BLD AUTO: 4.3 % — SIGNIFICANT CHANGE UP (ref 2–14)
NEUTROPHILS # BLD AUTO: 15.48 K/UL — HIGH (ref 1.8–7.4)
NEUTROPHILS # BLD AUTO: 15.48 K/UL — HIGH (ref 1.8–7.4)
NEUTROPHILS NFR BLD AUTO: 90.5 % — HIGH (ref 43–77)
NEUTROPHILS NFR BLD AUTO: 90.5 % — HIGH (ref 43–77)
NRBC # BLD: 0 /100 WBCS — SIGNIFICANT CHANGE UP (ref 0–0)
NRBC # BLD: 0 /100 WBCS — SIGNIFICANT CHANGE UP (ref 0–0)
PLATELET # BLD AUTO: 10 K/UL — CRITICAL LOW (ref 150–400)
PLATELET # BLD AUTO: 10 K/UL — CRITICAL LOW (ref 150–400)
RBC # BLD: 6.12 M/UL — HIGH (ref 4.2–5.8)
RBC # BLD: 6.12 M/UL — HIGH (ref 4.2–5.8)
RBC # FLD: 16.8 % — HIGH (ref 10.3–14.5)
RBC # FLD: 16.8 % — HIGH (ref 10.3–14.5)
WBC # BLD: 17.1 K/UL — HIGH (ref 3.8–10.5)
WBC # BLD: 17.1 K/UL — HIGH (ref 3.8–10.5)
WBC # FLD AUTO: 17.1 K/UL — HIGH (ref 3.8–10.5)
WBC # FLD AUTO: 17.1 K/UL — HIGH (ref 3.8–10.5)

## 2023-11-20 PROCEDURE — 82962 GLUCOSE BLOOD TEST: CPT

## 2023-11-20 PROCEDURE — 85730 THROMBOPLASTIN TIME PARTIAL: CPT

## 2023-11-20 PROCEDURE — 36415 COLL VENOUS BLD VENIPUNCTURE: CPT

## 2023-11-20 PROCEDURE — 83036 HEMOGLOBIN GLYCOSYLATED A1C: CPT

## 2023-11-20 PROCEDURE — 85049 AUTOMATED PLATELET COUNT: CPT

## 2023-11-20 PROCEDURE — 86901 BLOOD TYPING SEROLOGIC RH(D): CPT

## 2023-11-20 PROCEDURE — 96374 THER/PROPH/DIAG INJ IV PUSH: CPT

## 2023-11-20 PROCEDURE — 83735 ASSAY OF MAGNESIUM: CPT

## 2023-11-20 PROCEDURE — 87637 SARSCOV2&INF A&B&RSV AMP PRB: CPT

## 2023-11-20 PROCEDURE — 85610 PROTHROMBIN TIME: CPT

## 2023-11-20 PROCEDURE — 80053 COMPREHEN METABOLIC PANEL: CPT

## 2023-11-20 PROCEDURE — 99285 EMERGENCY DEPT VISIT HI MDM: CPT | Mod: 25

## 2023-11-20 PROCEDURE — 86900 BLOOD TYPING SEROLOGIC ABO: CPT

## 2023-11-20 PROCEDURE — 74177 CT ABD & PELVIS W/CONTRAST: CPT

## 2023-11-20 PROCEDURE — 86850 RBC ANTIBODY SCREEN: CPT

## 2023-11-20 PROCEDURE — 85027 COMPLETE CBC AUTOMATED: CPT

## 2023-11-20 PROCEDURE — 80048 BASIC METABOLIC PNL TOTAL CA: CPT

## 2023-11-20 PROCEDURE — 85025 COMPLETE CBC W/AUTO DIFF WBC: CPT

## 2023-11-20 PROCEDURE — 87338 HPYLORI STOOL AG IA: CPT

## 2023-11-20 RX ORDER — TRAZODONE HCL 50 MG
2 TABLET ORAL
Qty: 60 | Refills: 0
Start: 2023-11-20 | End: 2023-12-19

## 2023-11-20 RX ORDER — LEVOFLOXACIN 5 MG/ML
1 INJECTION, SOLUTION INTRAVENOUS
Qty: 11 | Refills: 0
Start: 2023-11-20 | End: 2023-11-30

## 2023-11-20 RX ORDER — PANTOPRAZOLE SODIUM 20 MG/1
1 TABLET, DELAYED RELEASE ORAL
Qty: 23 | Refills: 0
Start: 2023-11-20 | End: 2023-11-30

## 2023-11-20 RX ORDER — LACTOBACILLUS ACIDOPHILUS 100MM CELL
1 CAPSULE ORAL
Qty: 90 | Refills: 0
Start: 2023-11-20 | End: 2023-12-19

## 2023-11-20 RX ORDER — DEXAMETHASONE 0.5 MG/5ML
10 ELIXIR ORAL
Refills: 0 | DISCHARGE

## 2023-11-20 RX ORDER — AMOXICILLIN 250 MG/5ML
2 SUSPENSION, RECONSTITUTED, ORAL (ML) ORAL
Qty: 45 | Refills: 0
Start: 2023-11-20 | End: 2023-11-30

## 2023-11-20 RX ORDER — TRAZODONE HCL 50 MG
0 TABLET ORAL
Refills: 0 | DISCHARGE

## 2023-11-20 RX ADMIN — Medication 400 MILLIGRAM(S): at 04:47

## 2023-11-20 RX ADMIN — PANTOPRAZOLE SODIUM 40 MILLIGRAM(S): 20 TABLET, DELAYED RELEASE ORAL at 04:46

## 2023-11-20 RX ADMIN — BREXPIPRAZOLE 4 MILLIGRAM(S): 0.25 TABLET ORAL at 15:47

## 2023-11-20 RX ADMIN — Medication 400 MILLIGRAM(S): at 15:47

## 2023-11-20 RX ADMIN — Medication 1 TABLET(S): at 15:48

## 2023-11-20 RX ADMIN — Medication 1000 MILLIGRAM(S): at 04:46

## 2023-11-20 RX ADMIN — Medication 1 TABLET(S): at 15:49

## 2023-11-20 RX ADMIN — Medication 2 MILLIGRAM(S): at 09:44

## 2023-11-20 RX ADMIN — Medication 25 MILLIGRAM(S): at 04:48

## 2023-11-20 RX ADMIN — HALOPERIDOL DECANOATE 2.5 MILLIGRAM(S): 100 INJECTION INTRAMUSCULAR at 04:49

## 2023-11-20 NOTE — DISCHARGE NOTE PROVIDER - NSDCCPCAREPLAN_GEN_ALL_CORE_FT
PRINCIPAL DISCHARGE DIAGNOSIS  Diagnosis: Thrombocytopenia  Assessment and Plan of Treatment: follow up with heme Dr. Bhagat outpatient  monitor platelets and transfuse as needed      SECONDARY DISCHARGE DIAGNOSES  Diagnosis: History of developmental disorder  Assessment and Plan of Treatment: can follow up with psych outpatient    Diagnosis: History of Helicobacter pylori infection  Assessment and Plan of Treatment: Follow up with GI Dr Carbajal outpatient  continue triple therapy as prescribed for total 14 day course (amoxicillin,levaquin and protonix)   if symptoms return or worsen, contact provider

## 2023-11-20 NOTE — DISCHARGE NOTE PROVIDER - CARE PROVIDERS DIRECT ADDRESSES
,[email or web address],DirectAddress_Unknown,DirectAddress_Unknown

## 2023-11-20 NOTE — PROGRESS NOTE ADULT - PROVIDER SPECIALTY LIST ADULT
Gastroenterology
Internal Medicine
Gastroenterology
Heme/Onc
Internal Medicine
Internal Medicine
Gastroenterology
Internal Medicine
Internal Medicine
Heme/Onc
Internal Medicine

## 2023-11-20 NOTE — PROGRESS NOTE ADULT - SUBJECTIVE AND OBJECTIVE BOX
Chief Complaint:  Patient is a 23y old  Male who presents with a chief complaint of Sent by Hematologist to the ER for low platelets, easy bruisability (18 Nov 2023 12:43)      Date of service 11-19-23 @ 09:10      Interval Events:   no GI complaints  Hospital Medications:  amoxicillin 1000 milliGRAM(s) Oral two times a day  brexpiprazole 4 milliGRAM(s) Oral daily  cimetidine 400 milliGRAM(s) Oral three times a day  dexAMETHasone  IVPB 40 milliGRAM(s) IV Intermittent every 24 hours  dextrose 5%. 1000 milliLiter(s) IV Continuous <Continuous>  dextrose 5%. 1000 milliLiter(s) IV Continuous <Continuous>  dextrose 50% Injectable 25 Gram(s) IV Push once  dextrose 50% Injectable 12.5 Gram(s) IV Push once  dextrose 50% Injectable 25 Gram(s) IV Push once  dextrose Oral Gel 15 Gram(s) Oral once PRN  diphenhydrAMINE Injectable 25 milliGRAM(s) IV Push every 4 hours PRN  glucagon  Injectable 1 milliGRAM(s) IntraMuscular once  haloperidol    Injectable 2.5 milliGRAM(s) IV Push every 4 hours PRN  insulin lispro (ADMELOG) corrective regimen sliding scale   SubCutaneous three times a day before meals  insulin lispro (ADMELOG) corrective regimen sliding scale   SubCutaneous at bedtime  lactobacillus acidophilus 1 Tablet(s) Oral three times a day with meals  levoFLOXacin  Tablet 500 milliGRAM(s) Oral daily  multivitamin 1 Tablet(s) Oral daily  pantoprazole    Tablet 40 milliGRAM(s) Oral two times a day  traZODone 200 milliGRAM(s) Oral at bedtime        Review of Systems:  General:  No wt loss, fevers, chills, night sweats, fatigue,   Eyes:  Good vision, no reported pain  ENT:  No sore throat, pain, runny nose, dysphagia  CV:  No pain, palpitations, hypo/hypertension  Resp:  No dyspnea, cough, tachypnea, wheezing  GI:  See HPI  :  No pain, bleeding, incontinence, nocturia  Muscle:  No pain, weakness  Neuro:  No weakness, tingling, memory problems  Psych:  No fatigue, insomnia, mood problems, depression  Endocrine:  No polyuria, polydipsia, cold/heat intolerance  Heme:  No petechiae, ecchymosis, easy bruisability  Integumentary:  No rash, edema    PHYSICAL EXAM:   Vital Signs:  Vital Signs Last 24 Hrs  T(C): 36.4 (19 Nov 2023 05:54), Max: 36.7 (18 Nov 2023 20:02)  T(F): 97.6 (19 Nov 2023 05:54), Max: 98.1 (18 Nov 2023 20:02)  HR: 73 (19 Nov 2023 05:54) (73 - 87)  BP: 110/74 (19 Nov 2023 05:54) (110/74 - 118/68)  BP(mean): --  RR: 18 (19 Nov 2023 05:54) (18 - 18)  SpO2: 96% (19 Nov 2023 05:54) (95% - 96%)    Parameters below as of 19 Nov 2023 05:54  Patient On (Oxygen Delivery Method): room air      Daily     Daily       PHYSICAL EXAM:     GENERAL:  Appears stated age, well-groomed, well-nourished, no distress  HEENT:  NC/AT,  conjunctivae anicteric, clear and pink,   NECK: supple, trachea midline  CHEST:  Full & symmetric excursion, no increased effort, breath sounds clear  HEART:  Regular rhythm, no JVD  ABDOMEN:  Soft, non-tender, non-distended, normoactive bowel sounds,  no masses , no hepatosplenomegaly  EXTREMITIES:  no cyanosis,clubbing or edema  SKIN:  No rash, erythema, or, ecchymoses, no jaundice  NEURO:  Alert, non-focal, no asterixis  PSYCH: Appropriate affect, oriented to place and time  RECTAL: Deferred      LABS Personally reviewed by me:                        13.7   19.34 )-----------( 16       ( 19 Nov 2023 07:11 )             45.3     Mean Cell Volume: 84.5 fl (11-19-23 @ 07:11)    11-19    136  |  103  |  23  ----------------------------<  129<H>  4.8   |  23  |  0.73    Ca    8.6      19 Nov 2023 07:11  Mg     1.9     11-19          Urinalysis Basic - ( 19 Nov 2023 07:11 )    Color: x / Appearance: x / SG: x / pH: x  Gluc: 129 mg/dL / Ketone: x  / Bili: x / Urobili: x   Blood: x / Protein: x / Nitrite: x   Leuk Esterase: x / RBC: x / WBC x   Sq Epi: x / Non Sq Epi: x / Bacteria: x                              13.7   19.34 )-----------( 16       ( 19 Nov 2023 07:11 )             45.3                         13.4   23.80 )-----------( 7        ( 18 Nov 2023 08:56 )             42.9                         14.0   20.53 )-----------( 3        ( 17 Nov 2023 07:16 )             44.5       Imaging personally reviewed by me:          
Name of Patient : VINNY MOON  MRN: [id number]  Date of visit: 11-20-23 @ 15:41      Subjective: Patient seen and examined. No new events except as noted.   Patient seen earlier this AM. Mother at the bedside.   Continues on ABX for H. pylori treatment     REVIEW OF SYSTEMS:  Unable to obtain ROS     MEDICATIONS:  MEDICATIONS  (STANDING):  amoxicillin 1000 milliGRAM(s) Oral two times a day  brexpiprazole 4 milliGRAM(s) Oral daily  cimetidine 400 milliGRAM(s) Oral three times a day  dextrose 5%. 1000 milliLiter(s) (100 mL/Hr) IV Continuous <Continuous>  dextrose 5%. 1000 milliLiter(s) (50 mL/Hr) IV Continuous <Continuous>  dextrose 50% Injectable 12.5 Gram(s) IV Push once  dextrose 50% Injectable 25 Gram(s) IV Push once  dextrose 50% Injectable 25 Gram(s) IV Push once  glucagon  Injectable 1 milliGRAM(s) IntraMuscular once  insulin lispro (ADMELOG) corrective regimen sliding scale   SubCutaneous three times a day before meals  insulin lispro (ADMELOG) corrective regimen sliding scale   SubCutaneous at bedtime  lactobacillus acidophilus 1 Tablet(s) Oral three times a day with meals  levoFLOXacin  Tablet 500 milliGRAM(s) Oral daily  multivitamin 1 Tablet(s) Oral daily  pantoprazole    Tablet 40 milliGRAM(s) Oral two times a day  traZODone 200 milliGRAM(s) Oral at bedtime      PHYSICAL EXAM:  T(C): 36.3 (11-20-23 @ 04:49), Max: 36.8 (11-19-23 @ 21:15)  HR: 99 (11-20-23 @ 04:49) (83 - 99)  BP: 144/80 (11-20-23 @ 04:49) (144/80 - 148/85)  RR: 18 (11-20-23 @ 04:49) (18 - 18)  SpO2: 97% (11-20-23 @ 04:49) (93% - 97%)  Wt(kg): --  I&O's Summary        Appearance: Awake, calm, lying down in bed   HEENT:  Lying down, eyes are closed    Lymphatic: No lymphadenopathy   Cardiovascular: Normal    Respiratory: normal effort    Gastrointestinal:  Soft   Skin: No rashes,  warm to touch  Psychiatry: Calm   Musculoskeletal: No edema                              15.5   17.10 )-----------( 10       ( 20 Nov 2023 07:23 )             50.6               11-19    136  |  103  |  23  ----------------------------<  129<H>  4.8   |  23  |  0.73    Ca    8.6      19 Nov 2023 07:11  Mg     1.9     11-19                Urinalysis Basic - ( 19 Nov 2023 07:11 )    Color: x / Appearance: x / SG: x / pH: x  Gluc: 129 mg/dL / Ketone: x  / Bili: x / Urobili: x   Blood: x / Protein: x / Nitrite: x   Leuk Esterase: x / RBC: x / WBC x   Sq Epi: x / Non Sq Epi: x / Bacteria: x        
 pt seen and examined, no complaints, ROS - .     brexpiprazole 4 milliGRAM(s) Oral daily  cimetidine 400 milliGRAM(s) Oral three times a day  dexAMETHasone  IVPB 40 milliGRAM(s) IV Intermittent every 24 hours  dextrose 5%. 1000 milliLiter(s) IV Continuous <Continuous>  dextrose 5%. 1000 milliLiter(s) IV Continuous <Continuous>  dextrose 50% Injectable 25 Gram(s) IV Push once  dextrose 50% Injectable 12.5 Gram(s) IV Push once  dextrose 50% Injectable 25 Gram(s) IV Push once  dextrose Oral Gel 15 Gram(s) Oral once PRN  diphenhydrAMINE Injectable 25 milliGRAM(s) IV Push every 4 hours PRN  glucagon  Injectable 1 milliGRAM(s) IntraMuscular once  haloperidol    Injectable 2.5 milliGRAM(s) IV Push every 4 hours PRN  insulin lispro (ADMELOG) corrective regimen sliding scale   SubCutaneous three times a day before meals  insulin lispro (ADMELOG) corrective regimen sliding scale   SubCutaneous at bedtime  lactobacillus acidophilus 1 Tablet(s) Oral three times a day with meals  multivitamin 1 Tablet(s) Oral daily  pantoprazole    Tablet 40 milliGRAM(s) Oral before breakfast  traZODone 200 milliGRAM(s) Oral at bedtime                            13.4   23.80 )-----------( 7        ( 18 Nov 2023 08:56 )             42.9       Hemoglobin: 13.4 g/dL (11-18 @ 08:56)  Hemoglobin: 14.0 g/dL (11-17 @ 07:16)  Hemoglobin: 14.9 g/dL (11-16 @ 07:36)  Hemoglobin: 14.0 g/dL (11-15 @ 11:28)  Hemoglobin: 13.5 g/dL (11-14 @ 20:57)      11-18    136  |  101  |  23  ----------------------------<  145<H>  4.5   |  22  |  0.79    Ca    9.2      18 Nov 2023 08:56      Creatinine Trend: 0.79<--, 0.83<--, 0.86<--, 0.94<--    COAGS:           T(C): 36.3 (11-18-23 @ 06:24), Max: 36.6 (11-17-23 @ 12:34)  HR: 77 (11-18-23 @ 06:24) (67 - 79)  BP: 118/71 (11-18-23 @ 06:24) (107/59 - 126/76)  RR: 17 (11-18-23 @ 06:24) (17 - 18)  SpO2: 93% (11-18-23 @ 06:24) (93% - 96%)  Wt(kg): --    I&O's Summary    17 Nov 2023 07:01  -  18 Nov 2023 07:00  --------------------------------------------------------  IN: 1060 mL / OUT: 0 mL / NET: 1060 mL          Review of Systems:    General:  No wt loss, fevers, chills, night sweats, fatigue  Eyes:  Good vision, no reported pain  ENT:  No sore throat, pain, runny nose, dysphagia  CV:  No pain, palpitations, no lightheadedness  Resp:  No dyspnea, cough, tachypnea, wheezing  GI: per HPI  :  No pain, bleeding, incontinence, nocturia  Muscle:  No pain, weakness  Neuro:  No weakness, tingling, memory problems  Psych:  No fatigue, insomnia, mood problems, depression  Endocrine:  No polyuria, polydypsia, cold/heat intolerance  Heme:  No petechiae, ecchymosis, easy bruisability  Skin:  No rash, tattoos, scars, edema      General:  Appears stated age, well-groomed, nad  HEENT:  NC/AT,  conjunctivae clear and pink, no thyromegaly, nodules, adenopathy, no JVD  Chest:  Full & symmetric excursion, no increased effort, breath sounds clear  Cardiovascular:  Regular rhythm, S1, S2, no murmur/rub/S3/S4, no abdominal bruit, no edema  Abdomen:  Soft, non-tender, non-distended, normoactive bowel sounds,  no masses ,no hepatosplenomeagaly, no signs of chronic liver disease  Extremities:  no cyanosis,clubbing or edema  Skin:  No rash/erythema/ecchymoses/petechiae/wounds/abscess/warm/dry  Neuro/Psych:  A&Ox1, no asterixis, no tremor, no encephalopathy    
Name of Patient : VINNY MOON  MRN: [id number]  Date of visit: 11-17-23        Subjective: Patient seen and examined. No new events except as noted.   Patient seen earlier this AM. Mother at th bedside. On 1:1    REVIEW OF SYSTEMS:  Unable to obtain ROS    MEDICATIONS:  MEDICATIONS  (STANDING):  brexpiprazole 4 milliGRAM(s) Oral daily  cimetidine 400 milliGRAM(s) Oral three times a day  dexAMETHasone  IVPB 40 milliGRAM(s) IV Intermittent every 24 hours  dextrose 5%. 1000 milliLiter(s) (100 mL/Hr) IV Continuous <Continuous>  dextrose 5%. 1000 milliLiter(s) (50 mL/Hr) IV Continuous <Continuous>  dextrose 50% Injectable 25 Gram(s) IV Push once  dextrose 50% Injectable 12.5 Gram(s) IV Push once  dextrose 50% Injectable 25 Gram(s) IV Push once  glucagon  Injectable 1 milliGRAM(s) IntraMuscular once  insulin lispro (ADMELOG) corrective regimen sliding scale   SubCutaneous three times a day before meals  insulin lispro (ADMELOG) corrective regimen sliding scale   SubCutaneous at bedtime  lactobacillus acidophilus 1 Tablet(s) Oral three times a day with meals  multivitamin 1 Tablet(s) Oral daily  pantoprazole    Tablet 40 milliGRAM(s) Oral before breakfast  traZODone 200 milliGRAM(s) Oral at bedtime      PHYSICAL EXAM:  T(C): 36.6 (11-17-23 @ 12:34), Max: 36.9 (11-16-23 @ 21:05)  HR: 79 (11-17-23 @ 12:34) (79 - 91)  BP: 126/76 (11-17-23 @ 12:34) (124/75 - 131/83)  RR: 18 (11-17-23 @ 12:34) (16 - 18)  SpO2: 94% (11-17-23 @ 12:34) (94% - 96%)  Wt(kg): --  I&O's Summary    17 Nov 2023 07:01  -  17 Nov 2023 16:36  --------------------------------------------------------  IN: 360 mL / OUT: 0 mL / NET: 360 mL      Appearance: Awake, calm, lying down in bed   HEENT:  Lying down, eyes are closed    Lymphatic: No lymphadenopathy   Cardiovascular: Normal    Respiratory: normal effort    Gastrointestinal:  Soft   Skin: No rashes,  warm to touch  Psychiatry: Calm   Musculoskeletal: No edema        11-17-23 @ 07:01  -  11-17-23 @ 16:36  --------------------------------------------------------  IN: 360 mL / OUT: 0 mL / NET: 360 mL                                  14.0   20.53 )-----------( 3        ( 17 Nov 2023 07:16 )             44.5               11-16    140  |  104  |  21  ----------------------------<  179<H>  4.0   |  24  |  0.83    Ca    9.5      16 Nov 2023 07:39    TPro  6.9  /  Alb  4.5  /  TBili  0.7  /  DBili  x   /  AST  15  /  ALT  24  /  AlkPhos  65  11-16                       Urinalysis Basic - ( 16 Nov 2023 07:39 )    Color: x / Appearance: x / SG: x / pH: x  Gluc: 179 mg/dL / Ketone: x  / Bili: x / Urobili: x   Blood: x / Protein: x / Nitrite: x   Leuk Esterase: x / RBC: x / WBC x   Sq Epi: x / Non Sq Epi: x / Bacteria: x      
Patient is a 23y old  Male who presents with a chief complaint of Sent by Hematologist to the ER for low platelets, easy bruisability (16 Nov 2023 14:14)    His mother is at his bedside, seen earlier in the day.  Awaiting evaluation by GI.   No bleeding.      MEDICATIONS  (STANDING):  brexpiprazole 4 milliGRAM(s) Oral daily  cimetidine 400 milliGRAM(s) Oral three times a day  dexAMETHasone  IVPB 40 milliGRAM(s) IV Intermittent every 24 hours  dextrose 5%. 1000 milliLiter(s) (100 mL/Hr) IV Continuous <Continuous>  dextrose 5%. 1000 milliLiter(s) (50 mL/Hr) IV Continuous <Continuous>  dextrose 50% Injectable 25 Gram(s) IV Push once  dextrose 50% Injectable 12.5 Gram(s) IV Push once  dextrose 50% Injectable 25 Gram(s) IV Push once  glucagon  Injectable 1 milliGRAM(s) IntraMuscular once  insulin lispro (ADMELOG) corrective regimen sliding scale   SubCutaneous three times a day before meals  insulin lispro (ADMELOG) corrective regimen sliding scale   SubCutaneous at bedtime  lactobacillus acidophilus 1 Tablet(s) Oral three times a day with meals  pantoprazole    Tablet 40 milliGRAM(s) Oral before breakfast  traZODone 200 milliGRAM(s) Oral at bedtime    MEDICATIONS  (PRN):  dextrose Oral Gel 15 Gram(s) Oral once PRN Blood Glucose LESS THAN 70 milliGRAM(s)/deciliter  diphenhydrAMINE Injectable 25 milliGRAM(s) IV Push every 4 hours PRN agitation  haloperidol    Injectable 2.5 milliGRAM(s) IV Push every 4 hours PRN Agitation    Vital Signs Last 24 Hrs  T(C): 37.1 (16 Nov 2023 14:45), Max: 37.1 (16 Nov 2023 14:45)  T(F): 98.7 (16 Nov 2023 14:45), Max: 98.7 (16 Nov 2023 14:45)  HR: 118 (16 Nov 2023 14:45) (81 - 118)  BP: 141/86 (16 Nov 2023 14:45) (109/69 - 141/86)  BP(mean): --  RR: 18 (16 Nov 2023 14:45) (16 - 18)  SpO2: 97% (16 Nov 2023 14:45) (93% - 97%)    Parameters below as of 16 Nov 2023 14:45  Patient On (Oxygen Delivery Method): room air        PE  ecchymosis around left eye  lung are clear  abd soft NT  no edema                          14.9   11.73 )-----------( 2        ( 16 Nov 2023 07:36 )             48.3       11-16    140  |  104  |  21  ----------------------------<  179<H>  4.0   |  24  |  0.83    Ca    9.5      16 Nov 2023 07:39    TPro  6.9  /  Alb  4.5  /  TBili  0.7  /  DBili  x   /  AST  15  /  ALT  24  /  AlkPhos  65  11-16      
  Chief Complaint:  Patient is a 23y old  Male who presents with a chief complaint of Sent by Hematologist to the ER for low platelets, easy bruisability (18 Nov 2023 12:43)      Date of service 11-19-23 @ 09:10      Interval Events:   no GI complaints  Hospital Medications:  amoxicillin 1000 milliGRAM(s) Oral two times a day  brexpiprazole 4 milliGRAM(s) Oral daily  cimetidine 400 milliGRAM(s) Oral three times a day  dexAMETHasone  IVPB 40 milliGRAM(s) IV Intermittent every 24 hours  dextrose 5%. 1000 milliLiter(s) IV Continuous <Continuous>  dextrose 5%. 1000 milliLiter(s) IV Continuous <Continuous>  dextrose 50% Injectable 25 Gram(s) IV Push once  dextrose 50% Injectable 12.5 Gram(s) IV Push once  dextrose 50% Injectable 25 Gram(s) IV Push once  dextrose Oral Gel 15 Gram(s) Oral once PRN  diphenhydrAMINE Injectable 25 milliGRAM(s) IV Push every 4 hours PRN  glucagon  Injectable 1 milliGRAM(s) IntraMuscular once  haloperidol    Injectable 2.5 milliGRAM(s) IV Push every 4 hours PRN  insulin lispro (ADMELOG) corrective regimen sliding scale   SubCutaneous three times a day before meals  insulin lispro (ADMELOG) corrective regimen sliding scale   SubCutaneous at bedtime  lactobacillus acidophilus 1 Tablet(s) Oral three times a day with meals  levoFLOXacin  Tablet 500 milliGRAM(s) Oral daily  multivitamin 1 Tablet(s) Oral daily  pantoprazole    Tablet 40 milliGRAM(s) Oral two times a day  traZODone 200 milliGRAM(s) Oral at bedtime        Review of Systems:  General:  No wt loss, fevers, chills, night sweats, fatigue,   Eyes:  Good vision, no reported pain  ENT:  No sore throat, pain, runny nose, dysphagia  CV:  No pain, palpitations, hypo/hypertension  Resp:  No dyspnea, cough, tachypnea, wheezing  GI:  See HPI  :  No pain, bleeding, incontinence, nocturia  Muscle:  No pain, weakness  Neuro:  No weakness, tingling, memory problems  Psych:  No fatigue, insomnia, mood problems, depression  Endocrine:  No polyuria, polydipsia, cold/heat intolerance  Heme:  No petechiae, ecchymosis, easy bruisability  Integumentary:  No rash, edema    PHYSICAL EXAM:   Vital Signs:  Vital Signs Last 24 Hrs  T(C): 36.4 (19 Nov 2023 05:54), Max: 36.7 (18 Nov 2023 20:02)  T(F): 97.6 (19 Nov 2023 05:54), Max: 98.1 (18 Nov 2023 20:02)  HR: 73 (19 Nov 2023 05:54) (73 - 87)  BP: 110/74 (19 Nov 2023 05:54) (110/74 - 118/68)  BP(mean): --  RR: 18 (19 Nov 2023 05:54) (18 - 18)  SpO2: 96% (19 Nov 2023 05:54) (95% - 96%)    Parameters below as of 19 Nov 2023 05:54  Patient On (Oxygen Delivery Method): room air      Daily     Daily       PHYSICAL EXAM:     GENERAL:  Appears stated age, well-groomed, well-nourished, no distress  HEENT:  NC/AT,  conjunctivae anicteric, clear and pink,   NECK: supple, trachea midline  CHEST:  Full & symmetric excursion, no increased effort, breath sounds clear  HEART:  Regular rhythm, no JVD  ABDOMEN:  Soft, non-tender, non-distended, normoactive bowel sounds,  no masses , no hepatosplenomegaly  EXTREMITIES:  no cyanosis,clubbing or edema  SKIN:  No rash, erythema, or, ecchymoses, no jaundice  NEURO:  Alert, non-focal, no asterixis  PSYCH: Appropriate affect, oriented to place and time  RECTAL: Deferred      LABS Personally reviewed by me:                        13.7   19.34 )-----------( 16       ( 19 Nov 2023 07:11 )             45.3     Mean Cell Volume: 84.5 fl (11-19-23 @ 07:11)    11-19    136  |  103  |  23  ----------------------------<  129<H>  4.8   |  23  |  0.73    Ca    8.6      19 Nov 2023 07:11  Mg     1.9     11-19          Urinalysis Basic - ( 19 Nov 2023 07:11 )    Color: x / Appearance: x / SG: x / pH: x  Gluc: 129 mg/dL / Ketone: x  / Bili: x / Urobili: x   Blood: x / Protein: x / Nitrite: x   Leuk Esterase: x / RBC: x / WBC x   Sq Epi: x / Non Sq Epi: x / Bacteria: x                              13.7   19.34 )-----------( 16       ( 19 Nov 2023 07:11 )             45.3                         13.4   23.80 )-----------( 7        ( 18 Nov 2023 08:56 )             42.9                         14.0   20.53 )-----------( 3        ( 17 Nov 2023 07:16 )             44.5       Imaging personally reviewed by me:          
Name of Patient : VINNY MOON  MRN: [id number]  Date of visit: 11-19-23       Subjective: Patient seen and examined. No new events except as noted.     REVIEW OF SYSTEMS:  limited     MEDICATIONS:  MEDICATIONS  (STANDING):  amoxicillin 1000 milliGRAM(s) Oral two times a day  brexpiprazole 4 milliGRAM(s) Oral daily  cimetidine 400 milliGRAM(s) Oral three times a day  dextrose 5%. 1000 milliLiter(s) (100 mL/Hr) IV Continuous <Continuous>  dextrose 5%. 1000 milliLiter(s) (50 mL/Hr) IV Continuous <Continuous>  dextrose 50% Injectable 25 Gram(s) IV Push once  dextrose 50% Injectable 12.5 Gram(s) IV Push once  dextrose 50% Injectable 25 Gram(s) IV Push once  glucagon  Injectable 1 milliGRAM(s) IntraMuscular once  insulin lispro (ADMELOG) corrective regimen sliding scale   SubCutaneous three times a day before meals  insulin lispro (ADMELOG) corrective regimen sliding scale   SubCutaneous at bedtime  lactobacillus acidophilus 1 Tablet(s) Oral three times a day with meals  levoFLOXacin  Tablet 500 milliGRAM(s) Oral daily  multivitamin 1 Tablet(s) Oral daily  pantoprazole    Tablet 40 milliGRAM(s) Oral two times a day  traZODone 200 milliGRAM(s) Oral at bedtime      PHYSICAL EXAM:  T(C): 36.8 (11-19-23 @ 21:15), Max: 36.8 (11-19-23 @ 21:15)  HR: 83 (11-19-23 @ 21:15) (73 - 83)  BP: 148/85 (11-19-23 @ 21:15) (110/74 - 148/85)  RR: 18 (11-19-23 @ 21:15) (18 - 18)  SpO2: 93% (11-19-23 @ 21:15) (93% - 96%)  Wt(kg): --  I&O's Summary        Appearance: awake, clam, nonverbal  HEENT:  PERRLA   Lymphatic: No lymphadenopathy   Cardiovascular: Normal S1 S2, no JVD  Respiratory: normal effort , clear  Gastrointestinal:  Soft, Non-tender  Skin: No rashes,  warm to touch  Psychiatry:  Mood & affect appropriate  Musculuskeletal: No edema    recent labs, Imaging and EKGs personally reviewed                           13.7   19.34 )-----------( 16       ( 19 Nov 2023 07:11 )             45.3               11-19    136  |  103  |  23  ----------------------------<  129<H>  4.8   |  23  |  0.73    Ca    8.6      19 Nov 2023 07:11  Mg     1.9     11-19                         Urinalysis Basic - ( 19 Nov 2023 07:11 )    Color: x / Appearance: x / SG: x / pH: x  Gluc: 129 mg/dL / Ketone: x  / Bili: x / Urobili: x   Blood: x / Protein: x / Nitrite: x   Leuk Esterase: x / RBC: x / WBC x   Sq Epi: x / Non Sq Epi: x / Bacteria: x                    
Name of Patient : VINNY MOON  MRN: [id number]  Date of visit: 11-15-23        Subjective: Patient seen and examined. No new events except as noted.   Patient seen earlier this AM. Lying down in bed. Calm and sleeping at time of encounter.  Patient is S/P RRT this Am and Code gray due to agitation.   Mother at the bedside.  On 1:1 for safety     REVIEW OF SYSTEMS:  Unable to obtain ROS     MEDICATIONS:  MEDICATIONS  (STANDING):  dexAMETHasone  IVPB 40 milliGRAM(s) IV Intermittent every 24 hours  dextrose 5%. 1000 milliLiter(s) (100 mL/Hr) IV Continuous <Continuous>  dextrose 5%. 1000 milliLiter(s) (50 mL/Hr) IV Continuous <Continuous>  dextrose 50% Injectable 12.5 Gram(s) IV Push once  dextrose 50% Injectable 25 Gram(s) IV Push once  dextrose 50% Injectable 25 Gram(s) IV Push once  glucagon  Injectable 1 milliGRAM(s) IntraMuscular once  insulin lispro (ADMELOG) corrective regimen sliding scale   SubCutaneous every 6 hours      PHYSICAL EXAM:  T(C): 36.7 (11-15-23 @ 05:01), Max: 36.7 (11-15-23 @ 05:01)  HR: 90 (11-15-23 @ 05:01) (84 - 94)  BP: 94/57 (11-15-23 @ 05:01) (94/57 - 123/91)  RR: 19 (11-15-23 @ 05:01) (16 - 20)  SpO2: 99% (11-15-23 @ 05:01) (96% - 99%)  Wt(kg): --  I&O's Summary    Height (cm): 177.8 (11-14 @ 19:39)  Weight (kg): 106.6 (11-14 @ 19:39)  BMI (kg/m2): 33.7 (11-14 @ 19:39)  BSA (m2): 2.24 (11-14 @ 19:39)    Appearance: Awake, calm, lying down in bed   HEENT:  Lying down, eyes are closed    Lymphatic: No lymphadenopathy   Cardiovascular: Normal    Respiratory: normal effort    Gastrointestinal:  Soft   Skin: No rashes,  warm to touch  Psychiatry: Calm   Musculoskeletal: No edema                            14.0   13.11 )-----------( 12       ( 15 Nov 2023 11:28 )             45.5               11-15    143  |  106  |  11  ----------------------------<  74  3.7   |  27  |  0.86    Ca    8.8      15 Nov 2023 11:28    TPro  6.0  /  Alb  4.0  /  TBili  0.8  /  DBili  x   /  AST  13  /  ALT  23  /  AlkPhos  55  11-14    PT/INR - ( 14 Nov 2023 20:57 )   PT: 11.7 sec;   INR: 1.12 ratio         PTT - ( 14 Nov 2023 20:57 )  PTT:33.8 sec                   Urinalysis Basic - ( 15 Nov 2023 11:28 )    Color: x / Appearance: x / SG: x / pH: x  Gluc: 74 mg/dL / Ketone: x  / Bili: x / Urobili: x   Blood: x / Protein: x / Nitrite: x   Leuk Esterase: x / RBC: x / WBC x   Sq Epi: x / Non Sq Epi: x / Bacteria: x                
Name of Patient : VINNY MOON  MRN: [id number]  Date of visit: 11-16-23 @ 14:14      Subjective: Patient seen and examined. No new events except as noted.   Patient seen earlier this AM. Mother, Nicole the bedside.   Platelets down-trending to 2 this AM. Continues on IV Dexamethasone 40 Qd as per Hematology.   Mother refusing IVIG/ Platelet transfusions.   Intermittently agitated    REVIEW OF SYSTEMS:  Limited ROS     MEDICATIONS:  MEDICATIONS  (STANDING):  brexpiprazole 4 milliGRAM(s) Oral daily  cimetidine 400 milliGRAM(s) Oral three times a day  dexAMETHasone  IVPB 40 milliGRAM(s) IV Intermittent every 24 hours  dextrose 5%. 1000 milliLiter(s) (100 mL/Hr) IV Continuous <Continuous>  dextrose 5%. 1000 milliLiter(s) (50 mL/Hr) IV Continuous <Continuous>  dextrose 50% Injectable 25 Gram(s) IV Push once  dextrose 50% Injectable 12.5 Gram(s) IV Push once  dextrose 50% Injectable 25 Gram(s) IV Push once  glucagon  Injectable 1 milliGRAM(s) IntraMuscular once  insulin lispro (ADMELOG) corrective regimen sliding scale   SubCutaneous three times a day before meals  insulin lispro (ADMELOG) corrective regimen sliding scale   SubCutaneous at bedtime  lactobacillus acidophilus 1 Tablet(s) Oral three times a day with meals  pantoprazole    Tablet 40 milliGRAM(s) Oral before breakfast  traZODone 200 milliGRAM(s) Oral at bedtime      PHYSICAL EXAM:  T(C): 36.8 (11-16-23 @ 06:28), Max: 36.8 (11-16-23 @ 06:28)  HR: 115 (11-16-23 @ 10:51) (81 - 115)  BP: 136/89 (11-16-23 @ 10:51) (109/69 - 136/89)  RR: 18 (11-16-23 @ 06:28) (16 - 18)  SpO2: 96% (11-16-23 @ 06:28) (93% - 96%)  Wt(kg): --  I&O's Summary    15 Nov 2023 07:01  -  16 Nov 2023 07:00  --------------------------------------------------------  IN: 240 mL / OUT: 0 mL / NET: 240 mL          Appearance: Awake, lying down in bed, intermittently sits up    HEENT: Eyes ar eopen   Lymphatic: No lymphadenopathy   Cardiovascular: Normal    Respiratory: normal effort    Gastrointestinal:  Soft   Skin: No rashes,  warm to touch  Psychiatry: Intermittently agitated   Musculoskeletal: No edema      11-15-23 @ 07:01  -  11-16-23 @ 07:00  --------------------------------------------------------  IN: 240 mL / OUT: 0 mL / NET: 240 mL                                14.9   11.73 )-----------( 2        ( 16 Nov 2023 07:36 )             48.3               11-16    140  |  104  |  21  ----------------------------<  179<H>  4.0   |  24  |  0.83    Ca    9.5      16 Nov 2023 07:39    TPro  6.9  /  Alb  4.5  /  TBili  0.7  /  DBili  x   /  AST  15  /  ALT  24  /  AlkPhos  65  11-16    PT/INR - ( 14 Nov 2023 20:57 )   PT: 11.7 sec;   INR: 1.12 ratio         PTT - ( 14 Nov 2023 20:57 )  PTT:33.8 sec                   Urinalysis Basic - ( 16 Nov 2023 07:39 )    Color: x / Appearance: x / SG: x / pH: x  Gluc: 179 mg/dL / Ketone: x  / Bili: x / Urobili: x   Blood: x / Protein: x / Nitrite: x   Leuk Esterase: x / RBC: x / WBC x   Sq Epi: x / Non Sq Epi: x / Bacteria: x        < from: CT Abdomen and Pelvis w/ IV Cont (11.15.23 @ 15:02) >  COMPARISON: None.    CONTRAST/COMPLICATIONS:  IV Contrast: Omnipaque 350  90 cc administered   10 cc discarded  Oral Contrast: NONE  Complications: None reported at time of study completion    PROCEDURE:  CT of the Abdomen and Pelvis was performed.  Sagittal and coronal reformats were performed.    FINDINGS:  LOWER CHEST: Within normal limits.    LIVER: Within normal limits.  BILE DUCTS: Normal caliber.  GALLBLADDER: Within normal limits.  SPLEEN: Within normal limits.  PANCREAS: Within normal limits.  ADRENALS: Within normal limits.  KIDNEYS/URETERS: Within normal limits.    BLADDER: Within normal limits.  REPRODUCTIVE ORGANS: Prostate within normal limits.    BOWEL: No bowel obstruction. Appendix is normal.  PERITONEUM: No ascites.  VESSELS: Within normal limits.  RETROPERITONEUM/LYMPH NODES: No lymphadenopathy.  ABDOMINAL WALL: Subcutaneous soft tissue infiltration right lower   quadrant, likely contusion.  BONES: Within normal limits.    IMPRESSION:  No CT evidence of acute intra-abdominal process. Specifically, no   evidence of splenic mass.    < end of copied text >  
Name of Patient : VINNY MOON  MRN: [id number]  Date of visit: 11-18-23       Subjective: Patient seen and examined. No new events except as noted.     REVIEW OF SYSTEMS:  limited       MEDICATIONS:  MEDICATIONS  (STANDING):  amoxicillin 1000 milliGRAM(s) Oral two times a day  brexpiprazole 4 milliGRAM(s) Oral daily  cimetidine 400 milliGRAM(s) Oral three times a day  dexAMETHasone  IVPB 40 milliGRAM(s) IV Intermittent every 24 hours  dextrose 5%. 1000 milliLiter(s) (100 mL/Hr) IV Continuous <Continuous>  dextrose 5%. 1000 milliLiter(s) (50 mL/Hr) IV Continuous <Continuous>  dextrose 50% Injectable 25 Gram(s) IV Push once  dextrose 50% Injectable 12.5 Gram(s) IV Push once  dextrose 50% Injectable 25 Gram(s) IV Push once  glucagon  Injectable 1 milliGRAM(s) IntraMuscular once  insulin lispro (ADMELOG) corrective regimen sliding scale   SubCutaneous three times a day before meals  insulin lispro (ADMELOG) corrective regimen sliding scale   SubCutaneous at bedtime  lactobacillus acidophilus 1 Tablet(s) Oral three times a day with meals  levoFLOXacin  Tablet 500 milliGRAM(s) Oral daily  multivitamin 1 Tablet(s) Oral daily  pantoprazole    Tablet 40 milliGRAM(s) Oral two times a day  traZODone 200 milliGRAM(s) Oral at bedtime      PHYSICAL EXAM:  T(C): 36.7 (11-18-23 @ 20:02), Max: 36.7 (11-18-23 @ 20:02)  HR: 87 (11-18-23 @ 20:02) (67 - 87)  BP: 118/68 (11-18-23 @ 20:02) (107/59 - 118/71)  RR: 18 (11-18-23 @ 20:02) (17 - 18)  SpO2: 95% (11-18-23 @ 20:02) (93% - 96%)  Wt(kg): --  I&O's Summary    17 Nov 2023 07:01  -  18 Nov 2023 07:00  --------------------------------------------------------  IN: 1060 mL / OUT: 0 mL / NET: 1060 mL          Appearance: Normal	  HEENT:  PERRLA   Lymphatic: No lymphadenopathy   Cardiovascular: Normal S1 S2, no JVD  Respiratory: normal effort , clear  Gastrointestinal:  Soft, Non-tender  Skin: No rashes,  warm to touch  Psychiatry:  Mood & affect appropriate  Musculuskeletal: No edema    recent labs, Imaging and EKGs personally reviewed     11-17-23 @ 07:01  -  11-18-23 @ 07:00  --------------------------------------------------------  IN: 1060 mL / OUT: 0 mL / NET: 1060 mL                            13.4   23.80 )-----------( 7        ( 18 Nov 2023 08:56 )             42.9               11-18    136  |  101  |  23  ----------------------------<  145<H>  4.5   |  22  |  0.79    Ca    9.2      18 Nov 2023 08:56                         Urinalysis Basic - ( 18 Nov 2023 08:56 )    Color: x / Appearance: x / SG: x / pH: x  Gluc: 145 mg/dL / Ketone: x  / Bili: x / Urobili: x   Blood: x / Protein: x / Nitrite: x   Leuk Esterase: x / RBC: x / WBC x   Sq Epi: x / Non Sq Epi: x / Bacteria: x            
Patient is a 23y old  Male who presents with a chief complaint of Sent by Hematologist to the ER for low platelets, easy bruisability (20 Nov 2023 13:18)    Patient seen and examined. Patient resting comfortably in bed, family at bedside    MEDICATIONS  (STANDING):  amoxicillin 1000 milliGRAM(s) Oral two times a day  brexpiprazole 4 milliGRAM(s) Oral daily  cimetidine 400 milliGRAM(s) Oral three times a day  dextrose 5%. 1000 milliLiter(s) (100 mL/Hr) IV Continuous <Continuous>  dextrose 5%. 1000 milliLiter(s) (50 mL/Hr) IV Continuous <Continuous>  dextrose 50% Injectable 12.5 Gram(s) IV Push once  dextrose 50% Injectable 25 Gram(s) IV Push once  dextrose 50% Injectable 25 Gram(s) IV Push once  glucagon  Injectable 1 milliGRAM(s) IntraMuscular once  insulin lispro (ADMELOG) corrective regimen sliding scale   SubCutaneous three times a day before meals  insulin lispro (ADMELOG) corrective regimen sliding scale   SubCutaneous at bedtime  lactobacillus acidophilus 1 Tablet(s) Oral three times a day with meals  levoFLOXacin  Tablet 500 milliGRAM(s) Oral daily  multivitamin 1 Tablet(s) Oral daily  pantoprazole    Tablet 40 milliGRAM(s) Oral two times a day  traZODone 200 milliGRAM(s) Oral at bedtime    MEDICATIONS  (PRN):  dextrose Oral Gel 15 Gram(s) Oral once PRN Blood Glucose LESS THAN 70 milliGRAM(s)/deciliter  diphenhydrAMINE Injectable 25 milliGRAM(s) IV Push every 4 hours PRN agitation  haloperidol    Injectable 2.5 milliGRAM(s) IV Push every 4 hours PRN Agitation      Vital Signs Last 24 Hrs  T(C): 36.3 (20 Nov 2023 04:49), Max: 36.8 (19 Nov 2023 21:15)  T(F): 97.3 (20 Nov 2023 04:49), Max: 98.3 (19 Nov 2023 21:15)  HR: 99 (20 Nov 2023 04:49) (83 - 99)  BP: 144/80 (20 Nov 2023 04:49) (144/80 - 148/85)  BP(mean): --  RR: 18 (20 Nov 2023 04:49) (18 - 18)  SpO2: 97% (20 Nov 2023 04:49) (93% - 97%)    Parameters below as of 20 Nov 2023 04:49  Patient On (Oxygen Delivery Method): room air        PE  Awake, alert  Anicteric, MMM  RRR  CTAB  Abd soft, NT, ND  No c/c/e  No rash grossly  FROM                          15.5   17.10 )-----------( 10       ( 20 Nov 2023 07:23 )             50.6       11-19    136  |  103  |  23  ----------------------------<  129<H>  4.8   |  23  |  0.73    Ca    8.6      19 Nov 2023 07:11  Mg     1.9     11-19

## 2023-11-20 NOTE — PROVIDER CONTACT NOTE (CRITICAL VALUE NOTIFICATION) - SITUATION
admited for severe thrombocytopenia.  refused ivig.
Patient admitted or thrombocytopenia
Pt admitted with Thrombocytopenia
admitted for thrombocytopenia. on steroid.
Patient admitted or thrombocytopenia

## 2023-11-20 NOTE — DISCHARGE NOTE PROVIDER - NSDCMRMEDTOKEN_GEN_ALL_CORE_FT
Ativan 1 mg oral tablet: 1 tab(s) orally every 8 hours as needed for  agitation MDD: 3  cimetidine 400 mg oral tablet: 1 tab(s) orally 3 times a day (with meals)  dexAMETHasone 4 mg oral tablet: 10 tab(s) orally once a day total dose 40mg for 4 days end on 11/3  lactobacillus acidophilus oral capsule: 1 cap(s) orally once a day  loratadine 10 mg oral tablet: 1 tab(s) orally once a day as needed for  allergy symptoms  Multiple Vitamins oral tablet: 1 tab(s) orally once a day  Rexulti 4 mg oral tablet: 1 tab(s) orally once a day  traZODone 150 mg oral tablet: orally once a day (at bedtime)   Ativan 1 mg oral tablet: 1 tab(s) orally every 8 hours as needed for  agitation MDD: 3  cimetidine 400 mg oral tablet: 1 tab(s) orally 3 times a day (with meals)  lactobacillus acidophilus oral capsule: 1 cap(s) orally once a day  loratadine 10 mg oral tablet: 1 tab(s) orally once a day as needed for  allergy symptoms  Multiple Vitamins oral tablet: 1 tab(s) orally once a day  Rexulti 4 mg oral tablet: 1 tab(s) orally once a day   amoxicillin 500 mg oral capsule: 2 cap(s) orally 2 times a day total course 14 days- last day to be taken on 12/1/23  cimetidine 400 mg oral tablet: 1 tab(s) orally 3 times a day (with meals)  lactobacillus acidophilus oral capsule: 1 cap(s) orally 3 times a day  levoFLOXacin 500 mg oral tablet: 1 tab(s) orally once a day Last dose to be taken on 12/1/23  loratadine 10 mg oral tablet: 1 tab(s) orally once a day as needed for  allergy symptoms  Multiple Vitamins oral tablet: 1 tab(s) orally once a day  pantoprazole 40 mg oral delayed release tablet: 1 tab(s) orally 2 times a day total 14 day course. Last dose to be taken on 12/1/23. then take 1x daily thereafter indefinitely  Rexulti 4 mg oral tablet: 1 tab(s) orally once a day  traZODone 100 mg oral tablet: 2 tab(s) orally once a day (at bedtime)

## 2023-11-20 NOTE — PHARMACOTHERAPY INTERVENTION NOTE - COMMENTS
Medication Reconciliation completed for patient.  These were confirmed with [facility] and the patient's facility, [facility].  Updated medications are reflected in Outpatient Medication Review.     The Drug Utilization Report below displays all of the controlled substance prescriptions, if any, that your patient has filled in the last twelve months. The information displayed on this report is compiled from pharmacy submissions to the Department, and accurately reflects the information as submitted by the pharmacies.    This report was requested by: Daija Adrian | Reference #: [id number]    Practitioner Count: 0  Pharmacy Count: 0  Current Opioid Prescriptions: 0  Current Benzodiazepine Prescriptions: 0  Current Stimulant Prescriptions: 0      Patient Demographic Information (PDI)       PDI	First Name	Last Name	Birth Date	Gender	Street Address	[facility] Code  A	Denilson Hernandes	2000	Male	[address]	[place]	[address]  B	Denilson Hernandes	2000	Male	[address]	[place]	[address]    Prescription Information      PDI Filter:    PDI	Current Rx	Drug Type	Rx Written	Rx Dispensed	Drug	Quantity	Days Supply	Prescriber Name	Prescriber ULISES #	Payment Method	Dispenser  A	N	B	08/16/2023	08/17/2023	lorazepam 1 mg tablet	10	10	Richie Sandoval MD	[id number]	Insurance	[facility]  A	N	B	06/01/2023	06/01/2023	alprazolam 0.5 mg tablet	15	5	[facility]	[id number]	Insurance	[facility]  B	N	B	06/12/2023	06/23/2023	lorazepam 1 mg tablet	15	5	Amilcar Savage	[id number]	Insurance	[facility]    Home Medications:  Ativan 1mg oral tablet: 1 tab(s) orally every 8 hours as needed for agitation MDD 3 tabs  Cimetidine 400mg oral tablet: 1 tab(s) orally three times a day with meals  lactobacillus acdiophillus: 1 cap(s) orally once daily  dexAMETHasone 4 mg oral tablet: 10 tab(s) orally once a day total dose 40mg for 4 days end on 11/3  loratadine 10 mg oral tablet: 1 tab(s) orally once a day as needed for allergy symptoms   Multiple Vitamins oral tablet: 1 tab(s) orally once a day  Rexulti 4 mg oral tablet: 1 tab(s) orally once a day  traZODone 150 mg oral tablet: orally once a day (at bedtime)    Daija Adrian, PharmD, BCPS  Clinical Pharmacy Specialist  Available on Teams

## 2023-11-20 NOTE — DISCHARGE NOTE NURSING/CASE MANAGEMENT/SOCIAL WORK - NSDCPEFALRISK_GEN_ALL_CORE
For information on Fall & Injury Prevention, visit: [email or web address] OR  [email or web address] OR  https://www.cdc.gov/steadi/patient.html

## 2023-11-20 NOTE — PROGRESS NOTE ADULT - NS ATTEND AMEND GEN_ALL_CORE FT
As above. 22 y/o male with chronic ITP, admitted with thrombocytopenia    - Platelet remains severely low though at baseline; refractory to multiple lines of therapy. Mother had refused IVIG as well as for now fostamatinib.   - If no bleeding, can follow-up outpatient  - Continue treatment for H. pylori  - Second opinion from [facility] noted
Pt care and plan discussed and reviewed with PA. Plan as outlined above edited by me to reflect our discussion.
Pt care and plan discussed and reviewed with PA. Plan as outlined above edited by me to reflect our discussion.
Pt care and plan discussed and reviewed with PA. Plan as outlined above edited by me to reflect our discussion. Advanced care planning/advanced directives discussed with patient/family. DNR status including forceful chest compressions to attempt to restart the heart, ventilator support/artificial breathing, electric shock, artificial nutrition, health care proxy, Molst form all discussed with pt.
Pt care and plan discussed and reviewed with PA. Plan as outlined above edited by me to reflect our discussion.     discussed plan of Pt care with patient's mother at the bedside

## 2023-11-20 NOTE — DISCHARGE NOTE PROVIDER - CARE PROVIDER_API CALL
Rosaura Bhagat  Hematology  [address] [place] [address]  Phone: [phone]  Fax: [phone]  Follow Up Time:     Duong Carbajal  Gastroenterology  [address] [place] [address]  Phone: [phone]  Fax: [phone]  Follow Up Time:     Richie Sandoval  [address]  [place] [address]  Phone: [phone]  Fax: [phone]  Follow Up Time:

## 2023-11-20 NOTE — PROGRESS NOTE ADULT - ASSESSMENT
Pt is a 23 year old M hx of intellectual disability, autism, nonverbal at baseline, CARLITOS not on CPAP, severe chronic ITP who presents to the ER with mother due to abnormal labs. mother states platelets were found to be 1K. states patient was advised to come in by hematologist       Chronic ITP   - Diagnosed at the age of 18 months    - Has extensive treatment history including Rituxan weekly x 4 completed 6/26/23, Nplate (LD 10/16/23, poor response). Intermittent IVIG, steroids, winrho (allergic reaction), rituxan in 2010 and 2021, and has been on Doptelet since    - Pt with refractory severe chronic ITP with past courses of Rituxan, IVIG and past platelet transfusion  - Pt is accompanied by his mother, mother would like to hold off on IVIG and platelet transfusion, and refusing CTT head  - S/P methylprednisolone in ER, S/PDexamethsone 40 IV X 5 days  PPI as per heme/onc   - Heme/Onc [facility] consult appreciated; F/u recs   - 2nd opinion Heme/Onc eval appreciated; F/u recs   - Outpatient follow up on discharge     History of developmental disorder.   - History of severe developmental delay, autism, with patient nonverbal, noncommunicative with episodes of agitation   - S/P code gray and RRT due to agitation 11/15  - On 1:1 for safety   - Psych eval appreciated; F/u recs      H/O H. Pylori   - Mother reports patient was treated by an outpatient GI 8 week ago for H pylori, unsure of regimen patient was on.   - H. pylori was diagnosed with stool study. Mother reports repeat stool study was positive  - Mother believes ITP is due to H. pylori   - C/w PPI   - H. Pylori antigen --> + , now on Levaquin Amoxicillin and PPI X 14 day course per GI   - Previously Tx w/ Clarithromycin and Amoxicillin   - C/w repeat triple therapy Amoxicillin, Levaquin, and Protonix, discussed with mother   - GI eval appreciated; F/u recs  - outpatient GI follow up     Hemangioma of spleen.   - Mother reports patient with concern for splenic hemangioma outpatient  - CT A/P Negative for intraabdominal findings. No splenic mass     Leukocytosis  - Likely 2/2 steroids. Continue to monitor and trend. If febrile, check pan cultures    Discussed with Attending. Discussed with Mother at the bedside.  Pt is a 23 year old M hx of intellectual disability, autism, nonverbal at baseline, CARLITOS not on CPAP, severe chronic ITP who presents to the ER with mother due to abnormal labs. mother states platelets were found to be 1K. states patient was advised to come in by hematologist       Chronic ITP   - Diagnosed at the age of 18 months    - Has extensive treatment history including Rituxan weekly x 4 completed 6/26/23, Nplate (LD 10/16/23, poor response). Intermittent IVIG, steroids, winrho (allergic reaction), rituxan in 2010 and 2021, and has been on Doptelet since    - Pt with refractory severe chronic ITP with past courses of Rituxan, IVIG and past platelet transfusion  - Pt is accompanied by his mother, mother would like to hold off on IVIG and platelet transfusion, and refusing CTT head  - S/P methylprednisolone in ER, S/P Dexamethsone 40 IV X 5 days  PPI as per heme/onc   - Heme/Onc [facility] consult appreciated; F/u recs   - 2nd opinion Heme/Onc eval appreciated; F/u recs   - Outpatient follow up on discharge     History of developmental disorder.   - History of severe developmental delay, autism, with patient nonverbal, noncommunicative with episodes of agitation   - S/P code gray and RRT due to agitation 11/15  - On 1:1 for safety   - Psych eval appreciated; F/u recs      H/O H. Pylori   - Mother reports patient was treated by an outpatient GI 8 week ago for H pylori, unsure of regimen patient was on.   - H. pylori was diagnosed with stool study. Mother reports repeat stool study was positive  - Mother believes ITP is due to H. pylori   - C/w PPI   - H. Pylori antigen --> + , now on Levaquin Amoxicillin and PPI X 14 day course per GI   - Previously Tx w/ Clarithromycin and Amoxicillin   - C/w repeat triple therapy Amoxicillin, Levaquin, and Protonix, discussed with mother   - GI eval appreciated; F/u recs  - outpatient GI follow up     Hemangioma of spleen.   - Mother reports patient with concern for splenic hemangioma outpatient  - CT A/P Negative for intraabdominal findings. No splenic mass     Leukocytosis  - Likely 2/2 steroids. Continue to monitor and trend. If febrile, check pan cultures    Discussed with Attending. Discussed with Mother at the bedside.

## 2023-11-20 NOTE — DISCHARGE NOTE PROVIDER - PROVIDER TOKENS
PROVIDER:[TOKEN:[[id number]]],PROVIDER:[TOKEN:[[id number]]],PROVIDER:[TOKEN:[[id number]]]

## 2023-11-20 NOTE — PROGRESS NOTE ADULT - ASSESSMENT
22 y/o M with a history of intellectual impairment, autism, nonverbal and noncommunicative at baseline, obstructive sleep apnea not on positive pressure ventilation, severe chronic ITP followed by and referred by Dr. Bhagat to the ER following CBC draw with platelet count of 1K.      Chronic ITP  - since the age of 18 months of age (post MMR vaccine)  - Follows with Dr. Bhagat at [facility]  - His platelets were 1k on 11/14, and Dr. Bhagat recommended patient to come into the ED  - steroid refractory, no response to high/low dose steroids, rituxan, or TPO agonist.  - Has extensive treatment history including Rituxan weekly x 4 completed 6/26/23, Nplate (LD 10/16/23, poor response). Intermittent IVIG, steroids, winrho (allergic reaction), rituxan in 2010 and 2021, doptelet since 4/2021- 2023, then npalte- now off.  ITP worsened in 2021 due to COVID. No splenectomy (poor operative candidate, mother refused vaccinations that would be required prior to the splenectomy).   - Mother is refusing IVIG- concerned about COVID spike proteins.  She is only agreeable to IVIG if he bleeds  - Would consider Tavalisse, mother wants to wait for GI evaluation to complete  - s/p pulse dexamethasone 40mg IV daily x 4 days with PPI, platelets now at baseline  - plan for treatment of hpylori- told it was positive at Dr. Monge's office  - Recommend second opinion from [facility] heme/onc    History of H pylori  - Recommend GI evaluation  - Recommend PPI      patient cleared for discharge from a heme/onc perspective    Nisa Moody NP  Hematology/ Oncology  [facility]  [phone] (office)  [phone] (alt office)  Evenings and weekends please call MD on call or office

## 2023-11-20 NOTE — DISCHARGE NOTE PROVIDER - NSFOLLOWUPCLINICS_GEN_ALL_ED_FT
[facility]  Psychiatry  [address] [place] [address]  Phone: [phone]  Fax:

## 2023-11-20 NOTE — PROGRESS NOTE ADULT - REASON FOR ADMISSION
Sent by Hematologist to the ER for low platelets, easy bruisability

## 2023-11-20 NOTE — DISCHARGE NOTE NURSING/CASE MANAGEMENT/SOCIAL WORK - NSDCFUADDAPPT_GEN_ALL_CORE_FT
APPTS ARE READY TO BE MADE: [ ] YES    Best Family or Patient Contact (if needed):    Additional Information about above appointments (if needed):    1: Follow up with PCP Dr. Sandoval outpatient  2: Follow up with GI Dr. Carbajal outpatient  3: Follow up with Heme Dr. Olayinka jones  4: Can follow up with psych at [facility]     Other comments or requests:

## 2023-11-20 NOTE — DISCHARGE NOTE NURSING/CASE MANAGEMENT/SOCIAL WORK - PATIENT PORTAL LINK FT
You can access the FollowMyHealth Patient Portal offered by [facility] by registering at the following website: [email or web address]. By joining [facility]’s FollowMyHealth portal, you will also be able to view your health information using other applications (apps) compatible with our system.

## 2023-11-20 NOTE — DISCHARGE NOTE PROVIDER - HOSPITAL COURSE
HPI:  22 y/o M--unable to obtain history from patient--mother in attendance--patient with a history of intellectual impairment, autism, nonverbal and noncommunicative at baseline, obstructive sleep apnoea not on positive pressure ventilation, severe chronic ITP followed by and referred by Dr. Bhagat above to the ER following CBC draw with platelet count of 1K.  Patient with multiple hospitalizations, last at [facility] with discharge on June 12 2023, with past treatment with pulse steroids (past complications of increased agitation due to steroids), intolerance with paradoxical increase in agitation with IV Haldol, Thorazine, Benadryl, past Rituxan (following desensitization), and past platelet transfusions, IVIG, past COVID-19 infection in 2021 with apparent worsening of patient's baseline platelet counts  (patient remains UNVACCINATED with mother declining such), with the mother reports that she has researched other studies with gut erik with infection with H. pylori and has been having his son see a GI on this issue, including a reported spleen hemangioma (mother was planning to have an outpatient CTT abdomen/pelvis with IV contrast on this issue), with mother reports her concern of patient's development for autism from the patient's original MMR vaccine at age [age] (unclear if this was referenced by the mother from the original [facility] association in 1998?) with mother notes a LEFT infraorbital ecchymoses for the past week and scattered bruises on the LEFT chest/mammary area and back.  Unable to obtain history from patient--patient now sedated past Rx of oral Ativan and Trazodone 200 mg given in the ER--mother reports that patient's prior Trazodone dose of 150 mg at night with patient's Medex did not prevent agitation in patient.   Mother was offered option for IVIG and platelet transfusion by the ER following conversation by the ER attending with Dr. Bhagat and mother refused.  I spoke to the mother who confirms such.   I also reviewed with the patient's mother for a noncontrast CTT head to exclude intracranial process given the low platelets--the patient's mother refused.     Hospital Course:  Pt admitted for severe thrombocytopenia. Pt diagnosed with chronic ITP at the age of 18 months. Has extensive treatment history including Rituxan weekly x 4 completed 6/26/23, Nplate (LD 10/16/23, poor response). Intermittent IVIG, steroids, winrho (allergic reaction), rituxan in 2010 and 2021, and has been on Doptelet since. Pt with refractory severe chronic ITP with past courses of Rituxan, IVIG and past platelet transfusion. Pt is accompanied by his mother, mother refusing IVIG and platelet transfusion (most recent PLT 10), and refusing CT head. S/P methylprednisolone in ER and Dexamethsone 40 IV Q24. Pts mother requested 2nd heme onc eval opinion- seen by team. Heme onc stated to consider Promacta vs Tavalisee-This was discussed at length with patient's mother however she is apprehensive about any medication. She would prefer treatment for H pylori and resolution of the bacterial infection prior to any treatment for platelets. Risk of bleeding was discussed with heme and mother- however, mother has been adamant about saying that if he hasn't had major bleeds before, why would he suddenly develop them now. Heme signed off- pt can follow up with Dr. Rosaura Bhagat [facility] or at [facility] Pt has History of severe developmental delay, autism, with patient nonverbal, noncommunicative with episodes of agitation. S/P code gray and RRT due to agitation 11/15, evaled by psych, receiving PRN IV haldol and ativan for agitation, last 11/20. Pt can follow up at [facility] outpt. Pt also with Hx of H pylori- Mother reports patient was treated by an outpatient GI 8 week ago for H pylori, unsure of regimen patient was on. H. pylori was diagnosed with stool study. Mother reports repeat stool study was positive, Mother believes ITP is due to H. pylori- on PPI. Previously Tx w/ Clarithromycin and Amoxicillin. Evaluated by GI and recommended for repeat triple therapy with amoxi, levaquin and protonix for 14 day course. pt can follow up with GI Dr. Carbajal outpatient. Mother reported patient with concern for splenic hemangioma outpatient- CT A/P Negative for intraabdominal findings. No splenic mass. Pt seen with leukocytosis likely 2/2 steroids.     Important Medication Changes and Reason:      Active or Pending Issues Requiring Follow-up:  -Follow up with PCP Dr. Sandoval outpatient  -Follow up with GI Dr. Carbaajl outpatient  -Follow up with heme/onc Dr. Bhagat at [facility] outpatient  -Can follow up with psychiatry at [facility] outpatient    Advanced Directives:   [X] Full code  [ ] DNR  [ ] Hospice    Discharge Diagnoses:  - Severe thrombocytopenia   -H pylori   -Agitation 2/2 intellectual disability       Discharge/Dispo/Med rec discussed with attending Dr. Driscoll.  Patient medically cleared for discharge back to Group Home with outpatient follow up with PCP, GI, Heme/onc, psych               HPI:  22 y/o M--unable to obtain history from patient--mother in attendance--patient with a history of intellectual impairment, autism, nonverbal and noncommunicative at baseline, obstructive sleep apnoea not on positive pressure ventilation, severe chronic ITP followed by and referred by Dr. Bhagat above to the ER following CBC draw with platelet count of 1K.  Patient with multiple hospitalizations, last at [facility] with discharge on June 12 2023, with past treatment with pulse steroids (past complications of increased agitation due to steroids), intolerance with paradoxical increase in agitation with IV Haldol, Thorazine, Benadryl, past Rituxan (following desensitization), and past platelet transfusions, IVIG, past COVID-19 infection in 2021 with apparent worsening of patient's baseline platelet counts  (patient remains UNVACCINATED with mother declining such), with the mother reports that she has researched other studies with gut erik with infection with H. pylori and has been having his son see a GI on this issue, including a reported spleen hemangioma (mother was planning to have an outpatient CTT abdomen/pelvis with IV contrast on this issue), with mother reports her concern of patient's development for autism from the patient's original MMR vaccine at age [age] (unclear if this was referenced by the mother from the original [facility] association in 1998?) with mother notes a LEFT infraorbital ecchymoses for the past week and scattered bruises on the LEFT chest/mammary area and back.  Unable to obtain history from patient--patient now sedated past Rx of oral Ativan and Trazodone 200 mg given in the ER--mother reports that patient's prior Trazodone dose of 150 mg at night with patient's Medex did not prevent agitation in patient.   Mother was offered option for IVIG and platelet transfusion by the ER following conversation by the ER attending with Dr. Bhagat and mother refused.  I spoke to the mother who confirms such.   I also reviewed with the patient's mother for a noncontrast CTT head to exclude intracranial process given the low platelets--the patient's mother refused.     Hospital Course:  Pt admitted for severe thrombocytopenia. Pt diagnosed with chronic ITP at the age of 18 months. Has extensive treatment history including Rituxan weekly x 4 completed 6/26/23, Nplate (LD 10/16/23, poor response). Intermittent IVIG, steroids, winrho (allergic reaction), rituxan in 2010 and 2021, and has been on Doptelet since. Pt with refractory severe chronic ITP with past courses of Rituxan, IVIG and past platelet transfusion. Pt is accompanied by his mother, mother refusing IVIG and platelet transfusion (most recent PLT 10), and refusing CT head. S/P methylprednisolone in ER and Dexamethsone 40 IV Q24. Pts mother requested 2nd heme onc eval opinion- seen by team. Heme onc stated to consider Promacta vs Tavalisee-This was discussed at length with patient's mother however she is apprehensive about any medication. She would prefer treatment for H pylori and resolution of the bacterial infection prior to any treatment for platelets. Risk of bleeding was discussed with heme and mother- however, mother has been adamant about saying that if he hasn't had major bleeds before, why would he suddenly develop them now. Heme signed off- pt can follow up with Dr. Rosaura CANCINO or at [facility] Pt has History of severe developmental delay, autism, with patient nonverbal, noncommunicative with episodes of agitation. S/P code gray and RRT due to agitation 11/15, evaled by psych, receiving PRN IV haldol and ativan for agitation, last 11/20. Pt can follow up at [facility] outpt. Pt also with Hx of H pylori- Mother reports patient was treated by an outpatient GI 8 week ago for H pylori, unsure of regimen patient was on. H. pylori was diagnosed with stool study. Mother reports repeat stool study was positive, Mother believes ITP is due to H. pylori- on PPI. Previously Tx w/ Clarithromycin and Amoxicillin. Evaluated by GI and recommended for repeat triple therapy with amoxi, levaquin and protonix for 14 day course. pt can follow up with GI Dr. Carbajal outpatient. Mother reported patient with concern for splenic hemangioma outpatient- CT A/P Negative for intraabdominal findings. No splenic mass. Pt seen with leukocytosis likely 2/2 steroids.     Important Medication Changes and Reason:  -On amoxicillin BID for 14 day course for H pylori  -On levofloxacin daily for 14 day course for h pylori  -On pantoprazole for H pylori- can continue indefinitely   -On Trazadone 200 mg daily for agitation    Active or Pending Issues Requiring Follow-up:  -Follow up with PCP Dr. Sandoval outpatient  -Follow up with GI Dr. Carbajal outpatient  -Follow up with heme/onc Dr. Bhagat at [facility] outpatient  -Can follow up with psychiatry at [facility]    Advanced Directives:   [X] Full code  [ ] DNR  [ ] Hospice    Discharge Diagnoses:  - Severe thrombocytopenia   -H pylori   -Agitation 2/2 intellectual disability       Discharge/Dispo/Med rec discussed with attending Dr. Driscoll.  Patient medically cleared for discharge back to Group Home with outpatient follow up with PCP, GI, Heme/onc, psych

## 2023-11-20 NOTE — DISCHARGE NOTE PROVIDER - NSDCFUADDAPPT_GEN_ALL_CORE_FT
APPTS ARE READY TO BE MADE: [ ] YES    Best Family or Patient Contact (if needed):    Additional Information about above appointments (if needed):    1: Follow up with PCP Dr. Sandoval outpatient  2: Follow up with GI Dr. Carbajal outpatient  3: Follow up with Heme Dr. Olayinka jones  4: Can follow up with psych at [facility]     Other comments or requests:

## 2023-11-20 NOTE — PROGRESS NOTE ADULT - ASSESSMENT
22yo M with severe autism, ITP and H.Pylori    1. H. Pylori   complete total 14 days of levaquin based triple therapy    2. ITP  per heme

## 2023-11-21 RX ORDER — TRAZODONE HCL 50 MG
1 TABLET ORAL
Qty: 11 | Refills: 0
Start: 2023-11-21 | End: 2023-12-01

## 2023-11-26 ENCOUNTER — NON-APPOINTMENT (OUTPATIENT)
Age: 23
End: 2023-11-26

## 2023-11-27 ENCOUNTER — EMERGENCY (EMERGENCY)
Facility: HOSPITAL | Age: 23
LOS: 1 days | Discharge: LEFT BEFORE DISCHARGE | End: 2023-11-27
Attending: EMERGENCY MEDICINE
Payer: MEDICARE

## 2023-11-27 VITALS
WEIGHT: 240.08 LBS | SYSTOLIC BLOOD PRESSURE: 119 MMHG | OXYGEN SATURATION: 97 % | RESPIRATION RATE: 16 BRPM | TEMPERATURE: 98 F | HEART RATE: 90 BPM | HEIGHT: 70 IN | DIASTOLIC BLOOD PRESSURE: 80 MMHG

## 2023-11-27 DIAGNOSIS — Z92.89 PERSONAL HISTORY OF OTHER MEDICAL TREATMENT: Chronic | ICD-10-CM

## 2023-11-27 PROCEDURE — 99285 EMERGENCY DEPT VISIT HI MDM: CPT | Mod: GC

## 2023-11-27 NOTE — ED ADULT TRIAGE NOTE - PATIENT'S PREFERRED PRONOUN
Group Therapy Documentation    PATIENT'S NAME: Viviana Schaefer  MRN:   [id number]  :   1967  ACCT. NUMBER: [id number]  DATE OF SERVICE: 3/31/21  START TIME: 12:00 PM  END TIME:  2:00 PM  FACILITATOR(S): Peri Haynes LADC  TOPIC: BEH Group Therapy  Number of patients attending the group:  7  Group Length:  2 Hours    Group Therapy Type: Addiction and Psychoeducation    Summary of Group / Topics Discussed:    Assertiveness    Psychoeducation/Skills Assertiveness(TIFFANIE)  This topic will give a general overview of basic relapse prevention, including definitions of warning signs, triggers and cravings and the importance of addressing healthy coping skills for ongoing relapse prevention.    Objective(s):    Patients will identify 3 key parts of assertiveness    Patients will identify 4 styles of communication    Patients will give 2 examples        Structure (modalities, homework, worksheets, etc.):    Provide psychoeducation on assertiveness as a healthy communication style    Facilitate group discussion around each patient s current awareness of assertiveness        And situations where it can be helpful     Expected therapeutic outcome(s):    Patient will:    Be able to use assertiveness as a helpful style of communication      Therapeutic outcomes measured by:    Patients will name 4 styles of communication    Patients will name 3 parts of assertiveness      Group Attendance:  Attended group session    Patient's response to the group topic/interactions:  discussed personal experience with topic and expressed reluctance to alter behavior, regarding setting boundaries    Patient appeared to be Attentive and Engaged.        Telemedicine Visit: The patient's condition can be safely assessed and treated via synchronous audio and visual telemedicine encounter.      Reason for Telemedicine Visit: COVID-19     Originating Location (pt. Location): Home     Type of service:  Video Visit  GROUP    Originating  "Location (pt. Location): Home     Distant Location (provider location):  [facility]      Consent:  The patient/guardian has verbally consented to: the potential risks and benefits of telemedicine (video visit) versus in person care; billing of their insurance or make self-payment for services provided; and responsibility for payment of non-covered services.      Mode of Communication:  Video Conference via [employer]    Client specific details: todays session focused on dimension 6 assertiveness and communication,setting boundaries with son who lives with her.  She reports her son is \"basically living for free and he doesn't do anything around here, and I need to find a way of getting more help and not enabling him\"  She said because his father left before he was born, she and others have often given him so much leeway, but she has realized some of that needs to change now, but \"it is hard to do it differently.  Group supported her to use the assertive techniques to have a talk with her son.    Client reports: one thing I am doing well with communication is:  Willingness to learn and do better, as I know it affects my sobriety   One thing I want to work on is: not being passive with discussions with my son.    Client discussed assertiveness in relation to there other styles of learning.   Client was able to name 3 parts of assertiveness: Honest, Direct, Respectful.  And also said building confidence and trust with others seems important.  Discussed that the part about not having expectations and doing it because it is about self and demonstrating giving expression to my own thoughts makes sense.  When asked how this relates to or is connected to addiction or using: in so many ways, as other relationships and feelings connected to them are button pushers for using    She said she is grateful for: This group and her kids       P) Discuss boundaries with her son, process with " group as needed    Peri Haynes, MS, LADC, LPC                    Him/He

## 2023-11-28 VITALS
OXYGEN SATURATION: 99 % | DIASTOLIC BLOOD PRESSURE: 57 MMHG | RESPIRATION RATE: 18 BRPM | SYSTOLIC BLOOD PRESSURE: 128 MMHG | HEART RATE: 84 BPM

## 2023-11-28 PROBLEM — Z28.21 IMMUNIZATION NOT CARRIED OUT BECAUSE OF PATIENT REFUSAL: Chronic | Status: ACTIVE | Noted: 2023-11-15

## 2023-11-28 LAB
ALBUMIN SERPL ELPH-MCNC: 3.5 G/DL — SIGNIFICANT CHANGE UP (ref 3.3–5)
ALBUMIN SERPL ELPH-MCNC: 3.5 G/DL — SIGNIFICANT CHANGE UP (ref 3.3–5)
ALP SERPL-CCNC: 50 U/L — SIGNIFICANT CHANGE UP (ref 40–120)
ALP SERPL-CCNC: 50 U/L — SIGNIFICANT CHANGE UP (ref 40–120)
ALT FLD-CCNC: 32 U/L — SIGNIFICANT CHANGE UP (ref 10–45)
ALT FLD-CCNC: 32 U/L — SIGNIFICANT CHANGE UP (ref 10–45)
ANION GAP SERPL CALC-SCNC: 9 MMOL/L — SIGNIFICANT CHANGE UP (ref 5–17)
ANION GAP SERPL CALC-SCNC: 9 MMOL/L — SIGNIFICANT CHANGE UP (ref 5–17)
APTT BLD: 32.7 SEC — SIGNIFICANT CHANGE UP (ref 24.5–35.6)
APTT BLD: 32.7 SEC — SIGNIFICANT CHANGE UP (ref 24.5–35.6)
AST SERPL-CCNC: 16 U/L — SIGNIFICANT CHANGE UP (ref 10–40)
AST SERPL-CCNC: 16 U/L — SIGNIFICANT CHANGE UP (ref 10–40)
BILIRUB SERPL-MCNC: 0.8 MG/DL — SIGNIFICANT CHANGE UP (ref 0.2–1.2)
BILIRUB SERPL-MCNC: 0.8 MG/DL — SIGNIFICANT CHANGE UP (ref 0.2–1.2)
BUN SERPL-MCNC: 14 MG/DL — SIGNIFICANT CHANGE UP (ref 7–23)
BUN SERPL-MCNC: 14 MG/DL — SIGNIFICANT CHANGE UP (ref 7–23)
CALCIUM SERPL-MCNC: 8.8 MG/DL — SIGNIFICANT CHANGE UP (ref 8.4–10.5)
CALCIUM SERPL-MCNC: 8.8 MG/DL — SIGNIFICANT CHANGE UP (ref 8.4–10.5)
CHLORIDE SERPL-SCNC: 106 MMOL/L — SIGNIFICANT CHANGE UP (ref 96–108)
CHLORIDE SERPL-SCNC: 106 MMOL/L — SIGNIFICANT CHANGE UP (ref 96–108)
CO2 SERPL-SCNC: 25 MMOL/L — SIGNIFICANT CHANGE UP (ref 22–31)
CO2 SERPL-SCNC: 25 MMOL/L — SIGNIFICANT CHANGE UP (ref 22–31)
CREAT SERPL-MCNC: 1.07 MG/DL — SIGNIFICANT CHANGE UP (ref 0.5–1.3)
CREAT SERPL-MCNC: 1.07 MG/DL — SIGNIFICANT CHANGE UP (ref 0.5–1.3)
EGFR: 99 ML/MIN/1.73M2 — SIGNIFICANT CHANGE UP
EGFR: 99 ML/MIN/1.73M2 — SIGNIFICANT CHANGE UP
GLUCOSE SERPL-MCNC: 118 MG/DL — HIGH (ref 70–99)
GLUCOSE SERPL-MCNC: 118 MG/DL — HIGH (ref 70–99)
HCT VFR BLD CALC: 37.8 % — LOW (ref 39–50)
HCT VFR BLD CALC: 37.8 % — LOW (ref 39–50)
HGB BLD-MCNC: 11.7 G/DL — LOW (ref 13–17)
HGB BLD-MCNC: 11.7 G/DL — LOW (ref 13–17)
INR BLD: 1.04 RATIO — SIGNIFICANT CHANGE UP (ref 0.85–1.18)
INR BLD: 1.04 RATIO — SIGNIFICANT CHANGE UP (ref 0.85–1.18)
MCHC RBC-ENTMCNC: 25.5 PG — LOW (ref 27–34)
MCHC RBC-ENTMCNC: 25.5 PG — LOW (ref 27–34)
MCHC RBC-ENTMCNC: 31 GM/DL — LOW (ref 32–36)
MCHC RBC-ENTMCNC: 31 GM/DL — LOW (ref 32–36)
MCV RBC AUTO: 82.4 FL — SIGNIFICANT CHANGE UP (ref 80–100)
MCV RBC AUTO: 82.4 FL — SIGNIFICANT CHANGE UP (ref 80–100)
NRBC # BLD: 0 /100 WBCS — SIGNIFICANT CHANGE UP (ref 0–0)
NRBC # BLD: 0 /100 WBCS — SIGNIFICANT CHANGE UP (ref 0–0)
PLATELET # BLD AUTO: <2 K/UL — CRITICAL LOW (ref 150–400)
PLATELET # BLD AUTO: <2 K/UL — CRITICAL LOW (ref 150–400)
POTASSIUM SERPL-MCNC: 3.4 MMOL/L — LOW (ref 3.5–5.3)
POTASSIUM SERPL-MCNC: 3.4 MMOL/L — LOW (ref 3.5–5.3)
POTASSIUM SERPL-SCNC: 3.4 MMOL/L — LOW (ref 3.5–5.3)
POTASSIUM SERPL-SCNC: 3.4 MMOL/L — LOW (ref 3.5–5.3)
PROT SERPL-MCNC: 5.6 G/DL — LOW (ref 6–8.3)
PROT SERPL-MCNC: 5.6 G/DL — LOW (ref 6–8.3)
PROTHROM AB SERPL-ACNC: 11.4 SEC — SIGNIFICANT CHANGE UP (ref 9.5–13)
PROTHROM AB SERPL-ACNC: 11.4 SEC — SIGNIFICANT CHANGE UP (ref 9.5–13)
RBC # BLD: 4.59 M/UL — SIGNIFICANT CHANGE UP (ref 4.2–5.8)
RBC # BLD: 4.59 M/UL — SIGNIFICANT CHANGE UP (ref 4.2–5.8)
RBC # FLD: 16.9 % — HIGH (ref 10.3–14.5)
RBC # FLD: 16.9 % — HIGH (ref 10.3–14.5)
SODIUM SERPL-SCNC: 140 MMOL/L — SIGNIFICANT CHANGE UP (ref 135–145)
SODIUM SERPL-SCNC: 140 MMOL/L — SIGNIFICANT CHANGE UP (ref 135–145)
WBC # BLD: 7.24 K/UL — SIGNIFICANT CHANGE UP (ref 3.8–10.5)
WBC # BLD: 7.24 K/UL — SIGNIFICANT CHANGE UP (ref 3.8–10.5)
WBC # FLD AUTO: 7.24 K/UL — SIGNIFICANT CHANGE UP (ref 3.8–10.5)
WBC # FLD AUTO: 7.24 K/UL — SIGNIFICANT CHANGE UP (ref 3.8–10.5)

## 2023-11-28 PROCEDURE — 70450 CT HEAD/BRAIN W/O DYE: CPT | Mod: MA

## 2023-11-28 PROCEDURE — 80053 COMPREHEN METABOLIC PANEL: CPT

## 2023-11-28 PROCEDURE — 85610 PROTHROMBIN TIME: CPT

## 2023-11-28 PROCEDURE — 70486 CT MAXILLOFACIAL W/O DYE: CPT | Mod: MA

## 2023-11-28 PROCEDURE — 76377 3D RENDER W/INTRP POSTPROCES: CPT

## 2023-11-28 PROCEDURE — 96374 THER/PROPH/DIAG INJ IV PUSH: CPT

## 2023-11-28 PROCEDURE — 70450 CT HEAD/BRAIN W/O DYE: CPT | Mod: 26,MA

## 2023-11-28 PROCEDURE — 70486 CT MAXILLOFACIAL W/O DYE: CPT | Mod: 26,MA

## 2023-11-28 PROCEDURE — 96372 THER/PROPH/DIAG INJ SC/IM: CPT | Mod: XU

## 2023-11-28 PROCEDURE — 76377 3D RENDER W/INTRP POSTPROCES: CPT | Mod: 26

## 2023-11-28 PROCEDURE — 85730 THROMBOPLASTIN TIME PARTIAL: CPT

## 2023-11-28 PROCEDURE — 99284 EMERGENCY DEPT VISIT MOD MDM: CPT | Mod: 25

## 2023-11-28 PROCEDURE — 85027 COMPLETE CBC AUTOMATED: CPT

## 2023-11-28 RX ORDER — SODIUM CHLORIDE 9 MG/ML
1000 INJECTION INTRAMUSCULAR; INTRAVENOUS; SUBCUTANEOUS ONCE
Refills: 0 | Status: COMPLETED | OUTPATIENT
Start: 2023-11-28 | End: 2023-11-28

## 2023-11-28 RX ORDER — DIPHENHYDRAMINE HCL 50 MG
25 CAPSULE ORAL ONCE
Refills: 0 | Status: COMPLETED | OUTPATIENT
Start: 2023-11-28 | End: 2023-11-28

## 2023-11-28 RX ORDER — HALOPERIDOL DECANOATE 100 MG/ML
2.5 INJECTION INTRAMUSCULAR ONCE
Refills: 0 | Status: COMPLETED | OUTPATIENT
Start: 2023-11-28 | End: 2023-11-28

## 2023-11-28 RX ADMIN — Medication 25 MILLIGRAM(S): at 02:34

## 2023-11-28 RX ADMIN — SODIUM CHLORIDE 1000 MILLILITER(S): 9 INJECTION INTRAMUSCULAR; INTRAVENOUS; SUBCUTANEOUS at 04:27

## 2023-11-28 RX ADMIN — HALOPERIDOL DECANOATE 2.5 MILLIGRAM(S): 100 INJECTION INTRAMUSCULAR at 02:36
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
## 2023-11-28 NOTE — ED PROVIDER NOTE - PHYSICAL EXAMINATION
PHYSICAL EXAM:  GENERAL: NAD, Resting in bed  HEENT:  Head atraumatic, EOMI, PERRLA, conjunctiva and sclera clear; Moist mucous membranes, normal oropharynx  NECK: Supple, No JVD, no lymphadenopathy, no thyroid nodules or enlargement  CHEST/LUNG: Clear to auscultation bilaterally; No rales, rhonchi, wheezing, or rubs. Unlabored respirations on room air  HEART: Regular rate and rhythm; No murmurs, rubs, or gallops  ABDOMEN: Bowel sounds present; Soft, Nontender, Nondistended. No hepatomegally  EXTREMITIES:  2+ Peripheral Pulses, brisk capillary refill. No clubbing, cyanosis, or edema  NERVOUS SYSTEM:  Alert & Oriented X3, non-focal and spontaneous movements of all extremities  SKIN: Hematoma under left eye, swollen nose

## 2023-11-28 NOTE — ED PROVIDER NOTE - PATIENT PORTAL LINK FT
You can access the FollowMyHealth Patient Portal offered by [facility] by registering at the following website: [email or web address]. By joining [employer]’s FollowMyHealth portal, you will also be able to view your health information using other applications (apps) compatible with our system.
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
## 2023-12-02 RX ORDER — TRAZODONE HCL 50 MG
2 TABLET ORAL
Qty: 60 | Refills: 0
Start: 2023-12-02 | End: 2023-12-31

## 2023-12-08 NOTE — ED PROVIDER NOTE - ENMT, MLM
Airway patent
PRINCIPAL DISCHARGE DIAGNOSIS  Diagnosis: Unilateral primary osteoarthritis, left knee  Assessment and Plan of Treatment:

## 2023-12-19 NOTE — DIETITIAN INITIAL EVALUATION ADULT - REASON FOR ADMISSION
[facility] received a fax that requires your attention. The document has been indexed to the patient’s chart for your review.      Reason for sending: EXTERNAL MEDICAL RECORD NOTIFICATION     Documents Description: LAB-[facility]-12.18.23    Name of Sender: [facility]     Date Indexed: 12.18.23     Thrombocytopenia

## 2024-01-25 ENCOUNTER — EMERGENCY (EMERGENCY)
Facility: HOSPITAL | Age: 24
LOS: 0 days | Discharge: HOME | End: 2024-01-25
Attending: EMERGENCY MEDICINE
Payer: MEDICARE

## 2024-01-25 VITALS
TEMPERATURE: 98 F | OXYGEN SATURATION: 99 % | RESPIRATION RATE: 18 BRPM | DIASTOLIC BLOOD PRESSURE: 72 MMHG | SYSTOLIC BLOOD PRESSURE: 110 MMHG | HEART RATE: 88 BPM

## 2024-01-25 VITALS
OXYGEN SATURATION: 99 % | HEART RATE: 100 BPM | RESPIRATION RATE: 18 BRPM | TEMPERATURE: 98 F | SYSTOLIC BLOOD PRESSURE: 113 MMHG | WEIGHT: 179.9 LBS | DIASTOLIC BLOOD PRESSURE: 73 MMHG | HEIGHT: 70 IN

## 2024-01-25 DIAGNOSIS — F84.0 AUTISTIC DISORDER: ICD-10-CM

## 2024-01-25 DIAGNOSIS — Z92.89 PERSONAL HISTORY OF OTHER MEDICAL TREATMENT: Chronic | ICD-10-CM

## 2024-01-25 DIAGNOSIS — M79.81 NONTRAUMATIC HEMATOMA OF SOFT TISSUE: ICD-10-CM

## 2024-01-25 DIAGNOSIS — R53.83 OTHER FATIGUE: ICD-10-CM

## 2024-01-25 DIAGNOSIS — Z88.8 ALLERGY STATUS TO OTHER DRUGS, MEDICAMENTS AND BIOLOGICAL SUBSTANCES: ICD-10-CM

## 2024-01-25 DIAGNOSIS — J11.1 INFLUENZA DUE TO UNIDENTIFIED INFLUENZA VIRUS WITH OTHER RESPIRATORY MANIFESTATIONS: ICD-10-CM

## 2024-01-25 DIAGNOSIS — Z88.1 ALLERGY STATUS TO OTHER ANTIBIOTIC AGENTS STATUS: ICD-10-CM

## 2024-01-25 DIAGNOSIS — Z20.822 CONTACT WITH AND (SUSPECTED) EXPOSURE TO COVID-19: ICD-10-CM

## 2024-01-25 DIAGNOSIS — D69.3 IMMUNE THROMBOCYTOPENIC PURPURA: ICD-10-CM

## 2024-01-25 LAB
FLUAV AG NPH QL: DETECTED
FLUBV AG NPH QL: SIGNIFICANT CHANGE UP
SARS-COV-2 RNA SPEC QL NAA+PROBE: SIGNIFICANT CHANGE UP

## 2024-01-25 PROCEDURE — 99284 EMERGENCY DEPT VISIT MOD MDM: CPT

## 2024-01-25 RX ORDER — IBUPROFEN 200 MG
600 TABLET ORAL ONCE
Refills: 0 | Status: COMPLETED | OUTPATIENT
Start: 2024-01-25 | End: 2024-01-25

## 2024-01-25 RX ADMIN — Medication 600 MILLIGRAM(S): at 17:14

## 2024-01-25 NOTE — ED PROVIDER NOTE - CARE PROVIDER_API CALL
Devin Epps  [address] [address]  Phone: [phone]  Fax: [phone]  Follow Up Time:

## 2024-01-25 NOTE — ED ADULT TRIAGE NOTE - CHIEF COMPLAINT QUOTE
as per EMS " coming from group home feeling sick, fever and weak, just wants to sleep" [hx Autism, ITP]

## 2024-01-25 NOTE — ED ADULT NURSE NOTE - ASSOCIATED SYMPTOMS
As per group home staff member, "patient was not acting like himself today, he was more lethargic than usual".

## 2024-01-25 NOTE — ED ADULT NURSE NOTE - OBJECTIVE STATEMENT
Pt presently non-verbal, group home staff at bedside. As per group home staff member, "patient was not acting like himself today, he was more lethargic than usual". As per group home staff, pt is non-verbal and ambulatory at baseline, but has been weak today. PMH Autism.

## 2024-01-25 NOTE — ED PROVIDER NOTE - PHYSICAL EXAMINATION
Nontoxic-appearing, no respiratory distress.  Lungs clear to auscultation bilateral  Regular rate and rhythm S1-S2  Purposeful motor activity of all extremities  Scattered bruising noted to back area, mother states chronic due to history of ITP.

## 2024-01-25 NOTE — ED PROVIDER NOTE - PATIENT PORTAL LINK FT
You can access the FollowMyHealth Patient Portal offered by [facility] by registering at the following website: [email or web address]. By joining [employer]’s FollowMyHealth portal, you will also be able to view your health information using other applications (apps) compatible with our system.

## 2024-01-25 NOTE — ED PROVIDER NOTE - OBJECTIVE STATEMENT
23-year-old male history of autism (nonverbal) and ITP.  Patient accompanied with mother who states staff at group home was concerned that patient appeared more tired than usual.  No reported falls, no recent traumas.  Mother also states patient with longstanding history of thrombocytopenia and that platelet levels have been 2000 for the past 6 months.  Patient currently under care of hematologist Dr. Epps.  Mother informed that patient tested positive for influenza in ED.  At 6:50 PM patient is well-appearing, interactive, appeared to be moving body in dancing rhythmic fashion in stretcher while listening to music and videos on iPad.  Mother requesting to take patient home states that patient is not to receive any IV steroids for baseline thrombocytopenia and will continue care by Dr. Epps.

## 2024-01-25 NOTE — ED PROVIDER NOTE - NSFOLLOWUPINSTRUCTIONS_ED_ALL_ED_FT
Drink plenty of fluids, take Tylenol 650mg every 4 hours for fever.     Influenza, also called "the flu," is a viral infection that mainly affects the respiratory tract. This includes the lungs, nose, and throat. The flu spreads easily from person to person (is contagious). It causes common cold symptoms, along with high fever and body aches.    What are the causes?  This condition is caused by the influenza virus. You can get the virus by:  Breathing in droplets that are in the air from an infected person's cough or sneeze.  Touching something that has the virus on it (has been contaminated) and then touching your mouth, nose, or eyes.  What increases the risk?  The following factors may make you more likely to get the flu:  Not washing or sanitizing your hands often.  Having close contact with many people during cold and flu season.  Touching your mouth, eyes, or nose without first washing or sanitizing your hands.  Not getting an annual flu shot.  You may have a higher risk for the flu, including serious problems, such as a lung infection (pneumonia), if you:  Are older than 65.  Are pregnant.  Have a weakened disease-fighting system (immune system). This includes people who have HIV or AIDS, are on chemotherapy, or are taking medicines that reduce (suppress) the immune system.  Have a long-term (chronic) illness, such as heart disease, kidney disease, diabetes, or lung disease.  Have a liver disorder.  Are severely overweight (morbidly obese).  Have anemia.  Have asthma.  What are the signs or symptoms?  Symptoms of this condition usually begin suddenly and last 4–14 days. These may include:  Fever and chills.  Headaches, body aches, or muscle aches.  Sore throat.  Cough.  Runny or stuffy (congested) nose.  Chest discomfort.  Poor appetite.  Weakness or fatigue.  Dizziness.  Nausea or vomiting.  How is this diagnosed?  This condition may be diagnosed based on:  Your symptoms and medical history.  A physical exam.  Swabbing your nose or throat and testing the fluid for the influenza virus.  How is this treated?  If the flu is diagnosed early, you can be treated with antiviral medicine that is given by mouth (orally) or through an IV. This can help reduce how severe the illness is and how long it lasts.    Taking care of yourself at home can help relieve symptoms. Your health care provider may recommend:  Taking over-the-counter medicines.  Drinking plenty of fluids.  In many cases, the flu goes away on its own. If you have severe symptoms or complications, you may be treated in a hospital.    Follow these instructions at home:  Activity    Rest as needed and get plenty of sleep.  Stay home from work or school as told by your health care provider. Unless you are visiting your health care provider, avoid leaving home until your fever has been gone for 24 hours without taking medicine.  Eating and drinking    Take an oral rehydration solution (ORS). This is a drink that is sold at pharmacies and retail stores.  Drink enough fluid to keep your urine pale yellow.  Drink clear fluids in small amounts as you are able. Clear fluids include water, ice chips, fruit juice mixed with water, and low-calorie sports drinks.  Eat bland, easy-to-digest foods in small amounts as you are able. These foods include bananas, applesauce, rice, lean meats, toast, and crackers.  Avoid drinking fluids that contain a lot of sugar or caffeine, such as energy drinks, regular sports drinks, and soda.  Avoid alcohol.  Avoid spicy or fatty foods.  General instructions        Take over-the-counter and prescription medicines only as told by your health care provider.  Use a cool mist humidifier to add humidity to the air in your home. This can make it easier to breathe.  When using a cool mist humidifier, clean it daily. Empty the water and replace it with clean water.  Cover your mouth and nose when you cough or sneeze.  Wash your hands with soap and water often and for at least 20 seconds, especially after you cough or sneeze. If soap and water are not available, use alcohol-based hand .  Keep all follow-up visits. This is important.  How is this prevented?    Get an annual flu shot. This is usually available in late summer, fall, or winter. Ask your health care provider when you should get your flu shot.  Avoid contact with people who are sick during cold and flu season. This is generally fall and winter.  Contact a health care provider if:  You develop new symptoms.  You have:  Chest pain.  Diarrhea.  A fever.  Your cough gets worse.  You produce more mucus.  You feel nauseous or you vomit.  Get help right away if you:  Develop shortness of breath or have difficulty breathing.  Have skin or nails that turn a bluish color.  Have severe pain or stiffness in your neck.  Develop a sudden headache or sudden pain in your face or ear.  Cannot eat or drink without vomiting.  These symptoms may represent a serious problem that is an emergency. Do not wait to see if the symptoms will go away. Get medical help right away. Call your local emergency services (911 in the [place] Do not drive yourself to the hospital.

## 2024-01-25 NOTE — ED PROVIDER NOTE - CLINICAL SUMMARY MEDICAL DECISION MAKING FREE TEXT BOX
Patient in no acute distress.  Mother states she will return patient back to group home.  Instructions given for supportive care, patient drink plenty of fluids, take Tylenol for fever.  Parent has no new complaints and will f/u with pediatrician. Parent educated on care and need for follow up. Discussed anticipatory guidance and return precautions. Questions answered. I had a detailed discussion with parent regarding the historical points, exam findings, and any diagnostic results supporting the discharge diagnosis.

## 2024-01-25 NOTE — ED ADULT NURSE NOTE - NSFALLRISKINTERV_ED_ALL_ED

## 2024-03-22 ENCOUNTER — INPATIENT (INPATIENT)
Facility: HOSPITAL | Age: 24
LOS: 2 days | Discharge: HOME | End: 2024-03-25
Attending: INTERNAL MEDICINE | Admitting: INTERNAL MEDICINE
Payer: MEDICARE

## 2024-03-22 VITALS
DIASTOLIC BLOOD PRESSURE: 72 MMHG | HEIGHT: 70 IN | TEMPERATURE: 98 F | RESPIRATION RATE: 15 BRPM | OXYGEN SATURATION: 100 % | SYSTOLIC BLOOD PRESSURE: 127 MMHG | HEART RATE: 74 BPM

## 2024-03-22 DIAGNOSIS — Z86.19 PERSONAL HISTORY OF OTHER INFECTIOUS AND PARASITIC DISEASES: ICD-10-CM

## 2024-03-22 DIAGNOSIS — Z29.9 ENCOUNTER FOR PROPHYLACTIC MEASURES, UNSPECIFIED: ICD-10-CM

## 2024-03-22 DIAGNOSIS — K62.5 HEMORRHAGE OF ANUS AND RECTUM: ICD-10-CM

## 2024-03-22 DIAGNOSIS — F79 UNSPECIFIED INTELLECTUAL DISABILITIES: ICD-10-CM

## 2024-03-22 DIAGNOSIS — D69.6 THROMBOCYTOPENIA, UNSPECIFIED: ICD-10-CM

## 2024-03-22 DIAGNOSIS — Z92.89 PERSONAL HISTORY OF OTHER MEDICAL TREATMENT: Chronic | ICD-10-CM

## 2024-03-22 LAB
ALBUMIN SERPL ELPH-MCNC: 4 G/DL — SIGNIFICANT CHANGE UP (ref 3.3–5)
ALP SERPL-CCNC: 60 U/L — SIGNIFICANT CHANGE UP (ref 40–120)
ALT FLD-CCNC: 13 U/L — SIGNIFICANT CHANGE UP (ref 4–41)
ANION GAP SERPL CALC-SCNC: 11 MMOL/L — SIGNIFICANT CHANGE UP (ref 7–14)
AST SERPL-CCNC: 16 U/L — SIGNIFICANT CHANGE UP (ref 4–40)
BASOPHILS # BLD AUTO: 0.02 K/UL — SIGNIFICANT CHANGE UP (ref 0–0.2)
BASOPHILS # BLD AUTO: 0.02 K/UL — SIGNIFICANT CHANGE UP (ref 0–0.2)
BASOPHILS NFR BLD AUTO: 0.2 % — SIGNIFICANT CHANGE UP (ref 0–2)
BASOPHILS NFR BLD AUTO: 0.3 % — SIGNIFICANT CHANGE UP (ref 0–2)
BILIRUB SERPL-MCNC: 1.1 MG/DL — SIGNIFICANT CHANGE UP (ref 0.2–1.2)
BLD GP AB SCN SERPL QL: NEGATIVE — SIGNIFICANT CHANGE UP
BUN SERPL-MCNC: 10 MG/DL — SIGNIFICANT CHANGE UP (ref 7–23)
CALCIUM SERPL-MCNC: 9.1 MG/DL — SIGNIFICANT CHANGE UP (ref 8.4–10.5)
CHLORIDE SERPL-SCNC: 103 MMOL/L — SIGNIFICANT CHANGE UP (ref 98–107)
CO2 SERPL-SCNC: 24 MMOL/L — SIGNIFICANT CHANGE UP (ref 22–31)
CREAT SERPL-MCNC: 0.82 MG/DL — SIGNIFICANT CHANGE UP (ref 0.5–1.3)
EGFR: 125 ML/MIN/1.73M2 — SIGNIFICANT CHANGE UP
EOSINOPHIL # BLD AUTO: 0.02 K/UL — SIGNIFICANT CHANGE UP (ref 0–0.5)
EOSINOPHIL # BLD AUTO: 0.04 K/UL — SIGNIFICANT CHANGE UP (ref 0–0.5)
EOSINOPHIL NFR BLD AUTO: 0.2 % — SIGNIFICANT CHANGE UP (ref 0–6)
EOSINOPHIL NFR BLD AUTO: 0.5 % — SIGNIFICANT CHANGE UP (ref 0–6)
GLUCOSE SERPL-MCNC: 89 MG/DL — SIGNIFICANT CHANGE UP (ref 70–99)
HCT VFR BLD CALC: 41.7 % — SIGNIFICANT CHANGE UP (ref 39–50)
HCT VFR BLD CALC: 43.8 % — SIGNIFICANT CHANGE UP (ref 39–50)
HGB BLD-MCNC: 13.4 G/DL — SIGNIFICANT CHANGE UP (ref 13–17)
HGB BLD-MCNC: 13.8 G/DL — SIGNIFICANT CHANGE UP (ref 13–17)
IANC: 5.86 K/UL — SIGNIFICANT CHANGE UP (ref 1.8–7.4)
IANC: 6.62 K/UL — SIGNIFICANT CHANGE UP (ref 1.8–7.4)
IMM GRANULOCYTES NFR BLD AUTO: 0.2 % — SIGNIFICANT CHANGE UP (ref 0–0.9)
IMM GRANULOCYTES NFR BLD AUTO: 0.3 % — SIGNIFICANT CHANGE UP (ref 0–0.9)
INR BLD: 1.08 RATIO — SIGNIFICANT CHANGE UP (ref 0.85–1.18)
LYMPHOCYTES # BLD AUTO: 1.07 K/UL — SIGNIFICANT CHANGE UP (ref 1–3.3)
LYMPHOCYTES # BLD AUTO: 1.24 K/UL — SIGNIFICANT CHANGE UP (ref 1–3.3)
LYMPHOCYTES # BLD AUTO: 12.9 % — LOW (ref 13–44)
LYMPHOCYTES # BLD AUTO: 16 % — SIGNIFICANT CHANGE UP (ref 13–44)
MANUAL SMEAR VERIFICATION: SIGNIFICANT CHANGE UP
MCHC RBC-ENTMCNC: 26.1 PG — LOW (ref 27–34)
MCHC RBC-ENTMCNC: 26.3 PG — LOW (ref 27–34)
MCHC RBC-ENTMCNC: 31.5 GM/DL — LOW (ref 32–36)
MCHC RBC-ENTMCNC: 32.1 GM/DL — SIGNIFICANT CHANGE UP (ref 32–36)
MCV RBC AUTO: 81.8 FL — SIGNIFICANT CHANGE UP (ref 80–100)
MCV RBC AUTO: 83 FL — SIGNIFICANT CHANGE UP (ref 80–100)
MONOCYTES # BLD AUTO: 0.53 K/UL — SIGNIFICANT CHANGE UP (ref 0–0.9)
MONOCYTES # BLD AUTO: 0.57 K/UL — SIGNIFICANT CHANGE UP (ref 0–0.9)
MONOCYTES NFR BLD AUTO: 6.4 % — SIGNIFICANT CHANGE UP (ref 2–14)
MONOCYTES NFR BLD AUTO: 7.4 % — SIGNIFICANT CHANGE UP (ref 2–14)
NEUTROPHILS # BLD AUTO: 5.86 K/UL — SIGNIFICANT CHANGE UP (ref 1.8–7.4)
NEUTROPHILS # BLD AUTO: 6.62 K/UL — SIGNIFICANT CHANGE UP (ref 1.8–7.4)
NEUTROPHILS NFR BLD AUTO: 75.5 % — SIGNIFICANT CHANGE UP (ref 43–77)
NEUTROPHILS NFR BLD AUTO: 80.1 % — HIGH (ref 43–77)
NRBC # BLD: 0 /100 WBCS — SIGNIFICANT CHANGE UP (ref 0–0)
NRBC # BLD: 0 /100 WBCS — SIGNIFICANT CHANGE UP (ref 0–0)
NRBC # FLD: 0 K/UL — SIGNIFICANT CHANGE UP (ref 0–0)
NRBC # FLD: 0 K/UL — SIGNIFICANT CHANGE UP (ref 0–0)
PLAT MORPH BLD: NORMAL — SIGNIFICANT CHANGE UP
PLATELET # BLD AUTO: 1 K/UL — CRITICAL LOW (ref 150–400)
PLATELET # BLD AUTO: 2 K/UL — CRITICAL LOW (ref 150–400)
PLATELET COUNT - ESTIMATE: ABNORMAL
POTASSIUM SERPL-MCNC: 4 MMOL/L — SIGNIFICANT CHANGE UP (ref 3.5–5.3)
POTASSIUM SERPL-SCNC: 4 MMOL/L — SIGNIFICANT CHANGE UP (ref 3.5–5.3)
PROT SERPL-MCNC: 6.4 G/DL — SIGNIFICANT CHANGE UP (ref 6–8.3)
PROTHROM AB SERPL-ACNC: 12.1 SEC — SIGNIFICANT CHANGE UP (ref 9.5–13)
RBC # BLD: 5.1 M/UL — SIGNIFICANT CHANGE UP (ref 4.2–5.8)
RBC # BLD: 5.28 M/UL — SIGNIFICANT CHANGE UP (ref 4.2–5.8)
RBC # FLD: 15.5 % — HIGH (ref 10.3–14.5)
RBC # FLD: 15.7 % — HIGH (ref 10.3–14.5)
RBC BLD AUTO: SIGNIFICANT CHANGE UP
RH IG SCN BLD-IMP: POSITIVE — SIGNIFICANT CHANGE UP
SODIUM SERPL-SCNC: 138 MMOL/L — SIGNIFICANT CHANGE UP (ref 135–145)
WBC # BLD: 7.75 K/UL — SIGNIFICANT CHANGE UP (ref 3.8–10.5)
WBC # BLD: 8.28 K/UL — SIGNIFICANT CHANGE UP (ref 3.8–10.5)
WBC # FLD AUTO: 7.75 K/UL — SIGNIFICANT CHANGE UP (ref 3.8–10.5)
WBC # FLD AUTO: 8.28 K/UL — SIGNIFICANT CHANGE UP (ref 3.8–10.5)

## 2024-03-22 PROCEDURE — 99223 1ST HOSP IP/OBS HIGH 75: CPT

## 2024-03-22 PROCEDURE — 99285 EMERGENCY DEPT VISIT HI MDM: CPT | Mod: FS

## 2024-03-22 RX ORDER — TRAZODONE HCL 50 MG
200 TABLET ORAL AT BEDTIME
Refills: 0 | Status: DISCONTINUED | OUTPATIENT
Start: 2024-03-22 | End: 2024-03-25

## 2024-03-22 RX ORDER — PANTOPRAZOLE SODIUM 20 MG/1
40 TABLET, DELAYED RELEASE ORAL DAILY
Refills: 0 | Status: DISCONTINUED | OUTPATIENT
Start: 2024-03-22 | End: 2024-03-25

## 2024-03-22 RX ORDER — ACETAMINOPHEN 500 MG
650 TABLET ORAL EVERY 6 HOURS
Refills: 0 | Status: DISCONTINUED | OUTPATIENT
Start: 2024-03-22 | End: 2024-03-25

## 2024-03-22 RX ORDER — LORATADINE 10 MG/1
10 TABLET ORAL DAILY
Refills: 0 | Status: DISCONTINUED | OUTPATIENT
Start: 2024-03-22 | End: 2024-03-25

## 2024-03-22 RX ORDER — DEXAMETHASONE 0.5 MG/5ML
40 ELIXIR ORAL ONCE
Refills: 0 | Status: COMPLETED | OUTPATIENT
Start: 2024-03-22 | End: 2024-03-22

## 2024-03-22 RX ORDER — HALOPERIDOL DECANOATE 100 MG/ML
5 INJECTION INTRAMUSCULAR ONCE
Refills: 0 | Status: COMPLETED | OUTPATIENT
Start: 2024-03-22 | End: 2024-03-22

## 2024-03-22 RX ORDER — ASCORBIC ACID 60 MG
1 TABLET,CHEWABLE ORAL
Refills: 0 | DISCHARGE

## 2024-03-22 RX ORDER — DEXAMETHASONE 0.5 MG/5ML
40 ELIXIR ORAL DAILY
Refills: 0 | Status: COMPLETED | OUTPATIENT
Start: 2024-03-23 | End: 2024-03-25

## 2024-03-22 RX ORDER — LACTOBACILLUS ACIDOPHILUS 100MM CELL
1 CAPSULE ORAL
Refills: 0 | Status: DISCONTINUED | OUTPATIENT
Start: 2024-03-22 | End: 2024-03-25

## 2024-03-22 RX ORDER — ASCORBIC ACID 60 MG
500 TABLET,CHEWABLE ORAL DAILY
Refills: 0 | Status: DISCONTINUED | OUTPATIENT
Start: 2024-03-22 | End: 2024-03-25

## 2024-03-22 RX ORDER — SENNA PLUS 8.6 MG/1
2 TABLET ORAL AT BEDTIME
Refills: 0 | Status: DISCONTINUED | OUTPATIENT
Start: 2024-03-22 | End: 2024-03-25

## 2024-03-22 RX ORDER — LORATADINE 10 MG/1
1 TABLET ORAL
Refills: 0 | DISCHARGE

## 2024-03-22 RX ORDER — INFLUENZA VIRUS VACCINE 15; 15; 15; 15 UG/.5ML; UG/.5ML; UG/.5ML; UG/.5ML
0.5 SUSPENSION INTRAMUSCULAR ONCE
Refills: 0 | Status: DISCONTINUED | OUTPATIENT
Start: 2024-03-22 | End: 2024-03-25

## 2024-03-22 RX ORDER — PREGABALIN 225 MG/1
1 CAPSULE ORAL
Refills: 0 | DISCHARGE

## 2024-03-22 RX ORDER — PREGABALIN 225 MG/1
1000 CAPSULE ORAL DAILY
Refills: 0 | Status: DISCONTINUED | OUTPATIENT
Start: 2024-03-22 | End: 2024-03-25

## 2024-03-22 RX ORDER — LANOLIN ALCOHOL/MO/W.PET/CERES
3 CREAM (GRAM) TOPICAL AT BEDTIME
Refills: 0 | Status: DISCONTINUED | OUTPATIENT
Start: 2024-03-22 | End: 2024-03-25

## 2024-03-22 RX ADMIN — Medication 2 MILLIGRAM(S): at 08:59

## 2024-03-22 RX ADMIN — HALOPERIDOL DECANOATE 5 MILLIGRAM(S): 100 INJECTION INTRAMUSCULAR at 08:59

## 2024-03-22 RX ADMIN — Medication 108 MILLIGRAM(S): at 17:05

## 2024-03-22 RX ADMIN — Medication 200 MILLIGRAM(S): at 21:51

## 2024-03-22 NOTE — ED ADULT NURSE REASSESSMENT NOTE - NS ED NURSE REASSESS COMMENT FT1
Heme-Onc at patient's bedside to speak with patient/patient's mother. Patient's mother says that patient has had multiple attempts in the past of interventions for the thrombocytopenia with no significant response to interventions. Patient's mother is adamant about further interventions at this time due to concerns. Patient's mother voiced concerns to team and was provided education on risk/benefit. Team huddled for plan of care. Patient being admitted to medicine and plan for IV steroids. Safety maintained through out.

## 2024-03-22 NOTE — PATIENT PROFILE ADULT - NS PRO AD PATIENT TYPE
MD Chaudhry aware. Will place 0.25L O2 NC on patient if continues to desat or maintain sats below 90. Health Care Proxy (HCP)

## 2024-03-22 NOTE — H&P ADULT - HISTORY OF PRESENT ILLNESS
Patient is a 23 y/o M with history of intellectual impairment, autism, nonverbal and noncommunicative at baseline, obstructive sleep apnea not on positive pressure ventilation and severe chronic ITP now presenting with one episode of rectal bleeding. Patient nonverbal at baseline, unable to participate in interview however cooperative with exam. Patient's mother at bedside, offering collateral information. Patient was reportedly in his usual state of health at his group home. He was noted to has a small amount of dried/clotted blood in his underwear as well as a small amount of blood noted in the toilet bowl. Brought in to the ED by group home staff for further evaluation. Per the patient's mother, he has had similar episodes of rectal bleeding especially in the setting of severe thrombocytopenia. Patient has reportedly tried numerous lines of treatment with minimal improvement. Patient's family believes that there is a strong association between patient's H. Pylori infection (for which he has completed 2 courses of treatment with amox/clarithromycin and amox/levofloxacin) and would like repeat testing to check for clearance. Patient otherwise appearing to be at baseline behavior per family.    ED Course: Patient received haldol 5mg/ativan 2mg and dexamethasone 40mg IV

## 2024-03-22 NOTE — ED PROVIDER NOTE - SKIN, MLM
bruising noted on various parts of the body bruising noted bilateral LE/UE; petechia noted on chest and back

## 2024-03-22 NOTE — H&P ADULT - PROBLEM SELECTOR PLAN 4
Patient with history of intellectual disability with autism  - nonverbal at baseline  - c/w home trazodone 200mg qhs  - can likely continue with patient's home Rexulti (Brexpiprazole) however family will need to bring in medication from group home  - for agitation, patient received haldol 5/ativan 2 in ED, on previous admissions was recommended for Haldol 2.5mg IV q4h and benadryl 25mg IV q4h PRN for severe agitation Patient with history of intellectual disability with autism  - nonverbal at baseline  - c/w home trazodone 200mg qhs  - can likely continue with patient's home Rexulti (Brexpiprazole) however family will need to bring in medication from group home  - for agitation, patient received haldol 5/ativan 2 in ED, on previous admissions was recommended for Haldol 2.5mg IV q4h and benadryl 25mg IV q4h PRN for severe agitation  - can likely c/w similar regimen (patient noted with improvement in agitation in ED after haldol), however would check EKG to ensure no QTc prolongation (no QTc prolongation noted on previous EKGs)

## 2024-03-22 NOTE — CONSULT NOTE ADULT - SUBJECTIVE AND OBJECTIVE BOX
Reason for consult: thrombocytopenia    HPI:  Hematology/Oncology consulted on this 24 year old male with ITP who presents with thrombocytopenia and BRBPR. He has had platelet count in the single digits for the past several months but no bleeding until recently when pt had episode of BRBPR. He has received treatments in the past including steroids, IVIG, Rituximab, and Nplate but without significant response. Pt's mother is adamantly against many recommended treatments including further IVIG for fear of pt getting COVID antibody from infusions. She believes that his gastritis / H Pylori is driving his immune response.     PAST MEDICAL & SURGICAL HISTORY:  Intellectual disability      Chronic ITP (idiopathic thrombocytopenia)      Dental caries on smooth surface penetrating into dentin      Autism      CARLITOS (obstructive sleep apnea)      2019 novel coronavirus disease (COVID-19)      COVID-19 vaccine series declined      History of dental examination          FAMILY HISTORY:  FH: hypertension        Alochol: Denied  Smoking: Nonsmoker  Drug Use: Denied  Marital Status:         Allergies    Bactrim (Hives)  WinRho SDF (Hives)    Intolerances        MEDICATIONS  (STANDING):  dexAMETHasone  IVPB 40 milliGRAM(s) IV Intermittent Once    MEDICATIONS  (PRN):      ROS  No fever, sweats, chills  No epistaxis, HA, sore throat  No CP, SOB, cough, sputum  No n/v/d, abd pain, melena  +hematochezia  No edema  No rash  No anxiety  No back pain, joint pain  No bleeding, bruising  No dysuria, hematuria    T(C): 36.7 (03-22-24 @ 13:44), Max: 36.7 (03-22-24 @ 13:44)  HR: 89 (03-22-24 @ 13:44) (65 - 89)  BP: 148/88 (03-22-24 @ 13:44) (118/71 - 148/88)  RR: 16 (03-22-24 @ 13:44) (15 - 16)  SpO2: 100% (03-22-24 @ 13:44) (100% - 100%)  Wt(kg): --    PE  NAD  resting comfortable  No c/c/e  No rash grossly                            13.8   7.75  )-----------( 1        ( 22 Mar 2024 12:00 )             43.8       03-22    138  |  103  |  10  ----------------------------<  89  4.0   |  24  |  0.82    Ca    9.1      22 Mar 2024 09:19    TPro  6.4  /  Alb  4.0  /  TBili  1.1  /  DBili  x   /  AST  16  /  ALT  13  /  AlkPhos  60  03-22

## 2024-03-22 NOTE — ED ADULT NURSE NOTE - OBJECTIVE STATEMENT
Received this 24 y/0 male with care worker, Autistic male, non verbal. According to the health care worker, patient has rectal bleeding today, unknown timeline. Patient is non verbal due to medical condition. Patient is restless pacing across the room, and rocking back and forth. Providers in to see patient. Security called in to hold patient for medication administration, documenting RN able to administer meds, patient was cooperative, security not needed. Settled, blood work obtained and sent to lab. Multiple bruising noted on patient arms, due to frequent blood work. Resp easy, not in any distress. Vital sign stable.Waiting for results. Received this 24 y/0 male with care worker, Autistic male, non verbal. According to the health care worker, patient has rectal bleeding today, unknown timeline. Patient is non verbal due to medical condition. Patient is restless pacing across the room, and rocking back and forth. Providers in to see patient. Security called for safety purposes, documenting RN able to administer meds, patient was cooperative, security not needed. Settled, blood work obtained and sent to lab. Multiple bruising noted on patient arms, due to frequent blood work. Resp easy, not in any distress. Vital sign stable. Waiting for results.

## 2024-03-22 NOTE — H&P ADULT - NSHPPHYSICALEXAM_GEN_ALL_CORE
GENERAL: NAD, intermittently vocalizing  HEENT: NC/AT, eyes tracking  NECK: Supple, No JVD  CHEST/LUNG: CTAB, no increased WOB  HEART: RRR, no m/r/g  ABDOMEN: soft, NT, ND, BS+  EXTREMITIES:  2+ peripheral pulses, no clubbing, no edema  NERVOUS SYSTEM: no focal deficits noted, moving all extremities  SKIN: No rashes or lesions GENERAL: NAD, intermittently vocalizing  HEENT: NC/AT, eyes tracking  NECK: Supple, No JVD  CHEST/LUNG: CTAB, no increased WOB  HEART: RRR, no m/r/g  ABDOMEN: soft, NT, ND, BS+  EXTREMITIES:  2+ peripheral pulses, no edema  NERVOUS SYSTEM: no focal deficits noted, moving all extremities  SKIN: No rashes or lesions

## 2024-03-22 NOTE — H&P ADULT - NSHPLABSRESULTS_GEN_ALL_CORE
LABS:                         13.8   7.75  )-----------( 1        ( 22 Mar 2024 12:00 )             43.8     03-22    138  |  103  |  10  ----------------------------<  89  4.0   |  24  |  0.82    Ca    9.1      22 Mar 2024 09:19    TPro  6.4  /  Alb  4.0  /  TBili  1.1  /  DBili  x   /  AST  16  /  ALT  13  /  AlkPhos  60  03-22    PT/INR - ( 22 Mar 2024 09:19 )   PT: 12.1 sec;   INR: 1.08 ratio      Urinalysis Basic - ( 22 Mar 2024 09:19 )    Color: x / Appearance: x / SG: x / pH: x  Gluc: 89 mg/dL / Ketone: x  / Bili: x / Urobili: x   Blood: x / Protein: x / Nitrite: x   Leuk Esterase: x / RBC: x / WBC x   Sq Epi: x / Non Sq Epi: x / Bacteria: x    RADIOLOGY, EKG & ADDITIONAL TESTS: Reviewed.

## 2024-03-22 NOTE — H&P ADULT - PROBLEM SELECTOR PLAN 1
Patient noted on admission with severely low platelet count of 1  - of note, patient with history of severe ITP and has received several different treatment modalities  - heme/onc following, recs appreciated  - patient's mother not amenable to platelet transfusion  - c/w dexamethason 40mg qd x4 days  - trend plt count  - c/w PPI while on steroids Patient noted on admission with severely low platelet count of 1  - of note, patient with history of severe ITP and has received several different treatment modalities  - heme/onc following, recs appreciated  - patient's mother not amenable to platelet transfusion  - c/w dexamethasone 40mg qd x4 days  - trend plt count  - c/w PPI while on steroids

## 2024-03-22 NOTE — H&P ADULT - PROBLEM SELECTOR PLAN 3
Patient with history of H pylori infection  - completed 2 courses of treatment with amox/clarithromycin as well as amox/levofloxacin with PPI  - most recent H pylori stool test positive on 11/17/23  - will check repeat stool testing as well as ova/parasites  - explained that utility of stool testing may be limited in the setting of PPI use, however family wishes to proceed  - will request GI eval

## 2024-03-22 NOTE — ED PROVIDER NOTE - CLINICAL SUMMARY MEDICAL DECISION MAKING FREE TEXT BOX
23yo M with PMH ITP, Autistic non verbal at baseline, Intellectual disability presenting with rectal bleed noticed this morning by staff at group home  Chronic ITP at time requiring transfusion   Labs to assess for anemia / thrombocytopenia   +/- transfusion pending labs  patient currently not re-directable standing next to bed pacing in room will require sedation for medical necessities

## 2024-03-22 NOTE — ED ADULT NURSE NOTE - CHIEF COMPLAINT QUOTE
presents [facility] group [facility] with staff. Pt has rectal bleeding. this morning. Pt non verbal at baseline.  No Ac use as per staff. Phx Intellectual disability autism.

## 2024-03-22 NOTE — PATIENT PROFILE ADULT - FALL HARM RISK - HARM RISK INTERVENTIONS
Assistance with ambulation/Assistance OOB with selected safe patient handling equipment/Communicate Risk of Fall with Harm to all staff/Discuss with provider need for PT consult/Monitor for mental status changes/Monitor gait and stability/Provide patient with walking aids - walker, cane, crutches/Reinforce activity limits and safety measures with patient and family/Reorient to person, place and time as needed/Review medications for side effects contributing to fall risk/Sit up slowly, dangle for a short time, stand at bedside before walking/Tailored Fall Risk Interventions/Use of alarms - bed, chair and/or voice tab/Visual Cue: Yellow wristband and red socks/Bed in lowest position, wheels locked, appropriate side rails in place/Call bell, personal items and telephone in reach/Instruct patient to call for assistance before getting out of bed or chair/Non-slip footwear when patient is out of bed/[place] to call system/Physically safe environment - no spills, clutter or unnecessary equipment/Purposeful Proactive Rounding/Room/bathroom lighting operational, light cord in reach

## 2024-03-22 NOTE — ED ADULT NURSE NOTE - SUICIDE SCREENING QUESTION 3
Hydrate a lot and in a few days or so please repeat the UA to ensure this has normalized    Call [phone] to set up the ultrasound if the pain is worsening    If you have had any new partners of concerns for STD then we can check these as well.  Please let me know   Patient unable to complete

## 2024-03-22 NOTE — H&P ADULT - ASSESSMENT
Briefly, this is a 23 y/o M with history of intellectual impairment, autism, nonverbal and noncommunicative at baseline, obstructive sleep apnoea not on positive pressure ventilation and severe chronic ITP now presenting with one episode of rectal bleeding and found to have severe thrombocytopenia.

## 2024-03-22 NOTE — ED PROVIDER NOTE - ATTENDING CONTRIBUTION TO CARE
25yo M with PMH ITP, Autistic non verbal, intellectual disability presenting with rectal bleed since this morning.  Per group home staff (Yulissa) at bedside , patient was found to have blood on underwear and in the toilet  Prior episode of rectal bleed and with low platelets requiring transfusion   no known trauma to the area   Otherwise no other acute concerns

## 2024-03-22 NOTE — ED PROVIDER NOTE - OBJECTIVE STATEMENT
23yo M with PMH ITP, Autistic non verbal, intellectual disability presenting with rectal bleed since this morning.  Per group home staff (Yulissa) at bedside , patient was found to have blood on underwear and in the toilet  Prior episode of rectal bleed and with low platelets requiring transfusion   no known trauma to the area   Otherwise no other acute concerns
 Brigitte

## 2024-03-22 NOTE — H&P ADULT - PROBLEM SELECTOR PLAN 5
DVT: holding chemoprophylaxis i/s/o severe thrombocytopenia  Diet: Regular  Dispo: likely back to group home

## 2024-03-22 NOTE — CONSULT NOTE ADULT - NS ATTEND AMEND GEN_ALL_CORE FT
Difficult case of a 24 year old male with relapsed/refractory ITP. Has had multiple courses of steroids, IVIG, Rituximab, TPO agonists with platelet count remaining low. Refusal for Tavilesse, splenectomy or other immunosuppressants. She was willing to try hydroxychloroquine which has some data with CHERYLE+ ITP. His CHERYLE however is negative. Advised to stop hydroxychloroquin. She is not willing to do platelet transfusion or IVIG. Willing to get another course of pulse Dexamethasone 40mg which may help. Discussed will also start further immunosuppression prior to discharge.   GI and Allergy/Immuno consult.

## 2024-03-22 NOTE — ED PROVIDER NOTE - PHYSICAL EXAMINATION
Rectal exam: Chaperone: Martha Doyle  no active bleeding noted   no external hemorrhoid noted Rectal exam: Chaperone: Martha Doyle  no active bleeding noted   no external hemorrhoid noted  no internal rectal exam done given mental status and risk for breaking of skin
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
## 2024-03-22 NOTE — H&P ADULT - PROBLEM SELECTOR PLAN 2
Noted with 1 episode of rectal bleed prior to admission  - ED rectal exam noted, no active bleeding and no external hemorrhoids noted  - H/H currently stable and patient HD stable  - cont to trend H/H and platelets  - begin stool softener  - c/w PPI daily

## 2024-03-23 LAB
ANION GAP SERPL CALC-SCNC: 13 MMOL/L — SIGNIFICANT CHANGE UP (ref 7–14)
BASOPHILS # BLD AUTO: 0.02 K/UL — SIGNIFICANT CHANGE UP (ref 0–0.2)
BASOPHILS NFR BLD AUTO: 0.1 % — SIGNIFICANT CHANGE UP (ref 0–2)
BUN SERPL-MCNC: 16 MG/DL — SIGNIFICANT CHANGE UP (ref 7–23)
CALCIUM SERPL-MCNC: 9.4 MG/DL — SIGNIFICANT CHANGE UP (ref 8.4–10.5)
CHLORIDE SERPL-SCNC: 105 MMOL/L — SIGNIFICANT CHANGE UP (ref 98–107)
CLOSURE TME COLL+EPINEP BLD: 4 K/UL — CRITICAL LOW (ref 150–400)
CO2 SERPL-SCNC: 22 MMOL/L — SIGNIFICANT CHANGE UP (ref 22–31)
CREAT SERPL-MCNC: 0.71 MG/DL — SIGNIFICANT CHANGE UP (ref 0.5–1.3)
EGFR: 131 ML/MIN/1.73M2 — SIGNIFICANT CHANGE UP
EOSINOPHIL # BLD AUTO: 0 K/UL — SIGNIFICANT CHANGE UP (ref 0–0.5)
EOSINOPHIL NFR BLD AUTO: 0 % — SIGNIFICANT CHANGE UP (ref 0–6)
GLUCOSE SERPL-MCNC: 176 MG/DL — HIGH (ref 70–99)
HCT VFR BLD CALC: 46 % — SIGNIFICANT CHANGE UP (ref 39–50)
HGB BLD-MCNC: 14.7 G/DL — SIGNIFICANT CHANGE UP (ref 13–17)
IANC: 12.91 K/UL — HIGH (ref 1.8–7.4)
IMM GRANULOCYTES NFR BLD AUTO: 0.5 % — SIGNIFICANT CHANGE UP (ref 0–0.9)
LYMPHOCYTES # BLD AUTO: 0.53 K/UL — LOW (ref 1–3.3)
LYMPHOCYTES # BLD AUTO: 3.9 % — LOW (ref 13–44)
MAGNESIUM SERPL-MCNC: 1.9 MG/DL — SIGNIFICANT CHANGE UP (ref 1.6–2.6)
MCHC RBC-ENTMCNC: 25.9 PG — LOW (ref 27–34)
MCHC RBC-ENTMCNC: 32 GM/DL — SIGNIFICANT CHANGE UP (ref 32–36)
MCV RBC AUTO: 81 FL — SIGNIFICANT CHANGE UP (ref 80–100)
MONOCYTES # BLD AUTO: 0.13 K/UL — SIGNIFICANT CHANGE UP (ref 0–0.9)
MONOCYTES NFR BLD AUTO: 1 % — LOW (ref 2–14)
NEUTROPHILS # BLD AUTO: 12.91 K/UL — HIGH (ref 1.8–7.4)
NEUTROPHILS NFR BLD AUTO: 94.5 % — HIGH (ref 43–77)
NRBC # BLD: 0 /100 WBCS — SIGNIFICANT CHANGE UP (ref 0–0)
NRBC # FLD: 0 K/UL — SIGNIFICANT CHANGE UP (ref 0–0)
PHOSPHATE SERPL-MCNC: 2.6 MG/DL — SIGNIFICANT CHANGE UP (ref 2.5–4.5)
PLATELET # BLD AUTO: 6 K/UL — CRITICAL LOW (ref 150–400)
POTASSIUM SERPL-MCNC: 4.4 MMOL/L — SIGNIFICANT CHANGE UP (ref 3.5–5.3)
POTASSIUM SERPL-SCNC: 4.4 MMOL/L — SIGNIFICANT CHANGE UP (ref 3.5–5.3)
RBC # BLD: 5.68 M/UL — SIGNIFICANT CHANGE UP (ref 4.2–5.8)
RBC # FLD: 15.9 % — HIGH (ref 10.3–14.5)
SODIUM SERPL-SCNC: 140 MMOL/L — SIGNIFICANT CHANGE UP (ref 135–145)
WBC # BLD: 13.66 K/UL — HIGH (ref 3.8–10.5)
WBC # FLD AUTO: 13.66 K/UL — HIGH (ref 3.8–10.5)

## 2024-03-23 PROCEDURE — 99222 1ST HOSP IP/OBS MODERATE 55: CPT | Mod: GC

## 2024-03-23 RX ORDER — SODIUM CHLORIDE 0.65 %
1 AEROSOL, SPRAY (ML) NASAL
Refills: 0 | Status: DISCONTINUED | OUTPATIENT
Start: 2024-03-23 | End: 2024-03-25

## 2024-03-23 RX ADMIN — PANTOPRAZOLE SODIUM 40 MILLIGRAM(S): 20 TABLET, DELAYED RELEASE ORAL at 12:51

## 2024-03-23 RX ADMIN — Medication 108 MILLIGRAM(S): at 06:09

## 2024-03-23 RX ADMIN — Medication 1 TABLET(S): at 12:52

## 2024-03-23 RX ADMIN — Medication 1 TABLET(S): at 08:24

## 2024-03-23 RX ADMIN — Medication 200 MILLIGRAM(S): at 21:26

## 2024-03-23 RX ADMIN — Medication 1 TABLET(S): at 17:47

## 2024-03-23 RX ADMIN — SENNA PLUS 2 TABLET(S): 8.6 TABLET ORAL at 07:21

## 2024-03-23 RX ADMIN — PREGABALIN 1000 MICROGRAM(S): 225 CAPSULE ORAL at 12:52

## 2024-03-23 RX ADMIN — Medication 500 MILLIGRAM(S): at 12:52

## 2024-03-23 NOTE — DIETITIAN INITIAL EVALUATION ADULT - OTHER INFO
Per chart review, this is a 23 y/o M with history of intellectual impairment, autism, nonverbal and noncommunicative at baseline, obstructive sleep apnoea not on positive pressure ventilation and severe chronic ITP now presenting with one episode of rectal bleeding and found to have severe thrombocytopenia.    Patient consulted for MST Score 2 or >. At the time of visit, pt with RN. Pt with hx of intellectual impairment, nonverbal at baseline. Attempted to contact pt's mother w/o success. Comprehensive chart review completed. Pt currently on regular diet. No chewing and swallowing difficulties noted. No GI distress reported by RN (nausea/vomiting/diarrhea/constipation.) Last BM 3/22 per RN flow sheet.  Bowel regimen ordered.  Pt noted adm weight rectal bleeding due to severe thrombocytopenia. Heme is following. Pt is taking on ascorbic acid, MVI, and Vitamin 12 for micronutrient coverage. Pt's currently weight is not available. Pt's previous weight per (1/10/24), 235 (11/17/23), 267.8(6/5/23). Pt's weight trend reflects 20lbs weight loss x 7 months, weight loss is beneficial. Recommend obtain a current weight and document in EMR. RD to remain available for further nutritional interventions as indicated.

## 2024-03-23 NOTE — PROGRESS NOTE ADULT - SUBJECTIVE AND OBJECTIVE BOX
Name of Patient : VINNY MOON  MRN: [id number]  Date of visit: 03-23-24       Subjective: Patient seen and examined. No new events except as noted.   doing okay, n0 bleeding     REVIEW OF SYSTEMS:    limited     MEDICATIONS:  MEDICATIONS  (STANDING):  ascorbic acid 500 milliGRAM(s) Oral daily  cyanocobalamin 1000 MICROGram(s) Oral daily  dexAMETHasone  IVPB 40 milliGRAM(s) IV Intermittent daily  influenza   Vaccine 0.5 milliLiter(s) IntraMuscular once  lactobacillus acidophilus 1 Tablet(s) Oral three times a day with meals  multivitamin 1 Tablet(s) Oral daily  pantoprazole  Injectable 40 milliGRAM(s) IV Push daily  senna 2 Tablet(s) Oral at bedtime  traZODone 200 milliGRAM(s) Oral at bedtime      PHYSICAL EXAM:  T(C): 36.3 (03-23-24 @ 17:50), Max: 36.7 (03-23-24 @ 11:10)  HR: 93 (03-23-24 @ 17:50) (92 - 101)  BP: 136/67 (03-23-24 @ 17:50) (108/80 - 136/67)  RR: 17 (03-23-24 @ 17:50) (17 - 18)  SpO2: 95% (03-23-24 @ 17:50) (95% - 100%)  Wt(kg): --  I&O's Summary        Appearance: calm, nonverbal   HEENT:  PERRLA   Lymphatic: No lymphadenopathy   Cardiovascular: Normal S1 S2, no JVD  Respiratory: normal effort , clear  Gastrointestinal:  Soft, Non-tender  Skin: No rashes,  warm to touch  Psychiatry:  Mood & affect appropriate  Musculuskeletal: No edema    recent labs, Imaging and EKGs personally reviewed                           14.7   13.66 )-----------( 6        ( 23 Mar 2024 07:22 )             46.0               03-23    140  |  105  |  16  ----------------------------<  176<H>  4.4   |  22  |  0.71    Ca    9.4      23 Mar 2024 07:22  Phos  2.6     03-23  Mg     1.90     03-23    TPro  6.4  /  Alb  4.0  /  TBili  1.1  /  DBili  x   /  AST  16  /  ALT  13  /  AlkPhos  60  03-22    PT/INR - ( 22 Mar 2024 09:19 )   PT: 12.1 sec;   INR: 1.08 ratio                            Urinalysis Basic - ( 23 Mar 2024 07:22 )    Color: x / Appearance: x / SG: x / pH: x  Gluc: 176 mg/dL / Ketone: x  / Bili: x / Urobili: x   Blood: x / Protein: x / Nitrite: x   Leuk Esterase: x / RBC: x / WBC x   Sq Epi: x / Non Sq Epi: x / Bacteria: x

## 2024-03-23 NOTE — PROGRESS NOTE ADULT - ASSESSMENT
This is a 24 year old male with ITP who presented with severe thrombocytopenia and rectal bleeding.    1. Refractory ITP   -- Pt has had platelet count persistently <5K over the past several months w/o bleeding   -- Has received steroids, IVIG, Nplate, Rituxan, Doptelet in the past with minimal to no response  -- have discussed multiple other options on numerous occasions including Tavilesse, splenectomy, immunosuppresants and 2nd opinion with ITP specialist at [place] which all were declined. There is some data for CHERYLE positive patients with ITP responding to hydrozychloroquin which was started however CHERYLE negative. After discussion she although not ammenable to Tavilesse, IVIG, platelet transfusion, she is willing to try another immunosuppressant. Plan to start at discharge.   -- Pt's mom amenable to trial of pulse steroids. Recommend IV dexamethasone 40mg x 4 days   -- She believes this immune response is driven by his gastric issues/H Pylori.. recommend gastroenterology and allergy/immunology consult   -- Monitor CBC and transfuse to maintain plt >10K or >50K if bleeding. Discussed platelet transfusion with patient's mother but she adamantly declines. Risk of severe and life-threatening bleeding discussed, she continues to decline.  -- Follow up with [facility] Devin [facility] of [facility] after discharge    2. Hematochezia   -- Hemoglobin stable, poss hemorrhoidal bleeding in setting of severe thrombocytopenia   -- Continue to monitor blood counts   -- Recommend GI evaluation     Will continue to follow.    Devin Epps MD   Hematology/Oncology  [facility]  [phone] (office)  [phone] (alt office)     This is a 24 year old male with ITP who presented with severe thrombocytopenia and rectal bleeding.    1. Refractory ITP   -- Pt has had platelet count persistently <5K over the past several months w/o bleeding   -- Has received steroids, IVIG, Nplate, Rituxan, Doptelet in the past with minimal to no response  -- have discussed multiple other options on numerous occasions including Tavilesse, splenectomy, immunosuppresants and 2nd opinion with ITP specialist at [place] which all were declined. There is some data for CHERYLE positive patients with ITP responding to hydrozychloroquin which was started however CHERYLE negative. After discussion she although not ammenable to Tavilesse, IVIG, platelet transfusion.   After lengthy discussion on 3/23 mother is willing to try Tavilesse which I prefer over another immunosuppressant. Specialty medication which script was sent a few months ago but will need sent again. Will send to [facility] and plan to start outpatient with monitoring of his LFTs and CBC.   -- Pt's mom amenable to trial of pulse steroids. Recommend IV dexamethasone 40mg x 4 days   -- She believes this immune response is driven by his gastric issues/H Pylori which is not unreasonable however need to target antibody response. Was not responsive to rituxan and need to try a SYK inhibitor.  Recommend gastroenterology and allergy/immunology consult   -- Monitor CBC and transfuse to maintain plt >10K or >50K if bleeding. Discussed platelet transfusion with patient's mother but she adamantly declines. Risk of severe and life-threatening bleeding discussed, she continues to decline.  -- Follow up [facility] of [facility] after discharge    2. Hematochezia   -- Hemoglobin stable, poss hemorrhoidal bleeding in setting of severe thrombocytopenia   -- Continue to monitor blood counts   -- Recommend GI evaluation     Will continue to follow.    Discussed with mother at bedside in length this morning. If platelet count not improving can go to 20mg Dex daily. Please have immunology and GI evaluate. Will plan to start Tavilesse outpatient as specialty med and needs sent to pharmacy for approval which I will do. Can follow up in clinic with me after discharge. If no response to steroids and after consultations can be discharged to follow me outpatient.     Devin Epps MD   Hematology/Oncology  [facility]  [phone] (office)  [phone] (alt office)

## 2024-03-23 NOTE — DIETITIAN INITIAL EVALUATION ADULT - PERTINENT LABORATORY DATA
03-23    140  |  105  |  16  ----------------------------<  176<H>  4.4   |  22  |  0.71    Ca    9.4      23 Mar 2024 07:22  Phos  2.6     03-23  Mg     1.90     03-23    TPro  6.4  /  Alb  4.0  /  TBili  1.1  /  DBili  x   /  AST  16  /  ALT  13  /  AlkPhos  60  03-22  A1C with Estimated Average Glucose Result: 5.1 % (11-15-23 @ 11:28)

## 2024-03-23 NOTE — DIETITIAN INITIAL EVALUATION ADULT - PROBLEM SELECTOR PLAN 1
Patient noted on admission with severely low platelet count of 1  - of note, patient with history of severe ITP and has received several different treatment modalities  - heme/onc following, recs appreciated  - patient's mother not amenable to platelet transfusion  - c/w dexamethasone 40mg qd x4 days  - trend plt count  - c/w PPI while on steroids

## 2024-03-23 NOTE — DIETITIAN INITIAL EVALUATION ADULT - PERTINENT MEDS FT
MEDICATIONS  (STANDING):  ascorbic acid 500 milliGRAM(s) Oral daily  cyanocobalamin 1000 MICROGram(s) Oral daily  dexAMETHasone  IVPB 40 milliGRAM(s) IV Intermittent daily  influenza   Vaccine 0.5 milliLiter(s) IntraMuscular once  lactobacillus acidophilus 1 Tablet(s) Oral three times a day with meals  multivitamin 1 Tablet(s) Oral daily  pantoprazole  Injectable 40 milliGRAM(s) IV Push daily  senna 2 Tablet(s) Oral at bedtime  traZODone 200 milliGRAM(s) Oral at bedtime    MEDICATIONS  (PRN):  acetaminophen     Tablet .. 650 milliGRAM(s) Oral every 6 hours PRN Temp greater or equal to 38C (100.4F), Mild Pain (1 - 3)  aluminum hydroxide/magnesium hydroxide/simethicone Suspension 30 milliLiter(s) Oral every 4 hours PRN Dyspepsia  loratadine 10 milliGRAM(s) Oral daily PRN for allergy symptoms  melatonin 3 milliGRAM(s) Oral at bedtime PRN Insomnia

## 2024-03-23 NOTE — CONSULT NOTE ADULT - ASSESSMENT
This is a 24 year old male with ITP who presented with severe thrombocytopenia and rectal bleeding.    1. Refractory ITP   -- Pt has had platelet count persistently <5K over the past several months w/o bleeding   -- Has received steroids, IVIG, Nplate, Rituxan, Doptelet in the past   -- Pt's mom amenable to trial of pulse steroids. Recommend IV dexamethasone 40mg x 4 days   -- She believes this immune response is driven by his gastric issues/H Pylori.. recommend gastroenterology and allergy/immunology consult   -- Monitor CBC and transfuse to maintain plt >10K or >50K if bleeding. Discussed platelet transfusion with patient's mother but she adamantly declines. Risk of severe and life-threatening bleeding discussed, she continues to decline.  -- Follow up with Dr. Devin Epps of [facility] after discharge    2. Hematochezia   -- Hemoglobin stable, poss hemorrhoidal bleeding in setting of severe thrombocytopenia   -- Continue to monitor blood counts   -- Recommend GI evaluation     Will continue to follow.    Zuly Silva PA-C  Hematology/Oncology  [facility]  [phone] (office)  [phone] (alt office)  Evenings and weekends please call MD on call or office  
24M w/ intellectual impairment, autism, nonverbal and noncommunicative at baseline, CARLITOS, severe chronic ITP presented for severe thrombocytopenia.    Assessment:  #Severe ITP  #Hematochezia  #Hx of refractory H. pylori infection  - Patient is not optimized for endoscopic evaluation with Plt of 1K. Mother expressed understanding and agreed to withhold eval.  - Mother is very concerned that the persistent ITP was due to the hx of refractory H. pylori infection. Had stool testing 2 weeks ago with outpatient GI, but unclear about results. Stool antigen test was collected in ED per mother. However, if the test is positive, mother is very hesitant to undergo another around of abx treatment due to concern for side effect. If the test is negative, mother is concerned that it might be a false negative due to PPI use.    Recommendations  - ITP management per heme  - Not medically optimized for endoscopic evaluation at this time; will reassess if bleeding persists after plt reaches > 30K.   - Trend CBC and monitor for signs of GIB  - F/u H. pylori stool antigen test  - As per discussion with mother, if stool antigen is still positive, if mother is agreeable, would recommend quad therapy since patient has not tried quad therapy in the past. If stool test if negative, would repeat stool testing outpatient with patient's GI doctor while off PPI, bismuth, abx and probiotics for at least 2 weeks.   - Rest of care per primary team.     Case discussed with Dr. Hayden Merino  GI/Hepatology Fellow    MONDAY-FRIDAY 8AM-5PM:  Pager# [phone] ([facility]) or [phone] ([facility])    NON-URGENT CONSULTS:  Please email [email or web address]  AT NIGHT AND ON WEEKENDS:  Contact on-call GI fellow via answering service ([phone]) from 5pm-8am and on weekends/holidays

## 2024-03-23 NOTE — DIETITIAN INITIAL EVALUATION ADULT - PROBLEM SELECTOR PLAN 4
Patient with history of intellectual disability with autism  - nonverbal at baseline  - c/w home trazodone 200mg qhs  - can likely continue with patient's home Rexulti (Brexpiprazole) however family will need to bring in medication from group home  - for agitation, patient received haldol 5/ativan 2 in ED, on previous admissions was recommended for Haldol 2.5mg IV q4h and benadryl 25mg IV q4h PRN for severe agitation  - can likely c/w similar regimen (patient noted with improvement in agitation in ED after haldol), however would check EKG to ensure no QTc prolongation (no QTc prolongation noted on previous EKGs)

## 2024-03-23 NOTE — CONSULT NOTE ADULT - ATTENDING COMMENTS
24M intellectual disability/autism, chronic severe ITP and HP s/p triple therapy (clarithro based) without resolution and triple therapy (levaquin based) without confirmation of resolution, presents with rectal bleeding in s/o severe TCP (plts 1).    #Rectal bleeding  #Hx HP infection    Rectal bleeding likely represents outlet bleeding in s/o severe TCP, hgb stable 13s-14s, not safe for luminal eval with this degree of TCP as would likely cause further bleeding. Ensure soft stools without straining with bowel regimen and monitor hgb.    Past HP infection not responsive to clarithro-based regimen and eradication not confirmed following levaquin based therapy; stool HP ag pending. Discussed with mother that false negative possible in s/o continued PPI use. If stool ag returns positive, treat with doxy-based quad therapy (doxy 100 BID, flagyl 500 TID, bismuth subsalicylate 524 QID, PPI BID) for two weeks if she is willing. If negative will need repeat stool HP ag in outpatient setting with outpatient GI.

## 2024-03-23 NOTE — PROGRESS NOTE ADULT - ASSESSMENT
Briefly, this is a 25 y/o M with history of intellectual impairment, autism, nonverbal and noncommunicative at baseline, obstructive sleep apnoea not on positive pressure ventilation and severe chronic ITP now presenting with one episode of rectal bleeding and found to have severe thrombocytopenia.

## 2024-03-23 NOTE — PROGRESS NOTE ADULT - SUBJECTIVE AND OBJECTIVE BOX
INTERVAL HPI/OVERNIGHT EVENTS:  Patient S&E at bedside. No o/n events,     VITAL SIGNS:  T(F): 97.5 (03-22-24 @ 20:55)  HR: 92 (03-22-24 @ 20:55)  BP: 115/79 (03-22-24 @ 20:55)  RR: 17 (03-22-24 @ 20:55)  SpO2: 100% (03-22-24 @ 20:55)  Wt(kg): --    PHYSICAL EXAM:    Constitutional: NAD  Eyes: EOMI, sclera non-icteric  Neck: supple  Respiratory: CTAB, no wheezes or crackles   Cardiovascular: RRR  Gastrointestinal: soft, NTND, + BS  Extremities: no cyanosis, clubbing or edema   Neurological: awake and alert      MEDICATIONS  (STANDING):  ascorbic acid 500 milliGRAM(s) Oral daily  cyanocobalamin 1000 MICROGram(s) Oral daily  dexAMETHasone  IVPB 40 milliGRAM(s) IV Intermittent daily  influenza   Vaccine 0.5 milliLiter(s) IntraMuscular once  lactobacillus acidophilus 1 Tablet(s) Oral three times a day with meals  multivitamin 1 Tablet(s) Oral daily  pantoprazole  Injectable 40 milliGRAM(s) IV Push daily  senna 2 Tablet(s) Oral at bedtime  traZODone 200 milliGRAM(s) Oral at bedtime    MEDICATIONS  (PRN):  acetaminophen     Tablet .. 650 milliGRAM(s) Oral every 6 hours PRN Temp greater or equal to 38C (100.4F), Mild Pain (1 - 3)  aluminum hydroxide/magnesium hydroxide/simethicone Suspension 30 milliLiter(s) Oral every 4 hours PRN Dyspepsia  loratadine 10 milliGRAM(s) Oral daily PRN for allergy symptoms  melatonin 3 milliGRAM(s) Oral at bedtime PRN Insomnia      Allergies    Bactrim (Hives)  WinRho SDF (Hives)    Intolerances        LABS:                        13.8   7.75  )-----------( 1        ( 22 Mar 2024 12:00 )             43.8     03-22    138  |  103  |  10  ----------------------------<  89  4.0   |  24  |  0.82    Ca    9.1      22 Mar 2024 09:19    TPro  6.4  /  Alb  4.0  /  TBili  1.1  /  DBili  x   /  AST  16  /  ALT  13  /  AlkPhos  60  03-22    PT/INR - ( 22 Mar 2024 09:19 )   PT: 12.1 sec;   INR: 1.08 ratio           Urinalysis Basic - ( 22 Mar 2024 09:19 )    Color: x / Appearance: x / SG: x / pH: x  Gluc: 89 mg/dL / Ketone: x  / Bili: x / Urobili: x   Blood: x / Protein: x / Nitrite: x   Leuk Esterase: x / RBC: x / WBC x   Sq Epi: x / Non Sq Epi: x / Bacteria: x        RADIOLOGY & ADDITIONAL TESTS:  Studies reviewed.   INTERVAL HPI/OVERNIGHT EVENTS:  Patient S&E at bedside. No o/n events. Mother at bedside. No further bleeding.     VITAL SIGNS:  T(F): 97.5 (03-22-24 @ 20:55)  HR: 92 (03-22-24 @ 20:55)  BP: 115/79 (03-22-24 @ 20:55)  RR: 17 (03-22-24 @ 20:55)  SpO2: 100% (03-22-24 @ 20:55)  Wt(kg): --    PHYSICAL EXAM:    Constitutional: NAD  Eyes: EOMI, sclera non-icteric  Neck: supple  Respiratory: CTAB, no wheezes or crackles   Cardiovascular: RRR  Gastrointestinal: soft, NTND, + BS  Extremities: no cyanosis, clubbing or edema   Neurological: awake and alert      MEDICATIONS  (STANDING):  ascorbic acid 500 milliGRAM(s) Oral daily  cyanocobalamin 1000 MICROGram(s) Oral daily  dexAMETHasone  IVPB 40 milliGRAM(s) IV Intermittent daily  influenza   Vaccine 0.5 milliLiter(s) IntraMuscular once  lactobacillus acidophilus 1 Tablet(s) Oral three times a day with meals  multivitamin 1 Tablet(s) Oral daily  pantoprazole  Injectable 40 milliGRAM(s) IV Push daily  senna 2 Tablet(s) Oral at bedtime  traZODone 200 milliGRAM(s) Oral at bedtime    MEDICATIONS  (PRN):  acetaminophen     Tablet .. 650 milliGRAM(s) Oral every 6 hours PRN Temp greater or equal to 38C (100.4F), Mild Pain (1 - 3)  aluminum hydroxide/magnesium hydroxide/simethicone Suspension 30 milliLiter(s) Oral every 4 hours PRN Dyspepsia  loratadine 10 milliGRAM(s) Oral daily PRN for allergy symptoms  melatonin 3 milliGRAM(s) Oral at bedtime PRN Insomnia      Allergies    Bactrim (Hives)  WinRho SDF (Hives)    Intolerances        LABS:                        13.8   7.75  )-----------( 1        ( 22 Mar 2024 12:00 )             43.8     03-22    138  |  103  |  10  ----------------------------<  89  4.0   |  24  |  0.82    Ca    9.1      22 Mar 2024 09:19    TPro  6.4  /  Alb  4.0  /  TBili  1.1  /  DBili  x   /  AST  16  /  ALT  13  /  AlkPhos  60  03-22    PT/INR - ( 22 Mar 2024 09:19 )   PT: 12.1 sec;   INR: 1.08 ratio           Urinalysis Basic - ( 22 Mar 2024 09:19 )    Color: x / Appearance: x / SG: x / pH: x  Gluc: 89 mg/dL / Ketone: x  / Bili: x / Urobili: x   Blood: x / Protein: x / Nitrite: x   Leuk Esterase: x / RBC: x / WBC x   Sq Epi: x / Non Sq Epi: x / Bacteria: x        RADIOLOGY & ADDITIONAL TESTS:  Studies reviewed.

## 2024-03-23 NOTE — CONSULT NOTE ADULT - SUBJECTIVE AND OBJECTIVE BOX
HPI:  VINNY MOON is a 24M--unable to obtain history from patient--mother in attendance--patient with a history of intellectual impairment, autism, nonverbal and noncommunicative at baseline, obstructive sleep apnoea not on positive pressure ventilation, severe chronic ITP followed by and referred by Dr. Bhagat above to the ER following CBC draw with platelet count of 1K.   Mother reports that she has researched other studies with gut erik with infection with H. pylori and has been having his son see a GI on this issue. Mother also reports her concern of patient's development for autism from the patient's original MMR vaccine at age [age]. Patient was seen by Dr. Carbajal during last hospitalization (11/2023) at [facility] for H. pylori infection with persistently positive H. pylori stool antigen despite triple therapy. Patient was given triple therapy again (replaced clarithromycin with Levaquin x 14 days. Reportedly had H. pylori stool testing 2 weeks ago with outpatient GI, but unclear about test results. Patient was also noted to have dried blood/clots in his underwear.   GI called to evaluate for hematochezia.   Mother expressed concern that patient's ITP is associated to his H. Pylori infection, and she does not want further antibiotics if stool antigen is positive due to concern for side effects.       PMHX/PSHX:    Intellectual disability    Chronic ITP (idiopathic thrombocytopenia)    Dental caries on smooth surface penetrating into dentin    Autism    CARLITOS (obstructive sleep apnea)    2019 novel coronavirus disease (COVID-19)    COVID-19 vaccine series declined    No significant past surgical history    History of dental examination      Allergies:  Bactrim (Hives)  WinRho SDF (Hives)      Home Medications: reviewed  Hospital Medications:  acetaminophen     Tablet .. 650 milliGRAM(s) Oral every 6 hours PRN  aluminum hydroxide/magnesium hydroxide/simethicone Suspension 30 milliLiter(s) Oral every 4 hours PRN  ascorbic acid 500 milliGRAM(s) Oral daily  cyanocobalamin 1000 MICROGram(s) Oral daily  dexAMETHasone  IVPB 40 milliGRAM(s) IV Intermittent daily  influenza   Vaccine 0.5 milliLiter(s) IntraMuscular once  lactobacillus acidophilus 1 Tablet(s) Oral three times a day with meals  loratadine 10 milliGRAM(s) Oral daily PRN  melatonin 3 milliGRAM(s) Oral at bedtime PRN  multivitamin 1 Tablet(s) Oral daily  pantoprazole  Injectable 40 milliGRAM(s) IV Push daily  senna 2 Tablet(s) Oral at bedtime  traZODone 200 milliGRAM(s) Oral at bedtime      Social History:   Tobacco: denies  Alcohol: denies  Recreational drugs: denies    Family history:    FH: hypertension        PHYSICAL EXAM:   Vital Signs:  Vital Signs Last 24 Hrs  T(C): 36.7 (23 Mar 2024 11:10), Max: 36.7 (23 Mar 2024 11:10)  T(F): 98 (23 Mar 2024 11:10), Max: 98 (23 Mar 2024 11:10)  HR: 101 (23 Mar 2024 11:10) (71 - 101)  BP: 108/80 (23 Mar 2024 11:10) (101/48 - 115/79)  BP(mean): --  RR: 18 (23 Mar 2024 11:10) (17 - 18)  SpO2: 98% (23 Mar 2024 11:10) (98% - 100%)    Parameters below as of 23 Mar 2024 11:10  Patient On (Oxygen Delivery Method): room air      Daily     Daily     GENERAL: No acute distress, pacing around the room  NEURO: Alert and not actively engaging in conversation  HEENT: NCAT, anicteric sclera  CHEST: no respiratory distress with walking  CARDIAC: Not performed  ABDOMEN: Non-distended  EXTREMITIES: No apparent abnormalities. Functional strength with full mobility  SKIN: no apparent lesions noted    LABS: reviewed                        14.7   13.66 )-----------( 6        ( 23 Mar 2024 07:22 )             46.0     03-23    140  |  105  |  16  ----------------------------<  176<H>  4.4   |  22  |  0.71    Ca    9.4      23 Mar 2024 07:22  Phos  2.6     03-23  Mg     1.90     03-23    TPro  6.4  /  Alb  4.0  /  TBili  1.1  /  DBili  x   /  AST  16  /  ALT  13  /  AlkPhos  60  03-22    LIVER FUNCTIONS - ( 22 Mar 2024 09:19 )  Alb: 4.0 g/dL / Pro: 6.4 g/dL / ALK PHOS: 60 U/L / ALT: 13 U/L / AST: 16 U/L / GGT: x

## 2024-03-24 LAB
ANION GAP SERPL CALC-SCNC: 16 MMOL/L — HIGH (ref 7–14)
BUN SERPL-MCNC: 23 MG/DL — SIGNIFICANT CHANGE UP (ref 7–23)
CALCIUM SERPL-MCNC: 9.2 MG/DL — SIGNIFICANT CHANGE UP (ref 8.4–10.5)
CHLORIDE SERPL-SCNC: 103 MMOL/L — SIGNIFICANT CHANGE UP (ref 98–107)
CLOSURE TME COLL+EPINEP BLD: 14 K/UL — CRITICAL LOW (ref 150–400)
CO2 SERPL-SCNC: 19 MMOL/L — LOW (ref 22–31)
CREAT SERPL-MCNC: 0.72 MG/DL — SIGNIFICANT CHANGE UP (ref 0.5–1.3)
EGFR: 130 ML/MIN/1.73M2 — SIGNIFICANT CHANGE UP
GLUCOSE SERPL-MCNC: 158 MG/DL — HIGH (ref 70–99)
H PYLORI AG STL QL: NEGATIVE — SIGNIFICANT CHANGE UP
HCT VFR BLD CALC: 43.9 % — SIGNIFICANT CHANGE UP (ref 39–50)
HGB BLD-MCNC: 14 G/DL — SIGNIFICANT CHANGE UP (ref 13–17)
MCHC RBC-ENTMCNC: 26.4 PG — LOW (ref 27–34)
MCHC RBC-ENTMCNC: 31.9 GM/DL — LOW (ref 32–36)
MCV RBC AUTO: 82.7 FL — SIGNIFICANT CHANGE UP (ref 80–100)
NRBC # BLD: 0 /100 WBCS — SIGNIFICANT CHANGE UP (ref 0–0)
NRBC # FLD: 0 K/UL — SIGNIFICANT CHANGE UP (ref 0–0)
PLATELET # BLD AUTO: 17 K/UL — CRITICAL LOW (ref 150–400)
POTASSIUM SERPL-MCNC: 4.3 MMOL/L — SIGNIFICANT CHANGE UP (ref 3.5–5.3)
POTASSIUM SERPL-SCNC: 4.3 MMOL/L — SIGNIFICANT CHANGE UP (ref 3.5–5.3)
RBC # BLD: 5.31 M/UL — SIGNIFICANT CHANGE UP (ref 4.2–5.8)
RBC # FLD: 15.9 % — HIGH (ref 10.3–14.5)
SODIUM SERPL-SCNC: 138 MMOL/L — SIGNIFICANT CHANGE UP (ref 135–145)
WBC # BLD: 21.87 K/UL — HIGH (ref 3.8–10.5)
WBC # FLD AUTO: 21.87 K/UL — HIGH (ref 3.8–10.5)

## 2024-03-24 RX ORDER — DIPHENHYDRAMINE HCL 50 MG
25 CAPSULE ORAL ONCE
Refills: 0 | Status: COMPLETED | OUTPATIENT
Start: 2024-03-24 | End: 2024-03-24

## 2024-03-24 RX ADMIN — Medication 25 MILLIGRAM(S): at 08:46

## 2024-03-24 RX ADMIN — Medication 1 TABLET(S): at 11:28

## 2024-03-24 RX ADMIN — Medication 108 MILLIGRAM(S): at 05:45

## 2024-03-24 RX ADMIN — Medication 1 TABLET(S): at 12:50

## 2024-03-24 RX ADMIN — Medication 200 MILLIGRAM(S): at 21:51

## 2024-03-24 RX ADMIN — Medication 1 TABLET(S): at 18:15

## 2024-03-24 RX ADMIN — Medication 1 TABLET(S): at 08:17

## 2024-03-24 RX ADMIN — Medication 500 MILLIGRAM(S): at 11:26

## 2024-03-24 RX ADMIN — PANTOPRAZOLE SODIUM 40 MILLIGRAM(S): 20 TABLET, DELAYED RELEASE ORAL at 11:26

## 2024-03-24 RX ADMIN — PREGABALIN 1000 MICROGRAM(S): 225 CAPSULE ORAL at 11:26

## 2024-03-24 NOTE — PROGRESS NOTE ADULT - SUBJECTIVE AND OBJECTIVE BOX
Name of Patient : VINNY MOON  MRN: [id number]  Date of visit: 03-24-24       Subjective: Patient seen and examined. No new events except as noted.     REVIEW OF SYSTEMS:  limited     MEDICATIONS:  MEDICATIONS  (STANDING):  ascorbic acid 500 milliGRAM(s) Oral daily  cyanocobalamin 1000 MICROGram(s) Oral daily  dexAMETHasone  IVPB 40 milliGRAM(s) IV Intermittent daily  influenza   Vaccine 0.5 milliLiter(s) IntraMuscular once  lactobacillus acidophilus 1 Tablet(s) Oral three times a day with meals  multivitamin 1 Tablet(s) Oral daily  pantoprazole  Injectable 40 milliGRAM(s) IV Push daily  senna 2 Tablet(s) Oral at bedtime  traZODone 200 milliGRAM(s) Oral at bedtime      PHYSICAL EXAM:  T(C): 36.2 (03-24-24 @ 20:23), Max: 36.4 (03-24-24 @ 12:43)  HR: 98 (03-24-24 @ 20:23) (88 - 98)  BP: 127/70 (03-24-24 @ 20:23) (123/71 - 142/81)  RR: 18 (03-24-24 @ 20:23) (18 - 18)  SpO2: 98% (03-24-24 @ 20:23) (97% - 98%)  Wt(kg): --  I&O's Summary        Appearance: awake, nonverbal   HEENT:  PERRLA   Lymphatic: No lymphadenopaty   Cardiovascular: Normal S1 S2, no JVD  Respiratory: normal effort , clear  Gastrointestinal:  Soft, Non-tender  Skin: No rashes,  warm to touch  Psychiatry:  Mood & affect appropriate  Musculuskeletal: No edema    recent labs, Imaging and EKGs personally reviewed                           14.0   21.87 )-----------( 17       ( 24 Mar 2024 05:55 )             43.9               03-24    138  |  103  |  23  ----------------------------<  158<H>  4.3   |  19<L>  |  0.72    Ca    9.2      24 Mar 2024 05:55  Phos  2.6     03-23  Mg     1.90     03-23                         Urinalysis Basic - ( 24 Mar 2024 05:55 )    Color: x / Appearance: x / SG: x / pH: x  Gluc: 158 mg/dL / Ketone: x  / Bili: x / Urobili: x   Blood: x / Protein: x / Nitrite: x   Leuk Esterase: x / RBC: x / WBC x   Sq Epi: x / Non Sq Epi: x / Bacteria: x

## 2024-03-24 NOTE — PROGRESS NOTE ADULT - ASSESSMENT
This is a 24 year old male with ITP who presented with severe thrombocytopenia and rectal bleeding.    1. Refractory ITP   -- Pt has had platelet count persistently <5K over the past several months w/o bleeding   -- Has received steroids, IVIG, Nplate, Rituxan, Doptelet in the past with minimal to no response  -- have discussed multiple other options on numerous occasions including Tavilesse, splenectomy, immunosuppresants and 2nd opinion with ITP specialist at [place] which all were declined. There is some data for CHERYLE positive patients with ITP responding to hydrozychloroquin which was started however CHERYLE negative. After discussion she although not ammenable to Tavilesse, IVIG, platelet transfusion.   After lengthy discussion on 3/23 mother is willing to try Tavilesse which I prefer over another immunosuppressant. Specialty medication which script was sent a few months ago but will need sent again. Will send to [facility] and plan to start outpatient with monitoring of his LFTs and CBC.   -- Pt's mom amenable to trial of pulse steroids. Recommend IV dexamethasone 40mg x 4 days   -- She believes this immune response is driven by his gastric issues/H Pylori which is not unreasonable however need to target antibody response. Was not responsive to rituxan and need to try a SYK inhibitor.  Recommend allergy/immunology consult   -- Monitor CBC and transfuse to maintain plt >10K or >50K if bleeding. Discussed platelet transfusion with patient's mother but she adamantly declines. Risk of severe and life-threatening bleeding discussed, she continues to decline.  -- Follow up with Dr. Devin Epps of [facility] after discharge    2. Hematochezia   -- Hemoglobin stable, poss hemorrhoidal bleeding in setting of severe thrombocytopenia   -- Continue to monitor blood counts   -- Appreciate GI recs    Will continue to follow.     Will plan to start Tavilesse outpatient as specialty med and needs sent to pharmacy for approval which I will do. Can follow up in clinic with me after discharge.     Devin Epps MD   Hematology/Oncology  [facility]  [phone] (office)  [phone] (alt office)     This is a 24 year old male with ITP who presented with severe thrombocytopenia and rectal bleeding.    1. Refractory ITP   -- Pt has had platelet count persistently <5K over the past several months w/o bleeding   -- Has received steroids, IVIG, Nplate, Rituxan, Doptelet in the past with minimal to no response  -- have discussed multiple other options on numerous occasions including Tavilesse, splenectomy, immunosuppresants and 2nd opinion with ITP specialist at [place] which all were declined. There is some data for CHERYLE positive patients with ITP responding to hydrozychloroquin which was started however CHERYLE negative. After discussion she although not ammenable to Tavilesse, IVIG, platelet transfusion.   After lengthy discussion on 3/23 mother is willing to try Tavilesse which I prefer over another immunosuppressant. Specialty medication which script was sent a few months. Today since platelet count improved states she would not want to try Tavilesse or any other immunosuppressant. She wants to focus on the H Pylori- gut health and immunology. Discussed although he has not had a significant bleed thankfully it can develop in the future. He also does not have a sustained prolonged response to steroids and considered refractive. Sometimes he does not increase to what he has today. She understands, willing to stay to complete Dex 40mg IV and immunology consultation but deferring any other treatment at this time.   -- Pt's mom amenable to trial of pulse steroids. Recommend IV dexamethasone 40mg x 4 days   -- She believes this immune response is driven by his gastric issues/H Pylori which is not unreasonable however need to target antibody response. Was not responsive to rituxan and need to try a SYK inhibitor.  Recommend allergy/immunology consult   -- Monitor CBC and transfuse to maintain plt >10K or >50K if bleeding. Discussed platelet transfusion with patient's mother but she adamantly declines. Risk of severe and life-threatening bleeding discussed, she continues to decline.  -- Follow up with Dr. Devin Epps of [facility] after discharge    2. Hematochezia   -- Hemoglobin stable, poss hemorrhoidal bleeding in setting of severe thrombocytopenia   -- Continue to monitor blood counts   -- Appreciate GI recs    Will continue to follow. Follow with me in clinic after discharge.     Devin Epps MD   Hematology/Oncology  [facility]  [phone] (office)  [phone] (alt office)

## 2024-03-24 NOTE — PROGRESS NOTE ADULT - SUBJECTIVE AND OBJECTIVE BOX
INTERVAL HPI/OVERNIGHT EVENTS:  Patient S&E at bedside. No o/n events,     VITAL SIGNS:  T(F): 98.2 (03-23-24 @ 20:35)  HR: 93 (03-23-24 @ 20:35)  BP: 128/79 (03-23-24 @ 20:35)  RR: 18 (03-23-24 @ 20:35)  SpO2: 95% (03-23-24 @ 20:35)  Wt(kg): --    PHYSICAL EXAM:    Constitutional: NAD  Eyes: EOMI, sclera non-icteric  Neck: supple  Respiratory: CTAB, no wheezes or crackles   Cardiovascular: RRR  Gastrointestinal: soft, NTND, + BS  Extremities: no cyanosis, clubbing or edema   Neurological: awake and alert      MEDICATIONS  (STANDING):  ascorbic acid 500 milliGRAM(s) Oral daily  cyanocobalamin 1000 MICROGram(s) Oral daily  dexAMETHasone  IVPB 40 milliGRAM(s) IV Intermittent daily  influenza   Vaccine 0.5 milliLiter(s) IntraMuscular once  lactobacillus acidophilus 1 Tablet(s) Oral three times a day with meals  multivitamin 1 Tablet(s) Oral daily  pantoprazole  Injectable 40 milliGRAM(s) IV Push daily  senna 2 Tablet(s) Oral at bedtime  traZODone 200 milliGRAM(s) Oral at bedtime    MEDICATIONS  (PRN):  acetaminophen     Tablet .. 650 milliGRAM(s) Oral every 6 hours PRN Temp greater or equal to 38C (100.4F), Mild Pain (1 - 3)  aluminum hydroxide/magnesium hydroxide/simethicone Suspension 30 milliLiter(s) Oral every 4 hours PRN Dyspepsia  loratadine 10 milliGRAM(s) Oral daily PRN for allergy symptoms  melatonin 3 milliGRAM(s) Oral at bedtime PRN Insomnia  sodium chloride 0.65% Nasal 1 Spray(s) Both Nostrils four times a day PRN Nasal Congestion      Allergies    Bactrim (Hives)  WinRho SDF (Hives)    Intolerances        LABS:                        14.7   13.66 )-----------( 6        ( 23 Mar 2024 07:22 )             46.0     03-23    140  |  105  |  16  ----------------------------<  176<H>  4.4   |  22  |  0.71    Ca    9.4      23 Mar 2024 07:22  Phos  2.6     03-23  Mg     1.90     03-23    TPro  6.4  /  Alb  4.0  /  TBili  1.1  /  DBili  x   /  AST  16  /  ALT  13  /  AlkPhos  60  03-22    PT/INR - ( 22 Mar 2024 09:19 )   PT: 12.1 sec;   INR: 1.08 ratio           Urinalysis Basic - ( 23 Mar 2024 07:22 )    Color: x / Appearance: x / SG: x / pH: x  Gluc: 176 mg/dL / Ketone: x  / Bili: x / Urobili: x   Blood: x / Protein: x / Nitrite: x   Leuk Esterase: x / RBC: x / WBC x   Sq Epi: x / Non Sq Epi: x / Bacteria: x        RADIOLOGY & ADDITIONAL TESTS:  Studies reviewed.   INTERVAL HPI/OVERNIGHT EVENTS:  Patient S&E at bedside. Multiple bowel movements overnight, brown stool, no melena or blood seen. Hemoglobin stable.     VITAL SIGNS:  T(F): 98.2 (03-23-24 @ 20:35)  HR: 93 (03-23-24 @ 20:35)  BP: 128/79 (03-23-24 @ 20:35)  RR: 18 (03-23-24 @ 20:35)  SpO2: 95% (03-23-24 @ 20:35)  Wt(kg): --    PHYSICAL EXAM:    Constitutional: NAD  Eyes: EOMI, sclera non-icteric  Neck: supple  Respiratory: CTAB, no wheezes or crackles   Cardiovascular: RRR  Gastrointestinal: soft, NTND, + BS  Extremities: no cyanosis, clubbing or edema   Neurological: awake and alert      MEDICATIONS  (STANDING):  ascorbic acid 500 milliGRAM(s) Oral daily  cyanocobalamin 1000 MICROGram(s) Oral daily  dexAMETHasone  IVPB 40 milliGRAM(s) IV Intermittent daily  influenza   Vaccine 0.5 milliLiter(s) IntraMuscular once  lactobacillus acidophilus 1 Tablet(s) Oral three times a day with meals  multivitamin 1 Tablet(s) Oral daily  pantoprazole  Injectable 40 milliGRAM(s) IV Push daily  senna 2 Tablet(s) Oral at bedtime  traZODone 200 milliGRAM(s) Oral at bedtime    MEDICATIONS  (PRN):  acetaminophen     Tablet .. 650 milliGRAM(s) Oral every 6 hours PRN Temp greater or equal to 38C (100.4F), Mild Pain (1 - 3)  aluminum hydroxide/magnesium hydroxide/simethicone Suspension 30 milliLiter(s) Oral every 4 hours PRN Dyspepsia  loratadine 10 milliGRAM(s) Oral daily PRN for allergy symptoms  melatonin 3 milliGRAM(s) Oral at bedtime PRN Insomnia  sodium chloride 0.65% Nasal 1 Spray(s) Both Nostrils four times a day PRN Nasal Congestion      Allergies    Bactrim (Hives)  WinRho SDF (Hives)    Intolerances        LABS:                        14.7   13.66 )-----------( 6        ( 23 Mar 2024 07:22 )             46.0     03-23    140  |  105  |  16  ----------------------------<  176<H>  4.4   |  22  |  0.71    Ca    9.4      23 Mar 2024 07:22  Phos  2.6     03-23  Mg     1.90     03-23    TPro  6.4  /  Alb  4.0  /  TBili  1.1  /  DBili  x   /  AST  16  /  ALT  13  /  AlkPhos  60  03-22    PT/INR - ( 22 Mar 2024 09:19 )   PT: 12.1 sec;   INR: 1.08 ratio           Urinalysis Basic - ( 23 Mar 2024 07:22 )    Color: x / Appearance: x / SG: x / pH: x  Gluc: 176 mg/dL / Ketone: x  / Bili: x / Urobili: x   Blood: x / Protein: x / Nitrite: x   Leuk Esterase: x / RBC: x / WBC x   Sq Epi: x / Non Sq Epi: x / Bacteria: x        RADIOLOGY & ADDITIONAL TESTS:  Studies reviewed.

## 2024-03-25 ENCOUNTER — TRANSCRIPTION ENCOUNTER (OUTPATIENT)
Age: 24
End: 2024-03-25

## 2024-03-25 VITALS
SYSTOLIC BLOOD PRESSURE: 141 MMHG | DIASTOLIC BLOOD PRESSURE: 83 MMHG | RESPIRATION RATE: 18 BRPM | TEMPERATURE: 98 F | OXYGEN SATURATION: 98 % | HEART RATE: 81 BPM

## 2024-03-25 LAB
ANION GAP SERPL CALC-SCNC: 15 MMOL/L — HIGH (ref 7–14)
BUN SERPL-MCNC: 24 MG/DL — HIGH (ref 7–23)
CALCIUM SERPL-MCNC: 9 MG/DL — SIGNIFICANT CHANGE UP (ref 8.4–10.5)
CHLORIDE SERPL-SCNC: 106 MMOL/L — SIGNIFICANT CHANGE UP (ref 98–107)
CLOSURE TME COLL+EPINEP BLD: 13 K/UL — CRITICAL LOW (ref 150–400)
CO2 SERPL-SCNC: 21 MMOL/L — LOW (ref 22–31)
CREAT SERPL-MCNC: 0.65 MG/DL — SIGNIFICANT CHANGE UP (ref 0.5–1.3)
EGFR: 134 ML/MIN/1.73M2 — SIGNIFICANT CHANGE UP
GLUCOSE SERPL-MCNC: 118 MG/DL — HIGH (ref 70–99)
HCT VFR BLD CALC: 45.4 % — SIGNIFICANT CHANGE UP (ref 39–50)
HGB BLD-MCNC: 14.3 G/DL — SIGNIFICANT CHANGE UP (ref 13–17)
MCHC RBC-ENTMCNC: 26.4 PG — LOW (ref 27–34)
MCHC RBC-ENTMCNC: 31.5 GM/DL — LOW (ref 32–36)
MCV RBC AUTO: 83.9 FL — SIGNIFICANT CHANGE UP (ref 80–100)
NRBC # BLD: 0 /100 WBCS — SIGNIFICANT CHANGE UP (ref 0–0)
NRBC # FLD: 0 K/UL — SIGNIFICANT CHANGE UP (ref 0–0)
PLATELET # BLD AUTO: 15 K/UL — CRITICAL LOW (ref 150–400)
POTASSIUM SERPL-MCNC: 3.7 MMOL/L — SIGNIFICANT CHANGE UP (ref 3.5–5.3)
POTASSIUM SERPL-SCNC: 3.7 MMOL/L — SIGNIFICANT CHANGE UP (ref 3.5–5.3)
RBC # BLD: 5.41 M/UL — SIGNIFICANT CHANGE UP (ref 4.2–5.8)
RBC # FLD: 16.1 % — HIGH (ref 10.3–14.5)
SODIUM SERPL-SCNC: 142 MMOL/L — SIGNIFICANT CHANGE UP (ref 135–145)
WBC # BLD: 27.5 K/UL — HIGH (ref 3.8–10.5)
WBC # FLD AUTO: 27.5 K/UL — HIGH (ref 3.8–10.5)

## 2024-03-25 RX ORDER — SENNA PLUS 8.6 MG/1
2 TABLET ORAL
Qty: 0 | Refills: 0 | DISCHARGE
Start: 2024-03-25

## 2024-03-25 RX ORDER — ACETAMINOPHEN 500 MG
2 TABLET ORAL
Qty: 0 | Refills: 0 | DISCHARGE
Start: 2024-03-25

## 2024-03-25 RX ORDER — BREXPIPRAZOLE 0.25 MG/1
1 TABLET ORAL
Refills: 0 | DISCHARGE

## 2024-03-25 RX ORDER — PANTOPRAZOLE SODIUM 20 MG/1
1 TABLET, DELAYED RELEASE ORAL
Qty: 30 | Refills: 0
Start: 2024-03-25 | End: 2024-04-23

## 2024-03-25 RX ORDER — BREXPIPRAZOLE 0.25 MG/1
1 TABLET ORAL
Qty: 0 | Refills: 0 | DISCHARGE
Start: 2024-03-25

## 2024-03-25 RX ORDER — SODIUM CHLORIDE 0.65 %
1 AEROSOL, SPRAY (ML) NASAL
Qty: 0 | Refills: 0 | DISCHARGE
Start: 2024-03-25

## 2024-03-25 RX ADMIN — Medication 500 MILLIGRAM(S): at 12:37

## 2024-03-25 RX ADMIN — Medication 1 TABLET(S): at 12:37

## 2024-03-25 RX ADMIN — Medication 1 TABLET(S): at 08:03

## 2024-03-25 RX ADMIN — PREGABALIN 1000 MICROGRAM(S): 225 CAPSULE ORAL at 12:37

## 2024-03-25 RX ADMIN — Medication 108 MILLIGRAM(S): at 05:48

## 2024-03-25 RX ADMIN — PANTOPRAZOLE SODIUM 40 MILLIGRAM(S): 20 TABLET, DELAYED RELEASE ORAL at 12:36

## 2024-03-25 NOTE — DISCHARGE NOTE NURSING/CASE MANAGEMENT/SOCIAL WORK - NSFLUVACAGEDISCH_IMM_ALL_CORE
Show Aperture Variable?: Yes Duration Of Freeze Thaw-Cycle (Seconds): 0 Render Post-Care Instructions In Note?: no Detail Level: Zone Post-Care Instructions: I reviewed with the patient in detail post-care instructions. Patient is to wear sunprotection, and avoid picking at any of the treated lesions. Pt may apply Vaseline to crusted or scabbing areas. Consent: The patient's consent was obtained including but not limited to risks of crusting, scabbing, blistering, scarring, darker or lighter pigmentary change, recurrence, incomplete removal and infection. Adult Duration Of Freeze Thaw-Cycle (Seconds): 5-10 Medical Necessity Information: It is in your best interest to select a reason for this procedure from the list below. All of these items fulfill various CMS LCD requirements except the new and changing color options. Number Of Freeze-Thaw Cycles: 2 freeze-thaw cycles Spray Paint Text: The liquid nitrogen was applied to the skin utilizing a spray paint frosting technique. Medical Necessity Clause: This procedure was medically necessary because the lesions that were treated were: Detail Level: Simple

## 2024-03-25 NOTE — PROVIDER CONTACT NOTE (CRITICAL VALUE NOTIFICATION) - BACKGROUND
Pt. admitted for thrombocytopenia and rectal bleeding
Pt. admitted for thrombocytopenia and rectal bleeding

## 2024-03-25 NOTE — DISCHARGE NOTE NURSING/CASE MANAGEMENT/SOCIAL WORK - PATIENT PORTAL LINK FT
You can access the FollowMyHealth Patient Portal offered by [facility] by registering at the following website: [email or web address]. By joining [employer]’s FollowMyHealth portal, you will also be able to view your health information using other applications (apps) compatible with our system.

## 2024-03-25 NOTE — PROGRESS NOTE ADULT - PROBLEM SELECTOR PROBLEM 3
History of Helicobacter pylori infection

## 2024-03-25 NOTE — PROGRESS NOTE ADULT - SUBJECTIVE AND OBJECTIVE BOX
Name of Patient : VINNY MOON  MRN: [id number]  Date of visit: 03-25-24 @ 14:59      Subjective: Patient seen and examined. No new events except as noted.   Doing okay   no further bleeding   Hgb stable     REVIEW OF SYSTEMS:    CONSTITUTIONAL: No weakness, fevers or chills  EYES/ENT: No visual changes;  No vertigo or throat pain   NECK: No pain or stiffness  RESPIRATORY: No cough, wheezing, hemoptysis; No shortness of breath  CARDIOVASCULAR: No chest pain or palpitations  GASTROINTESTINAL: No abdominal or epigastric pain. No nausea, vomiting, or hematemesis; No diarrhea or constipation. No melena or hematochezia.  GENITOURINARY: No dysuria, frequency or hematuria  NEUROLOGICAL: No numbness or weakness  SKIN: No itching, burning, rashes, or lesions   All other review of systems is negative unless indicated above.    MEDICATIONS:  MEDICATIONS  (STANDING):  ascorbic acid 500 milliGRAM(s) Oral daily  cyanocobalamin 1000 MICROGram(s) Oral daily  influenza   Vaccine 0.5 milliLiter(s) IntraMuscular once  lactobacillus acidophilus 1 Tablet(s) Oral three times a day with meals  multivitamin 1 Tablet(s) Oral daily  pantoprazole  Injectable 40 milliGRAM(s) IV Push daily  senna 2 Tablet(s) Oral at bedtime  traZODone 200 milliGRAM(s) Oral at bedtime      PHYSICAL EXAM:  T(C): 36.7 (03-25-24 @ 11:23), Max: 36.7 (03-25-24 @ 11:23)  HR: 81 (03-25-24 @ 11:23) (81 - 99)  BP: 141/83 (03-25-24 @ 11:23) (121/71 - 142/81)  RR: 18 (03-25-24 @ 11:23) (18 - 18)  SpO2: 98% (03-25-24 @ 11:23) (97% - 99%)  Wt(kg): --  I&O's Summary        Appearance: Normal	  HEENT:  PERRLA   Lymphatic: No lymphadenopathy   Cardiovascular: Normal S1 S2, no JVD  Respiratory: normal effort , clear  Gastrointestinal:  Soft, Non-tender  Skin: No rashes,  warm to touch  Psychiatry:  Mood & affect appropriate  Musculuskeletal: No edema    recent labs, Imaging and EKGs personally reviewed                             14.3   27.50 )-----------( 15       ( 25 Mar 2024 06:36 )             45.4               03-25    142  |  106  |  24<H>  ----------------------------<  118<H>  3.7   |  21<L>  |  0.65    Ca    9.0      25 Mar 2024 06:36                         Urinalysis Basic - ( 25 Mar 2024 06:36 )    Color: x / Appearance: x / SG: x / pH: x  Gluc: 118 mg/dL / Ketone: x  / Bili: x / Urobili: x   Blood: x / Protein: x / Nitrite: x   Leuk Esterase: x / RBC: x / WBC x   Sq Epi: x / Non Sq Epi: x / Bacteria: x

## 2024-03-25 NOTE — PROGRESS NOTE ADULT - PROBLEM SELECTOR PLAN 1
- mild improvement in plt level   - of note, patient with history of severe ITP and has received several different treatment modalities  - heme/onc following, recs appreciated  - patient's mother not amenable to platelet transfusion  - c/w dexamethasone 40mg qd x4 days  - trend plt count  - c/w PPI while on steroids  - improved plt level
- mild improvement in plt level   - of note, patient with history of severe ITP and has received several different treatment modalities  - heme/onc following, recs appreciated  - patient's mother not amenable to platelet transfusion  - c/w dexamethasone 40mg qd x4 days, completed today   - trend plt count  - c/w PPI while on steroids  - improved plt level
- mild improvement in plt level   - of note, patient with history of severe ITP and has received several different treatment modalities  - heme/onc following, recs appreciated  - patient's mother not amenable to platelet transfusion  - c/w dexamethasone 40mg qd x4 days  - trend plt count  - c/w PPI while on steroids

## 2024-03-25 NOTE — DISCHARGE NOTE PROVIDER - CARE PROVIDER_API CALL
Follow-up,   Follow-up with your Primary Care Doctor within 1 week.  Phone: (   )    -  Fax: (   )    -  Follow Up Time:    Follow-up,   Follow-up with your Primary Care Doctor within 1 week.  Phone: (   )    -  Fax: (   )    -  Follow Up Time:     Devin Epps  [address] [address]  Phone: [phone]  Fax: [phone]  Follow Up Time: Routine    Wilner Driscoll  Internal Medicine  [address] [place] [id number]  Phone: [phone]  Fax: [phone]  Follow Up Time: Routine

## 2024-03-25 NOTE — DISCHARGE NOTE NURSING/CASE MANAGEMENT/SOCIAL WORK - NSDCVIVACCINE_GEN_ALL_CORE_FT
Tdap; 15-Aug-2020 20:47; Jumana Martinez (HENRRY); [employer]; [id number] (Exp. Date: 04-Apr-2022); IntraMuscular; Deltoid Right.; 0.5 milliLiter(s); VIS (VIS Published: 09-May-2013, VIS Presented: 15-Aug-2020);

## 2024-03-25 NOTE — DISCHARGE NOTE PROVIDER - NSDCMRMEDTOKEN_GEN_ALL_CORE_FT
ascorbic acid 500 mg oral tablet: 1 tab(s) orally once a day  B-12 1000 mcg oral tablet: 1 tab(s) orally once a day  cimetidine 400 mg oral tablet: 1 tab(s) orally 3 times a day (with meals)  lactobacillus acidophilus oral capsule: 1 cap(s) orally 3 times a day  loratadine 10 mg oral tablet: 1 tab(s) orally once a day as needed for  allergy symptoms  Multiple Vitamins oral tablet: 1 tab(s) orally once a day  pantoprazole 40 mg oral delayed release tablet: 1 tab(s) orally 2 times a day total 14 day course. Last dose to be taken on 12/1/23. then take 1x daily thereafter indefinitely  Rexulti 4 mg oral tablet: 1 tab(s) orally once a day  traZODone 100 mg oral tablet: 2 tab(s) orally once a day (at bedtime) take starting 12/2 once levofloxacin complete   acetaminophen 325 mg oral tablet: 2 tab(s) orally every 6 hours As needed Temp greater or equal to 38C (100.4F), Mild Pain (1 - 3)  ascorbic acid 500 mg oral tablet: 1 tab(s) orally once a day  B-12 1000 mcg oral tablet: 1 tab(s) orally once a day  lactobacillus acidophilus oral capsule: 1 cap(s) orally 3 times a day  loratadine 10 mg oral tablet: 1 tab(s) orally once a day as needed for  allergy symptoms  Multiple Vitamins oral tablet: 1 tab(s) orally once a day  Protonix 40 mg oral delayed release tablet: 1 tab(s) orally once a day  senna leaf extract oral tablet: 2 tab(s) orally once a day (at bedtime)  sodium chloride 0.65% nasal spray: 1 nasal 4 times a day as needed for  congestion  traZODone 100 mg oral tablet: 2 tab(s) orally once a day (at bedtime) take starting 12/2 once levofloxacin complete   acetaminophen 325 mg oral tablet: 2 tab(s) orally every 6 hours As needed Temp greater or equal to 38C (100.4F), Mild Pain (1 - 3)  ascorbic acid 500 mg oral tablet: 1 tab(s) orally once a day  B-12 1000 mcg oral tablet: 1 tab(s) orally once a day  lactobacillus acidophilus oral capsule: 1 cap(s) orally 3 times a day  loratadine 10 mg oral tablet: 1 tab(s) orally once a day as needed for  allergy symptoms  Multiple Vitamins oral tablet: 1 tab(s) orally once a day  Protonix 40 mg oral delayed release tablet: 1 tab(s) orally once a day  sodium chloride 0.65% nasal spray: 1 nasal 4 times a day as needed for  congestion  traZODone 100 mg oral tablet: 2 tab(s) orally once a day (at bedtime) take starting 12/2 once levofloxacin complete   acetaminophen 325 mg oral tablet: 2 tab(s) orally every 6 hours As needed Temp greater or equal to 38C (100.4F), Mild Pain (1 - 3)  ascorbic acid 500 mg oral tablet: 1 tab(s) orally once a day  B-12 1000 mcg oral tablet: 1 tab(s) orally once a day  lactobacillus acidophilus oral capsule: 1 cap(s) orally 3 times a day  loratadine 10 mg oral tablet: 1 tab(s) orally once a day as needed for  allergy symptoms  Multiple Vitamins oral tablet: 1 tab(s) orally once a day  Protonix 40 mg oral delayed release tablet: 1 tab(s) orally once a day  Rexulti 4 mg oral tablet: 1 tab(s) orally once a day  sodium chloride 0.65% nasal spray: 1 nasal 4 times a day as needed for  congestion  traZODone 100 mg oral tablet: 2 tab(s) orally once a day (at bedtime) take starting 12/2 once levofloxacin complete

## 2024-03-25 NOTE — DISCHARGE NOTE PROVIDER - PROVIDER TOKENS
FREE:[LAST:[Follow-up],PHONE:[(   )    -],FAX:[(   )    -],ADDRESS:[Follow-up with your Primary Care Doctor within 1 week.]] FREE:[LAST:[Follow-up],PHONE:[(   )    -],FAX:[(   )    -],ADDRESS:[Follow-up with your Primary Care Doctor within 1 week.]],PROVIDER:[TOKEN:[[id number]],FOLLOWUP:[Routine]],PROVIDER:[TOKEN:[[id number]],FOLLOWUP:[Routine]]

## 2024-03-25 NOTE — DISCHARGE NOTE PROVIDER - CARE PROVIDERS DIRECT ADDRESSES
,DirectAddress_Unknown ,DirectAddress_Unknown,DirectAddress_Unknown,[email or web address]

## 2024-03-25 NOTE — PROGRESS NOTE ADULT - PROBLEM SELECTOR PLAN 2
Noted with 1 episode of rectal bleed prior to admission  - ED rectal exam noted, no active bleeding and no external hemorrhoids noted  - H/H currently stable and patient HD stable  - cont to trend H/H and platelets  - begin stool softener  - c/w PPI daily  - GI eval appreciated
Noted with 1 episode of rectal bleed prior to admission  - ED rectal exam noted, no active bleeding and no external hemorrhoids noted  - H/H currently stable and patient HD stable  - cont to trend H/H and platelets  - begin stool softener  - c/w PPI daily  - GI eval called
Noted with 1 episode of rectal bleed prior to admission  - ED rectal exam noted, no active bleeding and no external hemorrhoids noted  - H/H currently stable and patient HD stable  - cont to trend H/H and platelets  - begin stool softener  - c/w PPI daily  - GI eval appreciated

## 2024-03-25 NOTE — DISCHARGE NOTE PROVIDER - NSDCCPCAREPLAN_GEN_ALL_CORE_FT
PRINCIPAL DISCHARGE DIAGNOSIS  Diagnosis: Thrombocytopenia  Assessment and Plan of Treatment: You had a mild improvement in your platelet count.  Follow-up with your hematologist as outpatient.  Continue current medications.      SECONDARY DISCHARGE DIAGNOSES  Diagnosis: History of Helicobacter pylori infection  Assessment and Plan of Treatment:     Diagnosis: Intellectual disability  Assessment and Plan of Treatment:     Diagnosis: Rectal bleeding  Assessment and Plan of Treatment:      PRINCIPAL DISCHARGE DIAGNOSIS  Diagnosis: Thrombocytopenia  Assessment and Plan of Treatment: You had a mild improvement in your platelet count.  Follow-up with your hematologist as outpatient.  Continue current medications.      SECONDARY DISCHARGE DIAGNOSES  Diagnosis: History of Helicobacter pylori infection  Assessment and Plan of Treatment: You were treated for H. pylori. Follow-up with your Primary Care Doctor within 1 week.    Diagnosis: Intellectual disability  Assessment and Plan of Treatment: Continue current medications.    Diagnosis: Rectal bleeding  Assessment and Plan of Treatment: Your hemoglobin is stable. Follow-up with your Primary Care Doctor within 1 week.     PRINCIPAL DISCHARGE DIAGNOSIS  Diagnosis: Thrombocytopenia  Assessment and Plan of Treatment: You had a mild improvement in your platelet count.  Follow-up with your hematologist as outpatient.  Continue current medications.  - please call your hematologist for follow up appointment      SECONDARY DISCHARGE DIAGNOSES  Diagnosis: Rectal bleeding  Assessment and Plan of Treatment: Your hemoglobin is stable. Follow-up with your Primary Care Doctor within 1 week.    Diagnosis: Intellectual disability  Assessment and Plan of Treatment: Continue current medications.    Diagnosis: History of Helicobacter pylori infection  Assessment and Plan of Treatment: You were treated for H. pylori. Follow-up with your Primary Care Doctor within 1 week.

## 2024-03-25 NOTE — PROGRESS NOTE ADULT - PROBLEM SELECTOR PLAN 3
Patient with history of H pylori infection  - completed 2 courses of treatment with amox/clarithromycin as well as amox/levofloxacin with PPI  - most recent H pylori stool test positive on 11/17/23  - will check repeat stool testing as well as ova/parasites  - explained that utility of stool testing may be limited in the setting of PPI use, however family wishes to proceed  - GI karenal

## 2024-03-25 NOTE — DISCHARGE NOTE PROVIDER - HOSPITAL COURSE
Briefly, this is a 23 y/o M with history of intellectual impairment, autism, nonverbal and noncommunicative at baseline, obstructive sleep apnoea not on positive pressure ventilation and severe chronic ITP now presenting with one episode of rectal bleeding and found to have severe thrombocytopenia.      Problem/Plan - 1:  ·  Problem: Severe thrombocytopenia.   ·  Plan: - mild improvement in plt level   - of note, patient with history of severe ITP and has received several different treatment modalities  - heme/onc following, recs appreciated  - patient's mother not amenable to platelet transfusion  - c/w dexamethasone 40mg qd x4 days  - trend plt count  - c/w PPI while on steroids  - improved plt level.  - will follow up with heme / onc as outpt following discharge     Problem/Plan - 2:  ·  Problem: Rectal bleeding.   ·  Plan: Noted with 1 episode of rectal bleed prior to admission  - ED rectal exam noted, no active bleeding and no external hemorrhoids noted  - H/H currently stable and patient HD stable  - cont to trend H/H and platelets  - begin stool softener  - c/w PPI daily  - GI eval appreciated.    Problem/Plan - 3:  ·  Problem: History of Helicobacter pylori infection.   ·  Plan: Patient with history of H pylori infection  - completed 2 courses of treatment with amox/clarithromycin as well as amox/levofloxacin with PPI  - most recent H pylori stool test positive on 11/17/23  - will check repeat stool testing as well as ova/parasites  - explained that utility of stool testing may be limited in the setting of PPI use, however family wishes to proceed  - GI eval.    Problem/Plan - 4:  ·  Problem: Intellectual disability.   ·  Plan: Patient with history of intellectual disability with autism  - nonverbal at baseline  - c/w home trazodone 200mg qhs  - can likely continue with patient's home Rexulti (Brexpiprazole) however family will need to bring in medication from group home  - for agitation, patient received haldol 5/ativan 2 in ED, on previous admissions was recommended for Haldol 2.5mg IV q4h and benadryl 25mg IV q4h PRN for severe agitation  - can likely c/w similar regimen (patient noted with improvement in agitation in ED after haldol), however would check EKG to ensure no QTc prolongation (no QTc prolongation noted on previous EKGs).    Problem/Plan - 5:  ·  Problem: Need for prophylactic measure.   ·  Plan: DVT: holding chemoprophylaxis i/s/o severe thrombocytopenia  Diet: Regular  Dispo: likely back to group home.   Briefly, this is a 25 y/o M with history of intellectual impairment, autism, nonverbal and noncommunicative at baseline, obstructive sleep apnoea not on positive pressure ventilation and severe chronic ITP now presenting with one episode of rectal bleeding and found to have severe thrombocytopenia.        ·  Severe thrombocytopenia.   ·  Plan: - mild improvement in plt level   - of note, patient with history of severe ITP and has received several different treatment modalities  - heme/onc following, recs appreciated  - patient's mother not amenable to platelet transfusion  - c/w dexamethasone 40mg qd x4 days  - trend plt count  - c/w PPI while on steroids  - improved plt level.  - will follow up with heme / onc as outpt following discharge     ·  Rectal bleeding.   ·  Plan: Noted with 1 episode of rectal bleed prior to admission  - ED rectal exam noted, no active bleeding and no external hemorrhoids noted  - H/H currently stable and patient HD stable  - cont to trend H/H and platelets  - begin stool softener  - c/w PPI daily  - GI eval appreciated.      · History of Helicobacter pylori infection.   ·  Plan: Patient with history of H pylori infection  - completed 2 courses of treatment with amox/clarithromycin as well as amox/levofloxacin with PPI  - most recent H pylori stool test positive on 11/17/23  - will check repeat stool testing as well as ova/parasites  - explained that utility of stool testing may be limited in the setting of PPI use, however family wishes to proceed  - GI eval.      ·Intellectual disability.   ·  Plan: Patient with history of intellectual disability with autism  - nonverbal at baseline  - c/w home trazodone 200mg qhs  - can likely continue with patient's home Rexulti (Brexpiprazole) however family will need to bring in medication from group home  - for agitation, patient received haldol 5/ativan 2 in ED, on previous admissions was recommended for Haldol 2.5mg IV q4h and benadryl 25mg IV q4h PRN for severe agitation  - can likely c/w similar regimen (patient noted with improvement in agitation in ED after haldol), however would check EKG to ensure no QTc prolongation (no QTc prolongation noted on previous EKGs).      · Need for prophylactic measure.   ·  Plan: DVT: holding chemoprophylaxis i/s/o severe thrombocytopenia  Diet: Regular  Dispo: Back to group home.   Briefly, this is a 25 y/o M with history of intellectual impairment, autism, nonverbal and noncommunicative at baseline, obstructive sleep apnoea not on positive pressure ventilation and severe chronic ITP now presenting with one episode of rectal bleeding and found to have severe thrombocytopenia.        ·  Severe thrombocytopenia.   ·  Plan: - mild improvement in plt level   - of note, patient with history of severe ITP and has received several different treatment modalities  - heme/onc following, recs appreciated  - patient's mother not amenable to platelet transfusion  - c/w dexamethasone 40mg qd x4 days  - trend plt count  - c/w PPI while on steroids  - improved plt level.  - will follow up with heme / onc as outpt following discharge     ·  Rectal bleeding.   ·  Plan: Noted with 1 episode of rectal bleed prior to admission  - ED rectal exam noted, no active bleeding and no external hemorrhoids noted  - H/H currently stable and patient HD stable  - cont to trend H/H and platelets  - begin stool softener  - c/w PPI daily  - GI eval appreciated.      · History of Helicobacter pylori infection.   ·  Plan: Patient with history of H pylori infection  - completed 2 courses of treatment with amox/clarithromycin as well as amox/levofloxacin with PPI  - most recent H pylori stool test positive on 11/17/23  - will check repeat stool testing as well as ova/parasites  - explained that utility of stool testing may be limited in the setting of PPI use, however family wishes to proceed  - GI eval.      ·Intellectual disability.   ·  Plan: Patient with history of intellectual disability with autism  - nonverbal at baseline  - c/w home trazodone 200mg qhs  - can likely continue with patient's home Rexulti (Brexpiprazole) however family will need to bring in medication from group home  - for agitation, patient received haldol 5/ativan 2 in ED, on previous admissions was recommended for Haldol 2.5mg IV q4h and benadryl 25mg IV q4h PRN for severe agitation  - can likely c/w similar regimen (patient noted with improvement in agitation in ED after haldol), however would check EKG to ensure no QTc prolongation (no QTc prolongation noted on previous EKGs).      · Need for prophylactic measure.   ·  Plan: DVT: holding chemoprophylaxis i/s/o severe thrombocytopenia  Diet: Regular  Dispo: Back to group home.    Case discussed with Dr. Driscoll and patient is medically stable for discharge to group home with outpatient follow-up.

## 2024-03-25 NOTE — PROVIDER CONTACT NOTE (CRITICAL VALUE NOTIFICATION) - ASSESSMENT
Pt. AOx0 nonverbal. Pt. has hx of intellectual impairment, autisim and is noncommincative at baseline
Pt. AOx0 nonverbal. Pt. has hx of intellectual impairment, autisim and is noncommincative at baseline

## 2024-03-25 NOTE — PROGRESS NOTE ADULT - PROBLEM SELECTOR PLAN 5
DVT: holding chemoprophylaxis i/s/o severe thrombocytopenia  Diet: Regular  Dispo: likely back to group home
DVT: holding chemoprophylaxis i/s/o severe thrombocytopenia  Diet: Regular  Dispo: likely back to group home      D/C planing
DVT: holding chemoprophylaxis i/s/o severe thrombocytopenia  Diet: Regular  Dispo: likely back to group home

## 2024-04-26 ENCOUNTER — EMERGENCY (EMERGENCY)
Facility: HOSPITAL | Age: 24
LOS: 1 days | Discharge: HOME | End: 2024-04-26
Attending: STUDENT IN AN ORGANIZED HEALTH CARE EDUCATION/TRAINING PROGRAM | Admitting: STUDENT IN AN ORGANIZED HEALTH CARE EDUCATION/TRAINING PROGRAM
Payer: MEDICARE

## 2024-04-26 VITALS
SYSTOLIC BLOOD PRESSURE: 107 MMHG | DIASTOLIC BLOOD PRESSURE: 67 MMHG | RESPIRATION RATE: 18 BRPM | HEART RATE: 97 BPM | OXYGEN SATURATION: 99 % | HEIGHT: 70 IN | TEMPERATURE: 98 F

## 2024-04-26 DIAGNOSIS — Z92.89 PERSONAL HISTORY OF OTHER MEDICAL TREATMENT: Chronic | ICD-10-CM

## 2024-04-26 LAB
BASOPHILS # BLD AUTO: 0.02 K/UL — SIGNIFICANT CHANGE UP (ref 0–0.2)
BASOPHILS NFR BLD AUTO: 0.3 % — SIGNIFICANT CHANGE UP (ref 0–2)
BLD GP AB SCN SERPL QL: NEGATIVE — SIGNIFICANT CHANGE UP
EOSINOPHIL # BLD AUTO: 0.07 K/UL — SIGNIFICANT CHANGE UP (ref 0–0.5)
EOSINOPHIL NFR BLD AUTO: 0.9 % — SIGNIFICANT CHANGE UP (ref 0–6)
HCT VFR BLD CALC: 45.7 % — SIGNIFICANT CHANGE UP (ref 39–50)
HGB BLD-MCNC: 14.6 G/DL — SIGNIFICANT CHANGE UP (ref 13–17)
IANC: 4.95 K/UL — SIGNIFICANT CHANGE UP (ref 1.8–7.4)
IMM GRANULOCYTES NFR BLD AUTO: 0.3 % — SIGNIFICANT CHANGE UP (ref 0–0.9)
LYMPHOCYTES # BLD AUTO: 1.76 K/UL — SIGNIFICANT CHANGE UP (ref 1–3.3)
LYMPHOCYTES # BLD AUTO: 23.6 % — SIGNIFICANT CHANGE UP (ref 13–44)
MCHC RBC-ENTMCNC: 26.8 PG — LOW (ref 27–34)
MCHC RBC-ENTMCNC: 31.9 GM/DL — LOW (ref 32–36)
MCV RBC AUTO: 83.9 FL — SIGNIFICANT CHANGE UP (ref 80–100)
MONOCYTES # BLD AUTO: 0.64 K/UL — SIGNIFICANT CHANGE UP (ref 0–0.9)
MONOCYTES NFR BLD AUTO: 8.6 % — SIGNIFICANT CHANGE UP (ref 2–14)
NEUTROPHILS # BLD AUTO: 4.95 K/UL — SIGNIFICANT CHANGE UP (ref 1.8–7.4)
NEUTROPHILS NFR BLD AUTO: 66.3 % — SIGNIFICANT CHANGE UP (ref 43–77)
NRBC # BLD: 0 /100 WBCS — SIGNIFICANT CHANGE UP (ref 0–0)
NRBC # FLD: 0 K/UL — SIGNIFICANT CHANGE UP (ref 0–0)
PLATELET # BLD AUTO: 2 K/UL — CRITICAL LOW (ref 150–400)
RBC # BLD: 5.45 M/UL — SIGNIFICANT CHANGE UP (ref 4.2–5.8)
RBC # FLD: 14.6 % — HIGH (ref 10.3–14.5)
RH IG SCN BLD-IMP: POSITIVE — SIGNIFICANT CHANGE UP
WBC # BLD: 7.46 K/UL — SIGNIFICANT CHANGE UP (ref 3.8–10.5)
WBC # FLD AUTO: 7.46 K/UL — SIGNIFICANT CHANGE UP (ref 3.8–10.5)

## 2024-04-26 PROCEDURE — 99285 EMERGENCY DEPT VISIT HI MDM: CPT | Mod: GC

## 2024-04-26 RX ORDER — HALOPERIDOL DECANOATE 100 MG/ML
5 INJECTION INTRAMUSCULAR ONCE
Refills: 0 | Status: COMPLETED | OUTPATIENT
Start: 2024-04-26 | End: 2024-04-26

## 2024-04-26 RX ADMIN — Medication 2 MILLIGRAM(S): at 20:14

## 2024-04-26 RX ADMIN — HALOPERIDOL DECANOATE 5 MILLIGRAM(S): 100 INJECTION INTRAMUSCULAR at 20:14

## 2024-04-26 NOTE — ED PROVIDER NOTE - PHYSICAL EXAMINATION
General: WN/WD NAD  Head: Atraumatic, dried blood noted to bilateral nares.   Eyes: EOM grossly intact, no scleral icterus  ENT: moist mucous membranes  Neurology: nonfocal, NICHOLS x 4  Respiratory: normal respiratory effort  CV: Extremities warm and well perfused  Abdominal: Soft, non-distended  Extremities: No edema  Skin: No rashes General: WN/WD NAD  Head: Atraumatic, dried blood noted to bilateral nares, no active bleeding apparent  Eyes: EOM grossly intact, no scleral icterus  ENT: moist mucous membranes  Neurology: nonfocal, NICHOLS x 4  Respiratory: normal respiratory effort  CV: Extremities warm and well perfused  Abdominal: Soft, non-distended  Extremities: No edema  Skin: No rashes

## 2024-04-26 NOTE — ED PROVIDER NOTE - NSFOLLOWUPINSTRUCTIONS_ED_ALL_ED_FT
You can use the afrin (oxymetazoline) spray as needed to help control active bleeding.    Dryness can make you more prone to nosebleeds. Help prevent future nosebleeds by using a humidifier machine and saline spray to keep the mucous membranes in the nose moisturized. Avoid picking or aggressively rubbing the nose to avoid future injury.     Follow up with your hematology doctor to be re-evaluated.    Nosebleed    WHAT YOU NEED TO KNOW:    A nosebleed, or epistaxis, occurs when one or more of the blood vessels in your nose break. You may have dark or bright red blood from one or both nostrils. A nosebleed is most commonly caused by dry air or picking your nose. A direct blow to your nose, irritation from a cold or allergies, or a foreign object can also cause a nosebleed.    DISCHARGE INSTRUCTIONS:    Return to the emergency department if:    Your nasal packing is soaked with blood.    Your nose is still bleeding after 20 minutes, even after you pinch it.    You have a foul-smelling discharge coming out of your nose.    You feel so weak and dizzy that you have trouble standing up.    You have trouble breathing or talking.  Contact your healthcare provider if:    You have a fever and are vomiting.    You have pain in and around your nose that is getting worse even after you take pain medicines.    Your nasal pack is loose.    You have questions or concerns about your condition or care.  First aid:    Sit up and lean forward. This will help prevent you from swallowing blood. Spit blood and saliva into a bowl.    Apply pressure to your nose. Use 2 fingers to pinch your nose shut for 10    Apply ice on the bridge of your nose to decrease swelling and bleeding. Use a cold pack or put crushed ice in a plastic bag. Cover it with a towel to protect your skin.  Medicines:    Medicines applied to a small piece of cotton and placed in your nose. Medicine may also be sprayed in or applied directly to your nose. You may need medicine to prevent an infection. If bleeding is severe, medicine may be injected into a blood vessel in your nose.    Take your medicine as directed. Contact your healthcare provider if you think your medicine is not helping or if you have side effects. Tell him of her if you are allergic to any medicine. Keep a list of the medicines, vitamins, and herbs you take. Include the amounts, and when and why you take them. Bring the list or the pill bottles to follow-up visits. Carry your medicine list with you in case of an emergency.  Prevent another nosebleed:    Keep your nose moist. Put a small amount of petroleum jelly inside your nostrils as needed. Use a saline (saltwater) nasal spray. Do not put anything else inside your nose unless your healthcare provider says it is okay. Do not use oil-based lubricants if you use oxygen therapy. They may be flammable.    Use a cool mist humidifier to increase air moisture in your home. This will help your nose stay moist.    Do not pick or blow your nose for at least a week. You can irritate or damage your nose if you pick it. Blowing your nose too hard may cause the bleeding to start again. Do not bend over or strain as this can cause the bleeding to start again.    Follow up with your healthcare provider as directed: Any packing in your nose should be removed within 2 to 3 days. Write down your questions so you remember to ask them during your visits.

## 2024-04-26 NOTE — ED PROVIDER NOTE - CLINICAL SUMMARY MEDICAL DECISION MAKING FREE TEXT BOX
24Y M PMH ITP, intellectual disability, CARLITOS presenting from group home with nosebleed.  Staff report that patient was agitated today and was striking himself in the face which resulted in nosebleed.  Agitated on arrival to ED, combative, received 5 and 2 and AMA.  On assessment, patient is calm, noted to be playing and heavily focused on iPad, limited interaction on exam.  Also noted to be aggressively rubbing nose. No active bleeding noted however does have dried blood around nares.  Mom is on phone and on her way to the hospital, notes that she does not want patient to get any blood products without her approval due to concern for exposure to blood from an individual vaccinated for COVID. Will check CBC and type and monitor for additional bleeding.

## 2024-04-26 NOTE — ED PROVIDER NOTE - PATIENT PORTAL LINK FT
You can access the FollowMyHealth Patient Portal offered by [facility] by registering at the following website: [email or web address]. By joining [facility]’s FollowMyHealth portal, you will also be able to view your health information using other applications (apps) compatible with our system.

## 2024-04-26 NOTE — PROVIDER CONTACT NOTE (OTHER) - ASSESSMENT
Arranged [facility] [phone]. Trip# [id number]. Pt will not be ready until 2 AM. So P/U arranged for 2 AM. Family with pt.

## 2024-04-26 NOTE — ED PROVIDER NOTE - OBJECTIVE STATEMENT
24Y M PMH ITP, intellectual disability, CARLITOS presenting from group home with epistaxis. Group home staff report patient began having bleeding from both nose this afternoon - reportedly was agitated and hitting himself in the face which led to nose bleed. Were unable to control bleeding after 2 hours so patient brought to ED for further evaluation. Patient agitated on arrival, combative with staff. No active bleeding noted.

## 2024-04-26 NOTE — ED ADULT NURSE NOTE - NSFALLRISKINTERV_ED_ALL_ED
Assistance OOB with selected safe patient handling equipment if applicable/Communicate fall risk and risk factors to all staff, patient, and family/Encourage patient to sit up slowly, dangle for a short time, stand at bedside before walking/Orthostatic vital signs/Provide visual cue: yellow wristband, yellow gown, etc/Reinforce activity limits and safety measures with patient and family/Review medications for side effects contributing to fall risk/Toileting schedule using arm’s reach rule for commode and bathroom/Call bell, personal items and telephone in reach/Instruct patient to call for assistance before getting out of bed/chair/stretcher/Non-slip footwear applied when patient is off stretcher/[place] to call system/Physically safe environment - no spills, clutter or unnecessary equipment/Purposeful Proactive Rounding/Room/bathroom lighting operational, light cord in reach

## 2024-04-26 NOTE — ED ADULT TRIAGE NOTE - CHIEF COMPLAINT QUOTE
as per group home  staff pt developed nose bleeding today and was instructed by MD to got o ER due to low platelet count, pxh of intellectual disability, thrombocytopenia. pt is anxious in triage.

## 2024-04-26 NOTE — ED PROVIDER NOTE - PROGRESS NOTE DETAILS
Jessie Oneil MD PGY-2: spoke with hematologist Dr. Epps regarding patient platelet count of 2; ok to defer transfusion now as patient not actively bleeding, will f/u in clinic. nonemergent placed via SW, patient awaiting transport back to . Jessie Oneil MD PGY-2: patient dc back to  via nonemergent without issue. Mom at bedside at time of DC.

## 2024-04-26 NOTE — ED ADULT NURSE NOTE - OBJECTIVE STATEMENT
Pt received from ambulance bay with security due to agitation and being combative. Pt medicated, provided with snacks and his tablet and was more cooperative. As per staff member from Group home pt has been having nose bleeding for approximately 2 hours and was sent to ER for evaluation. No obvious bleeding noted from the nose, pt repeated rubbing his nose. Labs drawn.

## 2024-04-27 VITALS
HEART RATE: 78 BPM | DIASTOLIC BLOOD PRESSURE: 52 MMHG | SYSTOLIC BLOOD PRESSURE: 105 MMHG | RESPIRATION RATE: 16 BRPM | OXYGEN SATURATION: 100 %

## 2024-04-27 NOTE — ED ADULT NURSE REASSESSMENT NOTE - NS ED NURSE REASSESS COMMENT FT1
Patient resting comfortably on stretcher. Not in acute distress. VSS. Mother at bedside. Safety maintained. Nursing care is ongoing.

## 2024-05-27 NOTE — H&P PST ADULT - NSICDXFAMILYHX_GEN_ALL_CORE_FT
pt with swelling and hardness of the right foot starting this morning. no fevers. easy WOB noted. +PO/+output. +pulses and cap refill. last Motrin and benadryl @2:30pm.   Denies PMH, NKDA, IUTD at this time FAMILY HISTORY:  FH: hypertension

## 2024-07-12 ENCOUNTER — INPATIENT (INPATIENT)
Facility: HOSPITAL | Age: 24
LOS: 2 days | Discharge: HOME | DRG: 603 | End: 2024-07-15
Attending: INTERNAL MEDICINE | Admitting: INTERNAL MEDICINE
Payer: MEDICARE

## 2024-07-12 VITALS
RESPIRATION RATE: 20 BRPM | OXYGEN SATURATION: 100 % | DIASTOLIC BLOOD PRESSURE: 70 MMHG | HEART RATE: 120 BPM | SYSTOLIC BLOOD PRESSURE: 89 MMHG

## 2024-07-12 DIAGNOSIS — L03.90 CELLULITIS, UNSPECIFIED: ICD-10-CM

## 2024-07-12 DIAGNOSIS — D69.3 IMMUNE THROMBOCYTOPENIC PURPURA: ICD-10-CM

## 2024-07-12 DIAGNOSIS — F79 UNSPECIFIED INTELLECTUAL DISABILITIES: ICD-10-CM

## 2024-07-12 DIAGNOSIS — Z92.89 PERSONAL HISTORY OF OTHER MEDICAL TREATMENT: Chronic | ICD-10-CM

## 2024-07-12 LAB
ALBUMIN SERPL ELPH-MCNC: 4.2 G/DL — SIGNIFICANT CHANGE UP (ref 3.3–5)
ALP SERPL-CCNC: 70 U/L — SIGNIFICANT CHANGE UP (ref 40–120)
ALT FLD-CCNC: 15 U/L — SIGNIFICANT CHANGE UP (ref 10–45)
ANION GAP SERPL CALC-SCNC: 12 MMOL/L — SIGNIFICANT CHANGE UP (ref 5–17)
APTT BLD: 27.8 SEC — SIGNIFICANT CHANGE UP (ref 24.5–35.6)
AST SERPL-CCNC: 21 U/L — SIGNIFICANT CHANGE UP (ref 10–40)
BILIRUB SERPL-MCNC: 2.2 MG/DL — HIGH (ref 0.2–1.2)
BLD GP AB SCN SERPL QL: NEGATIVE — SIGNIFICANT CHANGE UP
BUN SERPL-MCNC: 13 MG/DL — SIGNIFICANT CHANGE UP (ref 7–23)
CALCIUM SERPL-MCNC: 9.4 MG/DL — SIGNIFICANT CHANGE UP (ref 8.4–10.5)
CHLORIDE SERPL-SCNC: 104 MMOL/L — SIGNIFICANT CHANGE UP (ref 96–108)
CO2 SERPL-SCNC: 23 MMOL/L — SIGNIFICANT CHANGE UP (ref 22–31)
CREAT SERPL-MCNC: 0.83 MG/DL — SIGNIFICANT CHANGE UP (ref 0.5–1.3)
CRP SERPL-MCNC: 42 MG/L — HIGH (ref 0–4)
EGFR: 125 ML/MIN/1.73M2 — SIGNIFICANT CHANGE UP
GLUCOSE SERPL-MCNC: 103 MG/DL — HIGH (ref 70–99)
HCT VFR BLD CALC: 40.4 % — SIGNIFICANT CHANGE UP (ref 39–50)
HGB BLD-MCNC: 12.6 G/DL — LOW (ref 13–17)
INR BLD: 1.11 RATIO — SIGNIFICANT CHANGE UP (ref 0.85–1.18)
MCHC RBC-ENTMCNC: 27 PG — SIGNIFICANT CHANGE UP (ref 27–34)
MCHC RBC-ENTMCNC: 31.2 GM/DL — LOW (ref 32–36)
MCV RBC AUTO: 86.7 FL — SIGNIFICANT CHANGE UP (ref 80–100)
NRBC # BLD: 0 /100 WBCS — SIGNIFICANT CHANGE UP (ref 0–0)
PLATELET # BLD AUTO: 4 K/UL — CRITICAL LOW (ref 150–400)
POTASSIUM SERPL-MCNC: 3.8 MMOL/L — SIGNIFICANT CHANGE UP (ref 3.5–5.3)
POTASSIUM SERPL-SCNC: 3.8 MMOL/L — SIGNIFICANT CHANGE UP (ref 3.5–5.3)
PROT SERPL-MCNC: 7.1 G/DL — SIGNIFICANT CHANGE UP (ref 6–8.3)
PROTHROM AB SERPL-ACNC: 11.6 SEC — SIGNIFICANT CHANGE UP (ref 9.5–13)
RBC # BLD: 4.66 M/UL — SIGNIFICANT CHANGE UP (ref 4.2–5.8)
RBC # FLD: 15.2 % — HIGH (ref 10.3–14.5)
RH IG SCN BLD-IMP: POSITIVE — SIGNIFICANT CHANGE UP
SODIUM SERPL-SCNC: 139 MMOL/L — SIGNIFICANT CHANGE UP (ref 135–145)
WBC # BLD: 9.38 K/UL — SIGNIFICANT CHANGE UP (ref 3.8–10.5)
WBC # FLD AUTO: 9.38 K/UL — SIGNIFICANT CHANGE UP (ref 3.8–10.5)

## 2024-07-12 PROCEDURE — 93971 EXTREMITY STUDY: CPT | Mod: 26,RT

## 2024-07-12 PROCEDURE — 99223 1ST HOSP IP/OBS HIGH 75: CPT

## 2024-07-12 PROCEDURE — 99285 EMERGENCY DEPT VISIT HI MDM: CPT | Mod: GC

## 2024-07-12 RX ORDER — CYANOCOBALAMIN (VITAMIN B-12) 1000 MCG
1000 TABLET, EXTENDED RELEASE ORAL DAILY
Refills: 0 | Status: DISCONTINUED | OUTPATIENT
Start: 2024-07-12 | End: 2024-07-15

## 2024-07-12 RX ORDER — HALOPERIDOL DECANOATE 100 MG/ML
5 VIAL (ML) INTRAMUSCULAR EVERY 4 HOURS
Refills: 0 | Status: DISCONTINUED | OUTPATIENT
Start: 2024-07-12 | End: 2024-07-15

## 2024-07-12 RX ORDER — CLINDAMYCIN PHOSPHATE 150 MG/ML
600 INJECTION, SOLUTION INTRAVENOUS ONCE
Refills: 0 | Status: COMPLETED | OUTPATIENT
Start: 2024-07-12 | End: 2024-07-12

## 2024-07-12 RX ORDER — ACETAMINOPHEN 325 MG
650 TABLET ORAL EVERY 6 HOURS
Refills: 0 | Status: DISCONTINUED | OUTPATIENT
Start: 2024-07-12 | End: 2024-07-15

## 2024-07-12 RX ORDER — CLINDAMYCIN PHOSPHATE 150 MG/ML
600 INJECTION, SOLUTION INTRAVENOUS EVERY 8 HOURS
Refills: 0 | Status: DISCONTINUED | OUTPATIENT
Start: 2024-07-12 | End: 2024-07-13

## 2024-07-12 RX ORDER — PANTOPRAZOLE SODIUM 40 MG/10ML
40 INJECTION, POWDER, FOR SOLUTION INTRAVENOUS
Refills: 0 | Status: DISCONTINUED | OUTPATIENT
Start: 2024-07-12 | End: 2024-07-15

## 2024-07-12 RX ORDER — HALOPERIDOL DECANOATE 100 MG/ML
5 VIAL (ML) INTRAMUSCULAR ONCE
Refills: 0 | Status: COMPLETED | OUTPATIENT
Start: 2024-07-12 | End: 2024-07-12

## 2024-07-12 RX ORDER — DEXTROSE MONOHYDRATE AND SODIUM CHLORIDE 5; .3 G/100ML; G/100ML
500 INJECTION, SOLUTION INTRAVENOUS ONCE
Refills: 0 | Status: COMPLETED | OUTPATIENT
Start: 2024-07-12 | End: 2024-07-12

## 2024-07-12 RX ORDER — TRAZODONE HYDROCHLORIDE 50 MG/1
100 TABLET, FILM COATED ORAL AT BEDTIME
Refills: 0 | Status: DISCONTINUED | OUTPATIENT
Start: 2024-07-12 | End: 2024-07-15

## 2024-07-12 RX ORDER — BREXPIPRAZOLE 0.5 MG/1
4 TABLET ORAL DAILY
Refills: 0 | Status: DISCONTINUED | OUTPATIENT
Start: 2024-07-13 | End: 2024-07-15

## 2024-07-12 RX ORDER — TRAZODONE HYDROCHLORIDE 50 MG/1
1 TABLET, FILM COATED ORAL
Refills: 0 | DISCHARGE

## 2024-07-12 RX ORDER — ONDANSETRON HYDROCHLORIDE 2 MG/ML
4 INJECTION INTRAMUSCULAR; INTRAVENOUS EVERY 8 HOURS
Refills: 0 | Status: DISCONTINUED | OUTPATIENT
Start: 2024-07-12 | End: 2024-07-15

## 2024-07-12 RX ORDER — MAGNESIUM, ALUMINUM HYDROXIDE 400-400
30 TABLET,CHEWABLE ORAL EVERY 4 HOURS
Refills: 0 | Status: DISCONTINUED | OUTPATIENT
Start: 2024-07-12 | End: 2024-07-15

## 2024-07-12 RX ADMIN — Medication 5 MILLIGRAM(S): at 15:08

## 2024-07-12 RX ADMIN — DEXTROSE MONOHYDRATE AND SODIUM CHLORIDE 500 MILLILITER(S): 5; .3 INJECTION, SOLUTION INTRAVENOUS at 18:42

## 2024-07-12 RX ADMIN — CLINDAMYCIN PHOSPHATE 100 MILLIGRAM(S): 150 INJECTION, SOLUTION INTRAVENOUS at 17:17

## 2024-07-12 RX ADMIN — Medication 5 MILLIGRAM(S): at 14:50

## 2024-07-12 RX ADMIN — TRAZODONE HYDROCHLORIDE 100 MILLIGRAM(S): 50 TABLET, FILM COATED ORAL at 23:12

## 2024-07-12 NOTE — H&P ADULT - PROBLEM SELECTOR PLAN 3
- Easily agitated, sporatic self injurious behavior ( hitting self), unchanged form bL    - Mom reports pt sensitive to ativan, was given Haldol 5mg for agitation in ED improvement in behavior    - Prn haldol 5mg  - Avoid overstimulating pt   - Encourage family visit - Easily agitated, sporadic self injurious behavior ( hitting self), unchanged form bL    - Mom reports pt sensitive to ativan, was given Haldol 5mg for agitation in ED improvement in behavior    - Prn haldol 5mg  - Avoid overstimulating pt   - Encourage family visit

## 2024-07-12 NOTE — ED PROVIDER NOTE - OBJECTIVE STATEMENT
24M with PMH intellectual disability, chronic ITP coming in from group home for right lower extremity bruising. Pt lives in group home and was noted to have diffuse R lower leg bruising and with for cellulitis. No fevers noted however pt reportedly with elevated WBC prior to ED arrival. Pt saw outpatient hematologist and ultrasound was performed which was reportedly negative. Pt also reported to have bruising on chest in setting of ITP and self-injurious behavior. Pt was placed on doxycycline on Monday however right leg remained swollen and warm to touch, and his mother states that the redness has worsened over the past several days. Pt non-verbal at baseline, history provided by mother at bedside. No change in baseline mental status. No recent medication changes, no vomiting, dysuria, constipation/diarrhea. No known sick contacts.  Pt agitated in ED requiring IV Haldol 5mg x1 and IM haldol 5mg x1 after pt removed IV. Mother states pt becomes agitated with Ativan and requested to avoid if possible.    Outpatient hematologist Dr. Epps [phone].

## 2024-07-12 NOTE — ED ADULT NURSE REASSESSMENT NOTE - NS ED NURSE REASSESS COMMENT FT1
RN called [facility] for report, was put on hold after EDDY Aceves aware that pt is on their way up, ANM Maggy aware, ADmin Josesito aware. Was unable to give report at this time to RN.

## 2024-07-12 NOTE — H&P ADULT - NSHPLABSRESULTS_GEN_ALL_CORE
12.6   9.38  )-----------( 4        ( 12 Jul 2024 13:51 )             40.4       07-12    139  |  104  |  13  ----------------------------<  103<H>  3.8   |  23  |  0.83    Ca    9.4      12 Jul 2024 13:51    TPro  7.1  /  Alb  4.2  /  TBili  2.2<H>  /  DBili  x   /  AST  21  /  ALT  15  /  AlkPhos  70  07-12    C-Reactive Protein: 42 mg/L (07.12.24 @ 13:51)      - - - - - - - - - - - - - - - - - - - - - - - - - - - - - - - - - - - - - - - - - - - - - - - - - - - -       EKG PERSONALLY REVIEWED: X      IMAGES PERSONALLY REVIEWED:     < from: VA Duplex Lower Ext Vein Scan, Right (07.12.24 @ 16:34) >  IMPRESSION: Limited study due to patient positioning.  No evidence of right lower extremity deep venous thrombosis in the   visualized venous segments.  The right common femoral vein and femoral vein at the proximal thigh   levels were not visualized .

## 2024-07-12 NOTE — ED ADULT NURSE REASSESSMENT NOTE - NS ED NURSE REASSESS COMMENT FT1
Pt resting in bed calmly at this time with mother at the bedside. Pt taken to ultrasound with mother and transport.

## 2024-07-12 NOTE — ED PROVIDER NOTE - ATTENDING CONTRIBUTION TO CARE
pt is a 25 y/o male with h/o ITP presents from his group home with brusing To his right lower extremity as well as to his right breast the right leg was was concerning and much more bruised than his typical bruising as per his mom.  Denies any obvious trauma but noted that he hit his head in the multiple times during examination no change in mental status platelet count usually runs around 5 range as per mom.  Dr. Epps, This is hematologist who saw him on Monday.  Patient Dr. Epps told the mother to bring him in as his legs been more swollen had outpatient ultrasound on Monday which was negative for DVT concern for cellulitis at this time started on doxycycline no fevers no other complaints.

## 2024-07-12 NOTE — ED ADULT NURSE REASSESSMENT NOTE - NS ED NURSE REASSESS COMMENT FT1
Patient became combative and aggressive towards mother and himself. Patient was hitting himself and scatching his mother and refusing to get back in bed. As per mother, this is how he gets in the hospital. Patient given 5mg haldol with no change. Security called and helped to move patient to wheelchair where an additional 5mg haldol was given. patient moved from wheelchair to bed with help of security. Patient removed IV during incident. Patient now lying in bed. No signs of acute distress at this time.

## 2024-07-12 NOTE — H&P ADULT - NSHPPHYSICALEXAM_GEN_ALL_CORE
T(C): 36.6 (07-12-24 @ 18:39), Max: 36.8 (07-12-24 @ 14:05)  HR: 59 (07-12-24 @ 20:30) (59 - 120)  BP: 102/64 (07-12-24 @ 20:30) (89/70 - 136/110)  RR: 16 (07-12-24 @ 20:30) (16 - 20)  SpO2: 98% (07-12-24 @ 20:30) (97% - 100%)    CONSTITUTIONAL: Restless, no apparent distress  EYES: PERRLA , EOMI  ENMT: MMM  RESP: No respiratory distress, no use of accessory muscles  CV: +S1S2, RRR, no MRG; no peripheral edema  GI: Soft, NTND  MSK: Moves all extremities spontaneously   SKIN: diffuse ecchymotic lesion on RLE spanning anterior RLE, warm to touch   NEURO: Difficult to assess but CN II-XII appears grossly intact   PSYCH: Awake, alert, sporadic agitation unchanged from baseline

## 2024-07-12 NOTE — ED PROVIDER NOTE - NS ED ROS FT
ROS:  -Constitutional: Denies fever  -Head: Denies headache  -Eyes: Denies blurry vision  -Cardiovascular: Denies chest pain  -Pulmonary: Denies shortness of breath  -Gastrointestinal: Denies nausea or diarrhea  -Genitourinary: Denies dysuria  -Skin: +R leg swelling/bruising  -Neuro: Denies numbness or tingling

## 2024-07-12 NOTE — ED ADULT NURSE NOTE - OBJECTIVE STATEMENT
24 y.o M [facility] 24 y.o M [facility] intellectual disability, nonverbal, chronic ITP presenting to the ED from a group home with mother at the bedside for right lower extremity swelling and bruising. Pt mother providing information at this time. Pt saw his hematologist outpatient and received an ultrasound of the RLE, found to be negative. Pt also noted to have bruising to his chest from self injurious behaviour. Pt was started on doxycycline on 7/8 but RLE symptoms have progressed.  No recent medication changes, no vomiting, dysuria, constipation/diarrhea. No known sick contacts. Pt arrived to the ED agitated, hitting himself, however cooperative with staff at this time. Patient safety maintained, bed is in lowest position, wheels locked, and side rails raised.

## 2024-07-12 NOTE — H&P ADULT - ASSESSMENT
24M PMH intellectual disability, chronic ITP coming in from group home for right lower extremity bruising c/f cellulitis being admitted

## 2024-07-12 NOTE — ED ADULT NURSE NOTE - NSFALLUNIVINTERV_ED_ALL_ED
Bed/Stretcher in lowest position, wheels locked, appropriate side rails in place/Call bell, personal items and telephone in reach/Instruct patient to call for assistance before getting out of bed/chair/stretcher/Non-slip footwear applied when patient is off stretcher/[place] to call system/Physically safe environment - no spills, clutter or unnecessary equipment/Purposeful proactive rounding/Room/bathroom lighting operational, light cord in reach

## 2024-07-12 NOTE — ED PROVIDER NOTE - PHYSICAL EXAMINATION
GENERAL: NAD, lying in bed s/p Haldol 10mg  HEAD: normocephalic, atraumatic  HEENT: normal conjunctiva, oral mucosa moist  CARDIAC: regular rate and rhythm, normal S1S2  PULM: CTAB  GI: Abd soft, nondistended, nontender  NEURO: non-verbal at baseline  MSK: +diffuse right leg bruising, warm to touch, pulses intact  SKIN: well-perfused, extremities warm

## 2024-07-12 NOTE — ED PROVIDER NOTE - CLINICAL SUMMARY MEDICAL DECISION MAKING FREE TEXT BOX
See Attending Note See Attending Note  24M with PMH intellectual disability, chronic ITP coming in from group home for right lower extremity bruising with c/f cellulitis, outpatient ultrasound reportedly negative and has been taking doxycycline since Monday. Pt agitated in ED requiring 5mg IV Haldol and 5mg IM Haldol after pulling out IV. On physical exam s/p Haldol pt asleep, CV/pulm unremarkable, right lower leg with extensive bruising and warm to touch. Pt afebrile in ED, VSS. Will order CBC, CMP, CRP, VA duplex, and re-assess. Platelets on CBC 4, reportedly baseline is around 5. Will order 600mg IV Clindamycin due to c/f cellulitis See Attending Note  24M with PMH intellectual disability, chronic ITP coming in from group home for right lower extremity bruising with c/f cellulitis, outpatient ultrasound reportedly negative and has been taking doxycycline since Monday. Pt agitated in ED requiring 5mg IV Haldol and 5mg IM Haldol after pulling out IV. On physical exam s/p Haldol pt asleep, CV/pulm unremarkable, right lower leg with extensive bruising and warm to touch. Pt afebrile in ED, VSS. Will order CBC, CMP, CRP, VA duplex, and re-assess. Platelets on CBC 4, reportedly baseline is around 5. Will order 600mg IV Clindamycin due to c/f cellulitis. Dispo: admit to medicine under Dr. Driscoll

## 2024-07-12 NOTE — ED ADULT NURSE REASSESSMENT NOTE - NS ED NURSE REASSESS COMMENT FT1
As per break coverage HENRRY Mendoza, pt removed his own IV access during an episode of agitation. MD made aware. Pt in bed resting at this time.

## 2024-07-12 NOTE — H&P ADULT - HISTORY OF PRESENT ILLNESS
Poor historian 2/2 baseline disability, history obtained from mother at bedside    24M PMH intellectual disability, chronic ITP coming in from group home for right lower extremity bruising. Was noted to have diffuse RLE bruising and ?cellulitis. Was afebrile w/o elevated WBC. Pt also reported to have bruising on chest in setting of ITP and self-injurious behavior. Pt saw hematologist O/P, had ultrasound performed which was reportedly negative. Pt was placed on doxycycline on Monday however had no noticeable improvement in RLE lesion, in fact mother endorses redness worsened so pt was brought to ED for further eval.  No change in baseline mental status. No recent medication changes.    ROS limited but per mother afebrile, no N/V/D, sick contact     ED: Clindamycin, IVF, labs and imaging obtained

## 2024-07-12 NOTE — H&P ADULT - TIME-BASED BILLING (NON-CRITICAL CARE)
4/23 Pt called and stated she told Dr Foreman that she was having joint paint.  She continues to have pain and would like a recommendation on an orthopedic MD she could see.    Message routed to RN inbasket.   Time-based billing (NON-critical care)

## 2024-07-12 NOTE — ED ADULT TRIAGE NOTE - CHIEF COMPLAINT QUOTE
low platelets Bruising RLE  Hitting and biting himself Mom present  With  Lives in group home On Antibiotic  recently admitted HAs H/O ITP

## 2024-07-12 NOTE — H&P ADULT - PROBLEM SELECTOR PLAN 1
Noted to have diffuse RLE ecchymosis and redness  - Afebrile, VSS, clinically nontoxic   - S/P doxycycline O/P   - Labs: no leukocytosis, crp elevated   - S/p Clindamycin 600mg in ED   - C/w Clindamycin 600mg q8, check MRSA swab if neg consider deescalating abx ( ? Keflex)    - Trend labs, monitor clinically Noted to have diffuse RLE ecchymosis and redness  - Afebrile, VSS, clinically nontoxic   - S/P doxycycline O/P   - Labs: no leukocytosis, crp elevated   - Duplex RLE: neg DVT   - S/p Clindamycin 600mg in ED   - C/w Clindamycin 600mg q8, check MRSA swab if neg consider deescalating abx ( ? Keflex)    - Trend labs, monitor clinically

## 2024-07-13 LAB
ANION GAP SERPL CALC-SCNC: 10 MMOL/L — SIGNIFICANT CHANGE UP (ref 5–17)
BUN SERPL-MCNC: 11 MG/DL — SIGNIFICANT CHANGE UP (ref 7–23)
CALCIUM SERPL-MCNC: 9.3 MG/DL — SIGNIFICANT CHANGE UP (ref 8.4–10.5)
CHLORIDE SERPL-SCNC: 105 MMOL/L — SIGNIFICANT CHANGE UP (ref 96–108)
CO2 SERPL-SCNC: 26 MMOL/L — SIGNIFICANT CHANGE UP (ref 22–31)
CREAT SERPL-MCNC: 0.78 MG/DL — SIGNIFICANT CHANGE UP (ref 0.5–1.3)
EGFR: 127 ML/MIN/1.73M2 — SIGNIFICANT CHANGE UP
GLUCOSE SERPL-MCNC: 88 MG/DL — SIGNIFICANT CHANGE UP (ref 70–99)
HCT VFR BLD CALC: 37.8 % — LOW (ref 39–50)
HGB BLD-MCNC: 12 G/DL — LOW (ref 13–17)
INR BLD: 1.08 RATIO — SIGNIFICANT CHANGE UP (ref 0.85–1.18)
MCHC RBC-ENTMCNC: 27.4 PG — SIGNIFICANT CHANGE UP (ref 27–34)
MCHC RBC-ENTMCNC: 31.7 GM/DL — LOW (ref 32–36)
MCV RBC AUTO: 86.3 FL — SIGNIFICANT CHANGE UP (ref 80–100)
MRSA PCR RESULT.: SIGNIFICANT CHANGE UP
NRBC # BLD: 0 /100 WBCS — SIGNIFICANT CHANGE UP (ref 0–0)
PLATELET # BLD AUTO: 4 K/UL — CRITICAL LOW (ref 150–400)
POTASSIUM SERPL-MCNC: 4.3 MMOL/L — SIGNIFICANT CHANGE UP (ref 3.5–5.3)
POTASSIUM SERPL-SCNC: 4.3 MMOL/L — SIGNIFICANT CHANGE UP (ref 3.5–5.3)
PROTHROM AB SERPL-ACNC: 11.9 SEC — SIGNIFICANT CHANGE UP (ref 9.5–13)
RBC # BLD: 4.38 M/UL — SIGNIFICANT CHANGE UP (ref 4.2–5.8)
RBC # FLD: 15.2 % — HIGH (ref 10.3–14.5)
S AUREUS DNA NOSE QL NAA+PROBE: SIGNIFICANT CHANGE UP
SODIUM SERPL-SCNC: 141 MMOL/L — SIGNIFICANT CHANGE UP (ref 135–145)
WBC # BLD: 8.97 K/UL — SIGNIFICANT CHANGE UP (ref 3.8–10.5)
WBC # FLD AUTO: 8.97 K/UL — SIGNIFICANT CHANGE UP (ref 3.8–10.5)

## 2024-07-13 RX ORDER — AMPICILLIN AND SULBACTAM 2; 1 G/20ML; G/20ML
INJECTION, POWDER, FOR SOLUTION INTRAMUSCULAR; INTRAVENOUS
Refills: 0 | Status: DISCONTINUED | OUTPATIENT
Start: 2024-07-13 | End: 2024-07-15

## 2024-07-13 RX ORDER — AMPICILLIN AND SULBACTAM 2; 1 G/20ML; G/20ML
3 INJECTION, POWDER, FOR SOLUTION INTRAMUSCULAR; INTRAVENOUS EVERY 6 HOURS
Refills: 0 | Status: DISCONTINUED | OUTPATIENT
Start: 2024-07-13 | End: 2024-07-15

## 2024-07-13 RX ORDER — LORAZEPAM 0.5 MG
0.5 TABLET ORAL ONCE
Refills: 0 | Status: DISCONTINUED | OUTPATIENT
Start: 2024-07-13 | End: 2024-07-13

## 2024-07-13 RX ORDER — AMPICILLIN AND SULBACTAM 2; 1 G/20ML; G/20ML
3 INJECTION, POWDER, FOR SOLUTION INTRAMUSCULAR; INTRAVENOUS ONCE
Refills: 0 | Status: COMPLETED | OUTPATIENT
Start: 2024-07-13 | End: 2024-07-13

## 2024-07-13 RX ADMIN — PANTOPRAZOLE SODIUM 40 MILLIGRAM(S): 40 INJECTION, POWDER, FOR SOLUTION INTRAVENOUS at 09:12

## 2024-07-13 RX ADMIN — Medication 1 TABLET(S): at 08:30

## 2024-07-13 RX ADMIN — Medication 1 TABLET(S): at 16:45

## 2024-07-13 RX ADMIN — Medication 5 MILLIGRAM(S): at 00:37

## 2024-07-13 RX ADMIN — Medication 1 TABLET(S): at 12:43

## 2024-07-13 RX ADMIN — Medication 0.5 MILLIGRAM(S): at 14:49

## 2024-07-13 RX ADMIN — AMPICILLIN AND SULBACTAM 200 GRAM(S): 2; 1 INJECTION, POWDER, FOR SOLUTION INTRAMUSCULAR; INTRAVENOUS at 22:54

## 2024-07-13 RX ADMIN — BREXPIPRAZOLE 4 MILLIGRAM(S): 0.5 TABLET ORAL at 12:42

## 2024-07-13 RX ADMIN — Medication 5 MILLIGRAM(S): at 16:45

## 2024-07-13 RX ADMIN — Medication 1 TABLET(S): at 12:42

## 2024-07-13 RX ADMIN — AMPICILLIN AND SULBACTAM 200 GRAM(S): 2; 1 INJECTION, POWDER, FOR SOLUTION INTRAMUSCULAR; INTRAVENOUS at 18:34

## 2024-07-13 RX ADMIN — Medication 5 MILLIGRAM(S): at 09:35

## 2024-07-13 RX ADMIN — AMPICILLIN AND SULBACTAM 200 GRAM(S): 2; 1 INJECTION, POWDER, FOR SOLUTION INTRAMUSCULAR; INTRAVENOUS at 12:42

## 2024-07-13 RX ADMIN — Medication 500 MILLIGRAM(S): at 12:43

## 2024-07-13 RX ADMIN — Medication 2000 UNIT(S): at 12:43

## 2024-07-13 RX ADMIN — CLINDAMYCIN PHOSPHATE 100 MILLIGRAM(S): 150 INJECTION, SOLUTION INTRAVENOUS at 06:00

## 2024-07-13 RX ADMIN — TRAZODONE HYDROCHLORIDE 100 MILLIGRAM(S): 50 TABLET, FILM COATED ORAL at 22:51

## 2024-07-13 RX ADMIN — Medication 1000 MICROGRAM(S): at 12:43

## 2024-07-13 NOTE — PATIENT PROFILE ADULT - FUNCTIONAL ASSESSMENT - BASIC MOBILITY 1.
"Safe sleep:  Babies should always be placed in an empty crib or bassinette by themselves on their backs to sleep. New parents can get very tired so be careful to always put your baby down in their own crib. Co-sleeping is dangerous to your baby. Make sure the crib does not have any extra blankets, pillows, toys, or crib bumpers. The crib should be empty except for a fitted sheet and your baby. You can swaddle your baby in a blanket, but do not lay any loose blankets on top.    Normal Feeding, Output, and Weight:   babies should feed an average of 10 times per day. Some babies will \"cluster feed\" meaning they eat multiple times back to back, then go a few hours without eating. Don't let your baby go for more than 4 hours without eating, even overnight. You will know your baby is getting enough to eat if they are peeing frequently. We want babies to have one wet diaper per day of life (1 on day 1, 2 on day 2, etc.) up to about 5-6 wet diapers per day. It is normal for babies to lose up to 10% of their body weight. Babies will regain their birth weight by about 2 weeks of life. Your pediatrician will monitor your baby's weight.    Jaundice:  Almost all babies have a little jaundice. Jaundice is only concerning if the levels get too high. If the levels get to high, babies are treated with light therapy (or \"phototherapy\"). Jaundice usually peeks around day 5 of life, so it is important to see your pediatrician around that time for a check. If you notice increased yellowing of your baby's skin or eyes, contact your pediatrician sooner, especially if your baby is also having troubles eating. Sunlight, peeing, and pooping all help your baby's jaundice level go down.    Fever:  A fever in a baby before a month of life is a medical emergency. You do not need to take your baby's temperature every day. If your baby feels warm, is really fussy, is not waking up to feed, or is acting differently, you should take a " temperature. The most accurate way to take a temperature is in the bottom. You can put a little bit of Vaseline on a thermometer. A fever in a baby is 100.4F. If your baby has a temperature of 100.4 or above and is less than 30 days old, bring them to the ER. After 30 days old, you can call your pediatrician first.  Recommend all family contacts to get recommended vaccinations and perform good hands hygiene. Avoid crowded places.    Recommend to mom that NB will need RSV vaccine within a week of birth. Viral and RSV precautions explained.    to address in follow-up with PCP:  growth, Jaundice, avoid crowded places.      Reasons to seek care or call your pediatrician:  - Temperature of 100.4 or greater  - No urine in >8 hours  - Baby not drinking well or decreased from usual  - Baby develops vomiting (beyond normal spit ups) or starts having fully liquid stools  - Any new or concerning symptoms/behaviors arise       4 = No assist / stand by assistance

## 2024-07-13 NOTE — CHART NOTE - NSCHARTNOTEFT_GEN_A_CORE
24 year old male with refractory ITP here for evaluation of right leg cellulitis.     1. Refractory ITP   -- Pt has had platelet count persistently <5K for months without bleeding   -- Has received steroids, IVIG, Nplate, Rituxan, Doptelet in the past with minimal to no response  -- have discussed multiple other options on numerous occasions including Tavilesse, splenectomy, immunosuppressant and 2nd opinion with ITP specialist at [place] which all were declined. There is some data for CHERYLE positive patients with ITP responding to hydrozychloroquin which was started however CHERYLE negative. After discussion she although not amenable to Tavilesse, IVIG, platelet transfusion.   -- Ideally would give platelet transfusion, IVIG, Dex however she is not amenable. If she is in agreement would give any of those therapies to increase plt count with ideally Tavilesse outpatient but mother has refused.   -- Monitor CBC and transfuse to maintain plt >10K or >50K if bleeding. Discussed platelet transfusion with patient's mother but she adamantly declines. Risk of severe and life-threatening bleeding discussed, she continues to decline.  -- Follow up with Dr. Devin Epps of [facility] after discharge    2. Right Leg Cellulitis   - failed doxycyline outpatient   - c/w clindamycin   - primary team/ID recs       Full consult to follow in AM     Devin Epps MD   [facility]   Hematology/Oncology   [phone]

## 2024-07-13 NOTE — PATIENT PROFILE ADULT - FALL HARM RISK - HARM RISK INTERVENTIONS

## 2024-07-13 NOTE — PATIENT PROFILE ADULT - DOES PATIENT HAVE ADVANCE DIRECTIVE
"Chief Complaints and History of Present Illnesses   Patient presents with     Post Op (Ophthalmology) Right Eye     RIGHT EYE, CATARACT EXTRACTION WITH TORIC INTRAOCULAR LENS IMPLANT.  RIGHT EYE, ISTENT INSERTION     Chief Complaint(s) and History of Present Illness(es)     Post Op (Ophthalmology) Right Eye     Associated symptoms: Negative for eye pain, dryness, nausea and tearing    Comments: RIGHT EYE, CATARACT EXTRACTION WITH TORIC INTRAOCULAR LENS IMPLANT.  RIGHT EYE, ISTENT INSERTION              Comments     Pt slept well last night.  RE seemed \"darker\" and developed a redish brown haze in his RE vision yesterday.  Still sees the haze today.  Pt has no pain or other concerns at this time.    Porter Alberto, COT November 21, 2019 2:59 PM                    " Yes

## 2024-07-13 NOTE — PATIENT PROFILE ADULT - FUNCTIONAL ASSESSMENT - DAILY ACTIVITY SCORE.
"Encounter Date: 7/27/2020       History     Chief Complaint   Patient presents with    Shortness of Breath     Pt c/o SOB with pain to lower "ribs" and upper abdominal pain. Peritoneal dialysis patient. Denies CP but reports nausea.      60 year old female with peritoneal dialysis presents with acute onset of abdominal pain from yesterday. Pain started during the end of her dialysate infusion. Associated with bloating and nausea. No vomiting. Location is at the epigastrium region. She also complains of rib pain that worsens with movement and sob. She states that she has low urine output generally and has not urinated in the past 24 hours.     She arrived at the ED with low SpO2. Her SpO2 is 93 on 3L O2 given via nasal cannula.     No fever, runny nose, cough, congestion, chest pain, diarrhea, constipation, rash, leg swelling.         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 10/22/2016    Cortical cataract of both eyes 7/26/2016    Decubitus ulcer of buttock, stage 2     Degeneration of lumbar or lumbosacral intervertebral disc 3/5/2013    S/p MRI L-spine 5/2009     Depression     Encounter for blood transfusion     ESRD on hemodialysis     Started August 2018    General anesthetics causing adverse effect in therapeutic use     Hemorrhagic cerebrovascular accident (CVA)     8[place]2016 [place] [facility] [place]is    History of stroke 6/28/2017    s/p R-MCA stroke with R-putaminal " hemorrhagic transformation in 8/2016 and 11/2016 (s/p hemicraniotomy at [facility]) with residual L hemiparesis, on AED s/p CVA      Hypertensive retinopathy of both eyes 7/26/2016    Impingement syndrome of right shoulder 7/2/2014    Obesity     THERESA (obstructive sleep apnea) 3/5/2013    No Home CPAP 2ndary to cost     Partial symptomatic epilepsy with complex partial seizures, not intractable, without status epilepticus     Rheumatoid arthritis(714.0)     Rotator cuff tear 7/2/2014    Sarcoidosis     Stroke 2016    left sided flaccidity, SAH    Vertebral artery stenosis 3/5/2013    S/p Stenting per Dr Burnett      Past Surgical History:   Procedure Laterality Date    BREAST SURGERY      breast reduction    COLONOSCOPY N/A 8/11/2016    Procedure: COLONOSCOPY;  Surgeon: Jerry Vilchis MD;  Location: [facility] ENDO (4TH FLR);  Service: Endoscopy;  Laterality: N/A;  Patient reports difficulty awaking from anesthesia in the past.    DECLOTTING OF VASCULAR GRAFT Right 6/20/2019    Procedure: DECLOT-GRAFT;  Surgeon: Cabrera Irwin MD;  Location: [facility] CATH LAB;  Service: Cardiology;  Laterality: Right;    DECLOTTING OF VASCULAR GRAFT Right 12/6/2019    Procedure: DECLOT-GRAFT;  Surgeon: Cabrera Irwin MD;  Location: [facility] OR 2ND FLR;  Service: Peripheral Vascular;  Laterality: Right;    DECLOTTING OF VASCULAR GRAFT N/A 6/10/2020    Procedure: Declotting, Vascular Graft;  Surgeon: SERENA Buchanan II, MD;  Location: [facility] CATH LAB;  Service: Vascular;  Laterality: N/A;    DIAGNOSTIC LAPAROSCOPY N/A 6/8/2020    Procedure: LAPAROSCOPY, DIAGNOSTIC (POSSIBLE PD CATH REVISION);  Surgeon: Paige Monsalve MD;  Location: [facility] OR 2ND FLR;  Service: General;  Laterality: N/A;    FISTULOGRAM Right 2/11/2019    Procedure: Fistulogram;  Surgeon: Meir Valencia MD;  Location: [facility] CATH LAB;  Service: Peripheral Vascular;  Laterality: Right;    FISTULOGRAM Right 7/8/2019    Procedure: Fistulogram;  Surgeon: WELLINGTON  "PETRA Palm III, MD;  Location: [facility] CATH LAB;  Service: Peripheral Vascular;  Laterality: Right;    FISTULOGRAM Right 12/6/2019    Procedure: Fistulogram;  Surgeon: Cabrera Irwin MD;  [facility];  Service: Peripheral Vascular;  Laterality: Right;    FISTULOGRAM Right 3/12/2020    Procedure: FISTULOGRAM;  Surgeon: Meir Valencia MD;  Location: [facility] CATH LAB;  Service: Peripheral Vascular;  Laterality: Right;    HYSTERECTOMY  1999    JOSE LUIS/BSO (AUB)    LAPAROSCOPIC LYSIS OF ADHESIONS N/A 3/9/2020    Procedure: LYSIS, ADHESIONS, LAPAROSCOPIC;  Surgeon: Paige Monsalve MD;  [facility];  Service: General;  Laterality: N/A;    LAPAROSCOPIC REVISION OF PROCEDURE INVOLVING PERITONEAL DIALYSIS CATHETER N/A 6/8/2020    Procedure: REVISION OF PROCEDURE INVOLVING PERITONEAL DIALYSIS CATHETER, LAPAROSCOPIC;  Surgeon: Paige Monsalve MD;  [facility];  Service: General;  Laterality: N/A;    PLACEMENT OF ARTERIOVENOUS GRAFT Right 10/18/2018    Procedure: AV GRAFT CREATION;  Surgeon: Meir Valencia MD;  Location: [facility] OR [address] [place] [address];  Service: Cardiovascular;  Laterality: Right;    ROTATOR CUFF REPAIR Right July 9, 2014    right side    Skull surgery      Aneurysm    stent placed      in vertebral artery    TOTAL REDUCTION MAMMOPLASTY      TUBAL LIGATION       Family History   Problem Relation Age of Onset    Diabetes Mother     Hypertension Mother     Heart disease Mother     Heart attack Mother     Breast cancer Mother     Stroke Sister     Hypertension Sister     Sleep apnea Sister     No Known Problems Daughter     Diabetes Son     Bell's palsy Sister     Lupus Sister     Blindness Maternal Grandmother     Diabetes Unknown         "My entire family family has diabetes"    Stroke Maternal Aunt     Colon cancer Neg Hx     Ovarian cancer Neg Hx      Social History     Tobacco Use    Smoking status: Never Smoker    Smokeless " tobacco: Never Used   Substance Use Topics    Alcohol use: Yes     Frequency: Monthly or less     Drinks per session: 1 or 2     Binge frequency: Never     Comment: occasional wine cooler     Drug use: Never     Review of Systems   Constitutional: Negative for chills and fever.   HENT: Negative for congestion and sore throat.    Respiratory: Positive for shortness of breath. Negative for apnea, cough and choking.         Ribs hurting    Cardiovascular: Negative for chest pain and leg swelling.   Gastrointestinal: Positive for abdominal distention, abdominal pain and nausea. Negative for constipation, diarrhea and vomiting.   Genitourinary:        Anuria   Musculoskeletal: Positive for back pain.   Skin: Negative for rash.   Hematological: Negative.    Psychiatric/Behavioral: Negative.        Physical Exam     Initial Vitals [07/27/20 0039]   BP Pulse Resp Temp SpO2   (!) 160/87 82 (!) 28 98.6 °F (37 °C) (!) 89 %      MAP       --         Physical Exam    Constitutional: She appears well-developed and well-nourished.   Obese    HENT:   Head: Normocephalic and atraumatic.   Cardiovascular: Normal rate, regular rhythm, normal heart sounds and intact distal pulses.   Pulmonary/Chest: Breath sounds normal.   Abdominal: Soft. Bowel sounds are normal. She exhibits distension and ascites. There is abdominal tenderness in the epigastric area. There is no rigidity, no guarding, no tenderness at McBurney's point and negative Street's sign.   Neurological: She is oriented to person, place, and time.   Skin: Skin is warm and dry.         ED Course   Procedures  Labs Reviewed   CBC W/ AUTO DIFFERENTIAL   COMPREHENSIVE METABOLIC PANEL   LIPASE   LACTIC ACID, PLASMA   TROPONIN I   URINALYSIS, REFLEX TO URINE CULTURE   B-TYPE NATRIURETIC PEPTIDE   MAGNESIUM   PHOSPHORUS          Imaging Results          X-Ray Chest AP Portable (In process)                  Medical Decision Making:   Initial Assessment:   60 year old female with  history of peritoneal dialysis presents with abdominal pain and bloating. Associated with abdominal distension and shortness of breath. She is currently running low SpO2 from her baseline. DDx include:     1. Increased intraperitoneal pressure.   2. Pleural effusion due to pleuroperitoneal leak.   3. Pericatheteral leakage.   4. Peritonitis    We will do CBC, CMP, Lactic acid, Troponin, Mg, phosphorus, BNP, Lipase to rule out cardiac causes and metabolic abnormality. Will do CXR to rule out pleural effusion. Will consult hospital medicine for further evaluation.     Current SpO2 is around 93 on 3 L oxygen. COVID 19 test ordered. SpO2 will need to be monitored                                    Clinical Impression:       ICD-10-CM ICD-9-CM   1. SOB (shortness of breath)  R06.02 786.05                                Akanksha Gallegos MD  Resident  07/27/20 0228       Akanksha Gallegos MD  Resident  07/27/20 0310     10

## 2024-07-14 VITALS
SYSTOLIC BLOOD PRESSURE: 128 MMHG | TEMPERATURE: 98 F | OXYGEN SATURATION: 96 % | DIASTOLIC BLOOD PRESSURE: 70 MMHG | HEART RATE: 69 BPM | RESPIRATION RATE: 17 BRPM

## 2024-07-14 LAB
ALBUMIN SERPL ELPH-MCNC: 3.8 G/DL — SIGNIFICANT CHANGE UP (ref 3.3–5)
ALP SERPL-CCNC: 63 U/L — SIGNIFICANT CHANGE UP (ref 40–120)
ALT FLD-CCNC: 17 U/L — SIGNIFICANT CHANGE UP (ref 10–45)
ANION GAP SERPL CALC-SCNC: 10 MMOL/L — SIGNIFICANT CHANGE UP (ref 5–17)
AST SERPL-CCNC: 19 U/L — SIGNIFICANT CHANGE UP (ref 10–40)
BASOPHILS # BLD AUTO: 0.02 K/UL — SIGNIFICANT CHANGE UP (ref 0–0.2)
BASOPHILS NFR BLD AUTO: 0.2 % — SIGNIFICANT CHANGE UP (ref 0–2)
BILIRUB SERPL-MCNC: 1.4 MG/DL — HIGH (ref 0.2–1.2)
BUN SERPL-MCNC: 12 MG/DL — SIGNIFICANT CHANGE UP (ref 7–23)
CALCIUM SERPL-MCNC: 9.2 MG/DL — SIGNIFICANT CHANGE UP (ref 8.4–10.5)
CHLORIDE SERPL-SCNC: 104 MMOL/L — SIGNIFICANT CHANGE UP (ref 96–108)
CO2 SERPL-SCNC: 24 MMOL/L — SIGNIFICANT CHANGE UP (ref 22–31)
CREAT SERPL-MCNC: 0.81 MG/DL — SIGNIFICANT CHANGE UP (ref 0.5–1.3)
EGFR: 125 ML/MIN/1.73M2 — SIGNIFICANT CHANGE UP
EOSINOPHIL # BLD AUTO: 0.11 K/UL — SIGNIFICANT CHANGE UP (ref 0–0.5)
EOSINOPHIL NFR BLD AUTO: 1 % — SIGNIFICANT CHANGE UP (ref 0–6)
GLUCOSE SERPL-MCNC: 117 MG/DL — HIGH (ref 70–99)
HCT VFR BLD CALC: 39.8 % — SIGNIFICANT CHANGE UP (ref 39–50)
HGB BLD-MCNC: 12.4 G/DL — LOW (ref 13–17)
IMM GRANULOCYTES NFR BLD AUTO: 0.4 % — SIGNIFICANT CHANGE UP (ref 0–0.9)
LYMPHOCYTES # BLD AUTO: 1.03 K/UL — SIGNIFICANT CHANGE UP (ref 1–3.3)
LYMPHOCYTES # BLD AUTO: 9.5 % — LOW (ref 13–44)
MCHC RBC-ENTMCNC: 27.3 PG — SIGNIFICANT CHANGE UP (ref 27–34)
MCHC RBC-ENTMCNC: 31.2 GM/DL — LOW (ref 32–36)
MCV RBC AUTO: 87.7 FL — SIGNIFICANT CHANGE UP (ref 80–100)
MONOCYTES # BLD AUTO: 0.68 K/UL — SIGNIFICANT CHANGE UP (ref 0–0.9)
MONOCYTES NFR BLD AUTO: 6.2 % — SIGNIFICANT CHANGE UP (ref 2–14)
NEUTROPHILS # BLD AUTO: 9.01 K/UL — HIGH (ref 1.8–7.4)
NEUTROPHILS NFR BLD AUTO: 82.7 % — HIGH (ref 43–77)
NRBC # BLD: 0 /100 WBCS — SIGNIFICANT CHANGE UP (ref 0–0)
PLATELET # BLD AUTO: 3 K/UL — CRITICAL LOW (ref 150–400)
POTASSIUM SERPL-MCNC: 4.4 MMOL/L — SIGNIFICANT CHANGE UP (ref 3.5–5.3)
POTASSIUM SERPL-SCNC: 4.4 MMOL/L — SIGNIFICANT CHANGE UP (ref 3.5–5.3)
PROT SERPL-MCNC: 6.3 G/DL — SIGNIFICANT CHANGE UP (ref 6–8.3)
RBC # BLD: 4.54 M/UL — SIGNIFICANT CHANGE UP (ref 4.2–5.8)
RBC # FLD: 15.1 % — HIGH (ref 10.3–14.5)
SODIUM SERPL-SCNC: 138 MMOL/L — SIGNIFICANT CHANGE UP (ref 135–145)
WBC # BLD: 10.89 K/UL — HIGH (ref 3.8–10.5)
WBC # FLD AUTO: 10.89 K/UL — HIGH (ref 3.8–10.5)

## 2024-07-14 RX ORDER — HALOPERIDOL DECANOATE 100 MG/ML
5 VIAL (ML) INTRAMUSCULAR ONCE
Refills: 0 | Status: DISCONTINUED | OUTPATIENT
Start: 2024-07-14 | End: 2024-07-14

## 2024-07-14 RX ORDER — LORAZEPAM 0.5 MG
1.5 TABLET ORAL DAILY
Refills: 0 | Status: DISCONTINUED | OUTPATIENT
Start: 2024-07-14 | End: 2024-07-15

## 2024-07-14 RX ORDER — HALOPERIDOL DECANOATE 100 MG/ML
5 VIAL (ML) INTRAMUSCULAR ONCE
Refills: 0 | Status: COMPLETED | OUTPATIENT
Start: 2024-07-14 | End: 2024-07-14

## 2024-07-14 RX ADMIN — BREXPIPRAZOLE 4 MILLIGRAM(S): 0.5 TABLET ORAL at 12:06

## 2024-07-14 RX ADMIN — Medication 1.5 MILLIGRAM(S): at 12:09

## 2024-07-14 RX ADMIN — TRAZODONE HYDROCHLORIDE 100 MILLIGRAM(S): 50 TABLET, FILM COATED ORAL at 23:22

## 2024-07-14 RX ADMIN — Medication 2000 UNIT(S): at 11:55

## 2024-07-14 RX ADMIN — Medication 1 TABLET(S): at 11:55

## 2024-07-14 RX ADMIN — AMPICILLIN AND SULBACTAM 200 GRAM(S): 2; 1 INJECTION, POWDER, FOR SOLUTION INTRAMUSCULAR; INTRAVENOUS at 17:04

## 2024-07-14 RX ADMIN — Medication 5 MILLIGRAM(S): at 12:08

## 2024-07-14 RX ADMIN — Medication 1 TABLET(S): at 08:21

## 2024-07-14 RX ADMIN — Medication 500 MILLIGRAM(S): at 11:56

## 2024-07-14 RX ADMIN — Medication 1 TABLET(S): at 17:02

## 2024-07-14 RX ADMIN — AMPICILLIN AND SULBACTAM 200 GRAM(S): 2; 1 INJECTION, POWDER, FOR SOLUTION INTRAMUSCULAR; INTRAVENOUS at 05:25

## 2024-07-14 RX ADMIN — Medication 1000 MICROGRAM(S): at 11:55

## 2024-07-14 RX ADMIN — PANTOPRAZOLE SODIUM 40 MILLIGRAM(S): 40 INJECTION, POWDER, FOR SOLUTION INTRAVENOUS at 08:22

## 2024-07-14 RX ADMIN — Medication 5 MILLIGRAM(S): at 11:34

## 2024-07-14 RX ADMIN — AMPICILLIN AND SULBACTAM 200 GRAM(S): 2; 1 INJECTION, POWDER, FOR SOLUTION INTRAMUSCULAR; INTRAVENOUS at 11:48

## 2024-07-14 RX ADMIN — AMPICILLIN AND SULBACTAM 200 GRAM(S): 2; 1 INJECTION, POWDER, FOR SOLUTION INTRAMUSCULAR; INTRAVENOUS at 23:22

## 2024-07-14 NOTE — PROGRESS NOTE ADULT - SUBJECTIVE AND OBJECTIVE BOX
Name of Patient : VINNY MOON  MRN: [id number]  Date of visit: 07-14-24      Subjective: Patient seen and examined. No new events except as noted.   mother at [facility] bedside     REVIEW OF SYSTEMS:  limited     MEDICATIONS:  MEDICATIONS  (STANDING):  ampicillin/sulbactam  IVPB      ampicillin/sulbactam  IVPB 3 Gram(s) IV Intermittent every 6 hours  ascorbic acid 500 milliGRAM(s) Oral daily  brexpiprazole 4 milliGRAM(s) Oral daily  cholecalciferol 2000 Unit(s) Oral daily  cyanocobalamin 1000 MICROGram(s) Oral daily  lactobacillus acidophilus 1 Tablet(s) Oral three times a day with meals  LORazepam     Tablet 1.5 milliGRAM(s) Oral daily  multivitamin 1 Tablet(s) Oral daily  pantoprazole    Tablet 40 milliGRAM(s) Oral before breakfast  traZODone 100 milliGRAM(s) Oral at bedtime      PHYSICAL EXAM:  T(C): 37 (07-14-24 @ 09:19), Max: 37 (07-14-24 @ 09:19)  HR: 70 (07-14-24 @ 12:30) (70 - 89)  BP: 130/55 (07-14-24 @ 09:19) (130/55 - 130/55)  RR: 17 (07-14-24 @ 12:30) (17 - 18)  SpO2: 97% (07-14-24 @ 12:30) (97% - 98%)  Wt(kg): --  I&O's Summary    13 Jul 2024 07:01  -  14 Jul 2024 07:00  --------------------------------------------------------  IN: 480 mL / OUT: 0 mL / NET: 480 mL    14 Jul 2024 07:01  -  14 Jul 2024 21:44  --------------------------------------------------------  IN: 480 mL / OUT: 0 mL / NET: 480 mL          Appearance: awake, nonverbal 	  HEENT:  PERRLA   Lymphatic: No lymphadenopathy   Cardiovascular: Normal S1 S2, no JVD  Respiratory: normal effort , clear  Gastrointestinal:  Soft, Non-tender  Skin: No rashes,  warm to touch  Psychiatry:  Mood & affect appropriate  Musculuskeletal: RLE redness, achymosis, swelling     recent labs, Imaging and EKGs personally reviewed     07-13-24 @ 07:01  -  07-14-24 @ 07:00  --------------------------------------------------------  IN: 480 mL / OUT: 0 mL / NET: 480 mL    07-14-24 @ 07:01  -  07-14-24 @ 21:44  --------------------------------------------------------  IN: 480 mL / OUT: 0 mL / NET: 480 mL                          12.4   10.89 )-----------( 3        ( 14 Jul 2024 09:52 )             39.8               07-14    138  |  104  |  12  ----------------------------<  117<H>  4.4   |  24  |  0.81    Ca    9.2      14 Jul 2024 09:52    TPro  6.3  /  Alb  3.8  /  TBili  1.4<H>  /  DBili  x   /  AST  19  /  ALT  17  /  AlkPhos  63  07-14    PT/INR - ( 13 Jul 2024 10:26 )   PT: 11.9 sec;   INR: 1.08 ratio                            Urinalysis Basic - ( 14 Jul 2024 09:52 )    Color: x / Appearance: x / SG: x / pH: x  Gluc: 117 mg/dL / Ketone: x  / Bili: x / Urobili: x   Blood: x / Protein: x / Nitrite: x   Leuk Esterase: x / RBC: x / WBC x   Sq Epi: x / Non Sq Epi: x / Bacteria: x              
INTERVAL HPI/OVERNIGHT EVENTS:    25 y/o M who follows with Dr. Epps in our practice for refractory ITP presents for RLE cellulitis. He failed outpatient doxycycline and is receiving unasyn here.     VITAL SIGNS:  T(F): 97.7 (07-13-24 @ 12:36)  HR: 85 (07-13-24 @ 12:36)  BP: 102/64 (07-13-24 @ 12:36)  RR: 18 (07-13-24 @ 12:36)  SpO2: 99% (07-13-24 @ 12:36)  Wt(kg): --    PHYSICAL EXAM:    Constitutional: NAD  Eyes: EOMI, sclera non-icteric  Neck: supple, no masses, no JVD  Respiratory: CTA b/l, good air entry b/l  Cardiovascular: RRR, no M/R/G  Gastrointestinal: soft, NTND, no masses palpable, + BS, no hepatosplenomegaly  Extremities: no c/c/e  Neurological: AAOx3      MEDICATIONS  (STANDING):  ampicillin/sulbactam  IVPB 3 Gram(s) IV Intermittent every 6 hours  ampicillin/sulbactam  IVPB      ascorbic acid 500 milliGRAM(s) Oral daily  brexpiprazole 4 milliGRAM(s) Oral daily  cholecalciferol 2000 Unit(s) Oral daily  cyanocobalamin 1000 MICROGram(s) Oral daily  lactobacillus acidophilus 1 Tablet(s) Oral three times a day with meals  multivitamin 1 Tablet(s) Oral daily  pantoprazole    Tablet 40 milliGRAM(s) Oral before breakfast  traZODone 100 milliGRAM(s) Oral at bedtime    MEDICATIONS  (PRN):  acetaminophen     Tablet .. 650 milliGRAM(s) Oral every 6 hours PRN Temp greater or equal to 38C (100.4F), Mild Pain (1 - 3)  aluminum hydroxide/magnesium hydroxide/simethicone Suspension 30 milliLiter(s) Oral every 4 hours PRN Dyspepsia  haloperidol    Injectable 5 milliGRAM(s) IntraMuscular every 4 hours PRN Agitation  melatonin 3 milliGRAM(s) Oral at bedtime PRN Insomnia  ondansetron Injectable 4 milliGRAM(s) IV Push every 8 hours PRN Nausea and/or Vomiting      Allergies    WinRho SDF (Hives)  Bactrim (Hives)    Intolerances        LABS:                        12.0   8.97  )-----------( 4        ( 13 Jul 2024 10:26 )             37.8     07-13    141  |  105  |  11  ----------------------------<  88  4.3   |  26  |  0.78    Ca    9.3      13 Jul 2024 10:26    TPro  7.1  /  Alb  4.2  /  TBili  2.2<H>  /  DBili  x   /  AST  21  /  ALT  15  /  AlkPhos  70  07-12    PT/INR - ( 13 Jul 2024 10:26 )   PT: 11.9 sec;   INR: 1.08 ratio         PTT - ( 12 Jul 2024 13:51 )  PTT:27.8 sec  Urinalysis Basic - ( 13 Jul 2024 10:26 )    Color: x / Appearance: x / SG: x / pH: x  Gluc: 88 mg/dL / Ketone: x  / Bili: x / Urobili: x   Blood: x / Protein: x / Nitrite: x   Leuk Esterase: x / RBC: x / WBC x   Sq Epi: x / Non Sq Epi: x / Bacteria: x        RADIOLOGY & ADDITIONAL TESTS:  Studies reviewed.  
Name of Patient : VINNY MOON  MRN: [id number]  Date of visit: 07-13-24       Subjective: Patient seen and examined. No new events except as noted.       REVIEW OF SYSTEMS:  limited       MEDICATIONS:  MEDICATIONS  (STANDING):  ampicillin/sulbactam  IVPB      ampicillin/sulbactam  IVPB 3 Gram(s) IV Intermittent every 6 hours  ascorbic acid 500 milliGRAM(s) Oral daily  brexpiprazole 4 milliGRAM(s) Oral daily  cholecalciferol 2000 Unit(s) Oral daily  cyanocobalamin 1000 MICROGram(s) Oral daily  lactobacillus acidophilus 1 Tablet(s) Oral three times a day with meals  multivitamin 1 Tablet(s) Oral daily  pantoprazole    Tablet 40 milliGRAM(s) Oral before breakfast  traZODone 100 milliGRAM(s) Oral at bedtime      PHYSICAL EXAM:  T(C): 37 (07-13-24 @ 21:01), Max: 37.2 (07-13-24 @ 15:45)  HR: 68 (07-13-24 @ 21:01) (65 - 85)  BP: 98/61 (07-13-24 @ 21:01) (97/63 - 112/75)  RR: 18 (07-13-24 @ 21:01) (16 - 18)  SpO2: 97% (07-13-24 @ 21:01) (97% - 99%)  Wt(kg): --  I&O's Summary    13 Jul 2024 07:01  -  13 Jul 2024 23:45  --------------------------------------------------------  IN: 480 mL / OUT: 0 mL / NET: 480 mL          Appearance: awake, nonvebrla   HEENT:  PERRLA   Lymphatic: No lymphadenopathy   Cardiovascular: Normal S1 S2, no JVD  Respiratory: normal effort , clear  Gastrointestinal:  Soft, Non-tender  Skin: No rashes,  warm to touch  Psychiatry:  Mood & affect appropriate  Musculuskeletal: No edema    recent labs, Imaging and EKGs personally reviewed     07-13-24 @ 07:01  -  07-13-24 @ 23:45  --------------------------------------------------------  IN: 480 mL / OUT: 0 mL / NET: 480 mL                          12.0   8.97  )-----------( 4        ( 13 Jul 2024 10:26 )             37.8               07-13    141  |  105  |  11  ----------------------------<  88  4.3   |  26  |  0.78    Ca    9.3      13 Jul 2024 10:26    TPro  7.1  /  Alb  4.2  /  TBili  2.2<H>  /  DBili  x   /  AST  21  /  ALT  15  /  AlkPhos  70  07-12    PT/INR - ( 13 Jul 2024 10:26 )   PT: 11.9 sec;   INR: 1.08 ratio         PTT - ( 12 Jul 2024 13:51 )  PTT:27.8 sec                   Urinalysis Basic - ( 13 Jul 2024 10:26 )    Color: x / Appearance: x / SG: x / pH: x  Gluc: 88 mg/dL / Ketone: x  / Bili: x / Urobili: x   Blood: x / Protein: x / Nitrite: x   Leuk Esterase: x / RBC: x / WBC x   Sq Epi: x / Non Sq Epi: x / Bacteria: x

## 2024-07-14 NOTE — PROGRESS NOTE ADULT - PROBLEM SELECTOR PLAN 3
- Easily agitated, sporadic self injurious behavior ( hitting self), unchanged form bL    - Mom reports pt sensitive to ativan, was given Haldol 5mg for agitation in ED improvement in behavior    - Prn haldol 5mg  - Avoid overstimulating pt   - Encourage family visit
- Easily agitated, sporadic self injurious behavior ( hitting self), unchanged form bL    - Mom reports pt sensitive to ativan, was given Haldol 5mg for agitation in ED improvement in behavior    - Prn haldol 5mg  - Avoid overstimulating pt   - Encourage family visit  - ativan 1.5 oral daily  - IV ativan PRN

## 2024-07-14 NOTE — PROGRESS NOTE ADULT - PROBLEM SELECTOR PLAN 2
- Hx/o ITP  - Plt 4, bL per mom ~5  - trend labs, chronic low, at baseline   - Hold AC   - Heme eval appreciated
- Hx/o ITP  - Plt 4, bL per mom ~5  - trend labs, chronic low, at baseline   - Hold AC   - Heme eval appreciated

## 2024-07-14 NOTE — PROGRESS NOTE ADULT - PROBLEM SELECTOR PLAN 1
Noted to have diffuse RLE ecchymosis and redness  - Afebrile, VSS, clinically nontoxic   - S/P doxycycline O/P   - Labs: no leukocytosis, crp elevated   - Duplex RLE: neg DVT   - S/p Clindamycin 600mg in ED   - Change to unasyn, monitor site
Noted to have diffuse RLE ecchymosis and redness  - Afebrile, VSS, clinically nontoxic   - S/P doxycycline O/P   - Labs: no leukocytosis, crp elevated   - Duplex RLE: neg DVT   - S/p Clindamycin 600mg in ED   - Change to unasyn, monitor site

## 2024-07-14 NOTE — PROGRESS NOTE ADULT - ASSESSMENT
25 y/o M who follows with Dr. Epps in our practice for refractory ITP presents for RLE cellulitis. He failed outpatient doxycycline and is receiving clindamycin here.     1. Refractory ITP   -- Pt has had platelet count persistently <5K for months without bleeding   -- Has received steroids, IVIG, Nplate, Rituxan, Doptelet in the past with minimal to no response  -- have discussed multiple other options on numerous occasions including Tavilesse, splenectomy, immunosuppressant and 2nd opinion with ITP specialist at [place] which all were declined. There is some data for CHERYLE positive patients with ITP responding to hydroxychloroquin which was started however CHERYLE negative. After discussion she although not amenable to Tavilesse, IVIG, platelet transfusion.   -- Ideally would give platelet transfusion, IVIG, Dex however she is not amenable. If she is in agreement would give any of those therapies to increase plt count with ideally Tavilesse outpatient but mother has refused.   -- Monitor CBC and transfuse to maintain plt >10K or >50K if bleeding. Discussed platelet transfusion with patient's mother but she adamantly declines. Risk of severe and life-threatening bleeding discussed, she continues to decline.  -- Follow up with Dr. Devin Epps of [facility] after discharge    2. Right Leg Cellulitis   - Failed doxycyline outpatient   - Receiving unasyn  - Primary team/ID recs     Leticia Cates MD  Hematology-Oncology   [facility]  [phone] (Office)   
24M PMH intellectual disability, chronic ITP coming in from group home for right lower extremity bruising c/f cellulitis being admitted     
24M PMH intellectual disability, chronic ITP coming in from group home for right lower extremity bruising c/f cellulitis being admitted

## 2024-07-15 ENCOUNTER — TRANSCRIPTION ENCOUNTER (OUTPATIENT)
Age: 24
End: 2024-07-15

## 2024-07-15 LAB
ALBUMIN SERPL ELPH-MCNC: 4 G/DL — SIGNIFICANT CHANGE UP (ref 3.3–5)
ALP SERPL-CCNC: 68 U/L — SIGNIFICANT CHANGE UP (ref 40–120)
ALT FLD-CCNC: 22 U/L — SIGNIFICANT CHANGE UP (ref 10–45)
ANION GAP SERPL CALC-SCNC: 13 MMOL/L — SIGNIFICANT CHANGE UP (ref 5–17)
AST SERPL-CCNC: 22 U/L — SIGNIFICANT CHANGE UP (ref 10–40)
BASOPHILS # BLD AUTO: 0.05 K/UL — SIGNIFICANT CHANGE UP (ref 0–0.2)
BASOPHILS NFR BLD AUTO: 0.4 % — SIGNIFICANT CHANGE UP (ref 0–2)
BILIRUB SERPL-MCNC: 1.2 MG/DL — SIGNIFICANT CHANGE UP (ref 0.2–1.2)
BUN SERPL-MCNC: 16 MG/DL — SIGNIFICANT CHANGE UP (ref 7–23)
CALCIUM SERPL-MCNC: 9.3 MG/DL — SIGNIFICANT CHANGE UP (ref 8.4–10.5)
CHLORIDE SERPL-SCNC: 102 MMOL/L — SIGNIFICANT CHANGE UP (ref 96–108)
CO2 SERPL-SCNC: 24 MMOL/L — SIGNIFICANT CHANGE UP (ref 22–31)
CREAT SERPL-MCNC: 1.07 MG/DL — SIGNIFICANT CHANGE UP (ref 0.5–1.3)
EGFR: 99 ML/MIN/1.73M2 — SIGNIFICANT CHANGE UP
EOSINOPHIL # BLD AUTO: 0.09 K/UL — SIGNIFICANT CHANGE UP (ref 0–0.5)
EOSINOPHIL NFR BLD AUTO: 0.8 % — SIGNIFICANT CHANGE UP (ref 0–6)
GLUCOSE SERPL-MCNC: 106 MG/DL — HIGH (ref 70–99)
HCT VFR BLD CALC: 42.4 % — SIGNIFICANT CHANGE UP (ref 39–50)
HGB BLD-MCNC: 13 G/DL — SIGNIFICANT CHANGE UP (ref 13–17)
IMM GRANULOCYTES NFR BLD AUTO: 0.9 % — SIGNIFICANT CHANGE UP (ref 0–0.9)
LYMPHOCYTES # BLD AUTO: 1.08 K/UL — SIGNIFICANT CHANGE UP (ref 1–3.3)
LYMPHOCYTES # BLD AUTO: 9.3 % — LOW (ref 13–44)
MCHC RBC-ENTMCNC: 26.9 PG — LOW (ref 27–34)
MCHC RBC-ENTMCNC: 30.7 GM/DL — LOW (ref 32–36)
MCV RBC AUTO: 87.8 FL — SIGNIFICANT CHANGE UP (ref 80–100)
MONOCYTES # BLD AUTO: 0.8 K/UL — SIGNIFICANT CHANGE UP (ref 0–0.9)
MONOCYTES NFR BLD AUTO: 6.9 % — SIGNIFICANT CHANGE UP (ref 2–14)
NEUTROPHILS # BLD AUTO: 9.49 K/UL — HIGH (ref 1.8–7.4)
NEUTROPHILS NFR BLD AUTO: 81.7 % — HIGH (ref 43–77)
NRBC # BLD: 0 /100 WBCS — SIGNIFICANT CHANGE UP (ref 0–0)
PLATELET # BLD AUTO: 4 K/UL — CRITICAL LOW (ref 150–400)
POTASSIUM SERPL-MCNC: 4.4 MMOL/L — SIGNIFICANT CHANGE UP (ref 3.5–5.3)
POTASSIUM SERPL-SCNC: 4.4 MMOL/L — SIGNIFICANT CHANGE UP (ref 3.5–5.3)
PROT SERPL-MCNC: 6.7 G/DL — SIGNIFICANT CHANGE UP (ref 6–8.3)
RBC # BLD: 4.83 M/UL — SIGNIFICANT CHANGE UP (ref 4.2–5.8)
RBC # FLD: 14.8 % — HIGH (ref 10.3–14.5)
SODIUM SERPL-SCNC: 139 MMOL/L — SIGNIFICANT CHANGE UP (ref 135–145)
WBC # BLD: 11.61 K/UL — HIGH (ref 3.8–10.5)
WBC # FLD AUTO: 11.61 K/UL — HIGH (ref 3.8–10.5)

## 2024-07-15 PROCEDURE — 86850 RBC ANTIBODY SCREEN: CPT

## 2024-07-15 PROCEDURE — 87640 STAPH A DNA AMP PROBE: CPT

## 2024-07-15 PROCEDURE — 96375 TX/PRO/DX INJ NEW DRUG ADDON: CPT

## 2024-07-15 PROCEDURE — 87641 MR-STAPH DNA AMP PROBE: CPT

## 2024-07-15 PROCEDURE — 86140 C-REACTIVE PROTEIN: CPT

## 2024-07-15 PROCEDURE — 93971 EXTREMITY STUDY: CPT

## 2024-07-15 PROCEDURE — 99285 EMERGENCY DEPT VISIT HI MDM: CPT | Mod: 25

## 2024-07-15 PROCEDURE — 96374 THER/PROPH/DIAG INJ IV PUSH: CPT

## 2024-07-15 PROCEDURE — 80053 COMPREHEN METABOLIC PANEL: CPT

## 2024-07-15 PROCEDURE — 80048 BASIC METABOLIC PNL TOTAL CA: CPT

## 2024-07-15 PROCEDURE — 85027 COMPLETE CBC AUTOMATED: CPT

## 2024-07-15 PROCEDURE — 85025 COMPLETE CBC W/AUTO DIFF WBC: CPT

## 2024-07-15 PROCEDURE — 85610 PROTHROMBIN TIME: CPT

## 2024-07-15 PROCEDURE — 86900 BLOOD TYPING SEROLOGIC ABO: CPT

## 2024-07-15 PROCEDURE — 85730 THROMBOPLASTIN TIME PARTIAL: CPT

## 2024-07-15 PROCEDURE — 86901 BLOOD TYPING SEROLOGIC RH(D): CPT

## 2024-07-15 RX ORDER — LORAZEPAM 0.5 MG
3 TABLET ORAL
Qty: 15 | Refills: 0
Start: 2024-07-15 | End: 2024-07-19

## 2024-07-15 RX ORDER — LORAZEPAM 0.5 MG
1 TABLET ORAL ONCE
Refills: 0 | Status: DISCONTINUED | OUTPATIENT
Start: 2024-07-15 | End: 2024-07-15

## 2024-07-15 RX ORDER — ACETAMINOPHEN 325 MG
2 TABLET ORAL
Qty: 0 | Refills: 0 | DISCHARGE
Start: 2024-07-15

## 2024-07-15 RX ORDER — AMOXICILLIN/POTASSIUM CLAV 250-125 MG
875 TABLET ORAL
Qty: 14 | Refills: 0
Start: 2024-07-15 | End: 2024-07-21

## 2024-07-15 RX ORDER — LORAZEPAM 0.5 MG
1.5 TABLET ORAL ONCE
Refills: 0 | Status: DISCONTINUED | OUTPATIENT
Start: 2024-07-15 | End: 2024-07-15

## 2024-07-15 RX ORDER — HALOPERIDOL DECANOATE 100 MG/ML
5 VIAL (ML) INTRAMUSCULAR ONCE
Refills: 0 | Status: COMPLETED | OUTPATIENT
Start: 2024-07-15 | End: 2024-07-15

## 2024-07-15 RX ORDER — LORAZEPAM 0.5 MG
2 TABLET ORAL ONCE
Refills: 0 | Status: DISCONTINUED | OUTPATIENT
Start: 2024-07-15 | End: 2024-07-15

## 2024-07-15 RX ORDER — DOXYCYCLINE 100 MG/1
1 CAPSULE ORAL
Qty: 14 | Refills: 0
Start: 2024-07-15 | End: 2024-07-21

## 2024-07-15 RX ORDER — MAGNESIUM, ALUMINUM HYDROXIDE 400-400
30 TABLET,CHEWABLE ORAL
Qty: 0 | Refills: 0 | DISCHARGE
Start: 2024-07-15

## 2024-07-15 RX ORDER — LORAZEPAM 0.5 MG
1.5 TABLET ORAL DAILY
Refills: 0 | Status: DISCONTINUED | OUTPATIENT
Start: 2024-07-16 | End: 2024-07-15

## 2024-07-15 RX ADMIN — PANTOPRAZOLE SODIUM 40 MILLIGRAM(S): 40 INJECTION, POWDER, FOR SOLUTION INTRAVENOUS at 08:51

## 2024-07-15 RX ADMIN — Medication 5 MILLIGRAM(S): at 12:03

## 2024-07-15 RX ADMIN — Medication 1 TABLET(S): at 14:10

## 2024-07-15 RX ADMIN — Medication 1.5 MILLIGRAM(S): at 10:47

## 2024-07-15 RX ADMIN — Medication 2000 UNIT(S): at 14:12

## 2024-07-15 RX ADMIN — Medication 500 MILLIGRAM(S): at 14:11

## 2024-07-15 RX ADMIN — Medication 1 TABLET(S): at 14:16

## 2024-07-15 RX ADMIN — AMPICILLIN AND SULBACTAM 200 GRAM(S): 2; 1 INJECTION, POWDER, FOR SOLUTION INTRAMUSCULAR; INTRAVENOUS at 14:10

## 2024-07-15 RX ADMIN — BREXPIPRAZOLE 4 MILLIGRAM(S): 0.5 TABLET ORAL at 14:12

## 2024-07-15 RX ADMIN — Medication 2 MILLIGRAM(S): at 13:51

## 2024-07-15 RX ADMIN — Medication 1 TABLET(S): at 08:51

## 2024-07-15 RX ADMIN — AMPICILLIN AND SULBACTAM 200 GRAM(S): 2; 1 INJECTION, POWDER, FOR SOLUTION INTRAMUSCULAR; INTRAVENOUS at 08:51

## 2024-07-15 RX ADMIN — Medication 1000 MICROGRAM(S): at 14:11

## 2024-07-15 RX ADMIN — Medication 5 MILLIGRAM(S): at 12:16

## 2024-07-15 NOTE — DISCHARGE NOTE PROVIDER - NSDCMRMEDTOKEN_GEN_ALL_CORE_FT
acetaminophen 325 mg oral tablet: 2 tab(s) orally every 6 hours As needed Temp greater or equal to 38C (100.4F), Mild Pain (1 - 3)  aluminum hydroxide-magnesium hydroxide 200 mg-200 mg/5 mL oral suspension: 30 milliliter(s) orally every 4 hours As needed Dyspepsia  amoxicillin-clavulanate 875 mg-125 mg oral tablet: 875 milligram(s) orally 2 times a day  ascorbic acid 500 mg oral tablet: 1 tab(s) orally once a day  B-12 1000 mcg oral tablet: 1 tab(s) orally once a day  cholecalciferol 50 mcg (2000 intl units) oral tablet: 1 tab(s) orally once a day  doxycycline monohydrate 100 mg oral capsule: 1 cap(s) orally 2 times a day  lactobacillus acidophilus oral capsule: 1 cap(s) orally 3 times a day  loratadine 10 mg oral tablet: 1 tab(s) orally once a day as needed for  allergy symptoms  LORazepam 0.5 mg oral tablet: 3 tab(s) orally once a day MDD: 3 tabs max daily  Multiple Vitamins oral tablet: 1 tab(s) orally once a day  Protonix 40 mg oral delayed release tablet: 1 tab(s) orally once a day  Rexulti 4 mg oral tablet: 1 tab(s) orally once a day  traZODone 100 mg oral tablet: 1 tab(s) orally once a day (at bedtime)

## 2024-07-15 NOTE — DISCHARGE NOTE PROVIDER - CARE PROVIDERS DIRECT ADDRESSES
,[email or web address]_Unknown

## 2024-07-15 NOTE — PROVIDER CONTACT NOTE (CRITICAL VALUE NOTIFICATION) - BACKGROUND
lab drawn   results   called to floor
admitted for RLE bruised
pt had  labs  drawn  as results called prabha

## 2024-07-15 NOTE — DISCHARGE NOTE PROVIDER - CARE PROVIDER_API CALL
Richie Sandoval Mi N  [address], [facility] [address]  [place] [address]  Phone: [phone]  Fax: [phone]  Follow Up Time: 1 week    Devin Epps  Hematology  [address] [place] [id number]  Phone: [phone]  Fax: [phone]  Follow Up Time: 1 week

## 2024-07-15 NOTE — DISCHARGE NOTE PROVIDER - HOSPITAL COURSE
HPI:  Poor historian 2/2 baseline disability, history obtained from mother at bedside    24M PMH intellectual disability, chronic ITP coming in from group home for right lower extremity bruising. Was noted to have diffuse RLE bruising and ?cellulitis. Was afebrile w/o elevated WBC. Pt also reported to have bruising on chest in setting of ITP and self-injurious behavior. Pt saw hematologist O/P, had ultrasound performed which was reportedly negative. Pt was placed on doxycycline on Monday however had no noticeable improvement in RLE lesion, in fact mother endorses redness worsened so pt was brought to ED for further eval.  No change in baseline mental status. No recent medication changes.    ROS limited but per mother afebrile, no N/V/D, sick contact     ED: Clindamycin, IVF, labs and imaging obtained  (12 Jul 2024 22:40)    Hospital Course:  Presenting with right lower extremity bruising c/f cellulitis     Problem/Plan - 1:  ·  Problem: Cellulitis.   ·  Plan: Noted to have diffuse RLE ecchymosis and redness  - Afebrile, VSS, clinically nontoxic   - S/P doxycycline O/P   - Labs: no leukocytosis, crp elevated   - Duplex RLE: neg DVT   - S/p Clindamycin 600mg in ED   - Changed to unasyn, will be discharged on doxy and augmentin      Problem/Plan - 2:  ·  Problem: Chronic ITP (idiopathic thrombocytopenia).   ·  Plan: - Hx/o ITP  - Plt 4, bL per mom ~5  - trend labs, chronic low, at baseline   - Hold AC   - Heme eval appreciated. Discussed platelet transfusion with patient's mother but she adamantly declines. Risk of severe and life-threatening bleeding discussed, she continues to decline.       Problem/Plan - 3:  ·  Problem: Intellectual disability.   ·  Plan: - Easily agitated, sporadic self injurious behavior ( hitting self), unchanged form bL    - Mom reports pt sensitive to ativan, was given Haldol 5mg for agitation in ED improvement in behavior    - Prn haldol 5mg  - ativan 1.5 oral daily  - IV ativan PRN.    Patient is medically cleared and stable for discharge, discussed with .     Important Medication Changes and Reason:  doxy, augmentin and Ativan     Active or Pending Issues Requiring Follow-up:  pcp in 1 week    Advanced Directives:   [ x] Full code  [ ] DNR  [ ] Hospice    Discharge Diagnoses:  cellulitis        HPI:  Poor historian 2/2 baseline disability, history obtained from mother at bedside    24M PMH intellectual disability, chronic ITP coming in from group home for right lower extremity bruising. Was noted to have diffuse RLE bruising and ?cellulitis. Was afebrile w/o elevated WBC. Pt also reported to have bruising on chest in setting of ITP and self-injurious behavior. Pt saw hematologist O/P, had ultrasound performed which was reportedly negative. Pt was placed on doxycycline on Monday however had no noticeable improvement in RLE lesion, in fact mother endorses redness worsened so pt was brought to ED for further eval.  No change in baseline mental status. No recent medication changes.    ROS limited but per mother afebrile, no N/V/D, sick contact     ED: Clindamycin, IVF, labs and imaging obtained  (12 Jul 2024 22:40)    Hospital Course:  Presenting with right lower extremity bruising c/f cellulitis     Problem/Plan - 1:  ·  Problem: Cellulitis.   ·  Plan: Noted to have diffuse RLE ecchymosis and redness  - Afebrile, VSS, clinically nontoxic   - S/P doxycycline O/P   - Labs: no leukocytosis, crp elevated   - Duplex RLE: neg DVT   - S/p Clindamycin 600mg in ED   - Changed to unasyn, will be discharged on doxy and augmentin      Problem/Plan - 2:  ·  Problem: Chronic ITP (idiopathic thrombocytopenia).   ·  Plan: - Hx/o ITP  - Plt 4, bL per mom ~5  - trend labs, chronic low, at baseline   - Hold AC   - Heme eval appreciated. Discussed platelet transfusion with patient's mother but she adamantly declines. Risk of severe and life-threatening bleeding discussed, she continues to decline.       Problem/Plan - 3:  ·  Problem: Intellectual disability.   ·  Plan: - Easily agitated, sporadic self injurious behavior ( hitting self), unchanged form bL    - Mom reports pt sensitive to ativan, was given Haldol 5mg for agitation in ED improvement in behavior    - Prn haldol 5mg  - ativan 1.5 oral daily  - IV ativan PRN.    Patient is medically cleared and stable for discharge, discussed with .     Important Medication Changes and Reason:  doxy, augmentin and Ativan     Active or Pending Issues Requiring Follow-up:  pcp in 1 week    Advanced Directives:   [ x] Full code  [ ] DNR  [ ] Hospice    Discharge Diagnoses:  cellulitis       discussed in detail with patients mother. oral abx. monitor closely

## 2024-07-15 NOTE — DISCHARGE NOTE NURSING/CASE MANAGEMENT/SOCIAL WORK - NSDCVIVACCINE_GEN_ALL_CORE_FT
Tdap; 15-Aug-2020 20:47; Jumana Martinez (HENRRY); [employer]; [id number] (Exp. Date: 04-Apr-2022); IntraMuscular; Deltoid Right.; 0.5 milliLiter(s); VIS (VIS Published: 09-May-2013, VIS Presented: 15-Aug-2020);

## 2024-07-15 NOTE — DISCHARGE NOTE NURSING/CASE MANAGEMENT/SOCIAL WORK - PATIENT PORTAL LINK FT
You can access the FollowMyHealth Patient Portal offered by [facility] by registering at the following website: [email or web address]. By joining [employer]’s FollowMyHealth portal, you will also be able to view your health information using other applications (apps) compatible with our system.

## 2024-07-15 NOTE — DISCHARGE NOTE NURSING/CASE MANAGEMENT/SOCIAL WORK - NSDCPEFALRISK_GEN_ALL_CORE
For information on Fall & Injury Prevention, visit: [email or web address] OR  [email or web address] OR  https://www.cdc.gov/steadi/patient.html

## 2024-07-15 NOTE — DISCHARGE NOTE PROVIDER - PROVIDER TOKENS
PROVIDER:[TOKEN:[[id number]],FOLLOWUP:[1 week]],PROVIDER:[TOKEN:[[id number]],FOLLOWUP:[1 week]]

## 2024-07-15 NOTE — DISCHARGE NOTE PROVIDER - NSDCCPCAREPLAN_GEN_ALL_CORE_FT
PRINCIPAL DISCHARGE DIAGNOSIS  Diagnosis: Cellulitis  Assessment and Plan of Treatment: Noted to have diffuse RLE ecchymosis and redness  - Afebrile, VSS, clinically nontoxic   - S/P doxycycline outpatient    - Labs: no leukocytosis, crp elevated   - Duplex RLE: neg DVT   - S/p Clindamycin 600mg in ED   - Changed to unasyn IV, will be discharged on doxy and augmentin- sent to pharmacy

## 2024-07-15 NOTE — PROVIDER CONTACT NOTE (CRITICAL VALUE NOTIFICATION) - ASSESSMENT
Autistic non verbal
pt  autistic  calm in bed  bleeding  prec maintained   bruises  all over
autistic pt hits himself in head and body at times legs  chest   bruised

## 2024-07-19 ENCOUNTER — NON-APPOINTMENT (OUTPATIENT)
Age: 24
End: 2024-07-19

## 2024-07-22 ENCOUNTER — TRANSCRIPTION ENCOUNTER (OUTPATIENT)
Age: 24
End: 2024-07-22

## 2024-07-22 ENCOUNTER — EMERGENCY (EMERGENCY)
Facility: HOSPITAL | Age: 24
LOS: 1 days | Discharge: HOME | End: 2024-07-22
Attending: STUDENT IN AN ORGANIZED HEALTH CARE EDUCATION/TRAINING PROGRAM
Payer: MEDICARE

## 2024-07-22 VITALS
RESPIRATION RATE: 16 BRPM | WEIGHT: 259.93 LBS | TEMPERATURE: 98 F | OXYGEN SATURATION: 96 % | HEART RATE: 97 BPM | HEIGHT: 71 IN | DIASTOLIC BLOOD PRESSURE: 81 MMHG | SYSTOLIC BLOOD PRESSURE: 129 MMHG

## 2024-07-22 DIAGNOSIS — F79 UNSPECIFIED INTELLECTUAL DISABILITIES: ICD-10-CM

## 2024-07-22 DIAGNOSIS — D69.3 IMMUNE THROMBOCYTOPENIC PURPURA: ICD-10-CM

## 2024-07-22 DIAGNOSIS — S80.11XA CONTUSION OF RIGHT LOWER LEG, INITIAL ENCOUNTER: ICD-10-CM

## 2024-07-22 DIAGNOSIS — Z88.1 ALLERGY STATUS TO OTHER ANTIBIOTIC AGENTS: ICD-10-CM

## 2024-07-22 DIAGNOSIS — Z88.8 ALLERGY STATUS TO OTHER DRUGS, MEDICAMENTS AND BIOLOGICAL SUBSTANCES: ICD-10-CM

## 2024-07-22 DIAGNOSIS — R53.83 OTHER FATIGUE: ICD-10-CM

## 2024-07-22 DIAGNOSIS — Z92.89 PERSONAL HISTORY OF OTHER MEDICAL TREATMENT: Chronic | ICD-10-CM

## 2024-07-22 LAB
ALBUMIN SERPL ELPH-MCNC: 3.1 G/DL — LOW (ref 3.3–5)
ALP SERPL-CCNC: 72 U/L — SIGNIFICANT CHANGE UP (ref 40–120)
ALT FLD-CCNC: 25 U/L — SIGNIFICANT CHANGE UP (ref 12–78)
ANION GAP SERPL CALC-SCNC: 7 MMOL/L — SIGNIFICANT CHANGE UP (ref 5–17)
AST SERPL-CCNC: 15 U/L — SIGNIFICANT CHANGE UP (ref 15–37)
BASOPHILS # BLD AUTO: 0.04 K/UL — SIGNIFICANT CHANGE UP (ref 0–0.2)
BASOPHILS NFR BLD AUTO: 0.4 % — SIGNIFICANT CHANGE UP (ref 0–2)
BILIRUB SERPL-MCNC: 1.1 MG/DL — SIGNIFICANT CHANGE UP (ref 0.2–1.2)
BUN SERPL-MCNC: 17 MG/DL — SIGNIFICANT CHANGE UP (ref 7–23)
CALCIUM SERPL-MCNC: 8.9 MG/DL — SIGNIFICANT CHANGE UP (ref 8.5–10.1)
CHLORIDE SERPL-SCNC: 108 MMOL/L — SIGNIFICANT CHANGE UP (ref 96–108)
CO2 SERPL-SCNC: 27 MMOL/L — SIGNIFICANT CHANGE UP (ref 22–31)
CREAT SERPL-MCNC: 0.79 MG/DL — SIGNIFICANT CHANGE UP (ref 0.5–1.3)
EGFR: 126 ML/MIN/1.73M2 — SIGNIFICANT CHANGE UP
EOSINOPHIL # BLD AUTO: 0.18 K/UL — SIGNIFICANT CHANGE UP (ref 0–0.5)
EOSINOPHIL NFR BLD AUTO: 1.8 % — SIGNIFICANT CHANGE UP (ref 0–6)
GLUCOSE SERPL-MCNC: 86 MG/DL — SIGNIFICANT CHANGE UP (ref 70–99)
HCT VFR BLD CALC: 37.9 % — LOW (ref 39–50)
HGB BLD-MCNC: 11.9 G/DL — LOW (ref 13–17)
IMM GRANULOCYTES NFR BLD AUTO: 0.7 % — SIGNIFICANT CHANGE UP (ref 0–0.9)
LYMPHOCYTES # BLD AUTO: 1.63 K/UL — SIGNIFICANT CHANGE UP (ref 1–3.3)
LYMPHOCYTES # BLD AUTO: 16.3 % — SIGNIFICANT CHANGE UP (ref 13–44)
MCHC RBC-ENTMCNC: 27 PG — SIGNIFICANT CHANGE UP (ref 27–34)
MCHC RBC-ENTMCNC: 31.4 G/DL — LOW (ref 32–36)
MCV RBC AUTO: 86.1 FL — SIGNIFICANT CHANGE UP (ref 80–100)
MONOCYTES # BLD AUTO: 0.94 K/UL — HIGH (ref 0–0.9)
MONOCYTES NFR BLD AUTO: 9.4 % — SIGNIFICANT CHANGE UP (ref 2–14)
NEUTROPHILS # BLD AUTO: 7.11 K/UL — SIGNIFICANT CHANGE UP (ref 1.8–7.4)
NEUTROPHILS NFR BLD AUTO: 71.4 % — SIGNIFICANT CHANGE UP (ref 43–77)
NRBC # BLD: 0 /100 WBCS — SIGNIFICANT CHANGE UP (ref 0–0)
PLATELET # BLD AUTO: 2 K/UL — CRITICAL LOW (ref 150–400)
POTASSIUM SERPL-MCNC: 4.1 MMOL/L — SIGNIFICANT CHANGE UP (ref 3.5–5.3)
POTASSIUM SERPL-SCNC: 4.1 MMOL/L — SIGNIFICANT CHANGE UP (ref 3.5–5.3)
PROT SERPL-MCNC: 6.3 GM/DL — SIGNIFICANT CHANGE UP (ref 6–8.3)
RBC # BLD: 4.4 M/UL — SIGNIFICANT CHANGE UP (ref 4.2–5.8)
RBC # BLD: 4.4 M/UL — SIGNIFICANT CHANGE UP (ref 4.2–5.8)
RBC # FLD: 15 % — HIGH (ref 10.3–14.5)
RETICS #: 144 K/UL — HIGH (ref 25–125)
RETICS/RBC NFR: 3.3 % — HIGH (ref 0.5–2.5)
SODIUM SERPL-SCNC: 142 MMOL/L — SIGNIFICANT CHANGE UP (ref 135–145)
WBC # BLD: 9.97 K/UL — SIGNIFICANT CHANGE UP (ref 3.8–10.5)
WBC # FLD AUTO: 9.97 K/UL — SIGNIFICANT CHANGE UP (ref 3.8–10.5)

## 2024-07-22 PROCEDURE — 99284 EMERGENCY DEPT VISIT MOD MDM: CPT

## 2024-07-22 NOTE — ED ADULT NURSE NOTE - OBJECTIVE STATEMENT
BIBA from group home, patient was lethargic and obtunded on scene and more alert after assessing blood glucose. Mother states that he was concerned because he was diaphoretic. recovering from lower extremity cellulitis. Hx ITP f/s 102.

## 2024-07-22 NOTE — ED ADULT NURSE NOTE - NSFALLHARMRISKINTERV_ED_ALL_ED

## 2024-07-22 NOTE — ED ADULT TRIAGE NOTE - CHIEF COMPLAINT QUOTE
BIBA from group home, patient was lethargic and obtunded on scene and more alert after assessing blood glucose. Mother states that he was concerned because he was diaphoretic. recovering from lower extremity cellulitis. Hx ITP f/s 102

## 2024-07-23 VITALS
SYSTOLIC BLOOD PRESSURE: 116 MMHG | RESPIRATION RATE: 16 BRPM | TEMPERATURE: 98 F | HEART RATE: 81 BPM | OXYGEN SATURATION: 98 % | DIASTOLIC BLOOD PRESSURE: 76 MMHG

## 2024-07-23 LAB
HAPTOGLOB SERPL-MCNC: 155 MG/DL — SIGNIFICANT CHANGE UP (ref 34–200)
LDH SERPL L TO P-CCNC: 171 U/L — SIGNIFICANT CHANGE UP (ref 50–242)

## 2024-07-23 NOTE — ED PROVIDER NOTE - OBJECTIVE STATEMENT
25 y/o M with PMH intellectual disability, chronic ITP, recent admission for cellulitis at OSH presenting to the ED for lethargy. Patient was found to be more confused at his group home but since arrival to the ED he has been acting at baseline. Mother was concerned because patient appeared diaphoretic at home. She states he did not eat dinner but is unsure if his blood glucose was low. He has since been eating and tolerating PO. No fever, chills, N/V. Reports he had blood work done today and platelets were 2. 23 y/o M with PMH intellectual disability, chronic ITP, recent admission for cellulitis at OSH presenting to the ED for lethargy. Patient was found to be more confused at his group home but since arrival to the ED he has been acting at baseline. Mother was concerned because patient appeared diaphoretic at home. She states he did not eat dinner but is unsure if his blood glucose was low. He has since been eating and tolerating PO. No fever, chills, N/V. Reports he had blood work done today and platelets were 2. Mother continues to decline platelet transfusions, patient is actively following with hematology.

## 2024-07-23 NOTE — ED PROVIDER NOTE - PATIENT PORTAL LINK FT
You can access the FollowMyHealth Patient Portal offered by [facility] by registering at the following website: [email or web address]. By joining [employer]’s FollowMyHealth portal, you will also be able to view your health information using other applications (apps) compatible with our system.

## 2024-07-23 NOTE — ED PROVIDER NOTE - PHYSICAL EXAMINATION
GENERAL: Awake, alert, NAD, nonverbal  HEENT: NC/AT, moist mucous membranes  LUNGS: CTAB, no wheezes or crackles   CARDIAC: RRR, no m/r/g  ABDOMEN: Soft, non tender, non distended, no rebound, no guarding  EXT: No edema, no calf tenderness,  no deformities, scant erythema/ecchymosis noted to the R shin   NEURO: At baseline. Moving all extremities.  SKIN: Warm and dry. No rash.  PSYCH: Normal affect.

## 2024-07-23 NOTE — ED PROVIDER NOTE - NSFOLLOWUPINSTRUCTIONS_ED_ALL_ED_FT
Denilson was seen in the ED for medical evaluation.    He was at baseline mental status and did not show any acute neurologic deficit.    His lab work demonstrated a hemoglobin of 11.9 and platelets of 2.    Please follow up with his hematologist in the office.    Please return urgently if he appears to have any changes in mental status, weakness, fever, or other acute symptoms.

## 2024-07-23 NOTE — ED PROVIDER NOTE - CLINICAL SUMMARY MEDICAL DECISION MAKING FREE TEXT BOX
23 y/o M with PMH intellectual disability, chronic ITP, recent admission for cellulitis at OSH presenting to the ED for lethargy.   Vitals stable.  Patient eating on exam, at baseline mental status, moving all extremities.  Not lethargic and no obvious neuro deficits.  Will obtain basic labs in setting of ITP.  No indication for CT imaging of brain given no active neuro deficit, patient at baseline mental status tolerating PO, moving all extremities,    Platelets 2, at baseline per mother  She is comfortable taking the patient home  Return precautions provided

## 2024-09-02 ENCOUNTER — EMERGENCY (EMERGENCY)
Facility: HOSPITAL | Age: 24
LOS: 0 days | Discharge: HOME | End: 2024-09-03
Attending: EMERGENCY MEDICINE
Payer: MEDICARE

## 2024-09-02 VITALS
HEART RATE: 88 BPM | TEMPERATURE: 98 F | WEIGHT: 259.93 LBS | OXYGEN SATURATION: 97 % | DIASTOLIC BLOOD PRESSURE: 69 MMHG | SYSTOLIC BLOOD PRESSURE: 111 MMHG | RESPIRATION RATE: 18 BRPM | HEIGHT: 71 IN

## 2024-09-02 DIAGNOSIS — Z92.89 PERSONAL HISTORY OF OTHER MEDICAL TREATMENT: Chronic | ICD-10-CM

## 2024-09-02 DIAGNOSIS — S30.1XXA CONTUSION OF ABDOMINAL WALL, INITIAL ENCOUNTER: ICD-10-CM

## 2024-09-02 DIAGNOSIS — F79 UNSPECIFIED INTELLECTUAL DISABILITIES: ICD-10-CM
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
## 2024-09-02 DIAGNOSIS — S00.11XA CONTUSION OF RIGHT EYELID AND PERIOCULAR AREA, INITIAL ENCOUNTER: ICD-10-CM

## 2024-09-02 DIAGNOSIS — D69.3 IMMUNE THROMBOCYTOPENIC PURPURA: ICD-10-CM

## 2024-09-02 DIAGNOSIS — Z88.8 ALLERGY STATUS TO OTHER DRUGS, MEDICAMENTS AND BIOLOGICAL SUBSTANCES: ICD-10-CM
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
## 2024-09-02 DIAGNOSIS — Z88.1 ALLERGY STATUS TO OTHER ANTIBIOTIC AGENTS: ICD-10-CM

## 2024-09-02 LAB
ALBUMIN SERPL ELPH-MCNC: 3.6 G/DL — SIGNIFICANT CHANGE UP (ref 3.3–5)
ALP SERPL-CCNC: 66 U/L — SIGNIFICANT CHANGE UP (ref 40–120)
ALT FLD-CCNC: 39 U/L — SIGNIFICANT CHANGE UP (ref 12–78)
ANION GAP SERPL CALC-SCNC: 7 MMOL/L — SIGNIFICANT CHANGE UP (ref 5–17)
APTT BLD: 31 SEC — SIGNIFICANT CHANGE UP (ref 24.5–35.6)
AST SERPL-CCNC: 35 U/L — SIGNIFICANT CHANGE UP (ref 15–37)
BASOPHILS # BLD AUTO: 0.03 K/UL — SIGNIFICANT CHANGE UP (ref 0–0.2)
BASOPHILS NFR BLD AUTO: 0.4 % — SIGNIFICANT CHANGE UP (ref 0–2)
BILIRUB SERPL-MCNC: 1.1 MG/DL — SIGNIFICANT CHANGE UP (ref 0.2–1.2)
BLD GP AB SCN SERPL QL: SIGNIFICANT CHANGE UP
BUN SERPL-MCNC: 15 MG/DL — SIGNIFICANT CHANGE UP (ref 7–23)
CALCIUM SERPL-MCNC: 9.2 MG/DL — SIGNIFICANT CHANGE UP (ref 8.5–10.1)
CHLORIDE SERPL-SCNC: 109 MMOL/L — HIGH (ref 96–108)
CO2 SERPL-SCNC: 24 MMOL/L — SIGNIFICANT CHANGE UP (ref 22–31)
CREAT SERPL-MCNC: 0.87 MG/DL — SIGNIFICANT CHANGE UP (ref 0.5–1.3)
EGFR: 123 ML/MIN/1.73M2 — SIGNIFICANT CHANGE UP
EOSINOPHIL # BLD AUTO: 0.09 K/UL — SIGNIFICANT CHANGE UP (ref 0–0.5)
EOSINOPHIL NFR BLD AUTO: 1.1 % — SIGNIFICANT CHANGE UP (ref 0–6)
GLUCOSE SERPL-MCNC: 104 MG/DL — HIGH (ref 70–99)
HCT VFR BLD CALC: 43.5 % — SIGNIFICANT CHANGE UP (ref 39–50)
HGB BLD-MCNC: 14 G/DL — SIGNIFICANT CHANGE UP (ref 13–17)
IMM GRANULOCYTES NFR BLD AUTO: 0.6 % — SIGNIFICANT CHANGE UP (ref 0–0.9)
INR BLD: 0.93 RATIO — SIGNIFICANT CHANGE UP (ref 0.85–1.18)
LYMPHOCYTES # BLD AUTO: 1.38 K/UL — SIGNIFICANT CHANGE UP (ref 1–3.3)
LYMPHOCYTES # BLD AUTO: 16.6 % — SIGNIFICANT CHANGE UP (ref 13–44)
MCHC RBC-ENTMCNC: 27.5 PG — SIGNIFICANT CHANGE UP (ref 27–34)
MCHC RBC-ENTMCNC: 32.2 G/DL — SIGNIFICANT CHANGE UP (ref 32–36)
MCV RBC AUTO: 85.5 FL — SIGNIFICANT CHANGE UP (ref 80–100)
MONOCYTES # BLD AUTO: 0.74 K/UL — SIGNIFICANT CHANGE UP (ref 0–0.9)
MONOCYTES NFR BLD AUTO: 8.9 % — SIGNIFICANT CHANGE UP (ref 2–14)
NEUTROPHILS # BLD AUTO: 6.01 K/UL — SIGNIFICANT CHANGE UP (ref 1.8–7.4)
NEUTROPHILS NFR BLD AUTO: 72.4 % — SIGNIFICANT CHANGE UP (ref 43–77)
NRBC # BLD: 0 /100 WBCS — SIGNIFICANT CHANGE UP (ref 0–0)
PLATELET # BLD AUTO: 2 K/UL — CRITICAL LOW (ref 150–400)
POTASSIUM SERPL-MCNC: 4.1 MMOL/L — SIGNIFICANT CHANGE UP (ref 3.5–5.3)
POTASSIUM SERPL-SCNC: 4.1 MMOL/L — SIGNIFICANT CHANGE UP (ref 3.5–5.3)
PROT SERPL-MCNC: 6.6 GM/DL — SIGNIFICANT CHANGE UP (ref 6–8.3)
PROTHROM AB SERPL-ACNC: 11.1 SEC — SIGNIFICANT CHANGE UP (ref 9.5–13)
RBC # BLD: 5.09 M/UL — SIGNIFICANT CHANGE UP (ref 4.2–5.8)
RBC # FLD: 14.6 % — HIGH (ref 10.3–14.5)
SODIUM SERPL-SCNC: 140 MMOL/L — SIGNIFICANT CHANGE UP (ref 135–145)
WBC # BLD: 8.3 K/UL — SIGNIFICANT CHANGE UP (ref 3.8–10.5)
WBC # FLD AUTO: 8.3 K/UL — SIGNIFICANT CHANGE UP (ref 3.8–10.5)

## 2024-09-02 PROCEDURE — 99285 EMERGENCY DEPT VISIT HI MDM: CPT

## 2024-09-02 RX ORDER — HALOPERIDOL 1 MG
10 TABLET ORAL ONCE
Refills: 0 | Status: COMPLETED | OUTPATIENT
Start: 2024-09-02 | End: 2024-09-02

## 2024-09-02 RX ORDER — DIPHENHYDRAMINE HCL 50 MG
50 CAPSULE ORAL ONCE
Refills: 0 | Status: COMPLETED | OUTPATIENT
Start: 2024-09-02 | End: 2024-09-02

## 2024-09-02 RX ORDER — MIDAZOLAM HYDROCHLORIDE 5 MG/ML
6 INJECTION, SOLUTION INTRAMUSCULAR; INTRAVENOUS ONCE
Refills: 0 | Status: DISCONTINUED | OUTPATIENT
Start: 2024-09-02 | End: 2024-09-02

## 2024-09-02 RX ADMIN — Medication 50 MILLIGRAM(S): at 21:56

## 2024-09-02 RX ADMIN — Medication 10 MILLIGRAM(S): at 23:59

## 2024-09-02 NOTE — ED PROVIDER NOTE - PHYSICAL EXAMINATION
Vitals: WNL  Gen: AAOx3, NAD, sitting comfortably in stretcher  Head: bruising under R eyelid, no periorbital swelling or occular entrapment, otherwise ncat, no lacerations or abrasions, perrla, eomi b/l  Neck: supple, no lymphadenopathy, no midline deviation  Heart: rrr, no m/r/g  Lungs: CTA b/l, no rales/ronchi/wheezes  Abd: soft, nontender, non-distended, no rebound or guarding, + scattered mild bruising around flank and anterior abd, no broken skin   Ext: no clubbing/cyanosis/edema  Neuro: sensation and muscle strength intact b/l, steady gait

## 2024-09-02 NOTE — ED PROVIDER NOTE - PROGRESS NOTE DETAILS
Results reported to patient--grossly benign, CT shows non-specific bruising, signs of trauma, no acute active bleeding, plt count is at baseline of 2  Pt. reports feeling better after meds  pt. agrees to f/u with primary care outpt., heme/onc referral given for urgent f/u   pt. understands to return to ED if symptoms worsen; will d/c with copy of results for reference

## 2024-09-02 NOTE — ED PROVIDER NOTE - PATIENT PORTAL LINK FT
You can access the FollowMyHealth Patient Portal offered by [facility] by registering at the following website: [email or web address]. By joining [employer]’s FollowMyHealth portal, you will also be able to view your health information using other applications (apps) compatible with our system.

## 2024-09-02 NOTE — ED ADULT NURSE NOTE - OBJECTIVE STATEMENT
BIBA, from residential.  sent by hematologist (Dr. Devin Epps) for CT head due to bruising around eye-socket and also abd.  pt's platelet count was 2000 on friday.  per mother, pt non-verbal, and becomes easily agitated in the ER,  Pt exhibits baseline per mother, non verbal indicators of pain absent

## 2024-09-02 NOTE — ED PROVIDER NOTE - OBJECTIVE STATEMENT
[facility] [id number]  [address]  [facility] [id number] [phone]               January 6, 2020      Re: Glenny Harris  [address] [facility] [id number]      This is to certify that Glenny Harris has been under my care from 1/6/2020 and may return to SCHOOL on Wednesday 01/08/20 and may return to GYM and recess in one (1) week.            SIGNATURE_____________________________________________, 1/6/2020  Ariel Roque MD      
23 yo M with low platelet count, noticed to have bruising under R eye and abd, which prompted ER visit for CT scan to r/o bleeding.  Pt's platelet count is chronically low from ITP at 2,000, which is his baseline.  Pt's mom called his heme/onc doc who recommended ER visit for CT scan to r/o bleeding given chronically low platelet count.  Mom thinks it could be trauma from self-injurious behavior, which is regular for pt.  No other complaints, no recent known falls or specific inciting event.   ROS: negative for fever, cough, headache, chest pain, shortness of breath, abd pain, nausea, vomiting, diarrhea, rash, paresthesia, and weakness--all other systems reviewed are negative.   PMH: intellectual disability, chronic ITP, "platelet count normally 2;" Meds: See EMR for list; SH: Denies smoking/drinking/drug use

## 2024-09-02 NOTE — ED ADULT NURSE NOTE - CHIEF COMPLAINT QUOTE
BIBA, from [facility].  sent by hematologist (Dr. Devin Epps) for CT head due to bruising around eye-socket and also abd.  pt's platelet count was 2000 on friday.  per mother, pt non-verbal, and becomes easily agitated in the ER,  needs to be sedated for CT scan or prior to outbursts.

## 2024-09-02 NOTE — ED ADULT TRIAGE NOTE - CHIEF COMPLAINT QUOTE
BIBA, from group home.  sent by hemotologist (Dr. Devin Epps) for CT head due to bruising around eye-socket and also abd.  pt's platelet count was 2000 on friday.  per mother, pt non-verbal, needs to be sedated for CT scan. BIBA, from [facility].  sent by hematologist (Dr. Devin Epps) for CT head due to bruising around eye-socket and also abd.  pt's platelet count was 2000 on friday.  per mother, pt non-verbal, and becomes easily agitated in the ER,  needs to be sedated for CT scan or prior to outbursts.

## 2024-09-02 NOTE — ED PROVIDER NOTE - CARE PROVIDER_API CALL
Devin Epps  Hematology  [address] [place] [id number]  Phone: [phone]  Fax: [phone]  Follow Up Time: Urgent

## 2024-09-02 NOTE — ED PROVIDER NOTE - CLINICAL SUMMARY MEDICAL DECISION MAKING FREE TEXT BOX
25 yo M with self-injurious behavior, intellectual disability, likely bruising from self-injury, no other reported trauma, doubt significant internal hemorrhage, also reportedly difficulty to sedate  -cbc, cmp, coags, type and screen, CT brain/maxilo/abd to r/o hemorrhage, monitor, sedation for CT scan prn, monitor during sedation  -f/u results, reeval

## 2024-09-03 VITALS
OXYGEN SATURATION: 99 % | SYSTOLIC BLOOD PRESSURE: 106 MMHG | RESPIRATION RATE: 20 BRPM | HEART RATE: 67 BPM | DIASTOLIC BLOOD PRESSURE: 61 MMHG | TEMPERATURE: 98 F

## 2024-09-03 PROCEDURE — 70450 CT HEAD/BRAIN W/O DYE: CPT | Mod: 26,MC

## 2024-09-03 PROCEDURE — 74177 CT ABD & PELVIS W/CONTRAST: CPT | Mod: 26,MC

## 2024-09-03 PROCEDURE — 70486 CT MAXILLOFACIAL W/O DYE: CPT | Mod: 26,MC

## 2024-09-03 RX ADMIN — MIDAZOLAM HYDROCHLORIDE 6 MILLIGRAM(S): 5 INJECTION, SOLUTION INTRAMUSCULAR; INTRAVENOUS at 00:02

## 2024-09-12 ENCOUNTER — NON-APPOINTMENT (OUTPATIENT)
Age: 24
End: 2024-09-12

## 2024-10-07 NOTE — BH CONSULTATION LIAISON PROGRESS NOTE - NSBHATTESTBILLING_PSY_A_CORE
show
51315-Kxebhwandf OBS or IP - high complexity OR 50-79 mins
Non-billable
08933-Uekpcbvojz OBS or IP - high complexity OR 50-79 mins
80712-Lpiqxlvvpc OBS or IP - high complexity OR 50-79 mins
44514-Xygqewnvqj OBS or IP - high complexity OR 50-79 mins

## 2024-11-07 NOTE — ED ADULT TRIAGE NOTE - IDEAL BODY WEIGHT(KG)
Hermilo Brito or Chief is a 82 year old male who presents for Office Visit and Diabetes    HPI  The patient presents for evaluation of multiple medical concerns. He is accompanied by his daughter.    He has been under the care of Dr. Erna Jesus, who administered two injections in his back, one in the spine and another on the right side. The latter provided relief for over 90 days, leading to plans for another similar injection.    He has been managing his diabetes with Lantus (10 units) and metformin, and reports no hypoglycemic episodes. His blood sugar levels typically range from 115 to 125, occasionally spiking, particularly after steroid injections. He has been making efforts to lose weight, including increased physical activity, and has lost approximately 42 pounds over the past 3 years. His goal is to weigh under 300 pounds.    He has also been dealing with depression, which has improved since switching from sertraline to Prozac. He has experienced fewer panic attacks and has not needed to take hydroxyzine.    He has discontinued probiotics but continues to take MiraLAX once a week.    He has been experiencing a significant amount of phlegm, which he manages with loratadine and Flonase nasal spray. He does not believe the phlegm is originating from his lungs as he does not have a cough or rattle.    He has also been experiencing pain in his left knee, which has been replaced, for the past two months. The pain is intermittent and occurs during the day, particularly when he rides his stationary bike. He has not noticed any bruising or swelling in the knee.    Review of Systems  As documented above.    Objective   Vitals:    11/07/24 1128   BP: 118/62   Pulse: 64   SpO2: 94%   Weight: (!) 144.7 kg (319 lb)   Height: 6' 2\" (1.88 m)     Physical Exam  General: Alert.    Respiratory: Normal respiratory effort.   Cardiovascular: Normal rate and regular rhythm. Normal S1, S2. Murmurs are not present.  Lungs:  Clear to auscultation. Wheezing, rales or rhonchi not present.  Musculoskeletal: Gait: normal.  Psychiatric: Mood is normal and affect is normal.    Vital Signs  Blood pressure readings are excellent, ranging from 124/60 to 130/74, 120/60, 120/62.    Laboratory Studies  Blood counts are normal. Glycohemoglobin is excellent. Cholesterol is low. A1c dropped from 10 to 6.    Assessment & Plan   1. Diabetes Mellitus.  His A1c has significantly improved, dropping from 10 to 6. His blood pressure readings are within the normal range, and his blood counts are normal. His glycohemoglobin levels are excellent, indicating good control of his diabetes. His cholesterol levels are low. He has lost 42 pounds since 2021. The plan is to stop his insulin while maintaining his metformin regimen to monitor his response. He is advised to continue checking his blood sugar levels daily. If his blood sugar exceeds 160, he should contact the office. If needed, Ozempic can be considered to aid in weight loss.    2. Depression.  He reports a significant improvement in his symptoms since switching from sertraline to Prozac. He is happy with the current management, and his panic attacks have been very few and far between. He has not needed to use hydroxyzine recently. His depression appears to be in full remission.    3. Atrial Fibrillation.  He continues to take sotalol for atrial fibrillation. No irregularity was noted during the examination.    4. Hyperlipidemia.  He is on pravastatin for cholesterol management. His cholesterol levels are low.    5. Hypertension.  His blood pressure readings are excellent, ranging from 120/60 to 130/74. He continues to take lisinopril and amlodipine for blood pressure management.    6. Sinus-related symptoms.  He reports having a lot of phlegm, especially in the mornings. He uses loratadine and Flonase but still experiences symptoms. Azelastine, an allergy spray, will be prescribed to manage his  sinus-related symptoms.    7. Knee Pain.  He reports knee pain, especially when riding his stationary bike. He can try using diclofenac topically. He is advised to reduce the resistance on his stationary bike for a few weeks to see if this alleviates his knee pain. If the pain persists, a consultation with an orthopedic specialist may be necessary.    Medication Management.  He continues to take metformin and Lantus 10 units for diabetes, sotalol for atrial fibrillation, pravastatin for cholesterol, lisinopril and amlodipine for blood pressure, and Xarelto. He has stopped taking probiotics but continues to use MiraLAX once a week.    Follow-up  Patient is scheduled for a follow-up visit in May.    1. Type 2 diabetes mellitus with diabetic polyneuropathy, with long-term current use of insulin (CMD)  -     Microalbumin Urine Random; Future  -     Basic Metabolic Panel; Future  -     Glycohemoglobin; Future  -     Lipid Panel With Reflex; Future  -     Hepatic Function Panel; Future  -     Microalbumin Urine Random; Future  2. Need for vaccination  -     INFLUENZA (FLUZONE) HIGH DOSE  -     COVID SPIKEVAX 12+  3. HTN (hypertension), benign  -     Basic Metabolic Panel; Future  -     Glycohemoglobin; Future  -     Lipid Panel With Reflex; Future  -     Hepatic Function Panel; Future  -     Microalbumin Urine Random; Future  4. Dyslipidemia  -     Basic Metabolic Panel; Future  -     Glycohemoglobin; Future  -     Lipid Panel With Reflex; Future  -     Hepatic Function Panel; Future  -     Microalbumin Urine Random; Future  5. Chronic bilateral low back pain with left-sided sciatica  6. Recurrent major depressive disorder, in full remission (CMD)     73

## 2024-12-24 ENCOUNTER — EMERGENCY (EMERGENCY)
Facility: HOSPITAL | Age: 24
LOS: 0 days | Discharge: HOME | End: 2024-12-25
Attending: STUDENT IN AN ORGANIZED HEALTH CARE EDUCATION/TRAINING PROGRAM
Payer: MEDICARE

## 2024-12-24 VITALS
OXYGEN SATURATION: 93 % | SYSTOLIC BLOOD PRESSURE: 103 MMHG | DIASTOLIC BLOOD PRESSURE: 62 MMHG | HEART RATE: 93 BPM | TEMPERATURE: 98 F | RESPIRATION RATE: 15 BRPM | HEIGHT: 70 IN | WEIGHT: 250 LBS

## 2024-12-24 DIAGNOSIS — R92.1 MAMMOGRAPHIC CALCIFICATION FOUND ON DIAGNOSTIC IMAGING OF BREAST: ICD-10-CM

## 2024-12-24 DIAGNOSIS — Z92.89 PERSONAL HISTORY OF OTHER MEDICAL TREATMENT: Chronic | ICD-10-CM
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
## 2024-12-24 DIAGNOSIS — Z88.8 ALLERGY STATUS TO OTHER DRUGS, MEDICAMENTS AND BIOLOGICAL SUBSTANCES: ICD-10-CM
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
## 2024-12-24 DIAGNOSIS — K92.1 MELENA: ICD-10-CM

## 2024-12-24 DIAGNOSIS — S30.0XXA CONTUSION OF LOWER BACK AND PELVIS, INITIAL ENCOUNTER: ICD-10-CM

## 2024-12-24 DIAGNOSIS — Z88.1 ALLERGY STATUS TO OTHER ANTIBIOTIC AGENTS: ICD-10-CM

## 2024-12-24 DIAGNOSIS — F79 UNSPECIFIED INTELLECTUAL DISABILITIES: ICD-10-CM

## 2024-12-24 DIAGNOSIS — Z86.2 PERSONAL HISTORY OF DISEASES OF THE BLOOD AND BLOOD-FORMING ORGANS AND CERTAIN DISORDERS INVOLVING THE IMMUNE MECHANISM: ICD-10-CM

## 2024-12-24 DIAGNOSIS — R11.2 NAUSEA WITH VOMITING, UNSPECIFIED: ICD-10-CM

## 2024-12-24 LAB
HCT VFR BLD CALC: 47.7 % — SIGNIFICANT CHANGE UP (ref 39–50)
HGB BLD-MCNC: 15.6 G/DL — SIGNIFICANT CHANGE UP (ref 13–17)
MCHC RBC-ENTMCNC: 27.8 PG — SIGNIFICANT CHANGE UP (ref 27–34)
MCHC RBC-ENTMCNC: 32.7 G/DL — SIGNIFICANT CHANGE UP (ref 32–36)
MCV RBC AUTO: 84.9 FL — SIGNIFICANT CHANGE UP (ref 80–100)
RBC # BLD: 5.62 M/UL — SIGNIFICANT CHANGE UP (ref 4.2–5.8)
RBC # FLD: 14.2 % — SIGNIFICANT CHANGE UP (ref 10.3–14.5)
WBC # BLD: 15.11 K/UL — HIGH (ref 3.8–10.5)
WBC # FLD AUTO: 15.11 K/UL — HIGH (ref 3.8–10.5)

## 2024-12-24 PROCEDURE — 99285 EMERGENCY DEPT VISIT HI MDM: CPT

## 2024-12-24 RX ORDER — SODIUM CHLORIDE 9 MG/ML
1000 INJECTION, SOLUTION INTRAMUSCULAR; INTRAVENOUS; SUBCUTANEOUS ONCE
Refills: 0 | Status: COMPLETED | OUTPATIENT
Start: 2024-12-24 | End: 2024-12-24

## 2024-12-24 RX ORDER — ONDANSETRON HYDROCHLORIDE 4 MG/1
4 TABLET, FILM COATED ORAL ONCE
Refills: 0 | Status: COMPLETED | OUTPATIENT
Start: 2024-12-24 | End: 2024-12-24

## 2024-12-24 RX ADMIN — ONDANSETRON HYDROCHLORIDE 4 MILLIGRAM(S): 4 TABLET, FILM COATED ORAL at 23:02

## 2024-12-24 RX ADMIN — SODIUM CHLORIDE 1000 MILLILITER(S): 9 INJECTION, SOLUTION INTRAMUSCULAR; INTRAVENOUS; SUBCUTANEOUS at 23:03

## 2024-12-24 NOTE — ED ADULT TRIAGE NOTE - CHIEF COMPLAINT QUOTE
As per EMS pt with vomiting and possible GI bleed. States pt was cleaned by mom and she saw a little blood when she wiped. Reports pt with a history of low platelet, non verbal and autistic,

## 2024-12-24 NOTE — ED ADULT NURSE NOTE - SUICIDE SCREENING QUESTION 3
Patient unable to complete
I personally evaluated the patient. I reviewed the Resident’s or Physician Assistant’s note (as assigned above), and agree with the findings and plan except as documented in my note.

## 2024-12-24 NOTE — ED ADULT NURSE NOTE - OBJECTIVE STATEMENT
25 yo M PMHx autism nonverbal at baseline, low PLT BIBEMS from home accompanied by mother c/o nausea / NBNB vomiting, blood in feces. Pt nonverbal, ambulatory w steady gait. Pt noted with bruising all over body, mother at bedside states pt is self-injurious and has hx of low PLT. Unable to assess pain as pt nonverbal. (-) hematemesis

## 2024-12-24 NOTE — ED ADULT NURSE NOTE - NSSUHOSCREENINGYN_ED_ALL_ED
No - the patient is unable to be screened due to medical condition Quality 402: Tobacco Use And Help With Quitting Among Adolescents: Patient screened for tobacco and never smoked Quality 130: Documentation Of Current Medications In The Medical Record: Current Medications Documented Detail Level: Detailed Quality 431: Preventive Care And Screening: Unhealthy Alcohol Use - Screening: Patient screened for unhealthy alcohol use using a single question and scores less than 2 times per year

## 2024-12-25 VITALS
DIASTOLIC BLOOD PRESSURE: 63 MMHG | OXYGEN SATURATION: 95 % | TEMPERATURE: 98 F | HEART RATE: 90 BPM | RESPIRATION RATE: 18 BRPM | SYSTOLIC BLOOD PRESSURE: 98 MMHG

## 2024-12-25 LAB
ALBUMIN SERPL ELPH-MCNC: 3.2 G/DL — LOW (ref 3.3–5)
ALP SERPL-CCNC: 58 U/L — SIGNIFICANT CHANGE UP (ref 40–120)
ALT FLD-CCNC: 23 U/L — SIGNIFICANT CHANGE UP (ref 12–78)
ANION GAP SERPL CALC-SCNC: 4 MMOL/L — LOW (ref 5–17)
APTT BLD: 29.5 SEC — SIGNIFICANT CHANGE UP (ref 24.5–35.6)
AST SERPL-CCNC: 20 U/L — SIGNIFICANT CHANGE UP (ref 15–37)
BASOPHILS # BLD AUTO: 0.01 K/UL — SIGNIFICANT CHANGE UP (ref 0–0.2)
BASOPHILS # BLD AUTO: 0.02 K/UL — SIGNIFICANT CHANGE UP (ref 0–0.2)
BASOPHILS # BLD AUTO: 0.02 K/UL — SIGNIFICANT CHANGE UP (ref 0–0.2)
BASOPHILS NFR BLD AUTO: 0.1 % — SIGNIFICANT CHANGE UP (ref 0–2)
BASOPHILS NFR BLD AUTO: 0.1 % — SIGNIFICANT CHANGE UP (ref 0–2)
BASOPHILS NFR BLD AUTO: 0.2 % — SIGNIFICANT CHANGE UP (ref 0–2)
BILIRUB SERPL-MCNC: 1.8 MG/DL — HIGH (ref 0.2–1.2)
BLD GP AB SCN SERPL QL: SIGNIFICANT CHANGE UP
BUN SERPL-MCNC: 23 MG/DL — SIGNIFICANT CHANGE UP (ref 7–23)
CALCIUM SERPL-MCNC: 8 MG/DL — LOW (ref 8.5–10.1)
CHLORIDE SERPL-SCNC: 108 MMOL/L — SIGNIFICANT CHANGE UP (ref 96–108)
CO2 SERPL-SCNC: 27 MMOL/L — SIGNIFICANT CHANGE UP (ref 22–31)
CREAT SERPL-MCNC: 0.85 MG/DL — SIGNIFICANT CHANGE UP (ref 0.5–1.3)
EGFR: 124 ML/MIN/1.73M2 — SIGNIFICANT CHANGE UP
EOSINOPHIL # BLD AUTO: 0.01 K/UL — SIGNIFICANT CHANGE UP (ref 0–0.5)
EOSINOPHIL # BLD AUTO: 0.01 K/UL — SIGNIFICANT CHANGE UP (ref 0–0.5)
EOSINOPHIL # BLD AUTO: 0.02 K/UL — SIGNIFICANT CHANGE UP (ref 0–0.5)
EOSINOPHIL NFR BLD AUTO: 0.1 % — SIGNIFICANT CHANGE UP (ref 0–6)
FERRITIN SERPL-MCNC: 108 NG/ML — SIGNIFICANT CHANGE UP (ref 30–400)
GLUCOSE SERPL-MCNC: 108 MG/DL — HIGH (ref 70–99)
HCT VFR BLD CALC: 40.6 % — SIGNIFICANT CHANGE UP (ref 39–50)
HCT VFR BLD CALC: 45 % — SIGNIFICANT CHANGE UP (ref 39–50)
HGB BLD-MCNC: 13.4 G/DL — SIGNIFICANT CHANGE UP (ref 13–17)
HGB BLD-MCNC: 14.7 G/DL — SIGNIFICANT CHANGE UP (ref 13–17)
IMM GRANULOCYTES NFR BLD AUTO: 0.4 % — SIGNIFICANT CHANGE UP (ref 0–0.9)
IMM GRANULOCYTES NFR BLD AUTO: 0.5 % — SIGNIFICANT CHANGE UP (ref 0–0.9)
IMM GRANULOCYTES NFR BLD AUTO: 0.5 % — SIGNIFICANT CHANGE UP (ref 0–0.9)
INR BLD: 1 RATIO — SIGNIFICANT CHANGE UP (ref 0.85–1.16)
IRON SATN MFR SERPL: 10 % — LOW (ref 16–55)
IRON SATN MFR SERPL: 55 UG/DL — SIGNIFICANT CHANGE UP (ref 45–165)
LYMPHOCYTES # BLD AUTO: 0.27 K/UL — LOW (ref 1–3.3)
LYMPHOCYTES # BLD AUTO: 0.27 K/UL — LOW (ref 1–3.3)
LYMPHOCYTES # BLD AUTO: 0.38 K/UL — LOW (ref 1–3.3)
LYMPHOCYTES # BLD AUTO: 1.8 % — LOW (ref 13–44)
LYMPHOCYTES # BLD AUTO: 2.4 % — LOW (ref 13–44)
LYMPHOCYTES # BLD AUTO: 2.8 % — LOW (ref 13–44)
MCHC RBC-ENTMCNC: 27.8 PG — SIGNIFICANT CHANGE UP (ref 27–34)
MCHC RBC-ENTMCNC: 28 PG — SIGNIFICANT CHANGE UP (ref 27–34)
MCHC RBC-ENTMCNC: 32.7 G/DL — SIGNIFICANT CHANGE UP (ref 32–36)
MCHC RBC-ENTMCNC: 33 G/DL — SIGNIFICANT CHANGE UP (ref 32–36)
MCV RBC AUTO: 84.9 FL — SIGNIFICANT CHANGE UP (ref 80–100)
MCV RBC AUTO: 85.1 FL — SIGNIFICANT CHANGE UP (ref 80–100)
MONOCYTES # BLD AUTO: 0.68 K/UL — SIGNIFICANT CHANGE UP (ref 0–0.9)
MONOCYTES # BLD AUTO: 0.71 K/UL — SIGNIFICANT CHANGE UP (ref 0–0.9)
MONOCYTES # BLD AUTO: 0.72 K/UL — SIGNIFICANT CHANGE UP (ref 0–0.9)
MONOCYTES NFR BLD AUTO: 4.7 % — SIGNIFICANT CHANGE UP (ref 2–14)
MONOCYTES NFR BLD AUTO: 5.1 % — SIGNIFICANT CHANGE UP (ref 2–14)
MONOCYTES NFR BLD AUTO: 6.5 % — SIGNIFICANT CHANGE UP (ref 2–14)
NEUTROPHILS # BLD AUTO: 10.02 K/UL — HIGH (ref 1.8–7.4)
NEUTROPHILS # BLD AUTO: 12.33 K/UL — HIGH (ref 1.8–7.4)
NEUTROPHILS # BLD AUTO: 14.03 K/UL — HIGH (ref 1.8–7.4)
NEUTROPHILS NFR BLD AUTO: 90.3 % — HIGH (ref 43–77)
NEUTROPHILS NFR BLD AUTO: 91.6 % — HIGH (ref 43–77)
NEUTROPHILS NFR BLD AUTO: 92.8 % — HIGH (ref 43–77)
NRBC # BLD: 0 /100 WBCS — SIGNIFICANT CHANGE UP (ref 0–0)
PLATELET # BLD AUTO: 1 K/UL — CRITICAL LOW (ref 150–400)
PLATELET # BLD AUTO: 24 K/UL — LOW (ref 150–400)
PLATELET # BLD AUTO: 6 K/UL — CRITICAL LOW (ref 150–400)
POTASSIUM SERPL-MCNC: 4 MMOL/L — SIGNIFICANT CHANGE UP (ref 3.5–5.3)
POTASSIUM SERPL-SCNC: 4 MMOL/L — SIGNIFICANT CHANGE UP (ref 3.5–5.3)
PROT SERPL-MCNC: 6.2 GM/DL — SIGNIFICANT CHANGE UP (ref 6–8.3)
PROTHROM AB SERPL-ACNC: 11.3 SEC — SIGNIFICANT CHANGE UP (ref 9.9–13.4)
RBC # BLD: 4.78 M/UL — SIGNIFICANT CHANGE UP (ref 4.2–5.8)
RBC # BLD: 5.29 M/UL — SIGNIFICANT CHANGE UP (ref 4.2–5.8)
RBC # FLD: 14 % — SIGNIFICANT CHANGE UP (ref 10.3–14.5)
RBC # FLD: 14 % — SIGNIFICANT CHANGE UP (ref 10.3–14.5)
SODIUM SERPL-SCNC: 139 MMOL/L — SIGNIFICANT CHANGE UP (ref 135–145)
TIBC SERPL-MCNC: 550 UG/DL — HIGH (ref 220–430)
UIBC SERPL-MCNC: 495 UG/DL — HIGH (ref 110–370)
WBC # BLD: 11.09 K/UL — HIGH (ref 3.8–10.5)
WBC # BLD: 13.48 K/UL — HIGH (ref 3.8–10.5)
WBC # FLD AUTO: 11.09 K/UL — HIGH (ref 3.8–10.5)
WBC # FLD AUTO: 13.48 K/UL — HIGH (ref 3.8–10.5)

## 2024-12-25 PROCEDURE — 74177 CT ABD & PELVIS W/CONTRAST: CPT | Mod: 26,MC

## 2024-12-25 PROCEDURE — 70450 CT HEAD/BRAIN W/O DYE: CPT | Mod: 26,MC

## 2024-12-25 RX ORDER — ACETAMINOPHEN 500MG 500 MG/1
1000 TABLET, COATED ORAL ONCE
Refills: 0 | Status: COMPLETED | OUTPATIENT
Start: 2024-12-25 | End: 2024-12-25

## 2024-12-25 RX ORDER — HALOPERIDOL 2 MG
5 TABLET ORAL ONCE
Refills: 0 | Status: COMPLETED | OUTPATIENT
Start: 2024-12-25 | End: 2024-12-25

## 2024-12-25 RX ORDER — ACETAMINOPHEN 500MG 500 MG/1
650 TABLET, COATED ORAL ONCE
Refills: 0 | Status: DISCONTINUED | OUTPATIENT
Start: 2024-12-25 | End: 2024-12-25

## 2024-12-25 RX ORDER — SODIUM CHLORIDE 9 MG/ML
1000 INJECTION, SOLUTION INTRAMUSCULAR; INTRAVENOUS; SUBCUTANEOUS ONCE
Refills: 0 | Status: COMPLETED | OUTPATIENT
Start: 2024-12-25 | End: 2024-12-25

## 2024-12-25 RX ADMIN — SODIUM CHLORIDE 1000 MILLILITER(S): 9 INJECTION, SOLUTION INTRAMUSCULAR; INTRAVENOUS; SUBCUTANEOUS at 11:53

## 2024-12-25 RX ADMIN — SODIUM CHLORIDE 1000 MILLILITER(S): 9 INJECTION, SOLUTION INTRAMUSCULAR; INTRAVENOUS; SUBCUTANEOUS at 00:03

## 2024-12-25 RX ADMIN — Medication 5 MILLIGRAM(S): at 05:27

## 2024-12-25 RX ADMIN — ACETAMINOPHEN 500MG 400 MILLIGRAM(S): 500 TABLET, COATED ORAL at 11:53

## 2024-12-25 NOTE — ED PROVIDER NOTE - PATIENT PORTAL LINK FT
You can access the FollowMyHealth Patient Portal offered by [facility] by registering at the following website: [email or web address]. By joining [employer]’s FollowMyHealth portal, you will also be able to view your health information using other applications (apps) compatible with our system. You can access the FollowMyHealth Patient Portal offered by [facility] by registering at the following website: [email or web address]. By joining [employer]’s FollowMyHealth portal, you will also be able to view your health information using other applications (apps) compatible with our system.

## 2024-12-25 NOTE — ED PROVIDER NOTE - CLINICAL SUMMARY MEDICAL DECISION MAKING FREE TEXT BOX
24M pmhx autism, nonverbal at baseline, chronic thrombocytopenia from ITP on hydroxychloroquine and vit D, chronic iron deficiency who presents for evaluation of episode of blood on toilet paper after using the bathroom - mother at bedside providing history - Nicole Hernandes- states that pt was using bathroom, afterwards, she was helping him clean and noticed scant red blood on tissue paper at approx 530pm, later on pt had normal bowel movement without any signs of blood on tissue or in stool. Pt never had bloody stools, states it was only on tissue after wiping. Mother states she would not have taken pt to ED but he vomited. Pt has not had any recent falls though sometimes displays self injurious behavior such as smacking self. Pt then had 3 episodes of nonbloody vomiting throughout the day. Pt has since been tolerating liquids since ambulance ride  on exam abd nontender, scattered ecchymosis of various stages over abd and arm per mother is chronic because of thrombocytopenia, suspected viral syndrome as pt is nontender and well appearing, will obtain labs, antiemetic and serial abdominal exams/ re-eval, trial po - if not improved then consider advanced imaging, rectal exam without signs of hemorrhoids or active bleeding -digital exam not performed due to hx thrombocytopenia, chaperoned by Duong [employer]   - pt clinically improved, stable for dc with strict return precautions 24M pmhx intellectual disability, nonverbal at baseline, chronic thrombocytopenia from ITP on hydroxychloroquine and vit D, chronic iron deficiency who presents for evaluation of episode of blood on toilet paper after using the bathroom - mother at bedside providing history - Nicole Hernandes- states that pt was using bathroom, afterwards, she was helping him clean and noticed scant red blood on tissue paper at approx 530pm, later on pt had normal bowel movement without any signs of blood on tissue or in stool around 8pm. Pt never had bloody stools, mother states it was only one time on tissue after wiping one time and mother has photo of 2 scant red splotches on tissue paper. Mother states she would not have taken pt to ED but he vomited. Pt has not had any recent falls though sometimes displays self injurious behavior such as smacking self. Pt then had 3 episodes of nonbloody vomiting throughout the day. Pt has since been tolerating liquids since ambulance ride to hospital.   on exam abd nontender, scattered ecchymosis of various stages over abd and arm per mother is chronic because of chronic thrombocytopenia. small bruising over right lower back. No tenderness of abd on exam.   rectal exam without signs of hemorrhoids or active bleeding -digital exam not performed due to hx thrombocytopenia, chaperoned by Duong [employer] . Monitored for any signs of recurrent bleeding and no evidence of bleeding.   Vomiting suspected due to viral syndrome as pts abd nontender and well appearing, will obtain labs, antiemetic and serial abdominal exams/ re-eval, trial po - if not improved then consider advanced imaging,  - pt clinically improved, ambulatory, tolerating po, with repeat abd exams nontender, however pt not compliant with vital signs and unable to get adequate bp, and in setting of multiple bruises and self injurious behavior, decision made to get repeat cbc to eval for any significant blood loss and sedate for CT head/ CT a/p to rule out any signs of bleeding/ infcn/ obstruction  -ct resulted wnl, do not suspect protocolitis as pt has not had any symptoms while in ED . platelets initially 24, then on repeat 1 and repeated for accuracy 6, suspected dilutional component to cbc as pt was given IV fluids and wbc also decreased. Platelets near patient's baseline and no evidence of ongoing bleeding on exam, will dc with strict return precautions, pts mother Nicole at bedside and involved throughout pts care and agrees to have pt return for any signs of bleeding.

## 2024-12-25 NOTE — ED PROVIDER NOTE - PROGRESS NOTE DETAILS
Siegel DO: pt serially reassessed, abd soft ntnd, no signs of pain/discomfort, tolerating po, stable for dc, d/w pt and parent at bedside regarding return precautions and follow up Siegel DO: pt stable, sleeping soundly after haldol and on cardiac monitor, no emergent findings on imaging tests, platelets low but near his baseline, repeat checked for stability, no signs of bleeding while here, s/o to day team for re-eval as cannot be dc until haldol wears off/ steady gait as hx severe thrombocytopenia and fall risk, vitals wnl, on cardiac monitor.

## 2024-12-25 NOTE — ED PROVIDER NOTE - INPATIENT RECORD SUMMARY
prior 2 hospital admissions reviewed from this year, once for rectal bleeding presumed hemorrhoidal treated with supportive care and prior hospitalization for lower leg cellulitis - heme/onc consults reviewed and pt with refractory ITP which failed to improve despite steroids, rituxan, nplate, ivIG, droptelet, hydroxychloroquine. Platelets have been < 5K for several months and often < 10k over the last 1 year

## 2024-12-25 NOTE — ED PROVIDER NOTE - NSFOLLOWUPINSTRUCTIONS_ED_ALL_ED_FT
Patient was seen today for vomiting and reported blood on toilet paper - there was no signs of ongoing rectal bleeding today and he was reported to have had a normal stool.  His hemoglobin was stable. His platelets was 24K.     He was given a one time dose of zofran for nausea    He had slight elevated WBC count which we suspect from stomach bug    If he is not improving or if he is having any confusion, lethargy or change in behavior or signs of pain/agitation/ fever, have patient return to the emergency room immediately.     Have patient follow up with primary care doctor within 48 hours for reassessment Patient was seen today for vomiting and reported blood on toilet paper - there was no signs of ongoing rectal bleeding today and he was reported to have had a normal stool.  His hemoglobin was stable. His platelets was 24K.     He was given a one time dose of zofran for nausea    He had slight elevated WBC count which we suspect from stomach bug    If he is not improving or if he is having any confusion, lethargy or change in behavior or signs of pain/agitation/ fever, have patient return to the emergency room immediately.     Have patient follow up with primary care doctor within 48 hours for reassessment    Please use tylenol for fever greater than 100.4 F Patient was seen today for vomiting and reported blood on toilet paper - there was no signs of ongoing rectal bleeding today and he was reported to have had a normal stool.  His hemoglobin was stable..     He was given a one time dose of zofran for nausea    He had slight elevated WBC count which we suspect from stomach bug    If he is not improving or if he is having any confusion, lethargy or change in behavior or signs of pain/agitation/ fever, have patient return to the emergency room immediately.     Have patient follow up with primary care doctor within 48 hours for reassessment    Please use tylenol for fever greater than 100.4 F

## 2024-12-25 NOTE — ED PROVIDER NOTE - PHYSICAL EXAMINATION
Gen: alert, interactive but noncompliant, nonverbal   Head: NCAT  ENT: Airway patent, dry  mucous membranes, nasal passageways clear,   Cardiac: Normal rate, normal rhythm, no murmurs   Respiratory: Lungs CTA B/L  Gastrointestinal: Abdomen soft, nontender, nondistended, no rebound, no guarding  rectal: no external hemorrhoids, no active bleeding , chaperoned by Duong Zamorano RN   MSK: No gross abnormalities, FROM of all four extremities, no edema  HEME: Extremities warm and well perfused  Skin: small areas of scattered superficial ecchymosis over low back and abd and extremities , scant healing bruising under eyes   Neuro: No gross neurologic deficits, normal gait, normal strength throughout
